# Patient Record
Sex: FEMALE | Race: WHITE | NOT HISPANIC OR LATINO | Employment: UNEMPLOYED | RURAL
[De-identification: names, ages, dates, MRNs, and addresses within clinical notes are randomized per-mention and may not be internally consistent; named-entity substitution may affect disease eponyms.]

---

## 2017-01-23 RX ORDER — FUROSEMIDE 20 MG/1
TABLET ORAL
Qty: 13 TABLET | Refills: 4 | Status: SHIPPED | OUTPATIENT
Start: 2017-01-23 | End: 2017-06-30 | Stop reason: SDUPTHER

## 2017-02-16 ENCOUNTER — OFFICE VISIT (OUTPATIENT)
Dept: FAMILY MEDICINE CLINIC | Facility: CLINIC | Age: 67
End: 2017-02-16

## 2017-02-16 VITALS
BODY MASS INDEX: 35.7 KG/M2 | WEIGHT: 194 LBS | HEIGHT: 62 IN | HEART RATE: 82 BPM | DIASTOLIC BLOOD PRESSURE: 80 MMHG | TEMPERATURE: 97.9 F | RESPIRATION RATE: 16 BRPM | SYSTOLIC BLOOD PRESSURE: 160 MMHG

## 2017-02-16 DIAGNOSIS — E78.5 HYPERLIPIDEMIA, UNSPECIFIED HYPERLIPIDEMIA TYPE: ICD-10-CM

## 2017-02-16 DIAGNOSIS — I10 ESSENTIAL HYPERTENSION: ICD-10-CM

## 2017-02-16 DIAGNOSIS — R05.9 COUGH: ICD-10-CM

## 2017-02-16 DIAGNOSIS — Z11.59 NEED FOR HEPATITIS C SCREENING TEST: ICD-10-CM

## 2017-02-16 DIAGNOSIS — E11.9 TYPE 2 DIABETES MELLITUS WITHOUT COMPLICATION, WITHOUT LONG-TERM CURRENT USE OF INSULIN (HCC): Primary | ICD-10-CM

## 2017-02-16 PROCEDURE — 99213 OFFICE O/P EST LOW 20 MIN: CPT | Performed by: NURSE PRACTITIONER

## 2017-02-16 RX ORDER — GUAIFENESIN 600 MG/1
600 TABLET, EXTENDED RELEASE ORAL 2 TIMES DAILY
Qty: 60 TABLET | Refills: 1 | Status: SHIPPED | OUTPATIENT
Start: 2017-02-16 | End: 2017-07-17

## 2017-02-16 NOTE — PROGRESS NOTES
Subjective   Afia Adams is a 66 y.o. female.     Diabetes   She presents for her follow-up diabetic visit. She has type 2 diabetes mellitus. Her disease course has been stable. There are no hypoglycemic associated symptoms. Pertinent negatives for hypoglycemia include no headaches or sweats. Pertinent negatives for diabetes include no blurred vision, no chest pain and no weight loss. There are no hypoglycemic complications. Symptoms are stable. There are no diabetic complications. Pertinent negatives for diabetic complications include no CVA, PVD or retinopathy. Her weight is stable. She is following a diabetic diet. When asked about meal planning, she reported none. She has not had a previous visit with a dietitian. She rarely participates in exercise. She monitors blood glucose at home 3-4 x per week. Her breakfast blood glucose is taken between 7-8 am. Her breakfast blood glucose range is generally 140-180 mg/dl. An ACE inhibitor/angiotensin II receptor blocker is being taken.   Hypertension   This is a chronic problem. The current episode started more than 1 year ago. The problem is controlled. Associated symptoms include peripheral edema (occ slight swelling in her feet). Pertinent negatives include no anxiety, blurred vision, chest pain, headaches, malaise/fatigue, neck pain, orthopnea, palpitations, PND, shortness of breath or sweats. There are no associated agents to hypertension. Risk factors for coronary artery disease include diabetes mellitus, post-menopausal state and sedentary lifestyle. Past treatments include angiotensin blockers, beta blockers and diuretics. The current treatment provides no improvement. There are no compliance problems.  Hypertensive end-organ damage includes heart failure. There is no history of angina, kidney disease, CAD/MI, CVA, left ventricular hypertrophy, PVD or retinopathy.   Cough   This is a new problem. The current episode started 1 to 4 weeks ago (has had  for 2 weeks). The problem has been waxing and waning. The problem occurs every few hours. The cough is non-productive. Associated symptoms include rhinorrhea. Pertinent negatives include no chest pain, chills, ear congestion, ear pain, fever, headaches, heartburn, hemoptysis, myalgias, nasal congestion, postnasal drip, rash, sore throat, shortness of breath, sweats, weight loss or wheezing. Treatments tried: loratadine. The treatment provided mild relief. There is no history of asthma, bronchiectasis, bronchitis, COPD, emphysema, environmental allergies or pneumonia.        The following portions of the patient's history were reviewed and updated as appropriate: allergies, current medications, past family history, past medical history, past social history, past surgical history and problem list.    Review of Systems   Constitutional: Negative.  Negative for chills, fever, malaise/fatigue and weight loss.   HENT: Positive for rhinorrhea. Negative for ear pain, postnasal drip and sore throat.    Eyes: Negative for blurred vision.   Respiratory: Positive for cough. Negative for hemoptysis, shortness of breath and wheezing.    Cardiovascular: Negative.  Negative for chest pain, palpitations, orthopnea and PND.   Gastrointestinal: Negative.  Negative for heartburn.   Musculoskeletal: Negative.  Negative for myalgias and neck pain.   Skin: Negative.  Negative for rash.   Allergic/Immunologic: Negative for environmental allergies.   Neurological: Negative.  Negative for headaches.   Psychiatric/Behavioral: Negative.        Objective   Physical Exam   Constitutional: She is oriented to person, place, and time. She appears well-developed and well-nourished. No distress.   HENT:   Head: Normocephalic.   Right Ear: External ear normal.   Left Ear: External ear normal.   Mouth/Throat: Oropharynx is clear and moist. No oropharyngeal exudate.   Eyes: Pupils are equal, round, and reactive to light.   Neck: Normal range of motion.  Neck supple. No thyromegaly present.   Cardiovascular: Normal rate, regular rhythm and normal heart sounds.  Exam reveals no friction rub.    No murmur heard.  No edema today in lower ext.   Pulmonary/Chest: Effort normal and breath sounds normal. No respiratory distress. She has no wheezes. She has no rales.   Abdominal: Soft. Bowel sounds are normal.   Musculoskeletal: Normal range of motion.   Neurological: She is alert and oriented to person, place, and time.   Skin: Skin is warm and dry.   Psychiatric: She has a normal mood and affect. Thought content normal.   Nursing note and vitals reviewed.      Assessment/Plan   Afia was seen today for med refill.    Diagnoses and all orders for this visit:    Type 2 diabetes mellitus without complication, without long-term current use of insulin  -     Hemoglobin A1c; Future    Essential hypertension    Hyperlipidemia, unspecified hyperlipidemia type  -     Lipid panel; Future    Need for hepatitis C screening test  -     Hepatitis C Antibody; Future    Cough  -     guaiFENesin (MUCINEX) 600 MG 12 hr tablet; Take 1 tablet by mouth 2 (Two) Times a Day.        Health maintenance is updated and she will have labs drawn next week.        Lab Results   Component Value Date    HGBA1C 6.2 (H) 08/12/2016

## 2017-02-17 PROBLEM — R05.9 COUGH: Status: ACTIVE | Noted: 2017-02-17

## 2017-02-17 PROBLEM — Z11.59 NEED FOR HEPATITIS C SCREENING TEST: Status: ACTIVE | Noted: 2017-02-17

## 2017-02-21 ENCOUNTER — LAB (OUTPATIENT)
Dept: LAB | Facility: CLINIC | Age: 67
End: 2017-02-21

## 2017-02-21 DIAGNOSIS — E11.9 TYPE 2 DIABETES MELLITUS WITHOUT COMPLICATION, WITHOUT LONG-TERM CURRENT USE OF INSULIN (HCC): ICD-10-CM

## 2017-02-21 DIAGNOSIS — E78.5 HYPERLIPIDEMIA, UNSPECIFIED HYPERLIPIDEMIA TYPE: ICD-10-CM

## 2017-02-21 DIAGNOSIS — Z11.59 NEED FOR HEPATITIS C SCREENING TEST: ICD-10-CM

## 2017-02-21 LAB
ARTICHOKE IGE QN: 88 MG/DL (ref 1–129)
CHOLEST SERPL-MCNC: 170 MG/DL (ref 0–199)
HBA1C MFR BLD: 6.16 % (ref 4–5.6)
HCV AB SER DONR QL: NEGATIVE
HDLC SERPL-MCNC: 35 MG/DL (ref 60–200)
LDLC/HDLC SERPL: 2.37 {RATIO} (ref 0–3.22)
TRIGL SERPL-MCNC: 261 MG/DL (ref 20–199)

## 2017-02-21 PROCEDURE — 83036 HEMOGLOBIN GLYCOSYLATED A1C: CPT | Performed by: NURSE PRACTITIONER

## 2017-02-21 PROCEDURE — 80061 LIPID PANEL: CPT | Performed by: NURSE PRACTITIONER

## 2017-02-21 PROCEDURE — 86803 HEPATITIS C AB TEST: CPT | Performed by: NURSE PRACTITIONER

## 2017-03-13 DIAGNOSIS — I10 ESSENTIAL HYPERTENSION: ICD-10-CM

## 2017-03-13 DIAGNOSIS — Z79.899 DRUG THERAPY: ICD-10-CM

## 2017-03-13 DIAGNOSIS — I25.10 CORONARY ARTERY DISEASE INVOLVING NATIVE CORONARY ARTERY OF NATIVE HEART WITHOUT ANGINA PECTORIS: Primary | ICD-10-CM

## 2017-03-14 ENCOUNTER — OFFICE VISIT (OUTPATIENT)
Dept: CARDIOLOGY | Facility: CLINIC | Age: 67
End: 2017-03-14

## 2017-03-14 VITALS
WEIGHT: 190.25 LBS | HEART RATE: 77 BPM | SYSTOLIC BLOOD PRESSURE: 166 MMHG | HEIGHT: 63 IN | OXYGEN SATURATION: 98 % | DIASTOLIC BLOOD PRESSURE: 82 MMHG | BODY MASS INDEX: 33.71 KG/M2

## 2017-03-14 DIAGNOSIS — R05.9 COUGH: ICD-10-CM

## 2017-03-14 DIAGNOSIS — J06.9 UPPER RESPIRATORY TRACT INFECTION, UNSPECIFIED TYPE: ICD-10-CM

## 2017-03-14 DIAGNOSIS — I10 ESSENTIAL HYPERTENSION: ICD-10-CM

## 2017-03-14 DIAGNOSIS — Z79.899 DRUG THERAPY: ICD-10-CM

## 2017-03-14 DIAGNOSIS — I25.10 CORONARY ARTERIOSCLEROSIS: ICD-10-CM

## 2017-03-14 DIAGNOSIS — E78.2 MIXED HYPERLIPIDEMIA: ICD-10-CM

## 2017-03-14 DIAGNOSIS — I50.22 CHRONIC SYSTOLIC HEART FAILURE (HCC): Primary | ICD-10-CM

## 2017-03-14 LAB
ALBUMIN SERPL-MCNC: 4.1 G/DL (ref 3.4–4.8)
ALBUMIN/GLOB SERPL: 1.2 G/DL (ref 1.1–1.8)
ALP SERPL-CCNC: 107 U/L (ref 38–126)
ALT SERPL W P-5'-P-CCNC: 23 U/L (ref 9–52)
ANION GAP SERPL CALCULATED.3IONS-SCNC: 13 MMOL/L (ref 5–15)
AST SERPL-CCNC: 20 U/L (ref 14–36)
BASOPHILS # BLD AUTO: 0.02 10*3/MM3 (ref 0–0.2)
BASOPHILS NFR BLD AUTO: 0.2 % (ref 0–2)
BILIRUB SERPL-MCNC: 0.6 MG/DL (ref 0.2–1.3)
BUN BLD-MCNC: 14 MG/DL (ref 7–21)
BUN/CREAT SERPL: 18.7 (ref 7–25)
CALCIUM SPEC-SCNC: 9 MG/DL (ref 8.4–10.2)
CHLORIDE SERPL-SCNC: 100 MMOL/L (ref 95–110)
CO2 SERPL-SCNC: 29 MMOL/L (ref 22–31)
CREAT BLD-MCNC: 0.75 MG/DL (ref 0.5–1)
DEPRECATED RDW RBC AUTO: 44.7 FL (ref 36.4–46.3)
EOSINOPHIL # BLD AUTO: 0.09 10*3/MM3 (ref 0–0.7)
EOSINOPHIL NFR BLD AUTO: 1.1 % (ref 0–7)
ERYTHROCYTE [DISTWIDTH] IN BLOOD BY AUTOMATED COUNT: 18.7 % (ref 11.5–14.5)
GFR SERPL CREATININE-BSD FRML MDRD: 78 ML/MIN/1.73 (ref 45–104)
GLOBULIN UR ELPH-MCNC: 3.4 GM/DL (ref 2.3–3.5)
GLUCOSE BLD-MCNC: 119 MG/DL (ref 60–100)
HCT VFR BLD AUTO: 31.8 % (ref 35–45)
HGB BLD-MCNC: 9.3 G/DL (ref 12–15.5)
IMM GRANULOCYTES # BLD: 0.02 10*3/MM3 (ref 0–0.02)
IMM GRANULOCYTES NFR BLD: 0.2 % (ref 0–0.5)
LYMPHOCYTES # BLD AUTO: 2.06 10*3/MM3 (ref 0.6–4.2)
LYMPHOCYTES NFR BLD AUTO: 25.5 % (ref 10–50)
MCH RBC QN AUTO: 19.5 PG (ref 26.5–34)
MCHC RBC AUTO-ENTMCNC: 29.2 G/DL (ref 31.4–36)
MCV RBC AUTO: 66.8 FL (ref 80–98)
MONOCYTES # BLD AUTO: 0.76 10*3/MM3 (ref 0–0.9)
MONOCYTES NFR BLD AUTO: 9.4 % (ref 0–12)
NEUTROPHILS # BLD AUTO: 5.13 10*3/MM3 (ref 2–8.6)
NEUTROPHILS NFR BLD AUTO: 63.6 % (ref 37–80)
PLATELET # BLD AUTO: 253 10*3/MM3 (ref 150–450)
PMV BLD AUTO: 9.6 FL (ref 8–12)
POTASSIUM BLD-SCNC: 4.7 MMOL/L (ref 3.5–5.1)
PROT SERPL-MCNC: 7.5 G/DL (ref 6.3–8.6)
RBC # BLD AUTO: 4.76 10*6/MM3 (ref 3.77–5.16)
SODIUM BLD-SCNC: 142 MMOL/L (ref 137–145)
WBC NRBC COR # BLD: 8.08 10*3/MM3 (ref 3.2–9.8)

## 2017-03-14 PROCEDURE — 85025 COMPLETE CBC W/AUTO DIFF WBC: CPT | Performed by: NURSE PRACTITIONER

## 2017-03-14 PROCEDURE — 93010 ELECTROCARDIOGRAM REPORT: CPT | Performed by: INTERNAL MEDICINE

## 2017-03-14 PROCEDURE — 93005 ELECTROCARDIOGRAM TRACING: CPT | Performed by: NURSE PRACTITIONER

## 2017-03-14 PROCEDURE — 99214 OFFICE O/P EST MOD 30 MIN: CPT | Performed by: NURSE PRACTITIONER

## 2017-03-14 PROCEDURE — 80053 COMPREHEN METABOLIC PANEL: CPT | Performed by: NURSE PRACTITIONER

## 2017-03-14 RX ORDER — CEFUROXIME AXETIL 500 MG/1
500 TABLET ORAL 2 TIMES DAILY
Qty: 14 TABLET | Refills: 0 | Status: SHIPPED | OUTPATIENT
Start: 2017-03-14 | End: 2017-07-17

## 2017-03-14 RX ORDER — BENZONATATE 100 MG/1
100 CAPSULE ORAL 3 TIMES DAILY PRN
Qty: 21 CAPSULE | Refills: 0 | Status: SHIPPED | OUTPATIENT
Start: 2017-03-14 | End: 2017-07-17

## 2017-03-14 NOTE — PROGRESS NOTES
Subjective:     Chief Complaint   Patient presents with   • Congestive Heart Failure     chief complaint      Congestive Heart Failure   Presents for follow-up visit. Associated symptoms include orthopnea. Pertinent negatives include no chest pain, edema, fatigue, palpitations, paroxysmal nocturnal dyspnea or unexpected weight change. Shortness of breath: NYHA Class II. Compliance with total regimen is 26-50%. Compliance with diet is 51-75%. Compliance with exercise is 0-25%. Compliance with medications is %.     Ms Adams is a 65 year old female who presents for her follow up HF Clinic visit.  Ms Adams was admitted to the hospital (from [facility]) on 03/08/2016 with evidence of anterior STEMI with PCI per Dr Kent. Cath was noted with evidence of multi-vessel disease. In addition, she required intubation with mechanical ventilation secondary to pulmonary edema. Muga scan demonstrated evidence of EF 35.5% on 03/15/2016. She was discharged on 03/17/2016.  Ms Adams is accompanied by her daughter-in-law, Yu Adams.  Ms Adams indicates continued stability and improvement , but is experiencing no chest pain. She is low functional capacity at this time - NYHA Class III.     PCP: BILL Rodrigues  Cardiology: Dr Kent    [facility]:  1. CAD, S/P PCI LAD - per Dr Kent - 03/08/2016 2.75mm x 12mm and a 2.5mm x 23mm Alpine stent proximal LAD  2. HFrEF, ischemic 35.5% by Muga on 03/15/2016  3. Acute pulmonary edema 03/08/2016 requiring intubation   4. RLL pneumonia 03/2016  5. Hypertension  6. Hyperlipidemia  7. DM, Type II  8.GERD  9. Colon polyp removal 03/07/2016  10. H/O hyperkalemia on AA    Yu: daughter-in-law [phone] (cell)    Review of Systems   Constitutional: Negative for appetite change, chills, fatigue, fever and unexpected weight change.   HENT: Congestion: chest for approximately 1 week.    Respiratory: Negative for  apnea and chest tightness. Cough: NP for approximately 1 week; given Mucinex. Shortness of breath: NYHA Class II.    Cardiovascular: Negative for chest pain, palpitations and leg swelling.   Gastrointestinal: Negative for abdominal distention.   Neurological: Negative for dizziness, syncope and light-headedness.   Hematological: Does not bruise/bleed easily.     Current Outpatient Prescriptions   Medication Sig Dispense Refill   • albuterol (VENTOLIN HFA) 108 (90 BASE) MCG/ACT inhaler Inhale 2 puffs 4 (four) times a day.     • aspirin 81 MG tablet Take 81 mg by mouth daily.     • carvedilol (COREG) 25 MG tablet Take 25 mg by mouth 2 (two) times a day.     • clopidogrel (PLAVIX) 75 MG tablet Take 75 mg by mouth daily.     • digoxin (LANOXIN) 125 MCG tablet Take 125 mcg by mouth every day.     • furosemide (LASIX) 20 MG tablet TAKE ONE TABLET BY MOUTH ON MONDAYS, THURSDAYS, AND SATURDAYS 13 tablet 4   • glimepiride (AMARYL) 1 MG tablet TAKE ONE-HALF TABLET BY MOUTH TWO TIMES A DAY 30 tablet 3   • guaiFENesin (MUCINEX) 600 MG 12 hr tablet Take 1 tablet by mouth 2 (Two) Times a Day. 60 tablet 1   • ipratropium-albuterol (COMBIVENT)  MCG/ACT inhaler Inhale 3 (three) times a day.     • loratadine (CLARITIN) 10 MG tablet Take 1 tablet by mouth Daily. 30 tablet 3   • losartan (COZAAR) 50 MG tablet Take 50 mg by mouth daily.     • omeprazole (PriLOSEC) 40 MG capsule Take 40 mg by mouth daily.     • pravastatin (PRAVACHOL) 20 MG tablet TAKE ONE TABLET BY MOUTH EVERY NIGHT AT BEDTIME 30 tablet 3     No current facility-administered medications for this visit.      Objective:      Physical Exam   Constitutional: She is oriented to person, place, and time. She appears well-nourished. No distress.   HENT:   Head: Normocephalic and atraumatic.   Neck: No JVD present.   Cardiovascular: Normal rate, regular rhythm and normal heart sounds.  Exam reveals no gallop.    No murmur heard.  Pulmonary/Chest: Effort normal and breath  sounds normal. No respiratory distress. She has no wheezes.   Abdominal: She exhibits no distension.   Musculoskeletal: Edema: Pedal edema BLE - reported to occur as day progresses.   Neurological: She is alert and oriented to person, place, and time.   Skin: Skin is warm and dry. She is not diaphoretic.   Psychiatric: She has a normal mood and affect. Her behavior is normal. Judgment and thought content normal.     Cardiographics  FINDINGS:  The left atrium appears to be normal in size with mild  concentric left ventricular hypertrophy with normal aortic root size.  There is evidence of posterolateral wall hypokinesis noted and septal  wall severely hypokinetic, asymmetric septal wall hypertrophy, apical  wall akinesis, severely depressed left ventricular systolic function,  with an estimated ejection fraction of about 20% to 25%.  Mitral valve appears to be mildly thickened with adequate opening.  Aortic valve appears to be thickened and calcified suggestive of aortic  sclerosis. There is concentric left ventricular hypertrophy noted with E  to A reversal suggestive of diastolic dysfunction.  Color flow Doppler revealed mild mitral and mild tricuspid  regurgitation. 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  There is no intracardiac mass or smoke ECHO contrast noted suggestive of  intracardiac thrombus. 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 Pericardium appears to be normal without any evidence  of effusion.     Cardiac Cath: 03/08/2016  CORONARIES:  Right dominant system.  1.   LEFT MAIN: Left main coronary artery toward the distal end had       evidence of luminal irregularity.  2.   LAD: Left anterior descending artery was a medium caliber vessel,       which in the proximal portion was 100% occluded after the origin of       the 1st diagonal branch. There was no evidence of any antegrade       flow down the distal left anterior descending artery. The  1st       diagonal branch in the proximal portion had up to 80% to 90%       stenosis.          The 1st septal  in the proximal portion had evidence of       luminal irregularity. There was no evidence of any antegrade flow       down the left anterior descending artery.  3.   CIRCUMFLEX: The circumflex artery was a medium caliber vessel,       which towards the ostium, had up to 50% to 60% stenosis. The distal       end of the circumflex artery gave origin to the obtuse marginal       branch #2 and #3, which had 70% to 80% stenosis.  4.   RCA: The right coronary artery was a medium caliber dominant       vessel, which in the proximal portion had a discrete 60% to 70%       stenosis. The midportion of the right coronary artery had 30% to       40% stenosis, and the distal right coronary artery bifurcated into       the posterior descending and posterolateral branch. The posterior       lateral branch at its origin had 60% to 70% stenosis. The distal       right coronary artery, before the bifurcation to the posterior       descending and posterolateral branch, had evidence of 30% to 40%       narrowing.     FINAL IMPRESSION:  1.   The right coronary artery was diffusely diseased.  2.   A 100% occluded proximal left anterior descending artery, which was       the infarct-related vessel.  3.   Ostial circumflex artery with 30% to 40% narrowing and in some       views appeared to be 60% to 70%.  4.   The 1st diagonal branch in the proximal portion with 70% to 80%       stenosis.  Following this, a 2.75 mm x 12 mm and a 2.5 mm x 23 mm Alpine stent was  then successfully deployed with reduction of stenosis from 100% to less  than 0% stenosis. Patient did have distal left anterior descending  artery, diagonal, ostial circumflex artery, and diffusely diseased right  coronary artery.     Patient will be evaluated for direct revascularization and have  discussed with Dr. Verduzco. In view of the patient's acute  myocardial  infarction with left anterior descending artery 100% occluded, patient  at the present time would not be subjected to an emergent coronary  artery bypass grafting, and patient will be stabilized.    Lab Review   Lab on 02/21/2017   Component Date Value   • Hemoglobin A1C 02/21/2017 6.16*   • Total Cholesterol 02/21/2017 170    • Triglycerides 02/21/2017 261*   • HDL Cholesterol 02/21/2017 35*   • LDL Cholesterol  02/21/2017 88    • LDL/HDL Ratio 02/21/2017 2.37    • Hepatitis C Ab 02/21/2017 Negative    Hospital Outpatient Visit on 12/20/2016   Component Date Value   • Sodium 12/20/2016 140    • Potassium 12/20/2016 5.3*   • Chloride 12/20/2016 97    • CO2 12/20/2016 28    • Anion Gap 12/20/2016 15.0    • Glucose 12/20/2016 96    • BUN 12/20/2016 15    • Creatinine 12/20/2016 0.9    • GFR MDRD Non  Jacqui* 12/20/2016 63    • GFR MDRD  12/20/2016 76    • Calcium 12/20/2016 9.5    Office Visit on 12/06/2016   Component Date Value   • Sodium 12/06/2016 139    • Potassium 12/06/2016 5.5*   • Chloride 12/06/2016 98    • CO2 12/06/2016 29    • Anion Gap 12/06/2016 12.0    • Glucose 12/06/2016 138*   • BUN 12/06/2016 13    • Creatinine 12/06/2016 0.8    • GFR MDRD Non  Jacqui* 12/06/2016 72    • GFR MDRD  12/06/2016 87    • Calcium 12/06/2016 9.5    • Digoxin 12/06/2016 <0.4*      Assessment:      Diagnosis Plan   1. Chronic systolic heart failure  Comprehensive Metabolic Panel   2. Coronary arteriosclerosis  Comprehensive Metabolic Panel    CBC & Differential    CBC Auto Differential    Scan Slide   3. Essential hypertension  Comprehensive Metabolic Panel   4. Mixed hyperlipidemia  Comprehensive Metabolic Panel   5. Cough  benzonatate (TESSALON PERLES) 100 MG capsule    CBC & Differential    CBC Auto Differential    Scan Slide   6. Upper respiratory tract infection, unspecified type  cefuroxime (CEFTIN) 500 MG tablet    CBC & Differential    CBC Auto Differential    Scan  Slide   7. Drug therapy  Comprehensive Metabolic Panel    CBC & Differential    CBC Auto Differential    Scan Slide     Ms Adams presents with history of HFrEF for which there is no clinical evidence of ADHF. Uncontrolled hypertension is noted.   Ms Adams does present with clinical evidence of URI.  Plan:   Recommended daily weight monitoring.  Recommended restricted dietary sodium intake.  Discussed patient action plan for heart failure.  Recommended avoiding NSAIDs use.  Discussed warning signs requiring additional medical attention for heart failure.  X-Large compression stockings given.  Continue current HF plan of care.    Monitor OP BP as there is question of hypertension being related to OTC medications for URI, along with disruption of sleep patterns.   Will contact re: today's lab results, along with echocardiogram results in 1 week unless otherwise indicated by today's labs.

## 2017-03-21 ENCOUNTER — DOCUMENTATION (OUTPATIENT)
Dept: CARDIOLOGY | Facility: CLINIC | Age: 67
End: 2017-03-21

## 2017-03-21 ENCOUNTER — TELEPHONE (OUTPATIENT)
Dept: CARDIOLOGY | Facility: CLINIC | Age: 67
End: 2017-03-21

## 2017-03-21 RX ORDER — HYDRALAZINE HYDROCHLORIDE 25 MG/1
25 TABLET, FILM COATED ORAL 2 TIMES DAILY
Qty: 60 TABLET | Refills: 5 | Status: SHIPPED | OUTPATIENT
Start: 2017-03-21 | End: 2017-07-12 | Stop reason: DRUGHIGH

## 2017-03-21 RX ORDER — PRAVASTATIN SODIUM 20 MG
20 TABLET ORAL NIGHTLY
Qty: 30 TABLET | Refills: 5 | Status: SHIPPED | OUTPATIENT
Start: 2017-03-21 | End: 2017-04-25 | Stop reason: SDUPTHER

## 2017-03-21 NOTE — TELEPHONE ENCOUNTER
Telephone contact re: BP which is reported to be consistent: 140-160/80.   Labs and echocardiogram results are discussed.    Initiation of Hydralazine 25 mg bid, switching ARB to noon administration.     Daughter-in-law will continue to monitor BP and contact me with any issues/concerns.

## 2017-03-22 RX ORDER — GLIMEPIRIDE 1 MG/1
TABLET ORAL
Qty: 30 TABLET | Refills: 2 | Status: SHIPPED | OUTPATIENT
Start: 2017-03-22 | End: 2017-06-20 | Stop reason: SDUPTHER

## 2017-04-25 DIAGNOSIS — E78.5 HYPERLIPIDEMIA, UNSPECIFIED HYPERLIPIDEMIA TYPE: Primary | ICD-10-CM

## 2017-04-25 RX ORDER — PRAVASTATIN SODIUM 20 MG
20 TABLET ORAL NIGHTLY
Qty: 90 TABLET | Refills: 1 | Status: SHIPPED | OUTPATIENT
Start: 2017-04-25 | End: 2017-07-17 | Stop reason: SDUPTHER

## 2017-06-20 RX ORDER — GLIMEPIRIDE 1 MG/1
TABLET ORAL
Qty: 30 TABLET | Refills: 1 | Status: SHIPPED | OUTPATIENT
Start: 2017-06-20 | End: 2017-08-17 | Stop reason: SDUPTHER

## 2017-06-30 RX ORDER — FUROSEMIDE 20 MG/1
TABLET ORAL
Qty: 13 TABLET | Refills: 0 | Status: SHIPPED | OUTPATIENT
Start: 2017-06-30 | End: 2017-07-17 | Stop reason: SDUPTHER

## 2017-07-05 ENCOUNTER — APPOINTMENT (OUTPATIENT)
Dept: LAB | Facility: HOSPITAL | Age: 67
End: 2017-07-05

## 2017-07-05 ENCOUNTER — OFFICE VISIT (OUTPATIENT)
Dept: CARDIOLOGY | Facility: CLINIC | Age: 67
End: 2017-07-05

## 2017-07-05 VITALS
HEART RATE: 82 BPM | DIASTOLIC BLOOD PRESSURE: 78 MMHG | HEIGHT: 63 IN | BODY MASS INDEX: 35.81 KG/M2 | OXYGEN SATURATION: 97 % | SYSTOLIC BLOOD PRESSURE: 180 MMHG | WEIGHT: 202.13 LBS

## 2017-07-05 DIAGNOSIS — I10 ESSENTIAL HYPERTENSION: ICD-10-CM

## 2017-07-05 DIAGNOSIS — I50.22 CHRONIC SYSTOLIC HEART FAILURE (HCC): Primary | ICD-10-CM

## 2017-07-05 DIAGNOSIS — Z79.899 DRUG THERAPY: ICD-10-CM

## 2017-07-05 DIAGNOSIS — I25.10 CORONARY ARTERIOSCLEROSIS: ICD-10-CM

## 2017-07-05 LAB
ANION GAP SERPL CALCULATED.3IONS-SCNC: 13 MMOL/L (ref 5–15)
BUN BLD-MCNC: 12 MG/DL (ref 7–21)
BUN/CREAT SERPL: 14.3 (ref 7–25)
CALCIUM SPEC-SCNC: 9.3 MG/DL (ref 8.4–10.2)
CHLORIDE SERPL-SCNC: 99 MMOL/L (ref 95–110)
CO2 SERPL-SCNC: 28 MMOL/L (ref 22–31)
CREAT BLD-MCNC: 0.84 MG/DL (ref 0.5–1)
DIGOXIN SERPL-MCNC: <0.4 NG/ML (ref 0.8–2)
GFR SERPL CREATININE-BSD FRML MDRD: 68 ML/MIN/1.73 (ref 45–104)
GLUCOSE BLD-MCNC: 120 MG/DL (ref 60–100)
POTASSIUM BLD-SCNC: 5.3 MMOL/L (ref 3.5–5.1)
SODIUM BLD-SCNC: 140 MMOL/L (ref 137–145)

## 2017-07-05 PROCEDURE — 80048 BASIC METABOLIC PNL TOTAL CA: CPT | Performed by: NURSE PRACTITIONER

## 2017-07-05 PROCEDURE — 36415 COLL VENOUS BLD VENIPUNCTURE: CPT | Performed by: NURSE PRACTITIONER

## 2017-07-05 PROCEDURE — 99214 OFFICE O/P EST MOD 30 MIN: CPT | Performed by: NURSE PRACTITIONER

## 2017-07-05 PROCEDURE — 80162 ASSAY OF DIGOXIN TOTAL: CPT | Performed by: NURSE PRACTITIONER

## 2017-07-05 RX ORDER — LOSARTAN POTASSIUM 100 MG/1
TABLET ORAL
COMMUNITY
Start: 2017-04-06 | End: 2017-07-17

## 2017-07-05 NOTE — PROGRESS NOTES
Subjective:     Congestive Heart Failure (chief complaint )    Congestive Heart Failure   Presents for follow-up visit. Associated symptoms include edema, shortness of breath (Stage C; NYHA Class III) and unexpected weight change. Pertinent negatives include no abdominal pain, chest pain, chest pressure, muscle weakness, near-syncope, orthopnea, palpitations or paroxysmal nocturnal dyspnea. Compliance with total regimen is 26-50%. Compliance problems include adherence to diet and adherence to exercise.  Compliance with diet is 26-50%. Compliance with exercise is 0-25%. Compliance with medications is 51-75%.     Mrs. Adams is a 66-year-old  female who presents to the office for scheduled 3 month office follow-up.  She is accompanied by her .   Parties report compliance with medications although they do not have their medication bottles with them today.  They report that they have been doing some shopping prior to the office visit.   They had lunch at "[employer] prior to the office visit.    Ms Adams was admitted to the hospital (from [facility]) on 03/08/2016 with evidence of anterior STEMI with PCI per Dr Kent. Cath was noted with evidence of multi-vessel disease. In addition, she required intubation with mechanical ventilation secondary to pulmonary edema. Muga scan demonstrated evidence of EF 35.5% on 03/15/2016. She was discharged on 03/17/2016.    PCP: BILL Rodrigues  Cardiology: Dr Kent    [facility]:  1. CAD, S/P PCI LAD - per Dr Kent - 03/08/2016 2.75mm x 12mm and a 2.5mm x 23mm Alpine stent proximal LAD  2. HFrEF, ischemic 35.5% by Muga on 03/15/2016  3. Acute pulmonary edema 03/08/2016 requiring intubation   4. RLL pneumonia 03/2016  5. Hypertension  6. Hyperlipidemia  7. DM, Type II  8.GERD  9. Colon polyp removal 03/07/2016  10. H/O hyperkalemia on AA     Yu: daughter-in-law [phone] (cell)    Review of Systems   Constitution: Positive for unexpected  weight change.   Cardiovascular: Positive for dyspnea on exertion and leg swelling. Negative for chest pain, irregular heartbeat, near-syncope, orthopnea, palpitations, paroxysmal nocturnal dyspnea and syncope.   Respiratory: Positive for shortness of breath (Stage C; NYHA Class III).    Musculoskeletal: Negative for muscle weakness.   Gastrointestinal: Negative for abdominal pain.   Neurological: Negative for dizziness, light-headedness and vertigo.     Past Medical History:   Diagnosis Date   • Abdominal pain 11/20/2015    unspecified   • Acute gastritis    • Acute on chronic systolic congestive heart failure 04/13/2016   • Ankle pain 04/27/2015   • Candidiasis, skin or nails 12/22/2011    controlled   • Chronic systolic congestive heart failure 08/01/2016   • Congestive heart failure 08/01/2016   • Coronary arteriosclerosis 06/21/2016    multi-vessel   • Cough 09/25/2014    proabably due to allergy, chest x ray is normal      • Edema of foot 03/24/2016   • Encounter for long-term (current) drug use     therapy   • Epigastric pain 12/03/2015   • Essential hypertension 06/21/2016   • History of echocardiogram 03/14/2016    Left atrium normal with mild CLVH wit normal aortic root size.Evidence of posterolateral wall hypokinesis. Severely depressed LV systolic function of 20-25%.Mitral valve thickened Aortic sclerosis.Diastolic dysfunction   • Hyperlipidemia 03/31/2016    unspecified   • Hypertensive disorder 03/24/2016   • Left lower quadrant pain 07/12/2013    ruling out diverticular disease      • Myocardial infarction 03/24/2016   • Nausea 11/20/2015   • Osteoarthritis of knee 06/23/2016   • Pain in limb 01/25/2011   • Pediculosis capitis 12/22/2011    controlled   • Peptic ulcer    • Polyuria 01/25/2011   • Pruritic rash 12/09/2014   • Superficial foreign body hip without major open wound, no infection 12/09/2011    ??? back   • Type 2 diabetes mellitus 06/23/2016    new onset   • Weakness 06/23/2016     Past  Surgical History:   Procedure Laterality Date   • COLONOSCOPY W/ POLYPECTOMY  03/07/2016    Transverse colon polyps,descending polyps,Sigmoid polyps, all resected and retrieved. Diverticulosis without perforation or abscess without bleeding. Erica Thomas   • HYSTERECTOMY      ovary preserv   • INJECTION OF MEDICATION  09/11/2014    Celestone (betamethasone) (2)       Social History     Social History   • Marital status:      Spouse name: N/A   • Number of children: N/A   • Years of education: N/A     Social History Main Topics   • Smoking status: Never Smoker   • Smokeless tobacco: Never Used   • Alcohol use No   • Drug use: No   • Sexual activity: Not Asked     Other Topics Concern   • None     Social History Narrative     Current Outpatient Prescriptions   Medication Sig Dispense Refill   • albuterol (VENTOLIN HFA) 108 (90 BASE) MCG/ACT inhaler Inhale 2 puffs 4 (four) times a day.     • aspirin 81 MG tablet Take 81 mg by mouth daily.     • benzonatate (TESSALON PERLES) 100 MG capsule Take 1 capsule by mouth 3 (Three) Times a Day As Needed for cough. 21 capsule 0   • carvedilol (COREG) 25 MG tablet Take 25 mg by mouth 2 (two) times a day.     • cefuroxime (CEFTIN) 500 MG tablet Take 1 tablet by mouth 2 (Two) Times a Day. 14 tablet 0   • clopidogrel (PLAVIX) 75 MG tablet Take 75 mg by mouth daily.     • digoxin (LANOXIN) 125 MCG tablet Take 125 mcg by mouth every day.     • furosemide (LASIX) 20 MG tablet TAKE ONE TABLET BY MOUTH ON MONDAYS, THURSDAYS, AND SATURDAYS 13 tablet 0   • glimepiride (AMARYL) 1 MG tablet TAKE ONE-HALF TABLET BY MOUTH TWICE A DAY 30 tablet 1   • guaiFENesin (MUCINEX) 600 MG 12 hr tablet Take 1 tablet by mouth 2 (Two) Times a Day. 60 tablet 1   • hydrALAZINE (APRESOLINE) 25 MG tablet Take 1 tablet by mouth 2 (Two) Times a Day. 60 tablet 5   • ipratropium-albuterol (COMBIVENT)  MCG/ACT inhaler Inhale 3 (three) times a day.     • loratadine (CLARITIN) 10 MG tablet Take 1 tablet  "by mouth Daily. 30 tablet 3   • losartan (COZAAR) 100 MG tablet      • omeprazole (PriLOSEC) 40 MG capsule Take 40 mg by mouth daily.     • pravastatin (PRAVACHOL) 20 MG tablet Take 1 tablet by mouth Every Night. 90 tablet 1     No current facility-administered medications for this visit.      Objective:     Vitals:    07/05/17 1316   BP: 180/78   BP Location: Left arm   Patient Position: Sitting   Cuff Size: Adult   Pulse: 82   SpO2: 97%   Weight: 202 lb 2 oz (91.7 kg)   Height: 63\" (160 cm)      Physical Exam   Constitutional: She is oriented to person, place, and time. She appears well-nourished. No distress.   HENT:   Head: Normocephalic and atraumatic.   Neck: No JVD present.   Cardiovascular: Normal rate, regular rhythm and normal heart sounds.  Exam reveals no gallop.    No murmur heard.  Pulmonary/Chest: Effort normal and breath sounds normal. No respiratory distress. She has no wheezes.   Abdominal: She exhibits no distension.   Musculoskeletal: She exhibits edema (1+ BLE without presence of compression stockings).   Neurological: She is alert and oriented to person, place, and time.   Skin: Skin is warm and dry. She is not diaphoretic.   Psychiatric: She has a normal mood and affect. Her behavior is normal. Judgment and thought content normal.     Cardiographics  Echocardiogram: 03/16/2017  · Left ventricular function is severely decreased.  · Calculated EF = 29%. Estimated EF was in agreement with the calculated EF  · Global left ventricular wall motion appears abnormal  · Left ventricular diastolic dysfunction (grade II) consistent with pseudonormalization and elevated left atrial pressures.  · Left ventricular wall thickness is consistent with mild concentric hypertrophy.  · Right Ventricle: Normal cavity size, wall thickness, systolic function and septal motion noted  · Mild tricuspid valve regurgitation is present.  · Mild mitral valve regurgitation is present  · Mild to moderate pulmonary " hypertension is present  · There is no evidence of pericardial effusion.      Cardiac Cath: 03/08/2016  CORONARIES: Right dominant system.  1. LEFT MAIN: Left main coronary artery toward the distal end had  evidence of luminal irregularity.  2. LAD: Left anterior descending artery was a medium caliber vessel,  which in the proximal portion was 100% occluded after the origin of  the 1st diagonal branch. There was no evidence of any antegrade  flow down the distal left anterior descending artery. The 1st  diagonal branch in the proximal portion had up to 80% to 90%  stenosis.      The 1st septal  in the proximal portion had evidence of  luminal irregularity. There was no evidence of any antegrade flow  down the left anterior descending artery.  3. CIRCUMFLEX: The circumflex artery was a medium caliber vessel,  which towards the ostium, had up to 50% to 60% stenosis. The distal  end of the circumflex artery gave origin to the obtuse marginal  branch #2 and #3, which had 70% to 80% stenosis.  4. RCA: The right coronary artery was a medium caliber dominant  vessel, which in the proximal portion had a discrete 60% to 70%  stenosis. The midportion of the right coronary artery had 30% to  40% stenosis, and the distal right coronary artery bifurcated into  the posterior descending and posterolateral branch. The posterior  lateral branch at its origin had 60% to 70% stenosis. The distal  right coronary artery, before the bifurcation to the posterior  descending and posterolateral branch, had evidence of 30% to 40%  narrowing.      FINAL IMPRESSION:  1. The right coronary artery was diffusely diseased.  2. A 100% occluded proximal left anterior descending artery, which was  the infarct-related vessel.  3. Ostial circumflex artery with 30% to 40% narrowing and in some  views appeared to be 60% to 70%.  4. The 1st diagonal branch in the proximal portion with 70% to 80%  stenosis.  Following this, a 2.75 mm x 12 mm and  a 2.5 mm x 23 mm Alpine stent was  then successfully deployed with reduction of stenosis from 100% to less  than 0% stenosis. Patient did have distal left anterior descending  artery, diagonal, ostial circumflex artery, and diffusely diseased right  coronary artery.      Patient will be evaluated for direct revascularization and have  discussed with Dr. Verduzco. In view of the patient's acute myocardial  infarction with left anterior descending artery 100% occluded, patient  at the present time would not be subjected to an emergent coronary  artery bypass grafting, and patient will be stabilized.     Lab Results   Component Value Date    GLUCOSE 119 (H) 03/14/2017    CALCIUM 9.0 03/14/2017     03/14/2017    K 4.7 03/14/2017    CO2 29.0 03/14/2017     03/14/2017    BUN 14 03/14/2017    CREATININE 0.75 03/14/2017    EGFRIFNONA 78 03/14/2017    BCR 18.7 03/14/2017    ANIONGAP 13.0 03/14/2017     Lab Results   Component Value Date    WBC 8.08 03/14/2017    HGB 9.3 (L) 03/14/2017    HCT 31.8 (L) 03/14/2017    MCV 66.8 (L) 03/14/2017     03/14/2017     Lab Results   Component Value Date    CHOL 170 02/21/2017     Lab Results   Component Value Date    TRIG 261 (H) 02/21/2017    TRIG 272 (H) 03/09/2016     Lab Results   Component Value Date    HDL 35 (L) 02/21/2017    HDL 28 (L) 03/09/2016     Lab Results   Component Value Date    LDLCALC 90 03/09/2016     Lab Results   Component Value Date    TSH 2.83 03/09/2016     The following portions of the patient's history were reviewed and updated as appropriate: allergies, current medications, past family history, past medical history, past social history, past surgical history and problem list.     Assessment:      Diagnosis Plan   1. Chronic systolic heart failure   Stage C; NYHA Class III Basic Metabolic Panel  BETA-BLOCKER: Carvedilol  ACE/ARB: Losartan  ENTRESTO: consider  DIURETIC: Lasix (increasing to 20 mg (2) tablets every morning for 3 days; then (1)  tablet daily until returns to office)  ALDOSTERONT ANTAGONIST: H/O hyperkalemia  IMDUR/HYDRALAZINE: Hydralazine  DIGOXIN: Present  Fluid restriction: 2000 cc daily   Sodium restriction: 2000 mg daily   6MWT: Yes  Cardiac Rehab: N/A - transportation issues  Pulmonary Rehab: N/A   ICD: N/A @ this time  CardioMEMS: could be considered  Advanced HF evaluation (Transplant/LVAD):        Digoxin Level   2. Coronary arteriosclerosis  ASA plus statin therapy   3. Essential hypertension  Increase Hydralazine 25 mg to (2) tablets twice daily   4. Drug therapy  Basic Metabolic Panel    Digoxin Level      The patient presents with history of HFrEF, ischemic for chest there is clinical evidence of mild to moderate hypervolemia.  Blood pressure is not well-controlled this time.     Telephone contact made with daughter-in-law who typically assist this individual with her medication preparation.  I have made her aware of the above medication changes and the plan for follow-up visit.

## 2017-07-12 RX ORDER — HYDRALAZINE HYDROCHLORIDE 50 MG/1
50 TABLET, FILM COATED ORAL 2 TIMES DAILY
Qty: 60 TABLET | Refills: 2 | Status: SHIPPED | OUTPATIENT
Start: 2017-07-12 | End: 2017-09-18 | Stop reason: DRUGHIGH

## 2017-07-17 ENCOUNTER — OFFICE VISIT (OUTPATIENT)
Dept: CARDIOLOGY | Facility: CLINIC | Age: 67
End: 2017-07-17

## 2017-07-17 VITALS
BODY MASS INDEX: 35.47 KG/M2 | HEIGHT: 63 IN | DIASTOLIC BLOOD PRESSURE: 74 MMHG | OXYGEN SATURATION: 95 % | WEIGHT: 200.2 LBS | SYSTOLIC BLOOD PRESSURE: 166 MMHG | HEART RATE: 75 BPM

## 2017-07-17 DIAGNOSIS — E78.5 HYPERLIPIDEMIA, UNSPECIFIED HYPERLIPIDEMIA TYPE: ICD-10-CM

## 2017-07-17 DIAGNOSIS — I25.10 CORONARY ARTERIOSCLEROSIS: ICD-10-CM

## 2017-07-17 DIAGNOSIS — I10 ESSENTIAL HYPERTENSION: ICD-10-CM

## 2017-07-17 DIAGNOSIS — Z79.899 DRUG THERAPY: ICD-10-CM

## 2017-07-17 DIAGNOSIS — I50.22 CHRONIC SYSTOLIC HEART FAILURE (HCC): Primary | ICD-10-CM

## 2017-07-17 PROCEDURE — 99214 OFFICE O/P EST MOD 30 MIN: CPT | Performed by: NURSE PRACTITIONER

## 2017-07-17 RX ORDER — MAGNESIUM OXIDE 400 MG/1
400 TABLET ORAL 2 TIMES DAILY
COMMUNITY
End: 2018-07-02 | Stop reason: SDUPTHER

## 2017-07-17 RX ORDER — FUROSEMIDE 20 MG/1
TABLET ORAL
Qty: 16 TABLET | Refills: 3 | Status: SHIPPED | OUTPATIENT
Start: 2017-07-17 | End: 2017-11-05 | Stop reason: SDUPTHER

## 2017-07-17 RX ORDER — LOSARTAN POTASSIUM 50 MG/1
50 TABLET ORAL DAILY
COMMUNITY
End: 2017-07-17 | Stop reason: DRUGHIGH

## 2017-07-17 RX ORDER — PRAVASTATIN SODIUM 20 MG
20 TABLET ORAL NIGHTLY
Qty: 30 TABLET | Refills: 5 | Status: SHIPPED | OUTPATIENT
Start: 2017-07-17 | End: 2017-08-24 | Stop reason: DRUGHIGH

## 2017-07-17 RX ORDER — LOSARTAN POTASSIUM 100 MG/1
TABLET ORAL
Qty: 30 TABLET | Refills: 5 | Status: SHIPPED | OUTPATIENT
Start: 2017-07-17 | End: 2017-12-18 | Stop reason: CLARIF

## 2017-07-17 NOTE — PROGRESS NOTES
Subjective:     Congestive Heart Failure    Congestive Heart Failure   Presents for follow-up visit. Associated symptoms include edema and shortness of breath (Stage C; NYHA Class III). Pertinent negatives include no abdominal pain, chest pain, chest pressure, muscle weakness, near-syncope, orthopnea, palpitations, paroxysmal nocturnal dyspnea or unexpected weight change. The symptoms have been improving. Compliance with total regimen is 26-50%. Compliance problems include adherence to diet and adherence to exercise.  Compliance with diet is 26-50%. Compliance with exercise is 0-25%. Compliance with medications is 51-75%.     Mrs. Adams is a 66-year-old  female who presents to the office for scheduled 3 month office follow-up.  She is accompanied by her daughter in law.   Medications bottles are reviewed today.  During the course of conversation, there are concerns that arise re: compliance with dietary Na+ and fluid restrictions.     Ms Adams was admitted to the hospital (from [facility]) on 03/08/2016 with evidence of anterior STEMI with PCI per Dr Kent. Cath was noted with evidence of multi-vessel disease. In addition, she required intubation with mechanical ventilation secondary to pulmonary edema. Muga scan demonstrated evidence of EF 35.5% on 03/15/2016. She was discharged on 03/17/2016.    PCP: BILL Rodrigues  Cardiology: Dr Kent    [facility]:  1. CAD, S/P PCI LAD - per Dr Kent - 03/08/2016 2.75mm x 12mm and a 2.5mm x 23mm Alpine stent proximal LAD  2. HFrEF, ischemic 35.5% by Muga on 03/15/2016  3. Acute pulmonary edema 03/08/2016 requiring intubation   4. RLL pneumonia 03/2016  5. Hypertension  6. Hyperlipidemia  7. DM, Type II  8.GERD  9. Colon polyp removal 03/07/2016  10. H/O hyperkalemia on AA     Yu: daughter-in-law [phone] (cell)    Review of Systems   Constitution: Negative for unexpected weight change.   Cardiovascular: Positive for dyspnea on  exertion and leg swelling. Negative for chest pain, irregular heartbeat, near-syncope, orthopnea, palpitations, paroxysmal nocturnal dyspnea and syncope.   Respiratory: Positive for shortness of breath (Stage C; NYHA Class III).    Musculoskeletal: Negative for muscle weakness.   Gastrointestinal: Negative for abdominal pain.   Neurological: Negative for dizziness, light-headedness and vertigo.     Past Medical History:   Diagnosis Date   • Abdominal pain 11/20/2015    unspecified   • Acute gastritis    • Acute on chronic systolic congestive heart failure 04/13/2016   • Ankle pain 04/27/2015   • Candidiasis, skin or nails 12/22/2011    controlled   • Chronic systolic congestive heart failure 08/01/2016   • Congestive heart failure 08/01/2016   • Coronary arteriosclerosis 06/21/2016    multi-vessel   • Cough 09/25/2014    proabably due to allergy, chest x ray is normal      • Edema of foot 03/24/2016   • Encounter for long-term (current) drug use     therapy   • Epigastric pain 12/03/2015   • Essential hypertension 06/21/2016   • History of echocardiogram 03/14/2016    Left atrium normal with mild CLVH wit normal aortic root size.Evidence of posterolateral wall hypokinesis. Severely depressed LV systolic function of 20-25%.Mitral valve thickened Aortic sclerosis.Diastolic dysfunction   • Hyperlipidemia 03/31/2016    unspecified   • Hypertensive disorder 03/24/2016   • Left lower quadrant pain 07/12/2013    ruling out diverticular disease      • Myocardial infarction 03/24/2016   • Nausea 11/20/2015   • Osteoarthritis of knee 06/23/2016   • Pain in limb 01/25/2011   • Pediculosis capitis 12/22/2011    controlled   • Peptic ulcer    • Polyuria 01/25/2011   • Pruritic rash 12/09/2014   • Superficial foreign body hip without major open wound, no infection 12/09/2011    ??? back   • Type 2 diabetes mellitus 06/23/2016    new onset   • Weakness 06/23/2016     Past Surgical History:   Procedure Laterality Date   •  "COLONOSCOPY W/ POLYPECTOMY  03/07/2016    Transverse colon polyps,descending polyps,Sigmoid polyps, all resected and retrieved. Diverticulosis without perforation or abscess without bleeding. Erica Thomas   • HYSTERECTOMY      ovary preserv   • INJECTION OF MEDICATION  09/11/2014    Celestone (betamethasone) (2)       Social History     Social History   • Marital status:      Spouse name: N/A   • Number of children: N/A   • Years of education: N/A     Social History Main Topics   • Smoking status: Never Smoker   • Smokeless tobacco: Never Used   • Alcohol use No   • Drug use: No   • Sexual activity: Defer     Other Topics Concern   • None     Social History Narrative     Current Outpatient Prescriptions   Medication Sig Dispense Refill   • aspirin 81 MG tablet Take 81 mg by mouth daily.     • carvedilol (COREG) 25 MG tablet Take 25 mg by mouth 2 (two) times a day.     • furosemide (LASIX) 20 MG tablet Take (1) tablet by mouth every Sunday, Monday, Thursday and Saturday 16 tablet 3   • glimepiride (AMARYL) 1 MG tablet TAKE ONE-HALF TABLET BY MOUTH TWICE A DAY 30 tablet 1   • hydrALAZINE (APRESOLINE) 50 MG tablet Take 1 tablet by mouth 2 (Two) Times a Day. 60 tablet 2   • loratadine (CLARITIN) 10 MG tablet Take 1 tablet by mouth Daily. 30 tablet 3   • magnesium oxide (MAG-OX) 400 MG tablet Take 400 mg by mouth 2 (Two) Times a Day.     • omeprazole (PriLOSEC) 40 MG capsule Take 40 mg by mouth daily.     • pravastatin (PRAVACHOL) 20 MG tablet Take 1 tablet by mouth Every Night. 30 tablet 5   • losartan (COZAAR) 100 MG tablet Take (1) tablet by mouth with evening meal daily 30 tablet 5     No current facility-administered medications for this visit.      Objective:     Vitals:    07/17/17 1023   BP: 166/74   BP Location: Right arm   Patient Position: Sitting   Cuff Size: Adult   Pulse: 75   SpO2: 95%   Weight: 200 lb 3.2 oz (90.8 kg)   Height: 63\" (160 cm)      Physical Exam   Constitutional: She is oriented to " person, place, and time. She appears well-nourished. No distress.   HENT:   Head: Normocephalic and atraumatic.   Neck: No JVD present.   Cardiovascular: Normal rate, regular rhythm and normal heart sounds.  Exam reveals no gallop.    No murmur heard.  Pulmonary/Chest: Effort normal and breath sounds normal. No respiratory distress. She has no wheezes.   Abdominal: She exhibits no distension.   Musculoskeletal: She exhibits edema (1+ BLE without presence of compression stockings).   Neurological: She is alert and oriented to person, place, and time.   Skin: Skin is warm and dry. She is not diaphoretic.   Psychiatric: She has a normal mood and affect. Her behavior is normal. Judgment and thought content normal.     Cardiographics  Echocardiogram: 03/16/2017  · Left ventricular function is severely decreased.  · Calculated EF = 29%. Estimated EF was in agreement with the calculated EF  · Global left ventricular wall motion appears abnormal  · Left ventricular diastolic dysfunction (grade II) consistent with pseudonormalization and elevated left atrial pressures.  · Left ventricular wall thickness is consistent with mild concentric hypertrophy.  · Right Ventricle: Normal cavity size, wall thickness, systolic function and septal motion noted  · Mild tricuspid valve regurgitation is present.  · Mild mitral valve regurgitation is present  · Mild to moderate pulmonary hypertension is present  · There is no evidence of pericardial effusion.      Cardiac Cath: 03/08/2016  CORONARIES: Right dominant system.  1. LEFT MAIN: Left main coronary artery toward the distal end had  evidence of luminal irregularity.  2. LAD: Left anterior descending artery was a medium caliber vessel,  which in the proximal portion was 100% occluded after the origin of  the 1st diagonal branch. There was no evidence of any antegrade  flow down the distal left anterior descending artery. The 1st  diagonal branch in the proximal portion had up to 80% to  90%  stenosis.      The 1st septal  in the proximal portion had evidence of  luminal irregularity. There was no evidence of any antegrade flow  down the left anterior descending artery.  3. CIRCUMFLEX: The circumflex artery was a medium caliber vessel,  which towards the ostium, had up to 50% to 60% stenosis. The distal  end of the circumflex artery gave origin to the obtuse marginal  branch #2 and #3, which had 70% to 80% stenosis.  4. RCA: The right coronary artery was a medium caliber dominant  vessel, which in the proximal portion had a discrete 60% to 70%  stenosis. The midportion of the right coronary artery had 30% to  40% stenosis, and the distal right coronary artery bifurcated into  the posterior descending and posterolateral branch. The posterior  lateral branch at its origin had 60% to 70% stenosis. The distal  right coronary artery, before the bifurcation to the posterior  descending and posterolateral branch, had evidence of 30% to 40%  narrowing.      FINAL IMPRESSION:  1. The right coronary artery was diffusely diseased.  2. A 100% occluded proximal left anterior descending artery, which was  the infarct-related vessel.  3. Ostial circumflex artery with 30% to 40% narrowing and in some  views appeared to be 60% to 70%.  4. The 1st diagonal branch in the proximal portion with 70% to 80%  stenosis.  Following this, a 2.75 mm x 12 mm and a 2.5 mm x 23 mm Alpine stent was  then successfully deployed with reduction of stenosis from 100% to less  than 0% stenosis. Patient did have distal left anterior descending  artery, diagonal, ostial circumflex artery, and diffusely diseased right  coronary artery.      Patient will be evaluated for direct revascularization and have  discussed with Dr. Verduzco. In view of the patient's acute myocardial  infarction with left anterior descending artery 100% occluded, patient  at the present time would not be subjected to an emergent coronary  artery bypass  grafting, and patient will be stabilized.     Lab Results   Component Value Date    GLUCOSE 120 (H) 07/05/2017    CALCIUM 9.3 07/05/2017     07/05/2017    K 5.3 (H) 07/05/2017    CO2 28.0 07/05/2017    CL 99 07/05/2017    BUN 12 07/05/2017    CREATININE 0.84 07/05/2017    EGFRIFNONA 68 07/05/2017    BCR 14.3 07/05/2017    ANIONGAP 13.0 07/05/2017     Lab Results   Component Value Date    WBC 8.08 03/14/2017    HGB 9.3 (L) 03/14/2017    HCT 31.8 (L) 03/14/2017    MCV 66.8 (L) 03/14/2017     03/14/2017     Lab Results   Component Value Date    CHOL 170 02/21/2017     Lab Results   Component Value Date    TRIG 261 (H) 02/21/2017    TRIG 272 (H) 03/09/2016     Lab Results   Component Value Date    HDL 35 (L) 02/21/2017    HDL 28 (L) 03/09/2016     Lab Results   Component Value Date    LDLCALC 90 03/09/2016     Lab Results   Component Value Date    TSH 2.83 03/09/2016     The following portions of the patient's history were reviewed and updated as appropriate: allergies, current medications, past family history, past medical history, past social history, past surgical history and problem list.     Assessment:      Diagnosis Plan   1. Chronic systolic heart failure   Stage C; NYHA Class III Comprehensive Metabolic Panel to be obtained in 4 weeks - along with visit to BILL Rodriuges  BETA-BLOCKER: Carvedilol  ACE/ARB: Losartan increase to 100 mg daily  ENTRESTO: consider  DIURETIC: Lasix increase to 4 days weekly  ALDOSTERONT ANTAGONIST: H/O hyperkalemia  IMDUR/HYDRALAZINE: Hydralazine  DIGOXIN: Present  Fluid restriction: 2000 cc daily - reinforced   Sodium restriction: 2000 mg daily - reinforced  6MWT: Yes  Cardiac Rehab: N/A - transportation issues  Pulmonary Rehab: N/A   ICD: N/A @ this time  CardioMEMS: could be considered  Advanced HF evaluation (Transplant/LVAD):           2. Coronary arteriosclerosis  ASA plus statin therapy   3. Essential hypertension  Changes as made above   4. Drug therapy            The patient presents with history of HFrEF, ischemic for which there is clinical evidence of mild hypervolemia.  Blood pressure is not well-controlled this time.     The patient will receive evaluation with PCP in 4 weeks and I will schedule her back in 8 weeks, however the daughter in law is strongly encouraged to contact me should she notice failure to respond with today's medication changes.

## 2017-08-17 ENCOUNTER — OFFICE VISIT (OUTPATIENT)
Dept: FAMILY MEDICINE CLINIC | Facility: CLINIC | Age: 67
End: 2017-08-17

## 2017-08-17 ENCOUNTER — LAB (OUTPATIENT)
Dept: LAB | Facility: CLINIC | Age: 67
End: 2017-08-17

## 2017-08-17 VITALS
SYSTOLIC BLOOD PRESSURE: 151 MMHG | RESPIRATION RATE: 16 BRPM | BODY MASS INDEX: 35.61 KG/M2 | DIASTOLIC BLOOD PRESSURE: 90 MMHG | HEIGHT: 63 IN | OXYGEN SATURATION: 97 % | TEMPERATURE: 97.8 F | WEIGHT: 201 LBS | HEART RATE: 80 BPM

## 2017-08-17 DIAGNOSIS — Z79.899 DRUG THERAPY: ICD-10-CM

## 2017-08-17 DIAGNOSIS — I10 ESSENTIAL HYPERTENSION: Primary | ICD-10-CM

## 2017-08-17 DIAGNOSIS — E11.9 TYPE 2 DIABETES MELLITUS WITHOUT COMPLICATION, WITHOUT LONG-TERM CURRENT USE OF INSULIN (HCC): ICD-10-CM

## 2017-08-17 DIAGNOSIS — E78.2 MIXED HYPERLIPIDEMIA: ICD-10-CM

## 2017-08-17 DIAGNOSIS — I50.22 CHRONIC SYSTOLIC HEART FAILURE (HCC): ICD-10-CM

## 2017-08-17 DIAGNOSIS — I10 ESSENTIAL HYPERTENSION: ICD-10-CM

## 2017-08-17 LAB
ALBUMIN SERPL-MCNC: 4.2 G/DL (ref 3.4–4.8)
ALBUMIN/GLOB SERPL: 1.4 G/DL (ref 1.1–1.8)
ALP SERPL-CCNC: 136 U/L (ref 38–126)
ALT SERPL W P-5'-P-CCNC: 27 U/L (ref 9–52)
ANION GAP SERPL CALCULATED.3IONS-SCNC: 13 MMOL/L (ref 5–15)
ARTICHOKE IGE QN: 96 MG/DL (ref 1–129)
AST SERPL-CCNC: 19 U/L (ref 14–36)
BILIRUB SERPL-MCNC: 0.6 MG/DL (ref 0.2–1.3)
BUN BLD-MCNC: 13 MG/DL (ref 7–21)
BUN/CREAT SERPL: 14.6 (ref 7–25)
CALCIUM SPEC-SCNC: 9.6 MG/DL (ref 8.4–10.2)
CHLORIDE SERPL-SCNC: 99 MMOL/L (ref 95–110)
CHOLEST SERPL-MCNC: 176 MG/DL (ref 0–199)
CO2 SERPL-SCNC: 26 MMOL/L (ref 22–31)
CREAT BLD-MCNC: 0.89 MG/DL (ref 0.5–1)
GFR SERPL CREATININE-BSD FRML MDRD: 64 ML/MIN/1.73 (ref 45–104)
GLOBULIN UR ELPH-MCNC: 2.9 GM/DL (ref 2.3–3.5)
GLUCOSE BLD-MCNC: 95 MG/DL (ref 60–100)
HBA1C MFR BLD: 6.4 % (ref 4–5.6)
HDLC SERPL-MCNC: 36 MG/DL (ref 60–200)
LDLC/HDLC SERPL: 2.15 {RATIO} (ref 0–3.22)
POTASSIUM BLD-SCNC: 5.3 MMOL/L (ref 3.5–5.1)
PROT SERPL-MCNC: 7.1 G/DL (ref 6.3–8.6)
SODIUM BLD-SCNC: 138 MMOL/L (ref 137–145)
TRIGL SERPL-MCNC: 313 MG/DL (ref 20–199)

## 2017-08-17 PROCEDURE — 80061 LIPID PANEL: CPT | Performed by: NURSE PRACTITIONER

## 2017-08-17 PROCEDURE — 99214 OFFICE O/P EST MOD 30 MIN: CPT | Performed by: NURSE PRACTITIONER

## 2017-08-17 PROCEDURE — 80053 COMPREHEN METABOLIC PANEL: CPT | Performed by: NURSE PRACTITIONER

## 2017-08-17 PROCEDURE — 36415 COLL VENOUS BLD VENIPUNCTURE: CPT | Performed by: NURSE PRACTITIONER

## 2017-08-17 PROCEDURE — 83036 HEMOGLOBIN GLYCOSYLATED A1C: CPT | Performed by: NURSE PRACTITIONER

## 2017-08-17 RX ORDER — GLIMEPIRIDE 1 MG/1
TABLET ORAL
Qty: 30 TABLET | Refills: 0 | Status: SHIPPED | OUTPATIENT
Start: 2017-08-17 | End: 2017-08-24 | Stop reason: SDUPTHER

## 2017-08-17 NOTE — PROGRESS NOTES
Subjective   Afia Adams is a 66 y.o. female.     HPI Comments: Here today for kuldeep.  She feels well.  Does not recall what her b/s is running.    Diabetes   She presents for her follow-up diabetic visit. She has type 2 diabetes mellitus. Her disease course has been stable. There are no hypoglycemic associated symptoms. Pertinent negatives for hypoglycemia include no headaches or sweats. There are no diabetic associated symptoms. Pertinent negatives for diabetes include no blurred vision and no chest pain. There are no hypoglycemic complications. Symptoms are stable. There are no diabetic complications. Pertinent negatives for diabetic complications include no CVA, PVD or retinopathy. Risk factors for coronary artery disease include diabetes mellitus, dyslipidemia, hypertension, obesity, sedentary lifestyle and post-menopausal. Current diabetic treatment includes oral agent (monotherapy). She is compliant with treatment all of the time. Her weight is stable. When asked about meal planning, she reported none. She has not had a previous visit with a dietitian. She rarely participates in exercise. She monitors blood glucose at home 3-4 x per week. (Doesn't recall what it is running.) An ACE inhibitor/angiotensin II receptor blocker is being taken. She does not see a podiatrist.Eye exam is current.   Hypertension   This is a chronic problem. The problem is controlled. Associated symptoms include peripheral edema. Pertinent negatives include no anxiety, blurred vision, chest pain, headaches, malaise/fatigue, neck pain, orthopnea, palpitations, PND, shortness of breath or sweats. There are no associated agents to hypertension. Risk factors for coronary artery disease include diabetes mellitus, dyslipidemia, obesity, post-menopausal state and sedentary lifestyle. Past treatments include angiotensin blockers, beta blockers, direct vasodilators and diuretics. The current treatment provides significant  improvement. There are no compliance problems.  Hypertensive end-organ damage includes CAD/MI. There is no history of angina, kidney disease, CVA, heart failure, left ventricular hypertrophy, PVD or retinopathy.   Hyperlipidemia   Pertinent negatives include no chest pain or shortness of breath.        The following portions of the patient's history were reviewed and updated as appropriate: allergies, current medications, past family history, past medical history, past social history, past surgical history and problem list.    Review of Systems   Constitutional: Negative.  Negative for malaise/fatigue.   HENT: Negative.    Eyes: Negative for blurred vision.   Respiratory: Negative.  Negative for shortness of breath.    Cardiovascular: Negative.  Negative for chest pain, palpitations, orthopnea and PND.   Musculoskeletal: Negative.  Negative for neck pain.   Skin: Negative.    Neurological: Negative.  Negative for headaches.   Psychiatric/Behavioral: Negative.        Objective   Physical Exam   Constitutional: She is oriented to person, place, and time. She appears well-developed and well-nourished. No distress.   HENT:   Head: Normocephalic.   Eyes: Pupils are equal, round, and reactive to light.   Neck: Normal range of motion. Neck supple. No thyromegaly present.   Cardiovascular: Normal rate, regular rhythm and normal heart sounds.  Exam reveals no friction rub.    No murmur heard.  1+ edema in lower ext.   Pulmonary/Chest: Effort normal and breath sounds normal. No respiratory distress. She has no wheezes. She has no rales.   Abdominal: Soft.   Musculoskeletal: Normal range of motion.       Neurological Sensory Findings -  Unaltered sharp/dull right ankle/foot discrimination and unaltered sharp/dull left ankle/foot discrimination.    Vascular Status -  Her exam exhibits right foot vasculature normal and right foot edema. Her exam exhibits left foot vasculature normal and left foot edema.   Skin Integrity  -  Her  right foot skin is intact.     Afia 's left foot skin is intact. .  Neurological: She is alert and oriented to person, place, and time.   Skin: Skin is warm and dry.   Psychiatric: She has a normal mood and affect. Thought content normal.   Nursing note and vitals reviewed.      Assessment/Plan   Afia was seen today for diabetes, hypertension, heartburn and hyperlipidemia.    Diagnoses and all orders for this visit:    Essential hypertension  -     Cancel: Comprehensive metabolic panel    Mixed hyperlipidemia  -     Lipid panel    Type 2 diabetes mellitus without complication, without long-term current use of insulin  -     Hemoglobin A1c      She does not need refills at this time.

## 2017-08-18 ENCOUNTER — TELEPHONE (OUTPATIENT)
Dept: CARDIOLOGY | Facility: CLINIC | Age: 67
End: 2017-08-18

## 2017-08-18 NOTE — TELEPHONE ENCOUNTER
Called patient's daughter in law and let her know about her lab results. Patient's daughter in law was thankful for the phone call

## 2017-08-23 ENCOUNTER — TELEPHONE (OUTPATIENT)
Dept: FAMILY MEDICINE CLINIC | Facility: CLINIC | Age: 67
End: 2017-08-23

## 2017-08-23 NOTE — TELEPHONE ENCOUNTER
----- Message from BILL Hess sent at 8/18/2017  7:35 AM CDT -----  Her pravastatin needs to go up to 40mg a day and we'll kuldeep her chol when she returns.

## 2017-08-24 DIAGNOSIS — E78.5 HYPERLIPIDEMIA, UNSPECIFIED HYPERLIPIDEMIA TYPE: ICD-10-CM

## 2017-08-24 DIAGNOSIS — E11.9 TYPE 2 DIABETES MELLITUS WITHOUT COMPLICATION, WITHOUT LONG-TERM CURRENT USE OF INSULIN (HCC): Primary | ICD-10-CM

## 2017-08-24 RX ORDER — GLIMEPIRIDE 1 MG/1
TABLET ORAL
Qty: 30 TABLET | Refills: 3 | Status: SHIPPED | OUTPATIENT
Start: 2017-08-24 | End: 2017-12-18 | Stop reason: SDUPTHER

## 2017-08-24 RX ORDER — PRAVASTATIN SODIUM 40 MG
40 TABLET ORAL DAILY
Qty: 30 TABLET | Refills: 3 | Status: SHIPPED | OUTPATIENT
Start: 2017-08-24 | End: 2017-12-19 | Stop reason: SDUPTHER

## 2017-08-25 RX ORDER — GLIMEPIRIDE 1 MG/1
TABLET ORAL
Qty: 30 TABLET | Refills: 0 | Status: SHIPPED | OUTPATIENT
Start: 2017-08-25 | End: 2017-10-15 | Stop reason: SDUPTHER

## 2017-09-08 ENCOUNTER — TELEPHONE (OUTPATIENT)
Dept: CARDIOLOGY | Facility: CLINIC | Age: 67
End: 2017-09-08

## 2017-09-08 NOTE — TELEPHONE ENCOUNTER
Yu, patient's daughter-in-law in, contacted me this morning regarding concerns of cough inquiring as to my ability descendent prescription for antibiotic.    I explained to this person that due to the presence of multiple complex medical problems, the patient needed to be seen either by primary care, urgent care or in fact offered my services to her today.    Transportation to [place] appears to be an issue for today and the daughter-in-law is going to seek assistance within their locality.

## 2017-09-18 ENCOUNTER — OFFICE VISIT (OUTPATIENT)
Dept: CARDIOLOGY | Facility: CLINIC | Age: 67
End: 2017-09-18

## 2017-09-18 ENCOUNTER — APPOINTMENT (OUTPATIENT)
Dept: LAB | Facility: HOSPITAL | Age: 67
End: 2017-09-18

## 2017-09-18 ENCOUNTER — TELEPHONE (OUTPATIENT)
Dept: CARDIOLOGY | Facility: CLINIC | Age: 67
End: 2017-09-18

## 2017-09-18 VITALS
OXYGEN SATURATION: 93 % | BODY MASS INDEX: 35.69 KG/M2 | WEIGHT: 201.44 LBS | HEIGHT: 63 IN | HEART RATE: 82 BPM | DIASTOLIC BLOOD PRESSURE: 80 MMHG | SYSTOLIC BLOOD PRESSURE: 160 MMHG

## 2017-09-18 DIAGNOSIS — E66.9 OBESITY, CLASS II, BMI 35-39.9: ICD-10-CM

## 2017-09-18 DIAGNOSIS — E11.9 TYPE 2 DIABETES MELLITUS WITHOUT COMPLICATION, WITHOUT LONG-TERM CURRENT USE OF INSULIN (HCC): ICD-10-CM

## 2017-09-18 DIAGNOSIS — I10 ESSENTIAL HYPERTENSION: ICD-10-CM

## 2017-09-18 DIAGNOSIS — E78.2 MIXED HYPERLIPIDEMIA: ICD-10-CM

## 2017-09-18 DIAGNOSIS — I25.10 CORONARY ARTERIOSCLEROSIS: ICD-10-CM

## 2017-09-18 DIAGNOSIS — I50.22 CHRONIC SYSTOLIC HEART FAILURE (HCC): Primary | ICD-10-CM

## 2017-09-18 DIAGNOSIS — Z79.899 DRUG THERAPY: ICD-10-CM

## 2017-09-18 LAB
ANION GAP SERPL CALCULATED.3IONS-SCNC: 9 MMOL/L (ref 5–15)
BUN BLD-MCNC: 15 MG/DL (ref 7–21)
BUN/CREAT SERPL: 16.5 (ref 7–25)
CALCIUM SPEC-SCNC: 9 MG/DL (ref 8.4–10.2)
CHLORIDE SERPL-SCNC: 101 MMOL/L (ref 95–110)
CO2 SERPL-SCNC: 27 MMOL/L (ref 22–31)
CREAT BLD-MCNC: 0.91 MG/DL (ref 0.5–1)
GFR SERPL CREATININE-BSD FRML MDRD: 62 ML/MIN/1.73 (ref 45–104)
GLUCOSE BLD-MCNC: 97 MG/DL (ref 60–100)
POTASSIUM BLD-SCNC: 4.2 MMOL/L (ref 3.5–5.1)
SODIUM BLD-SCNC: 137 MMOL/L (ref 137–145)

## 2017-09-18 PROCEDURE — 36415 COLL VENOUS BLD VENIPUNCTURE: CPT | Performed by: NURSE PRACTITIONER

## 2017-09-18 PROCEDURE — 80048 BASIC METABOLIC PNL TOTAL CA: CPT | Performed by: NURSE PRACTITIONER

## 2017-09-18 PROCEDURE — 99214 OFFICE O/P EST MOD 30 MIN: CPT | Performed by: NURSE PRACTITIONER

## 2017-09-18 RX ORDER — HYDRALAZINE HYDROCHLORIDE 25 MG/1
25 TABLET, FILM COATED ORAL 3 TIMES DAILY
Qty: 90 TABLET | Refills: 5 | Status: SHIPPED | OUTPATIENT
Start: 2017-09-18 | End: 2017-09-18 | Stop reason: DRUGHIGH

## 2017-09-18 RX ORDER — HYDRALAZINE HYDROCHLORIDE 50 MG/1
50 TABLET, FILM COATED ORAL 3 TIMES DAILY
Qty: 90 TABLET | Refills: 5 | Status: SHIPPED | OUTPATIENT
Start: 2017-09-18 | End: 2018-04-01 | Stop reason: SDUPTHER

## 2017-09-18 NOTE — PROGRESS NOTES
Subjective:     Congestive Heart Failure (chief complaint )    History of Present Illness  Mrs. Adams is a 66-year-old  female who presents to the office for scheduled 3 month office follow-up in the heart failure clinic.  She is accompanied by her daughter-in-law and .    Presents for follow-up visit. Associated symptoms include edema and shortness of breath (Stage C; NYHA Class III). Pertinent negatives include no abdominal pain, chest pain, chest pressure, muscle weakness, near-syncope, orthopnea, palpitations, paroxysmal nocturnal dyspnea or unexpected weight change. The symptoms have been improving. Compliance with total regimen is 26-50%. Compliance problems include adherence to diet and adherence to exercise.  Compliance with diet is 26-50%. Compliance with exercise is 0-25%. Compliance with medications is 51-75%.     Ms Adams was admitted to the hospital (from [facility]) on 03/08/2016 with evidence of anterior STEMI with PCI per Dr Kent. Cath was noted with evidence of multi-vessel disease. In addition, she required intubation with mechanical ventilation secondary to pulmonary edema. Muga scan demonstrated evidence of EF 35.5% on 03/15/2016    PCP: BILL Rodrigues  Cardiology: Dr Kent     CM:  1. CAD, S/P PCI LAD - per Dr Kent - 03/08/2016 2.75mm x 12mm and a 2.5mm x 23mm Alpine stent proximal LAD  2. HFrEF, ischemic 35.5% by Muga on 03/15/2016  3. Acute pulmonary edema 03/08/2016 requiring intubation   4. RLL pneumonia 03/2016  5. Hypertension  6. Hyperlipidemia  7. DM, Type II  8.GERD  9. Colon polyp removal 03/07/2016  10. H/O hyperkalemia on AA      Yu: daughter-in-law [phone] (cell)    Review of Systems   Constitution: Negative for chills, fever and weight gain.   Cardiovascular: Positive for dyspnea on exertion. Negative for chest pain, leg swelling (ankle swelling throughout the day), near-syncope, orthopnea, palpitations, paroxysmal  nocturnal dyspnea and syncope.   Respiratory: Positive for snoring. Cough: nonproductive cough has improved over the past 2 weeks.    Hematologic/Lymphatic: Does not bruise/bleed easily.   Gastrointestinal: Negative for bloating.     Past Medical History:   Diagnosis Date   • Abdominal pain 11/20/2015    unspecified   • Acute gastritis    • Acute on chronic systolic congestive heart failure 04/13/2016   • Ankle pain 04/27/2015   • Candidiasis, skin or nails 12/22/2011    controlled   • Chronic systolic congestive heart failure 08/01/2016   • Congestive heart failure 08/01/2016   • Coronary arteriosclerosis 06/21/2016    multi-vessel   • Cough 09/25/2014    proabably due to allergy, chest x ray is normal      • Edema of foot 03/24/2016   • Encounter for long-term (current) drug use     therapy   • Epigastric pain 12/03/2015   • Essential hypertension 06/21/2016   • History of echocardiogram 03/14/2016    Left atrium normal with mild CLVH wit normal aortic root size.Evidence of posterolateral wall hypokinesis. Severely depressed LV systolic function of 20-25%.Mitral valve thickened Aortic sclerosis.Diastolic dysfunction   • Hyperlipidemia 03/31/2016    unspecified   • Hypertensive disorder 03/24/2016   • Left lower quadrant pain 07/12/2013    ruling out diverticular disease      • Myocardial infarction 03/24/2016   • Nausea 11/20/2015   • Osteoarthritis of knee 06/23/2016   • Pain in limb 01/25/2011   • Pediculosis capitis 12/22/2011    controlled   • Peptic ulcer    • Polyuria 01/25/2011   • Pruritic rash 12/09/2014   • Superficial foreign body hip without major open wound, no infection 12/09/2011    ??? back   • Type 2 diabetes mellitus 06/23/2016    new onset   • Weakness 06/23/2016     Past Surgical History:   Procedure Laterality Date   • COLONOSCOPY W/ POLYPECTOMY  03/07/2016    Transverse colon polyps,descending polyps,Sigmoid polyps, all resected and retrieved. Diverticulosis without perforation or abscess  "without bleeding. Erica Thomas   • HYSTERECTOMY      ovary preserv   • INJECTION OF MEDICATION  09/11/2014    Celestone (betamethasone) (2)       Social History     Social History   • Marital status:      Spouse name: N/A   • Number of children: N/A   • Years of education: N/A     Social History Main Topics   • Smoking status: Never Smoker   • Smokeless tobacco: Never Used   • Alcohol use No   • Drug use: No   • Sexual activity: Defer     Other Topics Concern   • None     Social History Narrative     Current Outpatient Prescriptions   Medication Sig Dispense Refill   • aspirin 81 MG tablet Take 81 mg by mouth daily.     • carvedilol (COREG) 25 MG tablet Take 25 mg by mouth 2 (two) times a day.     • furosemide (LASIX) 20 MG tablet Take (1) tablet by mouth every Sunday, Monday, Thursday and Saturday 16 tablet 3   • glimepiride (AMARYL) 1 MG tablet 1/2 tablet by mouth 2 times a day 30 tablet 3   • glimepiride (AMARYL) 1 MG tablet TAKE 1/2 TABLET BY MOUTH TWO TIMES A DAY 30 tablet 0   • loratadine (CLARITIN) 10 MG tablet Take 1 tablet by mouth Daily. 30 tablet 3   • losartan (COZAAR) 100 MG tablet Take (1) tablet by mouth with evening meal daily 30 tablet 5   • magnesium oxide (MAG-OX) 400 MG tablet Take 400 mg by mouth 2 (Two) Times a Day.     • omeprazole (PriLOSEC) 40 MG capsule Take 40 mg by mouth daily.     • pravastatin (PRAVACHOL) 40 MG tablet Take 1 tablet by mouth Daily. 30 tablet 3   • hydrALAZINE (APRESOLINE) 25 MG tablet Take 1 tablet by mouth 3 (Three) Times a Day. 90 tablet 5     No current facility-administered medications for this visit.      Objective:     Vitals:    09/18/17 1003   BP: 160/80   BP Location: Left arm   Patient Position: Sitting   Cuff Size: Adult   Pulse: 82   SpO2: 93%   Weight: 201 lb 7 oz (91.4 kg)   Height: 63\" (160 cm)      Physical Exam   Constitutional: She is oriented to person, place, and time. She appears well-nourished. No distress.   HENT:   Head: Atraumatic. " "  Neck: JVD: 6 cm @ 45 degrees.   Cardiovascular: Normal rate and regular rhythm.    Murmur heard.  High-pitched blowing holosystolic murmur is present with a grade of 1/6  at the apex  Pulses:       Radial pulses are 2+ on the right side, and 2+ on the left side.   Pulmonary/Chest: Effort normal. No respiratory distress. She has no wheezes.   Abdominal: She exhibits no distension.   Musculoskeletal: Edema: non pitting pedal and ankle edema.   Neurological: She is alert and oriented to person, place, and time.   Skin: Skin is warm and dry.   Psychiatric: She has a normal mood and affect. Her behavior is normal. Judgment and thought content normal.   Vitals reviewed.    Flowsheet Rows         First Filed Value    Admission Height  63\" (160 cm) Documented at 09/18/2017 1003    Admission Weight  201 lb 7 oz (91.4 kg) Documented at 09/18/2017 1003        Cardiographics  Echocardiogram: 03/16/2017  · Left ventricular function is severely decreased.  · Calculated EF = 29%. Estimated EF was in agreement with the calculated EF  · Global left ventricular wall motion appears abnormal  · Left ventricular diastolic dysfunction (grade II) consistent with pseudonormalization and elevated left atrial pressures.  · Left ventricular wall thickness is consistent with mild concentric hypertrophy.  · Right Ventricle: Normal cavity size, wall thickness, systolic function and septal motion noted  · Mild tricuspid valve regurgitation is present.  · Mild mitral valve regurgitation is present  · Mild to moderate pulmonary hypertension is present  · There is no evidence of pericardial effusion.      Cardiac Cath: 03/08/2016  CORONARIES: Right dominant system.  1. LEFT MAIN: Left main coronary artery toward the distal end had  evidence of luminal irregularity.  2. LAD: Left anterior descending artery was a medium caliber vessel,  which in the proximal portion was 100% occluded after the origin of  the 1st diagonal branch. There was no " evidence of any antegrade  flow down the distal left anterior descending artery. The 1st  diagonal branch in the proximal portion had up to 80% to 90%  stenosis.      The 1st septal  in the proximal portion had evidence of  luminal irregularity. There was no evidence of any antegrade flow  down the left anterior descending artery.  3. CIRCUMFLEX: The circumflex artery was a medium caliber vessel,  which towards the ostium, had up to 50% to 60% stenosis. The distal  end of the circumflex artery gave origin to the obtuse marginal  branch #2 and #3, which had 70% to 80% stenosis.  4. RCA: The right coronary artery was a medium caliber dominant  vessel, which in the proximal portion had a discrete 60% to 70%  stenosis. The midportion of the right coronary artery had 30% to  40% stenosis, and the distal right coronary artery bifurcated into  the posterior descending and posterolateral branch. The posterior  lateral branch at its origin had 60% to 70% stenosis. The distal  right coronary artery, before the bifurcation to the posterior  descending and posterolateral branch, had evidence of 30% to 40%  narrowing.      FINAL IMPRESSION:  1. The right coronary artery was diffusely diseased.  2. A 100% occluded proximal left anterior descending artery, which was  the infarct-related vessel.  3. Ostial circumflex artery with 30% to 40% narrowing and in some  views appeared to be 60% to 70%.  4. The 1st diagonal branch in the proximal portion with 70% to 80%  stenosis.  Following this, a 2.75 mm x 12 mm and a 2.5 mm x 23 mm Alpine stent was  then successfully deployed with reduction of stenosis from 100% to less  than 0% stenosis. Patient did have distal left anterior descending  artery, diagonal, ostial circumflex artery, and diffusely diseased right  coronary artery.      Patient will be evaluated for direct revascularization and have  discussed with Dr. Verduzco. In view of the patient's acute myocardial  infarction  with left anterior descending artery 100% occluded, patient  at the present time would not be subjected to an emergent coronary  artery bypass grafting, and patient will be stabilized.      Lab Results   Component Value Date    GLUCOSE 95 08/17/2017    CALCIUM 9.6 08/17/2017     08/17/2017    K 5.3 (H) 08/17/2017    CO2 26.0 08/17/2017    CL 99 08/17/2017    BUN 13 08/17/2017    CREATININE 0.89 08/17/2017    EGFRIFNONA 64 08/17/2017    BCR 14.6 08/17/2017    ANIONGAP 13.0 08/17/2017     Lab Results   Component Value Date    WBC 8.08 03/14/2017    HGB 9.3 (L) 03/14/2017    HCT 31.8 (L) 03/14/2017    MCV 66.8 (L) 03/14/2017     03/14/2017     Lab Results   Component Value Date    CHOL 176 08/17/2017    CHOL 170 02/21/2017     Lab Results   Component Value Date    TRIG 313 (H) 08/17/2017    TRIG 261 (H) 02/21/2017    TRIG 272 (H) 03/09/2016     Lab Results   Component Value Date    HDL 36 (L) 08/17/2017    HDL 35 (L) 02/21/2017    HDL 28 (L) 03/09/2016     Lab Results   Component Value Date    LDLCALC 90 03/09/2016     Lab Results   Component Value Date    TSH 2.83 03/09/2016     The following portions of the patient's history were reviewed and updated as appropriate: allergies, current medications, past family history, past medical history, past social history, past surgical history and problem list.     Assessment:      Diagnosis Plan   1. Chronic systolic heart failure   Stage C; NYHA Class III  Basic Metabolic Panel - will contact daughter-in-law via telephone with results  BETA-BLOCKER: Carvedilol 25 mg twice daily  ACE/ARB: Losartan 100 mg daily  DIURETIC: Lasix increase to 4 days weekly  ALDOSTERONT ANTAGONIST: H/O hyperkalemia  IMDUR/HYDRALAZINE: Increase hydralazine to 25 mg 3 times a day  DIGOXIN: Not applicable  Fluid restriction: 2000 cc daily - reinforced   Sodium restriction: 2000 mg daily - reinforced  6MWT: Up-to-date  Cardiac Rehab: N/A - transportation issues  Pulmonary Rehab: N/A   ICD:  N/A @ this time  CardioMEMS: could be considered  Advanced HF evaluation (Transplant/LVAD): Not applicable      Knee-high compression stockings extra-large have been given        2. Coronary arteriosclerosis  Aspirin plus statin therapy    3. Essential hypertension  Basic Metabolic Panel    According to #1    4. Type 2 diabetes mellitus without complication, without long-term current use of insulin  Basic Metabolic Panel; as managed per PCP    5. Mixed hyperlipidemia  Statin therapy    6. Obesity, Class II, BMI 35-39.9  Referral today to Dr. Lobato daughter are, sleep medicine for October 18 ; as I believe we have made this referral in the past, I strongly encourage the patient and her family members in compliance with this visit    7. Drug therapy  Basic Metabolic Panel     The patient presents with history of HFrEF, ischemic for which there is clinical evidence of mild hypervolemia.  Blood pressure is not well-controlled this time, For which medication adjustments have been made.    Family members will monitor blood pressure at home and if continued greater than 140/90 will contact me regarding additional plan.

## 2017-09-18 NOTE — TELEPHONE ENCOUNTER
Telephone conversation at which point the daughter-in-law informs me that hydralazine bottle at home indicates 50 mg twice daily.  Yu, daughter-in-law, assures me that this is what the patient has been taking.    We will increase hydralazine to 50 mg 3 times a day with new prescription sent.

## 2017-10-09 RX ORDER — HYDRALAZINE HYDROCHLORIDE 50 MG/1
TABLET, FILM COATED ORAL
Qty: 60 TABLET | Refills: 1 | OUTPATIENT
Start: 2017-10-09

## 2017-10-16 RX ORDER — GLIMEPIRIDE 1 MG/1
TABLET ORAL
Qty: 30 TABLET | Refills: 1 | Status: SHIPPED | OUTPATIENT
Start: 2017-10-16 | End: 2017-12-13 | Stop reason: SDUPTHER

## 2017-11-06 RX ORDER — FUROSEMIDE 20 MG/1
TABLET ORAL
Qty: 16 TABLET | Refills: 2 | Status: SHIPPED | OUTPATIENT
Start: 2017-11-06 | End: 2018-01-28 | Stop reason: SDUPTHER

## 2017-11-17 ENCOUNTER — OFFICE VISIT (OUTPATIENT)
Dept: FAMILY MEDICINE CLINIC | Facility: CLINIC | Age: 67
End: 2017-11-17

## 2017-11-17 VITALS
RESPIRATION RATE: 20 BRPM | HEART RATE: 76 BPM | OXYGEN SATURATION: 98 % | BODY MASS INDEX: 35.61 KG/M2 | TEMPERATURE: 98.2 F | HEIGHT: 63 IN | SYSTOLIC BLOOD PRESSURE: 128 MMHG | DIASTOLIC BLOOD PRESSURE: 69 MMHG | WEIGHT: 201 LBS

## 2017-11-17 DIAGNOSIS — I10 ESSENTIAL HYPERTENSION: Primary | ICD-10-CM

## 2017-11-17 DIAGNOSIS — E11.9 TYPE 2 DIABETES MELLITUS WITHOUT COMPLICATION, WITHOUT LONG-TERM CURRENT USE OF INSULIN (HCC): ICD-10-CM

## 2017-11-17 PROCEDURE — 99214 OFFICE O/P EST MOD 30 MIN: CPT | Performed by: NURSE PRACTITIONER

## 2017-11-17 NOTE — PROGRESS NOTES
Subjective   Afia Adams is a 66 y.o. female.     HPI Comments: Here today for kuldeep on her dm and her htn.  She is scheduled to see cardiology next month.  She does not recall what her b/s is running.    Diabetes   She presents for her follow-up diabetic visit. She has type 2 diabetes mellitus. Her disease course has been stable. There are no hypoglycemic associated symptoms. Pertinent negatives for hypoglycemia include no headaches or sweats. There are no diabetic associated symptoms. Pertinent negatives for diabetes include no blurred vision and no chest pain. There are no hypoglycemic complications. Symptoms are stable. There are no diabetic complications. Pertinent negatives for diabetic complications include no CVA, PVD or retinopathy. Risk factors for coronary artery disease include diabetes mellitus, dyslipidemia, hypertension, obesity, sedentary lifestyle and post-menopausal. Current diabetic treatment includes oral agent (monotherapy). She is compliant with treatment all of the time. Her weight is stable. She is following a diabetic diet. Meal planning includes avoidance of concentrated sweets. She rarely participates in exercise. She monitors blood glucose at home 1-2 x per week. Blood glucose monitoring compliance is fair. An ACE inhibitor/angiotensin II receptor blocker is being taken. She does not see a podiatrist.Eye exam is current.   Hypertension   This is a chronic problem. The current episode started more than 1 year ago. The problem is unchanged. The problem is controlled. Pertinent negatives include no anxiety, blurred vision, chest pain, headaches, malaise/fatigue, neck pain, orthopnea, palpitations, peripheral edema, PND, shortness of breath or sweats. There are no associated agents to hypertension. Risk factors for coronary artery disease include diabetes mellitus, dyslipidemia, obesity, post-menopausal state and sedentary lifestyle. Past treatments include angiotensin blockers,  direct vasodilators, diuretics and beta blockers. The current treatment provides significant improvement. There are no compliance problems.  There is no history of angina, kidney disease, CAD/MI, CVA, heart failure, left ventricular hypertrophy, PVD or retinopathy.        The following portions of the patient's history were reviewed and updated as appropriate: allergies, current medications, past family history, past medical history, past social history, past surgical history and problem list.    Review of Systems   Constitutional: Negative.  Negative for malaise/fatigue.   HENT: Negative.    Eyes: Negative for blurred vision.   Respiratory: Negative.  Negative for shortness of breath.    Cardiovascular: Negative.  Negative for chest pain, palpitations, orthopnea and PND.   Musculoskeletal: Negative.  Negative for neck pain.   Skin: Negative.    Neurological: Negative.  Negative for headaches.   Psychiatric/Behavioral: Negative.        Objective   Physical Exam   Constitutional: She is oriented to person, place, and time. She appears well-developed and well-nourished. No distress.   HENT:   Head: Normocephalic.   Eyes: EOM are normal. Pupils are equal, round, and reactive to light.   Neck: Normal range of motion. Neck supple. No thyromegaly present.   Cardiovascular: Normal rate, regular rhythm and normal heart sounds.  Exam reveals no friction rub.    No murmur heard.  Pulmonary/Chest: Effort normal and breath sounds normal. No respiratory distress. She has no wheezes. She has no rales.   Abdominal: Soft. Bowel sounds are normal. She exhibits no distension.   Musculoskeletal: Normal range of motion.       Neurological Sensory Findings -  Unaltered sharp/dull right ankle/foot discrimination and unaltered sharp/dull left ankle/foot discrimination.    Vascular Status -  Her exam exhibits right foot vasculature normal. Her exam exhibits no right foot edema. Her exam exhibits left foot vasculature normal. Her exam  exhibits no left foot edema.   Skin Integrity  -  Her right foot skin is intact.     Afia 's left foot skin is intact. .  Neurological: She is alert and oriented to person, place, and time.   Skin: Skin is warm and dry.   Psychiatric: She has a normal mood and affect. Thought content normal.   Nursing note and vitals reviewed.      Assessment/Plan   Afia was seen today for diabetes and hypertension.    Diagnoses and all orders for this visit:    Essential hypertension    Type 2 diabetes mellitus without complication, without long-term current use of insulin    Lab Results   Component Value Date    HGBA1C 6.4 (H) 08/17/2017     Lab Results   Component Value Date    CHOL 176 08/17/2017    CHLPL 172 03/09/2016    TRIG 313 (H) 08/17/2017    HDL 36 (L) 08/17/2017    LDLCALC 90 03/09/2016    LDLDIRECT 96 08/17/2017     Lab Results   Component Value Date    GLUCOSE 97 09/18/2017    BUN 15 09/18/2017    CREATININE 0.91 09/18/2017    EGFRIFNONA 62 09/18/2017    BCR 16.5 09/18/2017    K 4.2 09/18/2017    CO2 27.0 09/18/2017    CALCIUM 9.0 09/18/2017    ALBUMIN 4.20 08/17/2017    LABIL2 1.4 08/17/2017    AST 19 08/17/2017    ALT 27 08/17/2017

## 2017-11-17 NOTE — PATIENT INSTRUCTIONS
Calorie Counting for Weight Loss  Calories are energy you get from the things you eat and drink. Your body uses this energy to keep you going throughout the day. The number of calories you eat affects your weight. When you eat more calories than your body needs, your body stores the extra calories as fat. When you eat fewer calories than your body needs, your body burns fat to get the energy it needs.  Calorie counting means keeping track of how many calories you eat and drink each day. If you make sure to eat fewer calories than your body needs, you should lose weight. In order for calorie counting to work, you will need to eat the number of calories that are right for you in a day to lose a healthy amount of weight per week. A healthy amount of weight to lose per week is usually 1-2 lb (0.5-0.9 kg). A dietitian can determine how many calories you need in a day and give you suggestions on how to reach your calorie goal.   WHAT IS MY MY PLAN?  My goal is to have __________ calories per day.   If I have this many calories per day, I should lose around __________ pounds per week.  WHAT DO I NEED TO KNOW ABOUT CALORIE COUNTING?  In order to meet your daily calorie goal, you will need to:  · Find out how many calories are in each food you would like to eat. Try to do this before you eat.  · Decide how much of the food you can eat.  · Write down what you ate and how many calories it had. Doing this is called keeping a food log.  WHERE DO I FIND CALORIE INFORMATION?  The number of calories in a food can be found on a Nutrition Facts label. Note that all the information on a label is based on a specific serving of the food. If a food does not have a Nutrition Facts label, try to look up the calories online or ask your dietitian for help.  HOW DO I DECIDE HOW MUCH TO EAT?  To decide how much of the food you can eat, you will need to consider both the number of calories in one serving and the size of one serving. This  information can be found on the Nutrition Facts label. If a food does not have a Nutrition Facts label, look up the information online or ask your dietitian for help.  Remember that calories are listed per serving. If you choose to have more than one serving of a food, you will have to multiply the calories per serving by the amount of servings you plan to eat. For example, the label on a package of bread might say that a serving size is 1 slice and that there are 90 calories in a serving. If you eat 1 slice, you will have eaten 90 calories. If you eat 2 slices, you will have eaten 180 calories.  HOW DO I KEEP A FOOD LOG?  After each meal, record the following information in your food log:  · What you ate.  · How much of it you ate.  · How many calories it had.  · Then, add up your calories.  Keep your food log near you, such as in a small notebook in your pocket. Another option is to use a mobile sam or website. Some programs will calculate calories for you and show you how many calories you have left each time you add an item to the log.  WHAT ARE SOME CALORIE COUNTING TIPS?  · Use your calories on foods and drinks that will fill you up and not leave you hungry. Some examples of this include foods like nuts and nut butters, vegetables, lean proteins, and high-fiber foods (more than 5 g fiber per serving).  · Eat nutritious foods and avoid empty calories. Empty calories are calories you get from foods or beverages that do not have many nutrients, such as candy and soda. It is better to have a nutritious high-calorie food (such as an avocado) than a food with few nutrients (such as a bag of chips).  · Know how many calories are in the foods you eat most often. This way, you do not have to look up how many calories they have each time you eat them.  · Look out for foods that may seem like low-calorie foods but are really high-calorie foods, such as baked goods, soda, and fat-free candy.  · Pay attention to calories  in drinks. Drinks such as sodas, specialty coffee drinks, alcohol, and juices have a lot of calories yet do not fill you up. Choose low-calorie drinks like water and diet drinks.  · Focus your calorie counting efforts on higher calorie items. Logging the calories in a garden salad that contains only vegetables is less important than calculating the calories in a milk shake.  · Find a way of tracking calories that works for you. Get creative. Most people who are successful find ways to keep track of how much they eat in a day, even if they do not count every calorie.  WHAT ARE SOME PORTION CONTROL TIPS?  · Know how many calories are in a serving. This will help you know how many servings of a certain food you can have.  · Use a measuring cup to measure serving sizes. This is helpful when you start out. With time, you will be able to estimate serving sizes for some foods.  · Take some time to put servings of different foods on your favorite plates, bowls, and cups so you know what a serving looks like.  · Try not to eat straight from a bag or box. Doing this can lead to overeating. Put the amount you would like to eat in a cup or on a plate to make sure you are eating the right portion.  · Use smaller plates, glasses, and bowls to prevent overeating. This is a quick and easy way to practice portion control. If your plate is smaller, less food can fit on it.  · Try not to multitask while eating, such as watching TV or using your computer. If it is time to eat, sit down at a table and enjoy your food. Doing this will help you to start recognizing when you are full. It will also make you more aware of what and how much you are eating.  HOW CAN I CALORIE COUNT WHEN EATING OUT?  · Ask for smaller portion sizes or child-sized portions.  · Consider sharing an entree and sides instead of getting your own entree.  · If you get your own entree, eat only half. Ask for a box at the beginning of your meal and put the rest of your  "entree in it so you are not tempted to eat it.  · Look for the calories on the menu. If calories are listed, choose the lower calorie options.  · Choose dishes that include vegetables, fruits, whole grains, low-fat dairy products, and lean protein. Focusing on smart food choices from each of the 5 food groups can help you stay on track at restaurants.  · Choose items that are boiled, broiled, grilled, or steamed.  · Choose water, milk, unsweetened iced tea, or other drinks without added sugars. If you want an alcoholic beverage, choose a lower calorie option. For example, a regular opal can have up to 700 calories and a glass of wine has around 150.  · Stay away from items that are buttered, battered, fried, or served with cream sauce. Items labeled \"crispy\" are usually fried, unless stated otherwise.  · Ask for dressings, sauces, and syrups on the side. These are usually very high in calories, so do not eat much of them.  · Watch out for salads. Many people think salads are a healthy option, but this is often not the case. Many salads come with matute, fried chicken, lots of cheese, fried chips, and dressing. All of these items have a lot of calories. If you want a salad, choose a garden salad and ask for grilled meats or steak. Ask for the dressing on the side, or ask for olive oil and vinegar or lemon to use as dressing.  · Estimate how many servings of a food you are given. For example, a serving of cooked rice is ½ cup or about the size of half a tennis ball or one cupcake wrapper. Knowing serving sizes will help you be aware of how much food you are eating at restaurants. The list below tells you how big or small some common portion sizes are based on everyday objects.    1 oz--4 stacked dice.    3 oz--1 deck of cards.    1 tsp--1 dice.    1 Tbsp--½ a Ping-Pong ball.    2 Tbsp--1 Ping-Pong ball.    ½ cup--1 tennis ball or 1 cupcake wrapper.    1 cup--1 baseball.     This information is not intended to " replace advice given to you by your health care provider. Make sure you discuss any questions you have with your health care provider.     Document Released: 12/18/2006 Document Revised: 01/08/2016 Document Reviewed: 10/23/2014  [employer] Interactive Patient Education ©2017 [employer]

## 2017-12-13 RX ORDER — GLIMEPIRIDE 1 MG/1
TABLET ORAL
Qty: 30 TABLET | Refills: 3 | Status: SHIPPED | OUTPATIENT
Start: 2017-12-13 | End: 2018-02-16 | Stop reason: SDUPTHER

## 2017-12-18 ENCOUNTER — APPOINTMENT (OUTPATIENT)
Dept: LAB | Facility: HOSPITAL | Age: 67
End: 2017-12-18

## 2017-12-18 ENCOUNTER — OFFICE VISIT (OUTPATIENT)
Dept: CARDIOLOGY | Facility: CLINIC | Age: 67
End: 2017-12-18

## 2017-12-18 VITALS
BODY MASS INDEX: 35.5 KG/M2 | SYSTOLIC BLOOD PRESSURE: 130 MMHG | WEIGHT: 200.38 LBS | HEIGHT: 63 IN | HEART RATE: 75 BPM | OXYGEN SATURATION: 98 % | DIASTOLIC BLOOD PRESSURE: 74 MMHG

## 2017-12-18 DIAGNOSIS — I50.22 CHRONIC SYSTOLIC HEART FAILURE (HCC): Primary | ICD-10-CM

## 2017-12-18 DIAGNOSIS — Z79.899 DRUG THERAPY: ICD-10-CM

## 2017-12-18 DIAGNOSIS — I10 ESSENTIAL HYPERTENSION: ICD-10-CM

## 2017-12-18 DIAGNOSIS — E66.9 OBESITY, CLASS II, BMI 35-39.9: ICD-10-CM

## 2017-12-18 DIAGNOSIS — I25.10 CORONARY ARTERIOSCLEROSIS: ICD-10-CM

## 2017-12-18 LAB
ANION GAP SERPL CALCULATED.3IONS-SCNC: 13 MMOL/L (ref 5–15)
BUN BLD-MCNC: 17 MG/DL (ref 7–21)
BUN/CREAT SERPL: 18.5 (ref 7–25)
CALCIUM SPEC-SCNC: 9.2 MG/DL (ref 8.4–10.2)
CHLORIDE SERPL-SCNC: 99 MMOL/L (ref 95–110)
CO2 SERPL-SCNC: 29 MMOL/L (ref 22–31)
CREAT BLD-MCNC: 0.92 MG/DL (ref 0.5–1)
GFR SERPL CREATININE-BSD FRML MDRD: 61 ML/MIN/1.73 (ref 60–104)
GLUCOSE BLD-MCNC: 95 MG/DL (ref 60–100)
POTASSIUM BLD-SCNC: 5.3 MMOL/L (ref 3.5–5.1)
SODIUM BLD-SCNC: 141 MMOL/L (ref 137–145)

## 2017-12-18 PROCEDURE — 80048 BASIC METABOLIC PNL TOTAL CA: CPT | Performed by: NURSE PRACTITIONER

## 2017-12-18 PROCEDURE — 36415 COLL VENOUS BLD VENIPUNCTURE: CPT | Performed by: NURSE PRACTITIONER

## 2017-12-18 PROCEDURE — 99214 OFFICE O/P EST MOD 30 MIN: CPT | Performed by: NURSE PRACTITIONER

## 2017-12-18 NOTE — PROGRESS NOTES
Subjective:     Congestive Heart Failure (chief complaint )    Congestive Heart Failure   Presents for follow-up visit. Associated symptoms include shortness of breath. Pertinent negatives include no abdominal pain, chest pain, chest pressure, edema, fatigue, near-syncope, orthopnea, palpitations, paroxysmal nocturnal dyspnea or unexpected weight change. The symptoms have been stable. Compliance with total regimen is 51-75%. Compliance problems include adherence to exercise and adherence to diet.  Compliance with diet is 51-75%. Compliance with exercise is 0-25%. Compliance with medications is %.     Mrs. Adams is a 66-year-old  female who presents to the office for scheduled 3 month office follow-up in the heart failure clinic.  She is accompanied by her daughter-in-law.    Ms Adams was admitted to the hospital (from [facility]) on 03/08/2016 with evidence of anterior STEMI with PCI per Dr Kent. Cath was noted with evidence of multi-vessel disease. In addition, she required intubation with mechanical ventilation secondary to pulmonary edema. Muga scan demonstrated evidence of EF 35.5% on 03/15/2016    PCP: BILL Rodrigues  Cardiology: Dr Kent     [facility]:  1. CAD, S/P PCI LAD - per Dr Kent - 03/08/2016 2.75mm x 12mm and a 2.5mm x 23mm Alpine stent proximal LAD  2. HFrEF, ischemic 35.5% by Muga on 03/15/2016  3. Acute pulmonary edema 03/08/2016 requiring intubation   4. RLL pneumonia 03/2016  5. Hypertension  6. Hyperlipidemia  7. DM, Type II  8.GERD  9. Colon polyp removal 03/07/2016  10. H/O hyperkalemia on AA      Yu: daughter-in-law [phone] (cell)    The patient reports that she did not follow-up with Dr. Castellanos regarding sleep medicine evaluation for BUD.    Review of Systems   Constitution: Negative for chills, fatigue, fever, unexpected weight change and weight gain.   Cardiovascular: Positive for dyspnea on exertion. Negative for chest pain, leg  swelling (ankle swelling throughout the day), near-syncope, orthopnea, palpitations, paroxysmal nocturnal dyspnea and syncope.   Respiratory: Positive for shortness of breath and snoring. Cough: nonproductive cough has improved over the past 2 weeks.    Hematologic/Lymphatic: Does not bruise/bleed easily.   Gastrointestinal: Negative for bloating and abdominal pain.     Past Medical History:   Diagnosis Date   • Abdominal pain 11/20/2015    unspecified   • Acute gastritis    • Acute on chronic systolic congestive heart failure 04/13/2016   • Ankle pain 04/27/2015   • Candidiasis, skin or nails 12/22/2011    controlled   • Chronic systolic congestive heart failure 08/01/2016   • Congestive heart failure 08/01/2016   • Coronary arteriosclerosis 06/21/2016    multi-vessel   • Cough 09/25/2014    proabably due to allergy, chest x ray is normal      • Edema of foot 03/24/2016   • Encounter for long-term (current) drug use     therapy   • Epigastric pain 12/03/2015   • Essential hypertension 06/21/2016   • History of echocardiogram 03/14/2016    Left atrium normal with mild CLVH wit normal aortic root size.Evidence of posterolateral wall hypokinesis. Severely depressed LV systolic function of 20-25%.Mitral valve thickened Aortic sclerosis.Diastolic dysfunction   • Hyperlipidemia 03/31/2016    unspecified   • Hypertensive disorder 03/24/2016   • Left lower quadrant pain 07/12/2013    ruling out diverticular disease      • Myocardial infarction 03/24/2016   • Nausea 11/20/2015   • Osteoarthritis of knee 06/23/2016   • Pain in limb 01/25/2011   • Pediculosis capitis 12/22/2011    controlled   • Peptic ulcer    • Polyuria 01/25/2011   • Pruritic rash 12/09/2014   • Superficial foreign body hip without major open wound, no infection 12/09/2011    ??? back   • Type 2 diabetes mellitus 06/23/2016    new onset   • Weakness 06/23/2016     Past Surgical History:   Procedure Laterality Date   • COLONOSCOPY W/ POLYPECTOMY  03/07/2016  "   Transverse colon polyps,descending polyps,Sigmoid polyps, all resected and retrieved. Diverticulosis without perforation or abscess without bleeding. Erica Thomas   • HYSTERECTOMY      ovary preserv   • INJECTION OF MEDICATION  09/11/2014    Celestone (betamethasone) (2)       Social History     Social History   • Marital status:      Spouse name: N/A   • Number of children: N/A   • Years of education: N/A     Social History Main Topics   • Smoking status: Never Smoker   • Smokeless tobacco: Never Used   • Alcohol use No   • Drug use: No   • Sexual activity: Defer     Other Topics Concern   • None     Social History Narrative     Current Outpatient Prescriptions   Medication Sig Dispense Refill   • aspirin 81 MG tablet Take 81 mg by mouth daily.     • carvedilol (COREG) 25 MG tablet Take 25 mg by mouth 2 (two) times a day.     • furosemide (LASIX) 20 MG tablet TAKE 1 TABLET BY MOUTH EVERY SUNDAY, MONDAY, THURSDAY, AND SATURDAY 16 tablet 2   • glimepiride (AMARYL) 1 MG tablet TAKE ONE-HALF TABLET BY MOUTH TWICE A DAY 30 tablet 3   • hydrALAZINE (APRESOLINE) 50 MG tablet Take 1 tablet by mouth 3 (Three) Times a Day. 90 tablet 5   • loratadine (CLARITIN) 10 MG tablet Take 1 tablet by mouth Daily. 30 tablet 3   • magnesium oxide (MAG-OX) 400 MG tablet Take 400 mg by mouth 2 (Two) Times a Day.     • omeprazole (PriLOSEC) 40 MG capsule Take 40 mg by mouth daily.     • pravastatin (PRAVACHOL) 40 MG tablet Take 1 tablet by mouth Daily. 30 tablet 3   • sacubitril-valsartan (ENTRESTO) 24-26 MG tablet Take 1 tablet by mouth 2 (Two) Times a Day. 60 tablet 3     No current facility-administered medications for this visit.      Objective:     Vitals:    12/18/17 0951   BP: 130/74   BP Location: Left arm   Patient Position: Sitting   Cuff Size: Adult   Pulse: 75   SpO2: 98%   Weight: 90.9 kg (200 lb 6 oz)   Height: 160 cm (63\")      Physical Exam   Constitutional: She is oriented to person, place, and time. She appears " "well-nourished. No distress.   HENT:   Head: Atraumatic.   Neck: JVD: 6 cm @ 45 degrees.   Cardiovascular: Normal rate and regular rhythm.    Murmur heard.  High-pitched blowing holosystolic murmur is present with a grade of 1/6  at the apex  Pulses:       Radial pulses are 2+ on the right side, and 2+ on the left side.   Pulmonary/Chest: Effort normal. No respiratory distress. She has no wheezes.   Abdominal: She exhibits no distension.   Musculoskeletal: Edema: non pitting pedal and ankle edema.   Neurological: She is alert and oriented to person, place, and time.   Skin: Skin is warm and dry.   Psychiatric: She has a normal mood and affect. Her behavior is normal. Judgment and thought content normal.   Vitals reviewed.    Flowsheet Rows         First Filed Value    Admission Height  63\" (160 cm) Documented at 09/18/2017 1003    Admission Weight  201 lb 7 oz (91.4 kg) Documented at 09/18/2017 1003        Cardiographics  Echocardiogram: 03/16/2017  · Left ventricular function is severely decreased.  · Calculated EF = 29%. Estimated EF was in agreement with the calculated EF  · Global left ventricular wall motion appears abnormal  · Left ventricular diastolic dysfunction (grade II) consistent with pseudonormalization and elevated left atrial pressures.  · Left ventricular wall thickness is consistent with mild concentric hypertrophy.  · Right Ventricle: Normal cavity size, wall thickness, systolic function and septal motion noted  · Mild tricuspid valve regurgitation is present.  · Mild mitral valve regurgitation is present  · Mild to moderate pulmonary hypertension is present  · There is no evidence of pericardial effusion.      Cardiac Cath: 03/08/2016  CORONARIES: Right dominant system.  1. LEFT MAIN: Left main coronary artery toward the distal end had  evidence of luminal irregularity.  2. LAD: Left anterior descending artery was a medium caliber vessel,  which in the proximal portion was 100% occluded after the " origin of  the 1st diagonal branch. There was no evidence of any antegrade  flow down the distal left anterior descending artery. The 1st  diagonal branch in the proximal portion had up to 80% to 90%  stenosis.      The 1st septal  in the proximal portion had evidence of  luminal irregularity. There was no evidence of any antegrade flow  down the left anterior descending artery.  3. CIRCUMFLEX: The circumflex artery was a medium caliber vessel,  which towards the ostium, had up to 50% to 60% stenosis. The distal  end of the circumflex artery gave origin to the obtuse marginal  branch #2 and #3, which had 70% to 80% stenosis.  4. RCA: The right coronary artery was a medium caliber dominant  vessel, which in the proximal portion had a discrete 60% to 70%  stenosis. The midportion of the right coronary artery had 30% to  40% stenosis, and the distal right coronary artery bifurcated into  the posterior descending and posterolateral branch. The posterior  lateral branch at its origin had 60% to 70% stenosis. The distal  right coronary artery, before the bifurcation to the posterior  descending and posterolateral branch, had evidence of 30% to 40%  narrowing.      FINAL IMPRESSION:  1. The right coronary artery was diffusely diseased.  2. A 100% occluded proximal left anterior descending artery, which was  the infarct-related vessel.  3. Ostial circumflex artery with 30% to 40% narrowing and in some  views appeared to be 60% to 70%.  4. The 1st diagonal branch in the proximal portion with 70% to 80%  stenosis.  Following this, a 2.75 mm x 12 mm and a 2.5 mm x 23 mm Alpine stent was  then successfully deployed with reduction of stenosis from 100% to less  than 0% stenosis. Patient did have distal left anterior descending  artery, diagonal, ostial circumflex artery, and diffusely diseased right  coronary artery.      Patient will be evaluated for direct revascularization and have  discussed with Dr. Verduzco. In  view of the patient's acute myocardial  infarction with left anterior descending artery 100% occluded, patient  at the present time would not be subjected to an emergent coronary  artery bypass grafting, and patient will be stabilized.      Lab Results   Component Value Date    GLUCOSE 97 09/18/2017    CALCIUM 9.0 09/18/2017     09/18/2017    K 4.2 09/18/2017    CO2 27.0 09/18/2017     09/18/2017    BUN 15 09/18/2017    CREATININE 0.91 09/18/2017    EGFRIFNONA 62 09/18/2017    BCR 16.5 09/18/2017    ANIONGAP 9.0 09/18/2017     Lab Results   Component Value Date    WBC 8.08 03/14/2017    HGB 9.3 (L) 03/14/2017    HCT 31.8 (L) 03/14/2017    MCV 66.8 (L) 03/14/2017     03/14/2017     Lab Results   Component Value Date    CHOL 176 08/17/2017    CHOL 170 02/21/2017     Lab Results   Component Value Date    TRIG 313 (H) 08/17/2017    TRIG 261 (H) 02/21/2017    TRIG 272 (H) 03/09/2016     Lab Results   Component Value Date    HDL 36 (L) 08/17/2017    HDL 35 (L) 02/21/2017    HDL 28 (L) 03/09/2016     Lab Results   Component Value Date    LDLCALC 90 03/09/2016     Lab Results   Component Value Date    TSH 2.83 03/09/2016     The following portions of the patient's history were reviewed and updated as appropriate: allergies, current medications, past family history, past medical history, past social history, past surgical history and problem list.     Assessment:      Diagnosis Plan   1. Chronic systolic heart failure   Stage C; NYHA Class III  Basic Metabolic Panel - will contact daughter-in-law via telephone with results  BETA-BLOCKER: Carvedilol 25 mg twice daily  ACE/ARB: We'll discontinue losartan 100 mg daily and initiate Entresto 24/26 mg twice daily beginning tonight  DIURETIC: Lasix 20 mg on Sunday Monday Thursday and Saturday  ALDOSTERONT ANTAGONIST: H/O hyperkalemia  IMDUR/HYDRALAZINE: Hydralazine to 50 mg 3 times a day  DIGOXIN: Not applicable  Fluid restriction: 2000 cc daily - reinforced    Sodium restriction: 2000 mg daily - reinforced  6MWT: Up-to-date  Cardiac Rehab: N/A - transportation issues  Pulmonary Rehab: N/A   ICD: N/A @ this time  CardioMEMS: could be considered  Advanced HF evaluation (Transplant/LVAD): Not applicable      Knee-high compression stockings recommended         2. Coronary arteriosclerosis      Aspirin plus statin therapy    3. Essential hypertension  Basic Metabolic Panel      According to #1        4. Type 2 diabetes mellitus without complication, without long-term current use of insulin  Basic Metabolic Panel; as managed per PCP          5. Mixed hyperlipidemia  Statin therapy        6. Obesity, Class II, BMI 35-39.9  I strongly encourage the patient and her family members in compliance with Sleep medicine; as referral has been made, telephone contact regarding office has been given to the daughter-in-law        7. Drug therapy  Basic Metabolic Panel         The patient presents with history of HFrEF, ischemic for which there is no clinical evidence of mild hypervolemia.  She is well perfused.    Discussion with the patient and her daughter-in-law regarding advanced directive/living will.  I have given them written information regarding [place] advanced directive.   I will address with them again upon return to office.      Dr. Kent: 01/23/2018

## 2017-12-19 DIAGNOSIS — E11.9 TYPE 2 DIABETES MELLITUS WITHOUT COMPLICATION, WITHOUT LONG-TERM CURRENT USE OF INSULIN (HCC): ICD-10-CM

## 2017-12-19 DIAGNOSIS — E78.5 HYPERLIPIDEMIA, UNSPECIFIED HYPERLIPIDEMIA TYPE: ICD-10-CM

## 2017-12-19 RX ORDER — PRAVASTATIN SODIUM 40 MG
TABLET ORAL
Qty: 30 TABLET | Refills: 2 | Status: SHIPPED | OUTPATIENT
Start: 2017-12-19 | End: 2018-03-17 | Stop reason: SDUPTHER

## 2017-12-29 ENCOUNTER — TELEPHONE (OUTPATIENT)
Dept: CARDIOLOGY | Facility: CLINIC | Age: 67
End: 2017-12-29

## 2018-01-02 ENCOUNTER — OFFICE VISIT (OUTPATIENT)
Dept: CARDIOLOGY | Facility: CLINIC | Age: 68
End: 2018-01-02

## 2018-01-02 VITALS
SYSTOLIC BLOOD PRESSURE: 130 MMHG | BODY MASS INDEX: 35.14 KG/M2 | DIASTOLIC BLOOD PRESSURE: 64 MMHG | OXYGEN SATURATION: 98 % | WEIGHT: 198.31 LBS | HEART RATE: 75 BPM | HEIGHT: 63 IN

## 2018-01-02 DIAGNOSIS — I50.22 CHRONIC SYSTOLIC HEART FAILURE (HCC): Primary | ICD-10-CM

## 2018-01-02 DIAGNOSIS — E78.2 MIXED HYPERLIPIDEMIA: ICD-10-CM

## 2018-01-02 DIAGNOSIS — I10 ESSENTIAL HYPERTENSION: ICD-10-CM

## 2018-01-02 DIAGNOSIS — I25.10 CORONARY ARTERIOSCLEROSIS: ICD-10-CM

## 2018-01-02 DIAGNOSIS — E66.9 OBESITY, CLASS II, BMI 35-39.9: ICD-10-CM

## 2018-01-02 PROCEDURE — 99214 OFFICE O/P EST MOD 30 MIN: CPT | Performed by: NURSE PRACTITIONER

## 2018-01-02 RX ORDER — LOSARTAN POTASSIUM 100 MG/1
100 TABLET ORAL EVERY EVENING
COMMUNITY
Start: 2017-12-29 | End: 2018-07-02 | Stop reason: SDUPTHER

## 2018-01-02 NOTE — PROGRESS NOTES
Subjective:     chronic systolic heart failure (chief complaint)    Congestive Heart Failure   Presents for follow-up visit. Associated symptoms include shortness of breath. Pertinent negatives include no abdominal pain, chest pain, chest pressure, edema, fatigue, near-syncope, orthopnea, palpitations, paroxysmal nocturnal dyspnea or unexpected weight change. The symptoms have been stable. Compliance with total regimen is 51-75%. Compliance problems include adherence to exercise and adherence to diet.  Compliance with diet is 51-75%. Compliance with exercise is 0-25%. Compliance with medications is %.     Mrs. Adams is a 66-year-old  female who presents for follow up re: change from Losartan to Entresto, however due to abdominal rash Entresto was discontinued and Losartan was resumed.   The patient reports resolution of the rash with above changes.     She is accompanied by her daughter-in-law.    Ms Adams was admitted to the hospital (from [facility]) on 03/08/2016 with evidence of anterior STEMI with PCI per Dr Kent. Cath was noted with evidence of multi-vessel disease. In addition, she required intubation with mechanical ventilation secondary to pulmonary edema. Muga scan demonstrated evidence of EF 35.5% on 03/15/2016    PCP: BILL Rodrigues  Cardiology: Dr Kent     [facility]:  1. CAD, S/P PCI LAD - per Dr Kent - 03/08/2016 2.75mm x 12mm and a 2.5mm x 23mm Alpine stent proximal LAD  2. HFrEF, ischemic 35.5% by Muga on 03/15/2016  3. Acute pulmonary edema 03/08/2016 requiring intubation   4. RLL pneumonia 03/2016  5. Hypertension  6. Hyperlipidemia  7. DM, Type II  8.GERD  9. Colon polyp removal 03/07/2016  10. H/O hyperkalemia on AA      Yu: daughter-in-law [phone] (cell)    The patient reports that she did not follow-up with Dr. Castellanos regarding sleep medicine evaluation for BUD.    Review of Systems   Constitution: Negative for chills, fatigue, fever,  unexpected weight change and weight gain.   Cardiovascular: Positive for dyspnea on exertion. Negative for chest pain, leg swelling (ankle swelling throughout the day), near-syncope, orthopnea, palpitations, paroxysmal nocturnal dyspnea and syncope.   Respiratory: Positive for shortness of breath and snoring. Cough: nonproductive cough has improved over the past 2 weeks.    Hematologic/Lymphatic: Does not bruise/bleed easily.   Gastrointestinal: Negative for bloating and abdominal pain.     Past Medical History:   Diagnosis Date   • Abdominal pain 11/20/2015    unspecified   • Acute gastritis    • Acute on chronic systolic congestive heart failure 04/13/2016   • Ankle pain 04/27/2015   • Candidiasis, skin or nails 12/22/2011    controlled   • Chronic systolic congestive heart failure 08/01/2016   • Congestive heart failure 08/01/2016   • Coronary arteriosclerosis 06/21/2016    multi-vessel   • Cough 09/25/2014    proabably due to allergy, chest x ray is normal      • Edema of foot 03/24/2016   • Encounter for long-term (current) drug use     therapy   • Epigastric pain 12/03/2015   • Essential hypertension 06/21/2016   • History of echocardiogram 03/14/2016    Left atrium normal with mild CLVH wit normal aortic root size.Evidence of posterolateral wall hypokinesis. Severely depressed LV systolic function of 20-25%.Mitral valve thickened Aortic sclerosis.Diastolic dysfunction   • Hyperlipidemia 03/31/2016    unspecified   • Hypertensive disorder 03/24/2016   • Left lower quadrant pain 07/12/2013    ruling out diverticular disease      • Myocardial infarction 03/24/2016   • Nausea 11/20/2015   • Osteoarthritis of knee 06/23/2016   • Pain in limb 01/25/2011   • Pediculosis capitis 12/22/2011    controlled   • Peptic ulcer    • Polyuria 01/25/2011   • Pruritic rash 12/09/2014   • Superficial foreign body hip without major open wound, no infection 12/09/2011    ??? back   • Type 2 diabetes mellitus 06/23/2016    new  "onset   • Weakness 06/23/2016     Past Surgical History:   Procedure Laterality Date   • COLONOSCOPY W/ POLYPECTOMY  03/07/2016    Transverse colon polyps,descending polyps,Sigmoid polyps, all resected and retrieved. Diverticulosis without perforation or abscess without bleeding. Erica Thomas   • HYSTERECTOMY      ovary preserv   • INJECTION OF MEDICATION  09/11/2014    Celestone (betamethasone) (2)       Social History     Social History   • Marital status:      Spouse name: N/A   • Number of children: N/A   • Years of education: N/A     Social History Main Topics   • Smoking status: Never Smoker   • Smokeless tobacco: Never Used   • Alcohol use No   • Drug use: No   • Sexual activity: Defer     Other Topics Concern   • None     Social History Narrative     Current Outpatient Prescriptions   Medication Sig Dispense Refill   • aspirin 81 MG tablet Take 81 mg by mouth daily.     • carvedilol (COREG) 25 MG tablet Take 25 mg by mouth 2 (two) times a day.     • furosemide (LASIX) 20 MG tablet TAKE 1 TABLET BY MOUTH EVERY SUNDAY, MONDAY, THURSDAY, AND SATURDAY 16 tablet 2   • glimepiride (AMARYL) 1 MG tablet TAKE ONE-HALF TABLET BY MOUTH TWICE A DAY 30 tablet 3   • hydrALAZINE (APRESOLINE) 50 MG tablet Take 1 tablet by mouth 3 (Three) Times a Day. 90 tablet 5   • loratadine (CLARITIN) 10 MG tablet Take 1 tablet by mouth Daily. 30 tablet 3   • losartan (COZAAR) 100 MG tablet Take 100 mg by mouth Every Evening.     • magnesium oxide (MAG-OX) 400 MG tablet Take 400 mg by mouth 2 (Two) Times a Day.     • omeprazole (PriLOSEC) 40 MG capsule Take 40 mg by mouth daily.     • pravastatin (PRAVACHOL) 40 MG tablet TAKE ONE TABLET BY MOUTH DAILY 30 tablet 2     No current facility-administered medications for this visit.      Objective:     Vitals:    01/02/18 0910   BP: 130/64   BP Location: Left arm   Patient Position: Sitting   Cuff Size: Adult   Pulse: 75   SpO2: 98%   Weight: 90 kg (198 lb 5 oz)   Height: 160 cm (63\") " "     Physical Exam   Constitutional: She is oriented to person, place, and time. She appears well-nourished. No distress.   HENT:   Head: Atraumatic.   Neck: JVD: 6 cm @ 45 degrees.   Cardiovascular: Normal rate and regular rhythm.    Murmur heard.  High-pitched blowing holosystolic murmur is present with a grade of 1/6  at the apex  Pulses:       Radial pulses are 2+ on the right side, and 2+ on the left side.   Pulmonary/Chest: Effort normal. No respiratory distress. She has no wheezes.   Abdominal: She exhibits no distension.   Musculoskeletal: Edema: non pitting pedal and ankle edema.   Neurological: She is alert and oriented to person, place, and time.   Skin: Skin is warm and dry.   Psychiatric: She has a normal mood and affect. Her behavior is normal. Judgment and thought content normal.   Vitals reviewed.    Flowsheet Rows         First Filed Value    Admission Height  63\" (160 cm) Documented at 09/18/2017 1003    Admission Weight  201 lb 7 oz (91.4 kg) Documented at 09/18/2017 1003        Cardiographics  Echocardiogram: 03/16/2017  · Left ventricular function is severely decreased.  · Calculated EF = 29%. Estimated EF was in agreement with the calculated EF  · Global left ventricular wall motion appears abnormal  · Left ventricular diastolic dysfunction (grade II) consistent with pseudonormalization and elevated left atrial pressures.  · Left ventricular wall thickness is consistent with mild concentric hypertrophy.  · Right Ventricle: Normal cavity size, wall thickness, systolic function and septal motion noted  · Mild tricuspid valve regurgitation is present.  · Mild mitral valve regurgitation is present  · Mild to moderate pulmonary hypertension is present  · There is no evidence of pericardial effusion.      Cardiac Cath: 03/08/2016  CORONARIES: Right dominant system.  1. LEFT MAIN: Left main coronary artery toward the distal end had  evidence of luminal irregularity.  2. LAD: Left anterior descending " artery was a medium caliber vessel,  which in the proximal portion was 100% occluded after the origin of  the 1st diagonal branch. There was no evidence of any antegrade  flow down the distal left anterior descending artery. The 1st  diagonal branch in the proximal portion had up to 80% to 90%  stenosis.      The 1st septal  in the proximal portion had evidence of  luminal irregularity. There was no evidence of any antegrade flow  down the left anterior descending artery.  3. CIRCUMFLEX: The circumflex artery was a medium caliber vessel,  which towards the ostium, had up to 50% to 60% stenosis. The distal  end of the circumflex artery gave origin to the obtuse marginal  branch #2 and #3, which had 70% to 80% stenosis.  4. RCA: The right coronary artery was a medium caliber dominant  vessel, which in the proximal portion had a discrete 60% to 70%  stenosis. The midportion of the right coronary artery had 30% to  40% stenosis, and the distal right coronary artery bifurcated into  the posterior descending and posterolateral branch. The posterior  lateral branch at its origin had 60% to 70% stenosis. The distal  right coronary artery, before the bifurcation to the posterior  descending and posterolateral branch, had evidence of 30% to 40%  narrowing.      FINAL IMPRESSION:  1. The right coronary artery was diffusely diseased.  2. A 100% occluded proximal left anterior descending artery, which was  the infarct-related vessel.  3. Ostial circumflex artery with 30% to 40% narrowing and in some  views appeared to be 60% to 70%.  4. The 1st diagonal branch in the proximal portion with 70% to 80%  stenosis.  Following this, a 2.75 mm x 12 mm and a 2.5 mm x 23 mm Alpine stent was  then successfully deployed with reduction of stenosis from 100% to less  than 0% stenosis. Patient did have distal left anterior descending  artery, diagonal, ostial circumflex artery, and diffusely diseased right  coronary  artery.      Patient will be evaluated for direct revascularization and have  discussed with Dr. Verduzco. In view of the patient's acute myocardial  infarction with left anterior descending artery 100% occluded, patient  at the present time would not be subjected to an emergent coronary  artery bypass grafting, and patient will be stabilized.      Lab Results   Component Value Date    GLUCOSE 95 12/18/2017    CALCIUM 9.2 12/18/2017     12/18/2017    K 5.3 (H) 12/18/2017    CO2 29.0 12/18/2017    CL 99 12/18/2017    BUN 17 12/18/2017    CREATININE 0.92 12/18/2017    EGFRIFNONA 61 12/18/2017    BCR 18.5 12/18/2017    ANIONGAP 13.0 12/18/2017     Lab Results   Component Value Date    WBC 8.08 03/14/2017    HGB 9.3 (L) 03/14/2017    HCT 31.8 (L) 03/14/2017    MCV 66.8 (L) 03/14/2017     03/14/2017     Lab Results   Component Value Date    CHOL 176 08/17/2017    CHOL 170 02/21/2017     Lab Results   Component Value Date    TRIG 313 (H) 08/17/2017    TRIG 261 (H) 02/21/2017    TRIG 272 (H) 03/09/2016     Lab Results   Component Value Date    HDL 36 (L) 08/17/2017    HDL 35 (L) 02/21/2017    HDL 28 (L) 03/09/2016     Lab Results   Component Value Date    LDLCALC 90 03/09/2016     Lab Results   Component Value Date    TSH 2.83 03/09/2016     The following portions of the patient's history were reviewed and updated as appropriate: allergies, current medications, past family history, past medical history, past social history, past surgical history and problem list.     Assessment:      Diagnosis Plan   1. Chronic systolic heart failure   Stage C; NYHA Class III    BETA-BLOCKER: Carvedilol 25 mg twice daily  ACE/ARB: Losartan 100 mg daily at 4 PM  DIURETIC: Lasix 20 mg on Sunday Monday Thursday and Saturday  ALDOSTERONT ANTAGONIST: H/O hyperkalemia  IMDUR/HYDRALAZINE: Hydralazine to 50 mg 3 times a day  DIGOXIN: Not applicable  Fluid restriction: 2000 cc daily - reinforced   Sodium restriction: 2000 mg daily -  reinforced  6MWT: Up-to-date  Cardiac Rehab: N/A - transportation issues  Pulmonary Rehab: N/A   ICD: N/A @ this time  CardioMEMS: could be considered  Advanced HF evaluation (Transplant/LVAD): Not applicable      Knee-high compression stockings recommended         2. Coronary arteriosclerosis      Aspirin plus statin therapy    3. Essential hypertension        According to #1        4. Type 2 diabetes mellitus without complication, without long-term current use of insulin   as managed per PCP          5. Mixed hyperlipidemia  Statin therapy        6. Obesity, Class II, BMI 35-39.9  The patient did receive evaluation re: BUD with Dr Castellanos in [place]  Sleep study set for January 8th       7. Drug therapy           The patient presents with history of HFrEF, ischemic for which there is no clinical evidence of mild hypervolemia.  She is well perfused.    Dr. Kent: 01/23/2018

## 2018-01-29 RX ORDER — FUROSEMIDE 20 MG/1
TABLET ORAL
Qty: 16 TABLET | Refills: 1 | Status: SHIPPED | OUTPATIENT
Start: 2018-01-29 | End: 2018-03-19 | Stop reason: SDUPTHER

## 2018-02-16 ENCOUNTER — OFFICE VISIT (OUTPATIENT)
Dept: FAMILY MEDICINE CLINIC | Facility: CLINIC | Age: 68
End: 2018-02-16

## 2018-02-16 VITALS
WEIGHT: 202 LBS | OXYGEN SATURATION: 95 % | HEART RATE: 83 BPM | RESPIRATION RATE: 20 BRPM | BODY MASS INDEX: 35.79 KG/M2 | HEIGHT: 63 IN | TEMPERATURE: 98.9 F | DIASTOLIC BLOOD PRESSURE: 68 MMHG | SYSTOLIC BLOOD PRESSURE: 142 MMHG

## 2018-02-16 DIAGNOSIS — E11.9 TYPE 2 DIABETES MELLITUS WITHOUT COMPLICATION, WITHOUT LONG-TERM CURRENT USE OF INSULIN (HCC): Primary | ICD-10-CM

## 2018-02-16 DIAGNOSIS — I10 ESSENTIAL HYPERTENSION: ICD-10-CM

## 2018-02-16 LAB
ALBUMIN UR-MCNC: <0.6 MG/L
HBA1C MFR BLD: 6.2 % (ref 4–5.6)

## 2018-02-16 PROCEDURE — 82043 UR ALBUMIN QUANTITATIVE: CPT | Performed by: NURSE PRACTITIONER

## 2018-02-16 PROCEDURE — 99213 OFFICE O/P EST LOW 20 MIN: CPT | Performed by: NURSE PRACTITIONER

## 2018-02-16 PROCEDURE — 36415 COLL VENOUS BLD VENIPUNCTURE: CPT | Performed by: NURSE PRACTITIONER

## 2018-02-16 PROCEDURE — 83036 HEMOGLOBIN GLYCOSYLATED A1C: CPT | Performed by: NURSE PRACTITIONER

## 2018-02-16 RX ORDER — GLIMEPIRIDE 1 MG/1
1 TABLET ORAL 2 TIMES DAILY
Qty: 60 TABLET | Refills: 3 | Status: SHIPPED | OUTPATIENT
Start: 2018-02-16 | End: 2018-05-17 | Stop reason: SDUPTHER

## 2018-02-16 NOTE — PROGRESS NOTES
Subjective   Afia Adams is a 67 y.o. female.     HPI Comments: Here today for kuldeep on her dm and her htn.  Has not been checking her b/s.  Has recently seen cardiology.      Diabetes   She presents for her follow-up diabetic visit. She has type 2 diabetes mellitus. Her disease course has been stable. There are no hypoglycemic associated symptoms. Pertinent negatives for hypoglycemia include no headaches or sweats. There are no diabetic associated symptoms. Pertinent negatives for diabetes include no blurred vision and no chest pain. There are no hypoglycemic complications. Symptoms are stable. Diabetic complications include heart disease. Pertinent negatives for diabetic complications include no CVA, PVD or retinopathy. Risk factors for coronary artery disease include diabetes mellitus, dyslipidemia, hypertension, obesity, post-menopausal and sedentary lifestyle. She is compliant with treatment most of the time. Her weight is stable. She is following a diabetic diet. Meal planning includes avoidance of concentrated sweets. She has not had a previous visit with a dietitian. She rarely participates in exercise. She monitors blood glucose at home 1-2 x per week. Blood glucose monitoring compliance is poor. (Doesn't check her b/s) An ACE inhibitor/angiotensin II receptor blocker is being taken. She does not see a podiatrist.Eye exam is not current.   Hypertension   This is a chronic problem. The current episode started more than 1 year ago. The problem is controlled. Pertinent negatives include no anxiety, blurred vision, chest pain, headaches, malaise/fatigue, neck pain, orthopnea, palpitations, peripheral edema, PND, shortness of breath or sweats. There are no associated agents to hypertension. Risk factors for coronary artery disease include diabetes mellitus, dyslipidemia, obesity, post-menopausal state and sedentary lifestyle. Past treatments include angiotensin blockers, beta blockers, direct  vasodilators and diuretics. The current treatment provides significant improvement. There are no compliance problems.  Hypertensive end-organ damage includes CAD/MI and heart failure. There is no history of angina, kidney disease, CVA, left ventricular hypertrophy, PVD or retinopathy.        The following portions of the patient's history were reviewed and updated as appropriate: allergies, current medications, past family history, past medical history, past social history, past surgical history and problem list.    Review of Systems   Constitutional: Negative.  Negative for malaise/fatigue.   HENT: Negative.    Eyes: Negative for blurred vision.   Respiratory: Negative.  Negative for shortness of breath.    Cardiovascular: Negative.  Negative for chest pain, palpitations, orthopnea and PND.   Musculoskeletal: Negative.  Negative for neck pain.   Skin: Negative.    Neurological: Negative.  Negative for headaches.   Psychiatric/Behavioral: Negative.        Objective   Physical Exam   Constitutional: She is oriented to person, place, and time. She appears well-developed and well-nourished. No distress.   HENT:   Head: Normocephalic.   Eyes: Pupils are equal, round, and reactive to light.   Neck: Normal range of motion. Neck supple. No thyromegaly present.   Cardiovascular: Normal rate, regular rhythm and normal heart sounds.  Exam reveals no friction rub.    No murmur heard.  Pulmonary/Chest: Effort normal and breath sounds normal. No respiratory distress. She has no wheezes. She has no rales.   Abdominal: Soft.   Musculoskeletal: Normal range of motion.   Neurological: She is alert and oriented to person, place, and time.   Skin: Skin is warm and dry.   Psychiatric: She has a normal mood and affect. Thought content normal.   Nursing note and vitals reviewed.      Assessment/Plan   Afia was seen today for diabetes and hypertension.    Diagnoses and all orders for this visit:    Type 2 diabetes mellitus without  complication, without long-term current use of insulin  -     Hemoglobin A1c  -     MicroAlbumin, Urine, Random - Urine, Clean Catch    Essential hypertension    Other orders  -     glimepiride (AMARYL) 1 MG tablet; Take 1 tablet by mouth 2 (Two) Times a Day.      Lab Results   Component Value Date    HGBA1C 6.4 (H) 08/17/2017

## 2018-02-16 NOTE — PATIENT INSTRUCTIONS

## 2018-03-17 DIAGNOSIS — E78.5 HYPERLIPIDEMIA, UNSPECIFIED HYPERLIPIDEMIA TYPE: ICD-10-CM

## 2018-03-17 DIAGNOSIS — E11.9 TYPE 2 DIABETES MELLITUS WITHOUT COMPLICATION, WITHOUT LONG-TERM CURRENT USE OF INSULIN (HCC): ICD-10-CM

## 2018-03-19 DIAGNOSIS — E11.9 TYPE 2 DIABETES MELLITUS WITHOUT COMPLICATION, WITHOUT LONG-TERM CURRENT USE OF INSULIN (HCC): ICD-10-CM

## 2018-03-19 DIAGNOSIS — E78.5 HYPERLIPIDEMIA, UNSPECIFIED HYPERLIPIDEMIA TYPE: ICD-10-CM

## 2018-03-19 RX ORDER — PRAVASTATIN SODIUM 40 MG
TABLET ORAL
Qty: 30 TABLET | Refills: 1 | Status: SHIPPED | OUTPATIENT
Start: 2018-03-19 | End: 2018-05-17 | Stop reason: SDUPTHER

## 2018-03-19 RX ORDER — PRAVASTATIN SODIUM 40 MG
TABLET ORAL
Qty: 30 TABLET | Refills: 1 | Status: SHIPPED | OUTPATIENT
Start: 2018-03-19 | End: 2018-07-02 | Stop reason: SDUPTHER

## 2018-03-20 RX ORDER — FUROSEMIDE 20 MG/1
TABLET ORAL
Qty: 16 TABLET | Refills: 0 | Status: SHIPPED | OUTPATIENT
Start: 2018-03-20 | End: 2018-04-16 | Stop reason: SDUPTHER

## 2018-04-02 ENCOUNTER — OFFICE VISIT (OUTPATIENT)
Dept: CARDIOLOGY | Facility: CLINIC | Age: 68
End: 2018-04-02

## 2018-04-02 ENCOUNTER — LAB (OUTPATIENT)
Dept: LAB | Facility: HOSPITAL | Age: 68
End: 2018-04-02

## 2018-04-02 VITALS
HEART RATE: 71 BPM | DIASTOLIC BLOOD PRESSURE: 64 MMHG | WEIGHT: 203.5 LBS | SYSTOLIC BLOOD PRESSURE: 136 MMHG | OXYGEN SATURATION: 98 % | HEIGHT: 63 IN | BODY MASS INDEX: 36.06 KG/M2

## 2018-04-02 DIAGNOSIS — I10 ESSENTIAL HYPERTENSION: ICD-10-CM

## 2018-04-02 DIAGNOSIS — I25.10 CORONARY ARTERIOSCLEROSIS: ICD-10-CM

## 2018-04-02 DIAGNOSIS — I50.22 CHRONIC SYSTOLIC HEART FAILURE (HCC): ICD-10-CM

## 2018-04-02 DIAGNOSIS — E11.9 TYPE 2 DIABETES MELLITUS WITHOUT COMPLICATION, WITHOUT LONG-TERM CURRENT USE OF INSULIN (HCC): ICD-10-CM

## 2018-04-02 DIAGNOSIS — E66.9 OBESITY, CLASS II, BMI 35-39.9: ICD-10-CM

## 2018-04-02 DIAGNOSIS — Z79.899 DRUG THERAPY: ICD-10-CM

## 2018-04-02 DIAGNOSIS — E78.2 MIXED HYPERLIPIDEMIA: ICD-10-CM

## 2018-04-02 DIAGNOSIS — I50.22 CHRONIC SYSTOLIC HEART FAILURE (HCC): Primary | ICD-10-CM

## 2018-04-02 LAB
ALBUMIN SERPL-MCNC: 4.1 G/DL (ref 3.4–4.8)
ALBUMIN/GLOB SERPL: 1.2 G/DL (ref 1.1–1.8)
ALP SERPL-CCNC: 119 U/L (ref 38–126)
ALT SERPL W P-5'-P-CCNC: 27 U/L (ref 9–52)
ANION GAP SERPL CALCULATED.3IONS-SCNC: 15 MMOL/L (ref 5–15)
ARTICHOKE IGE QN: 78 MG/DL (ref 1–129)
AST SERPL-CCNC: 19 U/L (ref 14–36)
BH CV ECHO MEAS - ACS: 1.7 CM
BH CV ECHO MEAS - AO ISTHMUS: 2.1 CM
BH CV ECHO MEAS - AO MAX PG (FULL): 6.6 MMHG
BH CV ECHO MEAS - AO MAX PG: 9.7 MMHG
BH CV ECHO MEAS - AO MEAN PG (FULL): 2 MMHG
BH CV ECHO MEAS - AO MEAN PG: 4 MMHG
BH CV ECHO MEAS - AO ROOT AREA (BSA CORRECTED): 1.6
BH CV ECHO MEAS - AO ROOT AREA: 8 CM^2
BH CV ECHO MEAS - AO ROOT DIAM: 3.2 CM
BH CV ECHO MEAS - AO V2 MAX: 156 CM/SEC
BH CV ECHO MEAS - AO V2 MEAN: 94.7 CM/SEC
BH CV ECHO MEAS - AO V2 VTI: 28.5 CM
BH CV ECHO MEAS - ASC AORTA: 2.7 CM
BH CV ECHO MEAS - AVA(I,A): 2.8 CM^2
BH CV ECHO MEAS - AVA(I,D): 2.8 CM^2
BH CV ECHO MEAS - AVA(V,A): 2.1 CM^2
BH CV ECHO MEAS - AVA(V,D): 2.1 CM^2
BH CV ECHO MEAS - BSA(HAYCOCK): 2.1 M^2
BH CV ECHO MEAS - BSA: 1.9 M^2
BH CV ECHO MEAS - BZI_BMI: 36 KILOGRAMS/M^2
BH CV ECHO MEAS - BZI_METRIC_HEIGHT: 160 CM
BH CV ECHO MEAS - BZI_METRIC_WEIGHT: 92.1 KG
BH CV ECHO MEAS - EDV(CUBED): 59.3 ML
BH CV ECHO MEAS - EDV(TEICH): 65.9 ML
BH CV ECHO MEAS - EF(CUBED): 56.7 %
BH CV ECHO MEAS - EF(MOD-SP4): 46 %
BH CV ECHO MEAS - EF(TEICH): 49 %
BH CV ECHO MEAS - ESV(CUBED): 25.7 ML
BH CV ECHO MEAS - ESV(TEICH): 33.6 ML
BH CV ECHO MEAS - FS: 24.4 %
BH CV ECHO MEAS - IVS/LVPW: 1.3
BH CV ECHO MEAS - IVSD: 1.6 CM
BH CV ECHO MEAS - LA DIMENSION: 3.2 CM
BH CV ECHO MEAS - LA/AO: 1
BH CV ECHO MEAS - LV MASS(C)D: 201.5 GRAMS
BH CV ECHO MEAS - LV MASS(C)DI: 103.5 GRAMS/M^2
BH CV ECHO MEAS - LV MAX PG: 3.1 MMHG
BH CV ECHO MEAS - LV MEAN PG: 2 MMHG
BH CV ECHO MEAS - LV V1 MAX: 88 CM/SEC
BH CV ECHO MEAS - LV V1 MEAN: 68.5 CM/SEC
BH CV ECHO MEAS - LV V1 VTI: 20.7 CM
BH CV ECHO MEAS - LVIDD: 3.9 CM
BH CV ECHO MEAS - LVIDS: 3 CM
BH CV ECHO MEAS - LVOT AREA (M): 3.8 CM^2
BH CV ECHO MEAS - LVOT AREA: 3.8 CM^2
BH CV ECHO MEAS - LVOT DIAM: 2.2 CM
BH CV ECHO MEAS - LVPWD: 1.2 CM
BH CV ECHO MEAS - MV A MAX VEL: 127 CM/SEC
BH CV ECHO MEAS - MV DEC SLOPE: 679 CM/SEC^2
BH CV ECHO MEAS - MV E MAX VEL: 94.3 CM/SEC
BH CV ECHO MEAS - MV E/A: 0.74
BH CV ECHO MEAS - MV MAX PG: 8.1 MMHG
BH CV ECHO MEAS - MV MEAN PG: 4.5 MMHG
BH CV ECHO MEAS - MV P1/2T MAX VEL: 112.5 CM/SEC
BH CV ECHO MEAS - MV P1/2T: 48.5 MSEC
BH CV ECHO MEAS - MV V2 MAX: 142.5 CM/SEC
BH CV ECHO MEAS - MV V2 MEAN: 101.6 CM/SEC
BH CV ECHO MEAS - MV V2 VTI: 31.5 CM
BH CV ECHO MEAS - MVA P1/2T LCG: 2 CM^2
BH CV ECHO MEAS - MVA(P1/2T): 4.5 CM^2
BH CV ECHO MEAS - MVA(VTI): 2.5 CM^2
BH CV ECHO MEAS - PA MAX PG: 3.3 MMHG
BH CV ECHO MEAS - PA V2 MAX: 91.2 CM/SEC
BH CV ECHO MEAS - RAP SYSTOLE: 5 MMHG
BH CV ECHO MEAS - RVDD: 2.3 CM
BH CV ECHO MEAS - RVSP: 39 MMHG
BH CV ECHO MEAS - SI(AO): 117.8 ML/M^2
BH CV ECHO MEAS - SI(CUBED): 17.3 ML/M^2
BH CV ECHO MEAS - SI(LVOT): 40.4 ML/M^2
BH CV ECHO MEAS - SI(TEICH): 16.6 ML/M^2
BH CV ECHO MEAS - SV(AO): 229.2 ML
BH CV ECHO MEAS - SV(CUBED): 33.6 ML
BH CV ECHO MEAS - SV(LVOT): 78.7 ML
BH CV ECHO MEAS - SV(TEICH): 32.3 ML
BH CV ECHO MEAS - TV MAX PG: 25.6 MMHG
BH CV ECHO MEAS - TV V2 MAX: 252 CM/SEC
BILIRUB SERPL-MCNC: 0.3 MG/DL (ref 0.2–1.3)
BUN BLD-MCNC: 18 MG/DL (ref 7–21)
BUN/CREAT SERPL: 21.7 (ref 7–25)
CALCIUM SPEC-SCNC: 9.3 MG/DL (ref 8.4–10.2)
CHLORIDE SERPL-SCNC: 99 MMOL/L (ref 95–110)
CHOLEST SERPL-MCNC: 159 MG/DL (ref 0–199)
CO2 SERPL-SCNC: 28 MMOL/L (ref 22–31)
CREAT BLD-MCNC: 0.83 MG/DL (ref 0.5–1)
DEPRECATED RDW RBC AUTO: 44.3 FL (ref 36.4–46.3)
ERYTHROCYTE [DISTWIDTH] IN BLOOD BY AUTOMATED COUNT: 18.3 % (ref 11.5–14.5)
GFR SERPL CREATININE-BSD FRML MDRD: 69 ML/MIN/1.73 (ref 45–104)
GLOBULIN UR ELPH-MCNC: 3.3 GM/DL (ref 2.3–3.5)
GLUCOSE BLD-MCNC: 111 MG/DL (ref 60–100)
HCT VFR BLD AUTO: 31 % (ref 35–45)
HDLC SERPL-MCNC: 31 MG/DL (ref 60–200)
HGB BLD-MCNC: 9.3 G/DL (ref 12–15.5)
LDLC/HDLC SERPL: 2.75 {RATIO} (ref 0–3.22)
MAXIMAL PREDICTED HEART RATE: 154 BPM
MCH RBC QN AUTO: 20 PG (ref 26.5–34)
MCHC RBC AUTO-ENTMCNC: 30 G/DL (ref 31.4–36)
MCV RBC AUTO: 66.8 FL (ref 80–98)
PLATELET # BLD AUTO: 279 10*3/MM3 (ref 150–450)
PMV BLD AUTO: 9.5 FL (ref 8–12)
POTASSIUM BLD-SCNC: 5 MMOL/L (ref 3.5–5.1)
PROT SERPL-MCNC: 7.4 G/DL (ref 6.3–8.6)
RBC # BLD AUTO: 4.64 10*6/MM3 (ref 3.77–5.16)
SODIUM BLD-SCNC: 142 MMOL/L (ref 137–145)
STRESS TARGET HR: 131 BPM
TRIGL SERPL-MCNC: 213 MG/DL (ref 20–199)
WBC NRBC COR # BLD: 10.35 10*3/MM3 (ref 3.2–9.8)

## 2018-04-02 PROCEDURE — 80053 COMPREHEN METABOLIC PANEL: CPT

## 2018-04-02 PROCEDURE — 85027 COMPLETE CBC AUTOMATED: CPT

## 2018-04-02 PROCEDURE — 99214 OFFICE O/P EST MOD 30 MIN: CPT | Performed by: NURSE PRACTITIONER

## 2018-04-02 PROCEDURE — 80061 LIPID PANEL: CPT

## 2018-04-02 PROCEDURE — 93010 ELECTROCARDIOGRAM REPORT: CPT | Performed by: INTERNAL MEDICINE

## 2018-04-02 PROCEDURE — 36415 COLL VENOUS BLD VENIPUNCTURE: CPT

## 2018-04-02 PROCEDURE — 93005 ELECTROCARDIOGRAM TRACING: CPT | Performed by: NURSE PRACTITIONER

## 2018-04-02 RX ORDER — HYDRALAZINE HYDROCHLORIDE 50 MG/1
TABLET, FILM COATED ORAL
Qty: 90 TABLET | Refills: 4 | Status: SHIPPED | OUTPATIENT
Start: 2018-04-02 | End: 2018-07-02 | Stop reason: SDUPTHER

## 2018-04-02 NOTE — PROGRESS NOTES
Subjective:     Follow-up (3 months)    Congestive Heart Failure   Presents for follow-up visit. Associated symptoms include shortness of breath. Pertinent negatives include no abdominal pain, chest pain, chest pressure, edema, fatigue, near-syncope, orthopnea, palpitations, paroxysmal nocturnal dyspnea or unexpected weight change. The symptoms have been stable. Compliance with total regimen is 51-75%. Compliance problems include adherence to exercise and adherence to diet.  Compliance with diet is 51-75%. Compliance with exercise is 0-25%. Compliance with medications is %.     She is accompanied by her daughter-in-law.    Ms Adams was admitted to the hospital (from [facility]) on 03/08/2016 with evidence of anterior STEMI with PCI per Dr Kent. Cath was noted with evidence of multi-vessel disease. In addition, she required intubation with mechanical ventilation secondary to pulmonary edema. Muga scan demonstrated evidence of EF 35.5% on 03/15/2016    PCP: BILL Rodrigues  Cardiology: Dr Kent     [facility]:  1. CAD, S/P PCI LAD - per Dr Kent - 03/08/2016 2.75mm x 12mm and a 2.5mm x 23mm Alpine stent proximal LAD  2. HFrEF, ischemic 35.5% by Muga on 03/15/2016  3. Acute pulmonary edema 03/08/2016 requiring intubation   4. RLL pneumonia 03/2016  5. Hypertension  6. Hyperlipidemia  7. DM, Type II  8.GERD  9. Colon polyp removal 03/07/2016  10. H/O hyperkalemia on AA      Yu: daughter-in-law [phone] (cell)    Telephone contact with Dr Kent's office re: presence of TTE for which they have none since 2016.    The patient reports negative sleep study with Dr Castellanos since last HF appointment.    Review of Systems   Constitution: Negative for chills, fatigue, fever, unexpected weight change and weight gain.   Cardiovascular: Positive for dyspnea on exertion. Negative for chest pain, leg swelling (ankle swelling throughout the day), near-syncope, orthopnea, palpitations,  paroxysmal nocturnal dyspnea and syncope.   Respiratory: Positive for shortness of breath.    Hematologic/Lymphatic: Does not bruise/bleed easily.   Gastrointestinal: Negative for bloating and abdominal pain.     Past Medical History:   Diagnosis Date   • Abdominal pain 11/20/2015    unspecified   • Acute gastritis    • Acute on chronic systolic congestive heart failure 04/13/2016   • Ankle pain 04/27/2015   • Candidiasis, skin or nails 12/22/2011    controlled   • Chronic systolic congestive heart failure 08/01/2016   • Congestive heart failure 08/01/2016   • Coronary arteriosclerosis 06/21/2016    multi-vessel   • Cough 09/25/2014    proabably due to allergy, chest x ray is normal      • Edema of foot 03/24/2016   • Encounter for long-term (current) drug use     therapy   • Epigastric pain 12/03/2015   • Essential hypertension 06/21/2016   • History of echocardiogram 03/14/2016    Left atrium normal with mild CLVH wit normal aortic root size.Evidence of posterolateral wall hypokinesis. Severely depressed LV systolic function of 20-25%.Mitral valve thickened Aortic sclerosis.Diastolic dysfunction   • Hyperlipidemia 03/31/2016    unspecified   • Hypertensive disorder 03/24/2016   • Left lower quadrant pain 07/12/2013    ruling out diverticular disease      • Myocardial infarction 03/24/2016   • Nausea 11/20/2015   • Osteoarthritis of knee 06/23/2016   • Pain in limb 01/25/2011   • Pediculosis capitis 12/22/2011    controlled   • Peptic ulcer    • Polyuria 01/25/2011   • Pruritic rash 12/09/2014   • Superficial foreign body hip without major open wound, no infection 12/09/2011    ??? back   • Type 2 diabetes mellitus 06/23/2016    new onset   • Weakness 06/23/2016     Past Surgical History:   Procedure Laterality Date   • COLONOSCOPY W/ POLYPECTOMY  03/07/2016    Transverse colon polyps,descending polyps,Sigmoid polyps, all resected and retrieved. Diverticulosis without perforation or abscess without bleeding. Erica  "William   • HYSTERECTOMY      ovary preserv   • INJECTION OF MEDICATION  09/11/2014    Celestone (betamethasone) (2)       Social History     Social History   • Marital status:      Social History Main Topics   • Smoking status: Never Smoker   • Smokeless tobacco: Never Used   • Alcohol use No   • Drug use: No   • Sexual activity: Defer     Other Topics Concern   • Not on file     Current Outpatient Prescriptions   Medication Sig Dispense Refill   • aspirin 81 MG tablet Take 81 mg by mouth daily.     • carvedilol (COREG) 25 MG tablet Take 25 mg by mouth 2 (two) times a day.     • furosemide (LASIX) 20 MG tablet TAKE ONE TABLET BY MOUTH EVERY SUNDAY, MONDAY, THURSDAY, AND SATURDAY 16 tablet 0   • glimepiride (AMARYL) 1 MG tablet Take 1 tablet by mouth 2 (Two) Times a Day. 60 tablet 3   • hydrALAZINE (APRESOLINE) 50 MG tablet TAKE ONE TABLET BY MOUTH THREE TIMES A DAY 90 tablet 4   • loratadine (CLARITIN) 10 MG tablet Take 1 tablet by mouth Daily. 30 tablet 3   • losartan (COZAAR) 100 MG tablet Take 100 mg by mouth Every Evening.     • magnesium oxide (MAG-OX) 400 MG tablet Take 400 mg by mouth 2 (Two) Times a Day.     • omeprazole (PriLOSEC) 40 MG capsule Take 40 mg by mouth daily.     • pravastatin (PRAVACHOL) 40 MG tablet TAKE ONE TABLET BY MOUTH DAILY 30 tablet 1   • pravastatin (PRAVACHOL) 40 MG tablet TAKE ONE TABLET BY MOUTH DAILY 30 tablet 1     No current facility-administered medications for this visit.      Objective:     Vitals:    04/02/18 0952   BP: 136/64   Pulse: 71   SpO2: 98%   Weight: 92.3 kg (203 lb 8 oz)   Height: 160 cm (63\")      Physical Exam   Constitutional: She is oriented to person, place, and time. She appears well-nourished. No distress.   HENT:   Head: Atraumatic.   Neck: JVD: 6 cm @ 45 degrees.   Cardiovascular: Normal rate and regular rhythm.    Murmur heard.  High-pitched blowing holosystolic murmur is present with a grade of 1/6  at the apex  Pulses:       Radial pulses are 2+ " "on the right side, and 2+ on the left side.   Pulmonary/Chest: Effort normal. No respiratory distress. She has no wheezes.   Abdominal: She exhibits no distension.   Musculoskeletal: Edema: non pitting pedal and ankle edema.   Neurological: She is alert and oriented to person, place, and time.   Skin: Skin is warm and dry.   Psychiatric: She has a normal mood and affect. Her behavior is normal. Judgment and thought content normal.   Vitals reviewed.    Flowsheet Rows         First Filed Value    Admission Height  63\" (160 cm) Documented at 09/18/2017 1003    Admission Weight  201 lb 7 oz (91.4 kg) Documented at 09/18/2017 1003        Cardiographics  Echocardiogram: 03/16/2017  · Left ventricular function is severely decreased.  · Calculated EF = 29%. Estimated EF was in agreement with the calculated EF  · Global left ventricular wall motion appears abnormal  · Left ventricular diastolic dysfunction (grade II) consistent with pseudonormalization and elevated left atrial pressures.  · Left ventricular wall thickness is consistent with mild concentric hypertrophy.  · Right Ventricle: Normal cavity size, wall thickness, systolic function and septal motion noted  · Mild tricuspid valve regurgitation is present.  · Mild mitral valve regurgitation is present  · Mild to moderate pulmonary hypertension is present  · There is no evidence of pericardial effusion.      Cardiac Cath: 03/08/2016  CORONARIES: Right dominant system.  1. LEFT MAIN: Left main coronary artery toward the distal end had  evidence of luminal irregularity.  2. LAD: Left anterior descending artery was a medium caliber vessel,  which in the proximal portion was 100% occluded after the origin of  the 1st diagonal branch. There was no evidence of any antegrade  flow down the distal left anterior descending artery. The 1st  diagonal branch in the proximal portion had up to 80% to 90%  stenosis.      The 1st septal  in the proximal portion had " evidence of  luminal irregularity. There was no evidence of any antegrade flow  down the left anterior descending artery.  3. CIRCUMFLEX: The circumflex artery was a medium caliber vessel,  which towards the ostium, had up to 50% to 60% stenosis. The distal  end of the circumflex artery gave origin to the obtuse marginal  branch #2 and #3, which had 70% to 80% stenosis.  4. RCA: The right coronary artery was a medium caliber dominant  vessel, which in the proximal portion had a discrete 60% to 70%  stenosis. The midportion of the right coronary artery had 30% to  40% stenosis, and the distal right coronary artery bifurcated into  the posterior descending and posterolateral branch. The posterior  lateral branch at its origin had 60% to 70% stenosis. The distal  right coronary artery, before the bifurcation to the posterior  descending and posterolateral branch, had evidence of 30% to 40%  narrowing.      FINAL IMPRESSION:  1. The right coronary artery was diffusely diseased.  2. A 100% occluded proximal left anterior descending artery, which was  the infarct-related vessel.  3. Ostial circumflex artery with 30% to 40% narrowing and in some  views appeared to be 60% to 70%.  4. The 1st diagonal branch in the proximal portion with 70% to 80%  stenosis.  Following this, a 2.75 mm x 12 mm and a 2.5 mm x 23 mm Alpine stent was  then successfully deployed with reduction of stenosis from 100% to less  than 0% stenosis. Patient did have distal left anterior descending  artery, diagonal, ostial circumflex artery, and diffusely diseased right  coronary artery.      Patient will be evaluated for direct revascularization and have  discussed with Dr. Verduzco. In view of the patient's acute myocardial  infarction with left anterior descending artery 100% occluded, patient  at the present time would not be subjected to an emergent coronary  artery bypass grafting, and patient will be stabilized.      Lab Results   Component Value  Date    GLUCOSE 95 12/18/2017    CALCIUM 9.2 12/18/2017     12/18/2017    K 5.3 (H) 12/18/2017    CO2 29.0 12/18/2017    CL 99 12/18/2017    BUN 17 12/18/2017    CREATININE 0.92 12/18/2017    EGFRIFNONA 61 12/18/2017    BCR 18.5 12/18/2017    ANIONGAP 13.0 12/18/2017     Lab Results   Component Value Date    WBC 10.35 (H) 04/02/2018    HGB 9.3 (L) 04/02/2018    HCT 31.0 (L) 04/02/2018    MCV 66.8 (L) 04/02/2018     04/02/2018     Lab Results   Component Value Date    CHOL 176 08/17/2017    CHOL 170 02/21/2017     Lab Results   Component Value Date    TRIG 313 (H) 08/17/2017    TRIG 261 (H) 02/21/2017    TRIG 272 (H) 03/09/2016     Lab Results   Component Value Date    HDL 36 (L) 08/17/2017    HDL 35 (L) 02/21/2017    HDL 28 (L) 03/09/2016     Lab Results   Component Value Date    TSH 2.83 03/09/2016     The following portions of the patient's history were reviewed and updated as appropriate: allergies, current medications, past family history, past medical history, past social history, past surgical history and problem list.     Assessment:      Diagnosis Plan   1. Chronic systolic heart failure   Stage C; NYHA Class III  BETA-BLOCKER: Carvedilol 25 mg twice daily  ACE/ARB: Losartan 100 mg daily at 4 PM  ENTRESTO: History of rash associated with Entresto; resolved with discontinuation  DIURETIC: Lasix 20 mg on Sunday Monday Thursday and Saturday  ALDOSTERONT ANTAGONIST: H/O hyperkalemia  IMDUR/HYDRALAZINE: Hydralazine to 50 mg 3 times a day  DIGOXIN: Not applicable  Fluid restriction: 2000 cc daily - reinforced   Sodium restriction: 2000 mg daily - reinforced  6MWT: Up-to-date  Cardiac Rehab: N/A - transportation issues  Pulmonary Rehab: N/A   ICD: N/A @ this time  CardioMEMS: could be considered  Advanced HF evaluation (Transplant/LVAD): Not applicable      Knee-high compression stockings recommended         2. Coronary arteriosclerosis without angina      Aspirin, beta blocker plus statin therapy     3. Essential hypertension, stable       According to #1        4. Type 2 diabetes mellitus without complication, without long-term current use of insulin, stable   as managed per PCP          5. Mixed hyperlipidemia, stable  Statin therapy        6. Obesity, Class II, BMI 35-39.9  The patient did receive evaluation re: BUD with Dr Castellanos in [place]    We will obtain report from Dr Castellanos's office for scanning/documentation into our EHR system.       7. Drug therapy, stable           The patient presents with history of HFrEF, ischemic for which there is no clinical evidence of mild hypervolemia.  She is well perfused.    Transthoracic echocardiogram results from today will be called to the daughter-in-law as requested per the patient.         This document has been electronically signed by BILL Kebede on April 2, 2018 1:20 PM

## 2018-04-02 NOTE — PROGRESS NOTES
Afia Adams  Procedure: 6 Minute Walk Test   4-2-2018  Indication: HFrEF    Pretest: BP:136/64               HR:71               Sa02:98               Dyspnea:0               Fatigue:5    Post Test: BP:140/70               HR:93               Sa02:96               Dyspnea:0               Fatigue:4    First 6MWT:yes    Supplemental oxygen during test:no    Stopped before 6 minutes:no    Pauses:no    Results in distance walked:168.14    Did individual experience any pain or discomfort: no pain a little fatigue    I was present for the above test and agree with the data.     NYHA Functional Class III          This document has been electronically signed by BILL Kebede on April 2, 2018 10:31 AM

## 2018-04-04 NOTE — ADDENDUM NOTE
Addended by: KEENAN LOCKE on: 4/4/2018 03:25 PM     Modules accepted: Orders     Subjective:      Dannie Thurston is a 84 y.o. male who presents with Abdominal Pain            Abdominal Pain  This is a new problem. Episode onset: 2 weeks ago. He noticed gradual onset of discomfort to RLQ and he discovered a large bulge at that time. His is currently in PT for his back and wasn't sure if this was an injury from PT. The onset quality is gradual. The problem occurs constantly. The problem has been unchanged. The pain is located in the RLQ (inguinal region). The patient is experiencing no pain. Quality: describes as discomfort but not painful. The abdominal pain does not radiate. Pertinent negatives include no constipation, diarrhea, fever, nausea or vomiting. Associated symptoms comments: He regularly deals with constipation issues, this is not new for him. Denies any new bowel problems. Denies any redness, tenderness or new changes to his RLQ. Nothing aggravates the pain. There is no history of abdominal surgery or irritable bowel syndrome.       Review of Systems   Constitutional: Negative for fever.   Gastrointestinal: Positive for abdominal pain. Negative for nausea, vomiting, diarrhea and constipation.   All other systems reviewed and are negative.    Past Medical History   Diagnosis Date   • Arthritis    • Hypertension    • CATARACT    • Hyperlipidemia    • BPH (benign prostatic hyperplasia)       Past Surgical History   Procedure Laterality Date   • Tonsillectomy       CHILD   • Cataract phaco with iol  2008     BILATERAL   • Trigger finger release  10/8/2010     Performed by VIV COHEN at SURGERY SAME DAY [facility]      Social History     Social History   • Marital Status:      Spouse Name: N/A   • Number of Children: N/A   • Years of Education: N/A     Occupational History   • Not on file.     Social History Main Topics   • Smoking status: Former Smoker -- 3 years     Types: Pipe, Cigars     Quit date: 05/20/1972   • Smokeless tobacco: Never Used   • Alcohol Use: 1.2 oz/week  "    1 Shots of liquor, 1 Cans of beer per week   • Drug Use: No   • Sexual Activity: No     Other Topics Concern   • Not on file     Social History Narrative          Objective:     /68 mmHg  Pulse 88  Temp(Src) 36.5 °C (97.7 °F)  Resp 16  Ht 1.816 m (5' 11.5\")  Wt 83.915 kg (185 lb)  BMI 25.45 kg/m2  SpO2 94%     Physical Exam   Constitutional: He is oriented to person, place, and time. Vital signs are normal. He appears well-developed and well-nourished.   HENT:   Head: Normocephalic and atraumatic.   Eyes: EOM are normal. Pupils are equal, round, and reactive to light.   Neck: Normal range of motion.   Cardiovascular: Normal rate and regular rhythm.    Pulmonary/Chest: Effort normal.   Abdominal: Soft. Normal appearance. There is no tenderness. There is no rigidity and no guarding. A hernia is present. Hernia confirmed positive in the right inguinal area.       Musculoskeletal: Normal range of motion.   Neurological: He is alert and oriented to person, place, and time.   Skin: Skin is warm and dry.   Psychiatric: He has a normal mood and affect. His speech is normal and behavior is normal. Thought content normal.   Vitals reviewed.              Assessment/Plan:     1. Unilateral inguinal hernia without obstruction or gangrene, recurrence not specified  - REFERRAL TO GENERAL SURGERY    Strict ER precautions discussed for pain to the area, change in bowel movements, redness or new concerning symptoms  May continue PT, advised him to let pain be his guide with activity  Supportive care, differential diagnoses, and indications for immediate follow-up discussed with patient.    Pathogenesis of diagnosis discussed including typical length and natural progression.      Instructed to return to  or nearest emergency department if symptoms fail to improve, for any change in condition, further concerns, or new concerning symptoms.  Patient states understanding of the plan of care and discharge " instructions.

## 2018-04-10 DIAGNOSIS — I50.22 CHRONIC SYSTOLIC HEART FAILURE (HCC): Primary | ICD-10-CM

## 2018-04-10 RX ORDER — CARVEDILOL 25 MG/1
25 TABLET ORAL 2 TIMES DAILY WITH MEALS
Qty: 60 TABLET | Refills: 5 | Status: SHIPPED | OUTPATIENT
Start: 2018-04-10 | End: 2018-07-02 | Stop reason: SDUPTHER

## 2018-04-17 RX ORDER — FUROSEMIDE 20 MG/1
TABLET ORAL
Qty: 16 TABLET | Refills: 0 | Status: SHIPPED | OUTPATIENT
Start: 2018-04-17 | End: 2018-05-12 | Stop reason: SDUPTHER

## 2018-05-15 RX ORDER — FUROSEMIDE 20 MG/1
TABLET ORAL
Qty: 16 TABLET | Refills: 0 | Status: SHIPPED | OUTPATIENT
Start: 2018-05-15 | End: 2018-06-09 | Stop reason: SDUPTHER

## 2018-05-17 ENCOUNTER — OFFICE VISIT (OUTPATIENT)
Dept: FAMILY MEDICINE CLINIC | Facility: CLINIC | Age: 68
End: 2018-05-17

## 2018-05-17 ENCOUNTER — TELEPHONE (OUTPATIENT)
Dept: FAMILY MEDICINE CLINIC | Facility: CLINIC | Age: 68
End: 2018-05-17

## 2018-05-17 ENCOUNTER — TELEPHONE (OUTPATIENT)
Dept: CARDIOLOGY | Facility: CLINIC | Age: 68
End: 2018-05-17

## 2018-05-17 VITALS
BODY MASS INDEX: 36.5 KG/M2 | TEMPERATURE: 98.5 F | HEART RATE: 77 BPM | HEIGHT: 63 IN | OXYGEN SATURATION: 98 % | SYSTOLIC BLOOD PRESSURE: 149 MMHG | RESPIRATION RATE: 20 BRPM | WEIGHT: 206 LBS | DIASTOLIC BLOOD PRESSURE: 65 MMHG

## 2018-05-17 DIAGNOSIS — I10 ESSENTIAL HYPERTENSION: ICD-10-CM

## 2018-05-17 DIAGNOSIS — E11.9 TYPE 2 DIABETES MELLITUS WITHOUT COMPLICATION, WITHOUT LONG-TERM CURRENT USE OF INSULIN (HCC): Primary | ICD-10-CM

## 2018-05-17 PROCEDURE — 99214 OFFICE O/P EST MOD 30 MIN: CPT | Performed by: NURSE PRACTITIONER

## 2018-05-17 RX ORDER — GLIMEPIRIDE 1 MG/1
1 TABLET ORAL 2 TIMES DAILY
Qty: 60 TABLET | Refills: 5 | Status: SHIPPED | OUTPATIENT
Start: 2018-05-17 | End: 2018-11-10 | Stop reason: SDUPTHER

## 2018-05-17 NOTE — TELEPHONE ENCOUNTER
Returned Yu's phone call had a question about patients blood sugar testing equipment informed Yu that she would need to contact the provider that follows patients blood sugar. Yu was understanding and I provided her with a phone number for Nancy Chou who she said follows it

## 2018-05-18 RX ORDER — FLASH GLUCOSE SENSOR
1 KIT MISCELLANEOUS ONCE
Qty: 1 EACH | Refills: 0 | Status: SHIPPED | OUTPATIENT
Start: 2018-05-18 | End: 2018-05-18

## 2018-05-21 RX ORDER — FLASH GLUCOSE SENSOR
1 KIT MISCELLANEOUS
Qty: 4 EACH | Refills: 11 | Status: SHIPPED | OUTPATIENT
Start: 2018-05-21 | End: 2020-03-19 | Stop reason: SDUPTHER

## 2018-06-04 RX ORDER — BENZONATATE 200 MG/1
200 CAPSULE ORAL 3 TIMES DAILY PRN
Qty: 21 CAPSULE | Refills: 0 | Status: SHIPPED | OUTPATIENT
Start: 2018-06-04 | End: 2018-11-05 | Stop reason: SDUPTHER

## 2018-06-06 ENCOUNTER — TELEPHONE (OUTPATIENT)
Dept: CARDIOLOGY | Facility: CLINIC | Age: 68
End: 2018-06-06

## 2018-06-06 NOTE — TELEPHONE ENCOUNTER
Received telephone contact from daughter-in-law (Yu) regarding the patient (Afia) experiencing diarrhea today.  Instructions given for bland diet with adequate hydration today and if continuation or worsening of her symptoms, the patient needs to present to an urgent care center for evaluation, or her primary care provider.          This document has been electronically signed by BILL Kebede on June 6, 2018 11:48 AM

## 2018-06-11 RX ORDER — FUROSEMIDE 20 MG/1
TABLET ORAL
Qty: 16 TABLET | Refills: 0 | Status: SHIPPED | OUTPATIENT
Start: 2018-06-11 | End: 2018-07-02 | Stop reason: SDUPTHER

## 2018-07-02 ENCOUNTER — OFFICE VISIT (OUTPATIENT)
Dept: CARDIOLOGY | Facility: CLINIC | Age: 68
End: 2018-07-02

## 2018-07-02 ENCOUNTER — LAB (OUTPATIENT)
Dept: LAB | Facility: HOSPITAL | Age: 68
End: 2018-07-02

## 2018-07-02 VITALS
DIASTOLIC BLOOD PRESSURE: 74 MMHG | SYSTOLIC BLOOD PRESSURE: 132 MMHG | WEIGHT: 197.6 LBS | HEIGHT: 63 IN | BODY MASS INDEX: 35.01 KG/M2 | HEART RATE: 74 BPM | OXYGEN SATURATION: 95 %

## 2018-07-02 DIAGNOSIS — E11.9 TYPE 2 DIABETES MELLITUS WITHOUT COMPLICATION, WITHOUT LONG-TERM CURRENT USE OF INSULIN (HCC): ICD-10-CM

## 2018-07-02 DIAGNOSIS — I50.22 CHRONIC SYSTOLIC HEART FAILURE (HCC): Primary | ICD-10-CM

## 2018-07-02 DIAGNOSIS — I25.10 CORONARY ARTERIOSCLEROSIS: ICD-10-CM

## 2018-07-02 DIAGNOSIS — I10 ESSENTIAL HYPERTENSION: ICD-10-CM

## 2018-07-02 DIAGNOSIS — E78.2 MIXED HYPERLIPIDEMIA: ICD-10-CM

## 2018-07-02 DIAGNOSIS — Z79.899 DRUG THERAPY: ICD-10-CM

## 2018-07-02 DIAGNOSIS — E78.5 HYPERLIPIDEMIA, UNSPECIFIED HYPERLIPIDEMIA TYPE: ICD-10-CM

## 2018-07-02 DIAGNOSIS — I50.22 CHRONIC SYSTOLIC HEART FAILURE (HCC): ICD-10-CM

## 2018-07-02 DIAGNOSIS — E66.9 OBESITY, CLASS II, BMI 35-39.9: ICD-10-CM

## 2018-07-02 LAB
ANION GAP SERPL CALCULATED.3IONS-SCNC: 11 MMOL/L (ref 5–15)
BUN BLD-MCNC: 16 MG/DL (ref 7–21)
BUN/CREAT SERPL: 20 (ref 7–25)
CALCIUM SPEC-SCNC: 8.9 MG/DL (ref 8.4–10.2)
CHLORIDE SERPL-SCNC: 99 MMOL/L (ref 95–110)
CO2 SERPL-SCNC: 28 MMOL/L (ref 22–31)
CREAT BLD-MCNC: 0.8 MG/DL (ref 0.5–1)
GFR SERPL CREATININE-BSD FRML MDRD: 72 ML/MIN/1.73 (ref 45–104)
GLUCOSE BLD-MCNC: 88 MG/DL (ref 60–100)
MAGNESIUM SERPL-MCNC: 2.1 MG/DL (ref 1.6–2.3)
POTASSIUM BLD-SCNC: 4 MMOL/L (ref 3.5–5.1)
SODIUM BLD-SCNC: 138 MMOL/L (ref 137–145)

## 2018-07-02 PROCEDURE — 99215 OFFICE O/P EST HI 40 MIN: CPT | Performed by: NURSE PRACTITIONER

## 2018-07-02 PROCEDURE — 80048 BASIC METABOLIC PNL TOTAL CA: CPT

## 2018-07-02 PROCEDURE — 83735 ASSAY OF MAGNESIUM: CPT

## 2018-07-02 PROCEDURE — 36415 COLL VENOUS BLD VENIPUNCTURE: CPT

## 2018-07-02 RX ORDER — FUROSEMIDE 20 MG/1
TABLET ORAL
Qty: 16 TABLET | Refills: 5 | Status: SHIPPED | OUTPATIENT
Start: 2018-07-02 | End: 2019-01-31 | Stop reason: SDUPTHER

## 2018-07-02 RX ORDER — LOSARTAN POTASSIUM 100 MG/1
100 TABLET ORAL EVERY EVENING
Qty: 30 TABLET | Refills: 5 | Status: SHIPPED | OUTPATIENT
Start: 2018-07-02 | End: 2018-10-04 | Stop reason: SDUPTHER

## 2018-07-02 RX ORDER — CARVEDILOL 25 MG/1
25 TABLET ORAL 2 TIMES DAILY WITH MEALS
Qty: 60 TABLET | Refills: 5 | Status: SHIPPED | OUTPATIENT
Start: 2018-07-02 | End: 2018-10-04 | Stop reason: SDUPTHER

## 2018-07-02 RX ORDER — MAGNESIUM OXIDE 400 MG/1
400 TABLET ORAL DAILY
Qty: 30 TABLET | Refills: 5 | Status: SHIPPED | OUTPATIENT
Start: 2018-07-02 | End: 2018-10-04 | Stop reason: SDUPTHER

## 2018-07-02 RX ORDER — HYDRALAZINE HYDROCHLORIDE 50 MG/1
50 TABLET, FILM COATED ORAL 3 TIMES DAILY
Qty: 90 TABLET | Refills: 5 | Status: SHIPPED | OUTPATIENT
Start: 2018-07-02 | End: 2018-09-13 | Stop reason: SDUPTHER

## 2018-07-02 RX ORDER — PRAVASTATIN SODIUM 40 MG
40 TABLET ORAL NIGHTLY
Qty: 30 TABLET | Refills: 5 | Status: SHIPPED | OUTPATIENT
Start: 2018-07-02 | End: 2018-12-18 | Stop reason: SDUPTHER

## 2018-07-02 NOTE — PROGRESS NOTES
Subjective:     Congestive Heart Failure (chief complaint)    Congestive Heart Failure   Presents for follow-up visit. Pertinent negatives include no abdominal pain, chest pain, chest pressure, edema, fatigue, near-syncope, orthopnea, palpitations, paroxysmal nocturnal dyspnea, shortness of breath or unexpected weight change. The symptoms have been stable. Compliance with total regimen is 51-75%. Compliance problems include adherence to exercise and adherence to diet.  Compliance with diet is 51-75%. Compliance with exercise is 0-25%. Compliance with medications is %.     She is accompanied by her daughter-in-law.    Ms Adams was admitted to the hospital (from [facility]) on 03/08/2016 with evidence of anterior STEMI with PCI per Dr Kent. Cath was noted with evidence of multi-vessel disease. In addition, she required intubation with mechanical ventilation secondary to pulmonary edema. Muga scan demonstrated evidence of EF 35.5% on 03/15/2016    PCP: BILL Rodrigues  Cardiology: Dr Kent     [facility] [address] [facility]:  1. CAD, S/P PCI LAD - per Dr Kent - 03/08/2016 2.75mm x 12mm and a 2.5mm x 23mm Alpine stent proximal LAD  2. HFrEF, ischemic 35.5% by Muga on 03/15/2016  3. Acute pulmonary edema 03/08/2016 requiring intubation   4. RLL pneumonia 03/2016  5. Hypertension  6. Hyperlipidemia  7. DM, Type II  8.GERD  9. Colon polyp removal 03/07/2016  10. H/O hyperkalemia on AA      Yu: daughter-in-law [phone] (cell)    The patient reports negative sleep study with Dr Castellanos since last HF appointment.    Review of Systems   Constitution: Negative for chills, fatigue, fever, unexpected weight change and weight gain.   HENT: Negative.    Eyes: Negative.    Cardiovascular: Positive for dyspnea on exertion. Negative for chest pain, leg swelling (ankle swelling throughout the day), near-syncope, orthopnea, palpitations, paroxysmal nocturnal dyspnea and syncope.   Respiratory: Negative for shortness  of breath, sleep disturbances due to breathing, snoring and wheezing.    Endocrine: Negative for polydipsia, polyphagia and polyuria.   Hematologic/Lymphatic: Negative for bleeding problem. Does not bruise/bleed easily.   Skin: Negative for itching and rash.   Musculoskeletal: Positive for arthritis. Negative for falls.   Gastrointestinal: Negative for bloating and abdominal pain.   Genitourinary: Negative.    Neurological: Negative for dizziness, light-headedness and vertigo.   Psychiatric/Behavioral: Negative for altered mental status. The patient is not nervous/anxious.      Past Medical History:   Diagnosis Date   • Abdominal pain 11/20/2015    unspecified   • Acute gastritis    • Acute on chronic systolic congestive heart failure 04/13/2016   • Ankle pain 04/27/2015   • Candidiasis, skin or nails 12/22/2011    controlled   • Chronic systolic congestive heart failure 08/01/2016   • Congestive heart failure 08/01/2016   • Coronary arteriosclerosis 06/21/2016    multi-vessel   • Cough 09/25/2014    proabably due to allergy, chest x ray is normal      • Edema of foot 03/24/2016   • Encounter for long-term (current) drug use     therapy   • Epigastric pain 12/03/2015   • Essential hypertension 06/21/2016   • History of echocardiogram 03/14/2016    Left atrium normal with mild CLVH wit normal aortic root size.Evidence of posterolateral wall hypokinesis. Severely depressed LV systolic function of 20-25%.Mitral valve thickened Aortic sclerosis.Diastolic dysfunction   • Hyperlipidemia 03/31/2016    unspecified   • Hypertensive disorder 03/24/2016   • Left lower quadrant pain 07/12/2013    ruling out diverticular disease      • Myocardial infarction 03/24/2016   • Nausea 11/20/2015   • Osteoarthritis of knee 06/23/2016   • Pain in limb 01/25/2011   • Pediculosis capitis 12/22/2011    controlled   • Peptic ulcer    • Polyuria 01/25/2011   • Pruritic rash 12/09/2014   • Superficial foreign body hip without major open  wound, no infection 12/09/2011    ??? back   • Type 2 diabetes mellitus 06/23/2016    new onset   • Weakness 06/23/2016     Past Surgical History:   Procedure Laterality Date   • COLONOSCOPY W/ POLYPECTOMY  03/07/2016    Transverse colon polyps,descending polyps,Sigmoid polyps, all resected and retrieved. Diverticulosis without perforation or abscess without bleeding. Erica Thomas   • HYSTERECTOMY      ovary preserv   • INJECTION OF MEDICATION  09/11/2014    Celestone (betamethasone) (2)       Social History     Social History   • Marital status:      Social History Main Topics   • Smoking status: Never Smoker   • Smokeless tobacco: Never Used   • Alcohol use No   • Drug use: No   • Sexual activity: Defer     Other Topics Concern   • Not on file     Current Outpatient Prescriptions   Medication Sig Dispense Refill   • aspirin 81 MG tablet Take 81 mg by mouth daily.     • benzonatate (TESSALON) 200 MG capsule Take 1 capsule by mouth 3 (Three) Times a Day As Needed for Cough. 21 capsule 0   • carvedilol (COREG) 25 MG tablet Take 1 tablet by mouth 2 (Two) Times a Day With Meals. 60 tablet 5   • Continuous Blood Gluc Sensor ([employer] BENNETT SENSOR SYSTEM) misc 1 each Every 7 (Seven) Days. 4 each 11   • furosemide (LASIX) 20 MG tablet TAKE ONE TABLET BY MOUTH DAILY EVERY SUNDAY, MONDAY, THURSDAY AND SATURDAY 16 tablet 0   • glimepiride (AMARYL) 1 MG tablet Take 1 tablet by mouth 2 (Two) Times a Day. 60 tablet 5   • hydrALAZINE (APRESOLINE) 50 MG tablet TAKE ONE TABLET BY MOUTH THREE TIMES A DAY 90 tablet 4   • loratadine (CLARITIN) 10 MG tablet Take 1 tablet by mouth Daily. 30 tablet 3   • losartan (COZAAR) 100 MG tablet Take 100 mg by mouth Every Evening.     • magnesium oxide (MAG-OX) 400 MG tablet Take 400 mg by mouth 2 (Two) Times a Day.     • omeprazole (PriLOSEC) 40 MG capsule Take 40 mg by mouth daily.     • pravastatin (PRAVACHOL) 40 MG tablet TAKE ONE TABLET BY MOUTH DAILY 30 tablet 1     No current  "facility-administered medications for this visit.      Objective:     Vitals:    07/02/18 1010   BP: 132/74   BP Location: Left arm   Patient Position: Sitting   Cuff Size: Adult   Pulse: 74   SpO2: 95%   Weight: 89.6 kg (197 lb 9.6 oz)   Height: 160 cm (63\")      Physical Exam   Constitutional: She is oriented to person, place, and time. She appears well-nourished. No distress.   HENT:   Head: Normocephalic and atraumatic.   Eyes: Right eye exhibits no discharge. Left eye exhibits no discharge.   Neck: JVD: 6 cm @ 45 degrees.   Cardiovascular: Normal rate and regular rhythm.    Murmur heard.  High-pitched blowing holosystolic murmur is present with a grade of 1/6  at the apex  Pulses:       Radial pulses are 2+ on the right side, and 2+ on the left side.   Pulmonary/Chest: Effort normal. No stridor. No respiratory distress. She has no wheezes. She has no rales.   Abdominal: She exhibits no distension. There is no tenderness.   Musculoskeletal: She exhibits no edema or tenderness.   Neurological: She is alert and oriented to person, place, and time.   Skin: Skin is warm and dry.   Psychiatric: She has a normal mood and affect. Her behavior is normal. Judgment and thought content normal.   Vitals reviewed.    Flowsheet Rows         First Filed Value    Admission Height  63\" (160 cm) Documented at 09/18/2017 1003    Admission Weight  201 lb 7 oz (91.4 kg) Documented at 09/18/2017 1003        Cardiographics  Transthoracic echocardiogram: 4 02/02/2018  · Left Ventricle: Left ventricular systolic function is mildly decreased. Calculated EF = 46%.  · Left ventricular wall thickness is consistent with mild-to-moderate concentric hypertrophy. Diastolic function is normal.  · Right Ventricle: Normal right ventricular cavity size, wall thickness, systolic function and septal motion noted  · Mild mitral valve regurgitation is present.  · Mild tricuspid valve regurgitation is present. Estimated right ventricular systolic " pressure from tricuspid regurgitation is mildly elevated (35-45 mmHg).  · No evidence of pulmonary hypertension is present.  · There is no evidence of pericardial effusion.    Echocardiogram: 03/16/2017  · Left ventricular function is severely decreased.  · Calculated EF = 29%. Estimated EF was in agreement with the calculated EF  · Global left ventricular wall motion appears abnormal  · Left ventricular diastolic dysfunction (grade II) consistent with pseudonormalization and elevated left atrial pressures.  · Left ventricular wall thickness is consistent with mild concentric hypertrophy.  · Right Ventricle: Normal cavity size, wall thickness, systolic function and septal motion noted  · Mild tricuspid valve regurgitation is present.  · Mild mitral valve regurgitation is present  · Mild to moderate pulmonary hypertension is present  · There is no evidence of pericardial effusion.      Cardiac Cath: 03/08/2016  CORONARIES: Right dominant system.  1. LEFT MAIN: Left main coronary artery toward the distal end had  evidence of luminal irregularity.  2. LAD: Left anterior descending artery was a medium caliber vessel,  which in the proximal portion was 100% occluded after the origin of  the 1st diagonal branch. There was no evidence of any antegrade  flow down the distal left anterior descending artery. The 1st  diagonal branch in the proximal portion had up to 80% to 90%  stenosis.      The 1st septal  in the proximal portion had evidence of  luminal irregularity. There was no evidence of any antegrade flow  down the left anterior descending artery.  3. CIRCUMFLEX: The circumflex artery was a medium caliber vessel,  which towards the ostium, had up to 50% to 60% stenosis. The distal  end of the circumflex artery gave origin to the obtuse marginal  branch #2 and #3, which had 70% to 80% stenosis.  4. RCA: The right coronary artery was a medium caliber dominant  vessel, which in the proximal portion had a  discrete 60% to 70%  stenosis. The midportion of the right coronary artery had 30% to  40% stenosis, and the distal right coronary artery bifurcated into  the posterior descending and posterolateral branch. The posterior  lateral branch at its origin had 60% to 70% stenosis. The distal  right coronary artery, before the bifurcation to the posterior  descending and posterolateral branch, had evidence of 30% to 40%  narrowing.      FINAL IMPRESSION:  1. The right coronary artery was diffusely diseased.  2. A 100% occluded proximal left anterior descending artery, which was  the infarct-related vessel.  3. Ostial circumflex artery with 30% to 40% narrowing and in some  views appeared to be 60% to 70%.  4. The 1st diagonal branch in the proximal portion with 70% to 80%  stenosis.  Following this, a 2.75 mm x 12 mm and a 2.5 mm x 23 mm Alpine stent was  then successfully deployed with reduction of stenosis from 100% to less  than 0% stenosis. Patient did have distal left anterior descending  artery, diagonal, ostial circumflex artery, and diffusely diseased right  coronary artery.      Patient will be evaluated for direct revascularization and have  discussed with Dr. Verduzco. In view of the patient's acute myocardial  infarction with left anterior descending artery 100% occluded, patient  at the present time would not be subjected to an emergent coronary  artery bypass grafting, and patient will be stabilized.      Lab Results   Component Value Date    GLUCOSE 111 (H) 04/02/2018    CALCIUM 9.3 04/02/2018     04/02/2018    K 5.0 04/02/2018    CO2 28.0 04/02/2018    CL 99 04/02/2018    BUN 18 04/02/2018    CREATININE 0.83 04/02/2018    EGFRIFNONA 69 04/02/2018    BCR 21.7 04/02/2018    ANIONGAP 15.0 04/02/2018     Lab Results   Component Value Date    WBC 10.35 (H) 04/02/2018    HGB 9.3 (L) 04/02/2018    HCT 31.0 (L) 04/02/2018    MCV 66.8 (L) 04/02/2018     04/02/2018     Lab Results   Component Value Date     CHOL 159 04/02/2018    CHOL 176 08/17/2017    CHOL 170 02/21/2017     Lab Results   Component Value Date    TRIG 213 (H) 04/02/2018    TRIG 313 (H) 08/17/2017    TRIG 261 (H) 02/21/2017     Lab Results   Component Value Date    HDL 31 (L) 04/02/2018    HDL 36 (L) 08/17/2017    HDL 35 (L) 02/21/2017     Lab Results   Component Value Date    TSH 2.83 03/09/2016     The following portions of the patient's history were reviewed and updated as appropriate: allergies, current medications, past family history, past medical history, past social history, past surgical history and problem list.     Assessment:      Diagnosis Plan   1. Chronic systolic heart failure   Stage C; NYHA Class III  BETA-BLOCKER: Carvedilol 25 mg twice daily  ACE/ARB: Losartan 100 mg daily at 4 PM  ENTRESTO: History of rash associated with Entresto; resolved with discontinuation  DIURETIC: Lasix 20 mg on Sunday Monday Thursday and Saturday  ALDOSTERONT ANTAGONIST: H/O hyperkalemia  IMDUR/HYDRALAZINE: Hydralazine to 50 mg 3 times a day  DIGOXIN: Not applicable  Fluid restriction: 2000 cc daily - reinforced   Sodium restriction: 2000 mg daily - reinforced  6MWT: Up-to-date  Cardiac Rehab: N/A - transportation issues  Pulmonary Rehab: N/A   ICD: N/A @ this time  CardioMEMS: could be considered  Advanced HF evaluation (Transplant/LVAD): Not applicable      Knee-high compression stockings recommended         2. Coronary arteriosclerosis without angina      Aspirin, beta blocker plus statin therapy    3. Essential hypertension, stable       According to #1        4. Type 2 diabetes mellitus without complication, without long-term current use of insulin, stable   as managed per PCP          5. Mixed hyperlipidemia, stable  Statin therapy        6. Obesity, Class II, BMI 35-39.9  The patient did receive evaluation re: BUD with Dr Castellanos in [place]       7. Drug therapy, stable           The patient presents with history of HFrEF, ischemic for which  there is no clinical evidence of mild hypervolemia.  She is well perfused.    Recommended daily weight monitoring.  Discussed patient action plan for heart failure.  Recommended avoiding NSAIDs use.  Discussed warning signs requiring additional medical attention for heart failure.    I will contact the daughter-in-law regarding results of today's basic metabolic profile, as approved by the patient.        This document has been electronically signed by BILL Kebede on July 2, 2018 11:00 AM

## 2018-08-17 ENCOUNTER — OFFICE VISIT (OUTPATIENT)
Dept: FAMILY MEDICINE CLINIC | Facility: CLINIC | Age: 68
End: 2018-08-17

## 2018-08-17 VITALS
OXYGEN SATURATION: 97 % | BODY MASS INDEX: 35.44 KG/M2 | HEART RATE: 79 BPM | DIASTOLIC BLOOD PRESSURE: 64 MMHG | RESPIRATION RATE: 18 BRPM | SYSTOLIC BLOOD PRESSURE: 138 MMHG | WEIGHT: 200 LBS | HEIGHT: 63 IN | TEMPERATURE: 98.8 F

## 2018-08-17 DIAGNOSIS — I10 ESSENTIAL HYPERTENSION: ICD-10-CM

## 2018-08-17 DIAGNOSIS — E11.9 TYPE 2 DIABETES MELLITUS WITHOUT COMPLICATION, WITHOUT LONG-TERM CURRENT USE OF INSULIN (HCC): Primary | ICD-10-CM

## 2018-08-17 LAB — HBA1C MFR BLD: 6.3 % (ref 4–5.6)

## 2018-08-17 PROCEDURE — 36415 COLL VENOUS BLD VENIPUNCTURE: CPT | Performed by: NURSE PRACTITIONER

## 2018-08-17 PROCEDURE — 99214 OFFICE O/P EST MOD 30 MIN: CPT | Performed by: NURSE PRACTITIONER

## 2018-08-17 PROCEDURE — 83036 HEMOGLOBIN GLYCOSYLATED A1C: CPT | Performed by: NURSE PRACTITIONER

## 2018-08-17 NOTE — PROGRESS NOTES
Subjective   Afia Adams is a 67 y.o. female.     Here today for kuldeep.  Has seen cardiology recently and most meds refilled.  Does not recall what her b/s is running.  Last a1c was just over 6.      Diabetes   She presents for her follow-up diabetic visit. She has type 2 diabetes mellitus. Her disease course has been stable. There are no hypoglycemic associated symptoms. Pertinent negatives for hypoglycemia include no headaches or sweats. There are no diabetic associated symptoms. Pertinent negatives for diabetes include no blurred vision and no chest pain. There are no hypoglycemic complications. Symptoms are stable. Diabetic complications include heart disease. Pertinent negatives for diabetic complications include no CVA, PVD or retinopathy. Risk factors for coronary artery disease include diabetes mellitus, dyslipidemia, hypertension, obesity, post-menopausal and sedentary lifestyle. Current diabetic treatment includes oral agent (monotherapy). She is compliant with treatment all of the time. Her weight is stable. She is following a diabetic diet. Meal planning includes avoidance of concentrated sweets. She has not had a previous visit with a dietitian. She rarely participates in exercise. She monitors blood glucose at home 3-4 x per week. (Patient does not remember) An ACE inhibitor/angiotensin II receptor blocker is being taken. She does not see a podiatrist.Eye exam is current.   Hypertension   This is a chronic problem. The current episode started more than 1 year ago. The problem is controlled. Associated symptoms include peripheral edema. Pertinent negatives include no anxiety, blurred vision, chest pain, headaches, malaise/fatigue, neck pain, orthopnea, palpitations, PND, shortness of breath or sweats. There are no associated agents to hypertension. Risk factors for coronary artery disease include diabetes mellitus, dyslipidemia, obesity, post-menopausal state and sedentary lifestyle. Past  treatments include beta blockers, diuretics and direct vasodilators. Current antihypertension treatment includes beta blockers, direct vasodilators and diuretics. The current treatment provides significant improvement. There are no compliance problems.  Hypertensive end-organ damage includes CAD/MI. There is no history of angina, kidney disease, CVA, heart failure, left ventricular hypertrophy, PVD or retinopathy.        The following portions of the patient's history were reviewed and updated as appropriate: allergies, current medications, past family history, past medical history, past social history, past surgical history and problem list.    Review of Systems   Constitutional: Negative.  Negative for malaise/fatigue.   HENT: Negative.    Eyes: Negative for blurred vision.   Respiratory: Negative.  Negative for shortness of breath.    Cardiovascular: Negative.  Negative for chest pain, palpitations, orthopnea and PND.   Musculoskeletal: Negative.  Negative for neck pain.   Skin: Negative.    Neurological: Negative.    Psychiatric/Behavioral: Negative.        Objective   Physical Exam   Constitutional: She is oriented to person, place, and time. She appears well-developed and well-nourished. No distress.   HENT:   Head: Normocephalic.   Eyes: Pupils are equal, round, and reactive to light.   Neck: Normal range of motion. Neck supple.   Cardiovascular: Normal rate, regular rhythm and normal heart sounds.  Exam reveals no friction rub.    No murmur heard.  Pulmonary/Chest: Effort normal and breath sounds normal. No respiratory distress. She has no wheezes. She has no rales.   Abdominal: Soft.   Musculoskeletal: Normal range of motion.       Neurological Sensory Findings -  Unaltered sharp/dull right ankle/foot discrimination and unaltered sharp/dull left ankle/foot discrimination.  Vascular Status -  Her right foot exhibits normal foot vasculature  and no edema. Her left foot exhibits normal foot vasculature  and no  edema.  Skin Integrity  -  Her right foot skin is intact.Her left foot skin is intact..  Neurological: She is alert and oriented to person, place, and time.   Skin: Skin is warm and dry.   Psychiatric: She has a normal mood and affect. Thought content normal.   Nursing note and vitals reviewed.        Assessment/Plan   Afia was seen today for diabetes and hypertension.    Diagnoses and all orders for this visit:    Type 2 diabetes mellitus without complication, without long-term current use of insulin (CMS/[facility])  -     Hemoglobin A1c    Essential hypertension      Lab Results   Component Value Date    HGBA1C 6.2 (H) 02/16/2018

## 2018-09-13 DIAGNOSIS — I10 ESSENTIAL HYPERTENSION: ICD-10-CM

## 2018-09-13 RX ORDER — HYDRALAZINE HYDROCHLORIDE 50 MG/1
50 TABLET, FILM COATED ORAL 3 TIMES DAILY
Qty: 90 TABLET | Refills: 5 | Status: SHIPPED | OUTPATIENT
Start: 2018-09-13 | End: 2018-10-04 | Stop reason: SDUPTHER

## 2018-10-04 ENCOUNTER — OFFICE VISIT (OUTPATIENT)
Dept: CARDIOLOGY | Facility: CLINIC | Age: 68
End: 2018-10-04

## 2018-10-04 ENCOUNTER — LAB (OUTPATIENT)
Dept: LAB | Facility: HOSPITAL | Age: 68
End: 2018-10-04

## 2018-10-04 VITALS
HEART RATE: 82 BPM | HEIGHT: 63 IN | SYSTOLIC BLOOD PRESSURE: 142 MMHG | DIASTOLIC BLOOD PRESSURE: 70 MMHG | BODY MASS INDEX: 35.12 KG/M2 | WEIGHT: 198.2 LBS | OXYGEN SATURATION: 95 %

## 2018-10-04 DIAGNOSIS — I10 ESSENTIAL HYPERTENSION: ICD-10-CM

## 2018-10-04 DIAGNOSIS — E66.9 OBESITY, CLASS II, BMI 35-39.9: Primary | ICD-10-CM

## 2018-10-04 DIAGNOSIS — I25.10 CORONARY ARTERIOSCLEROSIS: ICD-10-CM

## 2018-10-04 DIAGNOSIS — I50.22 CHRONIC SYSTOLIC HEART FAILURE (HCC): ICD-10-CM

## 2018-10-04 DIAGNOSIS — E78.2 MIXED HYPERLIPIDEMIA: ICD-10-CM

## 2018-10-04 LAB
ALBUMIN SERPL-MCNC: 3.9 G/DL (ref 3.4–4.8)
ALBUMIN/GLOB SERPL: 1.2 G/DL (ref 1.1–1.8)
ALP SERPL-CCNC: 135 U/L (ref 38–126)
ALT SERPL W P-5'-P-CCNC: 20 U/L (ref 9–52)
ANION GAP SERPL CALCULATED.3IONS-SCNC: 12 MMOL/L (ref 5–15)
AST SERPL-CCNC: 18 U/L (ref 14–36)
BILIRUB SERPL-MCNC: 0.5 MG/DL (ref 0.2–1.3)
BUN BLD-MCNC: 14 MG/DL (ref 7–21)
BUN/CREAT SERPL: 16.7 (ref 7–25)
CALCIUM SPEC-SCNC: 9.7 MG/DL (ref 8.4–10.2)
CHLORIDE SERPL-SCNC: 99 MMOL/L (ref 95–110)
CO2 SERPL-SCNC: 27 MMOL/L (ref 22–31)
CREAT BLD-MCNC: 0.84 MG/DL (ref 0.5–1)
GFR SERPL CREATININE-BSD FRML MDRD: 68 ML/MIN/1.73 (ref 45–104)
GLOBULIN UR ELPH-MCNC: 3.2 GM/DL (ref 2.3–3.5)
GLUCOSE BLD-MCNC: 85 MG/DL (ref 60–100)
POTASSIUM BLD-SCNC: 5.2 MMOL/L (ref 3.5–5.1)
PROT SERPL-MCNC: 7.1 G/DL (ref 6.3–8.6)
SODIUM BLD-SCNC: 138 MMOL/L (ref 137–145)

## 2018-10-04 PROCEDURE — 99214 OFFICE O/P EST MOD 30 MIN: CPT | Performed by: NURSE PRACTITIONER

## 2018-10-04 PROCEDURE — 80053 COMPREHEN METABOLIC PANEL: CPT

## 2018-10-04 PROCEDURE — 36415 COLL VENOUS BLD VENIPUNCTURE: CPT

## 2018-10-04 RX ORDER — CARVEDILOL 25 MG/1
25 TABLET ORAL 2 TIMES DAILY WITH MEALS
Qty: 60 TABLET | Refills: 5 | Status: SHIPPED | OUTPATIENT
Start: 2018-10-04 | End: 2018-12-17 | Stop reason: SDUPTHER

## 2018-10-04 RX ORDER — MAGNESIUM OXIDE 400 MG/1
400 TABLET ORAL DAILY
Qty: 30 TABLET | Refills: 5 | Status: SHIPPED | OUTPATIENT
Start: 2018-10-04 | End: 2019-07-02 | Stop reason: SDUPTHER

## 2018-10-04 RX ORDER — LOSARTAN POTASSIUM 100 MG/1
100 TABLET ORAL EVERY EVENING
Qty: 30 TABLET | Refills: 5 | Status: SHIPPED | OUTPATIENT
Start: 2018-10-04 | End: 2019-01-04 | Stop reason: ALTCHOICE

## 2018-10-04 RX ORDER — HYDRALAZINE HYDROCHLORIDE 50 MG/1
50 TABLET, FILM COATED ORAL 3 TIMES DAILY
Qty: 90 TABLET | Refills: 5 | Status: SHIPPED | OUTPATIENT
Start: 2018-10-04 | End: 2019-04-03 | Stop reason: SDUPTHER

## 2018-10-04 NOTE — PROGRESS NOTES
Subjective:     Congestive Heart Failure (chief complaint)    Congestive Heart Failure   Presents for follow-up visit. Pertinent negatives include no abdominal pain, chest pain, chest pressure, edema, fatigue, near-syncope, orthopnea, palpitations, paroxysmal nocturnal dyspnea, shortness of breath or unexpected weight change. The symptoms have been stable. Compliance with total regimen is 51-75%. Compliance problems include adherence to exercise and adherence to diet.  Compliance with diet is 51-75%. Compliance with exercise is 0-25%. Compliance with medications is %.     She is accompanied by her  and daughter-in-law.    Ms Adams was admitted to the hospital (from [facility]) on 03/08/2016 with evidence of anterior STEMI with PCI per Dr Kent. Cath was noted with evidence of multi-vessel disease. In addition, she required intubation with mechanical ventilation secondary to pulmonary edema. Muga scan demonstrated evidence of EF 35.5% on 03/15/2016    PCP: BILL Rodrigues  Cardiology: Dr Kent     [facility]:  1. CAD, S/P PCI LAD - per Dr Kent - 03/08/2016 2.75mm x 12mm and a 2.5mm x 23mm Alpine stent proximal LAD  2. HFrEF, ischemic 35.5% by Muga on 03/15/2016  3. Acute pulmonary edema 03/08/2016 requiring intubation   4. RLL pneumonia 03/2016  5. Hypertension  6. Hyperlipidemia  7. DM, Type II  8.GERD  9. Colon polyp removal 03/07/2016  10. H/O hyperkalemia on AA      Yu: daughter-in-law [phone] (cell)    The patient reports negative sleep study with Dr Castellanos since last HF appointment.    Review of Systems   Constitution: Negative for chills, fatigue, fever, unexpected weight change and weight gain.   HENT: Negative.    Eyes: Negative.    Cardiovascular: Positive for dyspnea on exertion. Negative for chest pain, leg swelling (ankle swelling throughout the day), near-syncope, orthopnea, palpitations, paroxysmal nocturnal dyspnea and syncope.   Respiratory: Negative  for shortness of breath, sleep disturbances due to breathing, snoring and wheezing.    Endocrine: Negative for polydipsia, polyphagia and polyuria.   Hematologic/Lymphatic: Negative for bleeding problem. Does not bruise/bleed easily.   Skin: Negative for itching and rash.   Musculoskeletal: Positive for arthritis. Negative for falls.   Gastrointestinal: Negative for bloating and abdominal pain.   Genitourinary: Negative.    Neurological: Negative for dizziness, light-headedness and vertigo.   Psychiatric/Behavioral: Negative for altered mental status. The patient is not nervous/anxious.      Past Medical History:   Diagnosis Date   • Abdominal pain 11/20/2015    unspecified   • Acute gastritis    • Acute on chronic systolic congestive heart failure (CMS/[facility]) 04/13/2016   • Ankle pain 04/27/2015   • Candidiasis, skin or nails 12/22/2011    controlled   • Chronic systolic congestive heart failure (CMS/[facility]) 08/01/2016   • Congestive heart failure (CMS/[facility]) 08/01/2016   • Coronary arteriosclerosis 06/21/2016    multi-vessel   • Cough 09/25/2014    proabably due to allergy, chest x ray is normal      • Edema of foot 03/24/2016   • Encounter for long-term (current) drug use     therapy   • Epigastric pain 12/03/2015   • Essential hypertension 06/21/2016   • History of echocardiogram 03/14/2016    Left atrium normal with mild CLVH wit normal aortic root size.Evidence of posterolateral wall hypokinesis. Severely depressed LV systolic function of 20-25%.Mitral valve thickened Aortic sclerosis.Diastolic dysfunction   • Hyperlipidemia 03/31/2016    unspecified   • Hypertensive disorder 03/24/2016   • Left lower quadrant pain 07/12/2013    ruling out diverticular disease      • Myocardial infarction (CMS/[facility]) 03/24/2016   • Nausea 11/20/2015   • Osteoarthritis of knee 06/23/2016   • Pain in limb 01/25/2011   • Pediculosis capitis 12/22/2011    controlled   • Peptic ulcer    • Polyuria 01/25/2011   • Pruritic rash 12/09/2014   •  Superficial foreign body hip without major open wound, no infection 12/09/2011    ??? back   • Type 2 diabetes mellitus (CMS/HCC) 06/23/2016    new onset   • Weakness 06/23/2016     Past Surgical History:   Procedure Laterality Date   • COLONOSCOPY W/ POLYPECTOMY  03/07/2016    Transverse colon polyps,descending polyps,Sigmoid polyps, all resected and retrieved. Diverticulosis without perforation or abscess without bleeding. Erica William   • HYSTERECTOMY      ovary preserv   • INJECTION OF MEDICATION  09/11/2014    Celestone (betamethasone) (2)       Social History     Social History   • Marital status:      Social History Main Topics   • Smoking status: Never Smoker   • Smokeless tobacco: Never Used   • Alcohol use No   • Drug use: No   • Sexual activity: Defer     Other Topics Concern   • Not on file     Current Outpatient Prescriptions   Medication Sig Dispense Refill   • aspirin 81 MG tablet Take 81 mg by mouth daily.     • benzonatate (TESSALON) 200 MG capsule Take 1 capsule by mouth 3 (Three) Times a Day As Needed for Cough. 21 capsule 0   • carvedilol (COREG) 25 MG tablet Take 1 tablet by mouth 2 (Two) Times a Day With Meals. 60 tablet 5   • Continuous Blood Gluc Sensor (Nevolution BENNETT SENSOR SYSTEM) misc 1 each Every 7 (Seven) Days. 4 each 11   • furosemide (LASIX) 20 MG tablet Take 1 tablet by mouth every Sunday, Monday, Thursday and Saturday 16 tablet 5   • glimepiride (AMARYL) 1 MG tablet Take 1 tablet by mouth 2 (Two) Times a Day. 60 tablet 5   • hydrALAZINE (APRESOLINE) 50 MG tablet Take 1 tablet by mouth 3 (Three) Times a Day. 90 tablet 5   • loratadine (CLARITIN) 10 MG tablet Take 1 tablet by mouth Daily. 30 tablet 3   • losartan (COZAAR) 100 MG tablet Take 1 tablet by mouth Every Evening. 30 tablet 5   • magnesium oxide (MAG-OX) 400 MG tablet Take 1 tablet by mouth Daily. 30 tablet 5   • omeprazole (PriLOSEC) 40 MG capsule Take 40 mg by mouth daily.     • pravastatin (PRAVACHOL) 40 MG tablet  "Take 1 tablet by mouth Every Night. 30 tablet 5     No current facility-administered medications for this visit.      Objective:     Vitals:    10/04/18 1417   BP: 142/70   BP Location: Left arm   Patient Position: Sitting   Cuff Size: Adult   Pulse: 82   SpO2: 95%   Weight: 89.9 kg (198 lb 3.2 oz)   Height: 160 cm (63\")      Physical Exam   Constitutional: She is oriented to person, place, and time. She appears well-nourished. No distress.   HENT:   Head: Normocephalic and atraumatic.   Eyes: Right eye exhibits no discharge. Left eye exhibits no discharge.   Neck: JVD: 6 cm @ 45 degrees.   Cardiovascular: Normal rate and regular rhythm.    Murmur heard.  High-pitched blowing holosystolic murmur is present with a grade of 1/6  at the apex  Pulses:       Radial pulses are 2+ on the right side, and 2+ on the left side.   Pulmonary/Chest: Effort normal. No stridor. No respiratory distress. She has no wheezes. She has no rales.   Abdominal: She exhibits no distension. There is no tenderness.   Musculoskeletal: She exhibits no edema or tenderness.   Neurological: She is alert and oriented to person, place, and time.   Skin: Skin is warm and dry.   Psychiatric: She has a normal mood and affect. Her behavior is normal. Judgment and thought content normal.   Vitals reviewed.    Flowsheet Rows         First Filed Value    Admission Height  63\" (160 cm) Documented at 09/18/2017 1003    Admission Weight  201 lb 7 oz (91.4 kg) Documented at 09/18/2017 1003        Cardiographics  Transthoracic echocardiogram: 4 02/02/2018  · Left Ventricle: Left ventricular systolic function is mildly decreased. Calculated EF = 46%.  · Left ventricular wall thickness is consistent with mild-to-moderate concentric hypertrophy. Diastolic function is normal.  · Right Ventricle: Normal right ventricular cavity size, wall thickness, systolic function and septal motion noted  · Mild mitral valve regurgitation is present.  · Mild tricuspid valve " regurgitation is present. Estimated right ventricular systolic pressure from tricuspid regurgitation is mildly elevated (35-45 mmHg).  · No evidence of pulmonary hypertension is present.  · There is no evidence of pericardial effusion.    Echocardiogram: 03/16/2017  · Left ventricular function is severely decreased.  · Calculated EF = 29%. Estimated EF was in agreement with the calculated EF  · Global left ventricular wall motion appears abnormal  · Left ventricular diastolic dysfunction (grade II) consistent with pseudonormalization and elevated left atrial pressures.  · Left ventricular wall thickness is consistent with mild concentric hypertrophy.  · Right Ventricle: Normal cavity size, wall thickness, systolic function and septal motion noted  · Mild tricuspid valve regurgitation is present.  · Mild mitral valve regurgitation is present  · Mild to moderate pulmonary hypertension is present  · There is no evidence of pericardial effusion.      Cardiac Cath: 03/08/2016  CORONARIES: Right dominant system.  1. LEFT MAIN: Left main coronary artery toward the distal end had  evidence of luminal irregularity.  2. LAD: Left anterior descending artery was a medium caliber vessel,  which in the proximal portion was 100% occluded after the origin of  the 1st diagonal branch. There was no evidence of any antegrade  flow down the distal left anterior descending artery. The 1st  diagonal branch in the proximal portion had up to 80% to 90%  stenosis.      The 1st septal  in the proximal portion had evidence of  luminal irregularity. There was no evidence of any antegrade flow  down the left anterior descending artery.  3. CIRCUMFLEX: The circumflex artery was a medium caliber vessel,  which towards the ostium, had up to 50% to 60% stenosis. The distal  end of the circumflex artery gave origin to the obtuse marginal  branch #2 and #3, which had 70% to 80% stenosis.  4. RCA: The right coronary artery was a medium  caliber dominant  vessel, which in the proximal portion had a discrete 60% to 70%  stenosis. The midportion of the right coronary artery had 30% to  40% stenosis, and the distal right coronary artery bifurcated into  the posterior descending and posterolateral branch. The posterior  lateral branch at its origin had 60% to 70% stenosis. The distal  right coronary artery, before the bifurcation to the posterior  descending and posterolateral branch, had evidence of 30% to 40%  narrowing.      FINAL IMPRESSION:  1. The right coronary artery was diffusely diseased.  2. A 100% occluded proximal left anterior descending artery, which was  the infarct-related vessel.  3. Ostial circumflex artery with 30% to 40% narrowing and in some  views appeared to be 60% to 70%.  4. The 1st diagonal branch in the proximal portion with 70% to 80%  stenosis.  Following this, a 2.75 mm x 12 mm and a 2.5 mm x 23 mm Alpine stent was  then successfully deployed with reduction of stenosis from 100% to less  than 0% stenosis. Patient did have distal left anterior descending  artery, diagonal, ostial circumflex artery, and diffusely diseased right  coronary artery.      Patient will be evaluated for direct revascularization and have  discussed with Dr. Verduzco. In view of the patient's acute myocardial  infarction with left anterior descending artery 100% occluded, patient  at the present time would not be subjected to an emergent coronary  artery bypass grafting, and patient will be stabilized.      Lab Results   Component Value Date    GLUCOSE 88 07/02/2018    CALCIUM 8.9 07/02/2018     07/02/2018    K 4.0 07/02/2018    CO2 28.0 07/02/2018    CL 99 07/02/2018    BUN 16 07/02/2018    CREATININE 0.80 07/02/2018    EGFRIFNONA 72 07/02/2018    BCR 20.0 07/02/2018    ANIONGAP 11.0 07/02/2018     Lab Results   Component Value Date    WBC 10.35 (H) 04/02/2018    HGB 9.3 (L) 04/02/2018    HCT 31.0 (L) 04/02/2018    MCV 66.8 (L) 04/02/2018    PLT  279 04/02/2018     Lab Results   Component Value Date    CHOL 159 04/02/2018    CHOL 176 08/17/2017    CHOL 170 02/21/2017     Lab Results   Component Value Date    TRIG 213 (H) 04/02/2018    TRIG 313 (H) 08/17/2017    TRIG 261 (H) 02/21/2017     Lab Results   Component Value Date    HDL 31 (L) 04/02/2018    HDL 36 (L) 08/17/2017    HDL 35 (L) 02/21/2017     Lab Results   Component Value Date    TSH 2.83 03/09/2016     The following portions of the patient's history were reviewed and updated as appropriate: allergies, current medications, past family history, past medical history, past social history, past surgical history and problem list.     Assessment:      Diagnosis Plan   1. Chronic systolic heart failure   Stage C; NYHA Class III  BETA-BLOCKER: Carvedilol 25 mg twice daily  ACE/ARB: Losartan 100 mg daily at 4 PM  ENTRESTO: History of rash associated with Entresto; resolved with discontinuation  DIURETIC: Lasix 20 mg on Sunday Monday Thursday and Saturday  ALDOSTERONT ANTAGONIST: H/O hyperkalemia  IMDUR/HYDRALAZINE: Hydralazine to 50 mg 3 times a day  DIGOXIN: Not applicable  Fluid restriction: 2000 cc daily - reinforced   Sodium restriction: 2000 mg daily - reinforced  6MWT: Up-to-date  Cardiac Rehab: N/A - transportation issues  Pulmonary Rehab: N/A   ICD: N/A @ this time  CardioMEMS: could be considered  Advanced HF evaluation (Transplant/LVAD): Not applicable      Knee-high compression stockings recommended         2. Coronary arteriosclerosis without angina      Aspirin, beta blocker plus statin therapy    3. Essential hypertension, stable According to #1        5. Mixed hyperlipidemia, stable  Statin therapy        6. Obesity, Class II, BMI 35-39.9  The patient did receive evaluation re: BUD with Dr Castellanos in [place]         The patient presents with history of HFrEF, ischemic for which there is no clinical evidence of mild hypervolemia.  She is well perfused.    Recommended daily weight  monitoring.  Discussed patient action plan for heart failure.  Recommended avoiding NSAIDs use.  Discussed warning signs requiring additional medical attention for heart failure.    I will contact the daughter-in-law regarding results of today's basic metabolic profile, as approved by the patient.    The patient is scheduled to receive evaluation with Dr. Kent in November 2018, as well as her primary care provider.        This document has been electronically signed by BILL Kebede on October 4, 2018 2:26 PM

## 2018-11-05 ENCOUNTER — TELEPHONE (OUTPATIENT)
Dept: CARDIOLOGY | Facility: CLINIC | Age: 68
End: 2018-11-05

## 2018-11-05 DIAGNOSIS — R05.9 COUGH: Primary | ICD-10-CM

## 2018-11-05 RX ORDER — AZITHROMYCIN 250 MG/1
TABLET, FILM COATED ORAL
Qty: 6 TABLET | Refills: 0 | Status: SHIPPED | OUTPATIENT
Start: 2018-11-05 | End: 2018-11-19

## 2018-11-05 RX ORDER — BENZONATATE 200 MG/1
200 CAPSULE ORAL 3 TIMES DAILY PRN
Qty: 21 CAPSULE | Refills: 0 | Status: SHIPPED | OUTPATIENT
Start: 2018-11-05 | End: 2019-10-23

## 2018-11-05 NOTE — TELEPHONE ENCOUNTER
The patient is reported to have 2 days of non-productive cough without fever or chills, loss of appetite or change in activity tolerance.  The patient's PCP is out of the office and is requesting medication.  I have instructed that I will supply an antibiotic (Z-pack) and Tessalon Pearles for cough as needed.   If lack of improvement within 48 hours, or worsening of symptoms, the patient is to present to an urgent or acute care facility for further assessment/evaluation.           This document has been electronically signed by BILL Kebede on November 5, 2018 9:07 AM

## 2018-11-10 DIAGNOSIS — E11.9 TYPE 2 DIABETES MELLITUS WITHOUT COMPLICATION, WITHOUT LONG-TERM CURRENT USE OF INSULIN (HCC): ICD-10-CM

## 2018-11-12 RX ORDER — GLIMEPIRIDE 1 MG/1
TABLET ORAL
Qty: 180 TABLET | Refills: 4 | Status: SHIPPED | OUTPATIENT
Start: 2018-11-12 | End: 2019-05-20 | Stop reason: SDUPTHER

## 2018-11-19 ENCOUNTER — OFFICE VISIT (OUTPATIENT)
Dept: FAMILY MEDICINE CLINIC | Facility: CLINIC | Age: 68
End: 2018-11-19

## 2018-11-19 ENCOUNTER — APPOINTMENT (OUTPATIENT)
Dept: LAB | Facility: HOSPITAL | Age: 68
End: 2018-11-19

## 2018-11-19 VITALS
DIASTOLIC BLOOD PRESSURE: 74 MMHG | SYSTOLIC BLOOD PRESSURE: 132 MMHG | HEIGHT: 63 IN | WEIGHT: 197 LBS | RESPIRATION RATE: 20 BRPM | HEART RATE: 68 BPM | BODY MASS INDEX: 34.91 KG/M2 | TEMPERATURE: 98.7 F | OXYGEN SATURATION: 98 %

## 2018-11-19 DIAGNOSIS — K13.0 ANGULAR CHEILITIS: ICD-10-CM

## 2018-11-19 DIAGNOSIS — E11.9 TYPE 2 DIABETES MELLITUS WITHOUT COMPLICATION, WITHOUT LONG-TERM CURRENT USE OF INSULIN (HCC): Primary | ICD-10-CM

## 2018-11-19 DIAGNOSIS — I10 ESSENTIAL HYPERTENSION: ICD-10-CM

## 2018-11-19 PROCEDURE — 99214 OFFICE O/P EST MOD 30 MIN: CPT | Performed by: NURSE PRACTITIONER

## 2018-11-19 PROCEDURE — 82607 VITAMIN B-12: CPT | Performed by: NURSE PRACTITIONER

## 2018-11-19 NOTE — PROGRESS NOTES
Subjective   Afia Adams is a 67 y.o. female.     Here today for kuldeep on her b/p and her dm.  She is not checking her b/s.  She does admit to feeling tired.  She does have red cracked area at the corners of her mouth.      Hypertension   This is a chronic problem. The current episode started more than 1 year ago. The problem is controlled. Associated symptoms include malaise/fatigue. Pertinent negatives include no anxiety, blurred vision, chest pain, headaches, neck pain, orthopnea, palpitations, peripheral edema, PND, shortness of breath or sweats. There are no associated agents to hypertension. Risk factors for coronary artery disease include dyslipidemia, diabetes mellitus, obesity, post-menopausal state and sedentary lifestyle. Past treatments include beta blockers, angiotensin blockers and diuretics. Current antihypertension treatment includes angiotensin blockers, beta blockers and diuretics. The current treatment provides significant improvement. There are no compliance problems.  There is no history of angina, kidney disease, CAD/MI, CVA, heart failure, left ventricular hypertrophy, PVD or retinopathy.   Diabetes   She presents for her follow-up diabetic visit. She has type 2 diabetes mellitus. Her disease course has been stable. There are no hypoglycemic associated symptoms. Pertinent negatives for hypoglycemia include no headaches or sweats. Associated symptoms include fatigue. Pertinent negatives for diabetes include no blurred vision, no chest pain, no foot ulcerations, no polydipsia, no polyphagia, no polyuria, no visual change, no weakness and no weight loss. There are no hypoglycemic complications. Symptoms are stable. Pertinent negatives for diabetic complications include no CVA, PVD or retinopathy. Risk factors for coronary artery disease include diabetes mellitus, dyslipidemia, hypertension, obesity, post-menopausal and sedentary lifestyle. Current diabetic treatment includes oral  agent (monotherapy). She is compliant with treatment all of the time. Her weight is stable. She is following a diabetic diet. Meal planning includes avoidance of concentrated sweets. She has not had a previous visit with a dietitian. She rarely participates in exercise. There is no compliance with monitoring of blood glucose. An ACE inhibitor/angiotensin II receptor blocker is being taken. She does not see a podiatrist.Eye exam is current.        The following portions of the patient's history were reviewed and updated as appropriate: allergies, current medications, past family history, past medical history, past social history, past surgical history and problem list.    Review of Systems   Constitutional: Positive for fatigue and malaise/fatigue. Negative for unexpected weight loss.   HENT: Negative.    Eyes: Negative for blurred vision.   Respiratory: Negative.  Negative for shortness of breath.    Cardiovascular: Negative.  Negative for chest pain, palpitations, orthopnea and PND.   Endocrine: Negative for polydipsia, polyphagia and polyuria.   Musculoskeletal: Negative.  Negative for neck pain.   Skin: Negative.    Neurological: Negative.  Negative for weakness.   Psychiatric/Behavioral: Negative.        Objective   Physical Exam   Constitutional: She is oriented to person, place, and time. She appears well-developed and well-nourished. No distress.   HENT:   Head: Normocephalic.   Red cracked areas noted at each corner of her mouth.  Tongue appears glossy.   Eyes: Pupils are equal, round, and reactive to light.   Neck: Normal range of motion. Neck supple. No thyromegaly present.   Cardiovascular: Normal rate and regular rhythm. Exam reveals no friction rub.   No murmur heard.  Pulmonary/Chest: Effort normal and breath sounds normal. No stridor. No respiratory distress. She has no wheezes. She has no rales.   Abdominal: Soft.   Musculoskeletal: Normal range of motion.       Neurological Sensory Findings -   Unaltered sharp/dull right ankle/foot discrimination and unaltered sharp/dull left ankle/foot discrimination.  Vascular Status -  Her right foot exhibits normal foot vasculature  and no edema. Her left foot exhibits normal foot vasculature  and no edema.  Skin Integrity  - Her left foot skin is intact..  Neurological: She is alert and oriented to person, place, and time.   Skin: Skin is warm and dry.   Psychiatric: She has a normal mood and affect. Thought content normal.   Vitals reviewed.        Assessment/Plan   Afia was seen today for hypertension, heartburn and diabetes.    Diagnoses and all orders for this visit:    Type 2 diabetes mellitus without complication, without long-term current use of insulin (CMS/[facility])    Angular cheilitis  -     Vitamin B12    Essential hypertension

## 2018-11-20 LAB — VIT B12 BLD-MCNC: 315 PG/ML (ref 239–931)

## 2018-11-30 NOTE — TELEPHONE ENCOUNTER
The patient developed a rash following the administration Entresto...  Will stop the Entresto and restart Losartan  
(0) indicator not present

## 2018-12-17 DIAGNOSIS — I50.22 CHRONIC SYSTOLIC HEART FAILURE (HCC): ICD-10-CM

## 2018-12-17 RX ORDER — CARVEDILOL 25 MG/1
25 TABLET ORAL 2 TIMES DAILY WITH MEALS
Qty: 60 TABLET | Refills: 5 | Status: SHIPPED | OUTPATIENT
Start: 2018-12-17 | End: 2019-07-02 | Stop reason: SDUPTHER

## 2018-12-18 DIAGNOSIS — E78.2 MIXED HYPERLIPIDEMIA: ICD-10-CM

## 2018-12-18 DIAGNOSIS — E78.5 HYPERLIPIDEMIA, UNSPECIFIED HYPERLIPIDEMIA TYPE: ICD-10-CM

## 2018-12-18 DIAGNOSIS — E11.9 TYPE 2 DIABETES MELLITUS WITHOUT COMPLICATION, WITHOUT LONG-TERM CURRENT USE OF INSULIN (HCC): ICD-10-CM

## 2018-12-18 RX ORDER — PRAVASTATIN SODIUM 40 MG
40 TABLET ORAL NIGHTLY
Qty: 90 TABLET | Refills: 1 | Status: SHIPPED | OUTPATIENT
Start: 2018-12-18 | End: 2019-02-18 | Stop reason: SDUPTHER

## 2018-12-24 ENCOUNTER — TELEPHONE (OUTPATIENT)
Dept: CARDIOLOGY | Facility: CLINIC | Age: 68
End: 2018-12-24

## 2018-12-24 NOTE — TELEPHONE ENCOUNTER
Contacted patient's daughter in law, Yu, because she had left a voicemail and we thought it was regarding the patient but it was about the patient's son, but they had got him in to see his family doctor. Her son is not a patient of ours and we have never seen him in the office here.

## 2019-01-04 ENCOUNTER — OFFICE VISIT (OUTPATIENT)
Dept: CARDIOLOGY | Facility: CLINIC | Age: 69
End: 2019-01-04

## 2019-01-04 VITALS
DIASTOLIC BLOOD PRESSURE: 68 MMHG | WEIGHT: 196.8 LBS | OXYGEN SATURATION: 95 % | SYSTOLIC BLOOD PRESSURE: 122 MMHG | BODY MASS INDEX: 34.87 KG/M2 | HEIGHT: 63 IN | HEART RATE: 72 BPM

## 2019-01-04 DIAGNOSIS — E78.2 MIXED HYPERLIPIDEMIA: ICD-10-CM

## 2019-01-04 DIAGNOSIS — I25.10 CORONARY ARTERIOSCLEROSIS: ICD-10-CM

## 2019-01-04 DIAGNOSIS — I50.22 CHRONIC SYSTOLIC HEART FAILURE (HCC): Primary | ICD-10-CM

## 2019-01-04 DIAGNOSIS — I10 ESSENTIAL HYPERTENSION: ICD-10-CM

## 2019-01-04 PROCEDURE — 99215 OFFICE O/P EST HI 40 MIN: CPT | Performed by: NURSE PRACTITIONER

## 2019-01-04 RX ORDER — ALBUTEROL SULFATE 90 UG/1
2 AEROSOL, METERED RESPIRATORY (INHALATION) EVERY 4 HOURS PRN
Qty: 1 INHALER | Refills: 0 | Status: SHIPPED | OUTPATIENT
Start: 2019-01-04 | End: 2021-12-08

## 2019-01-04 NOTE — PROGRESS NOTES
Subjective:     Congestive Heart Failure (chief complaint)    Congestive Heart Failure   Presents for follow-up visit. Pertinent negatives include no abdominal pain, chest pain, chest pressure, edema, fatigue, near-syncope, orthopnea, palpitations, paroxysmal nocturnal dyspnea, shortness of breath or unexpected weight change. The symptoms have been stable. Compliance with total regimen is 51-75%. Compliance problems include adherence to exercise and adherence to diet.  Compliance with diet is 51-75%. Compliance with exercise is 0-25%. Compliance with medications is %.     She is accompanied by her  and daughter-in-law.    Ms Adams was admitted to the hospital (from [facility]) on 03/08/2016 with evidence of anterior STEMI with PCI per Dr Kent. Cath was noted with evidence of multi-vessel disease. In addition, she required intubation with mechanical ventilation secondary to pulmonary edema. Muga scan demonstrated evidence of EF 35.5% on 03/15/2016    PCP: BILL Rodrigues  Cardiology: Dr Kent     [facility]:  1. CAD, S/P PCI LAD - per Dr Kent - 03/08/2016 2.75mm x 12mm and a 2.5mm x 23mm Alpine stent proximal LAD  2. HFrEF, ischemic 35.5% by Muga on 03/15/2016  3. Acute pulmonary edema 03/08/2016 requiring intubation   4. RLL pneumonia 03/2016  5. Hypertension  6. Hyperlipidemia  7. DM, Type II  8.GERD  9. Colon polyp removal 03/07/2016  10. H/O hyperkalemia on AA      Yu: daughter-in-law [phone] (cell)    The patient reports negative sleep study with Dr Castellanos since last HF appointment.    Today, the patient presents and indicates that secondary to recall of her losartan 100 mg, Dr. Kent has forwarded new prescription which she will  later today.  The patient is unaware of a substitute that Dr. Kent is proceeding forward.  The daughter-in-law indicates an upcoming appointment with BILL Rodrigues within the next 2-3 weeks at which time laboratory  diagnostics are anticipated.    Review of Systems   Constitution: Negative for chills, decreased appetite, fatigue, fever, unexpected weight change and weight gain.   HENT: Negative.    Eyes: Negative.    Cardiovascular: Positive for dyspnea on exertion. Negative for chest pain, leg swelling (ankle swelling throughout the day), near-syncope, orthopnea, palpitations, paroxysmal nocturnal dyspnea and syncope.   Respiratory: Positive for cough (NP for the past 2-3 days). Negative for hemoptysis, shortness of breath, sleep disturbances due to breathing, snoring, sputum production and wheezing.    Endocrine: Negative for polydipsia, polyphagia and polyuria.   Hematologic/Lymphatic: Negative for bleeding problem. Does not bruise/bleed easily.   Skin: Negative for itching and rash.   Musculoskeletal: Positive for arthritis. Negative for falls.   Gastrointestinal: Negative for bloating and abdominal pain.   Genitourinary: Negative.    Neurological: Negative for dizziness, light-headedness and vertigo.   Psychiatric/Behavioral: Negative for altered mental status. The patient is not nervous/anxious.      Past Medical History:   Diagnosis Date   • Abdominal pain 11/20/2015    unspecified   • Acute gastritis    • Acute on chronic systolic congestive heart failure (CMS/HCC) 04/13/2016   • Ankle pain 04/27/2015   • Candidiasis, skin or nails 12/22/2011    controlled   • Chronic systolic congestive heart failure (CMS/HCC) 08/01/2016   • Congestive heart failure (CMS/HCC) 08/01/2016   • Coronary arteriosclerosis 06/21/2016    multi-vessel   • Cough 09/25/2014    proabably due to allergy, chest x ray is normal      • Edema of foot 03/24/2016   • Encounter for long-term (current) drug use     therapy   • Epigastric pain 12/03/2015   • Essential hypertension 06/21/2016   • History of echocardiogram 03/14/2016    Left atrium normal with mild CLVH wit normal aortic root size.Evidence of posterolateral wall hypokinesis. Severely  depressed LV systolic function of 20-25%.Mitral valve thickened Aortic sclerosis.Diastolic dysfunction   • Hyperlipidemia 03/31/2016    unspecified   • Hypertensive disorder 03/24/2016   • Left lower quadrant pain 07/12/2013    ruling out diverticular disease      • Myocardial infarction (CMS/HCC) 03/24/2016   • Nausea 11/20/2015   • Osteoarthritis of knee 06/23/2016   • Pain in limb 01/25/2011   • Pediculosis capitis 12/22/2011    controlled   • Peptic ulcer    • Polyuria 01/25/2011   • Pruritic rash 12/09/2014   • Superficial foreign body hip without major open wound, no infection 12/09/2011    ??? back   • Type 2 diabetes mellitus (CMS/HCC) 06/23/2016    new onset   • Weakness 06/23/2016     Past Surgical History:   Procedure Laterality Date   • COLONOSCOPY W/ POLYPECTOMY  03/07/2016    Transverse colon polyps,descending polyps,Sigmoid polyps, all resected and retrieved. Diverticulosis without perforation or abscess without bleeding. Erica Thomas   • HYSTERECTOMY      ovary preserv   • INJECTION OF MEDICATION  09/11/2014    Celestone (betamethasone) (2)       Social History     Socioeconomic History   • Marital status:      Spouse name: Not on file   • Number of children: Not on file   • Years of education: Not on file   • Highest education level: Not on file   Tobacco Use   • Smoking status: Never Smoker   • Smokeless tobacco: Never Used   Substance and Sexual Activity   • Alcohol use: No   • Drug use: No   • Sexual activity: Defer     Family History   Problem Relation Age of Onset   • Alcohol abuse Other    • Asthma Other    • Lung cancer Other    • Heart attack Father    • Lung cancer Father    • Hypertension Father    • Heart disease Father    • Heart attack Brother    • Hypertension Brother    • Heart disease Brother      Current Outpatient Medications   Medication Sig Dispense Refill   • aspirin 81 MG tablet Take 81 mg by mouth daily.     • benzonatate (TESSALON) 200 MG capsule Take 1 capsule by  "mouth 3 (Three) Times a Day As Needed for Cough. 21 capsule 0   • carvedilol (COREG) 25 MG tablet Take 1 tablet by mouth 2 (Two) Times a Day With Meals. 60 tablet 5   • Continuous Blood Gluc Sensor (FREESTYLE BENNETT SENSOR SYSTEM) misc 1 each Every 7 (Seven) Days. 4 each 11   • furosemide (LASIX) 20 MG tablet Take 1 tablet by mouth every Sunday, Monday, Thursday and Saturday 16 tablet 5   • glimepiride (AMARYL) 1 MG tablet TAKE ONE TABLET BY MOUTH TWICE A  tablet 4   • hydrALAZINE (APRESOLINE) 50 MG tablet Take 1 tablet by mouth 3 (Three) Times a Day. 90 tablet 5   • loratadine (CLARITIN) 10 MG tablet Take 1 tablet by mouth Daily. 30 tablet 3   • magnesium oxide (MAG-OX) 400 MG tablet Take 1 tablet by mouth Daily. 30 tablet 5   • omeprazole (PriLOSEC) 40 MG capsule Take 40 mg by mouth daily.     • pravastatin (PRAVACHOL) 40 MG tablet Take 1 tablet by mouth Every Night. 90 tablet 1   • albuterol sulfate  (90 Base) MCG/ACT inhaler Inhale 2 puffs Every 4 (Four) Hours As Needed for Wheezing. 1 inhaler 0     No current facility-administered medications for this visit.      Objective:     Vitals:    01/04/19 0945   BP: 122/68   BP Location: Left arm   Patient Position: Sitting   Cuff Size: Adult   Pulse: 72   SpO2: 95%   Weight: 89.3 kg (196 lb 12.8 oz)   Height: 160 cm (63\")      Physical Exam   Constitutional: She is oriented to person, place, and time. She appears well-nourished. No distress.   HENT:   Head: Normocephalic and atraumatic.   Eyes: Right eye exhibits no discharge. Left eye exhibits no discharge.   Neck: JVD: 6 cm @ 45 degrees.   Cardiovascular: Normal rate and regular rhythm.   Murmur heard.  High-pitched blowing holosystolic murmur is present with a grade of 1/6 at the apex.  Pulses:       Radial pulses are 2+ on the right side, and 2+ on the left side.   Pulmonary/Chest: Effort normal. No stridor. No respiratory distress (few expirtory). She has wheezes. She has no rales.   Abdominal: She " "exhibits no distension. There is no tenderness.   Musculoskeletal: She exhibits no edema or tenderness.   Neurological: She is alert and oriented to person, place, and time.   Skin: Skin is warm and dry.   Psychiatric: She has a normal mood and affect. Her behavior is normal. Judgment and thought content normal.   Vitals reviewed.    Flowsheet Rows         First Filed Value    Admission Height  63\" (160 cm) Documented at 09/18/2017 1003    Admission Weight  201 lb 7 oz (91.4 kg) Documented at 09/18/2017 1003        Cardiographics  Transthoracic echocardiogram: 4 02/02/2018  · Left Ventricle: Left ventricular systolic function is mildly decreased. Calculated EF = 46%.  · Left ventricular wall thickness is consistent with mild-to-moderate concentric hypertrophy. Diastolic function is normal.  · Right Ventricle: Normal right ventricular cavity size, wall thickness, systolic function and septal motion noted  · Mild mitral valve regurgitation is present.  · Mild tricuspid valve regurgitation is present. Estimated right ventricular systolic pressure from tricuspid regurgitation is mildly elevated (35-45 mmHg).  · No evidence of pulmonary hypertension is present.  · There is no evidence of pericardial effusion.    Echocardiogram: 03/16/2017  · Left ventricular function is severely decreased.  · Calculated EF = 29%. Estimated EF was in agreement with the calculated EF  · Global left ventricular wall motion appears abnormal  · Left ventricular diastolic dysfunction (grade II) consistent with pseudonormalization and elevated left atrial pressures.  · Left ventricular wall thickness is consistent with mild concentric hypertrophy.  · Right Ventricle: Normal cavity size, wall thickness, systolic function and septal motion noted  · Mild tricuspid valve regurgitation is present.  · Mild mitral valve regurgitation is present  · Mild to moderate pulmonary hypertension is present  · There is no evidence of pericardial " effusion.      Cardiac Cath: 03/08/2016  CORONARIES: Right dominant system.  1. LEFT MAIN: Left main coronary artery toward the distal end had  evidence of luminal irregularity.  2. LAD: Left anterior descending artery was a medium caliber vessel,  which in the proximal portion was 100% occluded after the origin of  the 1st diagonal branch. There was no evidence of any antegrade  flow down the distal left anterior descending artery. The 1st  diagonal branch in the proximal portion had up to 80% to 90%  stenosis.      The 1st septal  in the proximal portion had evidence of  luminal irregularity. There was no evidence of any antegrade flow  down the left anterior descending artery.  3. CIRCUMFLEX: The circumflex artery was a medium caliber vessel,  which towards the ostium, had up to 50% to 60% stenosis. The distal  end of the circumflex artery gave origin to the obtuse marginal  branch #2 and #3, which had 70% to 80% stenosis.  4. RCA: The right coronary artery was a medium caliber dominant  vessel, which in the proximal portion had a discrete 60% to 70%  stenosis. The midportion of the right coronary artery had 30% to  40% stenosis, and the distal right coronary artery bifurcated into  the posterior descending and posterolateral branch. The posterior  lateral branch at its origin had 60% to 70% stenosis. The distal  right coronary artery, before the bifurcation to the posterior  descending and posterolateral branch, had evidence of 30% to 40%  narrowing.      FINAL IMPRESSION:  1. The right coronary artery was diffusely diseased.  2. A 100% occluded proximal left anterior descending artery, which was  the infarct-related vessel.  3. Ostial circumflex artery with 30% to 40% narrowing and in some  views appeared to be 60% to 70%.  4. The 1st diagonal branch in the proximal portion with 70% to 80%  stenosis.  Following this, a 2.75 mm x 12 mm and a 2.5 mm x 23 mm Alpine stent was  then successfully deployed  with reduction of stenosis from 100% to less  than 0% stenosis. Patient did have distal left anterior descending  artery, diagonal, ostial circumflex artery, and diffusely diseased right  coronary artery.      Patient will be evaluated for direct revascularization and have  discussed with Dr. Verduzco. In view of the patient's acute myocardial  infarction with left anterior descending artery 100% occluded, patient  at the present time would not be subjected to an emergent coronary  artery bypass grafting, and patient will be stabilized.      Lab Results   Component Value Date    GLUCOSE 85 10/04/2018    CALCIUM 9.7 10/04/2018     10/04/2018    K 5.2 (H) 10/04/2018    CO2 27.0 10/04/2018    CL 99 10/04/2018    BUN 14 10/04/2018    CREATININE 0.84 10/04/2018    EGFRIFNONA 68 10/04/2018    BCR 16.7 10/04/2018    ANIONGAP 12.0 10/04/2018     Lab Results   Component Value Date    WBC 10.35 (H) 04/02/2018    HGB 9.3 (L) 04/02/2018    HCT 31.0 (L) 04/02/2018    MCV 66.8 (L) 04/02/2018     04/02/2018     Lab Results   Component Value Date    CHOL 159 04/02/2018    CHOL 176 08/17/2017    CHOL 170 02/21/2017     Lab Results   Component Value Date    TRIG 213 (H) 04/02/2018    TRIG 313 (H) 08/17/2017    TRIG 261 (H) 02/21/2017     Lab Results   Component Value Date    HDL 31 (L) 04/02/2018    HDL 36 (L) 08/17/2017    HDL 35 (L) 02/21/2017     Lab Results   Component Value Date    TSH 2.83 03/09/2016     The following portions of the patient's history were reviewed and updated as appropriate: allergies, current medications, past family history, past medical history, past social history, past surgical history and problem list.     Assessment:      Diagnosis Plan   1. Chronic systolic heart failure   Stage C; NYHA Class III  BETA-BLOCKER: Carvedilol 25 mg twice daily  ACE/ARB: Losartan has been recalled @ 100 mg dosage for which the patient is to  alternate by Dr Yoli BETANCOURT: History of rash associated  with Entresto; resolved with discontinuation  DIURETIC: Lasix 20 mg on Sunday Monday Thursday and Saturday  ALDOSTERONT ANTAGONIST: H/O hyperkalemia  IMDUR/HYDRALAZINE: Hydralazine 50 mg 3 times a day  DIGOXIN: Not applicable  Fluid restriction: 2000 cc daily - reinforced   Sodium restriction: 2000 mg daily - reinforced    Knee-high compression stockings recommended    Recommended daily weight monitoring.  Discussed patient action plan for heart failure.  Recommended avoiding NSAIDs use.  Discussed warning signs requiring additional medical attention for heart failure.           2. Coronary arteriosclerosis without angina      Aspirin, beta blocker plus statin therapy    3. Essential hypertension, stable According to #1        5. Mixed hyperlipidemia, stable  Statin therapy        6. BMI 34.0 - 34.9  The patient did receive evaluation re: BUD with Dr Castellanos in [place] which is reported to be negative.    The patient is strongly encouraged in lifestyle modifications:  Activity: Approximately 150 minutes of moderate activity (such as walking program)  per week (20-30 minutes most days of the week)  Diet: Heart healthy foods - more fresh fruits and vegetables and whole grains; less red meat; more fish and poultry that is baked or grilled - not fried; less salt not to exceed 2000 mg daily - less processed food; No trans or saturated fat  Non Smoker  Diabetes management  Blood pressure management  Weight management: Patient's Body mass index is 34.86 kg/m². BMI is above normal parameters. Recommendations include: exercise counseling and nutrition counseling.  Stress management         The patient presents with history of HFrEF, ischemic for which there is no clinical evidence of hypervolemia.  She is well perfused.    Secondary to the presence of wheezes which is most likely related to her asthma/allergy, albuterol inhaler has been prescribed with instructions to use twice daily for the next 2-3 days as well as  every 4 hours when necessary.    If lack of improvement over the course of the next 2-3 days, strongly encouraged and follow-up with primary care provider or urgent care setting.        This document has been electronically signed by BILL Kebede on January 4, 2019 10:37 AM

## 2019-01-31 ENCOUNTER — TELEPHONE (OUTPATIENT)
Dept: CARDIOLOGY | Facility: CLINIC | Age: 69
End: 2019-01-31

## 2019-01-31 DIAGNOSIS — I50.22 CHRONIC SYSTOLIC HEART FAILURE (HCC): ICD-10-CM

## 2019-01-31 RX ORDER — FUROSEMIDE 20 MG/1
TABLET ORAL
Qty: 16 TABLET | Refills: 5 | Status: SHIPPED | OUTPATIENT
Start: 2019-01-31 | End: 2019-07-02 | Stop reason: SDUPTHER

## 2019-02-01 RX ORDER — FUROSEMIDE 20 MG/1
TABLET ORAL
Qty: 16 TABLET | Refills: 0 | OUTPATIENT
Start: 2019-02-01

## 2019-02-18 ENCOUNTER — OFFICE VISIT (OUTPATIENT)
Dept: FAMILY MEDICINE CLINIC | Facility: CLINIC | Age: 69
End: 2019-02-18

## 2019-02-18 ENCOUNTER — APPOINTMENT (OUTPATIENT)
Dept: LAB | Facility: HOSPITAL | Age: 69
End: 2019-02-18

## 2019-02-18 VITALS
HEART RATE: 73 BPM | BODY MASS INDEX: 36.14 KG/M2 | OXYGEN SATURATION: 97 % | RESPIRATION RATE: 18 BRPM | SYSTOLIC BLOOD PRESSURE: 133 MMHG | HEIGHT: 63 IN | DIASTOLIC BLOOD PRESSURE: 47 MMHG | WEIGHT: 204 LBS | TEMPERATURE: 98.1 F

## 2019-02-18 DIAGNOSIS — E78.2 MIXED HYPERLIPIDEMIA: ICD-10-CM

## 2019-02-18 DIAGNOSIS — Z12.39 SCREENING FOR BREAST CANCER: ICD-10-CM

## 2019-02-18 DIAGNOSIS — E11.9 TYPE 2 DIABETES MELLITUS WITHOUT COMPLICATION, WITHOUT LONG-TERM CURRENT USE OF INSULIN (HCC): Primary | ICD-10-CM

## 2019-02-18 DIAGNOSIS — E78.5 HYPERLIPIDEMIA, UNSPECIFIED HYPERLIPIDEMIA TYPE: ICD-10-CM

## 2019-02-18 LAB
ALBUMIN UR-MCNC: <0.6 MG/L
HBA1C MFR BLD: 6.3 % (ref 4–5.6)

## 2019-02-18 PROCEDURE — 83036 HEMOGLOBIN GLYCOSYLATED A1C: CPT | Performed by: NURSE PRACTITIONER

## 2019-02-18 PROCEDURE — 99214 OFFICE O/P EST MOD 30 MIN: CPT | Performed by: NURSE PRACTITIONER

## 2019-02-18 PROCEDURE — 82043 UR ALBUMIN QUANTITATIVE: CPT | Performed by: NURSE PRACTITIONER

## 2019-02-18 RX ORDER — PRAVASTATIN SODIUM 40 MG
40 TABLET ORAL NIGHTLY
Qty: 90 TABLET | Refills: 1 | Status: SHIPPED | OUTPATIENT
Start: 2019-02-18 | End: 2019-07-02 | Stop reason: SDUPTHER

## 2019-02-18 NOTE — PROGRESS NOTES
Subjective   Afia Adams is a 68 y.o. female.     Here today for kuldeep on her dm and her htn.  She cont to see her cardiologist.  She reports checking her b/s but does not recall what it is running.      Diabetes   She presents for her follow-up diabetic visit. She has type 2 diabetes mellitus. Her disease course has been stable. Pertinent negatives for hypoglycemia include no headaches or sweats. There are no diabetic associated symptoms. Pertinent negatives for diabetes include no blurred vision and no chest pain. There are no hypoglycemic complications. Symptoms are stable. Diabetic complications include heart disease. Pertinent negatives for diabetic complications include no autonomic neuropathy, CVA, impotence, nephropathy, peripheral neuropathy, PVD or retinopathy. Risk factors for coronary artery disease include diabetes mellitus, dyslipidemia, hypertension, obesity, sedentary lifestyle and post-menopausal. Current diabetic treatment includes oral agent (monotherapy). She is compliant with treatment all of the time. Her weight is stable. She is following a diabetic diet. Meal planning includes avoidance of concentrated sweets. She has had a previous visit with a dietitian. She rarely participates in exercise. She monitors blood glucose at home 3-4 x per week. An ACE inhibitor/angiotensin II receptor blocker is not being taken. She does not see a podiatrist.Eye exam is not current (is making own appointment).   Hypertension   This is a chronic problem. The current episode started more than 1 year ago. The problem has been waxing and waning since onset. The problem is controlled. Pertinent negatives include no anxiety, blurred vision, chest pain, headaches, malaise/fatigue, neck pain, orthopnea, palpitations, peripheral edema, PND, shortness of breath or sweats. There are no associated agents to hypertension. Risk factors for coronary artery disease include diabetes mellitus, dyslipidemia, obesity,  post-menopausal state and sedentary lifestyle. Past treatments include beta blockers, diuretics and direct vasodilators. Current antihypertension treatment includes beta blockers, diuretics and direct vasodilators. The current treatment provides significant improvement. There are no compliance problems.  Hypertensive end-organ damage includes CAD/MI. There is no history of angina, kidney disease, CVA, heart failure, left ventricular hypertrophy, PVD or retinopathy.        The following portions of the patient's history were reviewed and updated as appropriate: allergies, current medications, past family history, past medical history, past social history, past surgical history and problem list.    Review of Systems   Constitutional: Negative.  Negative for malaise/fatigue.   HENT: Negative.    Eyes: Negative for blurred vision.   Respiratory: Negative.  Negative for shortness of breath.    Cardiovascular: Negative.  Negative for chest pain, palpitations, orthopnea and PND.   Genitourinary: Negative for impotence.   Musculoskeletal: Negative.  Negative for neck pain.   Skin: Negative.    Neurological: Negative.    Psychiatric/Behavioral: Negative.        Objective   Physical Exam   Constitutional: She is oriented to person, place, and time. She appears well-developed and well-nourished. No distress.   HENT:   Head: Normocephalic.   Eyes: Pupils are equal, round, and reactive to light.   Neck: Normal range of motion. Neck supple. No thyromegaly present.   Cardiovascular: Normal rate, regular rhythm and normal heart sounds. Exam reveals no friction rub.   No murmur heard.  Pulmonary/Chest: Effort normal and breath sounds normal. No stridor. No respiratory distress. She has no wheezes. She has no rales.   Abdominal: Soft.   Musculoskeletal: Normal range of motion.       Neurological Sensory Findings -  Unaltered sharp/dull right ankle/foot discrimination and unaltered sharp/dull left ankle/foot discrimination.  Vascular  Status -  Her right foot exhibits normal foot vasculature  and no edema. Her left foot exhibits normal foot vasculature  and no edema.  Skin Integrity  -  Her right foot skin is intact.Her left foot skin is intact..  Neurological: She is alert and oriented to person, place, and time.   Skin: Skin is warm and dry.   Psychiatric: She has a normal mood and affect.   Nursing note and vitals reviewed.        Assessment/Plan   Afia was seen today for diabetes, hypertension, heartburn and hyperlipidemia.    Diagnoses and all orders for this visit:    Type 2 diabetes mellitus without complication, without long-term current use of insulin (CMS/formerly [facility])  -     pravastatin (PRAVACHOL) 40 MG tablet; Take 1 tablet by mouth Every Night.  -     Hemoglobin A1c  -     MicroAlbumin, Urine, Random - Urine, Clean Catch    Hyperlipidemia, unspecified hyperlipidemia type  -     pravastatin (PRAVACHOL) 40 MG tablet; Take 1 tablet by mouth Every Night.    Mixed hyperlipidemia  -     pravastatin (PRAVACHOL) 40 MG tablet; Take 1 tablet by mouth Every Night.    Screening for breast cancer  -     Mammo Screening Bilateral With CAD; Future

## 2019-02-20 ENCOUNTER — TELEPHONE (OUTPATIENT)
Dept: FAMILY MEDICINE CLINIC | Facility: CLINIC | Age: 69
End: 2019-02-20

## 2019-04-03 ENCOUNTER — OFFICE VISIT (OUTPATIENT)
Dept: CARDIOLOGY | Facility: CLINIC | Age: 69
End: 2019-04-03

## 2019-04-03 VITALS
HEIGHT: 63 IN | OXYGEN SATURATION: 98 % | HEART RATE: 77 BPM | BODY MASS INDEX: 35.75 KG/M2 | DIASTOLIC BLOOD PRESSURE: 64 MMHG | WEIGHT: 201.8 LBS | SYSTOLIC BLOOD PRESSURE: 124 MMHG

## 2019-04-03 DIAGNOSIS — I10 ESSENTIAL HYPERTENSION: ICD-10-CM

## 2019-04-03 DIAGNOSIS — I25.10 CORONARY ARTERIOSCLEROSIS: ICD-10-CM

## 2019-04-03 DIAGNOSIS — E66.01 MORBIDLY OBESE (HCC): ICD-10-CM

## 2019-04-03 DIAGNOSIS — E78.2 MIXED HYPERLIPIDEMIA: ICD-10-CM

## 2019-04-03 DIAGNOSIS — I50.22 CHRONIC SYSTOLIC HEART FAILURE (HCC): Primary | ICD-10-CM

## 2019-04-03 PROCEDURE — 93000 ELECTROCARDIOGRAM COMPLETE: CPT | Performed by: INTERNAL MEDICINE

## 2019-04-03 PROCEDURE — 99214 OFFICE O/P EST MOD 30 MIN: CPT | Performed by: NURSE PRACTITIONER

## 2019-04-03 RX ORDER — OLMESARTAN MEDOXOMIL 20 MG/1
40 TABLET ORAL
COMMUNITY
End: 2019-07-02 | Stop reason: SDUPTHER

## 2019-04-03 RX ORDER — HYDRALAZINE HYDROCHLORIDE 50 MG/1
50 TABLET, FILM COATED ORAL 3 TIMES DAILY
Qty: 90 TABLET | Refills: 5 | Status: SHIPPED | OUTPATIENT
Start: 2019-04-03 | End: 2019-07-02 | Stop reason: SDUPTHER

## 2019-04-03 NOTE — PROGRESS NOTES
Subjective:     Congestive Heart Failure (chief complaint)    Patient Care Team:  Nancy Chou APRN as PCP - General    Congestive Heart Failure   Presents for follow-up visit. Pertinent negatives include no abdominal pain, chest pain, chest pressure, edema, fatigue, near-syncope, orthopnea, palpitations, paroxysmal nocturnal dyspnea, shortness of breath or unexpected weight change. The symptoms have been stable. Compliance with total regimen is 51-75%. Compliance problems include adherence to exercise and adherence to diet.  Compliance with diet is 51-75%. Compliance with exercise is 0-25%. Compliance with medications is %.     She is accompanied by her  and daughter-in-law.    Ms Adams was admitted to the hospital (from [facility]) on 03/08/2016 with evidence of anterior STEMI with PCI per Dr Kent. Cath was noted with evidence of multi-vessel disease. In addition, she required intubation with mechanical ventilation secondary to pulmonary edema. Muga scan demonstrated evidence of EF 35.5% on 03/15/2016    CMH:  1. CAD, S/P PCI LAD - per Dr Kent - 03/08/2016 2.75mm x 12mm and a 2.5mm x 23mm Alpine stent proximal LAD  2. HFrEF, ischemic 35.5% by Muga on 03/15/2016  3. Acute pulmonary edema 03/08/2016 requiring intubation   4. RLL pneumonia 03/2016  5. Hypertension  6. Hyperlipidemia  7. DM, Type II  8.GERD  9. Colon polyp removal 03/07/2016  10. H/O hyperkalemia on AA      Yu: daughter-in-law [phone] (cell)    The patient reports negative sleep study with Dr Castellanos since last HF appointment.    The daughter-in-law indicates an upcoming appointment with BILL Rodrigues within the next 2-3 weeks at which time laboratory diagnostics are anticipated.    Review of Systems   Constitution: Negative for chills, decreased appetite, fatigue, fever, unexpected weight change and weight gain.   HENT: Negative.    Eyes: Negative.    Cardiovascular: Positive for dyspnea  on exertion. Negative for chest pain, leg swelling (ankle swelling throughout the day), near-syncope, orthopnea, palpitations, paroxysmal nocturnal dyspnea and syncope.   Respiratory: Negative for cough (NP for the past 2-3 days), hemoptysis, shortness of breath, sleep disturbances due to breathing, snoring, sputum production and wheezing.    Endocrine: Negative for polydipsia, polyphagia and polyuria.   Hematologic/Lymphatic: Negative for bleeding problem. Does not bruise/bleed easily.   Skin: Negative for itching and rash.   Musculoskeletal: Positive for arthritis. Negative for falls.   Gastrointestinal: Negative for bloating and abdominal pain.   Genitourinary: Negative.    Neurological: Negative for dizziness, light-headedness and vertigo.   Psychiatric/Behavioral: Negative for altered mental status. The patient is not nervous/anxious.      Past Medical History:   Diagnosis Date   • Abdominal pain 11/20/2015    unspecified   • Acute gastritis    • Acute on chronic systolic congestive heart failure (CMS/[facility]) 04/13/2016   • Ankle pain 04/27/2015   • Candidiasis, skin or nails 12/22/2011    controlled   • Chronic systolic congestive heart failure (CMS/[facility]) 08/01/2016   • Congestive heart failure (CMS/[facility]) 08/01/2016   • Coronary arteriosclerosis 06/21/2016    multi-vessel   • Cough 09/25/2014    proabably due to allergy, chest x ray is normal      • Edema of foot 03/24/2016   • Encounter for long-term (current) drug use     therapy   • Epigastric pain 12/03/2015   • Essential hypertension 06/21/2016   • History of echocardiogram 03/14/2016    Left atrium normal with mild CLVH wit normal aortic root size.Evidence of posterolateral wall hypokinesis. Severely depressed LV systolic function of 20-25%.Mitral valve thickened Aortic sclerosis.Diastolic dysfunction   • Hyperlipidemia 03/31/2016    unspecified   • Hypertensive disorder 03/24/2016   • Left lower quadrant pain 07/12/2013    ruling out diverticular disease       • Myocardial infarction (CMS/[facility]) 03/24/2016   • Nausea 11/20/2015   • Osteoarthritis of knee 06/23/2016   • Pain in limb 01/25/2011   • Pediculosis capitis 12/22/2011    controlled   • Peptic ulcer    • Polyuria 01/25/2011   • Pruritic rash 12/09/2014   • Superficial foreign body hip without major open wound, no infection 12/09/2011    ??? back   • Type 2 diabetes mellitus (CMS/[facility]) 06/23/2016    new onset   • Weakness 06/23/2016   ,  Past Surgical History:   Procedure Laterality Date   • COLONOSCOPY W/ POLYPECTOMY  03/07/2016    Transverse colon polyps,descending polyps,Sigmoid polyps, all resected and retrieved. Diverticulosis without perforation or abscess without bleeding. Erica Thomas   • HYSTERECTOMY      ovary preserv   • INJECTION OF MEDICATION  09/11/2014    Celestone (betamethasone) (2)     ,  Social History     Socioeconomic History   • Marital status:      Spouse name: Not on file   • Number of children: Not on file   • Years of education: Not on file   • Highest education level: Not on file   Tobacco Use   • Smoking status: Never Smoker   • Smokeless tobacco: Never Used   Substance and Sexual Activity   • Alcohol use: No   • Drug use: No   • Sexual activity: Defer   ,  Family History   Problem Relation Age of Onset   • Alcohol abuse Other    • Asthma Other    • Lung cancer Other    • Heart attack Father    • Lung cancer Father    • Hypertension Father    • Heart disease Father    • Heart attack Brother    • Hypertension Brother    • Heart disease Brother      Current Outpatient Medications   Medication Sig Dispense Refill   • albuterol sulfate  (90 Base) MCG/ACT inhaler Inhale 2 puffs Every 4 (Four) Hours As Needed for Wheezing. 1 inhaler 0   • aspirin 81 MG tablet Take 81 mg by mouth daily.     • benzonatate (TESSALON) 200 MG capsule Take 1 capsule by mouth 3 (Three) Times a Day As Needed for Cough. 21 capsule 0   • carvedilol (COREG) 25 MG tablet Take 1 tablet by mouth 2 (Two) Times  "a Day With Meals. 60 tablet 5   • Continuous Blood Gluc Sensor (FREESTYLE BENNETT SENSOR SYSTEM) misc 1 each Every 7 (Seven) Days. 4 each 11   • furosemide (LASIX) 20 MG tablet Take 1 tablet by mouth every Sunday, Monday, Thursday and Saturday 16 tablet 5   • glimepiride (AMARYL) 1 MG tablet TAKE ONE TABLET BY MOUTH TWICE A  tablet 4   • hydrALAZINE (APRESOLINE) 50 MG tablet Take 1 tablet by mouth 3 (Three) Times a Day. 90 tablet 5   • loratadine (CLARITIN) 10 MG tablet Take 1 tablet by mouth Daily. 30 tablet 3   • magnesium oxide (MAG-OX) 400 MG tablet Take 1 tablet by mouth Daily. 30 tablet 5   • olmesartan (BENICAR) 20 MG tablet Take 40 mg by mouth Daily With Dinner.     • omeprazole (PriLOSEC) 40 MG capsule Take 40 mg by mouth daily.     • pravastatin (PRAVACHOL) 40 MG tablet Take 1 tablet by mouth Every Night. 90 tablet 1     No current facility-administered medications for this visit.      Objective:     Vitals:    04/03/19 0926   BP: 124/64   BP Location: Left arm   Patient Position: Sitting   Cuff Size: Adult   Pulse: 77   SpO2: 98%   Weight: 91.5 kg (201 lb 12.8 oz)   Height: 160 cm (63\")      Physical Exam   Constitutional: She is oriented to person, place, and time. She appears well-nourished. No distress.   HENT:   Head: Normocephalic and atraumatic.   Eyes: Right eye exhibits no discharge. Left eye exhibits no discharge.   Neck: JVD: 6 cm @ 45 degrees.   Cardiovascular: Normal rate and regular rhythm.   Murmur heard.  High-pitched blowing holosystolic murmur is present with a grade of 1/6 at the apex.  Pulses:       Radial pulses are 2+ on the right side, and 2+ on the left side.   Pulmonary/Chest: Effort normal. No stridor. No respiratory distress. She has no wheezes. She has no rales.   Abdominal: She exhibits no distension. There is no tenderness.   Musculoskeletal: She exhibits no edema or tenderness.   Neurological: She is alert and oriented to person, place, and time.   Skin: Skin is warm and " "dry.   Psychiatric: She has a normal mood and affect. Her behavior is normal. Judgment and thought content normal.   Vitals reviewed.    Flowsheet Rows         First Filed Value    Admission Height  63\" (160 cm) Documented at 09/18/2017 1003    Admission Weight  201 lb 7 oz (91.4 kg) Documented at 09/18/2017 1003        Data Reviewed:  Results for orders placed in visit on 04/02/18   Adult Transthoracic Echo Complete W/ Cont if Necessary Per Protocol    Narrative · Left Ventricle: Left ventricular systolic function is mildly decreased.   Calculated EF = 46%.  · Left ventricular wall thickness is consistent with mild-to-moderate   concentric hypertrophy. Diastolic function is normal.  · Right Ventricle: Normal right ventricular cavity size, wall thickness,   systolic function and septal motion noted  · Mild mitral valve regurgitation is present.  · Mild tricuspid valve regurgitation is present. Estimated right   ventricular systolic pressure from tricuspid regurgitation is mildly   elevated (35-45 mmHg).  · No evidence of pulmonary hypertension is present.  · There is no evidence of pericardial effusion.        Echocardiogram: 03/16/2017  · Left ventricular function is severely decreased.  · Calculated EF = 29%. Estimated EF was in agreement with the calculated EF  · Global left ventricular wall motion appears abnormal  · Left ventricular diastolic dysfunction (grade II) consistent with pseudonormalization and elevated left atrial pressures.  · Left ventricular wall thickness is consistent with mild concentric hypertrophy.  · Right Ventricle: Normal cavity size, wall thickness, systolic function and septal motion noted  · Mild tricuspid valve regurgitation is present.  · Mild mitral valve regurgitation is present  · Mild to moderate pulmonary hypertension is present  · There is no evidence of pericardial effusion.      Cardiac Cath: 03/08/2016  CORONARIES: Right dominant system.  1. LEFT MAIN: Left main coronary " artery toward the distal end had  evidence of luminal irregularity.  2. LAD: Left anterior descending artery was a medium caliber vessel,  which in the proximal portion was 100% occluded after the origin of  the 1st diagonal branch. There was no evidence of any antegrade  flow down the distal left anterior descending artery. The 1st  diagonal branch in the proximal portion had up to 80% to 90%  stenosis.  The 1st septal  in the proximal portion had evidence of  luminal irregularity. There was no evidence of any antegrade flow  down the left anterior descending artery.  3. CIRCUMFLEX: The circumflex artery was a medium caliber vessel,  which towards the ostium, had up to 50% to 60% stenosis. The distal  end of the circumflex artery gave origin to the obtuse marginal  branch #2 and #3, which had 70% to 80% stenosis.  4. RCA: The right coronary artery was a medium caliber dominant  vessel, which in the proximal portion had a discrete 60% to 70%  stenosis. The midportion of the right coronary artery had 30% to  40% stenosis, and the distal right coronary artery bifurcated into  the posterior descending and posterolateral branch. The posterior  lateral branch at its origin had 60% to 70% stenosis. The distal  right coronary artery, before the bifurcation to the posterior  descending and posterolateral branch, had evidence of 30% to 40%  narrowing.      FINAL IMPRESSION:  1. The right coronary artery was diffusely diseased.  2. A 100% occluded proximal left anterior descending artery, which was  the infarct-related vessel.  3. Ostial circumflex artery with 30% to 40% narrowing and in some  views appeared to be 60% to 70%.  4. The 1st diagonal branch in the proximal portion with 70% to 80%  stenosis.  Following this, a 2.75 mm x 12 mm and a 2.5 mm x 23 mm Alpine stent was  then successfully deployed with reduction of stenosis from 100% to less  than 0% stenosis. Patient did have distal left anterior  descending  artery, diagonal, ostial circumflex artery, and diffusely diseased right  coronary artery.      Patient will be evaluated for direct revascularization and have  discussed with Dr. Verduzco. In view of the patient's acute myocardial  infarction with left anterior descending artery 100% occluded, patient  at the present time would not be subjected to an emergent coronary  artery bypass grafting, and patient will be stabilized.      Lab Results   Component Value Date    GLUCOSE 85 10/04/2018    CALCIUM 9.7 10/04/2018     10/04/2018    K 5.2 (H) 10/04/2018    CO2 27.0 10/04/2018    CL 99 10/04/2018    BUN 14 10/04/2018    CREATININE 0.84 10/04/2018    EGFRIFNONA 68 10/04/2018    BCR 16.7 10/04/2018    ANIONGAP 12.0 10/04/2018     Lab Results   Component Value Date    WBC 10.35 (H) 04/02/2018    HGB 9.3 (L) 04/02/2018    HCT 31.0 (L) 04/02/2018    MCV 66.8 (L) 04/02/2018     04/02/2018     Lab Results   Component Value Date    CHOL 159 04/02/2018    CHOL 176 08/17/2017    CHOL 170 02/21/2017     Lab Results   Component Value Date    TRIG 213 (H) 04/02/2018    TRIG 313 (H) 08/17/2017    TRIG 261 (H) 02/21/2017     Lab Results   Component Value Date    HDL 31 (L) 04/02/2018    HDL 36 (L) 08/17/2017    HDL 35 (L) 02/21/2017     Lab Results   Component Value Date    TSH 2.83 03/09/2016     The following portions of the patient's history were reviewed and updated as appropriate: allergies, current medications, past family history, past medical history, past social history, past surgical history and problem list.     Assessment:      Diagnosis Plan   1. Chronic systolic heart failure (CMS/[facility])  NYHA Stage C; Class II-III BETA-BLOCKER: Carvedilol 25 mg twice daily  ACE/ARB: Benicar 40 mg daily @ 4 PM  ENTRESTO: History of rash associated with Entresto; resolved with discontinuation  DIURETIC: Lasix 20 mg on Sunday Monday Thursday and Saturday  ALDOSTERONT ANTAGONIST: H/O hyperkalemia  IMDUR/HYDRALAZINE:  Hydralazine 50 mg 3 times a day  DIGOXIN: Not applicable  Fluid restriction: 2000 cc daily - reinforced   Sodium restriction: 2000 mg daily - reinforced    Knee-high compression stockings recommended    Recommended daily weight monitoring.  Discussed patient action plan for heart failure.  Recommended avoiding NSAIDs use.  Discussed warning signs requiring additional medical attention for heart failure.           2. Coronary arteriosclerosis without angina      Aspirin, beta blocker plus statin therapy    3. Essential hypertension, stable According to #1        5. Mixed hyperlipidemia, stable  Statin therapy        6. Morbidly Obese (CMS/HCC) The patient did receive evaluation re: BUD with Dr Castellanos in [place] which is reported to be negative.    The patient is strongly encouraged in lifestyle modifications:  Activity: Approximately 150 minutes of moderate activity (such as walking program)  per week (20-30 minutes most days of the week)  Diet: Heart healthy foods - more fresh fruits and vegetables and whole grains; less red meat; more fish and poultry that is baked or grilled - not fried; less salt not to exceed 2000 mg daily - less processed food; No trans or saturated fat  Non Smoker  Diabetes management  Blood pressure management  Weight management: Patient's Body mass index is 35.75 kg/m². BMI is above normal parameters. Recommendations include: exercise counseling and nutrition counseling.  Stress management         The patient presents with history of HFrEF, ischemic for which there is no clinical evidence of hypervolemia.  She is well perfused.    The patient has an appointment with her primary care provider within the next 3-4 weeks and family members are reporting plans for laboratory diagnostics at that time.        This document has been electronically signed by BILL Kebede on April 3, 2019 10:36 AM

## 2019-04-18 DIAGNOSIS — Z12.39 SCREENING FOR BREAST CANCER: ICD-10-CM

## 2019-05-17 ENCOUNTER — APPOINTMENT (OUTPATIENT)
Dept: LAB | Facility: HOSPITAL | Age: 69
End: 2019-05-17

## 2019-05-17 ENCOUNTER — OFFICE VISIT (OUTPATIENT)
Dept: FAMILY MEDICINE CLINIC | Facility: CLINIC | Age: 69
End: 2019-05-17

## 2019-05-17 VITALS
WEIGHT: 208 LBS | HEART RATE: 80 BPM | DIASTOLIC BLOOD PRESSURE: 81 MMHG | OXYGEN SATURATION: 95 % | TEMPERATURE: 98.7 F | RESPIRATION RATE: 20 BRPM | SYSTOLIC BLOOD PRESSURE: 129 MMHG | BODY MASS INDEX: 36.86 KG/M2 | HEIGHT: 63 IN

## 2019-05-17 DIAGNOSIS — I25.10 CORONARY ARTERIOSCLEROSIS: ICD-10-CM

## 2019-05-17 DIAGNOSIS — I10 ESSENTIAL HYPERTENSION: ICD-10-CM

## 2019-05-17 DIAGNOSIS — E78.2 MIXED HYPERLIPIDEMIA: ICD-10-CM

## 2019-05-17 DIAGNOSIS — E11.9 TYPE 2 DIABETES MELLITUS WITHOUT COMPLICATION, WITHOUT LONG-TERM CURRENT USE OF INSULIN (HCC): Primary | ICD-10-CM

## 2019-05-17 DIAGNOSIS — D50.9 IRON DEFICIENCY ANEMIA, UNSPECIFIED IRON DEFICIENCY ANEMIA TYPE: ICD-10-CM

## 2019-05-17 LAB
ALBUMIN SERPL-MCNC: 4.2 G/DL (ref 3.5–5.2)
ALBUMIN/GLOB SERPL: 1.4 G/DL
ALP SERPL-CCNC: 114 U/L (ref 39–117)
ALT SERPL W P-5'-P-CCNC: 12 U/L (ref 1–33)
ANION GAP SERPL CALCULATED.3IONS-SCNC: 10.5 MMOL/L
AST SERPL-CCNC: 15 U/L (ref 1–32)
BASOPHILS # BLD AUTO: 0.05 10*3/MM3 (ref 0–0.2)
BASOPHILS NFR BLD AUTO: 0.6 % (ref 0–1.5)
BILIRUB SERPL-MCNC: 0.2 MG/DL (ref 0.2–1.2)
BUN BLD-MCNC: 22 MG/DL (ref 8–23)
BUN/CREAT SERPL: 21 (ref 7–25)
CALCIUM SPEC-SCNC: 9.6 MG/DL (ref 8.6–10.5)
CHLORIDE SERPL-SCNC: 102 MMOL/L (ref 98–107)
CHOLEST SERPL-MCNC: 143 MG/DL (ref 0–200)
CO2 SERPL-SCNC: 26.5 MMOL/L (ref 22–29)
CREAT BLD-MCNC: 1.05 MG/DL (ref 0.57–1)
DEPRECATED RDW RBC AUTO: 46.7 FL (ref 37–54)
EOSINOPHIL # BLD AUTO: 0.35 10*3/MM3 (ref 0–0.4)
EOSINOPHIL NFR BLD AUTO: 4.3 % (ref 0.3–6.2)
ERYTHROCYTE [DISTWIDTH] IN BLOOD BY AUTOMATED COUNT: 18.9 % (ref 12.3–15.4)
GFR SERPL CREATININE-BSD FRML MDRD: 52 ML/MIN/1.73
GLOBULIN UR ELPH-MCNC: 3.1 GM/DL
GLUCOSE BLD-MCNC: 89 MG/DL (ref 65–99)
HBA1C MFR BLD: 5.9 % (ref 4.8–5.6)
HCT VFR BLD AUTO: 30.6 % (ref 34–46.6)
HDLC SERPL-MCNC: 34 MG/DL (ref 40–60)
HGB BLD-MCNC: 8.6 G/DL (ref 12–15.9)
LDLC SERPL CALC-MCNC: 62 MG/DL (ref 0–100)
LDLC/HDLC SERPL: 1.84 {RATIO}
LYMPHOCYTES # BLD AUTO: 2.09 10*3/MM3 (ref 0.7–3.1)
LYMPHOCYTES NFR BLD AUTO: 25.7 % (ref 19.6–45.3)
MAGNESIUM SERPL-MCNC: 2.3 MG/DL (ref 1.6–2.4)
MCH RBC QN AUTO: 19.8 PG (ref 26.6–33)
MCHC RBC AUTO-ENTMCNC: 28.1 G/DL (ref 31.5–35.7)
MCV RBC AUTO: 70.5 FL (ref 79–97)
MONOCYTES # BLD AUTO: 0.68 10*3/MM3 (ref 0.1–0.9)
MONOCYTES NFR BLD AUTO: 8.4 % (ref 5–12)
NEUTROPHILS # BLD AUTO: 4.93 10*3/MM3 (ref 1.7–7)
NEUTROPHILS NFR BLD AUTO: 60.6 % (ref 42.7–76)
PLATELET # BLD AUTO: 272 10*3/MM3 (ref 140–450)
PMV BLD AUTO: 11.8 FL (ref 6–12)
POTASSIUM BLD-SCNC: 5.7 MMOL/L (ref 3.5–5.2)
PROT SERPL-MCNC: 7.3 G/DL (ref 6–8.5)
RBC # BLD AUTO: 4.34 10*6/MM3 (ref 3.77–5.28)
SODIUM BLD-SCNC: 139 MMOL/L (ref 136–145)
TRIGL SERPL-MCNC: 233 MG/DL (ref 0–150)
VLDLC SERPL-MCNC: 46.6 MG/DL (ref 5–40)
WBC NRBC COR # BLD: 8.13 10*3/MM3 (ref 3.4–10.8)

## 2019-05-17 PROCEDURE — 83735 ASSAY OF MAGNESIUM: CPT | Performed by: NURSE PRACTITIONER

## 2019-05-17 PROCEDURE — 85025 COMPLETE CBC W/AUTO DIFF WBC: CPT | Performed by: NURSE PRACTITIONER

## 2019-05-17 PROCEDURE — 80053 COMPREHEN METABOLIC PANEL: CPT | Performed by: NURSE PRACTITIONER

## 2019-05-17 PROCEDURE — 99214 OFFICE O/P EST MOD 30 MIN: CPT | Performed by: NURSE PRACTITIONER

## 2019-05-17 PROCEDURE — 83036 HEMOGLOBIN GLYCOSYLATED A1C: CPT | Performed by: NURSE PRACTITIONER

## 2019-05-17 PROCEDURE — 80061 LIPID PANEL: CPT | Performed by: NURSE PRACTITIONER

## 2019-05-17 RX ORDER — GLIMEPIRIDE 1 MG/1
1 TABLET ORAL 2 TIMES DAILY
Qty: 180 TABLET | Refills: 4 | Status: SHIPPED | OUTPATIENT
Start: 2019-05-17 | End: 2019-10-23 | Stop reason: SDUPTHER

## 2019-05-20 NOTE — PROGRESS NOTES
Subjective   Afia Adams is a 68 y.o. female.     Here today for kuldeep.  She is accompanied by her daughter who assists her.  She reports b/s of 120.  She cont to see cardiology and has recently seen Naida Talley.  She feels well and has no complaints today.      Diabetes   She presents for her follow-up diabetic visit. She has type 2 diabetes mellitus. Her disease course has been stable. There are no hypoglycemic associated symptoms. Pertinent negatives for hypoglycemia include no headaches or sweats. There are no diabetic associated symptoms. Pertinent negatives for diabetes include no blurred vision. There are no hypoglycemic complications. Symptoms are stable. Diabetic complications include heart disease. Pertinent negatives for diabetic complications include no autonomic neuropathy, CVA, impotence, nephropathy, peripheral neuropathy, PVD or retinopathy. Risk factors for coronary artery disease include diabetes mellitus, dyslipidemia, hypertension, obesity, post-menopausal and sedentary lifestyle. Current diabetic treatment includes oral agent (monotherapy). She is compliant with treatment all of the time. Her weight is stable. She is following a diabetic diet. Meal planning includes avoidance of concentrated sweets. She has not had a previous visit with a dietitian. She rarely participates in exercise. She monitors blood glucose at home 1-2 x per day. Her breakfast blood glucose is taken between 6-7 am. Her breakfast blood glucose range is generally 110-130 mg/dl. Her overall blood glucose range is 130-140 mg/dl. An ACE inhibitor/angiotensin II receptor blocker is not being taken. She does not see a podiatrist.Eye exam is current.   Hypertension   This is a chronic problem. The current episode started more than 1 year ago. The problem is controlled. Associated symptoms include peripheral edema. Pertinent negatives include no anxiety, blurred vision, headaches, malaise/fatigue, neck pain,  orthopnea, palpitations, PND or sweats. There are no associated agents to hypertension. Risk factors for coronary artery disease include diabetes mellitus, dyslipidemia, obesity, post-menopausal state and sedentary lifestyle. Past treatments include beta blockers, direct vasodilators and diuretics. Current antihypertension treatment includes beta blockers, direct vasodilators and diuretics. Hypertensive end-organ damage includes CAD/MI. There is no history of angina, kidney disease, CVA, heart failure, left ventricular hypertrophy, PVD or retinopathy.        The following portions of the patient's history were reviewed and updated as appropriate: allergies, current medications, past family history, past medical history, past social history, past surgical history and problem list.    Review of Systems   Constitutional: Negative.  Negative for malaise/fatigue.   HENT: Negative.    Eyes: Negative.  Negative for blurred vision.   Respiratory: Negative.    Cardiovascular: Negative.  Negative for palpitations, orthopnea and PND.   Gastrointestinal: Negative.    Endocrine: Negative.    Genitourinary: Negative.  Negative for impotence.   Musculoskeletal: Negative.  Negative for neck pain.   Skin: Negative.    Allergic/Immunologic: Negative.    Hematological: Negative.        Objective   Physical Exam   Constitutional: She is oriented to person, place, and time. She appears well-developed and well-nourished. No distress.   HENT:   Head: Normocephalic and atraumatic.   Right Ear: External ear normal.   Left Ear: External ear normal.   Nose: Nose normal.   Mouth/Throat: Oropharynx is clear and moist. No oropharyngeal exudate.   Eyes: Pupils are equal, round, and reactive to light.   Neck: Normal range of motion. Neck supple. No thyromegaly present.   Cardiovascular: Normal rate, regular rhythm and normal heart sounds. Exam reveals no friction rub.   No murmur heard.  Pulmonary/Chest: Effort normal and breath sounds normal. No  respiratory distress. She has no wheezes. She has no rales.   Abdominal: Soft.   Musculoskeletal: Normal range of motion.    Afia had a diabetic foot exam performed today.    Neurological Sensory Findings -  Unaltered sharp/dull right ankle/foot discrimination and unaltered sharp/dull left ankle/foot discrimination.  Vascular Status -  Her right foot exhibits normal foot vasculature  and no edema. Her left foot exhibits normal foot vasculature  and no edema.  Skin Integrity  -  Her right foot skin is intact.Her left foot skin is intact..  Neurological: She is alert and oriented to person, place, and time.   Skin: Skin is warm and dry.   Psychiatric: She has a normal mood and affect. Thought content normal.   Nursing note and vitals reviewed.        Assessment/Plan   Afia was seen today for diabetes, hypertension and heartburn.    Diagnoses and all orders for this visit:    Type 2 diabetes mellitus without complication, without long-term current use of insulin (CMS/[facility])  -     Hemoglobin A1c  -     glimepiride (AMARYL) 1 MG tablet; Take 1 tablet by mouth 2 (Two) Times a Day.    Coronary arteriosclerosis  -     Magnesium    Mixed hyperlipidemia  -     Lipid Panel    Essential hypertension  -     Comprehensive metabolic panel    Iron deficiency anemia, unspecified iron deficiency anemia type  -     CBC & Differential  -     CBC Auto Differential  -     Manual Differential; Future  -     Cancel: Manual Differential      Lab Results   Component Value Date    HGBA1C 5.90 (H) 05/17/2019

## 2019-06-03 ENCOUNTER — TELEPHONE (OUTPATIENT)
Dept: FAMILY MEDICINE CLINIC | Facility: CLINIC | Age: 69
End: 2019-06-03

## 2019-06-30 DIAGNOSIS — E78.5 HYPERLIPIDEMIA, UNSPECIFIED HYPERLIPIDEMIA TYPE: ICD-10-CM

## 2019-06-30 DIAGNOSIS — E11.9 TYPE 2 DIABETES MELLITUS WITHOUT COMPLICATION, WITHOUT LONG-TERM CURRENT USE OF INSULIN (HCC): ICD-10-CM

## 2019-06-30 DIAGNOSIS — E78.2 MIXED HYPERLIPIDEMIA: ICD-10-CM

## 2019-07-01 RX ORDER — PRAVASTATIN SODIUM 40 MG
TABLET ORAL
Qty: 90 TABLET | Refills: 3 | Status: SHIPPED | OUTPATIENT
Start: 2019-07-01 | End: 2019-07-02 | Stop reason: SDUPTHER

## 2019-07-01 NOTE — PROGRESS NOTES
Subjective:     Congestive Heart Failure (3 month follow up)    Patient Care Team:  Nancy Chou APRN as PCP - General    Congestive Heart Failure   Presents for follow-up visit. Pertinent negatives include no abdominal pain, chest pain, chest pressure, edema, fatigue, near-syncope, orthopnea, palpitations, paroxysmal nocturnal dyspnea, shortness of breath or unexpected weight change. The symptoms have been stable. Compliance with total regimen is 51-75%. Compliance problems include adherence to exercise and adherence to diet.  Compliance with diet is 51-75%. Compliance with exercise is 0-25%. Compliance with medications is %.     She is accompanied by her  and daughter-in-law.    Ms Adams was admitted to the hospital (from [facility]) on 03/08/2016 with evidence of anterior STEMI with PCI per Dr Kent. Cath was noted with evidence of multi-vessel disease. In addition, she required intubation with mechanical ventilation secondary to pulmonary edema. Muga scan demonstrated evidence of EF 35.5% on 03/15/2016    CMH:  1. CAD, S/P PCI LAD - per Dr Kent - 03/08/2016 2.75mm x 12mm and a 2.5mm x 23mm Alpine stent proximal LAD  2. HFrEF, ischemic 35.5% by Muga on 03/15/2016  3. Acute pulmonary edema 03/08/2016 requiring intubation   4. RLL pneumonia 03/2016  5. Hypertension  6. Hyperlipidemia  7. DM, Type II  8.GERD  9. Colon polyp removal 03/07/2016  10. H/O hyperkalemia on AA      Yu: daughter-in-law [phone] (cell)    Review of Systems   Constitution: Negative for chills, decreased appetite, fatigue, fever, unexpected weight change and weight gain.   HENT: Negative.    Eyes: Negative.    Cardiovascular: Positive for dyspnea on exertion. Negative for chest pain, leg swelling (ankle swelling throughout the day), near-syncope, orthopnea, palpitations, paroxysmal nocturnal dyspnea and syncope.   Respiratory: Negative for cough, hemoptysis, shortness of breath, sleep  disturbances due to breathing, snoring, sputum production and wheezing.    Endocrine: Negative for polydipsia, polyphagia and polyuria.   Hematologic/Lymphatic: Negative for bleeding problem. Does not bruise/bleed easily.   Skin: Negative for itching and rash.   Musculoskeletal: Positive for arthritis. Negative for falls.   Gastrointestinal: Negative for bloating and abdominal pain.   Genitourinary: Negative.    Neurological: Negative for dizziness, light-headedness and vertigo.   Psychiatric/Behavioral: Negative for altered mental status. The patient is not nervous/anxious.      Past Medical History:   Diagnosis Date   • Abdominal pain 11/20/2015    unspecified   • Acute gastritis    • Acute on chronic systolic congestive heart failure (CMS/[facility]) 04/13/2016   • Ankle pain 04/27/2015   • Candidiasis, skin or nails 12/22/2011    controlled   • Chronic systolic congestive heart failure (CMS/[facility]) 08/01/2016   • Congestive heart failure (CMS/[facility]) 08/01/2016   • Coronary arteriosclerosis 06/21/2016    multi-vessel   • Cough 09/25/2014    proabably due to allergy, chest x ray is normal      • Edema of foot 03/24/2016   • Encounter for long-term (current) drug use     therapy   • Epigastric pain 12/03/2015   • Essential hypertension 06/21/2016   • History of echocardiogram 03/14/2016    Left atrium normal with mild CLVH wit normal aortic root size.Evidence of posterolateral wall hypokinesis. Severely depressed LV systolic function of 20-25%.Mitral valve thickened Aortic sclerosis.Diastolic dysfunction   • Hyperlipidemia 03/31/2016    unspecified   • Hypertensive disorder 03/24/2016   • Left lower quadrant pain 07/12/2013    ruling out diverticular disease      • Myocardial infarction (CMS/[facility]) 03/24/2016   • Nausea 11/20/2015   • Osteoarthritis of knee 06/23/2016   • Pain in limb 01/25/2011   • Pediculosis capitis 12/22/2011    controlled   • Peptic ulcer    • Polyuria 01/25/2011   • Pruritic rash 12/09/2014   • Superficial  foreign body hip without major open wound, no infection 12/09/2011    ??? back   • Type 2 diabetes mellitus (CMS/[facility]) 06/23/2016    new onset   • Weakness 06/23/2016   ,  Past Surgical History:   Procedure Laterality Date   • COLONOSCOPY W/ POLYPECTOMY  03/07/2016    Transverse colon polyps,descending polyps,Sigmoid polyps, all resected and retrieved. Diverticulosis without perforation or abscess without bleeding. Erica Thomas   • HYSTERECTOMY      ovary preserv   • INJECTION OF MEDICATION  09/11/2014    Celestone (betamethasone) (2)     ,  Social History     Socioeconomic History   • Marital status:      Spouse name: Not on file   • Number of children: Not on file   • Years of education: Not on file   • Highest education level: Not on file   Tobacco Use   • Smoking status: Never Smoker   • Smokeless tobacco: Never Used   Substance and Sexual Activity   • Alcohol use: No   • Drug use: No   • Sexual activity: Defer   ,  Family History   Problem Relation Age of Onset   • Alcohol abuse Other    • Asthma Other    • Lung cancer Other    • Heart attack Father    • Lung cancer Father    • Hypertension Father    • Heart disease Father    • Heart attack Brother    • Hypertension Brother    • Heart disease Brother      Current Outpatient Medications   Medication Sig Dispense Refill   • albuterol sulfate  (90 Base) MCG/ACT inhaler Inhale 2 puffs Every 4 (Four) Hours As Needed for Wheezing. 1 inhaler 0   • aspirin 81 MG tablet Take 81 mg by mouth daily.     • benzonatate (TESSALON) 200 MG capsule Take 1 capsule by mouth 3 (Three) Times a Day As Needed for Cough. 21 capsule 0   • carvedilol (COREG) 25 MG tablet Take 1 tablet by mouth 2 (Two) Times a Day With Meals. 60 tablet 5   • Continuous Blood Gluc Sensor (FREESTYLE BENNETT SENSOR SYSTEM) misc 1 each Every 7 (Seven) Days. 4 each 11   • furosemide (LASIX) 20 MG tablet Take 1 tablet by mouth every Sunday, Monday, Thursday and Saturday 16 tablet 5   • glimepiride  "(AMARYL) 1 MG tablet Take 1 tablet by mouth 2 (Two) Times a Day. 180 tablet 4   • hydrALAZINE (APRESOLINE) 50 MG tablet Take 1 tablet by mouth 3 (Three) Times a Day. 90 tablet 5   • loratadine (CLARITIN) 10 MG tablet Take 1 tablet by mouth Daily. 30 tablet 3   • magnesium oxide (MAG-OX) 400 MG tablet Take 1 tablet by mouth Daily. 30 tablet 5   • olmesartan (BENICAR) 20 MG tablet Take 40 mg by mouth Daily With Dinner.     • omeprazole (PriLOSEC) 40 MG capsule Take 40 mg by mouth daily.     • pravastatin (PRAVACHOL) 40 MG tablet Take 1 tablet by mouth Every Night. 90 tablet 1   • pravastatin (PRAVACHOL) 40 MG tablet TAKE ONE TABLET BY MOUTH ONCE NIGHTLY 90 tablet 3     No current facility-administered medications for this visit.      Objective:     Vitals:    07/02/19 0928   BP: 129/70   Pulse: 69   SpO2: 99%   Weight: 91.4 kg (201 lb 9.6 oz)   Height: 160 cm (62.99\")      Physical Exam   Constitutional: She is oriented to person, place, and time. She appears well-nourished. No distress.   HENT:   Head: Normocephalic and atraumatic.   Eyes: Right eye exhibits no discharge. Left eye exhibits no discharge.   Neck: JVD: 6 cm @ 45 degrees.   Cardiovascular: Normal rate and regular rhythm.   Murmur heard.  High-pitched blowing holosystolic murmur is present with a grade of 1/6 at the apex.  Pulses:       Radial pulses are 2+ on the right side, and 2+ on the left side.   Pulmonary/Chest: Effort normal. No stridor. No respiratory distress. She has no wheezes. She has no rales.   Abdominal: She exhibits no distension. There is no tenderness.   Musculoskeletal: She exhibits no edema or tenderness.   Neurological: She is alert and oriented to person, place, and time.   Skin: Skin is warm and dry.   Psychiatric: She has a normal mood and affect. Her behavior is normal. Judgment and thought content normal.   Vitals reviewed.    Flowsheet Rows         First Filed Value    Admission Height  63\" (160 cm) Documented at 09/18/2017 " 1003    Admission Weight  201 lb 7 oz (91.4 kg) Documented at 09/18/2017 1003        Data Reviewed:  Results for orders placed in visit on 04/02/18   Adult Transthoracic Echo Complete W/ Cont if Necessary Per Protocol    Narrative · Left Ventricle: Left ventricular systolic function is mildly decreased.   Calculated EF = 46%.  · Left ventricular wall thickness is consistent with mild-to-moderate   concentric hypertrophy. Diastolic function is normal.  · Right Ventricle: Normal right ventricular cavity size, wall thickness,   systolic function and septal motion noted  · Mild mitral valve regurgitation is present.  · Mild tricuspid valve regurgitation is present. Estimated right   ventricular systolic pressure from tricuspid regurgitation is mildly   elevated (35-45 mmHg).  · No evidence of pulmonary hypertension is present.  · There is no evidence of pericardial effusion.        Echocardiogram: 03/16/2017  · Left ventricular function is severely decreased.  · Calculated EF = 29%. Estimated EF was in agreement with the calculated EF  · Global left ventricular wall motion appears abnormal  · Left ventricular diastolic dysfunction (grade II) consistent with pseudonormalization and elevated left atrial pressures.  · Left ventricular wall thickness is consistent with mild concentric hypertrophy.  · Right Ventricle: Normal cavity size, wall thickness, systolic function and septal motion noted  · Mild tricuspid valve regurgitation is present.  · Mild mitral valve regurgitation is present  · Mild to moderate pulmonary hypertension is present  · There is no evidence of pericardial effusion.      Cardiac Cath: 03/08/2016  CORONARIES: Right dominant system.  1. LEFT MAIN: Left main coronary artery toward the distal end had  evidence of luminal irregularity.  2. LAD: Left anterior descending artery was a medium caliber vessel,  which in the proximal portion was 100% occluded after the origin of  the 1st diagonal branch. There  was no evidence of any antegrade  flow down the distal left anterior descending artery. The 1st  diagonal branch in the proximal portion had up to 80% to 90%  stenosis.  The 1st septal  in the proximal portion had evidence of  luminal irregularity. There was no evidence of any antegrade flow  down the left anterior descending artery.  3. CIRCUMFLEX: The circumflex artery was a medium caliber vessel,  which towards the ostium, had up to 50% to 60% stenosis. The distal  end of the circumflex artery gave origin to the obtuse marginal  branch #2 and #3, which had 70% to 80% stenosis.  4. RCA: The right coronary artery was a medium caliber dominant  vessel, which in the proximal portion had a discrete 60% to 70%  stenosis. The midportion of the right coronary artery had 30% to  40% stenosis, and the distal right coronary artery bifurcated into  the posterior descending and posterolateral branch. The posterior  lateral branch at its origin had 60% to 70% stenosis. The distal  right coronary artery, before the bifurcation to the posterior  descending and posterolateral branch, had evidence of 30% to 40%  narrowing.      FINAL IMPRESSION:  1. The right coronary artery was diffusely diseased.  2. A 100% occluded proximal left anterior descending artery, which was  the infarct-related vessel.  3. Ostial circumflex artery with 30% to 40% narrowing and in some  views appeared to be 60% to 70%.  4. The 1st diagonal branch in the proximal portion with 70% to 80%  stenosis.  Following this, a 2.75 mm x 12 mm and a 2.5 mm x 23 mm Alpine stent was  then successfully deployed with reduction of stenosis from 100% to less  than 0% stenosis. Patient did have distal left anterior descending  artery, diagonal, ostial circumflex artery, and diffusely diseased right  coronary artery.      Patient will be evaluated for direct revascularization and have  discussed with Dr. Verduzco. In view of the patient's acute  myocardial  infarction with left anterior descending artery 100% occluded, patient  at the present time would not be subjected to an emergent coronary  artery bypass grafting, and patient will be stabilized.      Lab Results   Component Value Date    GLUCOSE 89 05/17/2019    CALCIUM 9.6 05/17/2019     05/17/2019    K 5.7 (H) 05/17/2019    CO2 26.5 05/17/2019     05/17/2019    BUN 22 05/17/2019    CREATININE 1.05 (H) 05/17/2019    EGFRIFNONA 52 (L) 05/17/2019    BCR 21.0 05/17/2019    ANIONGAP 10.5 05/17/2019     Lab Results   Component Value Date    WBC 8.13 05/17/2019    HGB 8.6 (L) 05/17/2019    HCT 30.6 (L) 05/17/2019    MCV 70.5 (L) 05/17/2019     05/17/2019     Lab Results   Component Value Date    CHOL 143 05/17/2019    CHOL 159 04/02/2018    CHOL 176 08/17/2017     Lab Results   Component Value Date    TRIG 233 (H) 05/17/2019    TRIG 213 (H) 04/02/2018    TRIG 313 (H) 08/17/2017     Lab Results   Component Value Date    HDL 34 (L) 05/17/2019    HDL 31 (L) 04/02/2018    HDL 36 (L) 08/17/2017     Lab Results   Component Value Date    TSH 2.83 03/09/2016     The following portions of the patient's history were reviewed and updated as appropriate: allergies, current medications, past family history, past medical history, past social history, past surgical history and problem list.     Assessment:      Diagnosis Plan   1. Chronic systolic heart failure (CMS/[facility])  NYHA Stage C; Class II-III BETA-BLOCKER: Carvedilol 25 mg twice daily  ACE/ARB: Benicar 40 mg daily @ 4 PM  ENTRESTO: History of rash associated with Entresto; resolved with discontinuation  DIURETIC: Lasix 20 mg on Sunday Monday Thursday and Saturday  ALDOSTERONT ANTAGONIST: H/O hyperkalemia  IMDUR/HYDRALAZINE: Hydralazine 50 mg 3 times a day  DIGOXIN: Not applicable  Fluid restriction: 2000 cc daily - reinforced   Sodium restriction: 2000 mg daily - reinforced    Knee-high compression stockings recommended    Recommended daily weight  monitoring.  Discussed patient action plan for heart failure.  Recommended avoiding NSAIDs use.  Discussed warning signs requiring additional medical attention for heart failure.           2. Coronary arteriosclerosis without angina      Aspirin, beta blocker plus statin therapy    3. Essential hypertension, stable According to #1        5. Mixed hyperlipidemia, stable  Statin therapy        6. Obesity Class II, BMI 35-39.9 The patient did receive evaluation re: BUD with Dr Castellanos in [place] which is reported to be negative.    The patient is strongly encouraged in lifestyle modifications:  Activity: Approximately 150 minutes of moderate activity (such as walking program)  per week (20-30 minutes most days of the week)  Diet: Heart healthy foods - more fresh fruits and vegetables and whole grains; less red meat; more fish and poultry that is baked or grilled - not fried; less salt not to exceed 2000 mg daily - less processed food; No trans or saturated fat  Non Smoker  Diabetes management  Blood pressure management  Weight management: Patient's Body mass index is 35.72 kg/m². BMI is above normal parameters. Recommendations include: exercise counseling and nutrition counseling.  Stress management         The patient presents with history of HFrEF, ischemic for which there is no clinical evidence of hypervolemia.  She is well perfused.        This document has been electronically signed by BILL Kebede on July 2, 2019 9:39 AM

## 2019-07-02 ENCOUNTER — OFFICE VISIT (OUTPATIENT)
Dept: CARDIOLOGY | Facility: CLINIC | Age: 69
End: 2019-07-02

## 2019-07-02 VITALS
HEIGHT: 63 IN | WEIGHT: 201.6 LBS | OXYGEN SATURATION: 99 % | HEART RATE: 69 BPM | SYSTOLIC BLOOD PRESSURE: 129 MMHG | DIASTOLIC BLOOD PRESSURE: 70 MMHG | BODY MASS INDEX: 35.72 KG/M2

## 2019-07-02 DIAGNOSIS — I10 ESSENTIAL HYPERTENSION: ICD-10-CM

## 2019-07-02 DIAGNOSIS — E11.9 TYPE 2 DIABETES MELLITUS WITHOUT COMPLICATION, WITHOUT LONG-TERM CURRENT USE OF INSULIN (HCC): ICD-10-CM

## 2019-07-02 DIAGNOSIS — I25.10 CORONARY ARTERIOSCLEROSIS: ICD-10-CM

## 2019-07-02 DIAGNOSIS — E78.2 MIXED HYPERLIPIDEMIA: ICD-10-CM

## 2019-07-02 DIAGNOSIS — I50.22 CHRONIC SYSTOLIC HEART FAILURE (HCC): Primary | ICD-10-CM

## 2019-07-02 DIAGNOSIS — E66.9 OBESITY, CLASS II, BMI 35-39.9: ICD-10-CM

## 2019-07-02 DIAGNOSIS — E78.5 HYPERLIPIDEMIA, UNSPECIFIED HYPERLIPIDEMIA TYPE: ICD-10-CM

## 2019-07-02 PROCEDURE — 99214 OFFICE O/P EST MOD 30 MIN: CPT | Performed by: NURSE PRACTITIONER

## 2019-07-02 RX ORDER — MAGNESIUM OXIDE 400 MG/1
400 TABLET ORAL DAILY
Qty: 30 TABLET | Refills: 5 | Status: SHIPPED | OUTPATIENT
Start: 2019-07-02 | End: 2019-11-20 | Stop reason: SDUPTHER

## 2019-07-02 RX ORDER — OLMESARTAN MEDOXOMIL 40 MG/1
40 TABLET ORAL
Qty: 30 TABLET | Refills: 5 | Status: SHIPPED | OUTPATIENT
Start: 2019-07-02 | End: 2019-07-02 | Stop reason: SDUPTHER

## 2019-07-02 RX ORDER — HYDRALAZINE HYDROCHLORIDE 50 MG/1
50 TABLET, FILM COATED ORAL 3 TIMES DAILY
Qty: 90 TABLET | Refills: 5 | Status: SHIPPED | OUTPATIENT
Start: 2019-07-02 | End: 2019-12-23

## 2019-07-02 RX ORDER — CARVEDILOL 25 MG/1
25 TABLET ORAL 2 TIMES DAILY WITH MEALS
Qty: 60 TABLET | Refills: 5 | Status: SHIPPED | OUTPATIENT
Start: 2019-07-02 | End: 2020-03-19 | Stop reason: SDUPTHER

## 2019-07-02 RX ORDER — FUROSEMIDE 20 MG/1
TABLET ORAL
Qty: 16 TABLET | Refills: 5 | Status: SHIPPED | OUTPATIENT
Start: 2019-07-02 | End: 2019-08-08 | Stop reason: SDUPTHER

## 2019-07-02 RX ORDER — PRAVASTATIN SODIUM 40 MG
40 TABLET ORAL NIGHTLY
Qty: 30 TABLET | Refills: 5 | Status: SHIPPED | OUTPATIENT
Start: 2019-07-02 | End: 2020-03-19 | Stop reason: SDUPTHER

## 2019-07-02 RX ORDER — OLMESARTAN MEDOXOMIL 40 MG/1
40 TABLET ORAL
Qty: 90 TABLET | Refills: 3 | Status: SHIPPED | OUTPATIENT
Start: 2019-07-02 | End: 2020-03-19 | Stop reason: SDUPTHER

## 2019-08-08 DIAGNOSIS — I50.22 CHRONIC SYSTOLIC HEART FAILURE (HCC): ICD-10-CM

## 2019-08-08 RX ORDER — FUROSEMIDE 20 MG/1
TABLET ORAL
Qty: 16 TABLET | Refills: 0 | Status: SHIPPED | OUTPATIENT
Start: 2019-08-08 | End: 2020-10-15

## 2019-08-29 ENCOUNTER — OFFICE VISIT (OUTPATIENT)
Dept: FAMILY MEDICINE CLINIC | Facility: CLINIC | Age: 69
End: 2019-08-29

## 2019-08-29 VITALS
OXYGEN SATURATION: 95 % | HEIGHT: 63 IN | HEART RATE: 59 BPM | WEIGHT: 206 LBS | SYSTOLIC BLOOD PRESSURE: 132 MMHG | BODY MASS INDEX: 36.5 KG/M2 | TEMPERATURE: 98.2 F | DIASTOLIC BLOOD PRESSURE: 82 MMHG

## 2019-08-29 DIAGNOSIS — I10 ESSENTIAL HYPERTENSION: ICD-10-CM

## 2019-08-29 DIAGNOSIS — E66.9 OBESITY, CLASS II, BMI 35-39.9: ICD-10-CM

## 2019-08-29 DIAGNOSIS — E11.8 TYPE 2 DIABETES MELLITUS WITH COMPLICATION, WITHOUT LONG-TERM CURRENT USE OF INSULIN (HCC): Primary | ICD-10-CM

## 2019-08-29 PROCEDURE — 99214 OFFICE O/P EST MOD 30 MIN: CPT | Performed by: NURSE PRACTITIONER

## 2019-08-29 NOTE — PATIENT INSTRUCTIONS
Calorie Counting for Weight Loss  Calories are units of energy. Your body needs a certain amount of calories from food to keep you going throughout the day. When you eat more calories than your body needs, your body stores the extra calories as fat. When you eat fewer calories than your body needs, your body burns fat to get the energy it needs.  Calorie counting means keeping track of how many calories you eat and drink each day. Calorie counting can be helpful if you need to lose weight. If you make sure to eat fewer calories than your body needs, you should lose weight. Ask your health care provider what a healthy weight is for you.  For calorie counting to work, you will need to eat the right number of calories in a day in order to lose a healthy amount of weight per week. A dietitian can help you determine how many calories you need in a day and will give you suggestions on how to reach your calorie goal.  · A healthy amount of weight to lose per week is usually 1-2 lb (0.5-0.9 kg). This usually means that your daily calorie intake should be reduced by 500-750 calories.  · Eating 1,200 - 1,500 calories per day can help most women lose weight.  · Eating 1,500 - 1,800 calories per day can help most men lose weight.  What is my plan?  My goal is to have __________ calories per day.  If I have this many calories per day, I should lose around __________ pounds per week.  What do I need to know about calorie counting?  In order to meet your daily calorie goal, you will need to:  · Find out how many calories are in each food you would like to eat. Try to do this before you eat.  · Decide how much of the food you plan to eat.  · Write down what you ate and how many calories it had. Doing this is called keeping a food log.  To successfully lose weight, it is important to balance calorie counting with a healthy lifestyle that includes regular activity. Aim for 150 minutes of moderate exercise (such as walking) or 75  minutes of vigorous exercise (such as running) each week.  Where do I find calorie information?    The number of calories in a food can be found on a Nutrition Facts label. If a food does not have a Nutrition Facts label, try to look up the calories online or ask your dietitian for help.  Remember that calories are listed per serving. If you choose to have more than one serving of a food, you will have to multiply the calories per serving by the amount of servings you plan to eat. For example, the label on a package of bread might say that a serving size is 1 slice and that there are 90 calories in a serving. If you eat 1 slice, you will have eaten 90 calories. If you eat 2 slices, you will have eaten 180 calories.  How do I keep a food log?  Immediately after each meal, record the following information in your food log:  · What you ate. Don't forget to include toppings, sauces, and other extras on the food.  · How much you ate. This can be measured in cups, ounces, or number of items.  · How many calories each food and drink had.  · The total number of calories in the meal.  Keep your food log near you, such as in a small notebook in your pocket, or use a mobile sam or website. Some programs will calculate calories for you and show you how many calories you have left for the day to meet your goal.  What are some calorie counting tips?    · Use your calories on foods and drinks that will fill you up and not leave you hungry:  ? Some examples of foods that fill you up are nuts and nut butters, vegetables, lean proteins, and high-fiber foods like whole grains. High-fiber foods are foods with more than 5 g fiber per serving.  ? Drinks such as sodas, specialty coffee drinks, alcohol, and juices have a lot of calories, yet do not fill you up.  · Eat nutritious foods and avoid empty calories. Empty calories are calories you get from foods or beverages that do not have many vitamins or protein, such as candy, sweets, and  "soda. It is better to have a nutritious high-calorie food (such as an avocado) than a food with few nutrients (such as a bag of chips).  · Know how many calories are in the foods you eat most often. This will help you calculate calorie counts faster.  · Pay attention to calories in drinks. Low-calorie drinks include water and unsweetened drinks.  · Pay attention to nutrition labels for \"low fat\" or \"fat free\" foods. These foods sometimes have the same amount of calories or more calories than the full fat versions. They also often have added sugar, starch, or salt, to make up for flavor that was removed with the fat.  · Find a way of tracking calories that works for you. Get creative. Try different apps or programs if writing down calories does not work for you.  What are some portion control tips?  · Know how many calories are in a serving. This will help you know how many servings of a certain food you can have.  · Use a measuring cup to measure serving sizes. You could also try weighing out portions on a kitchen scale. With time, you will be able to estimate serving sizes for some foods.  · Take some time to put servings of different foods on your favorite plates, bowls, and cups so you know what a serving looks like.  · Try not to eat straight from a bag or box. Doing this can lead to overeating. Put the amount you would like to eat in a cup or on a plate to make sure you are eating the right portion.  · Use smaller plates, glasses, and bowls to prevent overeating.  · Try not to multitask (for example, watch TV or use your computer) while eating. If it is time to eat, sit down at a table and enjoy your food. This will help you to know when you are full. It will also help you to be aware of what you are eating and how much you are eating.  What are tips for following this plan?  Reading food labels  · Check the calorie count compared to the serving size. The serving size may be smaller than what you are used to " eating.  · Check the source of the calories. Make sure the food you are eating is high in vitamins and protein and low in saturated and trans fats.  Shopping  · Read nutrition labels while you shop. This will help you make healthy decisions before you decide to purchase your food.  · Make a grocery list and stick to it.  Cooking  · Try to cook your favorite foods in a healthier way. For example, try baking instead of frying.  · Use low-fat dairy products.  Meal planning  · Use more fruits and vegetables. Half of your plate should be fruits and vegetables.  · Include lean proteins like poultry and fish.  How do I count calories when eating out?  · Ask for smaller portion sizes.  · Consider sharing an entree and sides instead of getting your own entree.  · If you get your own entree, eat only half. Ask for a box at the beginning of your meal and put the rest of your entree in it so you are not tempted to eat it.  · If calories are listed on the menu, choose the lower calorie options.  · Choose dishes that include vegetables, fruits, whole grains, low-fat dairy products, and lean protein.  · Choose items that are boiled, broiled, grilled, or steamed. Stay away from items that are buttered, battered, fried, or served with cream sauce. Items labeled “crispy” are usually fried, unless stated otherwise.  · Choose water, low-fat milk, unsweetened iced tea, or other drinks without added sugar. If you want an alcoholic beverage, choose a lower calorie option such as a glass of wine or light beer.  · Ask for dressings, sauces, and syrups on the side. These are usually high in calories, so you should limit the amount you eat.  · If you want a salad, choose a garden salad and ask for grilled meats. Avoid extra toppings like matute, cheese, or fried items. Ask for the dressing on the side, or ask for olive oil and vinegar or lemon to use as dressing.  · Estimate how many servings of a food you are given. For example, a serving of  cooked rice is ½ cup or about the size of half a baseball. Knowing serving sizes will help you be aware of how much food you are eating at restaurants. The list below tells you how big or small some common portion sizes are based on everyday objects:  ? 1 oz--4 stacked dice.  ? 3 oz--1 deck of cards.  ? 1 tsp--1 die.  ? 1 Tbsp--½ a ping-pong ball.  ? 2 Tbsp--1 ping-pong ball.  ? ½ cup--½ baseball.  ? 1 cup--1 baseball.  Summary  · Calorie counting means keeping track of how many calories you eat and drink each day. If you eat fewer calories than your body needs, you should lose weight.  · A healthy amount of weight to lose per week is usually 1-2 lb (0.5-0.9 kg). This usually means reducing your daily calorie intake by 500-750 calories.  · The number of calories in a food can be found on a Nutrition Facts label. If a food does not have a Nutrition Facts label, try to look up the calories online or ask your dietitian for help.  · Use your calories on foods and drinks that will fill you up, and not on foods and drinks that will leave you hungry.  · Use smaller plates, glasses, and bowls to prevent overeating.  This information is not intended to replace advice given to you by your health care provider. Make sure you discuss any questions you have with your health care provider.  Document Released: 12/18/2006 Document Revised: 11/17/2017 Document Reviewed: 11/17/2017  [employer] Patient Education © 2019 [employer]      Exercising to Lose Weight  Exercise is structured, repetitive physical activity to improve fitness and health. Getting regular exercise is important for everyone. It is especially important if you are overweight. Being overweight increases your risk of heart disease, stroke, diabetes, high blood pressure, and several types of cancer. Reducing your calorie intake and exercising can help you lose weight.  Exercise is usually categorized as moderate or vigorous intensity. To lose weight, most  people need to do a certain amount of moderate-intensity or vigorous-intensity exercise each week.  Moderate-intensity exercise    Moderate-intensity exercise is any activity that gets you moving enough to burn at least three times more energy (calories) than if you were sitting.  Examples of moderate exercise include:  · Walking a mile in 15 minutes.  · Doing light yard work.  · Biking at an easy pace.  Most people should get at least 150 minutes (2 hours and 30 minutes) a week of moderate-intensity exercise to maintain their body weight.  Vigorous-intensity exercise  Vigorous-intensity exercise is any activity that gets you moving enough to burn at least six times more calories than if you were sitting. When you exercise at this intensity, you should be working hard enough that you are not able to carry on a conversation.  Examples of vigorous exercise include:  · Running.  · Playing a team sport, such as football, basketball, and soccer.  · Jumping rope.  Most people should get at least 75 minutes (1 hour and 15 minutes) a week of vigorous-intensity exercise to maintain their body weight.  How can exercise affect me?  When you exercise enough to burn more calories than you eat, you lose weight. Exercise also reduces body fat and builds muscle. The more muscle you have, the more calories you burn. Exercise also:  · Improves mood.  · Reduces stress and tension.  · Improves your overall fitness, flexibility, and endurance.  · Increases bone strength.  The amount of exercise you need to lose weight depends on:  · Your age.  · The type of exercise.  · Any health conditions you have.  · Your overall physical ability.  Talk to your health care provider about how much exercise you need and what types of activities are safe for you.  What actions can I take to lose weight?  Nutrition    · Make changes to your diet as told by your health care provider or diet and nutrition specialist (dietitian). This may  include:  ? Eating fewer calories.  ? Eating more protein.  ? Eating less unhealthy fats.  ? Eating a diet that includes fresh fruits and vegetables, whole grains, low-fat dairy products, and lean protein.  ? Avoiding foods with added fat, salt, and sugar.  · Drink plenty of water while you exercise to prevent dehydration or heat stroke.  Activity  · Choose an activity that you enjoy and set realistic goals. Your health care provider can help you make an exercise plan that works for you.  · Exercise at a moderate or vigorous intensity most days of the week.  ? The intensity of exercise may vary from person to person. You can tell how intense a workout is for you by paying attention to your breathing and heartbeat. Most people will notice their breathing and heartbeat get faster with more intense exercise.  · Do resistance training twice each week, such as:  ? Push-ups.  ? Sit-ups.  ? Lifting weights.  ? Using resistance bands.  · Getting short amounts of exercise can be just as helpful as long structured periods of exercise. If you have trouble finding time to exercise, try to include exercise in your daily routine.  ? Get up, stretch, and walk around every 30 minutes throughout the day.  ? Go for a walk during your lunch break.  ? Park your car farther away from your destination.  ? If you take public transportation, get off one stop early and walk the rest of the way.  ? Make phone calls while standing up and walking around.  ? Take the stairs instead of elevators or escalators.  · Wear comfortable clothes and shoes with good support.  · Do not exercise so much that you hurt yourself, feel dizzy, or get very short of breath.  Where to find more information  · [place] Department of Health and Human Services: www.hhs.gov  · Centers for Disease Control and Prevention (CDC): www.cdc.gov  Contact a health care provider:  · Before starting a new exercise program.  · If you have questions or concerns about your  weight.  · If you have a medical problem that keeps you from exercising.  Get help right away if you have any of the following while exercising:  · Injury.  · Dizziness.  · Difficulty breathing or shortness of breath that does not go away when you stop exercising.  · Chest pain.  · Rapid heartbeat.  Summary  · Being overweight increases your risk of heart disease, stroke, diabetes, high blood pressure, and several types of cancer.  · Losing weight happens when you burn more calories than you eat.  · Reducing the amount of calories you eat in addition to getting regular moderate or vigorous exercise each week helps you lose weight.  This information is not intended to replace advice given to you by your health care provider. Make sure you discuss any questions you have with your health care provider.  Document Released: 01/20/2012 Document Revised: 12/31/2018 Document Reviewed: 12/31/2018  [employer] Patient Education © 2019 [employer]

## 2019-08-29 NOTE — PROGRESS NOTES
Subjective   Afia Adams is a 68 y.o. female.     Here today for kuldeep on her dm and her htn.  She reports feeling well.  She does not recall what her b/s is running.  She has recently seen cardiology.      Diabetes   She presents for her follow-up diabetic visit. She has type 2 diabetes mellitus. Her disease course has been stable. There are no hypoglycemic associated symptoms. Pertinent negatives for hypoglycemia include no headaches or sweats. There are no diabetic associated symptoms. Pertinent negatives for diabetes include no blurred vision and no chest pain. There are no hypoglycemic complications. Symptoms are stable. Diabetic complications include heart disease. Pertinent negatives for diabetic complications include no autonomic neuropathy, CVA, impotence, nephropathy, peripheral neuropathy, PVD or retinopathy. Risk factors for coronary artery disease include diabetes mellitus, dyslipidemia, obesity, hypertension, post-menopausal and sedentary lifestyle. Current diabetic treatment includes oral agent (monotherapy). She is compliant with treatment all of the time. Her weight is stable. She is following a diabetic diet. Meal planning includes avoidance of concentrated sweets. She has not had a previous visit with a dietitian. She rarely participates in exercise. She monitors blood glucose at home 1-2 x per day. (Does not recall) An ACE inhibitor/angiotensin II receptor blocker is being taken. She does not see a podiatrist.Eye exam is current.   Hypertension   This is a chronic problem. The current episode started today. The problem is unchanged. The problem is controlled. Pertinent negatives include no anxiety, blurred vision, chest pain, headaches, malaise/fatigue, neck pain, orthopnea, palpitations, peripheral edema, PND, shortness of breath or sweats. There are no associated agents to hypertension. Risk factors for coronary artery disease include diabetes mellitus, dyslipidemia, obesity,  post-menopausal state and sedentary lifestyle. Past treatments include beta blockers, diuretics and angiotensin blockers. Current antihypertension treatment includes angiotensin blockers, calcium channel blockers and diuretics. The current treatment provides significant improvement. There are no compliance problems.  Hypertensive end-organ damage includes CAD/MI and heart failure. There is no history of angina, kidney disease, CVA, left ventricular hypertrophy, PVD or retinopathy.        The following portions of the patient's history were reviewed and updated as appropriate: allergies, current medications, past family history, past medical history, past social history, past surgical history and problem list.    Review of Systems   Constitutional: Negative.  Negative for malaise/fatigue.   HENT: Negative.    Eyes: Negative.  Negative for blurred vision.   Respiratory: Negative.  Negative for shortness of breath.    Cardiovascular: Negative.  Negative for chest pain, palpitations, orthopnea and PND.   Gastrointestinal: Negative.    Endocrine: Negative.    Genitourinary: Negative.  Negative for impotence.   Musculoskeletal: Negative.  Negative for neck pain.   Skin: Negative.    Allergic/Immunologic: Negative.    Hematological: Negative.        Objective   Physical Exam   Constitutional: She is oriented to person, place, and time. She appears well-developed and well-nourished. No distress.   HENT:   Head: Normocephalic and atraumatic.   Right Ear: External ear normal.   Left Ear: External ear normal.   Nose: Nose normal.   Mouth/Throat: Oropharynx is clear and moist. No oropharyngeal exudate.   Eyes: Pupils are equal, round, and reactive to light.   Neck: Normal range of motion. Neck supple. No thyromegaly present.   Cardiovascular: Normal rate, regular rhythm and normal heart sounds. Exam reveals no friction rub.   No murmur heard.  Pulmonary/Chest: Effort normal and breath sounds normal. No respiratory distress. She  has no wheezes. She has no rales.   Abdominal: Soft.   Musculoskeletal: Normal range of motion.    Afia had a diabetic foot exam performed today.    Neurological Sensory Findings -  Unaltered sharp/dull right ankle/foot discrimination and unaltered sharp/dull left ankle/foot discrimination.  Vascular Status -  Her right foot exhibits normal foot vasculature  and no edema. Her left foot exhibits normal foot vasculature  and no edema.  Skin Integrity  -  Her right foot skin is intact.Her left foot skin is intact..  Neurological: She is alert and oriented to person, place, and time.   Skin: Skin is warm and dry.   Psychiatric: She has a normal mood and affect. Thought content normal.   Nursing note and vitals reviewed.        Assessment/Plan   Afia was seen today for diabetes and hypertension.    Diagnoses and all orders for this visit:    Type 2 diabetes mellitus with complication, without long-term current use of insulin (CMS/[facility])    Obesity, Class II, BMI 35-39.9  Comments:  diet and exercise info given    Essential hypertension      Lab Results   Component Value Date    HGBA1C 5.90 (H) 05/17/2019     No changes in meds

## 2019-09-05 DIAGNOSIS — I50.22 CHRONIC SYSTOLIC HEART FAILURE (HCC): ICD-10-CM

## 2019-09-05 RX ORDER — CARVEDILOL 25 MG/1
TABLET ORAL
Qty: 180 TABLET | Refills: 4 | OUTPATIENT
Start: 2019-09-05

## 2019-10-03 DIAGNOSIS — E11.9 TYPE 2 DIABETES MELLITUS WITHOUT COMPLICATION, WITHOUT LONG-TERM CURRENT USE OF INSULIN (HCC): ICD-10-CM

## 2019-10-04 RX ORDER — GLIMEPIRIDE 1 MG/1
TABLET ORAL
Qty: 180 TABLET | Refills: 4 | Status: SHIPPED | OUTPATIENT
Start: 2019-10-04 | End: 2020-03-19 | Stop reason: SDUPTHER

## 2019-10-15 NOTE — PROGRESS NOTES
Subjective:     Congestive Heart Failure    Patient Care Team:  Nancy Chou APRN as PCP - General    History of Present Illness  Ms Adams was admitted to the hospital (from [facility]) on 03/08/2016 with evidence of anterior STEMI with PCI per Dr Kent. Cath was noted with evidence of multi-vessel disease. In addition, she required intubation with mechanical ventilation secondary to pulmonary edema. Muga scan demonstrated evidence of EF 35.5% on 03/15/2016     CM[facility]:  1. CAD, S/P PCI LAD - per Dr Kent - 03/08/2016 2.75mm x 12mm and a 2.5mm x 23mm Alpine stent proximal LAD  2. HFrEF, ischemic 35.5% by Muga on 03/15/2016  3. Acute pulmonary edema 03/08/2016 requiring intubation   4. RLL pneumonia 03/2016  5. Hypertension  6. Hyperlipidemia  7. DM, Type II  8.GERD  9. Colon polyp removal 03/07/2016  10. H/O hyperkalemia on AA      Yu: daughter-in-law [phone] (cell)    10/21/2019  The patient presents accompanied by her . They report an appointment with Dr Kent within the past three months at which time they report an echocardiogram was performed. They have not heard back re: results.   The patient indicates compliance with her medications, compliance with dietary Na+ restrictions. She is of low functional capacity and does not weigh on a daily basis.   She has an appointment with PCP in November and reports plans for lab work.     Review of Systems   Cardiovascular: Negative for chest pain, claudication, cyanosis, dyspnea on exertion, irregular heartbeat, leg swelling, near-syncope, orthopnea, palpitations and paroxysmal nocturnal dyspnea.   Neurological: Negative for dizziness, light-headedness and vertigo.     Past Medical History:   Diagnosis Date   • Pain in limb 01/25/2011   • Polyuria 01/25/2011   • Superficial foreign body hip without major open wound, no infection 12/09/2011    ??? back   • Candidiasis, skin or nails 12/22/2011    controlled   • Pediculosis  capitis 12/22/2011    controlled   • Left lower quadrant pain 07/12/2013    ruling out diverticular disease      • Cough 09/25/2014    proabably due to allergy, chest x ray is normal      • Pruritic rash 12/09/2014   • Ankle pain 04/27/2015   • Nausea 11/20/2015   • Abdominal pain 11/20/2015    unspecified   • Epigastric pain 12/03/2015   • History of echocardiogram 03/14/2016    Left atrium normal with mild CLVH wit normal aortic root size.Evidence of posterolateral wall hypokinesis. Severely depressed LV systolic function of 20-25%.Mitral valve thickened Aortic sclerosis.Diastolic dysfunction   • Edema of foot 03/24/2016   • Myocardial infarction (CMS/HCC) 03/24/2016   • Hypertensive disorder 03/24/2016   • Hyperlipidemia 03/31/2016    unspecified   • Acute on chronic systolic congestive heart failure (CMS/Formerly [facility]) 04/13/2016   • Coronary arteriosclerosis 06/21/2016    multi-vessel   • Essential hypertension 06/21/2016   • Osteoarthritis of knee 06/23/2016   • Type 2 diabetes mellitus (CMS/Former[facility]) 06/23/2016    new onset   • Weakness 06/23/2016   • Chronic systolic congestive heart failure (CMS/Former[facility]) 08/01/2016   • Congestive heart failure (CMS/Formerly [facility]) 08/01/2016   • Acute gastritis    • Encounter for long-term (current) drug use     therapy   • Peptic ulcer    ,   Past Surgical History:   Procedure Laterality Date   • INJECTION OF MEDICATION  09/11/2014    Celestone (betamethasone) (2)     • COLONOSCOPY W/ POLYPECTOMY  03/07/2016    Transverse colon polyps,descending polyps,Sigmoid polyps, all resected and retrieved. Diverticulosis without perforation or abscess without bleeding. Erica Thomas   • HYSTERECTOMY      ovary preserv   ,   Family History   Problem Relation Age of Onset   • Alcohol abuse Other    • Asthma Other    • Lung cancer Other    • Heart attack Father    • Lung cancer Father    • Hypertension Father    • Heart disease Father    • Heart attack Brother    • Hypertension Brother    • Heart disease Brother     ,   Social History     Socioeconomic History   • Marital status:      Spouse name: Not on file   • Number of children: Not on file   • Years of education: Not on file   • Highest education level: Not on file   Tobacco Use   • Smoking status: Never Smoker   • Smokeless tobacco: Never Used   Substance and Sexual Activity   • Alcohol use: No   • Drug use: No   • Sexual activity: Defer     Patient has no known allergies.    Current Outpatient Medications   Medication Sig Dispense Refill   • albuterol sulfate  (90 Base) MCG/ACT inhaler Inhale 2 puffs Every 4 (Four) Hours As Needed for Wheezing. 1 inhaler 0   • aspirin 81 MG tablet Take 81 mg by mouth daily.     • carvedilol (COREG) 25 MG tablet Take 1 tablet by mouth 2 (Two) Times a Day With Meals. 60 tablet 5   • Continuous Blood Gluc Sensor ([employer] SENSOR SYSTEM) misc 1 each Every 7 (Seven) Days. 4 each 11   • furosemide (LASIX) 20 MG tablet TAKE 1 TABLET BY MOUTH EVERY SUNDAY, MONDAY, AND THURSDAY MORNINGS (Patient taking differently: 20 mg. TAKE 1 TABLET BY MOUTH on Saturday,sunday, Monday, and Thursday morning) 16 tablet 0   • glimepiride (AMARYL) 1 MG tablet TAKE ONE TABLET BY MOUTH TWICE A  tablet 4   • hydrALAZINE (APRESOLINE) 50 MG tablet Take 1 tablet by mouth 3 (Three) Times a Day. 90 tablet 5   • loratadine (CLARITIN) 10 MG tablet Take 1 tablet by mouth Daily. 30 tablet 3   • magnesium oxide (MAG-OX) 400 MG tablet Take 1 tablet by mouth Daily. (Patient taking differently: Take 400 mg by mouth 2 (Two) Times a Day.) 30 tablet 5   • olmesartan (BENICAR) 40 MG tablet Take 1 tablet by mouth Daily With Dinner. 90 tablet 3   • omeprazole (PriLOSEC) 40 MG capsule Take 40 mg by mouth daily.     • pravastatin (PRAVACHOL) 40 MG tablet Take 1 tablet by mouth Every Night. 30 tablet 5     No current facility-administered medications for this visit.      Objective:     Vitals:    10/21/19 0900   BP: 130/70   BP Location: Right arm   Patient  "Position: Sitting   Cuff Size: Adult   Pulse: 67   SpO2: 98%   Weight: 91.5 kg (201 lb 12.8 oz)   Height: 160 cm (63\")       Wt Readings from Last 3 Encounters:   10/21/19 91.5 kg (201 lb 12.8 oz)   08/29/19 93.4 kg (206 lb)   07/02/19 91.4 kg (201 lb 9.6 oz)       Physical Exam   Constitutional: She is oriented to person, place, and time. She appears well-nourished. No distress.   HENT:   Head: Normocephalic and atraumatic.   Eyes: Right eye exhibits no discharge. Left eye exhibits no discharge.   Neck: JVD: 6 cm @ 45 degrees.   Cardiovascular: Normal rate and regular rhythm.   Murmur heard.  High-pitched blowing holosystolic murmur is present with a grade of 1/6 at the apex.  Pulses:       Radial pulses are 2+ on the right side, and 2+ on the left side.   Pulmonary/Chest: Effort normal. No stridor. No respiratory distress. She has no wheezes. She has no rales.   Abdominal: She exhibits no distension. There is no tenderness.   Musculoskeletal: She exhibits no edema or tenderness.   Neurological: She is alert and oriented to person, place, and time.   Skin: Skin is warm and dry.   Psychiatric: She has a normal mood and affect. Her behavior is normal. Judgment and thought content normal.   Vitals reviewed.    Data Reviewed:  Results for orders placed in visit on 04/02/18   Adult Transthoracic Echo Complete W/ Cont if Necessary Per Protocol    Narrative · Left Ventricle: Left ventricular systolic function is mildly decreased.   Calculated EF = 46%.  · Left ventricular wall thickness is consistent with mild-to-moderate   concentric hypertrophy. Diastolic function is normal.  · Right Ventricle: Normal right ventricular cavity size, wall thickness,   systolic function and septal motion noted  · Mild mitral valve regurgitation is present.  · Mild tricuspid valve regurgitation is present. Estimated right   ventricular systolic pressure from tricuspid regurgitation is mildly   elevated (35-45 mmHg).  · No evidence of " pulmonary hypertension is present.  · There is no evidence of pericardial effusion.        Echocardiogram: 03/16/2017  · Left ventricular function is severely decreased.  · Calculated EF = 29%. Estimated EF was in agreement with the calculated EF  · Global left ventricular wall motion appears abnormal  · Left ventricular diastolic dysfunction (grade II) consistent with pseudonormalization and elevated left atrial pressures.  · Left ventricular wall thickness is consistent with mild concentric hypertrophy.  · Right Ventricle: Normal cavity size, wall thickness, systolic function and septal motion noted  · Mild tricuspid valve regurgitation is present.  · Mild mitral valve regurgitation is present  · Mild to moderate pulmonary hypertension is present  · There is no evidence of pericardial effusion.      Cardiac Cath: 03/08/2016  CORONARIES: Right dominant system.  1. LEFT MAIN: Left main coronary artery toward the distal end had  evidence of luminal irregularity.  2. LAD: Left anterior descending artery was a medium caliber vessel,  which in the proximal portion was 100% occluded after the origin of  the 1st diagonal branch. There was no evidence of any antegrade  flow down the distal left anterior descending artery. The 1st  diagonal branch in the proximal portion had up to 80% to 90%  stenosis.  The 1st septal  in the proximal portion had evidence of  luminal irregularity. There was no evidence of any antegrade flow  down the left anterior descending artery.  3. CIRCUMFLEX: The circumflex artery was a medium caliber vessel,  which towards the ostium, had up to 50% to 60% stenosis. The distal  end of the circumflex artery gave origin to the obtuse marginal  branch #2 and #3, which had 70% to 80% stenosis.  4. RCA: The right coronary artery was a medium caliber dominant  vessel, which in the proximal portion had a discrete 60% to 70%  stenosis. The midportion of the right coronary artery had 30% to  40%  stenosis, and the distal right coronary artery bifurcated into  the posterior descending and posterolateral branch. The posterior  lateral branch at its origin had 60% to 70% stenosis. The distal  right coronary artery, before the bifurcation to the posterior  descending and posterolateral branch, had evidence of 30% to 40%  narrowing.      FINAL IMPRESSION:  1. The right coronary artery was diffusely diseased.  2. A 100% occluded proximal left anterior descending artery, which was  the infarct-related vessel.  3. Ostial circumflex artery with 30% to 40% narrowing and in some  views appeared to be 60% to 70%.  4. The 1st diagonal branch in the proximal portion with 70% to 80%  stenosis.  Following this, a 2.75 mm x 12 mm and a 2.5 mm x 23 mm Alpine stent was  then successfully deployed with reduction of stenosis from 100% to less  than 0% stenosis. Patient did have distal left anterior descending  artery, diagonal, ostial circumflex artery, and diffusely diseased right  coronary artery.      Patient will be evaluated for direct revascularization and have  discussed with Dr. Verduzco. In view of the patient's acute myocardial  infarction with left anterior descending artery 100% occluded, patient  at the present time would not be subjected to an emergent coronary  artery bypass grafting, and patient will be stabilized.    Labs:  Lab Results   Component Value Date    GLUCOSE 89 05/17/2019    CALCIUM 9.6 05/17/2019     05/17/2019    K 5.7 (H) 05/17/2019    CO2 26.5 05/17/2019     05/17/2019    BUN 22 05/17/2019    CREATININE 1.05 (H) 05/17/2019    EGFRIFNONA 52 (L) 05/17/2019    BCR 21.0 05/17/2019    ANIONGAP 10.5 05/17/2019     Lab Results   Component Value Date    ALT 12 05/17/2019     CrCl cannot be calculated (Patient's most recent lab result is older than the maximum 30 days allowed.).    Lab Results   Component Value Date    CHOL 143 05/17/2019    CHLPL 172 03/09/2016    TRIG 233 (H) 05/17/2019    HDL  34 (L) 05/17/2019    LDL 62 05/17/2019     Lab Results   Component Value Date    TSH 2.83 03/09/2016    E3TDIZH 6.0 03/09/2016     The following portions of the patient's history were reviewed and updated as appropriate: allergies, current medications, problem list,  past medical history, surgical history, family history and social history.    Recent images independently reviewed.    Available laboratory values reviewed.    Assessment:      Diagnosis Plan   1. Chronic systolic heart failure (CMS/HCC)     2. Coronary arteriosclerosis     3. Essential hypertension       #1, #3  AHA Stage C; NYHA Class III  BETA-BLOCKER: Carvedilol 25 mg twice daily  ACE/ARB: Benicar 40 mg daily at 4 PM  ENTRESTO: History of associated rash  DIURETIC: Lasix 20 mg on Sunday, Monday, Thursday and Saturday  ALDOSTERONE ANTAGONIST: History of hyperkalemia  IMDUR/HYDRALAZINE: Hydralazine 50 mg 3 times daily  DIGOXIN: Not applicable  Fluid restriction: 2000 cc daily  Sodium restriction: 2000 mg daily     Recommended daily weight monitoring.  Discussed patient action plan for heart failure.  Recommended avoiding NSAIDs use.  Discussed warning signs requiring additional medical attention for heart failure.    #2  ASA, statin plus beta blocker therapy  Follow with Dr Kent    The patient will continue to follow with Dr Kent and PCP, BILL Dobbs.    Future Appointments       Provider Department Center    11/25/2019 9:45 AM Nancy Chou APRN [facility]               This document has been electronically signed by BILL Kebede on October 23, 2019 7:49 AM

## 2019-10-21 ENCOUNTER — OFFICE VISIT (OUTPATIENT)
Dept: CARDIOLOGY | Facility: CLINIC | Age: 69
End: 2019-10-21

## 2019-10-21 VITALS
WEIGHT: 201.8 LBS | HEART RATE: 67 BPM | HEIGHT: 63 IN | DIASTOLIC BLOOD PRESSURE: 70 MMHG | SYSTOLIC BLOOD PRESSURE: 130 MMHG | OXYGEN SATURATION: 98 % | BODY MASS INDEX: 35.75 KG/M2

## 2019-10-21 DIAGNOSIS — I25.10 CORONARY ARTERIOSCLEROSIS: ICD-10-CM

## 2019-10-21 DIAGNOSIS — I10 ESSENTIAL HYPERTENSION: ICD-10-CM

## 2019-10-21 DIAGNOSIS — I50.22 CHRONIC SYSTOLIC HEART FAILURE (HCC): Primary | ICD-10-CM

## 2019-10-21 PROCEDURE — 99214 OFFICE O/P EST MOD 30 MIN: CPT | Performed by: NURSE PRACTITIONER

## 2019-11-20 DIAGNOSIS — I50.22 CHRONIC SYSTOLIC HEART FAILURE (HCC): ICD-10-CM

## 2019-11-20 DIAGNOSIS — I25.10 CORONARY ARTERIOSCLEROSIS: ICD-10-CM

## 2019-11-25 ENCOUNTER — OFFICE VISIT (OUTPATIENT)
Dept: FAMILY MEDICINE CLINIC | Facility: CLINIC | Age: 69
End: 2019-11-25

## 2019-11-25 ENCOUNTER — APPOINTMENT (OUTPATIENT)
Dept: LAB | Facility: HOSPITAL | Age: 69
End: 2019-11-25

## 2019-11-25 VITALS
HEART RATE: 69 BPM | BODY MASS INDEX: 35.97 KG/M2 | DIASTOLIC BLOOD PRESSURE: 52 MMHG | RESPIRATION RATE: 20 BRPM | SYSTOLIC BLOOD PRESSURE: 115 MMHG | WEIGHT: 203 LBS | TEMPERATURE: 97.7 F | HEIGHT: 63 IN | OXYGEN SATURATION: 97 %

## 2019-11-25 DIAGNOSIS — E66.01 MORBIDLY OBESE (HCC): ICD-10-CM

## 2019-11-25 DIAGNOSIS — I10 ESSENTIAL HYPERTENSION: Primary | ICD-10-CM

## 2019-11-25 DIAGNOSIS — E11.9 TYPE 2 DIABETES MELLITUS WITHOUT COMPLICATION, WITHOUT LONG-TERM CURRENT USE OF INSULIN (HCC): ICD-10-CM

## 2019-11-25 DIAGNOSIS — E78.2 MIXED HYPERLIPIDEMIA: ICD-10-CM

## 2019-11-25 LAB
ALBUMIN SERPL-MCNC: 4.3 G/DL (ref 3.5–5.2)
ALBUMIN/GLOB SERPL: 1.3 G/DL
ALP SERPL-CCNC: 122 U/L (ref 39–117)
ALT SERPL W P-5'-P-CCNC: 13 U/L (ref 1–33)
ANION GAP SERPL CALCULATED.3IONS-SCNC: 10.8 MMOL/L (ref 5–15)
AST SERPL-CCNC: 16 U/L (ref 1–32)
BILIRUB SERPL-MCNC: 0.4 MG/DL (ref 0.2–1.2)
BUN BLD-MCNC: 16 MG/DL (ref 8–23)
BUN/CREAT SERPL: 12.8 (ref 7–25)
CALCIUM SPEC-SCNC: 9.8 MG/DL (ref 8.6–10.5)
CHLORIDE SERPL-SCNC: 95 MMOL/L (ref 98–107)
CO2 SERPL-SCNC: 29.2 MMOL/L (ref 22–29)
CREAT BLD-MCNC: 1.25 MG/DL (ref 0.57–1)
GFR SERPL CREATININE-BSD FRML MDRD: 43 ML/MIN/1.73
GLOBULIN UR ELPH-MCNC: 3.4 GM/DL
GLUCOSE BLD-MCNC: 97 MG/DL (ref 65–99)
HBA1C MFR BLD: 6.34 % (ref 4.8–5.6)
POTASSIUM BLD-SCNC: 5 MMOL/L (ref 3.5–5.2)
PROT SERPL-MCNC: 7.7 G/DL (ref 6–8.5)
SODIUM BLD-SCNC: 135 MMOL/L (ref 136–145)

## 2019-11-25 PROCEDURE — 80053 COMPREHEN METABOLIC PANEL: CPT | Performed by: NURSE PRACTITIONER

## 2019-11-25 PROCEDURE — 99214 OFFICE O/P EST MOD 30 MIN: CPT | Performed by: NURSE PRACTITIONER

## 2019-11-25 PROCEDURE — 83036 HEMOGLOBIN GLYCOSYLATED A1C: CPT | Performed by: NURSE PRACTITIONER

## 2019-12-01 NOTE — PATIENT INSTRUCTIONS
Calorie Counting for Weight Loss  Calories are units of energy. Your body needs a certain amount of calories from food to keep you going throughout the day. When you eat more calories than your body needs, your body stores the extra calories as fat. When you eat fewer calories than your body needs, your body burns fat to get the energy it needs.  Calorie counting means keeping track of how many calories you eat and drink each day. Calorie counting can be helpful if you need to lose weight. If you make sure to eat fewer calories than your body needs, you should lose weight. Ask your health care provider what a healthy weight is for you.  For calorie counting to work, you will need to eat the right number of calories in a day in order to lose a healthy amount of weight per week. A dietitian can help you determine how many calories you need in a day and will give you suggestions on how to reach your calorie goal.  · A healthy amount of weight to lose per week is usually 1-2 lb (0.5-0.9 kg). This usually means that your daily calorie intake should be reduced by 500-750 calories.  · Eating 1,200 - 1,500 calories per day can help most women lose weight.  · Eating 1,500 - 1,800 calories per day can help most men lose weight.  What is my plan?  My goal is to have __________ calories per day.  If I have this many calories per day, I should lose around __________ pounds per week.  What do I need to know about calorie counting?  In order to meet your daily calorie goal, you will need to:  · Find out how many calories are in each food you would like to eat. Try to do this before you eat.  · Decide how much of the food you plan to eat.  · Write down what you ate and how many calories it had. Doing this is called keeping a food log.  To successfully lose weight, it is important to balance calorie counting with a healthy lifestyle that includes regular activity. Aim for 150 minutes of moderate exercise (such as walking) or 75  minutes of vigorous exercise (such as running) each week.  Where do I find calorie information?    The number of calories in a food can be found on a Nutrition Facts label. If a food does not have a Nutrition Facts label, try to look up the calories online or ask your dietitian for help.  Remember that calories are listed per serving. If you choose to have more than one serving of a food, you will have to multiply the calories per serving by the amount of servings you plan to eat. For example, the label on a package of bread might say that a serving size is 1 slice and that there are 90 calories in a serving. If you eat 1 slice, you will have eaten 90 calories. If you eat 2 slices, you will have eaten 180 calories.  How do I keep a food log?  Immediately after each meal, record the following information in your food log:  · What you ate. Don't forget to include toppings, sauces, and other extras on the food.  · How much you ate. This can be measured in cups, ounces, or number of items.  · How many calories each food and drink had.  · The total number of calories in the meal.  Keep your food log near you, such as in a small notebook in your pocket, or use a mobile sam or website. Some programs will calculate calories for you and show you how many calories you have left for the day to meet your goal.  What are some calorie counting tips?    · Use your calories on foods and drinks that will fill you up and not leave you hungry:  ? Some examples of foods that fill you up are nuts and nut butters, vegetables, lean proteins, and high-fiber foods like whole grains. High-fiber foods are foods with more than 5 g fiber per serving.  ? Drinks such as sodas, specialty coffee drinks, alcohol, and juices have a lot of calories, yet do not fill you up.  · Eat nutritious foods and avoid empty calories. Empty calories are calories you get from foods or beverages that do not have many vitamins or protein, such as candy, sweets, and  "soda. It is better to have a nutritious high-calorie food (such as an avocado) than a food with few nutrients (such as a bag of chips).  · Know how many calories are in the foods you eat most often. This will help you calculate calorie counts faster.  · Pay attention to calories in drinks. Low-calorie drinks include water and unsweetened drinks.  · Pay attention to nutrition labels for \"low fat\" or \"fat free\" foods. These foods sometimes have the same amount of calories or more calories than the full fat versions. They also often have added sugar, starch, or salt, to make up for flavor that was removed with the fat.  · Find a way of tracking calories that works for you. Get creative. Try different apps or programs if writing down calories does not work for you.  What are some portion control tips?  · Know how many calories are in a serving. This will help you know how many servings of a certain food you can have.  · Use a measuring cup to measure serving sizes. You could also try weighing out portions on a kitchen scale. With time, you will be able to estimate serving sizes for some foods.  · Take some time to put servings of different foods on your favorite plates, bowls, and cups so you know what a serving looks like.  · Try not to eat straight from a bag or box. Doing this can lead to overeating. Put the amount you would like to eat in a cup or on a plate to make sure you are eating the right portion.  · Use smaller plates, glasses, and bowls to prevent overeating.  · Try not to multitask (for example, watch TV or use your computer) while eating. If it is time to eat, sit down at a table and enjoy your food. This will help you to know when you are full. It will also help you to be aware of what you are eating and how much you are eating.  What are tips for following this plan?  Reading food labels  · Check the calorie count compared to the serving size. The serving size may be smaller than what you are used to " "eating.  · Check the source of the calories. Make sure the food you are eating is high in vitamins and protein and low in saturated and trans fats.  Shopping  · Read nutrition labels while you shop. This will help you make healthy decisions before you decide to purchase your food.  · Make a grocery list and stick to it.  Cooking  · Try to cook your favorite foods in a healthier way. For example, try baking instead of frying.  · Use low-fat dairy products.  Meal planning  · Use more fruits and vegetables. Half of your plate should be fruits and vegetables.  · Include lean proteins like poultry and fish.  How do I count calories when eating out?  · Ask for smaller portion sizes.  · Consider sharing an entree and sides instead of getting your own entree.  · If you get your own entree, eat only half. Ask for a box at the beginning of your meal and put the rest of your entree in it so you are not tempted to eat it.  · If calories are listed on the menu, choose the lower calorie options.  · Choose dishes that include vegetables, fruits, whole grains, low-fat dairy products, and lean protein.  · Choose items that are boiled, broiled, grilled, or steamed. Stay away from items that are buttered, battered, fried, or served with cream sauce. Items labeled \"crispy\" are usually fried, unless stated otherwise.  · Choose water, low-fat milk, unsweetened iced tea, or other drinks without added sugar. If you want an alcoholic beverage, choose a lower calorie option such as a glass of wine or light beer.  · Ask for dressings, sauces, and syrups on the side. These are usually high in calories, so you should limit the amount you eat.  · If you want a salad, choose a garden salad and ask for grilled meats. Avoid extra toppings like matute, cheese, or fried items. Ask for the dressing on the side, or ask for olive oil and vinegar or lemon to use as dressing.  · Estimate how many servings of a food you are given. For example, a serving of " cooked rice is ½ cup or about the size of half a baseball. Knowing serving sizes will help you be aware of how much food you are eating at restaurants. The list below tells you how big or small some common portion sizes are based on everyday objects:  ? 1 oz--4 stacked dice.  ? 3 oz--1 deck of cards.  ? 1 tsp--1 die.  ? 1 Tbsp--½ a ping-pong ball.  ? 2 Tbsp--1 ping-pong ball.  ? ½ cup--½ baseball.  ? 1 cup--1 baseball.  Summary  · Calorie counting means keeping track of how many calories you eat and drink each day. If you eat fewer calories than your body needs, you should lose weight.  · A healthy amount of weight to lose per week is usually 1-2 lb (0.5-0.9 kg). This usually means reducing your daily calorie intake by 500-750 calories.  · The number of calories in a food can be found on a Nutrition Facts label. If a food does not have a Nutrition Facts label, try to look up the calories online or ask your dietitian for help.  · Use your calories on foods and drinks that will fill you up, and not on foods and drinks that will leave you hungry.  · Use smaller plates, glasses, and bowls to prevent overeating.  This information is not intended to replace advice given to you by your health care provider. Make sure you discuss any questions you have with your health care provider.  Document Released: 12/18/2006 Document Revised: 09/06/2019 Document Reviewed: 11/17/2017  [employer] Interactive Patient Education © 2019 [employer]      Exercising to Lose Weight  Exercise is structured, repetitive physical activity to improve fitness and health. Getting regular exercise is important for everyone. It is especially important if you are overweight. Being overweight increases your risk of heart disease, stroke, diabetes, high blood pressure, and several types of cancer. Reducing your calorie intake and exercising can help you lose weight.  Exercise is usually categorized as moderate or vigorous intensity. To lose weight, most  people need to do a certain amount of moderate-intensity or vigorous-intensity exercise each week.  Moderate-intensity exercise    Moderate-intensity exercise is any activity that gets you moving enough to burn at least three times more energy (calories) than if you were sitting.  Examples of moderate exercise include:  · Walking a mile in 15 minutes.  · Doing light yard work.  · Biking at an easy pace.  Most people should get at least 150 minutes (2 hours and 30 minutes) a week of moderate-intensity exercise to maintain their body weight.  Vigorous-intensity exercise  Vigorous-intensity exercise is any activity that gets you moving enough to burn at least six times more calories than if you were sitting. When you exercise at this intensity, you should be working hard enough that you are not able to carry on a conversation.  Examples of vigorous exercise include:  · Running.  · Playing a team sport, such as football, basketball, and soccer.  · Jumping rope.  Most people should get at least 75 minutes (1 hour and 15 minutes) a week of vigorous-intensity exercise to maintain their body weight.  How can exercise affect me?  When you exercise enough to burn more calories than you eat, you lose weight. Exercise also reduces body fat and builds muscle. The more muscle you have, the more calories you burn. Exercise also:  · Improves mood.  · Reduces stress and tension.  · Improves your overall fitness, flexibility, and endurance.  · Increases bone strength.  The amount of exercise you need to lose weight depends on:  · Your age.  · The type of exercise.  · Any health conditions you have.  · Your overall physical ability.  Talk to your health care provider about how much exercise you need and what types of activities are safe for you.  What actions can I take to lose weight?  Nutrition    · Make changes to your diet as told by your health care provider or diet and nutrition specialist (dietitian). This may  include:  ? Eating fewer calories.  ? Eating more protein.  ? Eating less unhealthy fats.  ? Eating a diet that includes fresh fruits and vegetables, whole grains, low-fat dairy products, and lean protein.  ? Avoiding foods with added fat, salt, and sugar.  · Drink plenty of water while you exercise to prevent dehydration or heat stroke.  Activity  · Choose an activity that you enjoy and set realistic goals. Your health care provider can help you make an exercise plan that works for you.  · Exercise at a moderate or vigorous intensity most days of the week.  ? The intensity of exercise may vary from person to person. You can tell how intense a workout is for you by paying attention to your breathing and heartbeat. Most people will notice their breathing and heartbeat get faster with more intense exercise.  · Do resistance training twice each week, such as:  ? Push-ups.  ? Sit-ups.  ? Lifting weights.  ? Using resistance bands.  · Getting short amounts of exercise can be just as helpful as long structured periods of exercise. If you have trouble finding time to exercise, try to include exercise in your daily routine.  ? Get up, stretch, and walk around every 30 minutes throughout the day.  ? Go for a walk during your lunch break.  ? Park your car farther away from your destination.  ? If you take public transportation, get off one stop early and walk the rest of the way.  ? Make phone calls while standing up and walking around.  ? Take the stairs instead of elevators or escalators.  · Wear comfortable clothes and shoes with good support.  · Do not exercise so much that you hurt yourself, feel dizzy, or get very short of breath.  Where to find more information  · [place] Department of Health and Human Services: www.hhs.gov  · Centers for Disease Control and Prevention (CDC): www.cdc.gov  Contact a health care provider:  · Before starting a new exercise program.  · If you have questions or concerns about your  weight.  · If you have a medical problem that keeps you from exercising.  Get help right away if you have any of the following while exercising:  · Injury.  · Dizziness.  · Difficulty breathing or shortness of breath that does not go away when you stop exercising.  · Chest pain.  · Rapid heartbeat.  Summary  · Being overweight increases your risk of heart disease, stroke, diabetes, high blood pressure, and several types of cancer.  · Losing weight happens when you burn more calories than you eat.  · Reducing the amount of calories you eat in addition to getting regular moderate or vigorous exercise each week helps you lose weight.  This information is not intended to replace advice given to you by your health care provider. Make sure you discuss any questions you have with your health care provider.  Document Released: 01/20/2012 Document Revised: 12/31/2018 Document Reviewed: 12/31/2018  [employer] Interactive Patient Education © 2019 [employer]      Exercising to Lose Weight  Exercise is structured, repetitive physical activity to improve fitness and health. Getting regular exercise is important for everyone. It is especially important if you are overweight. Being overweight increases your risk of heart disease, stroke, diabetes, high blood pressure, and several types of cancer. Reducing your calorie intake and exercising can help you lose weight.  Exercise is usually categorized as moderate or vigorous intensity. To lose weight, most people need to do a certain amount of moderate-intensity or vigorous-intensity exercise each week.  Moderate-intensity exercise    Moderate-intensity exercise is any activity that gets you moving enough to burn at least three times more energy (calories) than if you were sitting.  Examples of moderate exercise include:  · Walking a mile in 15 minutes.  · Doing light yard work.  · Biking at an easy pace.  Most people should get at least 150 minutes (2 hours and 30 minutes) a week of  moderate-intensity exercise to maintain their body weight.  Vigorous-intensity exercise  Vigorous-intensity exercise is any activity that gets you moving enough to burn at least six times more calories than if you were sitting. When you exercise at this intensity, you should be working hard enough that you are not able to carry on a conversation.  Examples of vigorous exercise include:  · Running.  · Playing a team sport, such as football, basketball, and soccer.  · Jumping rope.  Most people should get at least 75 minutes (1 hour and 15 minutes) a week of vigorous-intensity exercise to maintain their body weight.  How can exercise affect me?  When you exercise enough to burn more calories than you eat, you lose weight. Exercise also reduces body fat and builds muscle. The more muscle you have, the more calories you burn. Exercise also:  · Improves mood.  · Reduces stress and tension.  · Improves your overall fitness, flexibility, and endurance.  · Increases bone strength.  The amount of exercise you need to lose weight depends on:  · Your age.  · The type of exercise.  · Any health conditions you have.  · Your overall physical ability.  Talk to your health care provider about how much exercise you need and what types of activities are safe for you.  What actions can I take to lose weight?  Nutrition    · Make changes to your diet as told by your health care provider or diet and nutrition specialist (dietitian). This may include:  ? Eating fewer calories.  ? Eating more protein.  ? Eating less unhealthy fats.  ? Eating a diet that includes fresh fruits and vegetables, whole grains, low-fat dairy products, and lean protein.  ? Avoiding foods with added fat, salt, and sugar.  · Drink plenty of water while you exercise to prevent dehydration or heat stroke.  Activity  · Choose an activity that you enjoy and set realistic goals. Your health care provider can help you make an exercise plan that works for you.  · Exercise  at a moderate or vigorous intensity most days of the week.  ? The intensity of exercise may vary from person to person. You can tell how intense a workout is for you by paying attention to your breathing and heartbeat. Most people will notice their breathing and heartbeat get faster with more intense exercise.  · Do resistance training twice each week, such as:  ? Push-ups.  ? Sit-ups.  ? Lifting weights.  ? Using resistance bands.  · Getting short amounts of exercise can be just as helpful as long structured periods of exercise. If you have trouble finding time to exercise, try to include exercise in your daily routine.  ? Get up, stretch, and walk around every 30 minutes throughout the day.  ? Go for a walk during your lunch break.  ? Park your car farther away from your destination.  ? If you take public transportation, get off one stop early and walk the rest of the way.  ? Make phone calls while standing up and walking around.  ? Take the stairs instead of elevators or escalators.  · Wear comfortable clothes and shoes with good support.  · Do not exercise so much that you hurt yourself, feel dizzy, or get very short of breath.  Where to find more information  · [place] Department of Health and Human Services: www.hhs.gov  · Centers for Disease Control and Prevention (CDC): www.cdc.gov  Contact a health care provider:  · Before starting a new exercise program.  · If you have questions or concerns about your weight.  · If you have a medical problem that keeps you from exercising.  Get help right away if you have any of the following while exercising:  · Injury.  · Dizziness.  · Difficulty breathing or shortness of breath that does not go away when you stop exercising.  · Chest pain.  · Rapid heartbeat.  Summary  · Being overweight increases your risk of heart disease, stroke, diabetes, high blood pressure, and several types of cancer.  · Losing weight happens when you burn more calories than you eat.  · Reducing the  amount of calories you eat in addition to getting regular moderate or vigorous exercise each week helps you lose weight.  This information is not intended to replace advice given to you by your health care provider. Make sure you discuss any questions you have with your health care provider.  Document Released: 01/20/2012 Document Revised: 12/31/2018 Document Reviewed: 12/31/2018  [employer] Interactive Patient Education © 2019 [employer]

## 2019-12-22 DIAGNOSIS — I10 ESSENTIAL HYPERTENSION: ICD-10-CM

## 2019-12-23 RX ORDER — HYDRALAZINE HYDROCHLORIDE 50 MG/1
TABLET, FILM COATED ORAL
Qty: 90 TABLET | Refills: 5 | Status: SHIPPED | OUTPATIENT
Start: 2019-12-23 | End: 2020-03-19 | Stop reason: SDUPTHER

## 2020-02-14 DIAGNOSIS — E11.9 TYPE 2 DIABETES MELLITUS WITHOUT COMPLICATION, WITHOUT LONG-TERM CURRENT USE OF INSULIN (HCC): ICD-10-CM

## 2020-02-14 DIAGNOSIS — E78.5 HYPERLIPIDEMIA, UNSPECIFIED HYPERLIPIDEMIA TYPE: ICD-10-CM

## 2020-02-14 DIAGNOSIS — E78.2 MIXED HYPERLIPIDEMIA: ICD-10-CM

## 2020-02-14 RX ORDER — PRAVASTATIN SODIUM 40 MG
TABLET ORAL
Qty: 30 TABLET | Refills: 5 | OUTPATIENT
Start: 2020-02-14

## 2020-03-19 DIAGNOSIS — I50.22 CHRONIC SYSTOLIC HEART FAILURE (HCC): ICD-10-CM

## 2020-03-19 DIAGNOSIS — E78.5 HYPERLIPIDEMIA, UNSPECIFIED HYPERLIPIDEMIA TYPE: ICD-10-CM

## 2020-03-19 DIAGNOSIS — E78.2 MIXED HYPERLIPIDEMIA: ICD-10-CM

## 2020-03-19 DIAGNOSIS — J06.9 UPPER RESPIRATORY TRACT INFECTION, UNSPECIFIED TYPE: ICD-10-CM

## 2020-03-19 DIAGNOSIS — I10 ESSENTIAL HYPERTENSION: ICD-10-CM

## 2020-03-19 DIAGNOSIS — E11.9 TYPE 2 DIABETES MELLITUS WITHOUT COMPLICATION, WITHOUT LONG-TERM CURRENT USE OF INSULIN (HCC): ICD-10-CM

## 2020-03-19 RX ORDER — OLMESARTAN MEDOXOMIL 40 MG/1
40 TABLET ORAL
Qty: 90 TABLET | Refills: 3 | Status: SHIPPED | OUTPATIENT
Start: 2020-03-19 | End: 2021-04-26

## 2020-03-19 RX ORDER — PRAVASTATIN SODIUM 40 MG
40 TABLET ORAL NIGHTLY
Qty: 30 TABLET | Refills: 5 | Status: SHIPPED | OUTPATIENT
Start: 2020-03-19

## 2020-03-19 RX ORDER — CARVEDILOL 25 MG/1
25 TABLET ORAL 2 TIMES DAILY WITH MEALS
Qty: 60 TABLET | Refills: 5 | Status: SHIPPED | OUTPATIENT
Start: 2020-03-19 | End: 2020-10-09 | Stop reason: SDUPTHER

## 2020-03-19 RX ORDER — HYDRALAZINE HYDROCHLORIDE 50 MG/1
50 TABLET, FILM COATED ORAL 3 TIMES DAILY
Qty: 90 TABLET | Refills: 5 | Status: SHIPPED | OUTPATIENT
Start: 2020-03-19 | End: 2021-09-27 | Stop reason: SDUPTHER

## 2020-03-19 RX ORDER — ONDANSETRON 4 MG/1
4 TABLET, FILM COATED ORAL EVERY 8 HOURS PRN
Qty: 30 TABLET | Refills: 2 | Status: SHIPPED | OUTPATIENT
Start: 2020-03-19 | End: 2021-12-08

## 2020-03-19 RX ORDER — FLASH GLUCOSE SENSOR
1 KIT MISCELLANEOUS
Qty: 4 EACH | Refills: 11 | Status: SHIPPED | OUTPATIENT
Start: 2020-03-19 | End: 2021-12-08

## 2020-03-19 RX ORDER — GLIMEPIRIDE 1 MG/1
1 TABLET ORAL 2 TIMES DAILY
Qty: 180 TABLET | Refills: 4 | Status: ON HOLD | OUTPATIENT
Start: 2020-03-19 | End: 2020-10-02 | Stop reason: SDUPTHER

## 2020-03-19 RX ORDER — LORATADINE 10 MG/1
10 TABLET ORAL DAILY
Qty: 30 TABLET | Refills: 3 | Status: SHIPPED | OUTPATIENT
Start: 2020-03-19 | End: 2020-10-09 | Stop reason: SDUPTHER

## 2020-03-19 RX ORDER — OMEPRAZOLE 40 MG/1
40 CAPSULE, DELAYED RELEASE ORAL DAILY
Qty: 90 CAPSULE | Refills: 1 | Status: SHIPPED | OUTPATIENT
Start: 2020-03-19 | End: 2021-01-18

## 2020-08-05 NOTE — TELEPHONE ENCOUNTER
Anesthesia Evaluation     Patient summary reviewed   no history of anesthetic complications:  NPO Solid Status: > 8 hours             Airway   Mallampati: II  TM distance: >3 FB  Neck ROM: full  Dental      Pulmonary    (-) COPD, asthma, sleep apnea, not a smoker  Cardiovascular   Exercise tolerance: excellent (>7 METS)    (+) hyperlipidemia,   (-) pacemaker, past MI, angina, cardiac stents      Neuro/Psych  (-) seizures, TIA, CVA  GI/Hepatic/Renal/Endo    (+) obesity,     (-) GERD, liver disease, no renal disease, diabetes    Musculoskeletal     Abdominal    Substance History      OB/GYN          Other                        Anesthesia Plan    ASA 2     general     intravenous induction     Anesthetic plan, all risks, benefits, and alternatives have been provided, discussed and informed consent has been obtained with: patient.       Telephone contact regarding today's results of basic metabolic profile.    All questions have been answered

## 2020-09-08 ENCOUNTER — LAB (OUTPATIENT)
Dept: LAB | Facility: HOSPITAL | Age: 70
End: 2020-09-08

## 2020-09-08 ENCOUNTER — OFFICE VISIT (OUTPATIENT)
Dept: FAMILY MEDICINE CLINIC | Facility: CLINIC | Age: 70
End: 2020-09-08

## 2020-09-08 VITALS
OXYGEN SATURATION: 96 % | SYSTOLIC BLOOD PRESSURE: 132 MMHG | HEIGHT: 63 IN | HEART RATE: 91 BPM | TEMPERATURE: 98.1 F | RESPIRATION RATE: 20 BRPM | DIASTOLIC BLOOD PRESSURE: 88 MMHG | BODY MASS INDEX: 35.26 KG/M2 | WEIGHT: 199 LBS

## 2020-09-08 DIAGNOSIS — E78.2 MIXED HYPERLIPIDEMIA: ICD-10-CM

## 2020-09-08 DIAGNOSIS — E11.9 TYPE 2 DIABETES MELLITUS WITHOUT COMPLICATION, WITHOUT LONG-TERM CURRENT USE OF INSULIN (HCC): Primary | ICD-10-CM

## 2020-09-08 DIAGNOSIS — I10 ESSENTIAL HYPERTENSION: ICD-10-CM

## 2020-09-08 LAB
ALBUMIN SERPL-MCNC: 4 G/DL (ref 3.5–5.2)
ALBUMIN UR-MCNC: 13.7 MG/DL
ALBUMIN/GLOB SERPL: 1.1 G/DL
ALP SERPL-CCNC: 122 U/L (ref 39–117)
ALT SERPL W P-5'-P-CCNC: 12 U/L (ref 1–33)
ANION GAP SERPL CALCULATED.3IONS-SCNC: 9.6 MMOL/L (ref 5–15)
AST SERPL-CCNC: 10 U/L (ref 1–32)
BILIRUB SERPL-MCNC: 0.3 MG/DL (ref 0–1.2)
BUN SERPL-MCNC: 19 MG/DL (ref 8–23)
BUN/CREAT SERPL: 14.6 (ref 7–25)
CALCIUM SPEC-SCNC: 9.9 MG/DL (ref 8.6–10.5)
CHLORIDE SERPL-SCNC: 99 MMOL/L (ref 98–107)
CHOLEST SERPL-MCNC: 167 MG/DL (ref 0–200)
CO2 SERPL-SCNC: 27.4 MMOL/L (ref 22–29)
CREAT SERPL-MCNC: 1.3 MG/DL (ref 0.57–1)
GFR SERPL CREATININE-BSD FRML MDRD: 41 ML/MIN/1.73
GLOBULIN UR ELPH-MCNC: 3.5 GM/DL
GLUCOSE SERPL-MCNC: 80 MG/DL (ref 65–99)
HBA1C MFR BLD: 6.16 % (ref 4.8–5.6)
HDLC SERPL-MCNC: 31 MG/DL (ref 40–60)
LDLC SERPL CALC-MCNC: 75 MG/DL (ref 0–100)
LDLC/HDLC SERPL: 2.41 {RATIO}
POTASSIUM SERPL-SCNC: 5.4 MMOL/L (ref 3.5–5.2)
PROT SERPL-MCNC: 7.5 G/DL (ref 6–8.5)
SODIUM SERPL-SCNC: 136 MMOL/L (ref 136–145)
TRIGL SERPL-MCNC: 306 MG/DL (ref 0–150)
VLDLC SERPL-MCNC: 61.2 MG/DL (ref 5–40)

## 2020-09-08 PROCEDURE — 99213 OFFICE O/P EST LOW 20 MIN: CPT | Performed by: NURSE PRACTITIONER

## 2020-09-08 PROCEDURE — 83036 HEMOGLOBIN GLYCOSYLATED A1C: CPT | Performed by: NURSE PRACTITIONER

## 2020-09-08 PROCEDURE — 80053 COMPREHEN METABOLIC PANEL: CPT | Performed by: NURSE PRACTITIONER

## 2020-09-08 PROCEDURE — 82043 UR ALBUMIN QUANTITATIVE: CPT | Performed by: NURSE PRACTITIONER

## 2020-09-08 PROCEDURE — 80061 LIPID PANEL: CPT | Performed by: NURSE PRACTITIONER

## 2020-09-08 NOTE — PATIENT INSTRUCTIONS
Calorie Counting for Weight Loss  Calories are units of energy. Your body needs a certain amount of calories from food to keep you going throughout the day. When you eat more calories than your body needs, your body stores the extra calories as fat. When you eat fewer calories than your body needs, your body burns fat to get the energy it needs.  Calorie counting means keeping track of how many calories you eat and drink each day. Calorie counting can be helpful if you need to lose weight. If you make sure to eat fewer calories than your body needs, you should lose weight. Ask your health care provider what a healthy weight is for you.  For calorie counting to work, you will need to eat the right number of calories in a day in order to lose a healthy amount of weight per week. A dietitian can help you determine how many calories you need in a day and will give you suggestions on how to reach your calorie goal.  · A healthy amount of weight to lose per week is usually 1-2 lb (0.5-0.9 kg). This usually means that your daily calorie intake should be reduced by 500-750 calories.  · Eating 1,200 - 1,500 calories per day can help most women lose weight.  · Eating 1,500 - 1,800 calories per day can help most men lose weight.  What is my plan?  My goal is to have __________ calories per day.  If I have this many calories per day, I should lose around __________ pounds per week.  What do I need to know about calorie counting?  In order to meet your daily calorie goal, you will need to:  · Find out how many calories are in each food you would like to eat. Try to do this before you eat.  · Decide how much of the food you plan to eat.  · Write down what you ate and how many calories it had. Doing this is called keeping a food log.  To successfully lose weight, it is important to balance calorie counting with a healthy lifestyle that includes regular activity. Aim for 150 minutes of moderate exercise (such as walking) or 75  minutes of vigorous exercise (such as running) each week.  Where do I find calorie information?    The number of calories in a food can be found on a Nutrition Facts label. If a food does not have a Nutrition Facts label, try to look up the calories online or ask your dietitian for help.  Remember that calories are listed per serving. If you choose to have more than one serving of a food, you will have to multiply the calories per serving by the amount of servings you plan to eat. For example, the label on a package of bread might say that a serving size is 1 slice and that there are 90 calories in a serving. If you eat 1 slice, you will have eaten 90 calories. If you eat 2 slices, you will have eaten 180 calories.  How do I keep a food log?  Immediately after each meal, record the following information in your food log:  · What you ate. Don't forget to include toppings, sauces, and other extras on the food.  · How much you ate. This can be measured in cups, ounces, or number of items.  · How many calories each food and drink had.  · The total number of calories in the meal.  Keep your food log near you, such as in a small notebook in your pocket, or use a mobile sam or website. Some programs will calculate calories for you and show you how many calories you have left for the day to meet your goal.  What are some calorie counting tips?    · Use your calories on foods and drinks that will fill you up and not leave you hungry:  ? Some examples of foods that fill you up are nuts and nut butters, vegetables, lean proteins, and high-fiber foods like whole grains. High-fiber foods are foods with more than 5 g fiber per serving.  ? Drinks such as sodas, specialty coffee drinks, alcohol, and juices have a lot of calories, yet do not fill you up.  · Eat nutritious foods and avoid empty calories. Empty calories are calories you get from foods or beverages that do not have many vitamins or protein, such as candy, sweets, and  "soda. It is better to have a nutritious high-calorie food (such as an avocado) than a food with few nutrients (such as a bag of chips).  · Know how many calories are in the foods you eat most often. This will help you calculate calorie counts faster.  · Pay attention to calories in drinks. Low-calorie drinks include water and unsweetened drinks.  · Pay attention to nutrition labels for \"low fat\" or \"fat free\" foods. These foods sometimes have the same amount of calories or more calories than the full fat versions. They also often have added sugar, starch, or salt, to make up for flavor that was removed with the fat.  · Find a way of tracking calories that works for you. Get creative. Try different apps or programs if writing down calories does not work for you.  What are some portion control tips?  · Know how many calories are in a serving. This will help you know how many servings of a certain food you can have.  · Use a measuring cup to measure serving sizes. You could also try weighing out portions on a kitchen scale. With time, you will be able to estimate serving sizes for some foods.  · Take some time to put servings of different foods on your favorite plates, bowls, and cups so you know what a serving looks like.  · Try not to eat straight from a bag or box. Doing this can lead to overeating. Put the amount you would like to eat in a cup or on a plate to make sure you are eating the right portion.  · Use smaller plates, glasses, and bowls to prevent overeating.  · Try not to multitask (for example, watch TV or use your computer) while eating. If it is time to eat, sit down at a table and enjoy your food. This will help you to know when you are full. It will also help you to be aware of what you are eating and how much you are eating.  What are tips for following this plan?  Reading food labels  · Check the calorie count compared to the serving size. The serving size may be smaller than what you are used to " "eating.  · Check the source of the calories. Make sure the food you are eating is high in vitamins and protein and low in saturated and trans fats.  Shopping  · Read nutrition labels while you shop. This will help you make healthy decisions before you decide to purchase your food.  · Make a grocery list and stick to it.  Cooking  · Try to cook your favorite foods in a healthier way. For example, try baking instead of frying.  · Use low-fat dairy products.  Meal planning  · Use more fruits and vegetables. Half of your plate should be fruits and vegetables.  · Include lean proteins like poultry and fish.  How do I count calories when eating out?  · Ask for smaller portion sizes.  · Consider sharing an entree and sides instead of getting your own entree.  · If you get your own entree, eat only half. Ask for a box at the beginning of your meal and put the rest of your entree in it so you are not tempted to eat it.  · If calories are listed on the menu, choose the lower calorie options.  · Choose dishes that include vegetables, fruits, whole grains, low-fat dairy products, and lean protein.  · Choose items that are boiled, broiled, grilled, or steamed. Stay away from items that are buttered, battered, fried, or served with cream sauce. Items labeled \"crispy\" are usually fried, unless stated otherwise.  · Choose water, low-fat milk, unsweetened iced tea, or other drinks without added sugar. If you want an alcoholic beverage, choose a lower calorie option such as a glass of wine or light beer.  · Ask for dressings, sauces, and syrups on the side. These are usually high in calories, so you should limit the amount you eat.  · If you want a salad, choose a garden salad and ask for grilled meats. Avoid extra toppings like matute, cheese, or fried items. Ask for the dressing on the side, or ask for olive oil and vinegar or lemon to use as dressing.  · Estimate how many servings of a food you are given. For example, a serving of " cooked rice is ½ cup or about the size of half a baseball. Knowing serving sizes will help you be aware of how much food you are eating at restaurants. The list below tells you how big or small some common portion sizes are based on everyday objects:  ? 1 oz--4 stacked dice.  ? 3 oz--1 deck of cards.  ? 1 tsp--1 die.  ? 1 Tbsp--½ a ping-pong ball.  ? 2 Tbsp--1 ping-pong ball.  ? ½ cup--½ baseball.  ? 1 cup--1 baseball.  Summary  · Calorie counting means keeping track of how many calories you eat and drink each day. If you eat fewer calories than your body needs, you should lose weight.  · A healthy amount of weight to lose per week is usually 1-2 lb (0.5-0.9 kg). This usually means reducing your daily calorie intake by 500-750 calories.  · The number of calories in a food can be found on a Nutrition Facts label. If a food does not have a Nutrition Facts label, try to look up the calories online or ask your dietitian for help.  · Use your calories on foods and drinks that will fill you up, and not on foods and drinks that will leave you hungry.  · Use smaller plates, glasses, and bowls to prevent overeating.  This information is not intended to replace advice given to you by your health care provider. Make sure you discuss any questions you have with your health care provider.  Document Released: 12/18/2006 Document Revised: 09/06/2019 Document Reviewed: 11/17/2017  [employer] Patient Education © 2020 [employer]      Exercising to Lose Weight  Exercise is structured, repetitive physical activity to improve fitness and health. Getting regular exercise is important for everyone. It is especially important if you are overweight. Being overweight increases your risk of heart disease, stroke, diabetes, high blood pressure, and several types of cancer. Reducing your calorie intake and exercising can help you lose weight.  Exercise is usually categorized as moderate or vigorous intensity. To lose weight, most people need  to do a certain amount of moderate-intensity or vigorous-intensity exercise each week.  Moderate-intensity exercise    Moderate-intensity exercise is any activity that gets you moving enough to burn at least three times more energy (calories) than if you were sitting.  Examples of moderate exercise include:  · Walking a mile in 15 minutes.  · Doing light yard work.  · Biking at an easy pace.  Most people should get at least 150 minutes (2 hours and 30 minutes) a week of moderate-intensity exercise to maintain their body weight.  Vigorous-intensity exercise  Vigorous-intensity exercise is any activity that gets you moving enough to burn at least six times more calories than if you were sitting. When you exercise at this intensity, you should be working hard enough that you are not able to carry on a conversation.  Examples of vigorous exercise include:  · Running.  · Playing a team sport, such as football, basketball, and soccer.  · Jumping rope.  Most people should get at least 75 minutes (1 hour and 15 minutes) a week of vigorous-intensity exercise to maintain their body weight.  How can exercise affect me?  When you exercise enough to burn more calories than you eat, you lose weight. Exercise also reduces body fat and builds muscle. The more muscle you have, the more calories you burn. Exercise also:  · Improves mood.  · Reduces stress and tension.  · Improves your overall fitness, flexibility, and endurance.  · Increases bone strength.  The amount of exercise you need to lose weight depends on:  · Your age.  · The type of exercise.  · Any health conditions you have.  · Your overall physical ability.  Talk to your health care provider about how much exercise you need and what types of activities are safe for you.  What actions can I take to lose weight?  Nutrition    · Make changes to your diet as told by your health care provider or diet and nutrition specialist (dietitian). This may include:  ? Eating fewer  calories.  ? Eating more protein.  ? Eating less unhealthy fats.  ? Eating a diet that includes fresh fruits and vegetables, whole grains, low-fat dairy products, and lean protein.  ? Avoiding foods with added fat, salt, and sugar.  · Drink plenty of water while you exercise to prevent dehydration or heat stroke.  Activity  · Choose an activity that you enjoy and set realistic goals. Your health care provider can help you make an exercise plan that works for you.  · Exercise at a moderate or vigorous intensity most days of the week.  ? The intensity of exercise may vary from person to person. You can tell how intense a workout is for you by paying attention to your breathing and heartbeat. Most people will notice their breathing and heartbeat get faster with more intense exercise.  · Do resistance training twice each week, such as:  ? Push-ups.  ? Sit-ups.  ? Lifting weights.  ? Using resistance bands.  · Getting short amounts of exercise can be just as helpful as long structured periods of exercise. If you have trouble finding time to exercise, try to include exercise in your daily routine.  ? Get up, stretch, and walk around every 30 minutes throughout the day.  ? Go for a walk during your lunch break.  ? Park your car farther away from your destination.  ? If you take public transportation, get off one stop early and walk the rest of the way.  ? Make phone calls while standing up and walking around.  ? Take the stairs instead of elevators or escalators.  · Wear comfortable clothes and shoes with good support.  · Do not exercise so much that you hurt yourself, feel dizzy, or get very short of breath.  Where to find more information  · [place] Department of Health and Human Services: www.hhs.gov  · Centers for Disease Control and Prevention (CDC): www.cdc.gov  Contact a health care provider:  · Before starting a new exercise program.  · If you have questions or concerns about your weight.  · If you have a medical  problem that keeps you from exercising.  Get help right away if you have any of the following while exercising:  · Injury.  · Dizziness.  · Difficulty breathing or shortness of breath that does not go away when you stop exercising.  · Chest pain.  · Rapid heartbeat.  Summary  · Being overweight increases your risk of heart disease, stroke, diabetes, high blood pressure, and several types of cancer.  · Losing weight happens when you burn more calories than you eat.  · Reducing the amount of calories you eat in addition to getting regular moderate or vigorous exercise each week helps you lose weight.  This information is not intended to replace advice given to you by your health care provider. Make sure you discuss any questions you have with your health care provider.  Document Released: 01/20/2012 Document Revised: 12/31/2018 Document Reviewed: 12/31/2018  [employer] Patient Education © 2020 [employer]

## 2020-09-08 NOTE — PROGRESS NOTES
Subjective   Afia Adams is a 69 y.o. female.     Afia Adams age 69 comes in today for recheck on her diabetes and her hypertension.  She is recently seen Dr. Kent and he told her that her ejection fraction has improved.  She does not know her blood sugar runs.  Family member apparently checks it for her and make sure that she takes the correct medication.  She has history of coronary artery disease.    Hypertension   This is a chronic problem. The current episode started more than 1 year ago. The problem is unchanged. The problem is controlled. Pertinent negatives include no anxiety, blurred vision, headaches, malaise/fatigue, neck pain, orthopnea, palpitations, peripheral edema, PND, shortness of breath or sweats. There are no associated agents to hypertension. Risk factors for coronary artery disease include diabetes mellitus, dyslipidemia, obesity, post-menopausal state and sedentary lifestyle. Past treatments include beta blockers, direct vasodilators, angiotensin blockers and diuretics. Current antihypertension treatment includes beta blockers, direct vasodilators, angiotensin blockers and diuretics. The current treatment provides significant improvement. There are no compliance problems.  Hypertensive end-organ damage includes CAD/MI. There is no history of angina, kidney disease, CVA, heart failure, left ventricular hypertrophy, PVD or retinopathy.   Diabetes   She presents for her follow-up diabetic visit. She has type 2 diabetes mellitus. Her disease course has been stable. There are no hypoglycemic associated symptoms. Pertinent negatives for hypoglycemia include no headaches or sweats. There are no diabetic associated symptoms. Pertinent negatives for diabetes include no blurred vision. There are no hypoglycemic complications. Symptoms are stable. Diabetic complications include heart disease. Pertinent negatives for diabetic complications include no autonomic neuropathy, CVA,  impotence, nephropathy, peripheral neuropathy, PVD or retinopathy. Risk factors for coronary artery disease include post-menopausal, sedentary lifestyle, obesity, hypertension, dyslipidemia and diabetes mellitus. Current diabetic treatment includes oral agent (monotherapy). She is compliant with treatment all of the time. Her weight is stable. She is following a diabetic diet. Meal planning includes avoidance of concentrated sweets. She has had a previous visit with a dietitian. She rarely participates in exercise. Frequency home blood tests: There is checked by family member who does not accompany her today.  Not sure how many times a week that she does in fact check it. An ACE inhibitor/angiotensin II receptor blocker is being taken. She does not see a podiatrist.Eye exam is not current.        The following portions of the patient's history were reviewed and updated as appropriate: allergies, current medications, past family history, past medical history, past social history, past surgical history and problem list.    Review of Systems   Constitutional: Negative.  Negative for malaise/fatigue.   HENT: Negative.    Eyes: Negative.  Negative for blurred vision.   Respiratory: Negative.  Negative for shortness of breath.    Cardiovascular: Negative.  Negative for palpitations, orthopnea and PND.   Gastrointestinal: Negative.    Endocrine: Negative.    Genitourinary: Negative.  Negative for impotence.   Musculoskeletal: Negative.  Negative for neck pain.   Skin: Negative.    Allergic/Immunologic: Negative.    Hematological: Negative.        Objective   Physical Exam   Constitutional: She is oriented to person, place, and time. She appears well-developed and well-nourished. No distress.   HENT:   Head: Normocephalic and atraumatic.   Mouth/Throat: No oropharyngeal exudate.   Eyes: Pupils are equal, round, and reactive to light.   Neck: Normal range of motion. Neck supple. No thyromegaly present.   Cardiovascular:  Normal rate, regular rhythm and normal heart sounds. Exam reveals no friction rub.   No murmur heard.  Pulmonary/Chest: Effort normal and breath sounds normal. No respiratory distress. She has no wheezes. She has no rales.   Abdominal: Soft.   Musculoskeletal: Normal range of motion.    Afia had a diabetic foot exam performed today.    Neurological Sensory Findings -  Unaltered sharp/dull right ankle/foot discrimination and unaltered sharp/dull left ankle/foot discrimination.  Vascular Status -  Her right foot exhibits normal foot vasculature  and no edema. Her left foot exhibits normal foot vasculature  and no edema.  Skin Integrity  -  Her right foot skin is intact.Her left foot skin is intact..  Neurological: She is alert and oriented to person, place, and time.   Skin: Skin is warm and dry.   Psychiatric: She has a normal mood and affect. Thought content normal.   Nursing note and vitals reviewed.        Assessment/Plan   Afia was seen today for hypertension, diabetes and heartburn.    Diagnoses and all orders for this visit:    Type 2 diabetes mellitus without complication, without long-term current use of insulin (CMS/[facility])  -     Hemoglobin A1c  -     MicroAlbumin, Urine, Random - Urine, Clean Catch    Mixed hyperlipidemia  -     Lipid Panel    Essential hypertension  -     Comprehensive metabolic panel      Lab Results   Component Value Date    HGBA1C 6.34 (H) 11/25/2019       Is noted to be 35.3 which is considered obese.  Diet and exercise information will be provided to this patient.

## 2020-09-25 ENCOUNTER — OFFICE VISIT (OUTPATIENT)
Dept: FAMILY MEDICINE CLINIC | Facility: CLINIC | Age: 70
End: 2020-09-25

## 2020-09-25 VITALS
HEART RATE: 90 BPM | BODY MASS INDEX: 36.62 KG/M2 | DIASTOLIC BLOOD PRESSURE: 84 MMHG | OXYGEN SATURATION: 98 % | RESPIRATION RATE: 20 BRPM | SYSTOLIC BLOOD PRESSURE: 142 MMHG | WEIGHT: 199 LBS | TEMPERATURE: 98.6 F | HEIGHT: 62 IN

## 2020-09-25 DIAGNOSIS — R10.84 GENERALIZED ABDOMINAL PAIN: ICD-10-CM

## 2020-09-25 DIAGNOSIS — N39.0 ACUTE UTI: Primary | ICD-10-CM

## 2020-09-25 DIAGNOSIS — K59.00 CONSTIPATION, UNSPECIFIED CONSTIPATION TYPE: Primary | ICD-10-CM

## 2020-09-25 DIAGNOSIS — R11.0 NAUSEA: ICD-10-CM

## 2020-09-25 DIAGNOSIS — R30.0 DYSURIA: ICD-10-CM

## 2020-09-25 LAB
BILIRUB BLD-MCNC: ABNORMAL MG/DL
CLARITY, POC: ABNORMAL
COLOR UR: YELLOW
GLUCOSE UR STRIP-MCNC: NEGATIVE MG/DL
KETONES UR QL: ABNORMAL
LEUKOCYTE EST, POC: ABNORMAL
NITRITE UR-MCNC: POSITIVE MG/ML
PH UR: 6 [PH] (ref 5–8)
PROT UR STRIP-MCNC: ABNORMAL MG/DL
RBC # UR STRIP: ABNORMAL /UL
SP GR UR: 1.03 (ref 1–1.03)
UROBILINOGEN UR QL: NORMAL

## 2020-09-25 PROCEDURE — 87086 URINE CULTURE/COLONY COUNT: CPT | Performed by: NURSE PRACTITIONER

## 2020-09-25 PROCEDURE — 99214 OFFICE O/P EST MOD 30 MIN: CPT | Performed by: NURSE PRACTITIONER

## 2020-09-25 PROCEDURE — 81003 URINALYSIS AUTO W/O SCOPE: CPT | Performed by: NURSE PRACTITIONER

## 2020-09-25 PROCEDURE — 87186 SC STD MICRODIL/AGAR DIL: CPT | Performed by: NURSE PRACTITIONER

## 2020-09-25 PROCEDURE — 96372 THER/PROPH/DIAG INJ SC/IM: CPT | Performed by: NURSE PRACTITIONER

## 2020-09-25 PROCEDURE — 87088 URINE BACTERIA CULTURE: CPT | Performed by: NURSE PRACTITIONER

## 2020-09-25 RX ORDER — ONDANSETRON 8 MG/1
8 TABLET, ORALLY DISINTEGRATING ORAL EVERY 8 HOURS PRN
Qty: 20 TABLET | Refills: 0 | Status: SHIPPED | OUTPATIENT
Start: 2020-09-25 | End: 2020-10-02 | Stop reason: HOSPADM

## 2020-09-25 RX ORDER — POLYETHYLENE GLYCOL 3350 17 G/17G
17 POWDER, FOR SOLUTION ORAL DAILY PRN
Qty: 100 EACH | Refills: 1 | Status: SHIPPED | OUTPATIENT
Start: 2020-09-25

## 2020-09-25 RX ORDER — ONDANSETRON 8 MG/1
8 TABLET, ORALLY DISINTEGRATING ORAL ONCE
Status: COMPLETED | OUTPATIENT
Start: 2020-09-25 | End: 2020-09-25

## 2020-09-25 RX ORDER — SULFAMETHOXAZOLE AND TRIMETHOPRIM 800; 160 MG/1; MG/1
1 TABLET ORAL 2 TIMES DAILY
Qty: 14 TABLET | Refills: 0 | Status: SHIPPED | OUTPATIENT
Start: 2020-09-25 | End: 2020-09-28 | Stop reason: SINTOL

## 2020-09-25 RX ORDER — CEFTRIAXONE 1 G/1
1 INJECTION, POWDER, FOR SOLUTION INTRAMUSCULAR; INTRAVENOUS ONCE
Status: COMPLETED | OUTPATIENT
Start: 2020-09-25 | End: 2020-09-25

## 2020-09-25 RX ADMIN — CEFTRIAXONE 1 G: 1 INJECTION, POWDER, FOR SOLUTION INTRAMUSCULAR; INTRAVENOUS at 11:00

## 2020-09-25 RX ADMIN — ONDANSETRON 8 MG: 8 TABLET, ORALLY DISINTEGRATING ORAL at 10:26

## 2020-09-25 NOTE — PROGRESS NOTES
"Subjective   Afia Adams is a 69 y.o. female.     FP Same Day Appointment    PCP: BILL Russo    CC: \"nausea, abdominal pain\"    Denies hx of abdominal surgeries or obstructions.     Nausea  This is a new problem. The current episode started today. The problem occurs constantly. The problem has been unchanged. Associated symptoms include abdominal pain (generalized), a change in bowel habit (has only had BM a couple of times in the last few weeks, but hast been soft), nausea and urinary symptoms (decreased urine, dysuria). Pertinent negatives include no anorexia, arthralgias, chest pain, chills, congestion, coughing, diaphoresis, fatigue, fever, headaches, joint swelling, myalgias, neck pain, numbness, rash, sore throat, swollen glands, vertigo, visual change, vomiting or weakness. Nothing aggravates the symptoms. She has tried nothing for the symptoms.        The following portions of the patient's history were reviewed and updated as appropriate: allergies, current medications, past medical history, past social history, past surgical history and problem list.    Review of Systems   Constitutional: Positive for appetite change ( today). Negative for chills, diaphoresis, fatigue and fever.   HENT: Negative.  Negative for congestion, sore throat and swollen glands.    Respiratory: Negative.  Negative for cough.    Cardiovascular: Negative.  Negative for chest pain.   Gastrointestinal: Positive for abdominal pain (generalized), change in bowel habit (has only had BM a couple of times in the last few weeks, but hast been soft), constipation (only had a couple of BM's in the last few weeks, but have been soft) and nausea. Negative for anorexia, blood in stool, vomiting, GERD and indigestion.   Genitourinary: Negative for decreased libido and difficulty urinating.   Musculoskeletal: Negative for arthralgias, joint swelling, myalgias and neck pain.   Skin: Negative for rash.   Neurological: Negative for " "dizziness, vertigo, weakness, numbness and headache.     /84 (BP Location: Right arm, Patient Position: Sitting, Cuff Size: Large Adult)   Pulse 90   Temp 98.6 °F (37 °C) (Temporal)   Resp 20   Ht 157.5 cm (62\")   Wt 90.3 kg (199 lb)   LMP  (LMP Unknown)   SpO2 98%   BMI 36.40 kg/m²     Objective   Physical Exam  Vitals signs and nursing note reviewed.   Constitutional:       General: She is not in acute distress.     Appearance: Normal appearance. She is ill-appearing ( moaning ).   HENT:      Head: Normocephalic and atraumatic.      Mouth/Throat:      Mouth: Mucous membranes are moist.      Pharynx: Oropharynx is clear. No oropharyngeal exudate or posterior oropharyngeal erythema.   Neck:      Musculoskeletal: Normal range of motion and neck supple.   Cardiovascular:      Rate and Rhythm: Normal rate and regular rhythm.   Pulmonary:      Effort: Pulmonary effort is normal. No respiratory distress.      Breath sounds: Normal breath sounds. No wheezing, rhonchi or rales.   Abdominal:      General: Bowel sounds are normal.      Palpations: Abdomen is soft.      Tenderness: There is abdominal tenderness (generalized). There is no guarding or rebound.   Lymphadenopathy:      Cervical: No cervical adenopathy.   Skin:     General: Skin is warm and dry.      Coloration: Skin is pale.   Neurological:      General: No focal deficit present.      Mental Status: She is alert and oriented to person, place, and time.       Recent Results (from the past 24 hour(s))   POCT urinalysis dipstick, automated    Collection Time: 09/25/20 11:02 AM    Specimen: Urine   Result Value Ref Range    Color Yellow Yellow, Straw, Dark Yellow, Annia    Clarity, UA Cloudy (A) Clear    Specific Gravity  1.030 1.005 - 1.030    pH, Urine 6.0 5.0 - 8.0    Leukocytes Large (3+) (A) Negative    Nitrite, UA Positive (A) Negative    Protein, POC 2+ (A) Negative mg/dL    Glucose, UA Negative Negative, 1000 mg/dL (3+) mg/dL    Ketones, UA 1+ " (A) Negative    Urobilinogen, UA Normal Normal    Bilirubin 1 mg/dL (A) Negative    Blood, UA 1+ (A) Negative     No Images in the past 120 days found..      Assessment/Plan   Afia was seen today for nausea.    Diagnoses and all orders for this visit:    Acute UTI  -     Urine Culture - Urine, Urine, Clean Catch  -     cefTRIAXone (ROCEPHIN) injection 1 g  -     sulfamethoxazole-trimethoprim (Bactrim DS) 800-160 MG per tablet; Take 1 tablet by mouth 2 (Two) Times a Day.    Nausea  -     ondansetron ODT (ZOFRAN-ODT) disintegrating tablet 8 mg  -     POCT urinalysis dipstick, automated  -     XR Abdomen Flat & Upright (In Office)  -     ondansetron ODT (Zofran ODT) 8 MG disintegrating tablet; Place 1 tablet on the tongue Every 8 (Eight) Hours As Needed for Nausea or Vomiting.    Generalized abdominal pain  -     POCT urinalysis dipstick, automated  -     XR Abdomen Flat & Upright (In Office)    Dysuria  -     POCT urinalysis dipstick, automated      Zofran ODT given in office with some relief of nausea.    Rx for Zofran ODT provided    Rocephin 1 gm IM x 1 in office  Rx for Bactrim DS to begin tonight  Urine culture pending--will call with results when available  Increase water. Empty bladder frequently.    Abdominal xrays pending--will call with results when available    Patient's Body mass index is 36.4 kg/m². BMI is above normal parameters. Recommendations include: referral to primary care.    See PCP or RTC if symptoms persist/worsen  See PCP for routine f/u visit and management of chronic medical conditions      This document has been electronically signed by BILL Crawford on September 25, 2020 11:08 CDT,.

## 2020-09-27 LAB — BACTERIA SPEC AEROBE CULT: ABNORMAL

## 2020-09-28 ENCOUNTER — TELEPHONE (OUTPATIENT)
Dept: FAMILY MEDICINE CLINIC | Facility: CLINIC | Age: 70
End: 2020-09-28

## 2020-09-28 DIAGNOSIS — N39.0 E-COLI UTI: Primary | ICD-10-CM

## 2020-09-28 DIAGNOSIS — B96.20 E-COLI UTI: Primary | ICD-10-CM

## 2020-09-28 RX ORDER — CEFUROXIME AXETIL 500 MG/1
500 TABLET ORAL 2 TIMES DAILY
Qty: 20 TABLET | Refills: 0 | Status: SHIPPED | OUTPATIENT
Start: 2020-09-28 | End: 2020-10-02 | Stop reason: HOSPADM

## 2020-09-28 NOTE — TELEPHONE ENCOUNTER
I talked to pt daughter, new rx requested for abx to be sent to [facility]. Patient did not tolerate bactrim well. Please advise.     Daughter and law requested Bactrim (profuse sweating) be added to her allergies.

## 2020-09-28 NOTE — TELEPHONE ENCOUNTER
Patient's EC called requesting to speak with clinical staff. Provided no additional information as to reason for callback.     VALENTINA ROMERO (Child) [phone]

## 2020-09-28 NOTE — PROGRESS NOTES
I talked to pt daughter in law, Patient did not tolerate bactrim well- requested for different abx to be sent to [facility]. . Please advise.

## 2020-09-29 ENCOUNTER — HOSPITAL ENCOUNTER (INPATIENT)
Facility: HOSPITAL | Age: 70
LOS: 3 days | Discharge: HOME | End: 2020-10-02
Attending: EMERGENCY MEDICINE | Admitting: HOSPITALIST

## 2020-09-29 ENCOUNTER — APPOINTMENT (OUTPATIENT)
Dept: CT IMAGING | Facility: HOSPITAL | Age: 70
End: 2020-09-29

## 2020-09-29 ENCOUNTER — APPOINTMENT (OUTPATIENT)
Dept: GENERAL RADIOLOGY | Facility: HOSPITAL | Age: 70
End: 2020-09-29

## 2020-09-29 DIAGNOSIS — N17.9 ACUTE RENAL FAILURE, UNSPECIFIED ACUTE RENAL FAILURE TYPE (HCC): ICD-10-CM

## 2020-09-29 DIAGNOSIS — Z74.09 IMPAIRED MOBILITY AND ACTIVITIES OF DAILY LIVING: ICD-10-CM

## 2020-09-29 DIAGNOSIS — E11.9 TYPE 2 DIABETES MELLITUS WITHOUT COMPLICATION, WITHOUT LONG-TERM CURRENT USE OF INSULIN (HCC): ICD-10-CM

## 2020-09-29 DIAGNOSIS — N20.0 RENAL STONE: ICD-10-CM

## 2020-09-29 DIAGNOSIS — N13.9 OBSTRUCTIVE UROPATHY: Primary | ICD-10-CM

## 2020-09-29 DIAGNOSIS — Z78.9 IMPAIRED MOBILITY AND ACTIVITIES OF DAILY LIVING: ICD-10-CM

## 2020-09-29 DIAGNOSIS — E16.2 HYPOGLYCEMIA: ICD-10-CM

## 2020-09-29 DIAGNOSIS — Z74.09 IMPAIRED PHYSICAL MOBILITY: ICD-10-CM

## 2020-09-29 DIAGNOSIS — N12 PYELONEPHRITIS: ICD-10-CM

## 2020-09-29 LAB
ALBUMIN SERPL-MCNC: 3.6 G/DL (ref 3.5–5.2)
ALBUMIN/GLOB SERPL: 1 G/DL
ALP SERPL-CCNC: 119 U/L (ref 39–117)
ALT SERPL W P-5'-P-CCNC: 11 U/L (ref 1–33)
ANION GAP SERPL CALCULATED.3IONS-SCNC: 10 MMOL/L (ref 5–15)
AST SERPL-CCNC: 14 U/L (ref 1–32)
BACTERIA UR QL AUTO: ABNORMAL /HPF
BASOPHILS # BLD AUTO: 0.04 10*3/MM3 (ref 0–0.2)
BASOPHILS NFR BLD AUTO: 0.3 % (ref 0–1.5)
BILIRUB SERPL-MCNC: 0.2 MG/DL (ref 0–1.2)
BILIRUB UR QL STRIP: NEGATIVE
BUN SERPL-MCNC: 39 MG/DL (ref 8–23)
BUN/CREAT SERPL: 11.9 (ref 7–25)
CALCIUM SPEC-SCNC: 9.1 MG/DL (ref 8.6–10.5)
CHLORIDE SERPL-SCNC: 97 MMOL/L (ref 98–107)
CLARITY UR: ABNORMAL
CO2 SERPL-SCNC: 27 MMOL/L (ref 22–29)
COLOR UR: YELLOW
CREAT SERPL-MCNC: 3.29 MG/DL (ref 0.57–1)
DEPRECATED RDW RBC AUTO: 46.7 FL (ref 37–54)
EOSINOPHIL # BLD AUTO: 0.4 10*3/MM3 (ref 0–0.4)
EOSINOPHIL NFR BLD AUTO: 3.4 % (ref 0.3–6.2)
ERYTHROCYTE [DISTWIDTH] IN BLOOD BY AUTOMATED COUNT: 16.8 % (ref 12.3–15.4)
GFR SERPL CREATININE-BSD FRML MDRD: 14 ML/MIN/1.73
GFR SERPL CREATININE-BSD FRML MDRD: ABNORMAL ML/MIN/{1.73_M2}
GLOBULIN UR ELPH-MCNC: 3.5 GM/DL
GLUCOSE BLDC GLUCOMTR-MCNC: 101 MG/DL (ref 70–130)
GLUCOSE BLDC GLUCOMTR-MCNC: 167 MG/DL (ref 70–130)
GLUCOSE BLDC GLUCOMTR-MCNC: 189 MG/DL (ref 70–130)
GLUCOSE BLDC GLUCOMTR-MCNC: 56 MG/DL (ref 70–130)
GLUCOSE SERPL-MCNC: 34 MG/DL (ref 65–99)
GLUCOSE UR STRIP-MCNC: NEGATIVE MG/DL
HCT VFR BLD AUTO: 33.2 % (ref 34–46.6)
HGB BLD-MCNC: 10.5 G/DL (ref 12–15.9)
HGB UR QL STRIP.AUTO: NEGATIVE
HOLD SPECIMEN: NORMAL
HOLD SPECIMEN: NORMAL
HYALINE CASTS UR QL AUTO: ABNORMAL /LPF
IMM GRANULOCYTES # BLD AUTO: 0.06 10*3/MM3 (ref 0–0.05)
IMM GRANULOCYTES NFR BLD AUTO: 0.5 % (ref 0–0.5)
KETONES UR QL STRIP: NEGATIVE
LEUKOCYTE ESTERASE UR QL STRIP.AUTO: ABNORMAL
LIPASE SERPL-CCNC: 55 U/L (ref 13–60)
LYMPHOCYTES # BLD AUTO: 1.13 10*3/MM3 (ref 0.7–3.1)
LYMPHOCYTES NFR BLD AUTO: 9.5 % (ref 19.6–45.3)
MAGNESIUM SERPL-MCNC: 2.6 MG/DL (ref 1.6–2.4)
MCH RBC QN AUTO: 24.2 PG (ref 26.6–33)
MCHC RBC AUTO-ENTMCNC: 31.6 G/DL (ref 31.5–35.7)
MCV RBC AUTO: 76.5 FL (ref 79–97)
MONOCYTES # BLD AUTO: 0.69 10*3/MM3 (ref 0.1–0.9)
MONOCYTES NFR BLD AUTO: 5.8 % (ref 5–12)
NEUTROPHILS NFR BLD AUTO: 80.5 % (ref 42.7–76)
NEUTROPHILS NFR BLD AUTO: 9.54 10*3/MM3 (ref 1.7–7)
NITRITE UR QL STRIP: NEGATIVE
NRBC BLD AUTO-RTO: 0 /100 WBC (ref 0–0.2)
NT-PROBNP SERPL-MCNC: 1021 PG/ML (ref 0–900)
PH UR STRIP.AUTO: 6 [PH] (ref 5–9)
PLATELET # BLD AUTO: 424 10*3/MM3 (ref 140–450)
PMV BLD AUTO: 9.5 FL (ref 6–12)
POTASSIUM SERPL-SCNC: 4.5 MMOL/L (ref 3.5–5.2)
PROT SERPL-MCNC: 7.1 G/DL (ref 6–8.5)
PROT UR QL STRIP: NEGATIVE
RBC # BLD AUTO: 4.34 10*6/MM3 (ref 3.77–5.28)
RBC # UR: ABNORMAL /HPF
REF LAB TEST METHOD: ABNORMAL
SODIUM SERPL-SCNC: 134 MMOL/L (ref 136–145)
SP GR UR STRIP: 1.01 (ref 1–1.03)
SQUAMOUS #/AREA URNS HPF: ABNORMAL /HPF
TROPONIN T SERPL-MCNC: <0.01 NG/ML (ref 0–0.03)
UROBILINOGEN UR QL STRIP: ABNORMAL
WBC # BLD AUTO: 11.86 10*3/MM3 (ref 3.4–10.8)
WBC UR QL AUTO: ABNORMAL /HPF
WHOLE BLOOD HOLD SPECIMEN: NORMAL
WHOLE BLOOD HOLD SPECIMEN: NORMAL

## 2020-09-29 PROCEDURE — 85025 COMPLETE CBC W/AUTO DIFF WBC: CPT | Performed by: EMERGENCY MEDICINE

## 2020-09-29 PROCEDURE — 93005 ELECTROCARDIOGRAM TRACING: CPT | Performed by: EMERGENCY MEDICINE

## 2020-09-29 PROCEDURE — 83880 ASSAY OF NATRIURETIC PEPTIDE: CPT | Performed by: EMERGENCY MEDICINE

## 2020-09-29 PROCEDURE — 82962 GLUCOSE BLOOD TEST: CPT

## 2020-09-29 PROCEDURE — 25010000002 CEFTRIAXONE PER 250 MG: Performed by: EMERGENCY MEDICINE

## 2020-09-29 PROCEDURE — 81001 URINALYSIS AUTO W/SCOPE: CPT | Performed by: EMERGENCY MEDICINE

## 2020-09-29 PROCEDURE — 83735 ASSAY OF MAGNESIUM: CPT | Performed by: EMERGENCY MEDICINE

## 2020-09-29 PROCEDURE — 74176 CT ABD & PELVIS W/O CONTRAST: CPT

## 2020-09-29 PROCEDURE — 83690 ASSAY OF LIPASE: CPT | Performed by: EMERGENCY MEDICINE

## 2020-09-29 PROCEDURE — 80053 COMPREHEN METABOLIC PANEL: CPT | Performed by: EMERGENCY MEDICINE

## 2020-09-29 PROCEDURE — 93010 ELECTROCARDIOGRAM REPORT: CPT | Performed by: INTERNAL MEDICINE

## 2020-09-29 PROCEDURE — 99284 EMERGENCY DEPT VISIT MOD MDM: CPT

## 2020-09-29 PROCEDURE — 87086 URINE CULTURE/COLONY COUNT: CPT | Performed by: EMERGENCY MEDICINE

## 2020-09-29 PROCEDURE — 71045 X-RAY EXAM CHEST 1 VIEW: CPT

## 2020-09-29 PROCEDURE — 84484 ASSAY OF TROPONIN QUANT: CPT | Performed by: EMERGENCY MEDICINE

## 2020-09-29 RX ORDER — POLYETHYLENE GLYCOL 3350 17 G/17G
17 POWDER, FOR SOLUTION ORAL DAILY PRN
Status: DISCONTINUED | OUTPATIENT
Start: 2020-09-29 | End: 2020-10-02 | Stop reason: HOSPADM

## 2020-09-29 RX ORDER — PRAVASTATIN SODIUM 40 MG
40 TABLET ORAL NIGHTLY
Status: DISCONTINUED | OUTPATIENT
Start: 2020-09-29 | End: 2020-10-02 | Stop reason: HOSPADM

## 2020-09-29 RX ORDER — DEXTROSE MONOHYDRATE 25 G/50ML
INJECTION, SOLUTION INTRAVENOUS
Status: COMPLETED
Start: 2020-09-29 | End: 2020-09-29

## 2020-09-29 RX ORDER — SODIUM CHLORIDE 9 MG/ML
125 INJECTION, SOLUTION INTRAVENOUS CONTINUOUS
Status: DISCONTINUED | OUTPATIENT
Start: 2020-09-29 | End: 2020-09-29

## 2020-09-29 RX ORDER — PANTOPRAZOLE SODIUM 40 MG/1
40 TABLET, DELAYED RELEASE ORAL EVERY MORNING
Status: DISCONTINUED | OUTPATIENT
Start: 2020-09-30 | End: 2020-10-02 | Stop reason: HOSPADM

## 2020-09-29 RX ORDER — SODIUM CHLORIDE 0.9 % (FLUSH) 0.9 %
10 SYRINGE (ML) INJECTION AS NEEDED
Status: DISCONTINUED | OUTPATIENT
Start: 2020-09-29 | End: 2020-10-02 | Stop reason: HOSPADM

## 2020-09-29 RX ORDER — DEXTROSE MONOHYDRATE 25 G/50ML
50 INJECTION, SOLUTION INTRAVENOUS ONCE
Status: COMPLETED | OUTPATIENT
Start: 2020-09-29 | End: 2020-09-29

## 2020-09-29 RX ORDER — LANOLIN ALCOHOL/MO/W.PET/CERES
400 CREAM (GRAM) TOPICAL
Status: DISCONTINUED | OUTPATIENT
Start: 2020-09-30 | End: 2020-10-02 | Stop reason: HOSPADM

## 2020-09-29 RX ORDER — DEXTROSE MONOHYDRATE 100 MG/ML
100 INJECTION, SOLUTION INTRAVENOUS CONTINUOUS
Status: DISCONTINUED | OUTPATIENT
Start: 2020-09-29 | End: 2020-09-30

## 2020-09-29 RX ORDER — LOSARTAN POTASSIUM 50 MG/1
100 TABLET ORAL
Status: DISCONTINUED | OUTPATIENT
Start: 2020-09-29 | End: 2020-09-30

## 2020-09-29 RX ORDER — DEXTROSE MONOHYDRATE 25 G/50ML
INJECTION, SOLUTION INTRAVENOUS
Status: DISPENSED
Start: 2020-09-29 | End: 2020-09-30

## 2020-09-29 RX ORDER — NICOTINE POLACRILEX 4 MG
15 LOZENGE BUCCAL
Status: DISCONTINUED | OUTPATIENT
Start: 2020-09-29 | End: 2020-10-02 | Stop reason: HOSPADM

## 2020-09-29 RX ORDER — CARVEDILOL 25 MG/1
25 TABLET ORAL 2 TIMES DAILY WITH MEALS
Status: DISCONTINUED | OUTPATIENT
Start: 2020-09-29 | End: 2020-10-02 | Stop reason: HOSPADM

## 2020-09-29 RX ORDER — ONDANSETRON 4 MG/1
8 TABLET, ORALLY DISINTEGRATING ORAL EVERY 8 HOURS PRN
Status: DISCONTINUED | OUTPATIENT
Start: 2020-09-29 | End: 2020-10-02 | Stop reason: HOSPADM

## 2020-09-29 RX ORDER — ALBUTEROL SULFATE 2.5 MG/3ML
2.5 SOLUTION RESPIRATORY (INHALATION) EVERY 6 HOURS PRN
Status: DISCONTINUED | OUTPATIENT
Start: 2020-09-29 | End: 2020-10-02 | Stop reason: HOSPADM

## 2020-09-29 RX ORDER — SODIUM CHLORIDE 0.9 % (FLUSH) 0.9 %
10 SYRINGE (ML) INJECTION EVERY 12 HOURS SCHEDULED
Status: DISCONTINUED | OUTPATIENT
Start: 2020-09-29 | End: 2020-10-02 | Stop reason: HOSPADM

## 2020-09-29 RX ORDER — NYSTATIN 100000 [USP'U]/G
POWDER TOPICAL EVERY 12 HOURS SCHEDULED
Status: DISCONTINUED | OUTPATIENT
Start: 2020-09-29 | End: 2020-10-02 | Stop reason: HOSPADM

## 2020-09-29 RX ORDER — DEXTROSE MONOHYDRATE 25 G/50ML
50 INJECTION, SOLUTION INTRAVENOUS
Status: DISCONTINUED | OUTPATIENT
Start: 2020-09-29 | End: 2020-09-29

## 2020-09-29 RX ORDER — HYDRALAZINE HYDROCHLORIDE 50 MG/1
50 TABLET, FILM COATED ORAL 3 TIMES DAILY
Status: DISCONTINUED | OUTPATIENT
Start: 2020-09-29 | End: 2020-10-02 | Stop reason: HOSPADM

## 2020-09-29 RX ORDER — DEXTROSE MONOHYDRATE 25 G/50ML
25 INJECTION, SOLUTION INTRAVENOUS
Status: DISCONTINUED | OUTPATIENT
Start: 2020-09-29 | End: 2020-10-02 | Stop reason: HOSPADM

## 2020-09-29 RX ADMIN — CARVEDILOL 25 MG: 25 TABLET, FILM COATED ORAL at 20:50

## 2020-09-29 RX ADMIN — PRAVASTATIN SODIUM 40 MG: 40 TABLET ORAL at 20:50

## 2020-09-29 RX ADMIN — SODIUM CHLORIDE 125 ML/HR: 9 INJECTION, SOLUTION INTRAVENOUS at 12:33

## 2020-09-29 RX ADMIN — CEFTRIAXONE SODIUM 1 G: 1 INJECTION, POWDER, FOR SOLUTION INTRAMUSCULAR; INTRAVENOUS at 12:33

## 2020-09-29 RX ADMIN — DEXTROSE MONOHYDRATE 75 ML/HR: 100 INJECTION, SOLUTION INTRAVENOUS at 14:35

## 2020-09-29 RX ADMIN — DEXTROSE MONOHYDRATE 50 ML: 25 INJECTION, SOLUTION INTRAVENOUS at 11:56

## 2020-09-29 RX ADMIN — DEXTROSE MONOHYDRATE 50 ML: 25 INJECTION, SOLUTION INTRAVENOUS at 17:05

## 2020-09-29 RX ADMIN — LOSARTAN POTASSIUM 100 MG: 50 TABLET, FILM COATED ORAL at 20:50

## 2020-09-29 RX ADMIN — NYSTATIN: 100000 POWDER TOPICAL at 20:50

## 2020-09-29 RX ADMIN — SODIUM CHLORIDE 500 ML: 9 INJECTION, SOLUTION INTRAVENOUS at 11:07

## 2020-09-30 ENCOUNTER — APPOINTMENT (OUTPATIENT)
Dept: GENERAL RADIOLOGY | Facility: HOSPITAL | Age: 70
End: 2020-09-30

## 2020-09-30 ENCOUNTER — ANESTHESIA (OUTPATIENT)
Dept: PERIOP | Facility: HOSPITAL | Age: 70
End: 2020-09-30

## 2020-09-30 ENCOUNTER — ANESTHESIA EVENT (OUTPATIENT)
Dept: PERIOP | Facility: HOSPITAL | Age: 70
End: 2020-09-30

## 2020-09-30 PROBLEM — N20.0 RENAL STONE: Status: ACTIVE | Noted: 2020-09-29

## 2020-09-30 LAB
ANION GAP SERPL CALCULATED.3IONS-SCNC: 9 MMOL/L (ref 5–15)
BACTERIA SPEC AEROBE CULT: ABNORMAL
BASOPHILS # BLD AUTO: 0.06 10*3/MM3 (ref 0–0.2)
BASOPHILS NFR BLD AUTO: 0.5 % (ref 0–1.5)
BUN SERPL-MCNC: 21 MG/DL (ref 8–23)
BUN/CREAT SERPL: 11.4 (ref 7–25)
CALCIUM SPEC-SCNC: 9.1 MG/DL (ref 8.6–10.5)
CHLORIDE SERPL-SCNC: 100 MMOL/L (ref 98–107)
CO2 SERPL-SCNC: 26 MMOL/L (ref 22–29)
CREAT SERPL-MCNC: 1.84 MG/DL (ref 0.57–1)
DEPRECATED RDW RBC AUTO: 46.6 FL (ref 37–54)
EOSINOPHIL # BLD AUTO: 0.57 10*3/MM3 (ref 0–0.4)
EOSINOPHIL NFR BLD AUTO: 4.7 % (ref 0.3–6.2)
ERYTHROCYTE [DISTWIDTH] IN BLOOD BY AUTOMATED COUNT: 16.5 % (ref 12.3–15.4)
GFR SERPL CREATININE-BSD FRML MDRD: 27 ML/MIN/1.73
GLUCOSE BLDC GLUCOMTR-MCNC: 109 MG/DL (ref 70–130)
GLUCOSE BLDC GLUCOMTR-MCNC: 130 MG/DL (ref 70–130)
GLUCOSE BLDC GLUCOMTR-MCNC: 181 MG/DL (ref 70–130)
GLUCOSE BLDC GLUCOMTR-MCNC: 246 MG/DL (ref 70–130)
GLUCOSE SERPL-MCNC: 256 MG/DL (ref 65–99)
HCT VFR BLD AUTO: 31.8 % (ref 34–46.6)
HGB BLD-MCNC: 9.8 G/DL (ref 12–15.9)
IMM GRANULOCYTES # BLD AUTO: 0.08 10*3/MM3 (ref 0–0.05)
IMM GRANULOCYTES NFR BLD AUTO: 0.7 % (ref 0–0.5)
LYMPHOCYTES # BLD AUTO: 1.54 10*3/MM3 (ref 0.7–3.1)
LYMPHOCYTES NFR BLD AUTO: 12.6 % (ref 19.6–45.3)
MCH RBC QN AUTO: 23.8 PG (ref 26.6–33)
MCHC RBC AUTO-ENTMCNC: 30.8 G/DL (ref 31.5–35.7)
MCV RBC AUTO: 77.2 FL (ref 79–97)
MONOCYTES # BLD AUTO: 1.12 10*3/MM3 (ref 0.1–0.9)
MONOCYTES NFR BLD AUTO: 9.2 % (ref 5–12)
NEUTROPHILS NFR BLD AUTO: 72.3 % (ref 42.7–76)
NEUTROPHILS NFR BLD AUTO: 8.83 10*3/MM3 (ref 1.7–7)
NRBC BLD AUTO-RTO: 0 /100 WBC (ref 0–0.2)
PLATELET # BLD AUTO: 427 10*3/MM3 (ref 140–450)
PMV BLD AUTO: 9.6 FL (ref 6–12)
POTASSIUM SERPL-SCNC: 4.5 MMOL/L (ref 3.5–5.2)
RBC # BLD AUTO: 4.12 10*6/MM3 (ref 3.77–5.28)
SARS-COV-2 N GENE RESP QL NAA+PROBE: NOT DETECTED
SODIUM SERPL-SCNC: 135 MMOL/L (ref 136–145)
WBC # BLD AUTO: 12.2 10*3/MM3 (ref 3.4–10.8)

## 2020-09-30 PROCEDURE — 0T778DZ DILATION OF LEFT URETER WITH INTRALUMINAL DEVICE, VIA NATURAL OR ARTIFICIAL OPENING ENDOSCOPIC: ICD-10-PCS | Performed by: UROLOGY

## 2020-09-30 PROCEDURE — C1758 CATHETER, URETERAL: HCPCS | Performed by: UROLOGY

## 2020-09-30 PROCEDURE — 25010000002 SUCCINYLCHOLINE PER 20 MG: Performed by: NURSE ANESTHETIST, CERTIFIED REGISTERED

## 2020-09-30 PROCEDURE — 80048 BASIC METABOLIC PNL TOTAL CA: CPT | Performed by: HOSPITALIST

## 2020-09-30 PROCEDURE — 25010000002 MIDAZOLAM PER 1 MG: Performed by: NURSE ANESTHETIST, CERTIFIED REGISTERED

## 2020-09-30 PROCEDURE — 82962 GLUCOSE BLOOD TEST: CPT

## 2020-09-30 PROCEDURE — 74420 UROGRAPHY RTRGR +-KUB: CPT

## 2020-09-30 PROCEDURE — 97162 PT EVAL MOD COMPLEX 30 MIN: CPT

## 2020-09-30 PROCEDURE — 25010000002 CEFTRIAXONE PER 250 MG: Performed by: HOSPITALIST

## 2020-09-30 PROCEDURE — 85025 COMPLETE CBC W/AUTO DIFF WBC: CPT | Performed by: HOSPITALIST

## 2020-09-30 PROCEDURE — BT1F1ZZ FLUOROSCOPY OF LEFT KIDNEY, URETER AND BLADDER USING LOW OSMOLAR CONTRAST: ICD-10-PCS | Performed by: UROLOGY

## 2020-09-30 PROCEDURE — C2617 STENT, NON-COR, TEM W/O DEL: HCPCS | Performed by: UROLOGY

## 2020-09-30 PROCEDURE — 25010000002 PROPOFOL 10 MG/ML EMULSION: Performed by: NURSE ANESTHETIST, CERTIFIED REGISTERED

## 2020-09-30 PROCEDURE — 87635 SARS-COV-2 COVID-19 AMP PRB: CPT | Performed by: HOSPITALIST

## 2020-09-30 PROCEDURE — 63710000001 INSULIN ASPART PER 5 UNITS: Performed by: HOSPITALIST

## 2020-09-30 PROCEDURE — 25010000002 FENTANYL CITRATE (PF) 100 MCG/2ML SOLUTION: Performed by: NURSE ANESTHETIST, CERTIFIED REGISTERED

## 2020-09-30 PROCEDURE — 25010000002 PHENYLEPHRINE PER 1 ML: Performed by: NURSE ANESTHETIST, CERTIFIED REGISTERED

## 2020-09-30 PROCEDURE — 25010000002 IOPAMIDOL 61 % SOLUTION: Performed by: UROLOGY

## 2020-09-30 PROCEDURE — 25010000003 CEFAZOLIN PER 500 MG: Performed by: NURSE ANESTHETIST, CERTIFIED REGISTERED

## 2020-09-30 PROCEDURE — C1769 GUIDE WIRE: HCPCS | Performed by: UROLOGY

## 2020-09-30 DEVICE — URETERAL STENT
Type: IMPLANTABLE DEVICE | Site: URETER | Status: FUNCTIONAL
Brand: PERCUFLEX™ PLUS

## 2020-09-30 RX ORDER — SODIUM CHLORIDE 9 MG/ML
75 INJECTION, SOLUTION INTRAVENOUS CONTINUOUS
Status: DISCONTINUED | OUTPATIENT
Start: 2020-09-30 | End: 2020-10-02 | Stop reason: HOSPADM

## 2020-09-30 RX ORDER — EPHEDRINE SULFATE 50 MG/ML
5 INJECTION, SOLUTION INTRAVENOUS ONCE AS NEEDED
Status: DISCONTINUED | OUTPATIENT
Start: 2020-09-30 | End: 2020-09-30 | Stop reason: HOSPADM

## 2020-09-30 RX ORDER — DEXTROSE AND SODIUM CHLORIDE 5; .45 G/100ML; G/100ML
100 INJECTION, SOLUTION INTRAVENOUS CONTINUOUS
Status: DISCONTINUED | OUTPATIENT
Start: 2020-09-30 | End: 2020-10-01

## 2020-09-30 RX ORDER — EPHEDRINE SULFATE 50 MG/ML
INJECTION, SOLUTION INTRAVENOUS AS NEEDED
Status: DISCONTINUED | OUTPATIENT
Start: 2020-09-30 | End: 2020-09-30 | Stop reason: SURG

## 2020-09-30 RX ORDER — ONDANSETRON 4 MG/1
4 TABLET, FILM COATED ORAL EVERY 6 HOURS PRN
Status: DISCONTINUED | OUTPATIENT
Start: 2020-09-30 | End: 2020-10-02 | Stop reason: HOSPADM

## 2020-09-30 RX ORDER — NALOXONE HCL 0.4 MG/ML
0.4 VIAL (ML) INJECTION
Status: DISCONTINUED | OUTPATIENT
Start: 2020-09-30 | End: 2020-10-02 | Stop reason: HOSPADM

## 2020-09-30 RX ORDER — NALOXONE HCL 0.4 MG/ML
0.4 VIAL (ML) INJECTION AS NEEDED
Status: DISCONTINUED | OUTPATIENT
Start: 2020-09-30 | End: 2020-09-30 | Stop reason: HOSPADM

## 2020-09-30 RX ORDER — FLUMAZENIL 0.1 MG/ML
0.2 INJECTION INTRAVENOUS AS NEEDED
Status: DISCONTINUED | OUTPATIENT
Start: 2020-09-30 | End: 2020-09-30 | Stop reason: HOSPADM

## 2020-09-30 RX ORDER — ACETAMINOPHEN 650 MG/1
650 SUPPOSITORY RECTAL ONCE AS NEEDED
Status: DISCONTINUED | OUTPATIENT
Start: 2020-09-30 | End: 2020-09-30 | Stop reason: HOSPADM

## 2020-09-30 RX ORDER — PROPOFOL 10 MG/ML
VIAL (ML) INTRAVENOUS AS NEEDED
Status: DISCONTINUED | OUTPATIENT
Start: 2020-09-30 | End: 2020-09-30 | Stop reason: SURG

## 2020-09-30 RX ORDER — FENTANYL CITRATE 50 UG/ML
INJECTION, SOLUTION INTRAMUSCULAR; INTRAVENOUS AS NEEDED
Status: DISCONTINUED | OUTPATIENT
Start: 2020-09-30 | End: 2020-09-30 | Stop reason: SURG

## 2020-09-30 RX ORDER — ACETAMINOPHEN 325 MG/1
650 TABLET ORAL ONCE AS NEEDED
Status: DISCONTINUED | OUTPATIENT
Start: 2020-09-30 | End: 2020-09-30 | Stop reason: HOSPADM

## 2020-09-30 RX ORDER — SODIUM CHLORIDE, SODIUM GLUCONATE, SODIUM ACETATE, POTASSIUM CHLORIDE, AND MAGNESIUM CHLORIDE 526; 502; 368; 37; 30 MG/100ML; MG/100ML; MG/100ML; MG/100ML; MG/100ML
INJECTION, SOLUTION INTRAVENOUS CONTINUOUS PRN
Status: DISCONTINUED | OUTPATIENT
Start: 2020-09-30 | End: 2020-09-30 | Stop reason: SURG

## 2020-09-30 RX ORDER — MIDAZOLAM HYDROCHLORIDE 1 MG/ML
INJECTION INTRAMUSCULAR; INTRAVENOUS AS NEEDED
Status: DISCONTINUED | OUTPATIENT
Start: 2020-09-30 | End: 2020-09-30 | Stop reason: SURG

## 2020-09-30 RX ORDER — ONDANSETRON 2 MG/ML
4 INJECTION INTRAMUSCULAR; INTRAVENOUS EVERY 6 HOURS PRN
Status: DISCONTINUED | OUTPATIENT
Start: 2020-09-30 | End: 2020-10-02 | Stop reason: HOSPADM

## 2020-09-30 RX ORDER — LABETALOL HYDROCHLORIDE 5 MG/ML
5 INJECTION, SOLUTION INTRAVENOUS
Status: DISCONTINUED | OUTPATIENT
Start: 2020-09-30 | End: 2020-09-30 | Stop reason: HOSPADM

## 2020-09-30 RX ORDER — PROMETHAZINE HYDROCHLORIDE 25 MG/1
25 SUPPOSITORY RECTAL ONCE AS NEEDED
Status: DISCONTINUED | OUTPATIENT
Start: 2020-09-30 | End: 2020-09-30 | Stop reason: HOSPADM

## 2020-09-30 RX ORDER — CEFAZOLIN SODIUM 1 G/3ML
INJECTION, POWDER, FOR SOLUTION INTRAMUSCULAR; INTRAVENOUS AS NEEDED
Status: DISCONTINUED | OUTPATIENT
Start: 2020-09-30 | End: 2020-09-30 | Stop reason: SURG

## 2020-09-30 RX ORDER — DIPHENHYDRAMINE HYDROCHLORIDE 50 MG/ML
12.5 INJECTION INTRAMUSCULAR; INTRAVENOUS
Status: DISCONTINUED | OUTPATIENT
Start: 2020-09-30 | End: 2020-09-30 | Stop reason: HOSPADM

## 2020-09-30 RX ORDER — PROMETHAZINE HYDROCHLORIDE 25 MG/1
25 TABLET ORAL ONCE AS NEEDED
Status: DISCONTINUED | OUTPATIENT
Start: 2020-09-30 | End: 2020-09-30 | Stop reason: HOSPADM

## 2020-09-30 RX ORDER — SUCCINYLCHOLINE CHLORIDE 20 MG/ML
INJECTION INTRAMUSCULAR; INTRAVENOUS AS NEEDED
Status: DISCONTINUED | OUTPATIENT
Start: 2020-09-30 | End: 2020-09-30 | Stop reason: SURG

## 2020-09-30 RX ORDER — ONDANSETRON 2 MG/ML
4 INJECTION INTRAMUSCULAR; INTRAVENOUS ONCE AS NEEDED
Status: DISCONTINUED | OUTPATIENT
Start: 2020-09-30 | End: 2020-09-30 | Stop reason: HOSPADM

## 2020-09-30 RX ORDER — MEPERIDINE HYDROCHLORIDE 25 MG/ML
12.5 INJECTION INTRAMUSCULAR; INTRAVENOUS; SUBCUTANEOUS
Status: DISCONTINUED | OUTPATIENT
Start: 2020-09-30 | End: 2020-09-30 | Stop reason: HOSPADM

## 2020-09-30 RX ADMIN — SUCCINYLCHOLINE CHLORIDE 140 MG: 20 INJECTION, SOLUTION INTRAMUSCULAR; INTRAVENOUS at 18:17

## 2020-09-30 RX ADMIN — EPHEDRINE SULFATE 5 MG: 50 INJECTION INTRAVENOUS at 18:30

## 2020-09-30 RX ADMIN — PRAVASTATIN SODIUM 40 MG: 40 TABLET ORAL at 20:31

## 2020-09-30 RX ADMIN — EPHEDRINE SULFATE 10 MG: 50 INJECTION INTRAVENOUS at 18:31

## 2020-09-30 RX ADMIN — PROPOFOL 90 MG: 10 INJECTION, EMULSION INTRAVENOUS at 18:17

## 2020-09-30 RX ADMIN — INSULIN ASPART 6 UNITS: 100 INJECTION, SOLUTION INTRAVENOUS; SUBCUTANEOUS at 08:28

## 2020-09-30 RX ADMIN — DEXTROSE AND SODIUM CHLORIDE 100 ML/HR: 5; 450 INJECTION, SOLUTION INTRAVENOUS at 20:31

## 2020-09-30 RX ADMIN — PHENYLEPHRINE HYDROCHLORIDE 0.5 MCG/KG/MIN: 10 INJECTION INTRAVENOUS at 18:43

## 2020-09-30 RX ADMIN — CARVEDILOL 25 MG: 25 TABLET, FILM COATED ORAL at 08:27

## 2020-09-30 RX ADMIN — PHENYLEPHRINE HYDROCHLORIDE 100 MCG: 10 INJECTION INTRAVENOUS at 18:37

## 2020-09-30 RX ADMIN — CEFTRIAXONE SODIUM 1 G: 1 INJECTION, POWDER, FOR SOLUTION INTRAMUSCULAR; INTRAVENOUS at 11:53

## 2020-09-30 RX ADMIN — CARVEDILOL 25 MG: 25 TABLET, FILM COATED ORAL at 20:31

## 2020-09-30 RX ADMIN — SODIUM CHLORIDE, PRESERVATIVE FREE 10 ML: 5 INJECTION INTRAVENOUS at 08:28

## 2020-09-30 RX ADMIN — PANTOPRAZOLE SODIUM 40 MG: 40 TABLET, DELAYED RELEASE ORAL at 06:19

## 2020-09-30 RX ADMIN — MIDAZOLAM HYDROCHLORIDE 1 MG: 2 INJECTION, SOLUTION INTRAMUSCULAR; INTRAVENOUS at 18:08

## 2020-09-30 RX ADMIN — SODIUM CHLORIDE, PRESERVATIVE FREE 10 ML: 5 INJECTION INTRAVENOUS at 20:32

## 2020-09-30 RX ADMIN — PHENYLEPHRINE HYDROCHLORIDE 100 MCG: 10 INJECTION INTRAVENOUS at 18:36

## 2020-09-30 RX ADMIN — HYDRALAZINE HYDROCHLORIDE 50 MG: 50 TABLET, FILM COATED ORAL at 20:31

## 2020-09-30 RX ADMIN — SODIUM CHLORIDE, SODIUM GLUCONATE, SODIUM ACETATE, POTASSIUM CHLORIDE, AND MAGNESIUM CHLORIDE: 526; 502; 368; 37; 30 INJECTION, SOLUTION INTRAVENOUS at 18:55

## 2020-09-30 RX ADMIN — NYSTATIN: 100000 POWDER TOPICAL at 21:00

## 2020-09-30 RX ADMIN — CEFAZOLIN SODIUM 1 G: 1 INJECTION, POWDER, FOR SOLUTION INTRAMUSCULAR; INTRAVENOUS at 18:23

## 2020-09-30 RX ADMIN — FENTANYL CITRATE 50 MCG: 50 INJECTION, SOLUTION INTRAMUSCULAR; INTRAVENOUS at 18:17

## 2020-09-30 RX ADMIN — HYDRALAZINE HYDROCHLORIDE 50 MG: 50 TABLET, FILM COATED ORAL at 08:27

## 2020-09-30 RX ADMIN — Medication 400 MG: at 08:27

## 2020-09-30 RX ADMIN — SODIUM CHLORIDE 100 ML/HR: 900 INJECTION, SOLUTION INTRAVENOUS at 08:27

## 2020-09-30 RX ADMIN — CEFTRIAXONE SODIUM 1 G: 1 INJECTION, POWDER, FOR SOLUTION INTRAMUSCULAR; INTRAVENOUS at 19:15

## 2020-09-30 RX ADMIN — LOSARTAN POTASSIUM 100 MG: 50 TABLET, FILM COATED ORAL at 08:27

## 2020-09-30 RX ADMIN — NYSTATIN: 100000 POWDER TOPICAL at 08:30

## 2020-09-30 RX ADMIN — DEXTROSE MONOHYDRATE 100 ML/HR: 100 INJECTION, SOLUTION INTRAVENOUS at 00:05

## 2020-09-30 RX ADMIN — EPHEDRINE SULFATE 5 MG: 50 INJECTION INTRAVENOUS at 18:29

## 2020-09-30 NOTE — ANESTHESIA PREPROCEDURE EVALUATION
Anesthesia Evaluation     Patient summary reviewed and Nursing notes reviewed   no history of anesthetic complications:  NPO Solid Status: > 8 hours  NPO Liquid Status: > 8 hours           Airway   Mallampati: II  TM distance: >3 FB  Neck ROM: full  possible difficult intubation and Small opening  Dental    (+) upper dentures and lower dentures    Pulmonary    (+) asthma,decreased breath sounds,   (-) COPD, sleep apnea, wheezes, not a smoker, no home oxygen    ROS comment: FINDINGS:  Cardiac silhouette is normal in size. Thoracic aorta contains  atherosclerotic calcification. Mild pulmonary vascular  congestion.  No pleural effusion or pneumothorax.     IMPRESSION:  CONCLUSION:  Pulmonary vascular congestion.     Electronically signed by:  Salas Palomo MD  9/29/2020 12:27 PM  Cardiovascular   Exercise tolerance: poor (<4 METS)    ECG reviewed  Beta blocker given within 24 hours of surgery  Rhythm: regular  Rate: normal    (+) hypertension well controlled 2 medications or greater, valvular problems/murmurs murmur, MR and TI, past MI  >12 months, CAD, cardiac stents (2 stents 2015.) more than 12 months ago CHF Systolic <55%, murmur (Grade II/VI systolic), hyperlipidemia,   (-) pacemaker, dysrhythmias, carotid bruits, DVT    ROS comment: Date/Time: 9/29/2020 1:40 PM   Performed by: Robert Taylor MD   Authorized by: Robert Taylor MD   Interpreted by physician   Rhythm comments: Accelerated junctional   Rate: normal   BPM: 70   QRS axis: left   Clinical impression: abnormal ECG   Comments: Inferoseptal Q waves.   Interpreted by Robert Taylor MD on 9/29/2020  2:05 PM    · Left Ventricle: Left ventricular systolic function is mildly decreased. Calculated EF = 46%.  · Left ventricular wall thickness is consistent with mild-to-moderate concentric hypertrophy. Diastolic function is normal.  · Right Ventricle: Normal right ventricular cavity size, wall thickness, systolic function and septal motion noted  · Mild mitral valve  regurgitation is present.  · Mild tricuspid valve regurgitation is present. Estimated right ventricular systolic pressure from tricuspid regurgitation is mildly elevated (35-45 mmHg).  · No evidence of pulmonary hypertension is present.  · There is no evidence of pericardial effusion.       Neuro/Psych- negative ROS  (-) seizures, TIA, CVA  GI/Hepatic/Renal/Endo    (+) obesity,  GERD well controlled, PUD,  liver disease history of elevated LFT, renal disease (Creatinine 1.84) stones, diabetes mellitus type 2,     ROS Comment: HGB 9.8 HCT 31.8    Musculoskeletal     Abdominal   (+) obese,    Substance History - negative use     OB/GYN negative ob/gyn ROS         Other   arthritis,      ROS/Med Hx Other: Follows with Dr. Kent- last seen 3 weeks ago     Albuterol use for asthma- once a month    TTE 4/2/2018:  · Left Ventricle: Left ventricular systolic function is mildly decreased. Calculated EF = 46%.  · Left ventricular wall thickness is consistent with mild-to-moderate concentric hypertrophy. Diastolic function is normal.  · Right Ventricle: Normal right ventricular cavity size, wall thickness, systolic function and septal motion noted  · Mild mitral valve regurgitation is present.  · Mild tricuspid valve regurgitation is present. Estimated right ventricular systolic pressure from tricuspid regurgitation is mildly elevated (35-45 mmHg).  · No evidence of pulmonary hypertension is present.  · There is no evidence of pericardial effusion.    2016 had 2 stents placed                Anesthesia Plan    ASA 4 - emergent     general   (Pt was seen by Dr. Kent 3 weeks ago. Follows closely with Dr. Kent every 6 months. Discussed general anesthesia with pt and spouse and they agree to risks and possible complications. )  intravenous induction     Anesthetic plan, all risks, benefits, and alternatives have been provided, discussed and informed consent has been obtained with: patient.

## 2020-09-30 NOTE — ANESTHESIA PROCEDURE NOTES
Airway  Urgency: elective    Date/Time: 9/30/2020 6:19 PM  End Time:9/30/2020 6:39 PM  Airway not difficult    General Information and Staff    Patient location during procedure: OR  CRNA: Joanne Seaman CRNA    Indications and Patient Condition  Indications for airway management: airway protection    Preoxygenated: yes  Mask difficulty assessment: 0 - not attempted    Final Airway Details  Final airway type: endotracheal airway      Successful airway: ETT  Cuffed: yes   Successful intubation technique: video laryngoscopy  Facilitating devices/methods: intubating stylet  Endotracheal tube insertion site: oral  Blade: Van  Blade size: 3  ETT size (mm): 7.0  Cormack-Lehane Classification: grade I - full view of glottis  Placement verified by: chest auscultation and capnometry   Cuff volume (mL): 8  Measured from: lips  ETT/EBT  to lips (cm): 21  Number of attempts at approach: 1  Assessment: lips, teeth, and gum same as pre-op and atraumatic intubation

## 2020-10-01 LAB
ANION GAP SERPL CALCULATED.3IONS-SCNC: 8 MMOL/L (ref 5–15)
BASOPHILS # BLD AUTO: 0.06 10*3/MM3 (ref 0–0.2)
BASOPHILS NFR BLD AUTO: 0.6 % (ref 0–1.5)
BUN SERPL-MCNC: 12 MG/DL (ref 8–23)
BUN/CREAT SERPL: 8.8 (ref 7–25)
CALCIUM SPEC-SCNC: 8.8 MG/DL (ref 8.6–10.5)
CHLORIDE SERPL-SCNC: 101 MMOL/L (ref 98–107)
CO2 SERPL-SCNC: 26 MMOL/L (ref 22–29)
CREAT SERPL-MCNC: 1.36 MG/DL (ref 0.57–1)
DEPRECATED RDW RBC AUTO: 47.3 FL (ref 37–54)
EOSINOPHIL # BLD AUTO: 0.4 10*3/MM3 (ref 0–0.4)
EOSINOPHIL NFR BLD AUTO: 3.8 % (ref 0.3–6.2)
ERYTHROCYTE [DISTWIDTH] IN BLOOD BY AUTOMATED COUNT: 16.7 % (ref 12.3–15.4)
GFR SERPL CREATININE-BSD FRML MDRD: 39 ML/MIN/1.73
GLUCOSE BLDC GLUCOMTR-MCNC: 106 MG/DL (ref 70–130)
GLUCOSE BLDC GLUCOMTR-MCNC: 164 MG/DL (ref 70–130)
GLUCOSE BLDC GLUCOMTR-MCNC: 179 MG/DL (ref 70–130)
GLUCOSE BLDC GLUCOMTR-MCNC: 201 MG/DL (ref 70–130)
GLUCOSE BLDC GLUCOMTR-MCNC: 210 MG/DL (ref 70–130)
GLUCOSE BLDC GLUCOMTR-MCNC: 85 MG/DL (ref 70–130)
GLUCOSE SERPL-MCNC: 178 MG/DL (ref 65–99)
HCT VFR BLD AUTO: 31.8 % (ref 34–46.6)
HGB BLD-MCNC: 9.9 G/DL (ref 12–15.9)
IMM GRANULOCYTES # BLD AUTO: 0.07 10*3/MM3 (ref 0–0.05)
IMM GRANULOCYTES NFR BLD AUTO: 0.7 % (ref 0–0.5)
LYMPHOCYTES # BLD AUTO: 1.74 10*3/MM3 (ref 0.7–3.1)
LYMPHOCYTES NFR BLD AUTO: 16.5 % (ref 19.6–45.3)
MCH RBC QN AUTO: 24.2 PG (ref 26.6–33)
MCHC RBC AUTO-ENTMCNC: 31.1 G/DL (ref 31.5–35.7)
MCV RBC AUTO: 77.8 FL (ref 79–97)
MONOCYTES # BLD AUTO: 0.81 10*3/MM3 (ref 0.1–0.9)
MONOCYTES NFR BLD AUTO: 7.7 % (ref 5–12)
NEUTROPHILS NFR BLD AUTO: 7.47 10*3/MM3 (ref 1.7–7)
NEUTROPHILS NFR BLD AUTO: 70.7 % (ref 42.7–76)
NRBC BLD AUTO-RTO: 0 /100 WBC (ref 0–0.2)
PLATELET # BLD AUTO: 364 10*3/MM3 (ref 140–450)
PMV BLD AUTO: 8.9 FL (ref 6–12)
POTASSIUM SERPL-SCNC: 4.4 MMOL/L (ref 3.5–5.2)
RBC # BLD AUTO: 4.09 10*6/MM3 (ref 3.77–5.28)
SODIUM SERPL-SCNC: 135 MMOL/L (ref 136–145)
WBC # BLD AUTO: 10.55 10*3/MM3 (ref 3.4–10.8)

## 2020-10-01 PROCEDURE — 85025 COMPLETE CBC W/AUTO DIFF WBC: CPT | Performed by: UROLOGY

## 2020-10-01 PROCEDURE — 25010000002 CEFTRIAXONE PER 250 MG: Performed by: UROLOGY

## 2020-10-01 PROCEDURE — 82962 GLUCOSE BLOOD TEST: CPT

## 2020-10-01 PROCEDURE — 80048 BASIC METABOLIC PNL TOTAL CA: CPT | Performed by: UROLOGY

## 2020-10-01 PROCEDURE — 63710000001 INSULIN ASPART PER 5 UNITS: Performed by: UROLOGY

## 2020-10-01 PROCEDURE — 97530 THERAPEUTIC ACTIVITIES: CPT

## 2020-10-01 PROCEDURE — 97110 THERAPEUTIC EXERCISES: CPT

## 2020-10-01 PROCEDURE — 97116 GAIT TRAINING THERAPY: CPT

## 2020-10-01 RX ORDER — OXYBUTYNIN CHLORIDE 5 MG/1
2.5 TABLET ORAL 2 TIMES DAILY PRN
Status: DISCONTINUED | OUTPATIENT
Start: 2020-10-01 | End: 2020-10-02 | Stop reason: HOSPADM

## 2020-10-01 RX ADMIN — Medication 400 MG: at 08:20

## 2020-10-01 RX ADMIN — SODIUM CHLORIDE, PRESERVATIVE FREE 10 ML: 5 INJECTION INTRAVENOUS at 21:16

## 2020-10-01 RX ADMIN — HYDRALAZINE HYDROCHLORIDE 50 MG: 50 TABLET, FILM COATED ORAL at 21:16

## 2020-10-01 RX ADMIN — INSULIN ASPART 2 UNITS: 100 INJECTION, SOLUTION INTRAVENOUS; SUBCUTANEOUS at 08:20

## 2020-10-01 RX ADMIN — CARVEDILOL 25 MG: 25 TABLET, FILM COATED ORAL at 08:20

## 2020-10-01 RX ADMIN — DEXTROSE AND SODIUM CHLORIDE 100 ML/HR: 5; 450 INJECTION, SOLUTION INTRAVENOUS at 08:20

## 2020-10-01 RX ADMIN — SODIUM CHLORIDE 75 ML/HR: 900 INJECTION, SOLUTION INTRAVENOUS at 09:53

## 2020-10-01 RX ADMIN — CEFTRIAXONE SODIUM 1 G: 1 INJECTION, POWDER, FOR SOLUTION INTRAMUSCULAR; INTRAVENOUS at 18:27

## 2020-10-01 RX ADMIN — POLYETHYLENE GLYCOL 3350 17 G: 17 POWDER, FOR SOLUTION ORAL at 11:25

## 2020-10-01 RX ADMIN — HYDRALAZINE HYDROCHLORIDE 50 MG: 50 TABLET, FILM COATED ORAL at 08:20

## 2020-10-01 RX ADMIN — NYSTATIN: 100000 POWDER TOPICAL at 08:20

## 2020-10-01 RX ADMIN — PRAVASTATIN SODIUM 40 MG: 40 TABLET ORAL at 21:16

## 2020-10-01 RX ADMIN — HYDRALAZINE HYDROCHLORIDE 50 MG: 50 TABLET, FILM COATED ORAL at 17:15

## 2020-10-01 RX ADMIN — INSULIN ASPART 4 UNITS: 100 INJECTION, SOLUTION INTRAVENOUS; SUBCUTANEOUS at 11:19

## 2020-10-01 RX ADMIN — NYSTATIN: 100000 POWDER TOPICAL at 21:15

## 2020-10-01 RX ADMIN — PANTOPRAZOLE SODIUM 40 MG: 40 TABLET, DELAYED RELEASE ORAL at 06:17

## 2020-10-01 RX ADMIN — CARVEDILOL 25 MG: 25 TABLET, FILM COATED ORAL at 17:15

## 2020-10-01 NOTE — PLAN OF CARE
Problem: Adult Inpatient Plan of Care  Goal: Plan of Care Review  Outcome: Ongoing, Progressing  Flowsheets (Taken 10/1/2020 1325)  Plan of Care Reviewed With: patient  Outcome Summary: pt t/madhav sup to sit w/ CGA, sit<>stand w/ Min x 1, t/madhav bed to bathroom w/ RW w/ CGA/Min x 1, pt sat back down on bed prior to taking first steps stating she thought she was moving her feet, ambulated ~6', 20' w/ RW w/ CGA/Min x 1, pt did c/o being dizzy w/ ambulation, performed seated B LE ther ex, cont w/ gt and strengthening

## 2020-10-01 NOTE — THERAPY TREATMENT NOTE
Acute Care - Physical Therapy Treatment Note  [facility]     Patient Name: Afia Adams  : 1950  MRN: [id number]  Today's Date: 10/1/2020           PT Assessment (last 12 hours)      PT Evaluation and Treatment     Row Name 10/01/20 1325          Physical Therapy Time and Intention    Subjective Information  no complaints  -     Document Type  therapy note (daily note)  -     Mode of Treatment  individual therapy;physical therapy  -     Comment   present, pt needing to go to the bathroom and wanting to get OOB  -     Row Name 10/01/20 1325          General Information    Patient Profile Reviewed  yes  -     Existing Precautions/Restrictions  fall  -     Row Name 10/01/20 1325          Cognition    Orientation Status (Cognition)  oriented x 3  -     Row Name 10/01/20 1325          Pain Scale: Numbers Pre/Post-Treatment    Pretreatment Pain Rating  0/10 - no pain  -     Posttreatment Pain Rating  0/10 - no pain  -     Row Name 10/01/20 1325          Range of Motion Comprehensive    General Range of Motion  bilateral lower extremity ROM WNL  -     Row Name 10/01/20 1325          Bed Mobility    Bed Mobility  --  -     Scooting/Bridging [place] (Bed Mobility)  --  -     Supine-Sit [place] (Bed Mobility)  contact guard  -     Sit-Supine [place] (Bed Mobility)  --  -     Assistive Device (Bed Mobility)  bed rails;head of bed elevated  -     Row Name 10/01/20 1325          Transfers    Transfers  sit-stand transfer;stand-sit transfer;toilet transfer  -     Sit-Stand [place] (Transfers)  minimum assist (75% patient effort);1 person assist  -     Stand-Sit [place] (Transfers)  contact guard;minimum assist (75% patient effort);1 person assist  -     [place] Level (Toilet Transfer)  contact guard  -     Assistive Device (Toilet Transfer)  commode;grab bars/safety frame;walker, front-wheeled  -     Row Name 10/01/20 1320           Sit-Stand Transfer    Assistive Device (Sit-Stand Transfers)  walker, front-wheeled  -     Row Name 10/01/20 1325          Stand-Sit Transfer    Assistive Device (Stand-Sit Transfers)  walker, front-wheeled  -     Row Name 10/01/20 1325          Toilet Transfer    Type (Toilet Transfer)  sit-stand;stand-sit  -[facility] Name 10/01/20 1325          Gait/Stairs (Locomotion)    [place] Level (Gait)  minimum assist (75% patient effort);contact guard  -     Assistive Device (Gait)  walker, front-wheeled  -     Distance in Feet (Gait)  6', 20'  -     Deviations/Abnormal Patterns (Gait)  base of support, narrow;artemio decreased  -     Comment (Gait/Stairs)  pt c/o's dizziness w/ ambulation, pt initially stood and began to take a step before sitting back down on the bed, pt states she thought she was taking a step, doesn't know what happened  -[facility] Name 10/01/20 1325          Safety Issues, Functional Mobility    Impairments Affecting Function (Mobility)  endurance/activity tolerance;strength;cognition  -[facility] Name 10/01/20 1325          Motor Skills    Therapeutic Exercise  hip;knee;ankle  -[facility] Name 10/01/20 1325          Hip (Therapeutic Exercise)    Hip (Therapeutic Exercise)  AROM (active range of motion);isometric exercises  -     Hip AROM (Therapeutic Exercise)  bilateral;flexion;extension  -     Hip Isometrics (Therapeutic Exercise)  bilateral;aDduction  -[facility] Name 10/01/20 1325          Knee (Therapeutic Exercise)    Knee (Therapeutic Exercise)  AROM (active range of motion)  -     Knee AROM (Therapeutic Exercise)  bilateral;LAQ (long arc quad)  -[facility] Name 10/01/20 1325          Ankle (Therapeutic Exercise)    Ankle (Therapeutic Exercise)  AROM (active range of motion)  -     Ankle AROM (Therapeutic Exercise)  bilateral;dorsiflexion;plantarflexion  -[facility] Name 10/01/20 1325          Vital Signs    Pre Systolic BP Rehab  165  -     Pre Treatment  Diastolic BP  79  -     Post Systolic BP Rehab  176  -     Post Treatment Diastolic BP  81  -JW     Pretreatment Heart Rate (beats/min)  85  -     Posttreatment Heart Rate (beats/min)  84  -JW     Pre Patient Position  Supine  -JW     Intra Patient Position  Standing  -JW     Post Patient Position  Sitting  -     Row Name 10/01/20 1325          Bed Mobility Goal 1 (PT)    Activity/Assistive Device (Bed Mobility Goal 1, PT)  sit to supine/supine to sit  -JW     [place] Level/Cues Needed (Bed Mobility Goal 1, PT)  modified independence  -JW     Time Frame (Bed Mobility Goal 1, PT)  by discharge;long term goal (LTG)  -     Strategies/Barriers (Bed Mobility Goal 1, PT)  Fatigues very easily; long toenail on R Great Toe.  -     Progress/Outcomes (Bed Mobility Goal 1, PT)  goal not met  -     Row Name 10/01/20 1325          Transfer Goal 1 (PT)    Activity/Assistive Device (Transfer Goal 1, PT)  sit-to-stand/stand-to-sit;bed-to-chair/chair-to-bed;walker, rolling  -JW     [place] Level/Cues Needed (Transfer Goal 1, PT)  supervision required  -     Time Frame (Transfer Goal 1, PT)  long term goal (LTG)  -JW     Strategies/Barriers (Transfers Goal 1, PT)  Fatigues very easily; long toenail on R Great Toe.  -     Progress/Outcome (Transfer Goal 1, PT)  goal not met  -     Row Name 10/01/20 1325          Gait Training Goal 1 (PT)    [place] Level (Gait Training Goal 1, PT)  supervision required  -     Distance (Gait Training Goal 1, PT)  50'x2.  -     Time Frame (Gait Training Goal 1, PT)  long term goal (LTG);by discharge  -     Strategies/Barriers (Gait Training Goal 1, PT)  Fatigues very easily; long toenail on R Great Toe.  -     Row Name 10/01/20 1325          Stairs Goal 1 (PT)    Activity/Assistive Device (Stairs Goal 1, PT)  using handrail, right;using handrail, left  -     [place] Level/Cues Needed (Stairs Goal 1, PT)  supervision required  -     Number of Stairs  (Stairs Goal 1, PT)  2 steps, B rails.  -     Time Frame (Stairs Goal 1, PT)  by discharge;long term goal (LTG)  -     Strategies/Barriers (Stairs Goal 1, PT)  Fatigues very easily; long toenail on R Great Toe.  -     Progress/Outcome (Stairs Goal 1, PT)  goal not met  -     Row Name 10/01/20 1325          Positioning and Restraints    Pre-Treatment Position  in bed  -     Post Treatment Position  chair  -JW     In Chair  reclined;call light within reach;encouraged to call for assist;with family/caregiver  -     Row Name 10/01/20 1325          Therapy Assessment/Plan (PT)    Criteria for Skilled Interventions Met (PT)  yes;skilled treatment is necessary  -       User Key  (r) = Recorded By, (t) = Taken By, (c) = Cosigned By    Initials Name Provider Type     Kathy Jo, PTA Physical Therapy Assistant        Physical Therapy Education                 Title: PT OT SLP Therapies (In Progress)     Topic: Physical Therapy (In Progress)     Point: Mobility training (Done)     Learning Progress Summary           Patient Acceptance, E, VU,NR by  at 9/30/2020 1200    Comment: Educated on proper use of walker, proper gait mechanics, safe hand placement with transfers, proper technique with bed mobility.                   Point: Home exercise program (Not Started)     Learner Progress:  Not documented in this visit.          Point: Body mechanics (Not Started)     Learner Progress:  Not documented in this visit.          Point: Precautions (Not Started)     Learner Progress:  Not documented in this visit.                      User Key     Initials Effective Dates Name Provider Type Discipline     04/03/18 -  Thomas Manning, PT Physical Therapist PT              PT Recommendation and Plan  Anticipated Discharge Disposition (PT): home with assist  Therapy Frequency (PT): daily  Plan of Care Reviewed With: patient  Outcome Summary: pt t/madhav sup to sit w/ CGA, sit<>stand w/ Min x 1, t/madhav bed to  bathroom w/ RW w/ CGA/Min x 1, pt sat back down on bed prior to taking first steps stating she thought she was moving her feet, ambulated ~6', 20' w/ RW w/ CGA/Min x 1, pt did c/o being dizzy w/ ambulation, performed seated B LE ther ex, cont w/ gt and strengthening       Time Calculation:   PT Charges     Row Name 10/01/20 1416             Time Calculation    Start Time  1325  -      Stop Time  1410  -      Time Calculation (min)  45 min  -      PT Received On  10/01/20  -         Time Calculation- PT    Total Timed Code Minutes- PT  45 minute(s)  -        User Key  (r) = Recorded By, (t) = Taken By, (c) = Cosigned By    Initials Name Provider Type    Kathy Anguiano PTA Physical Therapy Assistant        Therapy Charges for Today     Code Description Service Date Service Provider Modifiers Qty    18107868584 HC GAIT TRAINING EA 15 MIN 10/1/2020 Kathy Jo, PTA GP 1    14574156084 HC PT THERAPEUTIC ACT EA 15 MIN 10/1/2020 Kathy Jo, GOLDEN GP 1    73809177331 HC PT THER PROC EA 15 MIN 10/1/2020 Kathy Jo, PTA GP 1          PT G-Codes  Outcome Measure Options: AM-PAC 6 Clicks Basic Mobility (PT)  AM-PAC 6 Clicks Score (PT): 17    Kathy Jo PTA  10/1/2020

## 2020-10-01 NOTE — PROGRESS NOTES
"   LOS: 2 days   Patient Care Team:  Nancy Chou APRN as PCP - General    Subjective     Subjective:  Symptoms:  Stable.  She reports diarrhea.  (Reports increased frequency, diarrhea.).    Diet:  No nausea or vomiting.    Pain:  She reports no pain.        History taken from: patient chart    Objective     Vital Signs  Temp:  [96.8 °F (36 °C)-98.2 °F (36.8 °C)] 96.8 °F (36 °C)  Heart Rate:  [73-91] 75  Resp:  [18-20] 18  BP: (126-180)/(61-85) 147/73    Objective:  General Appearance:  In no acute distress.    Vital signs: (most recent): Blood pressure 147/73, pulse 75, temperature 96.8 °F (36 °C), temperature source Oral, resp. rate 18, height 160 cm (63\"), weight 92.4 kg (203 lb 9.6 oz), SpO2 96 %, not currently breastfeeding.    Output: Producing urine and producing stool.    Lungs:  Normal effort.  She is not in respiratory distress.    Abdomen: Abdomen is soft.  (+ CVA tender LEFT).     Neurological: Patient is alert.    Pupils:  Pupils are equal, round, and reactive to light.    Skin:  Warm.              Results Review:    Lab Results (last 24 hours)     Procedure Component Value Units Date/Time    POC Glucose Once [[id number]]  (Abnormal) Collected: 10/01/20 1112    Specimen: Blood Updated: 10/01/20 1229     Glucose 210 mg/dL     POC Glucose Once [[id number]]  (Abnormal) Collected: 10/01/20 0636    Specimen: Blood Updated: 10/01/20 1048     Glucose 179 mg/dL     Basic Metabolic Panel [[id number]]  (Abnormal) Collected: 10/01/20 0703    Specimen: Blood Updated: 10/01/20 0800     Glucose 178 mg/dL      BUN 12 mg/dL      Creatinine 1.36 mg/dL      Sodium 135 mmol/L      Potassium 4.4 mmol/L      Chloride 101 mmol/L      CO2 26.0 mmol/L      Calcium 8.8 mg/dL      eGFR Non African Amer 39 mL/min/1.73      BUN/Creatinine Ratio 8.8     Anion Gap 8.0 mmol/L     Narrative:      GFR Normal >60  Chronic Kidney Disease <60  Kidney Failure <15      POC Glucose Once [[id number]]  (Normal) Collected: 09/30/20 1953    " Specimen: Blood Updated: 10/01/20 0735     Glucose 106 mg/dL     CBC & Differential [[id number]]  (Abnormal) Collected: 10/01/20 0703    Specimen: Blood Updated: 10/01/20 0723    Narrative:      The following orders were created for panel order CBC & Differential.  Procedure                               Abnormality         Status                     ---------                               -----------         ------                     CBC Auto Differential[[id number]]        Abnormal            Final result                 Please view results for these tests on the individual orders.    CBC Auto Differential [[id number]]  (Abnormal) Collected: 10/01/20 0703    Specimen: Blood Updated: 10/01/20 0723     WBC 10.55 10*3/mm3      RBC 4.09 10*6/mm3      Hemoglobin 9.9 g/dL      Hematocrit 31.8 %      MCV 77.8 fL      MCH 24.2 pg      MCHC 31.1 g/dL      RDW 16.7 %      RDW-SD 47.3 fl      MPV 8.9 fL      Platelets 364 10*3/mm3      Neutrophil % 70.7 %      Lymphocyte % 16.5 %      Monocyte % 7.7 %      Eosinophil % 3.8 %      Basophil % 0.6 %      Immature Grans % 0.7 %      Neutrophils, Absolute 7.47 10*3/mm3      Lymphocytes, Absolute 1.74 10*3/mm3      Monocytes, Absolute 0.81 10*3/mm3      Eosinophils, Absolute 0.40 10*3/mm3      Basophils, Absolute 0.06 10*3/mm3      Immature Grans, Absolute 0.07 10*3/mm3      nRBC 0.0 /100 WBC     POC Glucose Once [[id number]]  (Abnormal) Collected: 10/01/20 0009    Specimen: Blood Updated: 10/01/20 0036     Glucose 164 mg/dL     POC Glucose Once [[id number]]  (Normal) Collected: 09/30/20 1921    Specimen: Blood Updated: 09/30/20 1933     Glucose 109 mg/dL          Imaging Results (Last 24 Hours)     Procedure Component Value Units Date/Time    FL Retrograde Pyelogram In OR [[id number]] Resulted: 10/01/20 0838     Updated: 10/01/20 0838           I reviewed the patient's new clinical results.  I reviewed the patient's new imaging results and agree with the interpretation.  I reviewed  the patient's other test results and agree with the interpretation      Assessment/Plan       Type 2 diabetes mellitus without complication (CMS/[facility])    Essential hypertension    HFrEF (heart failure with reduced ejection fraction) (CMS/[facility])    Obesity, Class II, BMI 35-39.9    Obstructive uropathy    Renal stone      Assessment & Plan    POD #1 LEFT J-stent placement  Consulted to Urology for 17 x 12 mm left renal stone causing mild left hydronephrosis, some perinephric fat stranding. Associated CVA tenderness left side that is mild-->resolved. She has a leukocytosis-->resolved. No fever. She has acute kidney injury-->improving. She has confirmed E.Coli UTI from 9/25/20-->antibiotics ongoing.   -WBC 11.86-->12.20-->10.55  -Estimated Creatinine Clearance: 42.8 mL/min (A) (by C-G formula based on SCr of 1.36 mg/dL (H)).   -Cr 3.29-->1.84-->1.36, baseline Cr closer to 1.2.   -10/1/20: complains of increased frequency     Plan:  Will add oxybutynin for increased frequency possibly related to J-stent    BILL Lee  10/01/20  14:19 CDT

## 2020-10-01 NOTE — PLAN OF CARE
Goal Outcome Evaluation:  Plan of Care Reviewed With: patient  Progress: improving  Outcome Summary: VSS, frequently voiding, no new changes, will cont to monitor

## 2020-10-01 NOTE — PROGRESS NOTES
[facility]  INPATIENT PROGRESS NOTE    Length of Stay: 2  Date of Admission: 9/29/2020  Primary Care Physician: Nancy Chou APRN    Subjective   Chief Complaint: No new complaints.    HPI: Patient is seen for follow-up.  She is 69-year-old female with history of diabetes mellitus, hypertension, heart failure with reduced ejection fraction, GERD who was admitted for obstructive uropathy with acute kidney injury, hypoglycemia and acute cystitis/pyelonephritis.    She is doing better, tolerating prescribed diet and remains deconditioned.  She voices no new complaints.      Review of Systems   Constitutional: Positive for activity change and fatigue. Negative for appetite change, chills, diaphoresis and fever.   HENT: Negative for trouble swallowing and voice change.    Eyes: Negative for photophobia and visual disturbance.   Respiratory: Negative for cough, choking, chest tightness, shortness of breath, wheezing and stridor.    Cardiovascular: Negative for chest pain, palpitations and leg swelling.   Gastrointestinal: Negative for abdominal distention, abdominal pain, blood in stool, constipation, diarrhea, nausea and vomiting.   Endocrine: Negative for cold intolerance, heat intolerance, polydipsia, polyphagia and polyuria.   Genitourinary: Negative for decreased urine volume, difficulty urinating, dysuria, enuresis, flank pain, frequency, hematuria and urgency.   Musculoskeletal: Negative for arthralgias, gait problem, myalgias, neck pain and neck stiffness.   Skin: Negative for pallor, rash and wound.   Neurological: Negative for dizziness, tremors, seizures, syncope, facial asymmetry, speech difficulty, weakness, light-headedness, numbness and headaches.   Hematological: Does not bruise/bleed easily.   Psychiatric/Behavioral: Negative for agitation, behavioral problems and confusion.       Objective    Temp:  [97 °F (36.1 °C)-98.2 °F (36.8 °C)] 97.6  °F (36.4 °C)  Heart Rate:  [73-91] 91  Resp:  [18-20] 18  BP: (126-180)/(61-85) 156/69    Physical Exam  Vitals signs and nursing note reviewed.   Constitutional:       General: She is not in acute distress.     Appearance: She is well-developed. She is obese. She is not diaphoretic.   HENT:      Head: Normocephalic and atraumatic.      Right Ear: External ear normal.      Left Ear: External ear normal.      Nose: Nose normal.   Eyes:      Conjunctiva/sclera: Conjunctivae normal.      Pupils: Pupils are equal, round, and reactive to light.   Neck:      Musculoskeletal: Normal range of motion and neck supple.      Thyroid: No thyromegaly.      Vascular: No JVD.   Cardiovascular:      Rate and Rhythm: Normal rate and regular rhythm.      Heart sounds: Normal heart sounds. No murmur. No friction rub. No gallop.    Pulmonary:      Effort: Pulmonary effort is normal. No respiratory distress.      Breath sounds: Normal breath sounds. No stridor. No wheezing or rales.   Chest:      Chest wall: No tenderness.   Abdominal:      General: Bowel sounds are normal. There is no distension.      Palpations: Abdomen is soft. There is no mass.      Tenderness: There is no abdominal tenderness. There is no guarding or rebound.      Hernia: No hernia is present.   Musculoskeletal: Normal range of motion.         General: No tenderness or deformity.   Skin:     General: Skin is warm and dry.      Coloration: Skin is not pale.      Findings: No erythema or rash.   Neurological:      General: No focal deficit present.      Mental Status: She is alert and oriented to person, place, and time. Mental status is at baseline.      Cranial Nerves: No cranial nerve deficit.      Sensory: No sensory deficit.      Motor: No weakness.      Coordination: Coordination normal.      Deep Tendon Reflexes: Reflexes are normal and symmetric. Reflexes normal.   Psychiatric:         Behavior: Behavior normal. Behavior is cooperative.         Thought  Content: Thought content normal.         Judgment: Judgment normal.           Medication Review:    Current Facility-Administered Medications:   •  albuterol (PROVENTIL) nebulizer solution 0.083% 2.5 mg/3mL, 2.5 mg, Nebulization, Q6H PRN, Richar Rojas MD  •  carvedilol (COREG) tablet 25 mg, 25 mg, Oral, BID With Meals, Richar Rojas MD, 25 mg at 10/01/20 0820  •  cefTRIAXone (ROCEPHIN) 1 g/100 mL 0.9% NS (MBP), 1 g, Intravenous, Q24H, Richar Rojas MD, 1 g at 09/30/20 1915  •  dextrose (D50W) 25 g/ 50mL Intravenous Solution 25 g, 25 g, Intravenous, Q15 Min PRN, Richar Rojas MD  •  dextrose (GLUTOSE) oral gel 15 g, 15 g, Oral, Q15 Min PRN, Richar Rojas MD  •  glucagon (human recombinant) (GLUCAGEN DIAGNOSTIC) injection 1 mg, 1 mg, Subcutaneous, Q15 Min PRN, Richar Rojas MD  •  hydrALAZINE (APRESOLINE) tablet 50 mg, 50 mg, Oral, TID, Richar Rojas MD, 50 mg at 10/01/20 0820  •  insulin aspart (novoLOG) injection 0-9 Units, 0-9 Units, Subcutaneous, TID AC, Richar Rojas MD, 2 Units at 10/01/20 0820  •  magic butt ointment, , Topical, PRN, Richar Rojas MD  •  Magnesium Oxide tablet 400 mg, 400 mg, Oral, Daily With Breakfast, Richar Rojas MD, 400 mg at 10/01/20 0820  •  morphine injection 4 mg, 4 mg, Intravenous, Q2H PRN **AND** naloxone (NARCAN) injection 0.4 mg, 0.4 mg, Intravenous, Q5 Min PRN, Richar Rojas MD  •  nystatin (MYCOSTATIN) powder, , Topical, Q12H, Richar Rojas MD  •  ondansetron (ZOFRAN) tablet 4 mg, 4 mg, Oral, Q6H PRN **OR** ondansetron (ZOFRAN) injection 4 mg, 4 mg, Intravenous, Q6H PRN, Richar Rojas MD  •  ondansetron ODT (ZOFRAN-ODT) disintegrating tablet 8 mg, 8 mg, Oral, Q8H PRN, Richar Rojas MD  •  pantoprazole (PROTONIX) EC tablet 40 mg, 40 mg, Oral, QAM, Richar Rojas MD, 40 mg at 10/01/20 0617  •  polyethylene glycol (MIRALAX) packet 17 g, 17 g, Oral, Daily PRN, Richar Rojas MD  •  pravastatin (PRAVACHOL) tablet 40 mg, 40  mg, Oral, Nightly, ElliottRichar MD, 40 mg at 09/30/20 2031  •  sodium chloride 0.9 % flush 10 mL, 10 mL, Intravenous, Q12H, Richar Rojas MD, 10 mL at 09/30/20 2032  •  sodium chloride 0.9 % flush 10 mL, 10 mL, Intravenous, PRN, Richar Rojas MD  •  sodium chloride 0.9 % infusion, 75 mL/hr, Intravenous, Continuous, Maria [facility] HealthEdmond MD, Last Rate: 75 mL/hr at 10/01/20 0953, 75 mL/hr at 10/01/20 0953    Results Review:  I have reviewed the labs, radiology results, and diagnostic studies.    Laboratory Data:   Results from last 7 days   Lab Units 10/01/20  0703 09/30/20 0657 09/29/20  1035   SODIUM mmol/L 135* 135* 134*   POTASSIUM mmol/L 4.4 4.5 4.5   CHLORIDE mmol/L 101 100 97*   CO2 mmol/L 26.0 26.0 27.0   BUN mg/dL 12 21 39*   CREATININE mg/dL 1.36* 1.84* 3.29*   GLUCOSE mg/dL 178* 256* 34*   CALCIUM mg/dL 8.8 9.1 9.1   BILIRUBIN mg/dL  --   --  0.2   ALK PHOS U/L  --   --  119*   ALT (SGPT) U/L  --   --  11   AST (SGOT) U/L  --   --  14   ANION GAP mmol/L 8.0 9.0 10.0     Estimated Creatinine Clearance: 42.8 mL/min (A) (by C-G formula based on SCr of 1.36 mg/dL (H)).  Results from last 7 days   Lab Units 09/29/20  1035   MAGNESIUM mg/dL 2.6*         Results from last 7 days   Lab Units 10/01/20  0703 09/30/20  0657 09/29/20  1035   WBC 10*3/mm3 10.55 12.20* 11.86*   HEMOGLOBIN g/dL 9.9* 9.8* 10.5*   HEMATOCRIT % 31.8* 31.8* 33.2*   PLATELETS 10*3/mm3 364 427 424           Culture Data:   No results found for: BLOODCX  Urine Culture   Date Value Ref Range Status   09/29/2020 <10,000 CFU/mL Gram Negative Bacilli (A)  Final     Comment:     Call if further workup needed.       No results found for: RESPCX  No results found for: WOUNDCX  No results found for: STOOLCX  No components found for: BODYFLD    Radiology Data:   Imaging Results (Last 24 Hours)     Procedure Component Value Units Date/Time    FL Retrograde Pyelogram In OR [[id number]] Resulted: 10/01/20 0838     Updated: 10/01/20 0838           I have reviewed the patient's current medications.     Assessment/Plan     Hospital Problem List:  Active Problems:    Type 2 diabetes mellitus without complication (CMS/[facility])    Essential hypertension    HFrEF (heart failure with reduced ejection fraction) (CMS/[facility])    Obesity, Class II, BMI 35-39.9    Obstructive uropathy    Renal stone    Obstructive uropathy secondary to nephrolithiasis: Patient is postoperative day #1 status post cystoscopy and stent placement.  Continue IV hydration and pain control.  Input by urologist is appreciated.     Pyelonephritis/acute cystitis: Urine culture done 9/25/2020 is positive for E. coli and repeat urine culture showed gram-negative bacilli.  Reactive leukocytosis has resolved.    Hypertension: Stable.  Continue hydralazine and Coreg.  Continue to hold Cozaar secondary to acute renal failure.    History of diabetes mellitus: Stable.  Continue Accu-Cheks and sliding scale insulin.    Hyponatremia (likely hypovolemic): Improving.  Continue isotonic saline, restrict free water and monitor BMP.      Acute on chronic kidney disease stage III (likely prerenal / obstructive uropathy): Patient's baseline creatinine is between 1.2-1.3.  Improving as creatinine is down to 1.36 from a high of 3.29.  Continue to monitor renal function, avoid nephrotoxins and consult nephrologist if the need arises.    Heart failure with reduced ejection fraction: Patient is clinically not in heart failure and hypovolemic.  Diuretic therapy is on hold secondary to hypovolemia and acute kidney injury.    History of GERD: Continue PPI.    Continue DVT prophylaxis.    Deconditioning: Consult PT and OT.    Discharge Planning: In progress.    Edmond Jett MD   10/01/20   11:05 CDT

## 2020-10-01 NOTE — PLAN OF CARE
Problem: Adult Inpatient Plan of Care  Goal: Plan of Care Review  Outcome: Ongoing, Progressing  Flowsheets (Taken 10/1/2020 1613)  Progress: improving  Plan of Care Reviewed With: patient  Outcome Summary: vss. worked with therapy. no complaints of pain or discomfort. resting between care. will continue to monitor.   Goal Outcome Evaluation:  Plan of Care Reviewed With: patient  Progress: improving  Outcome Summary: vss. worked with therapy. no complaints of pain or discomfort. resting between care. will continue to monitor.

## 2020-10-02 ENCOUNTER — READMISSION MANAGEMENT (OUTPATIENT)
Dept: CALL CENTER | Facility: HOSPITAL | Age: 70
End: 2020-10-02

## 2020-10-02 VITALS
SYSTOLIC BLOOD PRESSURE: 155 MMHG | DIASTOLIC BLOOD PRESSURE: 81 MMHG | HEART RATE: 89 BPM | WEIGHT: 202.6 LBS | RESPIRATION RATE: 18 BRPM | BODY MASS INDEX: 35.9 KG/M2 | OXYGEN SATURATION: 95 % | HEIGHT: 63 IN | TEMPERATURE: 97.7 F

## 2020-10-02 LAB
ANION GAP SERPL CALCULATED.3IONS-SCNC: 8 MMOL/L (ref 5–15)
BASOPHILS # BLD AUTO: 0.05 10*3/MM3 (ref 0–0.2)
BASOPHILS NFR BLD AUTO: 0.4 % (ref 0–1.5)
BUN SERPL-MCNC: 8 MG/DL (ref 8–23)
BUN/CREAT SERPL: 7 (ref 7–25)
CALCIUM SPEC-SCNC: 9 MG/DL (ref 8.6–10.5)
CHLORIDE SERPL-SCNC: 104 MMOL/L (ref 98–107)
CO2 SERPL-SCNC: 27 MMOL/L (ref 22–29)
CREAT SERPL-MCNC: 1.15 MG/DL (ref 0.57–1)
DEPRECATED RDW RBC AUTO: 47 FL (ref 37–54)
EOSINOPHIL # BLD AUTO: 0.4 10*3/MM3 (ref 0–0.4)
EOSINOPHIL NFR BLD AUTO: 3.5 % (ref 0.3–6.2)
ERYTHROCYTE [DISTWIDTH] IN BLOOD BY AUTOMATED COUNT: 16.5 % (ref 12.3–15.4)
GFR SERPL CREATININE-BSD FRML MDRD: 47 ML/MIN/1.73
GLUCOSE BLDC GLUCOMTR-MCNC: 121 MG/DL (ref 70–130)
GLUCOSE BLDC GLUCOMTR-MCNC: 164 MG/DL (ref 70–130)
GLUCOSE SERPL-MCNC: 123 MG/DL (ref 65–99)
HCT VFR BLD AUTO: 32.7 % (ref 34–46.6)
HGB BLD-MCNC: 9.8 G/DL (ref 12–15.9)
IMM GRANULOCYTES # BLD AUTO: 0.12 10*3/MM3 (ref 0–0.05)
IMM GRANULOCYTES NFR BLD AUTO: 1 % (ref 0–0.5)
LYMPHOCYTES # BLD AUTO: 1.76 10*3/MM3 (ref 0.7–3.1)
LYMPHOCYTES NFR BLD AUTO: 15.4 % (ref 19.6–45.3)
MCH RBC QN AUTO: 23.7 PG (ref 26.6–33)
MCHC RBC AUTO-ENTMCNC: 30 G/DL (ref 31.5–35.7)
MCV RBC AUTO: 79 FL (ref 79–97)
MONOCYTES # BLD AUTO: 0.93 10*3/MM3 (ref 0.1–0.9)
MONOCYTES NFR BLD AUTO: 8.1 % (ref 5–12)
NEUTROPHILS NFR BLD AUTO: 71.6 % (ref 42.7–76)
NEUTROPHILS NFR BLD AUTO: 8.19 10*3/MM3 (ref 1.7–7)
NRBC BLD AUTO-RTO: 0 /100 WBC (ref 0–0.2)
PLATELET # BLD AUTO: 395 10*3/MM3 (ref 140–450)
PMV BLD AUTO: 9.6 FL (ref 6–12)
POTASSIUM SERPL-SCNC: 4.5 MMOL/L (ref 3.5–5.2)
RBC # BLD AUTO: 4.14 10*6/MM3 (ref 3.77–5.28)
SODIUM SERPL-SCNC: 139 MMOL/L (ref 136–145)
WBC # BLD AUTO: 11.45 10*3/MM3 (ref 3.4–10.8)

## 2020-10-02 PROCEDURE — 85025 COMPLETE CBC W/AUTO DIFF WBC: CPT | Performed by: UROLOGY

## 2020-10-02 PROCEDURE — 80048 BASIC METABOLIC PNL TOTAL CA: CPT | Performed by: UROLOGY

## 2020-10-02 PROCEDURE — 97166 OT EVAL MOD COMPLEX 45 MIN: CPT

## 2020-10-02 PROCEDURE — 82962 GLUCOSE BLOOD TEST: CPT

## 2020-10-02 RX ORDER — GLIMEPIRIDE 1 MG/1
1 TABLET ORAL
Qty: 180 TABLET | Refills: 4 | Status: SHIPPED | OUTPATIENT
Start: 2020-10-02 | End: 2020-10-26 | Stop reason: HOSPADM

## 2020-10-02 RX ORDER — BLOOD-GLUCOSE METER
1 KIT MISCELLANEOUS AS NEEDED
Qty: 1 EACH | Refills: 0 | Status: SHIPPED | OUTPATIENT
Start: 2020-10-02 | End: 2021-12-08

## 2020-10-02 RX ORDER — OXYBUTYNIN CHLORIDE 5 MG/1
2.5 TABLET ORAL 2 TIMES DAILY PRN
Qty: 30 TABLET | Refills: 0 | Status: SHIPPED | OUTPATIENT
Start: 2020-10-02 | End: 2021-12-08

## 2020-10-02 RX ORDER — CEFDINIR 300 MG/1
300 CAPSULE ORAL 2 TIMES DAILY
Qty: 10 CAPSULE | Refills: 0 | Status: SHIPPED | OUTPATIENT
Start: 2020-10-02 | End: 2020-10-07

## 2020-10-02 RX ADMIN — CARVEDILOL 25 MG: 25 TABLET, FILM COATED ORAL at 10:19

## 2020-10-02 RX ADMIN — PANTOPRAZOLE SODIUM 40 MG: 40 TABLET, DELAYED RELEASE ORAL at 05:50

## 2020-10-02 RX ADMIN — Medication 400 MG: at 10:19

## 2020-10-02 RX ADMIN — HYDRALAZINE HYDROCHLORIDE 50 MG: 50 TABLET, FILM COATED ORAL at 10:19

## 2020-10-02 RX ADMIN — NYSTATIN: 100000 POWDER TOPICAL at 10:19

## 2020-10-02 NOTE — DISCHARGE SUMMARY
[facility] Medicine Services  DISCHARGE SUMMARY       Date of Admission: 9/29/2020  Date of Discharge:  10/2/2020  Primary Care Physician: Nancy Chou APRN    Presenting Problem/History of Present Illness:  Hypoglycemia [E16.2]  Pyelonephritis [N12]  Obstructive uropathy [N13.9]  Acute renal failure, unspecified acute renal failure type (CMS/Formerly [facility]) [N17.9]   Patient is 69-year-old female with history of diabetes mellitus, hypertension, heart failure with reduced ejection fraction, GERD who was admitted for obstructive uropathy with acute kidney injury, hypoglycemia and acute cystitis/pyelonephritis.    Final Discharge Diagnoses:  Active Hospital Problems    Diagnosis   • Obstructive uropathy   • Renal stone     Added automatically from request for surgery [id number]     • Obesity, Class II, BMI 35-39.9   • HFrEF (heart failure with reduced ejection fraction) (CMS/Formerly [facility])   • Type 2 diabetes mellitus without complication (CMS/Formerly [facility])   • Essential hypertension       Consults:   Consults     Date and Time Order Name Status Description    9/29/2020 1728 Inpatient Urology Consult Completed           Procedures Performed: Procedure(s):  CYSTOSCOPY LEFT URETEROSCOPY RETROGRADE PYELOGRAM STENT INSERTION                Pertinent Test Results:   Lab Results (last 7 days)     Procedure Component Value Units Date/Time    POC Glucose Once [[id number]]  (Normal) Collected: 10/02/20 0754    Specimen: Blood Updated: 10/02/20 0820     Glucose 121 mg/dL     Basic Metabolic Panel [[id number]]  (Abnormal) Collected: 10/02/20 0526    Specimen: Blood Updated: 10/02/20 0640     Glucose 123 mg/dL      BUN 8 mg/dL      Creatinine 1.15 mg/dL      Sodium 139 mmol/L      Potassium 4.5 mmol/L      Chloride 104 mmol/L      CO2 27.0 mmol/L      Calcium 9.0 mg/dL      eGFR Non African Amer 47 mL/min/1.73      BUN/Creatinine Ratio 7.0     Anion Gap 8.0 mmol/L     Narrative:      GFR Normal >60  Chronic Kidney  Disease <60  Kidney Failure <15      CBC & Differential [[id number]]  (Abnormal) Collected: 10/02/20 0526    Specimen: Blood Updated: 10/02/20 0624    Narrative:      The following orders were created for panel order CBC & Differential.  Procedure                               Abnormality         Status                     ---------                               -----------         ------                     CBC Auto Differential[[id number]]        Abnormal            Final result                 Please view results for these tests on the individual orders.    CBC Auto Differential [[id number]]  (Abnormal) Collected: 10/02/20 0526    Specimen: Blood Updated: 10/02/20 0624     WBC 11.45 10*3/mm3      RBC 4.14 10*6/mm3      Hemoglobin 9.8 g/dL      Hematocrit 32.7 %      MCV 79.0 fL      MCH 23.7 pg      MCHC 30.0 g/dL      RDW 16.5 %      RDW-SD 47.0 fl      MPV 9.6 fL      Platelets 395 10*3/mm3      Neutrophil % 71.6 %      Lymphocyte % 15.4 %      Monocyte % 8.1 %      Eosinophil % 3.5 %      Basophil % 0.4 %      Immature Grans % 1.0 %      Neutrophils, Absolute 8.19 10*3/mm3      Lymphocytes, Absolute 1.76 10*3/mm3      Monocytes, Absolute 0.93 10*3/mm3      Eosinophils, Absolute 0.40 10*3/mm3      Basophils, Absolute 0.05 10*3/mm3      Immature Grans, Absolute 0.12 10*3/mm3      nRBC 0.0 /100 WBC     POC Glucose Once [[id number]]  (Abnormal) Collected: 10/01/20 1930    Specimen: Blood Updated: 10/01/20 1954     Glucose 201 mg/dL     POC Glucose Once [[id number]]  (Normal) Collected: 10/01/20 1628    Specimen: Blood Updated: 10/01/20 1734     Glucose 85 mg/dL     POC Glucose Once [[id number]]  (Abnormal) Collected: 10/01/20 1112    Specimen: Blood Updated: 10/01/20 1229     Glucose 210 mg/dL     POC Glucose Once [[id number]]  (Abnormal) Collected: 10/01/20 0636    Specimen: Blood Updated: 10/01/20 1048     Glucose 179 mg/dL     Basic Metabolic Panel [[id number]]  (Abnormal) Collected: 10/01/20 0703    Specimen: Blood  Updated: 10/01/20 0800     Glucose 178 mg/dL      BUN 12 mg/dL      Creatinine 1.36 mg/dL      Sodium 135 mmol/L      Potassium 4.4 mmol/L      Chloride 101 mmol/L      CO2 26.0 mmol/L      Calcium 8.8 mg/dL      eGFR Non African Amer 39 mL/min/1.73      BUN/Creatinine Ratio 8.8     Anion Gap 8.0 mmol/L     Narrative:      GFR Normal >60  Chronic Kidney Disease <60  Kidney Failure <15      POC Glucose Once [[id number]]  (Normal) Collected: 09/30/20 1953    Specimen: Blood Updated: 10/01/20 0735     Glucose 106 mg/dL     CBC & Differential [[id number]]  (Abnormal) Collected: 10/01/20 0703    Specimen: Blood Updated: 10/01/20 0723    Narrative:      The following orders were created for panel order CBC & Differential.  Procedure                               Abnormality         Status                     ---------                               -----------         ------                     CBC Auto Differential[[id number]]        Abnormal            Final result                 Please view results for these tests on the individual orders.    CBC Auto Differential [[id number]]  (Abnormal) Collected: 10/01/20 0703    Specimen: Blood Updated: 10/01/20 0723     WBC 10.55 10*3/mm3      RBC 4.09 10*6/mm3      Hemoglobin 9.9 g/dL      Hematocrit 31.8 %      MCV 77.8 fL      MCH 24.2 pg      MCHC 31.1 g/dL      RDW 16.7 %      RDW-SD 47.3 fl      MPV 8.9 fL      Platelets 364 10*3/mm3      Neutrophil % 70.7 %      Lymphocyte % 16.5 %      Monocyte % 7.7 %      Eosinophil % 3.8 %      Basophil % 0.6 %      Immature Grans % 0.7 %      Neutrophils, Absolute 7.47 10*3/mm3      Lymphocytes, Absolute 1.74 10*3/mm3      Monocytes, Absolute 0.81 10*3/mm3      Eosinophils, Absolute 0.40 10*3/mm3      Basophils, Absolute 0.06 10*3/mm3      Immature Grans, Absolute 0.07 10*3/mm3      nRBC 0.0 /100 WBC     POC Glucose Once [[id number]]  (Abnormal) Collected: 10/01/20 0009    Specimen: Blood Updated: 10/01/20 0036     Glucose 164 mg/dL     POC  Glucose Once [[id number]]  (Normal) Collected: 09/30/20 1921    Specimen: Blood Updated: 09/30/20 1933     Glucose 109 mg/dL     POC Glucose Once [[id number]]  (Normal) Collected: 09/30/20 1034    Specimen: Blood Updated: 09/30/20 1218     Glucose 130 mg/dL     Urine Culture - Urine, Urine, Clean Catch [[id number]]  (Abnormal) Collected: 09/29/20 1104    Specimen: Urine, Clean Catch Updated: 09/30/20 1157     Urine Culture <10,000 CFU/mL Gram Negative Bacilli     Comment: Call if further workup needed.         Basic Metabolic Panel [[id number]]  (Abnormal) Collected: 09/30/20 0657    Specimen: Blood Updated: 09/30/20 0814     Glucose 256 mg/dL      BUN 21 mg/dL      Creatinine 1.84 mg/dL      Sodium 135 mmol/L      Potassium 4.5 mmol/L      Chloride 100 mmol/L      CO2 26.0 mmol/L      Calcium 9.1 mg/dL      eGFR Non African Amer 27 mL/min/1.73      BUN/Creatinine Ratio 11.4     Anion Gap 9.0 mmol/L     Narrative:      GFR Normal >60  Chronic Kidney Disease <60  Kidney Failure <15      CBC & Differential [[id number]]  (Abnormal) Collected: 09/30/20 0657    Specimen: Blood Updated: 09/30/20 0747    Narrative:      The following orders were created for panel order CBC & Differential.  Procedure                               Abnormality         Status                     ---------                               -----------         ------                     CBC Auto Differential[[id number]]        Abnormal            Final result                 Please view results for these tests on the individual orders.    CBC Auto Differential [[id number]]  (Abnormal) Collected: 09/30/20 0657    Specimen: Blood Updated: 09/30/20 0747     WBC 12.20 10*3/mm3      RBC 4.12 10*6/mm3      Hemoglobin 9.8 g/dL      Hematocrit 31.8 %      MCV 77.2 fL      MCH 23.8 pg      MCHC 30.8 g/dL      RDW 16.5 %      RDW-SD 46.6 fl      MPV 9.6 fL      Platelets 427 10*3/mm3      Neutrophil % 72.3 %      Lymphocyte % 12.6 %      Monocyte % 9.2 %       Eosinophil % 4.7 %      Basophil % 0.5 %      Immature Grans % 0.7 %      Neutrophils, Absolute 8.83 10*3/mm3      Lymphocytes, Absolute 1.54 10*3/mm3      Monocytes, Absolute 1.12 10*3/mm3      Eosinophils, Absolute 0.57 10*3/mm3      Basophils, Absolute 0.06 10*3/mm3      Immature Grans, Absolute 0.08 10*3/mm3      nRBC 0.0 /100 WBC     POC Glucose Once [[id number]]  (Abnormal) Collected: 09/30/20 0612    Specimen: Blood Updated: 09/30/20 0742     Glucose 246 mg/dL     COVID-19, [facility] IN-HOUSE, NP SWAB IN TRANSPORT MEDIA 8-10 HR TAT - Swab, Nasopharynx [[id number]]  (Normal) Collected: 09/30/20 0021    Specimen: Swab from Nasopharynx Updated: 09/30/20 0512     COVID19 Not Detected    Narrative:      Testing performed by Real Time RT-PCR  This test has not been approved by the [facility] but is authorized under the Emergency Use Act (EUA)    Fact sheet for providers: https://www.fda.gov/media/231214/download    Fact sheet for patients: https://www.fda.gov/media/562061/download        POC Glucose Once [[id number]]  (Abnormal) Collected: 09/30/20 0010    Specimen: Blood Updated: 09/30/20 0453     Glucose 181 mg/dL     POC Glucose Once [[id number]]  (Abnormal) Collected: 09/29/20 1948    Specimen: Blood Updated: 09/29/20 2019     Glucose 189 mg/dL     POC Glucose Once [[id number]]  (Abnormal) Collected: 09/29/20 1806    Specimen: Blood Updated: 09/29/20 1819     Glucose 167 mg/dL     POC Glucose Once [[id number]]  (Abnormal) Collected: 09/29/20 1556    Specimen: Blood Updated: 09/29/20 1734     Glucose 56 mg/dL     POC Glucose Once [[id number]]  (Normal) Collected: 09/29/20 1258    Specimen: Blood Updated: 09/29/20 1346     Glucose 101 mg/dL     Comprehensive Metabolic Panel [[id number]]  (Abnormal) Collected: 09/29/20 1035    Specimen: Blood Updated: 09/29/20 1151     Glucose 34 mg/dL      BUN 39 mg/dL      Creatinine 3.29 mg/dL      Sodium 134 mmol/L      Potassium 4.5 mmol/L      Chloride 97 mmol/L      CO2 27.0 mmol/L       Calcium 9.1 mg/dL      Total Protein 7.1 g/dL      Albumin 3.60 g/dL      ALT (SGPT) 11 U/L      AST (SGOT) 14 U/L      Alkaline Phosphatase 119 U/L      Total Bilirubin 0.2 mg/dL      eGFR Non African Amer 14 mL/min/1.73      Comment: <15 Indicative of kidney failure.        eGFR   Amer --     Comment: <15 Indicative of kidney failure.        Globulin 3.5 gm/dL      A/G Ratio 1.0 g/dL      BUN/Creatinine Ratio 11.9     Anion Gap 10.0 mmol/L     Narrative:      GFR Normal >60  Chronic Kidney Disease <60  Kidney Failure <15      [place] Draw [[id number]] Collected: 09/29/20 1035    Specimen: Blood Updated: 09/29/20 1146    Narrative:      The following orders were created for panel order [place] Draw.  Procedure                               Abnormality         Status                     ---------                               -----------         ------                     Light Blue Top[[id number]]                                   Final result               Green Top (Gel)[[id number]]                                  Final result               Lavender Top[[id number]]                                     Final result               Gold Top - SST[[id number]]                                   Final result                 Please view results for these tests on the individual orders.    Light Blue Top [[id number]] Collected: 09/29/20 1035    Specimen: Blood Updated: 09/29/20 1146     Extra Tube hold for add-on     Comment: Auto resulted       Green Top (Gel) [[id number]] Collected: 09/29/20 1035    Specimen: Blood Updated: 09/29/20 1146     Extra Tube Hold for add-ons.     Comment: Auto resulted.       Lavender Top [[id number]] Collected: 09/29/20 1035    Specimen: Blood Updated: 09/29/20 1146     Extra Tube hold for add-on     Comment: Auto resulted       Gold Top - SST [[id number]] Collected: 09/29/20 1035    Specimen: Blood Updated: 09/29/20 1146     Extra Tube Hold for add-ons.     Comment: Auto resulted.       Lipase  [[id number]]  (Normal) Collected: 09/29/20 1035    Specimen: Blood Updated: 09/29/20 1124     Lipase 55 U/L     Magnesium [[id number]]  (Abnormal) Collected: 09/29/20 1035    Specimen: Blood Updated: 09/29/20 1124     Magnesium 2.6 mg/dL     Troponin [[id number]]  (Normal) Collected: 09/29/20 1035    Specimen: Blood Updated: 09/29/20 1123     Troponin T <0.010 ng/mL     Narrative:      Troponin T Reference Range:  <= 0.03 ng/mL-   Negative for AMI  >0.03 ng/mL-     Abnormal for myocardial necrosis.  Clinicians would have to utilize clinical acumen, EKG, Troponin and serial changes to determine if it is an Acute Myocardial Infarction or myocardial injury due to an underlying chronic condition.       Results may be falsely decreased if patient taking Biotin.      BNP [[id number]]  (Abnormal) Collected: 09/29/20 1035    Specimen: Blood Updated: 09/29/20 1122     proBNP 1,021.0 pg/mL     Narrative:      Among patients with dyspnea, NT-proBNP is highly sensitive for the detection of acute congestive heart failure. In addition NT-proBNP of <300 pg/ml effectively rules out acute congestive heart failure with 99% negative predictive value.    Results may be falsely decreased if patient taking Biotin.      Urinalysis, Microscopic Only - Urine, Clean Catch [[id number]]  (Abnormal) Collected: 09/29/20 1104    Specimen: Urine, Clean Catch Updated: 09/29/20 1113     RBC, UA 3-5 /HPF      WBC, UA 21-30 /HPF      Bacteria, UA None Seen /HPF      Squamous Epithelial Cells, UA 6-12 /HPF      Hyaline Casts, UA 3-6 /LPF      Methodology Automated Microscopy    Urinalysis With Culture If Indicated - Urine, Clean Catch [[id number]]  (Abnormal) Collected: 09/29/20 1104    Specimen: Urine, Clean Catch Updated: 09/29/20 1112     Color, UA Yellow     Appearance, UA Cloudy     pH, UA 6.0     Specific Gravity, UA 1.009     Glucose, UA Negative     Ketones, UA Negative     Bilirubin, UA Negative     Blood, UA Negative     Protein, UA Negative      Leuk Esterase, UA Moderate (2+)     Nitrite, UA Negative     Urobilinogen, UA 0.2 E.U./dL    CBC & Differential [[id number]]  (Abnormal) Collected: 09/29/20 1035    Specimen: Blood Updated: 09/29/20 1103    Narrative:      The following orders were created for panel order CBC & Differential.  Procedure                               Abnormality         Status                     ---------                               -----------         ------                     CBC Auto Differential[[id number]]        Abnormal            Final result                 Please view results for these tests on the individual orders.    CBC Auto Differential [[id number]]  (Abnormal) Collected: 09/29/20 1035    Specimen: Blood Updated: 09/29/20 1103     WBC 11.86 10*3/mm3      RBC 4.34 10*6/mm3      Hemoglobin 10.5 g/dL      Hematocrit 33.2 %      MCV 76.5 fL      MCH 24.2 pg      MCHC 31.6 g/dL      RDW 16.8 %      RDW-SD 46.7 fl      MPV 9.5 fL      Platelets 424 10*3/mm3      Neutrophil % 80.5 %      Lymphocyte % 9.5 %      Monocyte % 5.8 %      Eosinophil % 3.4 %      Basophil % 0.3 %      Immature Grans % 0.5 %      Neutrophils, Absolute 9.54 10*3/mm3      Lymphocytes, Absolute 1.13 10*3/mm3      Monocytes, Absolute 0.69 10*3/mm3      Eosinophils, Absolute 0.40 10*3/mm3      Basophils, Absolute 0.04 10*3/mm3      Immature Grans, Absolute 0.06 10*3/mm3      nRBC 0.0 /100 WBC         Imaging Results (Last 7 Days)     Procedure Component Value Units Date/Time    FL Retrograde Pyelogram In OR [[id number]] Resulted: 10/01/20 0838     Updated: 10/01/20 0838    CT Abdomen Pelvis Without Contrast [[id number]] Collected: 09/29/20 1208     Updated: 09/29/20 1243    Narrative:      EXAM: CT ABDOMEN PELVIS WITHOUT IV CONTRAST    COMPARISONS: None    INDICATION: right side pain    TECHNIQUE: Noncontrast CT images were obtained through the  abdomen and pelvis.  Coronal and sagittal reformats were  provided.      FINDINGS:  Note, the lack of intravenous  contrast limits the evaluation of  the vasculature and viscera in the abdomen and pelvis.    Liver: Liver is normal in size and contour. No intrahepatic  ductal dilatation.  Spleen: Within normal limits.  Pancreas: Within normal limits.  Gallbladder: Decompressed    Adrenals: Within normal limits.  Kidneys: There is a 17 x 12 mm left nephrolith. There is trace  left perinephric fat stranding and minimal left hydronephrosis.  Ureters: Visualized portions are within normal limits.   Bladder: Partially distended without focal abnormality.   : Hysterectomy. No adnexal masses.    Stomach: Small hiatal hernia.  Bowel: Small bowel is unremarkable. There are distal and sigmoid  colonic diverticula without diverticulitis.  Appendix: Within normal limits.    Vasculature: Major abdominal vasculature is normal in course and  caliber. There is aortoiliac atherosclerosis.    Miscellaneous: No significant free fluid, focal fluid collection,  pneumoperitoneum, or bulky lymphadenopathy.    Musculoskeletal: No acute fracture or suspicious osseous lesion.  There is vertebral body hemangioma noted within T12. Mild  spondylosis and degenerative disc disease of the thoracic and  lumbosacral spine.    Lungs: Lung bases are unremarkable.      Impression:      Obstructing 17 x 12 mm left nephrolith with mild left  hydronephrosis and left perinephric fat stranding.    Distal and diverticulosis without diverticulitis.    Small hiatal hernia.    Electronically signed by:  Rodrigo Sterling MD  9/29/2020  12:42 PM CDT Workstation: [id number]    XR Chest 1 View [[id number]] Collected: 09/29/20 1123     Updated: 09/29/20 1228    Narrative:      PROCEDURE: Portable chest x-ray    TECHNIQUE: Single frontal view of the chest    COMPARISON: 3/17/2016    HISTORY: dizziness    FINDINGS:  Cardiac silhouette is normal in size. Thoracic aorta contains  atherosclerotic calcification. Mild pulmonary vascular  congestion.  No pleural effusion or  pneumothorax.      Impression:      CONCLUSION:  Pulmonary vascular congestion.    Electronically signed by:  Salas Palomo MD  9/29/2020 12:27 PM  CDT Workstation: [id number]            Chief Complaint on Day of Discharge: None.    Hospital Course:  The patient was admitted and managed as follows:    Obstructive uropathy secondary to nephrolithiasis:  She was started on IV hydration and pain control.  She was seen by the urologist and had cystoscopy and stent placement.  She was recommended oxybutynin as needed, cleared for discharge by the urologist and will be seen for follow-up in 1 week.       Pyelonephritis/acute cystitis: Patient was started on IV antibiotics.  Urine culture done 9/25/2020 was positive for E. coli and repeat urine culture showed gram-negative bacilli.  Antibiotic was de-escalated to Omnicef on discharge. Reactive leukocytosis has resolved.     History of diabetes mellitus: Patient was hypoglycemic on admission requiring dextrose infusion.  Blood sugar did improve and she was started on Accu-Cheks and sliding scale insulin.  Dose of glimepiride was changed to once daily on discharge.  She was also prescribed glucometer on discharge.      Hyponatremia (likely hypovolemic):  Resolved with isotonic saline.         Acute on chronic kidney disease stage III (likely prerenal / obstructive uropathy): Patient's baseline creatinine is between 1.2-1.3.  Creatinine was down to 1.15 on discharge following IV hydration.     Heart failure with reduced ejection fraction: Patient patient was hypovolemic on admission requiring IV resuscitation.  She was euvolemic prior to discharge and her home medications including diuretic and losartan were resumed.     She also benefited from PT and OT secondary to deconditioning and will be set up for transitional visit with home health.    Condition on Discharge: Stable and improved.    Physical Exam on Discharge:  /81 (BP Location: Right arm, Patient Position:  "Lying)   Pulse 89   Temp 97.7 °F (36.5 °C) (Oral)   Resp 18   Ht 160 cm (63\")   Wt 91.9 kg (202 lb 9.6 oz)   LMP  (LMP Unknown)   SpO2 95%   BMI 35.89 kg/m²   Physical Exam  Vitals signs and nursing note reviewed.   Constitutional:       General: She is not in acute distress.     Appearance: She is well-developed. She is obese. She is not diaphoretic.   HENT:      Head: Normocephalic and atraumatic.      Right Ear: External ear normal.      Left Ear: External ear normal.      Nose: Nose normal.   Eyes:      Conjunctiva/sclera: Conjunctivae normal.      Pupils: Pupils are equal, round, and reactive to light.   Neck:      Musculoskeletal: Normal range of motion and neck supple.      Thyroid: No thyromegaly.      Vascular: No JVD.   Cardiovascular:      Rate and Rhythm: Normal rate and regular rhythm.      Heart sounds: Normal heart sounds. No murmur. No friction rub. No gallop.    Pulmonary:      Effort: Pulmonary effort is normal. No respiratory distress.      Breath sounds: Normal breath sounds. No stridor. No wheezing or rales.   Chest:      Chest wall: No tenderness.   Abdominal:      General: Bowel sounds are normal. There is no distension.      Palpations: Abdomen is soft. There is no mass.      Tenderness: There is no abdominal tenderness. There is no guarding or rebound.      Hernia: No hernia is present.   Musculoskeletal: Normal range of motion.         General: No tenderness or deformity.   Skin:     General: Skin is warm and dry.      Coloration: Skin is not pale.      Findings: No erythema or rash.   Neurological:      General: No focal deficit present.      Mental Status: She is alert and oriented to person, place, and time. Mental status is at baseline.      Cranial Nerves: No cranial nerve deficit.      Sensory: No sensory deficit.      Coordination: Coordination normal.      Deep Tendon Reflexes: Reflexes are normal and symmetric.   Psychiatric:         Mood and Affect: Mood normal.         " Behavior: Behavior normal. Behavior is cooperative.         Thought Content: Thought content normal.         Judgment: Judgment normal.           Discharge Disposition:  Home or Self Care    Discharge Medications:     Discharge Medications      New Medications      Instructions Start Date   cefdinir 300 MG capsule  Commonly known as: OMNICEF   300 mg, Oral, 2 Times Daily      glucose monitor monitoring kit   1 each, Does not apply, As Needed      oxybutynin 5 MG tablet  Commonly known as: DITROPAN   2.5 mg, Oral, 2 Times Daily PRN         Changes to Medications      Instructions Start Date   furosemide 20 MG tablet  Commonly known as: LASIX  What changed:   · how much to take  · additional instructions   TAKE 1 TABLET BY MOUTH EVERY SUNDAY, MONDAY, AND THURSDAY MORNINGS      glimepiride 1 MG tablet  Commonly known as: AMARYL  What changed: when to take this   1 mg, Oral, Every Morning Before Breakfast      MAGnesium-Oxide 400 (241.3 Mg) MG tablet tablet  Generic drug: magnesium oxide  What changed:   · how much to take  · how to take this  · when to take this  · additional instructions   TAKE 1 TABLET BY MOUTH EVERY DAY WITH FOOD         Continue These Medications      Instructions Start Date   albuterol sulfate  (90 Base) MCG/ACT inhaler  Commonly known as: PROVENTIL HFA;VENTOLIN HFA;PROAIR HFA   2 puffs, Inhalation, Every 4 Hours PRN      aspirin 81 MG tablet   81 mg, Oral, Daily      carvedilol 25 MG tablet  Commonly known as: COREG   25 mg, Oral, 2 Times Daily With Meals      FreeStyle Bennett Sensor System   1 each, Does not apply, Every 7 Days      hydrALAZINE 50 MG tablet  Commonly known as: APRESOLINE   50 mg, Oral, 3 Times Daily      loratadine 10 MG tablet  Commonly known as: CLARITIN   10 mg, Oral, Daily      olmesartan 40 MG tablet  Commonly known as: BENICAR   40 mg, Oral, Daily With Dinner      omeprazole 40 MG capsule  Commonly known as: priLOSEC   40 mg, Oral, Daily      ondansetron 4 MG  tablet  Commonly known as: Zofran   4 mg, Oral, Every 8 Hours PRN      polyethylene glycol 17 g packet  Commonly known as: MIRALAX   17 g, Oral, Daily PRN      pravastatin 40 MG tablet  Commonly known as: PRAVACHOL   40 mg, Oral, Nightly         Stop These Medications    cefuroxime 500 MG tablet  Commonly known as: Ceftin     ondansetron ODT 8 MG disintegrating tablet  Commonly known as: Zofran ODT            Discharge Diet: Cardiac and diabetic.    Activity at Discharge: As tolerated.    Discharge Care Plan/Instructions: Patient has been advised to take her medications as prescribed and to return to the emergency room in the event of any worsening symptoms.    Follow-up Appointments:   Future Appointments   Date Time Provider Department Center   3/8/2021  8:45 AM Nancy Chou APRN [facility]  [facility] None   Follow-up with Dr. Rojas in 1 week.    Test Results Pending at Discharge:     Edmond Jett MD  10/02/20  10:40 CDT    Time: 35 minutes.

## 2020-10-02 NOTE — PAYOR COMM NOTE
"  Yi Bah, GISELE [facility]  [phone]     Phone  [phone]       Fax  Cont stay review    Afia Adams (69 y.o. 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 Marital Status          Other        Admission Date Admission Type Admitting Provider Attending Provider Department, Room/Bed    9/29/20 Emergency Xu Blanchard MD Williams, Kevin L, MD [address], 416/2    Discharge Date Discharge Disposition Discharge Destination                       Attending Provider: Xu Blanchard MD    Allergies: Bactrim [Sulfamethoxazole-trimethoprim]    Isolation: None   Infection: None   Code Status: CPR    Ht: 160 cm (63\")   Wt: 91.9 kg (202 lb 9.6 oz)    Admission Cmt: None   Principal Problem: None                Active Insurance as of 9/29/2020     Primary Coverage     Payor Plan Insurance Group Employer/Plan Group    ANTHEM MEDICARE REPLACEMENT ANTHEM MEDICARE ADVANTAGE [id number]     Payor Plan Address Payor Plan Phone Number Payor Plan Fax Number Effective Dates    [address] [phone]  8/1/2017 - None Entered    [facility] [id number]       Subscriber Name Subscriber Birth Date Member ID       AFIA ADAMS 1950 [id number]                 Emergency Contacts      (Rel.) Home Phone Work Phone Mobile Phone    Adams,Robert (Spouse) [phone]    RASHIVALENTINA (Relative) [phone] -- [phone]            Vital Signs (last day)     Date/Time   Temp   Temp src   Pulse   Resp   BP   Patient Position   SpO2    10/02/20 0840   --   --   93   --   --   --   --    10/02/20 0340   97.3 (36.3)   Oral   83   18   158/82   Lying   --    10/02/20 0154   --   --   76   --   --   --   --    10/02/20 0103   98.3 (36.8)   Oral   --   --   --   --   --    " 10/01/20 2115   --   --   84   18   144/86   Lying   94    10/01/20 1614   97.7 (36.5)   Oral   84   18   148/80   Lying   96    10/01/20 1540   --   --   80   --   --   --   --    10/01/20 1108   96.8 (36)   Oral   75   18   147/73   Lying   96    10/01/20 0819   97.6 (36.4)   Oral   91   18   156/69   Lying   95    10/01/20 0736   --   --   85   --   --   --   --    10/01/20 0342   98 (36.7)   Oral   73   18   144/61   Lying   95    10/01/20 0144   --   --   77   --   --   --   --              Oxygen Therapy (last day)     Date/Time   SpO2   Device (Oxygen Therapy)   Flow (L/min)   Oxygen Concentration (%)   ETCO2 (mmHg)    10/01/20 2115   94   room air   --   --   --    10/01/20 2041   --   room air   --   --   --    10/01/20 1614   96   room air   --   --   --    10/01/20 1108   96   room air   --   --   --    10/01/20 0819   95   room air   --   --   --    10/01/20 0342   95   room air   --   --   --              Current Facility-Administered Medications   Medication Dose Route Frequency Provider Last Rate Last Dose   • albuterol (PROVENTIL) nebulizer solution 0.083% 2.5 mg/3mL  2.5 mg Nebulization Q6H PRN Richar Rojas MD       • carvedilol (COREG) tablet 25 mg  25 mg Oral BID With Meals Richar Rojas MD   25 mg at 10/01/20 1715   • cefTRIAXone (ROCEPHIN) 1 g/100 mL 0.9% NS (MBP)  1 g Intravenous Q24H Richar Rojas MD   1 g at 10/01/20 1827   • dextrose (D50W) 25 g/ 50mL Intravenous Solution 25 g  25 g Intravenous Q15 Min PRN Richar Rojas MD       • dextrose (GLUTOSE) oral gel 15 g  15 g Oral Q15 Min PRN Richar Rojas MD       • glucagon (human recombinant) (GLUCAGEN DIAGNOSTIC) injection 1 mg  1 mg Subcutaneous Q15 Min PRN Richar Rojas MD       • hydrALAZINE (APRESOLINE) tablet 50 mg  50 mg Oral TID Richar Rojas MD   50 mg at 10/01/20 2116   • insulin aspart (novoLOG) injection 0-9 Units  0-9 Units Subcutaneous TID AC Richar Rojas MD   4 Units at 10/01/20 1119   • magic  butt ointment   Topical PRN Richar Rojas MD       • Magnesium Oxide tablet 400 mg  400 mg Oral Daily With Breakfast Richar Rojas MD   400 mg at 10/01/20 0820   • morphine injection 4 mg  4 mg Intravenous Q2H PRN Richar Rojas MD        And   • naloxone (NARCAN) injection 0.4 mg  0.4 mg Intravenous Q5 Min PRN Richar Rojas MD       • nystatin (MYCOSTATIN) powder   Topical Q12H Richar Rojas MD       • ondansetron (ZOFRAN) tablet 4 mg  4 mg Oral Q6H PRN Richar Rojas MD        Or   • ondansetron (ZOFRAN) injection 4 mg  4 mg Intravenous Q6H PRN Richar Rojas MD       • ondansetron ODT (ZOFRAN-ODT) disintegrating tablet 8 mg  8 mg Oral Q8H PRN Richar Rojas MD       • oxybutynin (DITROPAN) half tablet 2.5 mg  2.5 mg Oral BID PRN Anne Evans, BILL       • pantoprazole (PROTONIX) EC tablet 40 mg  40 mg Oral QAM Richar Rojas MD   40 mg at 10/02/20 0550   • polyethylene glycol (MIRALAX) packet 17 g  17 g Oral Daily PRN Richar Rojas MD   17 g at 10/01/20 1125   • pravastatin (PRAVACHOL) tablet 40 mg  40 mg Oral Nightly Richar Rojas MD   40 mg at 10/01/20 2116   • sodium chloride 0.9 % flush 10 mL  10 mL Intravenous Q12H Richar Rojas MD   10 mL at 10/01/20 2116   • sodium chloride 0.9 % flush 10 mL  10 mL Intravenous PRN Richar Rojas MD       • sodium chloride 0.9 % infusion  75 mL/hr Intravenous Continuous EchEdmond chinchilla MD 75 mL/hr at 10/02/20 0604 75 mL/hr at 10/02/20 0604     Lab Results (last 24 hours)     Procedure Component Value Units Date/Time    POC Glucose Once [[id number]]  (Normal) Collected: 10/02/20 0754    Specimen: Blood Updated: 10/02/20 0820     Glucose 121 mg/dL     Basic Metabolic Panel [[id number]]  (Abnormal) Collected: 10/02/20 0526    Specimen: Blood Updated: 10/02/20 0640     Glucose 123 mg/dL      BUN 8 mg/dL      Creatinine 1.15 mg/dL      Sodium 139 mmol/L      Potassium 4.5 mmol/L      Chloride 104 mmol/L      CO2 27.0 mmol/L       Calcium 9.0 mg/dL      eGFR Non African Amer 47 mL/min/1.73      BUN/Creatinine Ratio 7.0     Anion Gap 8.0 mmol/L     Narrative:      GFR Normal >60  Chronic Kidney Disease <60  Kidney Failure <15      CBC & Differential [[id number]]  (Abnormal) Collected: 10/02/20 0526    Specimen: Blood Updated: 10/02/20 0624    Narrative:      The following orders were created for panel order CBC & Differential.  Procedure                               Abnormality         Status                     ---------                               -----------         ------                     CBC Auto Differential[[id number]]        Abnormal            Final result                 Please view results for these tests on the individual orders.    CBC Auto Differential [[id number]]  (Abnormal) Collected: 10/02/20 0526    Specimen: Blood Updated: 10/02/20 0624     WBC 11.45 10*3/mm3      RBC 4.14 10*6/mm3      Hemoglobin 9.8 g/dL      Hematocrit 32.7 %      MCV 79.0 fL      MCH 23.7 pg      MCHC 30.0 g/dL      RDW 16.5 %      RDW-SD 47.0 fl      MPV 9.6 fL      Platelets 395 10*3/mm3      Neutrophil % 71.6 %      Lymphocyte % 15.4 %      Monocyte % 8.1 %      Eosinophil % 3.5 %      Basophil % 0.4 %      Immature Grans % 1.0 %      Neutrophils, Absolute 8.19 10*3/mm3      Lymphocytes, Absolute 1.76 10*3/mm3      Monocytes, Absolute 0.93 10*3/mm3      Eosinophils, Absolute 0.40 10*3/mm3      Basophils, Absolute 0.05 10*3/mm3      Immature Grans, Absolute 0.12 10*3/mm3      nRBC 0.0 /100 WBC     POC Glucose Once [[id number]]  (Abnormal) Collected: 10/01/20 1930    Specimen: Blood Updated: 10/01/20 1954     Glucose 201 mg/dL     POC Glucose Once [[id number]]  (Normal) Collected: 10/01/20 1628    Specimen: Blood Updated: 10/01/20 1734     Glucose 85 mg/dL     POC Glucose Once [[id number]]  (Abnormal) Collected: 10/01/20 1112    Specimen: Blood Updated: 10/01/20 1229     Glucose 210 mg/dL     POC Glucose Once [[id number]]  (Abnormal) Collected: 10/01/20  "0636    Specimen: Blood Updated: 10/01/20 1048     Glucose 179 mg/dL         Imaging Results (Last 24 Hours)     ** No results found for the last 24 hours. **           Physician Progress Notes (last 24 hours) (Notes from 10/01/20 0938 through 10/02/20 0938)      Anne Evans APRN at 10/01/20 1400     Attestation signed by [place], Richar BANGURA MD at 10/01/20 1814    I have reviewed this documentation and agree.                       LOS: 2 days   Patient Care Team:  Nancy Chou APRN as PCP - General    Subjective     Subjective:  Symptoms:  Stable.  She reports diarrhea.  (Reports increased frequency, diarrhea.).    Diet:  No nausea or vomiting.    Pain:  She reports no pain.        History taken from: patient chart    Objective     Vital Signs  Temp:  [96.8 °F (36 °C)-98.2 °F (36.8 °C)] 96.8 °F (36 °C)  Heart Rate:  [73-91] 75  Resp:  [18-20] 18  BP: (126-180)/(61-85) 147/73    Objective:  General Appearance:  In no acute distress.    Vital signs: (most recent): Blood pressure 147/73, pulse 75, temperature 96.8 °F (36 °C), temperature source Oral, resp. rate 18, height 160 cm (63\"), weight 92.4 kg (203 lb 9.6 oz), SpO2 96 %, not currently breastfeeding.    Output: Producing urine and producing stool.    Lungs:  Normal effort.  She is not in respiratory distress.    Abdomen: Abdomen is soft.  (+ CVA tender LEFT).     Neurological: Patient is alert.    Pupils:  Pupils are equal, round, and reactive to light.    Skin:  Warm.              Results Review:    Lab Results (last 24 hours)     Procedure Component Value Units Date/Time    POC Glucose Once [[id number]]  (Abnormal) Collected: 10/01/20 1112    Specimen: Blood Updated: 10/01/20 1229     Glucose 210 mg/dL     POC Glucose Once [[id number]]  (Abnormal) Collected: 10/01/20 0636    Specimen: Blood Updated: 10/01/20 1048     Glucose 179 mg/dL     Basic Metabolic Panel [[id number]]  (Abnormal) Collected: 10/01/20 0703    Specimen: Blood Updated: 10/01/20 0800     " Glucose 178 mg/dL      BUN 12 mg/dL      Creatinine 1.36 mg/dL      Sodium 135 mmol/L      Potassium 4.4 mmol/L      Chloride 101 mmol/L      CO2 26.0 mmol/L      Calcium 8.8 mg/dL      eGFR Non African Amer 39 mL/min/1.73      BUN/Creatinine Ratio 8.8     Anion Gap 8.0 mmol/L     Narrative:      GFR Normal >60  Chronic Kidney Disease <60  Kidney Failure <15      POC Glucose Once [[id number]]  (Normal) Collected: 09/30/20 1953    Specimen: Blood Updated: 10/01/20 0735     Glucose 106 mg/dL     CBC & Differential [[id number]]  (Abnormal) Collected: 10/01/20 0703    Specimen: Blood Updated: 10/01/20 0723    Narrative:      The following orders were created for panel order CBC & Differential.  Procedure                               Abnormality         Status                     ---------                               -----------         ------                     CBC Auto Differential[[id number]]        Abnormal            Final result                 Please view results for these tests on the individual orders.    CBC Auto Differential [[id number]]  (Abnormal) Collected: 10/01/20 0703    Specimen: Blood Updated: 10/01/20 0723     WBC 10.55 10*3/mm3      RBC 4.09 10*6/mm3      Hemoglobin 9.9 g/dL      Hematocrit 31.8 %      MCV 77.8 fL      MCH 24.2 pg      MCHC 31.1 g/dL      RDW 16.7 %      RDW-SD 47.3 fl      MPV 8.9 fL      Platelets 364 10*3/mm3      Neutrophil % 70.7 %      Lymphocyte % 16.5 %      Monocyte % 7.7 %      Eosinophil % 3.8 %      Basophil % 0.6 %      Immature Grans % 0.7 %      Neutrophils, Absolute 7.47 10*3/mm3      Lymphocytes, Absolute 1.74 10*3/mm3      Monocytes, Absolute 0.81 10*3/mm3      Eosinophils, Absolute 0.40 10*3/mm3      Basophils, Absolute 0.06 10*3/mm3      Immature Grans, Absolute 0.07 10*3/mm3      nRBC 0.0 /100 WBC     POC Glucose Once [[id number]]  (Abnormal) Collected: 10/01/20 0009    Specimen: Blood Updated: 10/01/20 0036     Glucose 164 mg/dL     POC Glucose Once [[id number]]   (Normal) Collected: 09/30/20 1921    Specimen: Blood Updated: 09/30/20 1933     Glucose 109 mg/dL          Imaging Results (Last 24 Hours)     Procedure Component Value Units Date/Time    FL Retrograde Pyelogram In OR [[id number]] Resulted: 10/01/20 0838     Updated: 10/01/20 0838           I reviewed the patient's new clinical results.  I reviewed the patient's new imaging results and agree with the interpretation.  I reviewed the patient's other test results and agree with the interpretation      Assessment/Plan       Type 2 diabetes mellitus without complication (CMS/[facility])    Essential hypertension    HFrEF (heart failure with reduced ejection fraction) (CMS/[facility])    Obesity, Class II, BMI 35-39.9    Obstructive uropathy    Renal stone      Assessment & Plan    POD #1 LEFT J-stent placement  Consulted to Urology for 17 x 12 mm left renal stone causing mild left hydronephrosis, some perinephric fat stranding. Associated CVA tenderness left side that is mild-->resolved. She has a leukocytosis-->resolved. No fever. She has acute kidney injury-->improving. She has confirmed E.Coli UTI from 9/25/20-->antibiotics ongoing.   -WBC 11.86-->12.20-->10.55  -Estimated Creatinine Clearance: 42.8 mL/min (A) (by C-G formula based on SCr of 1.36 mg/dL (H)).   -Cr 3.29-->1.84-->1.36, baseline Cr closer to 1.2.   -10/1/20: complains of increased frequency     Plan:  Will add oxybutynin for increased frequency possibly related to J-stent    BILL Lee  10/01/20  14:19 CDT        Electronically signed by [place], Richar BANGURA MD at 10/01/20 1814     Edmond Jett MD at 10/01/20 1105              [facility] Medicine Services  INPATIENT PROGRESS NOTE    Length of Stay: 2  Date of Admission: 9/29/2020  Primary Care Physician: Nancy Chou APRN    Subjective   Chief Complaint: No new complaints.    HPI: Patient is seen for follow-up.  She is 69-year-old female with history of diabetes  mellitus, hypertension, heart failure with reduced ejection fraction, GERD who was admitted for obstructive uropathy with acute kidney injury, hypoglycemia and acute cystitis/pyelonephritis.    She is doing better, tolerating prescribed diet and remains deconditioned.  She voices no new complaints.      Review of Systems   Constitutional: Positive for activity change and fatigue. Negative for appetite change, chills, diaphoresis and fever.   HENT: Negative for trouble swallowing and voice change.    Eyes: Negative for photophobia and visual disturbance.   Respiratory: Negative for cough, choking, chest tightness, shortness of breath, wheezing and stridor.    Cardiovascular: Negative for chest pain, palpitations and leg swelling.   Gastrointestinal: Negative for abdominal distention, abdominal pain, blood in stool, constipation, diarrhea, nausea and vomiting.   Endocrine: Negative for cold intolerance, heat intolerance, polydipsia, polyphagia and polyuria.   Genitourinary: Negative for decreased urine volume, difficulty urinating, dysuria, enuresis, flank pain, frequency, hematuria and urgency.   Musculoskeletal: Negative for arthralgias, gait problem, myalgias, neck pain and neck stiffness.   Skin: Negative for pallor, rash and wound.   Neurological: Negative for dizziness, tremors, seizures, syncope, facial asymmetry, speech difficulty, weakness, light-headedness, numbness and headaches.   Hematological: Does not bruise/bleed easily.   Psychiatric/Behavioral: Negative for agitation, behavioral problems and confusion.       Objective    Temp:  [97 °F (36.1 °C)-98.2 °F (36.8 °C)] 97.6 °F (36.4 °C)  Heart Rate:  [73-91] 91  Resp:  [18-20] 18  BP: (126-180)/(61-85) 156/69    Physical Exam  Vitals signs and nursing note reviewed.   Constitutional:       General: She is not in acute distress.     Appearance: She is well-developed. She is obese. She is not diaphoretic.   HENT:      Head: Normocephalic and atraumatic.       Right Ear: External ear normal.      Left Ear: External ear normal.      Nose: Nose normal.   Eyes:      Conjunctiva/sclera: Conjunctivae normal.      Pupils: Pupils are equal, round, and reactive to light.   Neck:      Musculoskeletal: Normal range of motion and neck supple.      Thyroid: No thyromegaly.      Vascular: No JVD.   Cardiovascular:      Rate and Rhythm: Normal rate and regular rhythm.      Heart sounds: Normal heart sounds. No murmur. No friction rub. No gallop.    Pulmonary:      Effort: Pulmonary effort is normal. No respiratory distress.      Breath sounds: Normal breath sounds. No stridor. No wheezing or rales.   Chest:      Chest wall: No tenderness.   Abdominal:      General: Bowel sounds are normal. There is no distension.      Palpations: Abdomen is soft. There is no mass.      Tenderness: There is no abdominal tenderness. There is no guarding or rebound.      Hernia: No hernia is present.   Musculoskeletal: Normal range of motion.         General: No tenderness or deformity.   Skin:     General: Skin is warm and dry.      Coloration: Skin is not pale.      Findings: No erythema or rash.   Neurological:      General: No focal deficit present.      Mental Status: She is alert and oriented to person, place, and time. Mental status is at baseline.      Cranial Nerves: No cranial nerve deficit.      Sensory: No sensory deficit.      Motor: No weakness.      Coordination: Coordination normal.      Deep Tendon Reflexes: Reflexes are normal and symmetric. Reflexes normal.   Psychiatric:         Behavior: Behavior normal. Behavior is cooperative.         Thought Content: Thought content normal.         Judgment: Judgment normal.           Medication Review:    Current Facility-Administered Medications:   •  albuterol (PROVENTIL) nebulizer solution 0.083% 2.5 mg/3mL, 2.5 mg, Nebulization, Q6H PRN, Richar Rojas MD  •  carvedilol (COREG) tablet 25 mg, 25 mg, Oral, BID With Meals, Richar Rojas,  MD, 25 mg at 10/01/20 0820  •  cefTRIAXone (ROCEPHIN) 1 g/100 mL 0.9% NS (MBP), 1 g, Intravenous, Q24H, Richar Rojas MD, 1 g at 09/30/20 1915  •  dextrose (D50W) 25 g/ 50mL Intravenous Solution 25 g, 25 g, Intravenous, Q15 Min PRN, Richar Rojas MD  •  dextrose (GLUTOSE) oral gel 15 g, 15 g, Oral, Q15 Min PRN, Richar Rojas MD  •  glucagon (human recombinant) (GLUCAGEN DIAGNOSTIC) injection 1 mg, 1 mg, Subcutaneous, Q15 Min PRN, Richar Rojas MD  •  hydrALAZINE (APRESOLINE) tablet 50 mg, 50 mg, Oral, TID, Rihcar Rojas MD, 50 mg at 10/01/20 0820  •  insulin aspart (novoLOG) injection 0-9 Units, 0-9 Units, Subcutaneous, TID AC, Richar Rojas MD, 2 Units at 10/01/20 0820  •  magic butt ointment, , Topical, PRN, Richar Rojas MD  •  Magnesium Oxide tablet 400 mg, 400 mg, Oral, Daily With Breakfast, Richar Rojas MD, 400 mg at 10/01/20 0820  •  morphine injection 4 mg, 4 mg, Intravenous, Q2H PRN **AND** naloxone (NARCAN) injection 0.4 mg, 0.4 mg, Intravenous, Q5 Min PRN, Richar Rojas MD  •  nystatin (MYCOSTATIN) powder, , Topical, Q12H, Richar Rojas MD  •  ondansetron (ZOFRAN) tablet 4 mg, 4 mg, Oral, Q6H PRN **OR** ondansetron (ZOFRAN) injection 4 mg, 4 mg, Intravenous, Q6H PRN, Richar Rojas MD  •  ondansetron ODT (ZOFRAN-ODT) disintegrating tablet 8 mg, 8 mg, Oral, Q8H PRN, Richar Rojas MD  •  pantoprazole (PROTONIX) EC tablet 40 mg, 40 mg, Oral, QAM, Richar Rojas MD, 40 mg at 10/01/20 0617  •  polyethylene glycol (MIRALAX) packet 17 g, 17 g, Oral, Daily PRN, Richar Rojas MD  •  pravastatin (PRAVACHOL) tablet 40 mg, 40 mg, Oral, Nightly, Richar Rojas MD, 40 mg at 09/30/20 2031  •  sodium chloride 0.9 % flush 10 mL, 10 mL, Intravenous, Q12H, Richar Rojas MD, 10 mL at 09/30/20 2032  •  sodium chloride 0.9 % flush 10 mL, 10 mL, Intravenous, PRN, Rcihar Rojas MD  •  sodium chloride 0.9 % infusion, 75 mL/hr, Intravenous, Continuous, [place],  Edmond HILL MD, Last Rate: 75 mL/hr at 10/01/20 0953, 75 mL/hr at 10/01/20 0953    Results Review:  I have reviewed the labs, radiology results, and diagnostic studies.    Laboratory Data:   Results from last 7 days   Lab Units 10/01/20  0703 09/30/20  0657 09/29/20  1035   SODIUM mmol/L 135* 135* 134*   POTASSIUM mmol/L 4.4 4.5 4.5   CHLORIDE mmol/L 101 100 97*   CO2 mmol/L 26.0 26.0 27.0   BUN mg/dL 12 21 39*   CREATININE mg/dL 1.36* 1.84* 3.29*   GLUCOSE mg/dL 178* 256* 34*   CALCIUM mg/dL 8.8 9.1 9.1   BILIRUBIN mg/dL  --   --  0.2   ALK PHOS U/L  --   --  119*   ALT (SGPT) U/L  --   --  11   AST (SGOT) U/L  --   --  14   ANION GAP mmol/L 8.0 9.0 10.0     Estimated Creatinine Clearance: 42.8 mL/min (A) (by C-G formula based on SCr of 1.36 mg/dL (H)).  Results from last 7 days   Lab Units 09/29/20  1035   MAGNESIUM mg/dL 2.6*         Results from last 7 days   Lab Units 10/01/20  0703 09/30/20  0657 09/29/20  1035   WBC 10*3/mm3 10.55 12.20* 11.86*   HEMOGLOBIN g/dL 9.9* 9.8* 10.5*   HEMATOCRIT % 31.8* 31.8* 33.2*   PLATELETS 10*3/mm3 364 427 424           Culture Data:   No results found for: BLOODCX  Urine Culture   Date Value Ref Range Status   09/29/2020 <10,000 CFU/mL Gram Negative Bacilli (A)  Final     Comment:     Call if further workup needed.       No results found for: RESPCX  No results found for: WOUNDCX  No results found for: STOOLCX  No components found for: BODYFLD    Radiology Data:   Imaging Results (Last 24 Hours)     Procedure Component Value Units Date/Time    FL Retrograde Pyelogram In OR [[id number]] Resulted: 10/01/20 0838     Updated: 10/01/20 0838          I have reviewed the patient's current medications.     Assessment/Plan     Hospital Problem List:  Active Problems:    Type 2 diabetes mellitus without complication (CMS/[facility])    Essential hypertension    HFrEF (heart failure with reduced ejection fraction) (CMS/[facility])    Obesity, Class II, BMI 35-39.9    Obstructive uropathy    Renal  stone    Obstructive uropathy secondary to nephrolithiasis: Patient is postoperative day #1 status post cystoscopy and stent placement.  Continue IV hydration and pain control.  Input by urologist is appreciated.     Pyelonephritis/acute cystitis: Urine culture done 9/25/2020 is positive for E. coli and repeat urine culture showed gram-negative bacilli.  Reactive leukocytosis has resolved.    Hypertension: Stable.  Continue hydralazine and Coreg.  Continue to hold Cozaar secondary to acute renal failure.    History of diabetes mellitus: Stable.  Continue Accu-Cheks and sliding scale insulin.    Hyponatremia (likely hypovolemic): Improving.  Continue isotonic saline, restrict free water and monitor BMP.      Acute on chronic kidney disease stage III (likely prerenal / obstructive uropathy): Patient's baseline creatinine is between 1.2-1.3.  Improving as creatinine is down to 1.36 from a high of 3.29.  Continue to monitor renal function, avoid nephrotoxins and consult nephrologist if the need arises.    Heart failure with reduced ejection fraction: Patient is clinically not in heart failure and hypovolemic.  Diuretic therapy is on hold secondary to hypovolemia and acute kidney injury.    History of GERD: Continue PPI.    Continue DVT prophylaxis.    Deconditioning: Consult PT and OT.    Discharge Planning: In progress.    Edmond Jett MD   10/01/20   11:05 CDT          Electronically signed by Edmond Jett MD at 10/01/20 1102       Medical Student Notes (last 24 hours) (Notes from 10/01/20 0938 through 10/02/20 0938)    No notes of this type exist for this encounter.         Consult Notes (last 24 hours) (Notes from 10/01/20 0938 through 10/02/20 0938)    No notes of this type exist for this encounter.

## 2020-10-02 NOTE — PLAN OF CARE
Goal Outcome Evaluation:  Plan of Care Reviewed With: patient  Progress: improving  Outcome Summary: ARI, she has been sleeping well, no complaint at this time, will cont to monitor

## 2020-10-02 NOTE — SIGNIFICANT NOTE
10/02/20 1112   OTHER   Discipline physical therapy assistant   Rehab Time/Intention   Session Not Performed patient/family declined treatment

## 2020-10-02 NOTE — THERAPY EVALUATION
Acute Care - Occupational Therapy Initial Evaluation  [facility]     Patient Name: Afia Adams  : 1950  MRN: [id number]  Today's Date: 10/2/2020  Onset of Illness/Injury or Date of Surgery: 20  Date of Referral to OT: 10/01/20  Referring Physician: BENIGNO White MD    Admit Date: 2020       ICD-10-CM ICD-9-CM   1. Obstructive uropathy  N13.9 599.60   2. Acute renal failure, unspecified acute renal failure type (CMS/[facility])  N17.9 584.9   3. Pyelonephritis  N12 590.80   4. Hypoglycemia  E16.2 251.2   5. Renal stone  N20.0 592.0   6. Impaired physical mobility  Z74.09 781.99   7. Type 2 diabetes mellitus without complication, without long-term current use of insulin (CMS/[facility])  E11.9 250.00   8. Impaired mobility and activities of daily living  Z74.09 V49.89    Z78.9      Patient Active Problem List   Diagnosis   • Type 2 diabetes mellitus without complication (CMS/[facility])   • Essential hypertension   • Hyperlipemia   • HFrEF (heart failure with reduced ejection fraction) (CMS/[facility])   • Coronary arteriosclerosis   • Cough   • Need for hepatitis C screening test   • Obesity, Class II, BMI 35-39.9   • Drug therapy   • Morbidly obese (CMS/[facility])   • Acute UTI   • Obstructive uropathy   • Renal stone     Past Medical History:   Diagnosis Date   • Abdominal pain 2015    unspecified   • Acute gastritis    • Acute on chronic systolic congestive heart failure (CMS/[facility]) 2016   • Ankle pain 2015   • Candidiasis, skin or nails 2011    controlled   • Chronic systolic congestive heart failure (CMS/HCC) 2016   • Congestive heart failure (CMS/[facility]) 2016   • Coronary arteriosclerosis 2016    multi-vessel   • Cough 2014    proabably due to allergy, chest x ray is normal      • Edema of foot 2016   • Encounter for long-term (current) drug use     therapy   • Epigastric pain 2015   • Essential hypertension 2016   • History of echocardiogram  03/14/2016    Left atrium normal with mild CLVH wit normal aortic root size.Evidence of posterolateral wall hypokinesis. Severely depressed LV systolic function of 20-25%.Mitral valve thickened Aortic sclerosis.Diastolic dysfunction   • Hyperlipidemia 03/31/2016    unspecified   • Hypertensive disorder 03/24/2016   • Left lower quadrant pain 07/12/2013    ruling out diverticular disease      • Myocardial infarction (CMS/HCC) 03/24/2016   • Nausea 11/20/2015   • Osteoarthritis of knee 06/23/2016   • Pain in limb 01/25/2011   • Pediculosis capitis 12/22/2011    controlled   • Peptic ulcer    • Polyuria 01/25/2011   • Pruritic rash 12/09/2014   • Superficial foreign body hip without major open wound, no infection 12/09/2011    ??? back   • Type 2 diabetes mellitus (CMS/HCC) 06/23/2016    new onset   • Weakness 06/23/2016     Past Surgical History:   Procedure Laterality Date   • COLONOSCOPY W/ POLYPECTOMY  03/07/2016    Transverse colon polyps,descending polyps,Sigmoid polyps, all resected and retrieved. Diverticulosis without perforation or abscess without bleeding. Erica Thomas   • HYSTERECTOMY      ovary preserv   • INJECTION OF MEDICATION  09/11/2014    Celestone (betamethasone) (2)            OT ASSESSMENT FLOWSHEET (last 12 hours)      OT Evaluation and Treatment     Row Name 10/02/20 0912                   OT Time and Intention    Subjective Information  no complaints  -RB        Document Type  evaluation  -RB        Mode of Treatment  individual therapy;occupational therapy  -RB        Patient Effort  good  -RB           General Information    Patient Profile Reviewed  yes  -RB        Onset of Illness/Injury or Date of Surgery  09/29/20  -RB        Referring Physician  BENIGNO White MD  -RB        Prior Level of Function  independent:;gait;transfer;ADL's;min assist:;home management;dependent:;driving  -RB        Existing Precautions/Restrictions  fall  -RB        Risks Reviewed  patient:;LOB  -RB         Benefits Reviewed  patient:;improve function;increase independence;increase strength;increase balance  -RB           Previous Level of Function/Home Environm    BADLs, Premorbid Functional Level  independent  -RB        IADLs, Premorbid Functional Level  needs assistive device for safe performance;needs assistance for safe performance  -RB        Household Ambulation, Premorbid Functional Level  needs adaptive equipment for safe performance  -RB        Community Ambulation, Premorbid Functional Level  needs adaptive equipment for safe performance  -RB           Living Environment    Current Living Arrangements  correctional facility  -RB        Lives With  spouse;child(ashlee), adult  -RB           Home Main Entrance    Number of Stairs, Main Entrance  two  -RB        Stair Railings, Main Entrance  railings on both sides of stairs  -RB           Cognition    Orientation Status (Cognition)  oriented x 4  -RB           Pain Scale: Numbers Pre/Post-Treatment    Pretreatment Pain Rating  0/10 - no pain  -RB        Posttreatment Pain Rating  0/10 - no pain  -RB           Range of Motion Comprehensive    General Range of Motion  bilateral upper extremity ROM WNL  -RB           Strength Comprehensive (MMT)    Comment, General Manual Muscle Testing (MMT) Assessment  B UE str was 4+/5 grossly  -RB           Bed Mobility    Bed Mobility  --  -RB        Scooting/Bridging [place] (Bed Mobility)  --  -RB        Supine-Sit [place] (Bed Mobility)  --  -RB        Sit-Supine [place] (Bed Mobility)  --  -RB        Assistive Device (Bed Mobility)  --  -RB           Functional Mobility    Functional Mobility- Ind. Level  minimum assist (75% patient effort)  -RB        Functional Mobility- Device  rolling walker  -RB        Functional Mobility-Distance (Feet)  30  -RB        Functional Mobility- Safety Issues  balance decreased during turns;sequencing ability decreased;step length decreased  -RB           Transfer  Assessment/Treatment    Transfers  sit-stand transfer;stand-sit transfer;toilet transfer  -RB           Transfers    Sit-Stand [place] (Transfers)  minimum assist (75% patient effort)  -RB        Stand-Sit [place] (Transfers)  contact guard;minimum assist (75% patient effort)  -RB        [place] Level (Toilet Transfer)  minimum assist (75% patient effort)  -RB        Assistive Device (Toilet Transfer)  raised toilet seat;grab bars/safety frame;walker, front-wheeled  -RB           Sit-Stand Transfer    Assistive Device (Sit-Stand Transfers)  walker, front-wheeled  -RB           Stand-Sit Transfer    Assistive Device (Stand-Sit Transfers)  walker, front-wheeled  -RB           Toilet Transfer    Type (Toilet Transfer)  sit-stand;stand-sit  -RB           Safety Issues, Functional Mobility    Impairments Affecting Function (Mobility)  endurance/activity tolerance;strength;cognition  -RB           Activities of Daily Living    BADL Assessment/Intervention  lower body dressing;toileting  -RB           Lower Body Dressing Assessment/Training    [place] Level (Lower Body Dressing)  lower body dressing skills;doff;don;socks;set up;supervision  -RB        Position (Lower Body Dressing)  sitting up in bed  -RB           Toileting Assessment/Training    [place] Level (Toileting)  toileting skills;supervision  -RB        Assistive Devices (Toileting)  raised toilet seat;grab bar/safety frame  -RB        Position (Toileting)  supported standing;unsupported sitting  -RB           Plan of Care Review    Plan of Care Reviewed With  patient  -RB           Vital Signs    Pre Systolic BP Rehab  181  -RB        Pre Treatment Diastolic BP  100  -RB        Post Systolic BP Rehab  154  -RB        Post Treatment Diastolic BP  90  -RB        Pretreatment Heart Rate (beats/min)  96  -RB        Posttreatment Heart Rate (beats/min)  102  -RB        Pre SpO2 (%)  96  -RB        O2 Delivery Pre Treatment  room air  -RB         Post SpO2 (%)  97  -RB        O2 Delivery Post Treatment  room air  -RB        Pre Patient Position  Sitting  -RB        Intra Patient Position  Standing  -RB        Post Patient Position  Sitting  -RB           OT Goals    Bed Mobility Goal Selection (OT)  --  -RB        Transfer Goal Selection (OT)  transfer, OT goal 1  -RB        Bathing Goal Selection (OT)  bathing, OT goal 1  -RB        Dressing Goal Selection (OT)  dressing, OT goal 1  -RB        Toileting Goal Selection (OT)  toileting, OT goal 1  -RB        Activity Tolerance Goal Selection (OT)  activity tolerance, OT goal 1  -RB           Transfer Goal 1 (OT)    Activity/Assistive Device (Transfer Goal 1, OT)  transfers, all  -RB        [place] Level/Cues Needed (Transfer Goal 1, OT)  standby assist  -RB        Time Frame (Transfer Goal 1, OT)  long term goal (LTG)  -RB        Progress/Outcome (Transfer Goal 1, OT)  goal not met  -RB           Bathing Goal 1 (OT)    Activity/Device (Bathing Goal 1, OT)  bathing skills, all  -RB        [place] Level/Cues Needed (Bathing Goal 1, OT)  contact guard assist  -RB        Time Frame (Bathing Goal 1, OT)  long term goal (LTG)  -RB        Progress/Outcomes (Bathing Goal 1, OT)  goal not met  -RB           Dressing Goal 1 (OT)    Activity/Device (Dressing Goal 1, OT)  dressing skills, all  -RB        [place]/Cues Needed (Dressing Goal 1, OT)  supervision required  -RB        Time Frame (Dressing Goal 1, OT)  long term goal (LTG)  -RB        Progress/Outcome (Dressing Goal 1, OT)  goal not met  -RB           Toileting Goal 1 (OT)    Activity/Device (Toileting Goal 1, OT)  toileting skills, all  -RB        [place] Level/Cues Needed (Toileting Goal 1, OT)  modified independence  -RB        Time Frame (Toileting Goal 1, OT)  long term goal (LTG)  -RB        Progress/Outcome (Toileting Goal 1, OT)  goal not met  -RB            Activity Tolerance Goal 1 (OT)    Activity Tolerance Goal 1 (OT)   Therapeutic/functional activity with 1-2 rest breaks.  -RB        Activity Level (Endurance Goal 1, OT)  15 min activity  -RB        Time Frame (Activity Tolerance Goal 1, OT)  long term goal (LTG)  -RB        Progress/Outcome (Activity Tolerance Goal 1, OT)  goal not met  -RB           Patient Education Goal (OT)    Activity (Patient Education Goal, OT)  Home safety/fall prev and EC/WS.  -RB        Green/Cues/Accuracy (Memory Goal 2, OT)  demonstrates adequately;verbalizes understanding  -RB        Time Frame (Patient Education Goal, OT)  long term goal (LTG)  -RB        Progress/Outcome (Patient Education Goal, OT)  goal not met  -RB           Positioning and Restraints    Pre-Treatment Position  sitting in chair/recliner  -RB        Post Treatment Position  chair  -RB        In Chair  encouraged to call for assist;call light within reach;sitting  -RB           Therapy Assessment/Plan (OT)    Date of Referral to OT  10/01/20  -RB        Functional Level at Time of Evaluation (OT)  Impaired mob and ADLs..  -RB        OT Diagnosis  Imapaired mob and ADLs.  -RB        Rehab Potential (OT)  good, to achieve stated therapy goals  -RB        Criteria for Skilled Therapeutic Interventions Met (OT)  yes;meets criteria  -RB        Therapy Frequency (OT)  other (see comments) 5-7 days/wk.  -RB        Predicted Duration of Therapy Intervention (OT)  Until D/C or goals met.  -RB        Problem List (OT)  problems related to;balance;coordination;mobility;strength;inability to direct their own care  -RB        Activity Limitations Related to Problem List (OT)  unable to ambulate safely;unable to transfer safely;BADLs not performed adequately or safely;home management activity not performed adequately or safely;community activities not performed adequately or safely  -RB        Planned Therapy Interventions (OT)  activity tolerance training;adaptive equipment training;BADL retraining;functional balance  retraining;occupation/activity based interventions;patient/caregiver education/training;strengthening exercise;transfer/mobility retraining;ROM/therapeutic exercise  -RB           Evaluation Complexity (OT)    Review Occupational Profile/Medical/Therapy History Complexity  moderate complexity  -RB        Assessment, Occupational Performance/Identification of Deficit Complexity  moderate complexity  -RB        Clinical Decision Making Complexity (OT)  moderate complexity  -RB        Overall Complexity of Evaluation (OT)  moderate complexity  -RB           Therapy Plan Review/Discharge Plan (OT)    Therapy Plan Review (OT)  evaluation/treatment results reviewed;care plan/treatment goals reviewed;current/potential barriers reviewed;participants voiced agreement with care plan;patient  -RB        Anticipated Discharge Disposition (OT)  home with assist;home with 24/7 care;other (see comments);home with home health H/H therapy.  -RB          User Key  (r) = Recorded By, (t) = Taken By, (c) = Cosigned By    Initials Name Effective Dates    RB Keo Dominguez, OT 07/24/19 -          Occupational Therapy Education                 Title: PT OT SLP Therapies (In Progress)     Topic: Occupational Therapy (In Progress)     Point: ADL training (Not Started)     Description:   Instruct learner(s) on proper safety adaptation and remediation techniques during self care or transfers.   Instruct in proper use of assistive devices.              Learner Progress:  Not documented in this visit.          Point: Home exercise program (Not Started)     Description:   Instruct learner(s) on appropriate technique for monitoring, assisting and/or progressing therapeutic exercises/activities.              Learner Progress:  Not documented in this visit.          Point: Precautions (Done)     Description:   Instruct learner(s) on prescribed precautions during self-care and functional transfers.              Learning Progress Summary            Patient Acceptance, E, VU,NR by RB at 10/2/2020 2040    Comment: Edu pt on use of gait belt and non skid socks when OOB and no OOB without assist.                   Point: Body mechanics (Not Started)     Description:   Instruct learner(s) on proper positioning and spine alignment during self-care, functional mobility activities and/or exercises.              Learner Progress:  Not documented in this visit.                      User Key     Initials Effective Dates Name Provider Type Discipline     07/24/19 -  Keo Dominguez OT Occupational Therapist OT                  OT Recommendation and Plan  Planned Therapy Interventions (OT): activity tolerance training, adaptive equipment training, BADL retraining, functional balance retraining, occupation/activity based interventions, patient/caregiver education/training, strengthening exercise, transfer/mobility retraining, ROM/therapeutic exercise  Therapy Frequency (OT): other (see comments)(5-7 days/wk.)  Plan of Care Review  Plan of Care Reviewed With: patient  Plan of Care Reviewed With: patient    Outcome Measures     Row Name 10/02/20 0912             How much help from another is currently needed...    Putting on and taking off regular lower body clothing?  3  -RB      Bathing (including washing, rinsing, and drying)  2  -RB      Toileting (which includes using toilet bed pan or urinal)  3  -RB      Putting on and taking off regular upper body clothing  4  -RB      Taking care of personal grooming (such as brushing teeth)  4  -RB      Eating meals  4  -RB      AM-PAC 6 Clicks Score (OT)  20  -RB         Functional Assessment    Outcome Measure Options  AM-PAC 6 Clicks Daily Activity (OT)  -RB        User Key  (r) = Recorded By, (t) = Taken By, (c) = Cosigned By    Initials Name Provider Type    RB Keo Dominguez OT Occupational Therapist          Time Calculation:   Time Calculation- OT     Row Name 10/02/20 3743             Time Calculation- OT    OT Start  Time  0912  -      OT Stop Time  1001  -      OT Time Calculation (min)  49 min  -      OT Received On  10/02/20  -      OT Goal Re-Cert Due Date  10/15/20  -        User Key  (r) = Recorded By, (t) = Taken By, (c) = Cosigned By    Initials Name Provider Type    RB Keo Dominguez OT Occupational Therapist        Therapy Charges for Today     Code Description Service Date Service Provider Modifiers Qty    [id number] HC OT EVAL MOD COMPLEXITY 3 10/2/2020 Keo Dominguez OT GO 1               Keo Dominguez OT  10/2/2020

## 2020-10-02 NOTE — PLAN OF CARE
OT patt on this date.  Pt required min A for sit to stand, ambulation 30' to/from bathroom and toilet transfer.  Tosha care with supervision.  She was set up supervision to don/doff sock.  She could benefit from OT services to increase safety and independence with ADLs and functional mobility.  Rec home health thereapy when D/C from hospital.

## 2020-10-03 NOTE — OUTREACH NOTE
Prep Survey      Responses   [employer] facility patient discharged from?  [facility]   Is LACE score < 7 ?  No   Eligibility  [facility]   Date of Admission  09/29/20   Date of Discharge  10/02/20   Discharge diagnosis  Pyelonephritis   Does the patient have one of the following disease processes/diagnoses(primary or secondary)?  Other   Does the patient have Home health ordered?  No   Is there a DME ordered?  No   Prep survey completed?  Yes          Cuca Archer RN

## 2020-10-05 ENCOUNTER — TRANSITIONAL CARE MANAGEMENT TELEPHONE ENCOUNTER (OUTPATIENT)
Dept: CALL CENTER | Facility: HOSPITAL | Age: 70
End: 2020-10-05

## 2020-10-05 NOTE — PAYOR COMM NOTE
"Elle Metz  [facility]  P :[phone]  F: [phone]    auth#[id number]    Afia Adams (69 y.o. Female)     Date of Birth Social Security Number Address Home Phone MRN    1950  [id number] [place] [address] [phone] [id number]    Worship Marital Status          Other        Admission Date Admission Type Admitting Provider Attending Provider Department, Room/Bed    9/29/20 Emergency Xu Blanchard MD  [address], 416/2    Discharge Date Discharge Disposition Discharge Destination        10/2/2020 Home or Self Care              Attending Provider: (none)   Allergies: Bactrim [Sulfamethoxazole-trimethoprim]    Isolation: None   Infection: None   Code Status: Prior    Ht: 160 cm (63\")   Wt: 91.9 kg (202 lb 9.6 oz)    Admission Cmt: None   Principal Problem: None                Active Insurance as of 9/29/2020     Primary Coverage     Payor Plan Insurance Group Employer/Plan Group    ANTH MEDICARE REPLACEMENT ANTH MEDICARE ADVANTAGE [id number]     Payor Plan Address Payor Plan Phone Number Payor Plan Fax Number Effective Dates    [address] [phone]  8/1/2017 - None Entered    [place] [address]       Subscriber Name Subscriber Birth Date Member ID       AFIA ADAMS 1950 [id number]                 Emergency Contacts      (Rel.) Home Phone Work Phone Mobile Phone    BryanNahun (Spouse) [phone]    ADAMS, BETH (Relative) [phone] -- [phone]               Discharge Summary      Edmond Jett MD at 10/02/20 1040              [facility] Medicine Services  DISCHARGE SUMMARY       Date of Admission: 9/29/2020  Date of Discharge:  10/2/2020  Primary Care Physician: Nancy Chou APRN    Presenting Problem/History of Present Illness:  Hypoglycemia [E16.2]  Pyelonephritis " [N12]  Obstructive uropathy [N13.9]  Acute renal failure, unspecified acute renal failure type (CMS/[facility]) [N17.9]   Patient is 69-year-old female with history of diabetes mellitus, hypertension, heart failure with reduced ejection fraction, GERD who was admitted for obstructive uropathy with acute kidney injury, hypoglycemia and acute cystitis/pyelonephritis.    Final Discharge Diagnoses:  Active Hospital Problems    Diagnosis   • Obstructive uropathy   • Renal stone     Added automatically from request for surgery [id number]     • Obesity, Class II, BMI 35-39.9   • HFrEF (heart failure with reduced ejection fraction) (CMS/[facility])   • Type 2 diabetes mellitus without complication (CMS/[facility])   • Essential hypertension       Consults:   Consults     Date and Time Order Name Status Description    9/29/2020 1728 Inpatient Urology Consult Completed           Procedures Performed: Procedure(s):  CYSTOSCOPY LEFT URETEROSCOPY RETROGRADE PYELOGRAM STENT INSERTION                Pertinent Test Results:   Lab Results (last 7 days)     Procedure Component Value Units Date/Time    POC Glucose Once [[id number]]  (Normal) Collected: 10/02/20 0754    Specimen: Blood Updated: 10/02/20 0820     Glucose 121 mg/dL     Basic Metabolic Panel [[id number]]  (Abnormal) Collected: 10/02/20 0526    Specimen: Blood Updated: 10/02/20 0640     Glucose 123 mg/dL      BUN 8 mg/dL      Creatinine 1.15 mg/dL      Sodium 139 mmol/L      Potassium 4.5 mmol/L      Chloride 104 mmol/L      CO2 27.0 mmol/L      Calcium 9.0 mg/dL      eGFR Non African Amer 47 mL/min/1.73      BUN/Creatinine Ratio 7.0     Anion Gap 8.0 mmol/L     Narrative:      GFR Normal >60  Chronic Kidney Disease <60  Kidney Failure <15      CBC & Differential [[id number]]  (Abnormal) Collected: 10/02/20 0526    Specimen: Blood Updated: 10/02/20 0624    Narrative:      The following orders were created for panel order CBC & Differential.  Procedure                               Abnormality          Status                     ---------                               -----------         ------                     CBC Auto Differential[[id number]]        Abnormal            Final result                 Please view results for these tests on the individual orders.    CBC Auto Differential [[id number]]  (Abnormal) Collected: 10/02/20 0526    Specimen: Blood Updated: 10/02/20 0624     WBC 11.45 10*3/mm3      RBC 4.14 10*6/mm3      Hemoglobin 9.8 g/dL      Hematocrit 32.7 %      MCV 79.0 fL      MCH 23.7 pg      MCHC 30.0 g/dL      RDW 16.5 %      RDW-SD 47.0 fl      MPV 9.6 fL      Platelets 395 10*3/mm3      Neutrophil % 71.6 %      Lymphocyte % 15.4 %      Monocyte % 8.1 %      Eosinophil % 3.5 %      Basophil % 0.4 %      Immature Grans % 1.0 %      Neutrophils, Absolute 8.19 10*3/mm3      Lymphocytes, Absolute 1.76 10*3/mm3      Monocytes, Absolute 0.93 10*3/mm3      Eosinophils, Absolute 0.40 10*3/mm3      Basophils, Absolute 0.05 10*3/mm3      Immature Grans, Absolute 0.12 10*3/mm3      nRBC 0.0 /100 WBC     POC Glucose Once [[id number]]  (Abnormal) Collected: 10/01/20 1930    Specimen: Blood Updated: 10/01/20 1954     Glucose 201 mg/dL     POC Glucose Once [[id number]]  (Normal) Collected: 10/01/20 1628    Specimen: Blood Updated: 10/01/20 1734     Glucose 85 mg/dL     POC Glucose Once [[id number]]  (Abnormal) Collected: 10/01/20 1112    Specimen: Blood Updated: 10/01/20 1229     Glucose 210 mg/dL     POC Glucose Once [[id number]]  (Abnormal) Collected: 10/01/20 0636    Specimen: Blood Updated: 10/01/20 1048     Glucose 179 mg/dL     Basic Metabolic Panel [[id number]]  (Abnormal) Collected: 10/01/20 0703    Specimen: Blood Updated: 10/01/20 0800     Glucose 178 mg/dL      BUN 12 mg/dL      Creatinine 1.36 mg/dL      Sodium 135 mmol/L      Potassium 4.4 mmol/L      Chloride 101 mmol/L      CO2 26.0 mmol/L      Calcium 8.8 mg/dL      eGFR Non African Amer 39 mL/min/1.73      BUN/Creatinine Ratio 8.8     Anion Gap 8.0  mmol/L     Narrative:      GFR Normal >60  Chronic Kidney Disease <60  Kidney Failure <15      POC Glucose Once [[id number]]  (Normal) Collected: 09/30/20 1953    Specimen: Blood Updated: 10/01/20 0735     Glucose 106 mg/dL     CBC & Differential [[id number]]  (Abnormal) Collected: 10/01/20 0703    Specimen: Blood Updated: 10/01/20 0723    Narrative:      The following orders were created for panel order CBC & Differential.  Procedure                               Abnormality         Status                     ---------                               -----------         ------                     CBC Auto Differential[[id number]]        Abnormal            Final result                 Please view results for these tests on the individual orders.    CBC Auto Differential [[id number]]  (Abnormal) Collected: 10/01/20 0703    Specimen: Blood Updated: 10/01/20 0723     WBC 10.55 10*3/mm3      RBC 4.09 10*6/mm3      Hemoglobin 9.9 g/dL      Hematocrit 31.8 %      MCV 77.8 fL      MCH 24.2 pg      MCHC 31.1 g/dL      RDW 16.7 %      RDW-SD 47.3 fl      MPV 8.9 fL      Platelets 364 10*3/mm3      Neutrophil % 70.7 %      Lymphocyte % 16.5 %      Monocyte % 7.7 %      Eosinophil % 3.8 %      Basophil % 0.6 %      Immature Grans % 0.7 %      Neutrophils, Absolute 7.47 10*3/mm3      Lymphocytes, Absolute 1.74 10*3/mm3      Monocytes, Absolute 0.81 10*3/mm3      Eosinophils, Absolute 0.40 10*3/mm3      Basophils, Absolute 0.06 10*3/mm3      Immature Grans, Absolute 0.07 10*3/mm3      nRBC 0.0 /100 WBC     POC Glucose Once [[id number]]  (Abnormal) Collected: 10/01/20 0009    Specimen: Blood Updated: 10/01/20 0036     Glucose 164 mg/dL     POC Glucose Once [[id number]]  (Normal) Collected: 09/30/20 1921    Specimen: Blood Updated: 09/30/20 1933     Glucose 109 mg/dL     POC Glucose Once [[id number]]  (Normal) Collected: 09/30/20 1034    Specimen: Blood Updated: 09/30/20 1218     Glucose 130 mg/dL     Urine Culture - Urine, Urine, Clean  Catch [[id number]]  (Abnormal) Collected: 09/29/20 1104    Specimen: Urine, Clean Catch Updated: 09/30/20 1157     Urine Culture <10,000 CFU/mL Gram Negative Bacilli     Comment: Call if further workup needed.         Basic Metabolic Panel [[id number]]  (Abnormal) Collected: 09/30/20 0657    Specimen: Blood Updated: 09/30/20 0814     Glucose 256 mg/dL      BUN 21 mg/dL      Creatinine 1.84 mg/dL      Sodium 135 mmol/L      Potassium 4.5 mmol/L      Chloride 100 mmol/L      CO2 26.0 mmol/L      Calcium 9.1 mg/dL      eGFR Non African Amer 27 mL/min/1.73      BUN/Creatinine Ratio 11.4     Anion Gap 9.0 mmol/L     Narrative:      GFR Normal >60  Chronic Kidney Disease <60  Kidney Failure <15      CBC & Differential [[id number]]  (Abnormal) Collected: 09/30/20 0657    Specimen: Blood Updated: 09/30/20 0747    Narrative:      The following orders were created for panel order CBC & Differential.  Procedure                               Abnormality         Status                     ---------                               -----------         ------                     CBC Auto Differential[[id number]]        Abnormal            Final result                 Please view results for these tests on the individual orders.    CBC Auto Differential [[id number]]  (Abnormal) Collected: 09/30/20 0657    Specimen: Blood Updated: 09/30/20 0747     WBC 12.20 10*3/mm3      RBC 4.12 10*6/mm3      Hemoglobin 9.8 g/dL      Hematocrit 31.8 %      MCV 77.2 fL      MCH 23.8 pg      MCHC 30.8 g/dL      RDW 16.5 %      RDW-SD 46.6 fl      MPV 9.6 fL      Platelets 427 10*3/mm3      Neutrophil % 72.3 %      Lymphocyte % 12.6 %      Monocyte % 9.2 %      Eosinophil % 4.7 %      Basophil % 0.5 %      Immature Grans % 0.7 %      Neutrophils, Absolute 8.83 10*3/mm3      Lymphocytes, Absolute 1.54 10*3/mm3      Monocytes, Absolute 1.12 10*3/mm3      Eosinophils, Absolute 0.57 10*3/mm3      Basophils, Absolute 0.06 10*3/mm3      Immature Grans, Absolute  0.08 10*3/mm3      nRBC 0.0 /100 WBC     POC Glucose Once [[id number]]  (Abnormal) Collected: 09/30/20 0612    Specimen: Blood Updated: 09/30/20 0742     Glucose 246 mg/dL     COVID-19, [facility] IN-HOUSE, NP SWAB IN TRANSPORT MEDIA 8-10 HR TAT - Swab, Nasopharynx [[id number]]  (Normal) Collected: 09/30/20 0021    Specimen: Swab from Nasopharynx Updated: 09/30/20 0512     COVID19 Not Detected    Narrative:      Testing performed by Real Time RT-PCR  This test has not been approved by the [employer] but is authorized under the Emergency Use Act (EUA)    Fact sheet for providers: https://www.fda.gov/media/106854/download    Fact sheet for patients: https://www.fda.gov/media/245974/download        POC Glucose Once [[id number]]  (Abnormal) Collected: 09/30/20 0010    Specimen: Blood Updated: 09/30/20 0453     Glucose 181 mg/dL     POC Glucose Once [[id number]]  (Abnormal) Collected: 09/29/20 1948    Specimen: Blood Updated: 09/29/20 2019     Glucose 189 mg/dL     POC Glucose Once [[id number]]  (Abnormal) Collected: 09/29/20 1806    Specimen: Blood Updated: 09/29/20 1819     Glucose 167 mg/dL     POC Glucose Once [[id number]]  (Abnormal) Collected: 09/29/20 1556    Specimen: Blood Updated: 09/29/20 1734     Glucose 56 mg/dL     POC Glucose Once [[id number]]  (Normal) Collected: 09/29/20 1258    Specimen: Blood Updated: 09/29/20 1346     Glucose 101 mg/dL     Comprehensive Metabolic Panel [[id number]]  (Abnormal) Collected: 09/29/20 1035    Specimen: Blood Updated: 09/29/20 1151     Glucose 34 mg/dL      BUN 39 mg/dL      Creatinine 3.29 mg/dL      Sodium 134 mmol/L      Potassium 4.5 mmol/L      Chloride 97 mmol/L      CO2 27.0 mmol/L      Calcium 9.1 mg/dL      Total Protein 7.1 g/dL      Albumin 3.60 g/dL      ALT (SGPT) 11 U/L      AST (SGOT) 14 U/L      Alkaline Phosphatase 119 U/L      Total Bilirubin 0.2 mg/dL      eGFR Non African Amer 14 mL/min/1.73      Comment: <15 Indicative of kidney failure.        eGFR   Amer --      Comment: <15 Indicative of kidney failure.        Globulin 3.5 gm/dL      A/G Ratio 1.0 g/dL      BUN/Creatinine Ratio 11.9     Anion Gap 10.0 mmol/L     Narrative:      GFR Normal >60  Chronic Kidney Disease <60  Kidney Failure <15      [place] Draw [[id number]] Collected: 09/29/20 1035    Specimen: Blood Updated: 09/29/20 1146    Narrative:      The following orders were created for panel order [place] Draw.  Procedure                               Abnormality         Status                     ---------                               -----------         ------                     Light Blue Top[[id number]]                                   Final result               Green Top (Gel)[[id number]]                                  Final result               Lavender Top[[id number]]                                     Final result               Gold Top - SST[[id number]]                                   Final result                 Please view results for these tests on the individual orders.    Light Blue Top [[id number]] Collected: 09/29/20 1035    Specimen: Blood Updated: 09/29/20 1146     Extra Tube hold for add-on     Comment: Auto resulted       Green Top (Gel) [[id number]] Collected: 09/29/20 1035    Specimen: Blood Updated: 09/29/20 1146     Extra Tube Hold for add-ons.     Comment: Auto resulted.       Lavender Top [[id number]] Collected: 09/29/20 1035    Specimen: Blood Updated: 09/29/20 1146     Extra Tube hold for add-on     Comment: Auto resulted       Gold Top - SST [[id number]] Collected: 09/29/20 1035    Specimen: Blood Updated: 09/29/20 1146     Extra Tube Hold for add-ons.     Comment: Auto resulted.       Lipase [[id number]]  (Normal) Collected: 09/29/20 1035    Specimen: Blood Updated: 09/29/20 1124     Lipase 55 U/L     Magnesium [[id number]]  (Abnormal) Collected: 09/29/20 1035    Specimen: Blood Updated: 09/29/20 1124     Magnesium 2.6 mg/dL     Troponin [[id number]]  (Normal) Collected: 09/29/20 1035     Specimen: Blood Updated: 09/29/20 1123     Troponin T <0.010 ng/mL     Narrative:      Troponin T Reference Range:  <= 0.03 ng/mL-   Negative for AMI  >0.03 ng/mL-     Abnormal for myocardial necrosis.  Clinicians would have to utilize clinical acumen, EKG, Troponin and serial changes to determine if it is an Acute Myocardial Infarction or myocardial injury due to an underlying chronic condition.       Results may be falsely decreased if patient taking Biotin.      BNP [[id number]]  (Abnormal) Collected: 09/29/20 1035    Specimen: Blood Updated: 09/29/20 1122     proBNP 1,021.0 pg/mL     Narrative:      Among patients with dyspnea, NT-proBNP is highly sensitive for the detection of acute congestive heart failure. In addition NT-proBNP of <300 pg/ml effectively rules out acute congestive heart failure with 99% negative predictive value.    Results may be falsely decreased if patient taking Biotin.      Urinalysis, Microscopic Only - Urine, Clean Catch [[id number]]  (Abnormal) Collected: 09/29/20 1104    Specimen: Urine, Clean Catch Updated: 09/29/20 1113     RBC, UA 3-5 /HPF      WBC, UA 21-30 /HPF      Bacteria, UA None Seen /HPF      Squamous Epithelial Cells, UA 6-12 /HPF      Hyaline Casts, UA 3-6 /LPF      Methodology Automated Microscopy    Urinalysis With Culture If Indicated - Urine, Clean Catch [[id number]]  (Abnormal) Collected: 09/29/20 1104    Specimen: Urine, Clean Catch Updated: 09/29/20 1112     Color, UA Yellow     Appearance, UA Cloudy     pH, UA 6.0     Specific Gravity, UA 1.009     Glucose, UA Negative     Ketones, UA Negative     Bilirubin, UA Negative     Blood, UA Negative     Protein, UA Negative     Leuk Esterase, UA Moderate (2+)     Nitrite, UA Negative     Urobilinogen, UA 0.2 E.U./dL    CBC & Differential [[id number]]  (Abnormal) Collected: 09/29/20 1035    Specimen: Blood Updated: 09/29/20 1103    Narrative:      The following orders were created for panel order CBC &  Differential.  Procedure                               Abnormality         Status                     ---------                               -----------         ------                     CBC Auto Differential[[id number]]        Abnormal            Final result                 Please view results for these tests on the individual orders.    CBC Auto Differential [[id number]]  (Abnormal) Collected: 09/29/20 1035    Specimen: Blood Updated: 09/29/20 1103     WBC 11.86 10*3/mm3      RBC 4.34 10*6/mm3      Hemoglobin 10.5 g/dL      Hematocrit 33.2 %      MCV 76.5 fL      MCH 24.2 pg      MCHC 31.6 g/dL      RDW 16.8 %      RDW-SD 46.7 fl      MPV 9.5 fL      Platelets 424 10*3/mm3      Neutrophil % 80.5 %      Lymphocyte % 9.5 %      Monocyte % 5.8 %      Eosinophil % 3.4 %      Basophil % 0.3 %      Immature Grans % 0.5 %      Neutrophils, Absolute 9.54 10*3/mm3      Lymphocytes, Absolute 1.13 10*3/mm3      Monocytes, Absolute 0.69 10*3/mm3      Eosinophils, Absolute 0.40 10*3/mm3      Basophils, Absolute 0.04 10*3/mm3      Immature Grans, Absolute 0.06 10*3/mm3      nRBC 0.0 /100 WBC         Imaging Results (Last 7 Days)     Procedure Component Value Units Date/Time    FL Retrograde Pyelogram In OR [[id number]] Resulted: 10/01/20 0838     Updated: 10/01/20 0838    CT Abdomen Pelvis Without Contrast [[id number]] Collected: 09/29/20 1208     Updated: 09/29/20 1243    Narrative:      EXAM: CT ABDOMEN PELVIS WITHOUT IV CONTRAST    COMPARISONS: None    INDICATION: right side pain    TECHNIQUE: Noncontrast CT images were obtained through the  abdomen and pelvis.  Coronal and sagittal reformats were  provided.      FINDINGS:  Note, the lack of intravenous contrast limits the evaluation of  the vasculature and viscera in the abdomen and pelvis.    Liver: Liver is normal in size and contour. No intrahepatic  ductal dilatation.  Spleen: Within normal limits.  Pancreas: Within normal limits.  Gallbladder: Decompressed    Adrenals:  Within normal limits.  Kidneys: There is a 17 x 12 mm left nephrolith. There is trace  left perinephric fat stranding and minimal left hydronephrosis.  Ureters: Visualized portions are within normal limits.   Bladder: Partially distended without focal abnormality.   : Hysterectomy. No adnexal masses.    Stomach: Small hiatal hernia.  Bowel: Small bowel is unremarkable. There are distal and sigmoid  colonic diverticula without diverticulitis.  Appendix: Within normal limits.    Vasculature: Major abdominal vasculature is normal in course and  caliber. There is aortoiliac atherosclerosis.    Miscellaneous: No significant free fluid, focal fluid collection,  pneumoperitoneum, or bulky lymphadenopathy.    Musculoskeletal: No acute fracture or suspicious osseous lesion.  There is vertebral body hemangioma noted within T12. Mild  spondylosis and degenerative disc disease of the thoracic and  lumbosacral spine.    Lungs: Lung bases are unremarkable.      Impression:      Obstructing 17 x 12 mm left nephrolith with mild left  hydronephrosis and left perinephric fat stranding.    Distal and diverticulosis without diverticulitis.    Small hiatal hernia.    Electronically signed by:  Rodrigo Sterling MD  9/29/2020  12:42 PM CDT Workstation: [id number]    XR Chest 1 View [[id number]] Collected: 09/29/20 1123     Updated: 09/29/20 1228    Narrative:      PROCEDURE: Portable chest x-ray    TECHNIQUE: Single frontal view of the chest    COMPARISON: 3/17/2016    HISTORY: dizziness    FINDINGS:  Cardiac silhouette is normal in size. Thoracic aorta contains  atherosclerotic calcification. Mild pulmonary vascular  congestion.  No pleural effusion or pneumothorax.      Impression:      CONCLUSION:  Pulmonary vascular congestion.    Electronically signed by:  Salas Palomo MD  9/29/2020 12:27 PM  CDT Workstation: [id number]            Chief Complaint on Day of Discharge: None.    Hospital Course:  The patient was admitted and  "managed as follows:    Obstructive uropathy secondary to nephrolithiasis:  She was started on IV hydration and pain control.  She was seen by the urologist and had cystoscopy and stent placement.  She was recommended oxybutynin as needed, cleared for discharge by the urologist and will be seen for follow-up in 1 week.       Pyelonephritis/acute cystitis: Patient was started on IV antibiotics.  Urine culture done 9/25/2020 was positive for E. coli and repeat urine culture showed gram-negative bacilli.  Antibiotic was de-escalated to Omnicef on discharge. Reactive leukocytosis has resolved.     History of diabetes mellitus: Patient was hypoglycemic on admission requiring dextrose infusion.  Blood sugar did improve and she was started on Accu-Cheks and sliding scale insulin.  Dose of glimepiride was changed to once daily on discharge.  She was also prescribed glucometer on discharge.      Hyponatremia (likely hypovolemic):  Resolved with isotonic saline.         Acute on chronic kidney disease stage III (likely prerenal / obstructive uropathy): Patient's baseline creatinine is between 1.2-1.3.  Creatinine was down to 1.15 on discharge following IV hydration.     Heart failure with reduced ejection fraction: Patient patient was hypovolemic on admission requiring IV resuscitation.  She was euvolemic prior to discharge and her home medications including diuretic and losartan were resumed.     She also benefited from PT and OT secondary to deconditioning and will be set up for transitional visit with home health.    Condition on Discharge: Stable and improved.    Physical Exam on Discharge:  /81 (BP Location: Right arm, Patient Position: Lying)   Pulse 89   Temp 97.7 °F (36.5 °C) (Oral)   Resp 18   Ht 160 cm (63\")   Wt 91.9 kg (202 lb 9.6 oz)   LMP  (LMP Unknown)   SpO2 95%   BMI 35.89 kg/m²   Physical Exam  Vitals signs and nursing note reviewed.   Constitutional:       General: She is not in acute " distress.     Appearance: She is well-developed. She is obese. She is not diaphoretic.   HENT:      Head: Normocephalic and atraumatic.      Right Ear: External ear normal.      Left Ear: External ear normal.      Nose: Nose normal.   Eyes:      Conjunctiva/sclera: Conjunctivae normal.      Pupils: Pupils are equal, round, and reactive to light.   Neck:      Musculoskeletal: Normal range of motion and neck supple.      Thyroid: No thyromegaly.      Vascular: No JVD.   Cardiovascular:      Rate and Rhythm: Normal rate and regular rhythm.      Heart sounds: Normal heart sounds. No murmur. No friction rub. No gallop.    Pulmonary:      Effort: Pulmonary effort is normal. No respiratory distress.      Breath sounds: Normal breath sounds. No stridor. No wheezing or rales.   Chest:      Chest wall: No tenderness.   Abdominal:      General: Bowel sounds are normal. There is no distension.      Palpations: Abdomen is soft. There is no mass.      Tenderness: There is no abdominal tenderness. There is no guarding or rebound.      Hernia: No hernia is present.   Musculoskeletal: Normal range of motion.         General: No tenderness or deformity.   Skin:     General: Skin is warm and dry.      Coloration: Skin is not pale.      Findings: No erythema or rash.   Neurological:      General: No focal deficit present.      Mental Status: She is alert and oriented to person, place, and time. Mental status is at baseline.      Cranial Nerves: No cranial nerve deficit.      Sensory: No sensory deficit.      Coordination: Coordination normal.      Deep Tendon Reflexes: Reflexes are normal and symmetric.   Psychiatric:         Mood and Affect: Mood normal.         Behavior: Behavior normal. Behavior is cooperative.         Thought Content: Thought content normal.         Judgment: Judgment normal.           Discharge Disposition:  Home or Self Care    Discharge Medications:     Discharge Medications      New Medications       Instructions Start Date   cefdinir 300 MG capsule  Commonly known as: OMNICEF   300 mg, Oral, 2 Times Daily      glucose monitor monitoring kit   1 each, Does not apply, As Needed      oxybutynin 5 MG tablet  Commonly known as: DITROPAN   2.5 mg, Oral, 2 Times Daily PRN         Changes to Medications      Instructions Start Date   furosemide 20 MG tablet  Commonly known as: LASIX  What changed:   · how much to take  · additional instructions   TAKE 1 TABLET BY MOUTH EVERY SUNDAY, MONDAY, AND THURSDAY MORNINGS      glimepiride 1 MG tablet  Commonly known as: AMARYL  What changed: when to take this   1 mg, Oral, Every Morning Before Breakfast      MAGnesium-Oxide 400 (241.3 Mg) MG tablet tablet  Generic drug: magnesium oxide  What changed:   · how much to take  · how to take this  · when to take this  · additional instructions   TAKE 1 TABLET BY MOUTH EVERY DAY WITH FOOD         Continue These Medications      Instructions Start Date   albuterol sulfate  (90 Base) MCG/ACT inhaler  Commonly known as: PROVENTIL HFA;VENTOLIN HFA;PROAIR HFA   2 puffs, Inhalation, Every 4 Hours PRN      aspirin 81 MG tablet   81 mg, Oral, Daily      carvedilol 25 MG tablet  Commonly known as: COREG   25 mg, Oral, 2 Times Daily With Meals      FreeStyle Bennett Sensor System   1 each, Does not apply, Every 7 Days      hydrALAZINE 50 MG tablet  Commonly known as: APRESOLINE   50 mg, Oral, 3 Times Daily      loratadine 10 MG tablet  Commonly known as: CLARITIN   10 mg, Oral, Daily      olmesartan 40 MG tablet  Commonly known as: BENICAR   40 mg, Oral, Daily With Dinner      omeprazole 40 MG capsule  Commonly known as: priLOSEC   40 mg, Oral, Daily      ondansetron 4 MG tablet  Commonly known as: Zofran   4 mg, Oral, Every 8 Hours PRN      polyethylene glycol 17 g packet  Commonly known as: MIRALAX   17 g, Oral, Daily PRN      pravastatin 40 MG tablet  Commonly known as: PRAVACHOL   40 mg, Oral, Nightly         Stop These Medications     cefuroxime 500 MG tablet  Commonly known as: Ceftin     ondansetron ODT 8 MG disintegrating tablet  Commonly known as: Zofran ODT            Discharge Diet: Cardiac and diabetic.    Activity at Discharge: As tolerated.    Discharge Care Plan/Instructions: Patient has been advised to take her medications as prescribed and to return to the emergency room in the event of any worsening symptoms.    Follow-up Appointments:   Future Appointments   Date Time Provider Department Center   3/8/2021  8:45 AM Nancy Chou APRN [facility] None   Follow-up with Dr. Rojas in 1 week.    Test Results Pending at Discharge:     Edmond Jett MD  10/02/20  10:40 CDT    Time: 35 minutes.                Electronically signed by Edmond Jett MD at 10/02/20 1050       Discharge Order (From admission, onward)     Start     Ordered    10/02/20 1035  Discharge patient  Once     Expected Discharge Date: 10/02/20    Discharge Disposition: Home or Self Care    Physician of Record for Attribution - Please select from Treatment Team: EDMOND JETT [[id number]]    Review needed by CMO to determine Physician of Record: No       Question Answer Comment   Physician of Record for Attribution - Please select from Treatment Team EDMOND JETT    Review needed by CMO to determine Physician of Record No        10/02/20 4058

## 2020-10-05 NOTE — OUTREACH NOTE
Call Center TCM Note      Responses   [facility]   Does the patient have one of the following disease processes/diagnoses(primary or secondary)?  Other   TCM attempt successful?  Yes   Call start time  1512   Call end time  1514   Discharge diagnosis  Pyelonephritis   Meds reviewed with patient/caregiver?  Yes   Is the patient having any side effects they believe may be caused by any medication additions or changes?  No   Does the patient have all medications ordered at discharge?  Yes   Is the patient taking all medications as directed (includes completed medication regime)?  Yes   Does the patient have a primary care provider?   Yes   Does the patient have an appointment with their PCP within 7 days of discharge?  Yes   Comments regarding PCP  Hospital d/c f/u appt is on 10/9/20 at 11:00 am    Has the patient kept scheduled appointments due by today?  N/A   What is the Home health agency?   [facility]   Has home health visited the patient within 72 hours of discharge?  Yes   Psychosocial issues?  No   Did the patient receive a copy of their discharge instructions?  Yes   Nursing interventions  Reviewed instructions with patient   What is the patient's perception of their health status since discharge?  Improving   Is the patient/caregiver able to teach back signs and symptoms related to disease process for when to call PCP?  Yes   Is the patient/caregiver able to teach back signs and symptoms related to disease process for when to call 911?  Yes   Is the patient/caregiver able to teach back the hierarchy of who to call/visit for symptoms/problems? PCP, Specialist, Home health nurse, Urgent Care, ED, 911  Yes   If the patient is a current smoker, are they able to teach back resources for cessation?  Not a smoker   TCM call completed?  Yes          Silvia Cummins RN    10/5/2020, 15:14 EDT

## 2020-10-08 ENCOUNTER — PREP FOR SURGERY (OUTPATIENT)
Dept: OTHER | Facility: HOSPITAL | Age: 70
End: 2020-10-08

## 2020-10-08 DIAGNOSIS — N20.0 CALCULUS OF KIDNEY: Primary | ICD-10-CM

## 2020-10-09 ENCOUNTER — OFFICE VISIT (OUTPATIENT)
Dept: FAMILY MEDICINE CLINIC | Facility: CLINIC | Age: 70
End: 2020-10-09

## 2020-10-09 VITALS
WEIGHT: 193.4 LBS | TEMPERATURE: 98.2 F | DIASTOLIC BLOOD PRESSURE: 79 MMHG | OXYGEN SATURATION: 95 % | HEART RATE: 76 BPM | SYSTOLIC BLOOD PRESSURE: 151 MMHG | BODY MASS INDEX: 34.26 KG/M2

## 2020-10-09 DIAGNOSIS — I50.22 CHRONIC SYSTOLIC HEART FAILURE (HCC): ICD-10-CM

## 2020-10-09 DIAGNOSIS — N20.0 RENAL STONE: Primary | ICD-10-CM

## 2020-10-09 DIAGNOSIS — N39.0 ACUTE UTI: ICD-10-CM

## 2020-10-09 DIAGNOSIS — J06.9 UPPER RESPIRATORY TRACT INFECTION, UNSPECIFIED TYPE: ICD-10-CM

## 2020-10-09 PROCEDURE — 99214 OFFICE O/P EST MOD 30 MIN: CPT | Performed by: NURSE PRACTITIONER

## 2020-10-09 RX ORDER — CARVEDILOL 25 MG/1
25 TABLET ORAL 2 TIMES DAILY WITH MEALS
Qty: 60 TABLET | Refills: 5 | Status: SHIPPED | OUTPATIENT
Start: 2020-10-09 | End: 2022-05-17 | Stop reason: HOSPADM

## 2020-10-09 RX ORDER — LORATADINE 10 MG/1
10 TABLET ORAL DAILY
Qty: 30 TABLET | Refills: 3 | Status: SHIPPED | OUTPATIENT
Start: 2020-10-09

## 2020-10-09 NOTE — PATIENT INSTRUCTIONS
Calorie Counting for Weight Loss  Calories are units of energy. Your body needs a certain amount of calories from food to keep you going throughout the day. When you eat more calories than your body needs, your body stores the extra calories as fat. When you eat fewer calories than your body needs, your body burns fat to get the energy it needs.  Calorie counting means keeping track of how many calories you eat and drink each day. Calorie counting can be helpful if you need to lose weight. If you make sure to eat fewer calories than your body needs, you should lose weight. Ask your health care provider what a healthy weight is for you.  For calorie counting to work, you will need to eat the right number of calories in a day in order to lose a healthy amount of weight per week. A dietitian can help you determine how many calories you need in a day and will give you suggestions on how to reach your calorie goal.  · A healthy amount of weight to lose per week is usually 1-2 lb (0.5-0.9 kg). This usually means that your daily calorie intake should be reduced by 500-750 calories.  · Eating 1,200 - 1,500 calories per day can help most women lose weight.  · Eating 1,500 - 1,800 calories per day can help most men lose weight.  What is my plan?  My goal is to have __________ calories per day.  If I have this many calories per day, I should lose around __________ pounds per week.  What do I need to know about calorie counting?  In order to meet your daily calorie goal, you will need to:  · Find out how many calories are in each food you would like to eat. Try to do this before you eat.  · Decide how much of the food you plan to eat.  · Write down what you ate and how many calories it had. Doing this is called keeping a food log.  To successfully lose weight, it is important to balance calorie counting with a healthy lifestyle that includes regular activity. Aim for 150 minutes of moderate exercise (such as walking) or 75  minutes of vigorous exercise (such as running) each week.  Where do I find calorie information?    The number of calories in a food can be found on a Nutrition Facts label. If a food does not have a Nutrition Facts label, try to look up the calories online or ask your dietitian for help.  Remember that calories are listed per serving. If you choose to have more than one serving of a food, you will have to multiply the calories per serving by the amount of servings you plan to eat. For example, the label on a package of bread might say that a serving size is 1 slice and that there are 90 calories in a serving. If you eat 1 slice, you will have eaten 90 calories. If you eat 2 slices, you will have eaten 180 calories.  How do I keep a food log?  Immediately after each meal, record the following information in your food log:  · What you ate. Don't forget to include toppings, sauces, and other extras on the food.  · How much you ate. This can be measured in cups, ounces, or number of items.  · How many calories each food and drink had.  · The total number of calories in the meal.  Keep your food log near you, such as in a small notebook in your pocket, or use a mobile sam or website. Some programs will calculate calories for you and show you how many calories you have left for the day to meet your goal.  What are some calorie counting tips?    · Use your calories on foods and drinks that will fill you up and not leave you hungry:  ? Some examples of foods that fill you up are nuts and nut butters, vegetables, lean proteins, and high-fiber foods like whole grains. High-fiber foods are foods with more than 5 g fiber per serving.  ? Drinks such as sodas, specialty coffee drinks, alcohol, and juices have a lot of calories, yet do not fill you up.  · Eat nutritious foods and avoid empty calories. Empty calories are calories you get from foods or beverages that do not have many vitamins or protein, such as candy, sweets, and  "soda. It is better to have a nutritious high-calorie food (such as an avocado) than a food with few nutrients (such as a bag of chips).  · Know how many calories are in the foods you eat most often. This will help you calculate calorie counts faster.  · Pay attention to calories in drinks. Low-calorie drinks include water and unsweetened drinks.  · Pay attention to nutrition labels for \"low fat\" or \"fat free\" foods. These foods sometimes have the same amount of calories or more calories than the full fat versions. They also often have added sugar, starch, or salt, to make up for flavor that was removed with the fat.  · Find a way of tracking calories that works for you. Get creative. Try different apps or programs if writing down calories does not work for you.  What are some portion control tips?  · Know how many calories are in a serving. This will help you know how many servings of a certain food you can have.  · Use a measuring cup to measure serving sizes. You could also try weighing out portions on a kitchen scale. With time, you will be able to estimate serving sizes for some foods.  · Take some time to put servings of different foods on your favorite plates, bowls, and cups so you know what a serving looks like.  · Try not to eat straight from a bag or box. Doing this can lead to overeating. Put the amount you would like to eat in a cup or on a plate to make sure you are eating the right portion.  · Use smaller plates, glasses, and bowls to prevent overeating.  · Try not to multitask (for example, watch TV or use your computer) while eating. If it is time to eat, sit down at a table and enjoy your food. This will help you to know when you are full. It will also help you to be aware of what you are eating and how much you are eating.  What are tips for following this plan?  Reading food labels  · Check the calorie count compared to the serving size. The serving size may be smaller than what you are used to " "eating.  · Check the source of the calories. Make sure the food you are eating is high in vitamins and protein and low in saturated and trans fats.  Shopping  · Read nutrition labels while you shop. This will help you make healthy decisions before you decide to purchase your food.  · Make a grocery list and stick to it.  Cooking  · Try to cook your favorite foods in a healthier way. For example, try baking instead of frying.  · Use low-fat dairy products.  Meal planning  · Use more fruits and vegetables. Half of your plate should be fruits and vegetables.  · Include lean proteins like poultry and fish.  How do I count calories when eating out?  · Ask for smaller portion sizes.  · Consider sharing an entree and sides instead of getting your own entree.  · If you get your own entree, eat only half. Ask for a box at the beginning of your meal and put the rest of your entree in it so you are not tempted to eat it.  · If calories are listed on the menu, choose the lower calorie options.  · Choose dishes that include vegetables, fruits, whole grains, low-fat dairy products, and lean protein.  · Choose items that are boiled, broiled, grilled, or steamed. Stay away from items that are buttered, battered, fried, or served with cream sauce. Items labeled \"crispy\" are usually fried, unless stated otherwise.  · Choose water, low-fat milk, unsweetened iced tea, or other drinks without added sugar. If you want an alcoholic beverage, choose a lower calorie option such as a glass of wine or light beer.  · Ask for dressings, sauces, and syrups on the side. These are usually high in calories, so you should limit the amount you eat.  · If you want a salad, choose a garden salad and ask for grilled meats. Avoid extra toppings like matute, cheese, or fried items. Ask for the dressing on the side, or ask for olive oil and vinegar or lemon to use as dressing.  · Estimate how many servings of a food you are given. For example, a serving of " cooked rice is ½ cup or about the size of half a baseball. Knowing serving sizes will help you be aware of how much food you are eating at restaurants. The list below tells you how big or small some common portion sizes are based on everyday objects:  ? 1 oz--4 stacked dice.  ? 3 oz--1 deck of cards.  ? 1 tsp--1 die.  ? 1 Tbsp--½ a ping-pong ball.  ? 2 Tbsp--1 ping-pong ball.  ? ½ cup--½ baseball.  ? 1 cup--1 baseball.  Summary  · Calorie counting means keeping track of how many calories you eat and drink each day. If you eat fewer calories than your body needs, you should lose weight.  · A healthy amount of weight to lose per week is usually 1-2 lb (0.5-0.9 kg). This usually means reducing your daily calorie intake by 500-750 calories.  · The number of calories in a food can be found on a Nutrition Facts label. If a food does not have a Nutrition Facts label, try to look up the calories online or ask your dietitian for help.  · Use your calories on foods and drinks that will fill you up, and not on foods and drinks that will leave you hungry.  · Use smaller plates, glasses, and bowls to prevent overeating.  This information is not intended to replace advice given to you by your health care provider. Make sure you discuss any questions you have with your health care provider.  Document Released: 12/18/2006 Document Revised: 09/06/2019 Document Reviewed: 11/17/2017  [employer] Patient Education © 2020 [employer]      Exercising to Lose Weight  Exercise is structured, repetitive physical activity to improve fitness and health. Getting regular exercise is important for everyone. It is especially important if you are overweight. Being overweight increases your risk of heart disease, stroke, diabetes, high blood pressure, and several types of cancer. Reducing your calorie intake and exercising can help you lose weight.  Exercise is usually categorized as moderate or vigorous intensity. To lose weight, most people need  to do a certain amount of moderate-intensity or vigorous-intensity exercise each week.  Moderate-intensity exercise    Moderate-intensity exercise is any activity that gets you moving enough to burn at least three times more energy (calories) than if you were sitting.  Examples of moderate exercise include:  · Walking a mile in 15 minutes.  · Doing light yard work.  · Biking at an easy pace.  Most people should get at least 150 minutes (2 hours and 30 minutes) a week of moderate-intensity exercise to maintain their body weight.  Vigorous-intensity exercise  Vigorous-intensity exercise is any activity that gets you moving enough to burn at least six times more calories than if you were sitting. When you exercise at this intensity, you should be working hard enough that you are not able to carry on a conversation.  Examples of vigorous exercise include:  · Running.  · Playing a team sport, such as football, basketball, and soccer.  · Jumping rope.  Most people should get at least 75 minutes (1 hour and 15 minutes) a week of vigorous-intensity exercise to maintain their body weight.  How can exercise affect me?  When you exercise enough to burn more calories than you eat, you lose weight. Exercise also reduces body fat and builds muscle. The more muscle you have, the more calories you burn. Exercise also:  · Improves mood.  · Reduces stress and tension.  · Improves your overall fitness, flexibility, and endurance.  · Increases bone strength.  The amount of exercise you need to lose weight depends on:  · Your age.  · The type of exercise.  · Any health conditions you have.  · Your overall physical ability.  Talk to your health care provider about how much exercise you need and what types of activities are safe for you.  What actions can I take to lose weight?  Nutrition    · Make changes to your diet as told by your health care provider or diet and nutrition specialist (dietitian). This may include:  ? Eating fewer  calories.  ? Eating more protein.  ? Eating less unhealthy fats.  ? Eating a diet that includes fresh fruits and vegetables, whole grains, low-fat dairy products, and lean protein.  ? Avoiding foods with added fat, salt, and sugar.  · Drink plenty of water while you exercise to prevent dehydration or heat stroke.  Activity  · Choose an activity that you enjoy and set realistic goals. Your health care provider can help you make an exercise plan that works for you.  · Exercise at a moderate or vigorous intensity most days of the week.  ? The intensity of exercise may vary from person to person. You can tell how intense a workout is for you by paying attention to your breathing and heartbeat. Most people will notice their breathing and heartbeat get faster with more intense exercise.  · Do resistance training twice each week, such as:  ? Push-ups.  ? Sit-ups.  ? Lifting weights.  ? Using resistance bands.  · Getting short amounts of exercise can be just as helpful as long structured periods of exercise. If you have trouble finding time to exercise, try to include exercise in your daily routine.  ? Get up, stretch, and walk around every 30 minutes throughout the day.  ? Go for a walk during your lunch break.  ? Park your car farther away from your destination.  ? If you take public transportation, get off one stop early and walk the rest of the way.  ? Make phone calls while standing up and walking around.  ? Take the stairs instead of elevators or escalators.  · Wear comfortable clothes and shoes with good support.  · Do not exercise so much that you hurt yourself, feel dizzy, or get very short of breath.  Where to find more information  · [place] Department of Health and Human Services: www.hhs.gov  · Centers for Disease Control and Prevention (CDC): www.cdc.gov  Contact a health care provider:  · Before starting a new exercise program.  · If you have questions or concerns about your weight.  · If you have a medical  problem that keeps you from exercising.  Get help right away if you have any of the following while exercising:  · Injury.  · Dizziness.  · Difficulty breathing or shortness of breath that does not go away when you stop exercising.  · Chest pain.  · Rapid heartbeat.  Summary  · Being overweight increases your risk of heart disease, stroke, diabetes, high blood pressure, and several types of cancer.  · Losing weight happens when you burn more calories than you eat.  · Reducing the amount of calories you eat in addition to getting regular moderate or vigorous exercise each week helps you lose weight.  This information is not intended to replace advice given to you by your health care provider. Make sure you discuss any questions you have with your health care provider.  Document Released: 01/20/2012 Document Revised: 12/31/2018 Document Reviewed: 12/31/2018  [employer] Patient Education © 2020 [employer]

## 2020-10-09 NOTE — PROGRESS NOTES
Subjective   Afia Adams is a 69 y.o. female.     Afia Adams age 69 comes in today for recheck.  She is recently been in the hospital with a UTI and a kidney stone on the left.  She states she is feeling much better.  She is following up with her urologist soon.  She states her blood sugar did go up with this incident but she does not recall how high her blood sugars running daughter accompanies her and does not recall either.    Flank Pain  This is a new problem. The current episode started 1 to 4 weeks ago. The problem occurs constantly. The problem has been gradually improving since onset. The pain radiates to the left thigh. The pain is at a severity of 10/10. The pain is severe. Stiffness is present all day and at night. Associated symptoms include abdominal pain and pelvic pain. Pertinent negatives include no bladder incontinence, bowel incontinence, chest pain, dysuria, fever, headaches, leg pain, numbness, paresis, paresthesias, perianal numbness, tingling, weakness or weight loss. Risk factors include sedentary lifestyle and menopause. Treatments tried: Stone was treated along with a UTI at the hospital. The treatment provided significant relief.   Urinary Tract Infection   This is a new problem. The current episode started 1 to 4 weeks ago. The problem occurs every urination. The problem has been resolved (Says that the UTI has resolved.). The pain is at a severity of 0/10. The patient is experiencing no pain. There has been no fever. Associated symptoms include flank pain, frequency and urgency. Pertinent negatives include no chills, discharge, hematuria, hesitancy, nausea, possible pregnancy, sweats or vomiting. The treatment provided significant relief. Her past medical history is significant for kidney stones and recurrent UTIs.        The following portions of the patient's history were reviewed and updated as appropriate: allergies, current medications, past family history, past  medical history, past social history, past surgical history and problem list.    Review of Systems   Constitutional: Negative.  Negative for chills, fever and unexpected weight loss.   HENT: Negative.    Eyes: Negative.    Respiratory: Negative.    Cardiovascular: Negative.  Negative for chest pain.   Gastrointestinal: Positive for abdominal pain. Negative for bowel incontinence, nausea and vomiting.   Endocrine: Negative.    Genitourinary: Positive for flank pain, frequency, pelvic pain and urgency. Negative for dysuria, hematuria, hesitancy and urinary incontinence.   Skin: Negative.    Allergic/Immunologic: Negative.    Neurological: Negative.  Negative for tingling, weakness, numbness and paresthesias.   Hematological: Negative.    Psychiatric/Behavioral: Negative.        Objective   Physical Exam  Vitals signs and nursing note reviewed.   Constitutional:       General: She is not in acute distress.     Appearance: She is well-developed.   HENT:      Head: Normocephalic and atraumatic.      Right Ear: External ear normal.      Left Ear: External ear normal.      Nose: Nose normal.      Mouth/Throat:      Pharynx: No oropharyngeal exudate.   Eyes:      Pupils: Pupils are equal, round, and reactive to light.   Neck:      Musculoskeletal: Normal range of motion and neck supple.      Thyroid: No thyromegaly.   Cardiovascular:      Rate and Rhythm: Normal rate and regular rhythm.      Heart sounds: Normal heart sounds. No murmur. No friction rub.   Pulmonary:      Effort: Pulmonary effort is normal. No respiratory distress.      Breath sounds: Normal breath sounds. No wheezing or rales.   Abdominal:      Palpations: Abdomen is soft.   Musculoskeletal: Normal range of motion.   Skin:     General: Skin is warm and dry.   Neurological:      Mental Status: She is alert and oriented to person, place, and time.   Psychiatric:         Thought Content: Thought content normal.           Assessment/Plan   Afia was seen today  for hospital follow up visit and flank pain.    Diagnoses and all orders for this visit:    Renal stone    Chronic systolic heart failure (CMS/HCC)  -     carvedilol (COREG) 25 MG tablet; Take 1 tablet by mouth 2 (Two) Times a Day With Meals.    Upper respiratory tract infection, unspecified type  -     loratadine (CLARITIN) 10 MG tablet; Take 1 tablet by mouth Daily.    Acute UTI        My is noted to be 35.4 which is considered obese.  Diet and exercise information will be provided this patient.

## 2020-10-14 ENCOUNTER — READMISSION MANAGEMENT (OUTPATIENT)
Dept: CALL CENTER | Facility: HOSPITAL | Age: 70
End: 2020-10-14

## 2020-10-14 NOTE — OUTREACH NOTE
Medical Week 2 Survey      Responses   [facility] patient discharged from?  [place]   Does the patient have one of the following disease processes/diagnoses(primary or secondary)?  Other   Week 2 attempt successful?  Yes   Call start time  1641   Discharge diagnosis  Pyelonephritis   Call end time  1642   Meds reviewed with patient/caregiver?  Yes   Is the patient taking all medications as directed (includes completed medication regime)?  Yes   Comments regarding PCP  Hospital d/c f/u appt is on 10/9/20 at 11:00 am    Has the patient kept scheduled appointments due by today?  Yes   What is the patient's perception of their health status since discharge?  Improving   Week 2 Call Completed?  Yes   Revoked  No further contact(revokes)-requires comment   Graduated/Revoked comments  Pt reports she's doing well and no further calls are needed.           Silvia Cummins RN

## 2020-10-15 RX ORDER — FUROSEMIDE 20 MG/1
20 TABLET ORAL
COMMUNITY
End: 2020-10-26 | Stop reason: HOSPADM

## 2020-10-16 ENCOUNTER — TELEPHONE (OUTPATIENT)
Dept: FAMILY MEDICINE CLINIC | Facility: CLINIC | Age: 70
End: 2020-10-16

## 2020-10-16 NOTE — TELEPHONE ENCOUNTER
[facility] called wanting to know if you received a  Fax from them on this patient. She would like you to give them a call back the phone number is [phone] case ID is [id number]

## 2020-10-18 ENCOUNTER — LAB (OUTPATIENT)
Dept: LAB | Facility: HOSPITAL | Age: 70
End: 2020-10-18

## 2020-10-18 DIAGNOSIS — Z01.818 PREOP TESTING: Primary | ICD-10-CM

## 2020-10-18 LAB — SARS-COV-2 N GENE RESP QL NAA+PROBE: NOT DETECTED

## 2020-10-18 PROCEDURE — 87635 SARS-COV-2 COVID-19 AMP PRB: CPT

## 2020-10-18 PROCEDURE — C9803 HOPD COVID-19 SPEC COLLECT: HCPCS

## 2020-10-19 ENCOUNTER — TELEPHONE (OUTPATIENT)
Dept: FAMILY MEDICINE CLINIC | Facility: CLINIC | Age: 70
End: 2020-10-19

## 2020-10-19 DIAGNOSIS — E11.9 TYPE 2 DIABETES MELLITUS WITHOUT COMPLICATION, WITHOUT LONG-TERM CURRENT USE OF INSULIN (HCC): Primary | ICD-10-CM

## 2020-10-19 NOTE — TELEPHONE ENCOUNTER
Ms. Adams is needing a referral to see Dr. Dozier. She is needing her toe nails clipped they are really long Urgent

## 2020-10-21 ENCOUNTER — APPOINTMENT (OUTPATIENT)
Dept: GENERAL RADIOLOGY | Facility: HOSPITAL | Age: 70
End: 2020-10-21

## 2020-10-21 ENCOUNTER — ANESTHESIA (OUTPATIENT)
Dept: PERIOP | Facility: HOSPITAL | Age: 70
End: 2020-10-21

## 2020-10-21 ENCOUNTER — HOSPITAL ENCOUNTER (OUTPATIENT)
Facility: HOSPITAL | Age: 70
Setting detail: HOSPITAL OUTPATIENT SURGERY
Discharge: HOME | End: 2020-10-21
Attending: UROLOGY | Admitting: UROLOGY

## 2020-10-21 ENCOUNTER — ANESTHESIA EVENT (OUTPATIENT)
Dept: PERIOP | Facility: HOSPITAL | Age: 70
End: 2020-10-21

## 2020-10-21 VITALS
SYSTOLIC BLOOD PRESSURE: 150 MMHG | OXYGEN SATURATION: 98 % | RESPIRATION RATE: 20 BRPM | BODY MASS INDEX: 33.52 KG/M2 | HEIGHT: 63 IN | WEIGHT: 189.15 LBS | DIASTOLIC BLOOD PRESSURE: 76 MMHG | TEMPERATURE: 96.8 F | HEART RATE: 80 BPM

## 2020-10-21 DIAGNOSIS — N20.0 CALCULUS OF KIDNEY: ICD-10-CM

## 2020-10-21 LAB
BILIRUB UR QL STRIP: NEGATIVE
CLARITY UR: ABNORMAL
COLOR UR: YELLOW
GLUCOSE BLDC GLUCOMTR-MCNC: 106 MG/DL (ref 70–130)
GLUCOSE BLDC GLUCOMTR-MCNC: 88 MG/DL (ref 70–130)
GLUCOSE UR STRIP-MCNC: NEGATIVE MG/DL
HGB UR QL STRIP.AUTO: NEGATIVE
KETONES UR QL STRIP: NEGATIVE
LEUKOCYTE ESTERASE UR QL STRIP.AUTO: ABNORMAL
NITRITE UR QL STRIP: POSITIVE
PH UR STRIP.AUTO: 7 [PH] (ref 5–9)
PROT UR QL STRIP: ABNORMAL
SP GR UR STRIP: 1.02 (ref 1–1.03)
UROBILINOGEN UR QL STRIP: ABNORMAL

## 2020-10-21 PROCEDURE — C1758 CATHETER, URETERAL: HCPCS | Performed by: UROLOGY

## 2020-10-21 PROCEDURE — 88300 SURGICAL PATH GROSS: CPT

## 2020-10-21 PROCEDURE — 25010000002 ONDANSETRON PER 1 MG: Performed by: NURSE ANESTHETIST, CERTIFIED REGISTERED

## 2020-10-21 PROCEDURE — 25010000002 FENTANYL CITRATE (PF) 100 MCG/2ML SOLUTION: Performed by: NURSE ANESTHETIST, CERTIFIED REGISTERED

## 2020-10-21 PROCEDURE — 25010000002 PHENYLEPHRINE PER 1 ML: Performed by: NURSE ANESTHETIST, CERTIFIED REGISTERED

## 2020-10-21 PROCEDURE — 81003 URINALYSIS AUTO W/O SCOPE: CPT | Performed by: UROLOGY

## 2020-10-21 PROCEDURE — 82962 GLUCOSE BLOOD TEST: CPT

## 2020-10-21 PROCEDURE — C2617 STENT, NON-COR, TEM W/O DEL: HCPCS | Performed by: UROLOGY

## 2020-10-21 PROCEDURE — 25010000002 PROPOFOL 10 MG/ML EMULSION: Performed by: NURSE ANESTHETIST, CERTIFIED REGISTERED

## 2020-10-21 PROCEDURE — 25010000002 MIDAZOLAM PER 1 MG: Performed by: NURSE ANESTHETIST, CERTIFIED REGISTERED

## 2020-10-21 PROCEDURE — 25010000002 IOPAMIDOL 61 % SOLUTION: Performed by: UROLOGY

## 2020-10-21 PROCEDURE — 25010000002 CEFTRIAXONE: Performed by: UROLOGY

## 2020-10-21 PROCEDURE — 74420 UROGRAPHY RTRGR +-KUB: CPT

## 2020-10-21 PROCEDURE — C1769 GUIDE WIRE: HCPCS | Performed by: UROLOGY

## 2020-10-21 DEVICE — URETERAL STENT
Type: IMPLANTABLE DEVICE | Status: FUNCTIONAL
Brand: PERCUFLEX™ PLUS

## 2020-10-21 RX ORDER — SODIUM CHLORIDE 9 MG/ML
1000 INJECTION, SOLUTION INTRAVENOUS CONTINUOUS
Status: DISCONTINUED | OUTPATIENT
Start: 2020-10-21 | End: 2020-10-21 | Stop reason: HOSPADM

## 2020-10-21 RX ORDER — LABETALOL HYDROCHLORIDE 5 MG/ML
5 INJECTION, SOLUTION INTRAVENOUS
Status: CANCELLED | OUTPATIENT
Start: 2020-10-21

## 2020-10-21 RX ORDER — EPHEDRINE SULFATE 50 MG/ML
INJECTION, SOLUTION INTRAVENOUS AS NEEDED
Status: DISCONTINUED | OUTPATIENT
Start: 2020-10-21 | End: 2020-10-21 | Stop reason: SURG

## 2020-10-21 RX ORDER — PROPOFOL 10 MG/ML
VIAL (ML) INTRAVENOUS AS NEEDED
Status: DISCONTINUED | OUTPATIENT
Start: 2020-10-21 | End: 2020-10-21 | Stop reason: SURG

## 2020-10-21 RX ORDER — ONDANSETRON 2 MG/ML
INJECTION INTRAMUSCULAR; INTRAVENOUS AS NEEDED
Status: DISCONTINUED | OUTPATIENT
Start: 2020-10-21 | End: 2020-10-21 | Stop reason: SURG

## 2020-10-21 RX ORDER — PROMETHAZINE HYDROCHLORIDE 25 MG/1
25 TABLET ORAL ONCE AS NEEDED
Status: CANCELLED | OUTPATIENT
Start: 2020-10-21

## 2020-10-21 RX ORDER — MIDAZOLAM HYDROCHLORIDE 1 MG/ML
INJECTION INTRAMUSCULAR; INTRAVENOUS AS NEEDED
Status: DISCONTINUED | OUTPATIENT
Start: 2020-10-21 | End: 2020-10-21 | Stop reason: SURG

## 2020-10-21 RX ORDER — ACETAMINOPHEN 325 MG/1
650 TABLET ORAL ONCE AS NEEDED
Status: CANCELLED | OUTPATIENT
Start: 2020-10-21

## 2020-10-21 RX ORDER — FENTANYL CITRATE 50 UG/ML
INJECTION, SOLUTION INTRAMUSCULAR; INTRAVENOUS AS NEEDED
Status: DISCONTINUED | OUTPATIENT
Start: 2020-10-21 | End: 2020-10-21 | Stop reason: SURG

## 2020-10-21 RX ORDER — OXYCODONE AND ACETAMINOPHEN 7.5; 325 MG/1; MG/1
1-2 TABLET ORAL EVERY 4 HOURS PRN
Qty: 10 TABLET | Refills: 0 | Status: ON HOLD | OUTPATIENT
Start: 2020-10-21 | End: 2020-11-04

## 2020-10-21 RX ORDER — ACETAMINOPHEN 650 MG/1
650 SUPPOSITORY RECTAL ONCE AS NEEDED
Status: CANCELLED | OUTPATIENT
Start: 2020-10-21

## 2020-10-21 RX ORDER — NALOXONE HCL 0.4 MG/ML
0.4 VIAL (ML) INJECTION AS NEEDED
Status: CANCELLED | OUTPATIENT
Start: 2020-10-21

## 2020-10-21 RX ORDER — PROMETHAZINE HYDROCHLORIDE 25 MG/1
25 SUPPOSITORY RECTAL ONCE AS NEEDED
Status: CANCELLED | OUTPATIENT
Start: 2020-10-21

## 2020-10-21 RX ORDER — DIPHENHYDRAMINE HYDROCHLORIDE 50 MG/ML
12.5 INJECTION INTRAMUSCULAR; INTRAVENOUS
Status: CANCELLED | OUTPATIENT
Start: 2020-10-21

## 2020-10-21 RX ORDER — MEPERIDINE HYDROCHLORIDE 25 MG/ML
12.5 INJECTION INTRAMUSCULAR; INTRAVENOUS; SUBCUTANEOUS
Status: CANCELLED | OUTPATIENT
Start: 2020-10-21 | End: 2020-10-22

## 2020-10-21 RX ORDER — FLUMAZENIL 0.1 MG/ML
0.2 INJECTION INTRAVENOUS AS NEEDED
Status: CANCELLED | OUTPATIENT
Start: 2020-10-21

## 2020-10-21 RX ORDER — LIDOCAINE HYDROCHLORIDE 20 MG/ML
INJECTION, SOLUTION INFILTRATION; PERINEURAL AS NEEDED
Status: DISCONTINUED | OUTPATIENT
Start: 2020-10-21 | End: 2020-10-21 | Stop reason: SURG

## 2020-10-21 RX ORDER — ONDANSETRON 2 MG/ML
4 INJECTION INTRAMUSCULAR; INTRAVENOUS ONCE AS NEEDED
Status: CANCELLED | OUTPATIENT
Start: 2020-10-21

## 2020-10-21 RX ORDER — LEVOFLOXACIN 250 MG/1
250 TABLET ORAL DAILY
Qty: 7 TABLET | Refills: 0 | Status: SHIPPED | OUTPATIENT
Start: 2020-10-21 | End: 2020-10-28

## 2020-10-21 RX ORDER — EPHEDRINE SULFATE 50 MG/ML
5 INJECTION, SOLUTION INTRAVENOUS ONCE AS NEEDED
Status: CANCELLED | OUTPATIENT
Start: 2020-10-21

## 2020-10-21 RX ADMIN — FENTANYL CITRATE 25 MCG: 50 INJECTION, SOLUTION INTRAMUSCULAR; INTRAVENOUS at 16:22

## 2020-10-21 RX ADMIN — EPHEDRINE SULFATE 10 MG: 50 INJECTION INTRAVENOUS at 16:20

## 2020-10-21 RX ADMIN — PROPOFOL 80 MG: 10 INJECTION, EMULSION INTRAVENOUS at 15:45

## 2020-10-21 RX ADMIN — ONDANSETRON 4 MG: 2 INJECTION INTRAMUSCULAR; INTRAVENOUS at 16:42

## 2020-10-21 RX ADMIN — LIDOCAINE HYDROCHLORIDE 100 MG: 20 INJECTION, SOLUTION INFILTRATION; PERINEURAL at 15:45

## 2020-10-21 RX ADMIN — EPHEDRINE SULFATE 10 MG: 50 INJECTION INTRAVENOUS at 15:55

## 2020-10-21 RX ADMIN — EPHEDRINE SULFATE 10 MG: 50 INJECTION INTRAVENOUS at 16:37

## 2020-10-21 RX ADMIN — SODIUM CHLORIDE 1000 ML: 9 INJECTION, SOLUTION INTRAVENOUS at 13:34

## 2020-10-21 RX ADMIN — FENTANYL CITRATE 50 MCG: 50 INJECTION, SOLUTION INTRAMUSCULAR; INTRAVENOUS at 15:45

## 2020-10-21 RX ADMIN — CEFTRIAXONE 1 G: 1 INJECTION, POWDER, FOR SOLUTION INTRAMUSCULAR; INTRAVENOUS at 15:53

## 2020-10-21 RX ADMIN — MIDAZOLAM HYDROCHLORIDE 1 MG: 2 INJECTION, SOLUTION INTRAMUSCULAR; INTRAVENOUS at 15:37

## 2020-10-21 RX ADMIN — EPHEDRINE SULFATE 10 MG: 50 INJECTION INTRAVENOUS at 15:59

## 2020-10-21 RX ADMIN — EPHEDRINE SULFATE 10 MG: 50 INJECTION INTRAVENOUS at 16:09

## 2020-10-21 RX ADMIN — PHENYLEPHRINE HYDROCHLORIDE 100 MCG: 10 INJECTION INTRAVENOUS at 16:33

## 2020-10-21 RX ADMIN — FENTANYL CITRATE 25 MCG: 50 INJECTION, SOLUTION INTRAMUSCULAR; INTRAVENOUS at 16:04

## 2020-10-21 NOTE — OP NOTE
CYSTOSCOPY URETEROSCOPY RETROGRADE PYELOGRAM HOLMIUM LASER STENT INSERTION  Procedure Note    Afia Adams  10/21/2020    Pre-op Diagnosis:   Calculus of kidney [N20.0]    Post-op Diagnosis:     Post-Op Diagnosis Codes:     * Calculus of kidney [N20.0]    Procedure(s):  CYSTOSCOPY, LEFT RETROGRADE, URETEROSCOPY, LASER LITHOTRIPSY, STENT PLACEMENT    Surgeon(s):  Richar Rojas MD    Anesthesia: Choice    Staff:   Circulator: Radha Pierce RN  Scrub Person: Rajan Granado  Assistant: Amelia Miranda CSA    Assistant: Amelia iMranda CSA was responsible for performing the following activities: Irrigation and their skilled assistance was necessary for the success of this case.     Estimated Blood Loss: minimal    Specimens:                ID Type Source Tests Collected by Time   A : Old Stent Tissue Kidney, Left TISSUE PATHOLOGY EXAM [place], Richar BANGURA MD 10/21/2020 1638         Drains:   [REMOVED] External Urinary Catheter (Removed)   Site Assessment Clean;Skin intact 10/02/20 1024   Application/Removal external catheter changed 10/01/20 2041   Collection Container Wall suction 10/02/20 1024   Output (mL) 200 mL 09/30/20 0400       Findings: 1 cm left renal pelvis stone    Complications: None    Indications: Same    Description of Procedure: Patient came to surgery placed in dorsolithotomy position.  Sterile prep and drape genitalia routine fashion passed on 20 cystoscope and bladder left ureter was catheterized 6 cc of contrast placed up the ureter I put an 038 guidewire in the left renal pelvis I then dilated with the navigator system and then put the lithoview ureteroscope over the top guidewire in the right renal pelvis.  I used a 365 µm fiber Brice laser with the fragmentation and also dust mode of the laser and fragmented the stone and small pieces flush out to debris.  Then I remove the lithoview ureteroscope over the top guidewire and then over the top of the guidewire  through-the-scope I placed a 8 x 26 double-J stent.  I then drained the bladder remove the scope and fluoroscopy showed J stent was in good position.  Patient was then taken recovery  Richar Rojas MD     Date: 10/21/2020  Time: 16:57 CDT

## 2020-10-21 NOTE — ANESTHESIA PROCEDURE NOTES
Airway  Urgency: elective    Date/Time: 10/21/2020 3:49 PM  Airway not difficult    General Information and Staff    Patient location during procedure: OR  CRNA: Kayla Prakash CRNA    Indications and Patient Condition  Indications for airway management: airway protection    Preoxygenated: yes  Mask difficulty assessment: 0 - not attempted    Final Airway Details  Final airway type: supraglottic airway      Successful airway: I-gel  Size 4    Number of attempts at approach: 1  Assessment: lips, teeth, and gum same as pre-op

## 2020-10-21 NOTE — ANESTHESIA PREPROCEDURE EVALUATION
Anesthesia Evaluation     Patient summary reviewed and Nursing notes reviewed   no history of anesthetic complications:  NPO Solid Status: > 8 hours  NPO Liquid Status: > 8 hours           Airway   Mallampati: II  TM distance: >3 FB  Neck ROM: full  possible difficult intubation, Small opening and Large neck circumference  Comment: General anesthesia 9/30/20. MAC #3, ETT 7.0, Grade 1  Dental    (+) upper dentures and lower dentures    Pulmonary    (+) asthma,decreased breath sounds,   (-) COPD, sleep apnea, wheezes, not a smoker, no home oxygen    ROS comment: FINDINGS:  Cardiac silhouette is normal in size. Thoracic aorta contains  atherosclerotic calcification. Mild pulmonary vascular  congestion.  No pleural effusion or pneumothorax.     IMPRESSION:  CONCLUSION:  Pulmonary vascular congestion.     Electronically signed by:  Salas Palomo MD  9/29/2020 12:27 PM  Cardiovascular   Exercise tolerance: poor (<4 METS)    ECG reviewed  Beta blocker given within 24 hours of surgery  Rhythm: regular  Rate: normal    (+) hypertension well controlled 2 medications or greater, valvular problems/murmurs murmur, MR and TI, past MI  >12 months, CAD, cardiac stents (2 stents 2015.) more than 12 months ago CHF Systolic <55%, murmur (Grade II/VI systolic), hyperlipidemia,   (-) pacemaker, dysrhythmias, carotid bruits, DVT    ROS comment: Date/Time: 9/29/2020 1:40 PM   Performed by: Robert Taylor MD   Authorized by: Robert Taylor MD   Interpreted by physician   Rhythm comments: Accelerated junctional   Rate: normal   BPM: 70   QRS axis: left   Clinical impression: abnormal ECG   Comments: Inferoseptal Q waves.   Interpreted by Robert Taylor MD on 9/29/2020  2:05 PM    · Left Ventricle: Left ventricular systolic function is mildly decreased. Calculated EF = 46%.  · Left ventricular wall thickness is consistent with mild-to-moderate concentric hypertrophy. Diastolic function is normal.  · Right Ventricle: Normal right ventricular  cavity size, wall thickness, systolic function and septal motion noted  · Mild mitral valve regurgitation is present.  · Mild tricuspid valve regurgitation is present. Estimated right ventricular systolic pressure from tricuspid regurgitation is mildly elevated (35-45 mmHg).  · No evidence of pulmonary hypertension is present.  · There is no evidence of pericardial effusion.       Neuro/Psych- negative ROS  (-) seizures, TIA, CVA  GI/Hepatic/Renal/Endo    (+) obesity,  GERD well controlled, PUD,  liver disease history of elevated LFT, renal disease (Creatinine 1.15) stones, diabetes mellitus type 2,     ROS Comment: HGB 9.8 HCT 31.8    Musculoskeletal     Abdominal   (+) obese,    Substance History - negative use     OB/GYN negative ob/gyn ROS         Other   arthritis,      ROS/Med Hx Other: Follows with Dr. Kent- last seen 3 weeks ago     Albuterol use for asthma- once a month    TTE 4/2/2018:  · Left Ventricle: Left ventricular systolic function is mildly decreased. Calculated EF = 46%.  · Left ventricular wall thickness is consistent with mild-to-moderate concentric hypertrophy. Diastolic function is normal.  · Right Ventricle: Normal right ventricular cavity size, wall thickness, systolic function and septal motion noted  · Mild mitral valve regurgitation is present.  · Mild tricuspid valve regurgitation is present. Estimated right ventricular systolic pressure from tricuspid regurgitation is mildly elevated (35-45 mmHg).  · No evidence of pulmonary hypertension is present.  · There is no evidence of pericardial effusion.    2016 had 2 stents placed                    Anesthesia Plan    ASA 4     general   ( Follows closely with Dr. Kent every 6 months. Discussed general anesthesia with pt and spouse and they agree to risks and possible complications. )  intravenous induction     Anesthetic plan, all risks, benefits, and alternatives have been provided, discussed and informed consent has been obtained  with: patient and spouse/significant other.

## 2020-10-21 NOTE — ANESTHESIA POSTPROCEDURE EVALUATION
Patient: Afia Adams    Procedure Summary     Date: 10/21/20 Room / Location: [facility] OR 02 / [facility] OR    Anesthesia Start: 1539 Anesthesia Stop: 1655    Procedure: CYSTOSCOPY, LEFT RETROGRADE, URETEROSCOPY, LASER LITHOTRIPSY, STENT PLACEMENT (Left ) Diagnosis:       Calculus of kidney      (Calculus of kidney [N20.0])    Surgeon: Richar Rojas MD Provider: Steve Magdaleno MD    Anesthesia Type: general ASA Status: 4          Anesthesia Type: general    Vitals  No vitals data found for the desired time range.          Post Anesthesia Care and Evaluation    Patient location during evaluation: PACU  Patient participation: complete - patient participated  Level of consciousness: awake and alert  Pain score: 2  Airway patency: patent  Anesthetic complications: No anesthetic complications  PONV Status: none  Cardiovascular status: acceptable  Respiratory status: acceptable  Hydration status: acceptable

## 2020-10-22 ENCOUNTER — TELEPHONE (OUTPATIENT)
Dept: FAMILY MEDICINE CLINIC | Facility: CLINIC | Age: 70
End: 2020-10-22

## 2020-10-22 NOTE — TELEPHONE ENCOUNTER
PATIENTS DAUGHTER CALLED STATING THAT SHE HAS BEEN WAITING ON A CALL BACK TO HEAR ABOUT A REFERRAL SHE ASKED FOR ON 10/19/2020. HER MOTHER HAS A LONG NAIL THAT NEEDS TO BE CUT BY A PODIATRIST.    PLEASE CALL BACK AND ADVISE:  [phone]

## 2020-10-23 PROCEDURE — 99284 EMERGENCY DEPT VISIT MOD MDM: CPT

## 2020-10-23 PROCEDURE — 96374 THER/PROPH/DIAG INJ IV PUSH: CPT

## 2020-10-24 ENCOUNTER — APPOINTMENT (OUTPATIENT)
Dept: CT IMAGING | Facility: HOSPITAL | Age: 70
End: 2020-10-24

## 2020-10-24 ENCOUNTER — HOSPITAL ENCOUNTER (OUTPATIENT)
Facility: HOSPITAL | Age: 70
Setting detail: OBSERVATION
Discharge: HOME | End: 2020-10-26
Attending: EMERGENCY MEDICINE | Admitting: FAMILY MEDICINE

## 2020-10-24 ENCOUNTER — APPOINTMENT (OUTPATIENT)
Dept: CARDIOLOGY | Facility: HOSPITAL | Age: 70
End: 2020-10-24

## 2020-10-24 DIAGNOSIS — E16.2 HYPOGLYCEMIA: Primary | ICD-10-CM

## 2020-10-24 DIAGNOSIS — N28.9 ACUTE RENAL INSUFFICIENCY: ICD-10-CM

## 2020-10-24 DIAGNOSIS — R10.84 GENERALIZED ABDOMINAL PAIN: ICD-10-CM

## 2020-10-24 PROBLEM — N18.31 STAGE 3A CHRONIC KIDNEY DISEASE (HCC): Status: ACTIVE | Noted: 2020-10-24

## 2020-10-24 PROBLEM — E11.621 DIABETIC ULCER OF TOE OF RIGHT FOOT ASSOCIATED WITH TYPE 2 DIABETES MELLITUS (HCC): Status: ACTIVE | Noted: 2020-10-24

## 2020-10-24 PROBLEM — I25.10 CAD (CORONARY ARTERY DISEASE): Status: ACTIVE | Noted: 2020-10-24

## 2020-10-24 PROBLEM — N17.9 AKI (ACUTE KIDNEY INJURY) (HCC): Status: ACTIVE | Noted: 2020-10-24

## 2020-10-24 PROBLEM — L97.519 DIABETIC ULCER OF TOE OF RIGHT FOOT ASSOCIATED WITH TYPE 2 DIABETES MELLITUS (HCC): Status: ACTIVE | Noted: 2020-10-24

## 2020-10-24 PROBLEM — N20.0 NEPHROLITHIASIS: Status: ACTIVE | Noted: 2020-10-24

## 2020-10-24 LAB
ALBUMIN SERPL-MCNC: 3.3 G/DL (ref 3.5–5.2)
ALBUMIN SERPL-MCNC: 3.6 G/DL (ref 3.5–5.2)
ALBUMIN/GLOB SERPL: 1 G/DL
ALBUMIN/GLOB SERPL: 1.1 G/DL
ALP SERPL-CCNC: 101 U/L (ref 39–117)
ALP SERPL-CCNC: 107 U/L (ref 39–117)
ALT SERPL W P-5'-P-CCNC: 7 U/L (ref 1–33)
ALT SERPL W P-5'-P-CCNC: 9 U/L (ref 1–33)
ANION GAP SERPL CALCULATED.3IONS-SCNC: 11 MMOL/L (ref 5–15)
ANION GAP SERPL CALCULATED.3IONS-SCNC: 9 MMOL/L (ref 5–15)
AST SERPL-CCNC: 13 U/L (ref 1–32)
AST SERPL-CCNC: 17 U/L (ref 1–32)
BACTERIA UR QL AUTO: ABNORMAL /HPF
BACTERIA UR QL AUTO: ABNORMAL /HPF
BASOPHILS # BLD AUTO: 0.04 10*3/MM3 (ref 0–0.2)
BASOPHILS # BLD AUTO: 0.04 10*3/MM3 (ref 0–0.2)
BASOPHILS NFR BLD AUTO: 0.4 % (ref 0–1.5)
BASOPHILS NFR BLD AUTO: 0.5 % (ref 0–1.5)
BH CV ECHO MEAS - ACS: 1.8 CM
BH CV ECHO MEAS - AO MAX PG (FULL): 3.4 MMHG
BH CV ECHO MEAS - AO MAX PG: 7.7 MMHG
BH CV ECHO MEAS - AO MEAN PG (FULL): 2 MMHG
BH CV ECHO MEAS - AO MEAN PG: 4 MMHG
BH CV ECHO MEAS - AO ROOT AREA (BSA CORRECTED): 1.7
BH CV ECHO MEAS - AO ROOT AREA: 8.6 CM^2
BH CV ECHO MEAS - AO ROOT DIAM: 3.3 CM
BH CV ECHO MEAS - AO V2 MAX: 139 CM/SEC
BH CV ECHO MEAS - AO V2 MEAN: 93.5 CM/SEC
BH CV ECHO MEAS - AO V2 VTI: 31.1 CM
BH CV ECHO MEAS - ASC AORTA: 3.5 CM
BH CV ECHO MEAS - AVA(I,A): 3.1 CM^2
BH CV ECHO MEAS - AVA(I,D): 3.1 CM^2
BH CV ECHO MEAS - AVA(V,A): 2.8 CM^2
BH CV ECHO MEAS - AVA(V,D): 2.8 CM^2
BH CV ECHO MEAS - BSA(HAYCOCK): 2 M^2
BH CV ECHO MEAS - BSA: 1.9 M^2
BH CV ECHO MEAS - BZI_BMI: 33.8 KILOGRAMS/M^2
BH CV ECHO MEAS - BZI_METRIC_HEIGHT: 160 CM
BH CV ECHO MEAS - BZI_METRIC_WEIGHT: 86.6 KG
BH CV ECHO MEAS - EDV(CUBED): 154.9 ML
BH CV ECHO MEAS - EDV(MOD-SP2): 74.1 ML
BH CV ECHO MEAS - EDV(MOD-SP4): 60.5 ML
BH CV ECHO MEAS - EDV(TEICH): 139.5 ML
BH CV ECHO MEAS - EF(CUBED): 54.5 %
BH CV ECHO MEAS - EF(MOD-SP2): 49.8 %
BH CV ECHO MEAS - EF(MOD-SP4): 15.7 %
BH CV ECHO MEAS - EF(TEICH): 45.9 %
BH CV ECHO MEAS - ESV(CUBED): 70.4 ML
BH CV ECHO MEAS - ESV(MOD-SP2): 37.2 ML
BH CV ECHO MEAS - ESV(MOD-SP4): 51 ML
BH CV ECHO MEAS - ESV(TEICH): 75.5 ML
BH CV ECHO MEAS - FS: 23.1 %
BH CV ECHO MEAS - IVS/LVPW: 0.81
BH CV ECHO MEAS - IVSD: 0.84 CM
BH CV ECHO MEAS - LA DIMENSION: 2.8 CM
BH CV ECHO MEAS - LA/AO: 0.85
BH CV ECHO MEAS - LV DIASTOLIC VOL/BSA (35-75): 31.9 ML/M^2
BH CV ECHO MEAS - LV MASS(C)D: 189.1 GRAMS
BH CV ECHO MEAS - LV MASS(C)DI: 99.7 GRAMS/M^2
BH CV ECHO MEAS - LV MAX PG: 4.3 MMHG
BH CV ECHO MEAS - LV MEAN PG: 2 MMHG
BH CV ECHO MEAS - LV SYSTOLIC VOL/BSA (12-30): 26.9 ML/M^2
BH CV ECHO MEAS - LV V1 MAX: 104 CM/SEC
BH CV ECHO MEAS - LV V1 MEAN: 64.3 CM/SEC
BH CV ECHO MEAS - LV V1 VTI: 25.3 CM
BH CV ECHO MEAS - LVIDD: 5.4 CM
BH CV ECHO MEAS - LVIDS: 4.1 CM
BH CV ECHO MEAS - LVLD AP2: 7 CM
BH CV ECHO MEAS - LVLD AP4: 8.2 CM
BH CV ECHO MEAS - LVLS AP2: 6.4 CM
BH CV ECHO MEAS - LVLS AP4: 6.9 CM
BH CV ECHO MEAS - LVOT AREA (M): 3.8 CM^2
BH CV ECHO MEAS - LVOT AREA: 3.8 CM^2
BH CV ECHO MEAS - LVOT DIAM: 2.2 CM
BH CV ECHO MEAS - LVPWD: 1 CM
BH CV ECHO MEAS - MR MAX PG: 110.7 MMHG
BH CV ECHO MEAS - MR MAX VEL: 526 CM/SEC
BH CV ECHO MEAS - MV A MAX VEL: 129 CM/SEC
BH CV ECHO MEAS - MV DEC SLOPE: 893 CM/SEC^2
BH CV ECHO MEAS - MV E MAX VEL: 117 CM/SEC
BH CV ECHO MEAS - MV E/A: 0.91
BH CV ECHO MEAS - MV P1/2T MAX VEL: 145 CM/SEC
BH CV ECHO MEAS - MV P1/2T: 47.6 MSEC
BH CV ECHO MEAS - MVA P1/2T LCG: 1.5 CM^2
BH CV ECHO MEAS - MVA(P1/2T): 4.6 CM^2
BH CV ECHO MEAS - PA MAX PG (FULL): 0.08 MMHG
BH CV ECHO MEAS - PA MAX PG: 3.3 MMHG
BH CV ECHO MEAS - PA V2 MAX: 90.5 CM/SEC
BH CV ECHO MEAS - RAP SYSTOLE: 10 MMHG
BH CV ECHO MEAS - RV MAX PG: 3.2 MMHG
BH CV ECHO MEAS - RV MEAN PG: 2 MMHG
BH CV ECHO MEAS - RV V1 MAX: 89.4 CM/SEC
BH CV ECHO MEAS - RV V1 MEAN: 58.6 CM/SEC
BH CV ECHO MEAS - RV V1 VTI: 16.8 CM
BH CV ECHO MEAS - RVDD: 2.4 CM
BH CV ECHO MEAS - RVSP: 26.5 MMHG
BH CV ECHO MEAS - SI(AO): 140.3 ML/M^2
BH CV ECHO MEAS - SI(CUBED): 44.5 ML/M^2
BH CV ECHO MEAS - SI(LVOT): 50.7 ML/M^2
BH CV ECHO MEAS - SI(MOD-SP2): 19.5 ML/M^2
BH CV ECHO MEAS - SI(MOD-SP4): 5 ML/M^2
BH CV ECHO MEAS - SI(TEICH): 33.7 ML/M^2
BH CV ECHO MEAS - SV(AO): 266 ML
BH CV ECHO MEAS - SV(CUBED): 84.4 ML
BH CV ECHO MEAS - SV(LVOT): 96.2 ML
BH CV ECHO MEAS - SV(MOD-SP2): 36.9 ML
BH CV ECHO MEAS - SV(MOD-SP4): 9.5 ML
BH CV ECHO MEAS - SV(TEICH): 64 ML
BH CV ECHO MEAS - TR MAX VEL: 203 CM/SEC
BILIRUB SERPL-MCNC: 0.2 MG/DL (ref 0–1.2)
BILIRUB SERPL-MCNC: 0.3 MG/DL (ref 0–1.2)
BILIRUB UR QL STRIP: NEGATIVE
BILIRUB UR QL STRIP: NEGATIVE
BUN SERPL-MCNC: 31 MG/DL (ref 8–23)
BUN SERPL-MCNC: 34 MG/DL (ref 8–23)
BUN/CREAT SERPL: 16.5 (ref 7–25)
BUN/CREAT SERPL: 17 (ref 7–25)
CALCIUM SPEC-SCNC: 9 MG/DL (ref 8.6–10.5)
CALCIUM SPEC-SCNC: 9.1 MG/DL (ref 8.6–10.5)
CHLORIDE SERPL-SCNC: 96 MMOL/L (ref 98–107)
CHLORIDE SERPL-SCNC: 99 MMOL/L (ref 98–107)
CLARITY UR: ABNORMAL
CLARITY UR: ABNORMAL
CO2 SERPL-SCNC: 25 MMOL/L (ref 22–29)
CO2 SERPL-SCNC: 27 MMOL/L (ref 22–29)
COLOR UR: YELLOW
COLOR UR: YELLOW
CREAT SERPL-MCNC: 1.82 MG/DL (ref 0.57–1)
CREAT SERPL-MCNC: 2.06 MG/DL (ref 0.57–1)
D-LACTATE SERPL-SCNC: 0.8 MMOL/L (ref 0.5–2)
DEPRECATED RDW RBC AUTO: 43.3 FL (ref 37–54)
DEPRECATED RDW RBC AUTO: 46.4 FL (ref 37–54)
EOSINOPHIL # BLD AUTO: 0.25 10*3/MM3 (ref 0–0.4)
EOSINOPHIL # BLD AUTO: 0.34 10*3/MM3 (ref 0–0.4)
EOSINOPHIL NFR BLD AUTO: 2.9 % (ref 0.3–6.2)
EOSINOPHIL NFR BLD AUTO: 3.1 % (ref 0.3–6.2)
ERYTHROCYTE [DISTWIDTH] IN BLOOD BY AUTOMATED COUNT: 15.9 % (ref 12.3–15.4)
ERYTHROCYTE [DISTWIDTH] IN BLOOD BY AUTOMATED COUNT: 16.2 % (ref 12.3–15.4)
GFR SERPL CREATININE-BSD FRML MDRD: 24 ML/MIN/1.73
GFR SERPL CREATININE-BSD FRML MDRD: 28 ML/MIN/1.73
GLOBULIN UR ELPH-MCNC: 3.3 GM/DL
GLOBULIN UR ELPH-MCNC: 3.4 GM/DL
GLUCOSE BLDC GLUCOMTR-MCNC: 100 MG/DL (ref 70–130)
GLUCOSE BLDC GLUCOMTR-MCNC: 105 MG/DL (ref 70–130)
GLUCOSE BLDC GLUCOMTR-MCNC: 138 MG/DL (ref 70–130)
GLUCOSE BLDC GLUCOMTR-MCNC: 185 MG/DL (ref 70–130)
GLUCOSE BLDC GLUCOMTR-MCNC: 186 MG/DL (ref 70–130)
GLUCOSE BLDC GLUCOMTR-MCNC: 200 MG/DL (ref 70–130)
GLUCOSE BLDC GLUCOMTR-MCNC: 50 MG/DL (ref 70–130)
GLUCOSE SERPL-MCNC: 148 MG/DL (ref 65–99)
GLUCOSE SERPL-MCNC: 46 MG/DL (ref 65–99)
GLUCOSE UR STRIP-MCNC: NEGATIVE MG/DL
GLUCOSE UR STRIP-MCNC: NEGATIVE MG/DL
HBA1C MFR BLD: 6.1 % (ref 4.8–5.6)
HCT VFR BLD AUTO: 29.8 % (ref 34–46.6)
HCT VFR BLD AUTO: 30.9 % (ref 34–46.6)
HGB BLD-MCNC: 9.3 G/DL (ref 12–15.9)
HGB BLD-MCNC: 9.5 G/DL (ref 12–15.9)
HGB UR QL STRIP.AUTO: ABNORMAL
HGB UR QL STRIP.AUTO: ABNORMAL
HOLD SPECIMEN: NORMAL
HOLD SPECIMEN: NORMAL
HYALINE CASTS UR QL AUTO: ABNORMAL /LPF
HYALINE CASTS UR QL AUTO: ABNORMAL /LPF
IMM GRANULOCYTES # BLD AUTO: 0.07 10*3/MM3 (ref 0–0.05)
IMM GRANULOCYTES # BLD AUTO: 0.1 10*3/MM3 (ref 0–0.05)
IMM GRANULOCYTES NFR BLD AUTO: 0.8 % (ref 0–0.5)
IMM GRANULOCYTES NFR BLD AUTO: 0.9 % (ref 0–0.5)
KETONES UR QL STRIP: NEGATIVE
KETONES UR QL STRIP: NEGATIVE
LEUKOCYTE ESTERASE UR QL STRIP.AUTO: ABNORMAL
LEUKOCYTE ESTERASE UR QL STRIP.AUTO: ABNORMAL
LV EF 2D ECHO EST: 43 %
LYMPHOCYTES # BLD AUTO: 1.53 10*3/MM3 (ref 0.7–3.1)
LYMPHOCYTES # BLD AUTO: 1.71 10*3/MM3 (ref 0.7–3.1)
LYMPHOCYTES NFR BLD AUTO: 14.1 % (ref 19.6–45.3)
LYMPHOCYTES NFR BLD AUTO: 19.7 % (ref 19.6–45.3)
MAXIMAL PREDICTED HEART RATE: 152 BPM
MCH RBC QN AUTO: 23.8 PG (ref 26.6–33)
MCH RBC QN AUTO: 24 PG (ref 26.6–33)
MCHC RBC AUTO-ENTMCNC: 30.7 G/DL (ref 31.5–35.7)
MCHC RBC AUTO-ENTMCNC: 31.2 G/DL (ref 31.5–35.7)
MCV RBC AUTO: 76.2 FL (ref 79–97)
MCV RBC AUTO: 78 FL (ref 79–97)
MONOCYTES # BLD AUTO: 0.82 10*3/MM3 (ref 0.1–0.9)
MONOCYTES # BLD AUTO: 0.91 10*3/MM3 (ref 0.1–0.9)
MONOCYTES NFR BLD AUTO: 8.4 % (ref 5–12)
MONOCYTES NFR BLD AUTO: 9.5 % (ref 5–12)
NEUTROPHILS NFR BLD AUTO: 5.77 10*3/MM3 (ref 1.7–7)
NEUTROPHILS NFR BLD AUTO: 66.6 % (ref 42.7–76)
NEUTROPHILS NFR BLD AUTO: 7.95 10*3/MM3 (ref 1.7–7)
NEUTROPHILS NFR BLD AUTO: 73.1 % (ref 42.7–76)
NITRITE UR QL STRIP: NEGATIVE
NITRITE UR QL STRIP: NEGATIVE
NRBC BLD AUTO-RTO: 0 /100 WBC (ref 0–0.2)
NRBC BLD AUTO-RTO: 0.2 /100 WBC (ref 0–0.2)
PH UR STRIP.AUTO: 6 [PH] (ref 5–9)
PH UR STRIP.AUTO: 6 [PH] (ref 5–9)
PLATELET # BLD AUTO: 274 10*3/MM3 (ref 140–450)
PLATELET # BLD AUTO: 283 10*3/MM3 (ref 140–450)
PMV BLD AUTO: 10.4 FL (ref 6–12)
PMV BLD AUTO: 9.7 FL (ref 6–12)
POTASSIUM SERPL-SCNC: 3.8 MMOL/L (ref 3.5–5.2)
POTASSIUM SERPL-SCNC: 4.2 MMOL/L (ref 3.5–5.2)
PROT SERPL-MCNC: 6.6 G/DL (ref 6–8.5)
PROT SERPL-MCNC: 7 G/DL (ref 6–8.5)
PROT UR QL STRIP: ABNORMAL
PROT UR QL STRIP: ABNORMAL
RBC # BLD AUTO: 3.91 10*6/MM3 (ref 3.77–5.28)
RBC # BLD AUTO: 3.96 10*6/MM3 (ref 3.77–5.28)
RBC # UR: ABNORMAL /HPF
RBC # UR: ABNORMAL /HPF
REF LAB TEST METHOD: ABNORMAL
REF LAB TEST METHOD: ABNORMAL
SODIUM SERPL-SCNC: 133 MMOL/L (ref 136–145)
SODIUM SERPL-SCNC: 134 MMOL/L (ref 136–145)
SP GR UR STRIP: 1.01 (ref 1–1.03)
SP GR UR STRIP: 1.01 (ref 1–1.03)
SQUAMOUS #/AREA URNS HPF: ABNORMAL /HPF
SQUAMOUS #/AREA URNS HPF: ABNORMAL /HPF
STRESS TARGET HR: 129 BPM
UROBILINOGEN UR QL STRIP: ABNORMAL
UROBILINOGEN UR QL STRIP: ABNORMAL
WBC # BLD AUTO: 10.87 10*3/MM3 (ref 3.4–10.8)
WBC # BLD AUTO: 8.66 10*3/MM3 (ref 3.4–10.8)
WBC UR QL AUTO: ABNORMAL /HPF
WBC UR QL AUTO: ABNORMAL /HPF
WHOLE BLOOD HOLD SPECIMEN: NORMAL
WHOLE BLOOD HOLD SPECIMEN: NORMAL
YEAST URNS QL MICRO: ABNORMAL /HPF

## 2020-10-24 PROCEDURE — 87086 URINE CULTURE/COLONY COUNT: CPT | Performed by: STUDENT IN AN ORGANIZED HEALTH CARE EDUCATION/TRAINING PROGRAM

## 2020-10-24 PROCEDURE — 81001 URINALYSIS AUTO W/SCOPE: CPT | Performed by: STUDENT IN AN ORGANIZED HEALTH CARE EDUCATION/TRAINING PROGRAM

## 2020-10-24 PROCEDURE — 93306 TTE W/DOPPLER COMPLETE: CPT | Performed by: INTERNAL MEDICINE

## 2020-10-24 PROCEDURE — 80053 COMPREHEN METABOLIC PANEL: CPT | Performed by: STUDENT IN AN ORGANIZED HEALTH CARE EDUCATION/TRAINING PROGRAM

## 2020-10-24 PROCEDURE — G0378 HOSPITAL OBSERVATION PER HR: HCPCS

## 2020-10-24 PROCEDURE — 96376 TX/PRO/DX INJ SAME DRUG ADON: CPT

## 2020-10-24 PROCEDURE — 93306 TTE W/DOPPLER COMPLETE: CPT

## 2020-10-24 PROCEDURE — 85025 COMPLETE CBC W/AUTO DIFF WBC: CPT | Performed by: STUDENT IN AN ORGANIZED HEALTH CARE EDUCATION/TRAINING PROGRAM

## 2020-10-24 PROCEDURE — 82962 GLUCOSE BLOOD TEST: CPT

## 2020-10-24 PROCEDURE — 96361 HYDRATE IV INFUSION ADD-ON: CPT

## 2020-10-24 PROCEDURE — 83036 HEMOGLOBIN GLYCOSYLATED A1C: CPT | Performed by: STUDENT IN AN ORGANIZED HEALTH CARE EDUCATION/TRAINING PROGRAM

## 2020-10-24 PROCEDURE — 25010000002 PERFLUTREN (DEFINITY) 8.476 MG IN SODIUM CHLORIDE 0.9 % 10 ML INJECTION: Performed by: STUDENT IN AN ORGANIZED HEALTH CARE EDUCATION/TRAINING PROGRAM

## 2020-10-24 PROCEDURE — 99220 PR INITIAL OBSERVATION CARE/DAY 70 MINUTES: CPT | Performed by: STUDENT IN AN ORGANIZED HEALTH CARE EDUCATION/TRAINING PROGRAM

## 2020-10-24 PROCEDURE — 87040 BLOOD CULTURE FOR BACTERIA: CPT | Performed by: STUDENT IN AN ORGANIZED HEALTH CARE EDUCATION/TRAINING PROGRAM

## 2020-10-24 PROCEDURE — 83605 ASSAY OF LACTIC ACID: CPT | Performed by: STUDENT IN AN ORGANIZED HEALTH CARE EDUCATION/TRAINING PROGRAM

## 2020-10-24 PROCEDURE — 74176 CT ABD & PELVIS W/O CONTRAST: CPT

## 2020-10-24 PROCEDURE — 96374 THER/PROPH/DIAG INJ IV PUSH: CPT

## 2020-10-24 RX ORDER — CETIRIZINE HYDROCHLORIDE 10 MG/1
10 TABLET ORAL DAILY
Status: DISCONTINUED | OUTPATIENT
Start: 2020-10-24 | End: 2020-10-26 | Stop reason: HOSPADM

## 2020-10-24 RX ORDER — SODIUM CHLORIDE 0.9 % (FLUSH) 0.9 %
10 SYRINGE (ML) INJECTION EVERY 12 HOURS SCHEDULED
Status: DISCONTINUED | OUTPATIENT
Start: 2020-10-24 | End: 2020-10-26 | Stop reason: HOSPADM

## 2020-10-24 RX ORDER — NICOTINE POLACRILEX 4 MG
15 LOZENGE BUCCAL
Status: DISCONTINUED | OUTPATIENT
Start: 2020-10-24 | End: 2020-10-26 | Stop reason: HOSPADM

## 2020-10-24 RX ORDER — LEVOFLOXACIN 250 MG/1
250 TABLET ORAL DAILY
Status: DISCONTINUED | OUTPATIENT
Start: 2020-10-24 | End: 2020-10-26 | Stop reason: HOSPADM

## 2020-10-24 RX ORDER — ALBUTEROL SULFATE 2.5 MG/3ML
2.5 SOLUTION RESPIRATORY (INHALATION) EVERY 6 HOURS PRN
Status: DISCONTINUED | OUTPATIENT
Start: 2020-10-24 | End: 2020-10-26 | Stop reason: HOSPADM

## 2020-10-24 RX ORDER — ACETAMINOPHEN 325 MG/1
650 TABLET ORAL EVERY 4 HOURS PRN
Status: DISCONTINUED | OUTPATIENT
Start: 2020-10-24 | End: 2020-10-26 | Stop reason: HOSPADM

## 2020-10-24 RX ORDER — LOSARTAN POTASSIUM 25 MG/1
25 TABLET ORAL
Status: DISCONTINUED | OUTPATIENT
Start: 2020-10-24 | End: 2020-10-26 | Stop reason: HOSPADM

## 2020-10-24 RX ORDER — OXYBUTYNIN CHLORIDE 5 MG/1
2.5 TABLET ORAL 2 TIMES DAILY PRN
Status: DISCONTINUED | OUTPATIENT
Start: 2020-10-24 | End: 2020-10-26 | Stop reason: HOSPADM

## 2020-10-24 RX ORDER — FUROSEMIDE 20 MG/1
20 TABLET ORAL
Status: DISCONTINUED | OUTPATIENT
Start: 2020-10-24 | End: 2020-10-24

## 2020-10-24 RX ORDER — PRAVASTATIN SODIUM 40 MG
40 TABLET ORAL NIGHTLY
Status: DISCONTINUED | OUTPATIENT
Start: 2020-10-24 | End: 2020-10-26 | Stop reason: HOSPADM

## 2020-10-24 RX ORDER — POLYETHYLENE GLYCOL 3350 17 G/17G
17 POWDER, FOR SOLUTION ORAL DAILY PRN
Status: DISCONTINUED | OUTPATIENT
Start: 2020-10-24 | End: 2020-10-26 | Stop reason: HOSPADM

## 2020-10-24 RX ORDER — SODIUM CHLORIDE 0.9 % (FLUSH) 0.9 %
10 SYRINGE (ML) INJECTION AS NEEDED
Status: DISCONTINUED | OUTPATIENT
Start: 2020-10-24 | End: 2020-10-26 | Stop reason: HOSPADM

## 2020-10-24 RX ORDER — SODIUM CHLORIDE 9 MG/ML
75 INJECTION, SOLUTION INTRAVENOUS CONTINUOUS
Status: DISCONTINUED | OUTPATIENT
Start: 2020-10-24 | End: 2020-10-26 | Stop reason: HOSPADM

## 2020-10-24 RX ORDER — PANTOPRAZOLE SODIUM 40 MG/1
40 TABLET, DELAYED RELEASE ORAL EVERY MORNING
Status: DISCONTINUED | OUTPATIENT
Start: 2020-10-24 | End: 2020-10-26 | Stop reason: HOSPADM

## 2020-10-24 RX ORDER — ONDANSETRON 4 MG/1
4 TABLET, FILM COATED ORAL EVERY 6 HOURS PRN
Status: DISCONTINUED | OUTPATIENT
Start: 2020-10-24 | End: 2020-10-26 | Stop reason: HOSPADM

## 2020-10-24 RX ORDER — DOCUSATE SODIUM 100 MG/1
100 CAPSULE, LIQUID FILLED ORAL 2 TIMES DAILY PRN
Status: DISCONTINUED | OUTPATIENT
Start: 2020-10-24 | End: 2020-10-26 | Stop reason: HOSPADM

## 2020-10-24 RX ORDER — LANOLIN ALCOHOL/MO/W.PET/CERES
400 CREAM (GRAM) TOPICAL 2 TIMES DAILY
Status: DISCONTINUED | OUTPATIENT
Start: 2020-10-24 | End: 2020-10-26 | Stop reason: HOSPADM

## 2020-10-24 RX ORDER — DEXTROSE MONOHYDRATE 25 G/50ML
25 INJECTION, SOLUTION INTRAVENOUS
Status: DISCONTINUED | OUTPATIENT
Start: 2020-10-24 | End: 2020-10-26 | Stop reason: HOSPADM

## 2020-10-24 RX ORDER — OXYCODONE AND ACETAMINOPHEN 7.5; 325 MG/1; MG/1
1 TABLET ORAL EVERY 4 HOURS PRN
Status: DISCONTINUED | OUTPATIENT
Start: 2020-10-24 | End: 2020-10-26 | Stop reason: HOSPADM

## 2020-10-24 RX ORDER — DEXTROSE MONOHYDRATE 25 G/50ML
50 INJECTION, SOLUTION INTRAVENOUS
Status: DISCONTINUED | OUTPATIENT
Start: 2020-10-24 | End: 2020-10-26 | Stop reason: HOSPADM

## 2020-10-24 RX ORDER — CARVEDILOL 25 MG/1
25 TABLET ORAL 2 TIMES DAILY WITH MEALS
Status: DISCONTINUED | OUTPATIENT
Start: 2020-10-24 | End: 2020-10-26 | Stop reason: HOSPADM

## 2020-10-24 RX ORDER — HYDRALAZINE HYDROCHLORIDE 50 MG/1
50 TABLET, FILM COATED ORAL 3 TIMES DAILY
Status: DISCONTINUED | OUTPATIENT
Start: 2020-10-24 | End: 2020-10-26 | Stop reason: HOSPADM

## 2020-10-24 RX ADMIN — PANTOPRAZOLE SODIUM 40 MG: 40 TABLET, DELAYED RELEASE ORAL at 06:49

## 2020-10-24 RX ADMIN — SODIUM CHLORIDE 75 ML/HR: 9 INJECTION, SOLUTION INTRAVENOUS at 16:49

## 2020-10-24 RX ADMIN — LEVOFLOXACIN 250 MG: 250 TABLET, FILM COATED ORAL at 08:35

## 2020-10-24 RX ADMIN — CARVEDILOL 25 MG: 25 TABLET, FILM COATED ORAL at 17:38

## 2020-10-24 RX ADMIN — CETIRIZINE HYDROCHLORIDE 10 MG: 10 TABLET, FILM COATED ORAL at 08:32

## 2020-10-24 RX ADMIN — Medication 400 MG: at 08:31

## 2020-10-24 RX ADMIN — PERFLUTREN 1.5 ML: 6.52 INJECTION, SUSPENSION INTRAVENOUS at 15:15

## 2020-10-24 RX ADMIN — HYDRALAZINE HYDROCHLORIDE 50 MG: 50 TABLET, FILM COATED ORAL at 20:58

## 2020-10-24 RX ADMIN — HYDRALAZINE HYDROCHLORIDE 50 MG: 50 TABLET, FILM COATED ORAL at 16:49

## 2020-10-24 RX ADMIN — HYDRALAZINE HYDROCHLORIDE 50 MG: 50 TABLET, FILM COATED ORAL at 08:32

## 2020-10-24 RX ADMIN — DEXTROSE MONOHYDRATE 25 G: 500 INJECTION PARENTERAL at 16:38

## 2020-10-24 RX ADMIN — DEXTROSE MONOHYDRATE 50 ML: 500 INJECTION PARENTERAL at 01:03

## 2020-10-24 RX ADMIN — CARVEDILOL 25 MG: 25 TABLET, FILM COATED ORAL at 08:31

## 2020-10-24 RX ADMIN — Medication 400 MG: at 20:58

## 2020-10-24 RX ADMIN — SODIUM CHLORIDE, PRESERVATIVE FREE 10 ML: 5 INJECTION INTRAVENOUS at 20:59

## 2020-10-24 RX ADMIN — LOSARTAN POTASSIUM 25 MG: 25 TABLET, FILM COATED ORAL at 08:32

## 2020-10-24 RX ADMIN — PRAVASTATIN SODIUM 40 MG: 40 TABLET ORAL at 20:58

## 2020-10-24 RX ADMIN — SODIUM CHLORIDE, PRESERVATIVE FREE 10 ML: 5 INJECTION INTRAVENOUS at 08:32

## 2020-10-24 NOTE — ED NOTES
Pt has finished peanut butter and orange juice. Pt is up to restroom had a good bowel movement. Pt back in bed amp of d50 given. [place] given. Pt states her stomach is feeling a little better .     Gladis Irwin, RN  10/24/20 0102

## 2020-10-24 NOTE — PLAN OF CARE
Goal Outcome Evaluation:  Plan of Care Reviewed With: patient  Progress: no change  Outcome Summary: Vitals stable.  Pt hypoglycemic 1x on this shift.  Glucose at 50.  1 amp of D50 given and blood sugar up to 200.  Will continue to monitor this pt.

## 2020-10-24 NOTE — ED PROVIDER NOTES
Subjective   Afia Adams 69 y.o. past medical history type 2 diabetes, coronary artery disease, heart failure with reduced ejection fraction, seen and evaluated for low blood sugar at home.   gave her orange juice and initial reading of in the 40s and afterwards which was in the 40s and he called for presentation to emergency department.  He reports that she was dizzy, sweaty.  They deny loss of consciousness, numbness, tingling.  Patient does report she recently had surgery to get her stones out.  She denies fevers or chills and reports continued on antibiotics.          Review of Systems   Constitutional: Positive for chills and diaphoresis. Negative for activity change, appetite change and fever.   HENT: Negative for congestion, rhinorrhea, sinus pressure, sinus pain and sore throat.    Eyes: Negative for visual disturbance.   Respiratory: Negative for apnea, cough, choking, chest tightness, shortness of breath and wheezing.    Cardiovascular: Negative for chest pain, palpitations and leg swelling.   Gastrointestinal: Positive for abdominal pain. Negative for abdominal distention, blood in stool, constipation, diarrhea, nausea and vomiting.   Genitourinary: Negative for difficulty urinating, dysuria, frequency, hematuria and urgency.   Musculoskeletal: Negative for arthralgias, back pain, joint swelling, myalgias and neck pain.   Skin: Negative for color change, pallor, rash and wound.   Neurological: Positive for dizziness. Negative for weakness, numbness and headaches.   Psychiatric/Behavioral: Negative for agitation and behavioral problems.       Past Medical History:   Diagnosis Date   • Abdominal pain 11/20/2015    unspecified   • Acute gastritis    • Acute on chronic systolic congestive heart failure (CMS/[facility]) 04/13/2016   • Ankle pain 04/27/2015   • Candidiasis, skin or nails 12/22/2011    controlled   • Chronic systolic congestive heart failure (CMS/[facility]) 08/01/2016   • Congestive heart  failure (CMS/[facility]) 08/01/2016   • Coronary arteriosclerosis 06/21/2016    multi-vessel, 2 stents, followed by Dr. Kent   • Cough 09/25/2014    proabably due to allergy, chest x ray is normal      • Edema of foot 03/24/2016   • Encounter for long-term (current) drug use     therapy   • Epigastric pain 12/03/2015   • Essential hypertension 06/21/2016   • History of echocardiogram 03/14/2016    Left atrium normal with mild CLVH wit normal aortic root size.Evidence of posterolateral wall hypokinesis. Severely depressed LV systolic function of 20-25%.Mitral valve thickened Aortic sclerosis.Diastolic dysfunction   • Hyperlipidemia 03/31/2016    unspecified   • Hypertensive disorder 03/24/2016   • Left lower quadrant pain 07/12/2013    ruling out diverticular disease      • Myocardial infarction (CMS/[facility]) 03/24/2016   • Nausea 11/20/2015   • Osteoarthritis of knee 06/23/2016   • Pain in limb 01/25/2011   • Pediculosis capitis 12/22/2011    controlled   • Peptic ulcer    • Polyuria 01/25/2011   • Pruritic rash 12/09/2014   • Superficial foreign body hip without major open wound, no infection 12/09/2011    ??? back   • Type 2 diabetes mellitus (CMS/[facility]) 06/23/2016    new onset   • Weakness 06/23/2016       Allergies   Allergen Reactions   • Bactrim [Sulfamethoxazole-Trimethoprim] Other (See Comments)     Profuse sweating       Past Surgical History:   Procedure Laterality Date   • COLONOSCOPY W/ POLYPECTOMY  03/07/2016    Transverse colon polyps,descending polyps,Sigmoid polyps, all resected and retrieved. Diverticulosis without perforation or abscess without bleeding. Erica Thomas   • CYSTOSCOPY, URETEROSCOPY, RETROGRADE PYELOGRAM, STENT INSERTION Left 9/30/2020    Procedure: CYSTOSCOPY LEFT URETEROSCOPY RETROGRADE PYELOGRAM STENT INSERTION;  Surgeon: [place], Richar BANGURA MD;  Location: [facility];  Service: Urology;  Laterality: Left;   • HYSTERECTOMY      ovary preserv   • INJECTION OF MEDICATION  09/11/2014    Celestone  (betamethasone) (2)         Family History   Problem Relation Age of Onset   • Alcohol abuse Other    • Asthma Other    • Lung cancer Other    • Heart attack Father    • Lung cancer Father    • Hypertension Father    • Heart disease Father    • Heart attack Brother    • Hypertension Brother    • Heart disease Brother        Social History     Socioeconomic History   • Marital status:      Spouse name: Not on file   • Number of children: Not on file   • Years of education: Not on file   • Highest education level: Not on file   Tobacco Use   • Smoking status: Never Smoker   • Smokeless tobacco: Never Used   Substance and Sexual Activity   • Alcohol use: No   • Drug use: No   • Sexual activity: Defer           Objective   Physical Exam  Constitutional:       General: She is not in acute distress.     Appearance: She is well-developed. She is not diaphoretic.   HENT:      Head: Normocephalic and atraumatic.      Right Ear: External ear normal.      Left Ear: External ear normal.      Nose: Nose normal.   Eyes:      General: No scleral icterus.        Right eye: No discharge.         Left eye: No discharge.      Conjunctiva/sclera: Conjunctivae normal.      Pupils: Pupils are equal, round, and reactive to light.   Neck:      Musculoskeletal: Normal range of motion and neck supple.      Thyroid: No thyromegaly.   Cardiovascular:      Rate and Rhythm: Normal rate and regular rhythm.      Heart sounds: Normal heart sounds. No murmur. No friction rub. No gallop.    Pulmonary:      Effort: Pulmonary effort is normal. No respiratory distress.      Breath sounds: Normal breath sounds. No wheezing or rales.   Chest:      Chest wall: No tenderness.   Abdominal:      General: Bowel sounds are normal. There is no distension.      Palpations: Abdomen is soft. There is no mass.      Tenderness: There is abdominal tenderness (Nonfocal without rebound). There is no guarding.   Musculoskeletal: Normal range of motion.          General: No tenderness or deformity.   Lymphadenopathy:      Cervical: No cervical adenopathy.   Skin:     General: Skin is warm and dry.      Capillary Refill: Capillary refill takes 2 to 3 seconds.      Findings: Rash (Underneath the base of first enlarged toenail where the toenail meets skin.) present.   Neurological:      Mental Status: She is alert and oriented to person, place, and time.      Cranial Nerves: No cranial nerve deficit.   Psychiatric:         Behavior: Behavior normal.         Thought Content: Thought content normal.         Judgment: Judgment normal.         Procedures           ED Course  ED Course as of Oct 24 0440   Sat Oct 24, 2020   0125 Patient given amp of D50.  We are going to recheck her POC glucose at this time.    [WO]   0126 There remains concern for diabetic foot injury, underneath her 5 to 6 cm toenail.    [WO]   0232 POC Glucose STAT [WO]   0309 Repeat POC glucose of 180s.    [WO]   0324 Patient with acute renal sufficiency with creatinine of 2.06, no baseline for comparison.  GFR is 24.  We will start patient on normal saline 125 mils per hour.    [WO]   0334 Dr. Rossi accepts the patient for admission at 0335 hrs. on 10-.    [WO]      ED Course User Index  [WO] Salas Hernandez III, MD           Lab Results (all)     Procedure Component Value Units Date/Time    POC Glucose Once [[id number]]  (Abnormal) Collected: 10/24/20 0308    Specimen: Blood Updated: 10/24/20 0320     Glucose 185 mg/dL      Comment: RN NotifiedOperator: [id number] [facility] ID: [id number]       Urinalysis, Microscopic Only - Urine, Clean Catch [[id number]]  (Abnormal) Collected: 10/24/20 0116    Specimen: Urine, Clean Catch Updated: 10/24/20 0219     RBC, UA None Seen /HPF      WBC, UA 21-30 /HPF      Bacteria, UA 2+ /HPF      Squamous Epithelial Cells, UA 31-50 /HPF      Hyaline Casts, UA None Seen /LPF      Methodology Manual Light Microscopy    Urine Culture - Urine, Urine, Clean Catch  [[id number]] Collected: 10/24/20 0116    Specimen: Urine, Clean Catch Updated: 10/24/20 0219    Urinalysis With Culture If Indicated - Urine, Clean Catch [[id number]]  (Abnormal) Collected: 10/24/20 0116    Specimen: Urine, Clean Catch Updated: 10/24/20 0155     Color, UA Yellow     Appearance, UA Cloudy     pH, UA 6.0     Specific Gravity, UA 1.006     Glucose, UA Negative     Ketones, UA Negative     Bilirubin, UA Negative     Blood, UA Small (1+)     Protein, UA 30 mg/dL (1+)     Leuk Esterase, UA Large (3+)     Nitrite, UA Negative     Urobilinogen, UA 0.2 E.U./dL    [place] Draw [[id number]] Collected: 10/24/20 0034    Specimen: Blood Updated: 10/24/20 0145    Narrative:      The following orders were created for panel order [place] Draw.  Procedure                               Abnormality         Status                     ---------                               -----------         ------                     Light Blue Top[[id number]]                                   Final result               Green Top (Gel)[[id number]]                                  Final result               Lavender Top[[id number]]                                     Final result               Gold Top - SST[[id number]]                                   Final result                 Please view results for these tests on the individual orders.    Light Blue Top [[id number]] Collected: 10/24/20 0034    Specimen: Blood Updated: 10/24/20 0145     Extra Tube hold for add-on     Comment: Auto resulted       Green Top (Gel) [[id number]] Collected: 10/24/20 0034    Specimen: Blood Updated: 10/24/20 0145     Extra Tube Hold for add-ons.     Comment: Auto resulted.       Lavender Top [[id number]] Collected: 10/24/20 0034    Specimen: Blood Updated: 10/24/20 0145     Extra Tube hold for add-on     Comment: Auto resulted       Gold Top - SST [[id number]] Collected: 10/24/20 0034    Specimen: Blood Updated: 10/24/20 0145     Extra Tube Hold for add-ons.      Comment: Auto resulted.       POC Glucose Once [[id number]]  (Abnormal) Collected: 10/24/20 0127    Specimen: Blood Updated: 10/24/20 0140     Glucose 186 mg/dL      Comment: : [id number] KELSY Mehta ID: [id number]       Comprehensive Metabolic Panel [[id number]]  (Abnormal) Collected: 10/24/20 0034    Specimen: Blood Updated: 10/24/20 0139     Glucose 46 mg/dL      BUN 34 mg/dL      Creatinine 2.06 mg/dL      Sodium 134 mmol/L      Potassium 3.8 mmol/L      Chloride 96 mmol/L      CO2 27.0 mmol/L      Calcium 9.1 mg/dL      Total Protein 7.0 g/dL      Albumin 3.60 g/dL      ALT (SGPT) 9 U/L      AST (SGOT) 17 U/L      Alkaline Phosphatase 107 U/L      Total Bilirubin 0.3 mg/dL      eGFR Non African Amer 24 mL/min/1.73      Globulin 3.4 gm/dL      A/G Ratio 1.1 g/dL      BUN/Creatinine Ratio 16.5     Anion Gap 11.0 mmol/L     Narrative:      GFR Normal >60  Chronic Kidney Disease <60  Kidney Failure <15      CBC & Differential [[id number]]  (Abnormal) Collected: 10/24/20 0034    Specimen: Blood Updated: 10/24/20 0123    Narrative:      The following orders were created for panel order CBC & Differential.  Procedure                               Abnormality         Status                     ---------                               -----------         ------                     CBC Auto Differential[[id number]]        Abnormal            Final result                 Please view results for these tests on the individual orders.    CBC Auto Differential [[id number]]  (Abnormal) Collected: 10/24/20 0034    Specimen: Blood Updated: 10/24/20 0123     WBC 10.87 10*3/mm3      RBC 3.96 10*6/mm3      Hemoglobin 9.5 g/dL      Hematocrit 30.9 %      MCV 78.0 fL      MCH 24.0 pg      MCHC 30.7 g/dL      RDW 16.2 %      RDW-SD 46.4 fl      MPV 10.4 fL      Platelets 283 10*3/mm3      Neutrophil % 73.1 %      Lymphocyte % 14.1 %      Monocyte % 8.4 %      Eosinophil % 3.1 %      Basophil % 0.4 %      Immature Grans %  0.9 %      Neutrophils, Absolute 7.95 10*3/mm3      Lymphocytes, Absolute 1.53 10*3/mm3      Monocytes, Absolute 0.91 10*3/mm3      Eosinophils, Absolute 0.34 10*3/mm3      Basophils, Absolute 0.04 10*3/mm3      Immature Grans, Absolute 0.10 10*3/mm3      nRBC 0.0 /100 WBC     Lactic Acid, Plasma [[id number]]  (Normal) Collected: 10/24/20 0041    Specimen: Blood Updated: 10/24/20 0103     Lactate 0.8 mmol/L     Blood Culture - Blood, Arm, Left [[id number]] Collected: 10/24/20 0040    Specimen: Blood from Arm, Left Updated: 10/24/20 0046        Imaging Results (Last 72 Hours)     Procedure Component Value Units Date/Time    CT Abdomen Pelvis Without Contrast [[id number]] Collected: 10/24/20 0209     Updated: 10/24/20 0242    Narrative:      EXAM:    CT Abdomen and Pelvis Without Intravenous Contrast    CLINICAL HISTORY:    The patient is 69 years old and is Female; s/p renal stone with  abdominal pain    TECHNIQUE:    Axial computed tomography images of the abdomen and pelvis  without intravenous contrast.  Sagittal and coronal reformatted  images were created and reviewed.  This CT exam was performed  using one or more of the following dose reduction techniques:   automated exposure control, adjustment of the mA and/or kV  according to patient size, and/or use of iterative reconstruction  technique.    COMPARISON:    CT of the abdomen and pelvis September 29, 2020    FINDINGS:    LUNG BASES:  Calcified granuloma within the right lower lobe is  present.  Interval development of atelectasis within the left  lower lobe is noted.    PLEURAL SPACE:  Trace left pleural effusion is present.    HEART:  Trace pericardial effusion is present.     ABDOMEN:    LIVER:  Homogeneous without focal mass.    GALLBLADDER AND BILE DUCTS:  The gallbladder is not well  distended. Punctate calcified gallstone is present.    PANCREAS:  Unremarkable.  No ductal dilation.    SPLEEN:  Unremarkable.    ADRENALS:  Unremarkable.  No mass.     KIDNEYS AND URETERS:  A left double-J ureteral stent is in  place. There is minimal improvement in the mild left  hydronephrosis. Previously demonstrated calculus at the level of  the left UPJ is no longer present. However, innumerable left  intrarenal calcifications are present.  Mild left perinephric  stranding is present. The right kidney is normal.    STOMACH AND BOWEL:  The stomach is moderately distended with  food contents. A small duodenal diverticulum is noted without  surrounding inflammation. The small bowel is decompressed.  Minimal stool is present throughout colon. There is no mucosal  thickening or evidence of bowel obstruction. Scattered colonic  diverticula are noted without surrounding inflammation.     PELVIS:    APPENDIX:  The appendix is normal in caliber without  surrounding inflammation.    BLADDER:  The bladder is nearly empty.  No stones.    REPRODUCTIVE:  The patient is status post hysterectomy.     ABDOMEN and PELVIS:    INTRAPERITONEAL SPACE:  Unremarkable.  No free air.  No  significant fluid collection.    BONES/JOINTS:  No acute fracture.    SOFT TISSUES:  The soft tissues are normal.    VASCULATURE:  Extensive atherosclerosis of the vasculature is  noted.  No abdominal aortic aneurysm.    LYMPH NODES:  Unremarkable. No enlarged lymph nodes.      Impression:      1.  Left double-J ureteral stent with multiple left intrarenal  calcifications likely from lithotripsy of the previously  demonstrated large left UVJ calculus which is no longer seen.  2.  Colonic diverticulosis    Electronically signed by:  Florina Jimenes MD  10/24/2020 2:41 AM  CDT Workstation: [id number]        CT abdomen pelvis without any acute injury just double-J stent.  Patient with ELMA with last creatinine 1.5 now over 2.  Patient also with hypoglycemia that responded to an amp of D50, however only at 180 mg/dL.  Patient would likely sulfonylurea related hyperglycemia refractory to p.o. intake and significant elevation  with 1 ampoule of D50.  We deferred D5 continuous infusion at this time and admitted patient to family medicine with recommendation for close glucose monitoring.                                MDM  Number of Diagnoses or Management Options  Acute renal insufficiency: new and requires workup  Generalized abdominal pain: new and requires workup  Hypoglycemia: new and requires workup     Amount and/or Complexity of Data Reviewed  Clinical lab tests: reviewed and ordered  Tests in the radiology section of CPT®: reviewed and ordered  Tests in the medicine section of CPT®: ordered and reviewed  Decide to obtain previous medical records or to obtain history from someone other than the patient: yes    Risk of Complications, Morbidity, and/or Mortality  Presenting problems: high  Diagnostic procedures: high  Management options: high    Patient Progress  Patient progress: stable      Final diagnoses:   Hypoglycemia   Generalized abdominal pain   Acute renal insufficiency             This document has been electronically signed by Salas Hernandez III, MD on October 24, 2020 04:40 CDT      Dragon disclaimer: Parts of this note were transcribed using dragon dictation software.       Salas Hernandez III, MD  Resident  10/24/20 0442

## 2020-10-24 NOTE — H&P
HISTORY AND PHYSICAL  NAME: Afia Adams  : 1950  MRN: [id number]    DATE OF ADMISSION:  10/24/2020     DATE & TIME SEEN: 10/24/20 at 4:43am    PCP: Nancy Chou APRN    CODE STATUS: Full code    CHIEF COMPLAINT:  Hypoglycemia    HPI:  Afia Adams is a 69 y.o. female with a CMH of cad, chf, ckd, htn, hld  who presents complaining of an episode of hypoglycemia. Patient states she felt dizzy, sweating, nauseous. She states that she took her amaryl in the morning at noon. Patient states her symptoms started before midnight and she rushed to the ED via her daughter in law. Patient denies chest pain at that time. She states she simply started sweating and got real cold. She states this has happened in the past a long time ago. She use to be on amaryl twice a day but had an episode of hypoglycemia during her last hospital admission and was switched to one a day. Patient has not been on any other hypoglycemics. Patient has no urinary symptoms.     CONCURRENT MEDICAL HISTORY:  Past Medical History:   Diagnosis Date   • Abdominal pain 2015    unspecified   • Acute gastritis    • Acute on chronic systolic congestive heart failure (CMS/HCC) 2016   • Ankle pain 2015   • Candidiasis, skin or nails 2011    controlled   • Chronic systolic congestive heart failure (CMS/HCC) 2016   • Congestive heart failure (CMS/[facility]) 2016   • Coronary arteriosclerosis 2016    multi-vessel, 2 stents, followed by Dr. Kent   • Cough 2014    proabably due to allergy, chest x ray is normal      • Edema of foot 2016   • Encounter for long-term (current) drug use     therapy   • Epigastric pain 2015   • Essential hypertension 2016   • History of echocardiogram 2016    Left atrium normal with mild CLVH wit normal aortic root size.Evidence of posterolateral wall hypokinesis. Severely depressed LV systolic function of 20-25%.Mitral valve  thickened Aortic sclerosis.Diastolic dysfunction   • Hyperlipidemia 03/31/2016    unspecified   • Hypertensive disorder 03/24/2016   • Left lower quadrant pain 07/12/2013    ruling out diverticular disease      • Myocardial infarction (CMS/HCC) 03/24/2016   • Nausea 11/20/2015   • Osteoarthritis of knee 06/23/2016   • Pain in limb 01/25/2011   • Pediculosis capitis 12/22/2011    controlled   • Peptic ulcer    • Polyuria 01/25/2011   • Pruritic rash 12/09/2014   • Superficial foreign body hip without major open wound, no infection 12/09/2011    ??? back   • Type 2 diabetes mellitus (CMS/HCC) 06/23/2016    new onset   • Weakness 06/23/2016       PAST SURGICAL HISTORY:  Past Surgical History:   Procedure Laterality Date   • COLONOSCOPY W/ POLYPECTOMY  03/07/2016    Transverse colon polyps,descending polyps,Sigmoid polyps, all resected and retrieved. Diverticulosis without perforation or abscess without bleeding. Erica Thomas   • CYSTOSCOPY, URETEROSCOPY, RETROGRADE PYELOGRAM, STENT INSERTION Left 9/30/2020    Procedure: CYSTOSCOPY LEFT URETEROSCOPY RETROGRADE PYELOGRAM STENT INSERTION;  Surgeon: Bedrock, Richar BANGURA MD;  Location: [facility];  Service: Urology;  Laterality: Left;   • HYSTERECTOMY      ovary preserv   • INJECTION OF MEDICATION  09/11/2014    Celestone (betamethasone) (2)         FAMILY HISTORY:  Family History   Problem Relation Age of Onset   • Alcohol abuse Other    • Asthma Other    • Lung cancer Other    • Heart attack Father    • Lung cancer Father    • Hypertension Father    • Heart disease Father    • Heart attack Brother    • Hypertension Brother    • Heart disease Brother         SOCIAL HISTORY:  Social History     Socioeconomic History   • Marital status:      Spouse name: Not on file   • Number of children: Not on file   • Years of education: Not on file   • Highest education level: Not on file   Tobacco Use   • Smoking status: Never Smoker   • Smokeless tobacco: Never Used   Substance  and Sexual Activity   • Alcohol use: No   • Drug use: No   • Sexual activity: Defer       HOME MEDICATIONS:  Prior to Admission medications    Medication Sig Start Date End Date Taking? Authorizing Provider   aspirin 81 MG tablet Take 81 mg by mouth daily.   Yes Rangel Cassidy MD   albuterol sulfate  (90 Base) MCG/ACT inhaler Inhale 2 puffs Every 4 (Four) Hours As Needed for Wheezing. 1/4/19   Naida Talley APRN   carvedilol (COREG) 25 MG tablet Take 1 tablet by mouth 2 (Two) Times a Day With Meals. 10/9/20   Nancy Chou APRN   Continuous Blood Gluc Sensor ([employer] BENNETT SENSOR SYSTEM) 1 each Every 7 (Seven) Days. 3/19/20   Nancy Chou APRN   furosemide (LASIX) 20 MG tablet Take 20 mg by mouth 4 (Four) Times a Week. Saturday, Sunday, Monday, and Thursday    ProviderRangel MD   glimepiride (AMARYL) 1 MG tablet Take 1 tablet by mouth Every Morning Before Breakfast. 10/2/20   Edmond Jett MD   glucose monitor monitoring kit 1 each As Needed (Follow Accu-Cheks 3 times daily and at at bedtime.). 10/2/20   Edmond Jett MD   hydrALAZINE (APRESOLINE) 50 MG tablet Take 1 tablet by mouth 3 (Three) Times a Day. 3/19/20   Nancy Chou APRN   levoFLOXacin (Levaquin) 250 MG tablet Take 1 tablet by mouth Daily for 7 days. 10/21/20 10/28/20  PrestonRichar MD   loratadine (CLARITIN) 10 MG tablet Take 1 tablet by mouth Daily. 10/9/20   Nancy Chou APRN   magnesium oxide (MAGOX) 400 (241.3 Mg) MG tablet tablet Take 400 mg by mouth 2 (Two) Times a Day.    Rangel Cassidy MD   olmesartan (BENICAR) 40 MG tablet Take 1 tablet by mouth Daily With Dinner. 3/19/20   Nancy Chou APRN   omeprazole (priLOSEC) 40 MG capsule Take 1 capsule by mouth Daily. 3/19/20   Nancy Chou APRN   ondansetron (Zofran) 4 MG tablet Take 1 tablet by mouth Every 8 (Eight) Hours As Needed for Nausea or Vomiting. 3/19/20   Nancy Chou, BILL   oxybutynin (DITROPAN)  5 MG tablet Take 0.5 tablets by mouth 2 (Two) Times a Day As Needed (increased frequency or bladder spasms). 10/2/20   Edmond Jett MD   oxyCODONE-acetaminophen (PERCOCET) 7.5-325 MG per tablet Take 1-2 tablets by mouth Every 4 (Four) Hours As Needed (Pain). 10/21/20   Richar Rojas MD   polyethylene glycol (MIRALAX) 17 g packet Take 17 g by mouth Daily As Needed (constipation). 9/25/20   Mookie Aggarwal APRN   pravastatin (PRAVACHOL) 40 MG tablet Take 1 tablet by mouth Every Night. 3/19/20   Nancy Chou APRN       ALLERGIES:  Bactrim [sulfamethoxazole-trimethoprim]    REVIEW OF SYSTEMS  Review of Systems   Constitutional: Negative for activity change, appetite change, chills, fatigue and fever.   HENT: Negative for drooling, ear discharge, ear pain, facial swelling, hearing loss, mouth sores, rhinorrhea and sinus pain.    Eyes: Negative for pain, redness and itching.   Respiratory: Negative for cough, choking, chest tightness, shortness of breath and stridor.    Cardiovascular: Negative for chest pain, palpitations and leg swelling.   Gastrointestinal: Negative for abdominal distention, abdominal pain, anal bleeding, blood in stool, constipation, diarrhea and nausea.   Endocrine: Negative for heat intolerance, polydipsia and polyphagia.   Genitourinary: Negative for dysuria, flank pain, frequency and genital sores.   Musculoskeletal: Negative for back pain, gait problem, joint swelling and myalgias.   Skin: Negative for pallor and rash.   Neurological: Negative for seizures, facial asymmetry, speech difficulty, light-headedness, numbness and headaches.   Hematological: Negative for adenopathy. Does not bruise/bleed easily.   Psychiatric/Behavioral: Negative for confusion, decreased concentration, dysphoric mood and hallucinations. The patient is not nervous/anxious and is not hyperactive.        PHYSICAL EXAM:  Temp:  [96.1 °F (35.6 °C)-97.6 °F (36.4 °C)] 96.1 °F (35.6 °C)  Heart Rate:  [63-76]  72  Resp:  [18] 18  BP: (121-137)/(63-74) 137/63  Body mass index is 33.87 kg/m².  Physical Exam  Constitutional:       Appearance: She is well-developed.   HENT:      Head: Normocephalic and atraumatic.      Right Ear: External ear normal.      Left Ear: External ear normal.      Nose: Nose normal.   Eyes:      Conjunctiva/sclera: Conjunctivae normal.      Pupils: Pupils are equal, round, and reactive to light.   Neck:      Musculoskeletal: Normal range of motion and neck supple.   Cardiovascular:      Rate and Rhythm: Normal rate and regular rhythm.      Heart sounds: Normal heart sounds.   Pulmonary:      Effort: Pulmonary effort is normal.      Breath sounds: Normal breath sounds.   Abdominal:      General: Bowel sounds are normal.      Palpations: Abdomen is soft.   Musculoskeletal: Normal range of motion.      Comments: BL LE edema more pronounced in the ankle region     Skin:     General: Skin is warm and dry.      Capillary Refill: Capillary refill takes less than 2 seconds.      Comments: Multiple bug excoriations in bilateral le  Large right big toe nail with tenderness      Neurological:      Mental Status: She is alert and oriented to person, place, and time.   Psychiatric:         Behavior: Behavior normal.         Thought Content: Thought content normal.         Judgment: Judgment normal.         DIAGNOSTIC DATA:   Lab Results (last 24 hours)     Procedure Component Value Units Date/Time    POC Glucose Once [[id number]]  (Abnormal) Collected: 10/24/20 0308    Specimen: Blood Updated: 10/24/20 0320     Glucose 185 mg/dL      Comment: RN NotifiedOperator: [id number] SANTI [facility] ID: [id number]       Urinalysis, Microscopic Only - Urine, Clean Catch [[id number]]  (Abnormal) Collected: 10/24/20 0116    Specimen: Urine, Clean Catch Updated: 10/24/20 0219     RBC, UA None Seen /HPF      WBC, UA 21-30 /HPF      Bacteria, UA 2+ /HPF      Squamous Epithelial Cells, UA 31-50 /HPF      Hyaline Casts, UA  None Seen /LPF      Methodology Manual Light Microscopy    Urine Culture - Urine, Urine, Clean Catch [[id number]] Collected: 10/24/20 0116    Specimen: Urine, Clean Catch Updated: 10/24/20 0219    Urinalysis With Culture If Indicated - Urine, Clean Catch [[id number]]  (Abnormal) Collected: 10/24/20 0116    Specimen: Urine, Clean Catch Updated: 10/24/20 0155     Color, UA Yellow     Appearance, UA Cloudy     pH, UA 6.0     Specific Gravity, UA 1.006     Glucose, UA Negative     Ketones, UA Negative     Bilirubin, UA Negative     Blood, UA Small (1+)     Protein, UA 30 mg/dL (1+)     Leuk Esterase, UA Large (3+)     Nitrite, UA Negative     Urobilinogen, UA 0.2 E.U./dL    [facility] Draw [[id number]] Collected: 10/24/20 0034    Specimen: Blood Updated: 10/24/20 0145    Narrative:      The following orders were created for panel order [facility] Draw.  Procedure                               Abnormality         Status                     ---------                               -----------         ------                     Light Blue Top[[id number]]                                   Final result               Green Top (Gel)[[id number]]                                  Final result               Lavender Top[[id number]]                                     Final result               Gold Top - SST[[id number]]                                   Final result                 Please view results for these tests on the individual orders.    Light Blue Top [[id number]] Collected: 10/24/20 0034    Specimen: Blood Updated: 10/24/20 0145     Extra Tube hold for add-on     Comment: Auto resulted       Green Top (Gel) [[id number]] Collected: 10/24/20 0034    Specimen: Blood Updated: 10/24/20 0145     Extra Tube Hold for add-ons.     Comment: Auto resulted.       Lavender Top [[id number]] Collected: 10/24/20 0034    Specimen: Blood Updated: 10/24/20 0145     Extra Tube hold for add-on     Comment: Auto resulted       Gold Top - SST [[id number]]  Collected: 10/24/20 0034    Specimen: Blood Updated: 10/24/20 0145     Extra Tube Hold for add-ons.     Comment: Auto resulted.       POC Glucose Once [[id number]]  (Abnormal) Collected: 10/24/20 0127    Specimen: Blood Updated: 10/24/20 0140     Glucose 186 mg/dL      Comment: : [id number] KELSY Mehta ID: [id number]       Comprehensive Metabolic Panel [[id number]]  (Abnormal) Collected: 10/24/20 0034    Specimen: Blood Updated: 10/24/20 0139     Glucose 46 mg/dL      BUN 34 mg/dL      Creatinine 2.06 mg/dL      Sodium 134 mmol/L      Potassium 3.8 mmol/L      Chloride 96 mmol/L      CO2 27.0 mmol/L      Calcium 9.1 mg/dL      Total Protein 7.0 g/dL      Albumin 3.60 g/dL      ALT (SGPT) 9 U/L      AST (SGOT) 17 U/L      Alkaline Phosphatase 107 U/L      Total Bilirubin 0.3 mg/dL      eGFR Non African Amer 24 mL/min/1.73      Globulin 3.4 gm/dL      A/G Ratio 1.1 g/dL      BUN/Creatinine Ratio 16.5     Anion Gap 11.0 mmol/L     Narrative:      GFR Normal >60  Chronic Kidney Disease <60  Kidney Failure <15      CBC & Differential [[id number]]  (Abnormal) Collected: 10/24/20 0034    Specimen: Blood Updated: 10/24/20 0123    Narrative:      The following orders were created for panel order CBC & Differential.  Procedure                               Abnormality         Status                     ---------                               -----------         ------                     CBC Auto Differential[[id number]]        Abnormal            Final result                 Please view results for these tests on the individual orders.    CBC Auto Differential [[id number]]  (Abnormal) Collected: 10/24/20 0034    Specimen: Blood Updated: 10/24/20 0123     WBC 10.87 10*3/mm3      RBC 3.96 10*6/mm3      Hemoglobin 9.5 g/dL      Hematocrit 30.9 %      MCV 78.0 fL      MCH 24.0 pg      MCHC 30.7 g/dL      RDW 16.2 %      RDW-SD 46.4 fl      MPV 10.4 fL      Platelets 283 10*3/mm3      Neutrophil % 73.1 %       Lymphocyte % 14.1 %      Monocyte % 8.4 %      Eosinophil % 3.1 %      Basophil % 0.4 %      Immature Grans % 0.9 %      Neutrophils, Absolute 7.95 10*3/mm3      Lymphocytes, Absolute 1.53 10*3/mm3      Monocytes, Absolute 0.91 10*3/mm3      Eosinophils, Absolute 0.34 10*3/mm3      Basophils, Absolute 0.04 10*3/mm3      Immature Grans, Absolute 0.10 10*3/mm3      nRBC 0.0 /100 WBC     Lactic Acid, Plasma [[id number]]  (Normal) Collected: 10/24/20 0041    Specimen: Blood Updated: 10/24/20 0103     Lactate 0.8 mmol/L     Blood Culture - Blood, Arm, Left [[id number]] Collected: 10/24/20 0040    Specimen: Blood from Arm, Left Updated: 10/24/20 0046           Imaging Results (Last 24 Hours)     Procedure Component Value Units Date/Time    CT Abdomen Pelvis Without Contrast [[id number]] Collected: 10/24/20 0209     Updated: 10/24/20 0242    Narrative:      EXAM:    CT Abdomen and Pelvis Without Intravenous Contrast    CLINICAL HISTORY:    The patient is 69 years old and is Female; s/p renal stone with  abdominal pain    TECHNIQUE:    Axial computed tomography images of the abdomen and pelvis  without intravenous contrast.  Sagittal and coronal reformatted  images were created and reviewed.  This CT exam was performed  using one or more of the following dose reduction techniques:   automated exposure control, adjustment of the mA and/or kV  according to patient size, and/or use of iterative reconstruction  technique.    COMPARISON:    CT of the abdomen and pelvis September 29, 2020    FINDINGS:    LUNG BASES:  Calcified granuloma within the right lower lobe is  present.  Interval development of atelectasis within the left  lower lobe is noted.    PLEURAL SPACE:  Trace left pleural effusion is present.    HEART:  Trace pericardial effusion is present.     ABDOMEN:    LIVER:  Homogeneous without focal mass.    GALLBLADDER AND BILE DUCTS:  The gallbladder is not well  distended. Punctate calcified gallstone is present.     PANCREAS:  Unremarkable.  No ductal dilation.    SPLEEN:  Unremarkable.    ADRENALS:  Unremarkable.  No mass.    KIDNEYS AND URETERS:  A left double-J ureteral stent is in  place. There is minimal improvement in the mild left  hydronephrosis. Previously demonstrated calculus at the level of  the left UPJ is no longer present. However, innumerable left  intrarenal calcifications are present.  Mild left perinephric  stranding is present. The right kidney is normal.    STOMACH AND BOWEL:  The stomach is moderately distended with  food contents. A small duodenal diverticulum is noted without  surrounding inflammation. The small bowel is decompressed.  Minimal stool is present throughout colon. There is no mucosal  thickening or evidence of bowel obstruction. Scattered colonic  diverticula are noted without surrounding inflammation.     PELVIS:    APPENDIX:  The appendix is normal in caliber without  surrounding inflammation.    BLADDER:  The bladder is nearly empty.  No stones.    REPRODUCTIVE:  The patient is status post hysterectomy.     ABDOMEN and PELVIS:    INTRAPERITONEAL SPACE:  Unremarkable.  No free air.  No  significant fluid collection.    BONES/JOINTS:  No acute fracture.    SOFT TISSUES:  The soft tissues are normal.    VASCULATURE:  Extensive atherosclerosis of the vasculature is  noted.  No abdominal aortic aneurysm.    LYMPH NODES:  Unremarkable. No enlarged lymph nodes.      Impression:      1.  Left double-J ureteral stent with multiple left intrarenal  calcifications likely from lithotripsy of the previously  demonstrated large left UVJ calculus which is no longer seen.  2.  Colonic diverticulosis    Electronically signed by:  Florina Jimenes MD  10/24/2020 2:41 AM  CDT Workstation: [id number]            I reviewed the patient's new clinical results.    ASSESSMENT AND PLAN: This is a 69 y.o. female with:    Active Hospital Problems    Diagnosis POA   • **Hypoglycemia [E16.2] Yes     Patient had a glucose  of 46, was given an amp of d50 and went up to 185  Patient will be taken off the glipizide   will start patient on different hypoglycemic before discharge  hba1c pening  ssi       • CAD (coronary artery disease) [I25.10] Yes     Hx of 2 stents placed by Dr. Kent in 2016   Continue with statin, bb  Will hold asa as patient recently had lithotripsy and has not been told to resume asa yet     • ELMA (acute kidney injury) (CMS/[facility]) [N17.9] Yes     Creatinine of 2.06  Baseline at 1.3  Will start on ivf at 75 cc/hr  Will hold lasix       • Nephrolithiasis s/p lithotripsy [N20.0] Yes     Patient had a Cystoscopy, left retrograde, ureteroscopy, laser lithotripsy, stent placement done with Dr. Rojas on 10/21/20  Ct abdomen today shows resolution of the kidney stone   Continue with levaquin       • Stage 3a chronic kidney disease [N18.31] Yes     Baseline around 1.2-1.3  Creatinine on admission 2.06  Will provide iv hydration       • Diabetic ulcer of toe of right foot associated with type 2 diabetes mellitus (CMS/[facility]) [E11.621, L97.519] Yes     Will consult podiatry       • Obesity, Class II, BMI 35-39.9 [E66.9] Yes     Body mass index is 33.87 kg/m².  Nutrition consult       • HFrEF (heart failure with reduced ejection fraction) (CMS/[facility]) [I50.20] Yes     Continue with acei, bb, statin   Echo done in 2018 with Calculated EF = 46%.  Will repeat echo  Daily standing weights  Patient currently has a elma so will not restrict water at this time   Sodium restricted diet  Will hold lasix while we are correcting patients elma      • Essential hypertension [I10] Yes     Continue with coreg, benicar, hydralazine, and lasix     • Hyperlipemia [E78.5] Yes     Continue with statin      • Type 2 diabetes mellitus without complication (CMS/[facility]) [E11.9] Yes     Will discontinue patients amaryl  Will start on different medication before discharge  Currently on SSI           DVT prophylaxis: NICHOLASs/TEDgodwin Adams and JOHN  have discussed pain goals for this hospitalization after reviewing her current clinical condition, medical history and prior pain experiences.  The goal is to keep the pain level at a minimum.  To help achieve this, I plan to add pain medication as needed.    JOSEF # [id number], reviewed and consistent with patient reported medications.    Expected Length of Stay: Where: home and When:  1-2days    I discussed the patient's findings and my recommendations with patient.     Magda Peña MD is the attending on record at time of admission, She is aware of the patient's status and agrees with the above history and physical.         Mayra Rossi M.D. PGY3  [facility]  [address] [place] [address]  Office: [phone]    This document has been electronically signed by Mayra Rossi MD on October 24, 2020 04:57 CDT

## 2020-10-24 NOTE — ED NOTES
POC Glucose 186 MD Stover and Mary made aware.      Gladis Irwin, RN  10/24/20 0138     [id number] Detailed

## 2020-10-24 NOTE — PROGRESS NOTES
FAMILY MEDICINE RESIDENCY  TRANSFER OF CARE/ACCEPTANCE NOTE    PATIENT NAME: Afia Adams  : 1950  MRN: [id number]     Active Hospital Problems    Diagnosis  POA   • **Hypoglycemia [E16.2]  Yes   • CAD (coronary artery disease) [I25.10]  Yes   • ELMA (acute kidney injury) (CMS/[facility]) [N17.9]  Yes   • Nephrolithiasis s/p lithotripsy [N20.0]  Yes   • Stage 3a chronic kidney disease [N18.31]  Yes   • Diabetic ulcer of toe of right foot associated with type 2 diabetes mellitus (CMS/[facility]) [E11.621, L97.519]  Yes   • Obesity, Class II, BMI 35-39.9 [E66.9]  Yes   • HFrEF (heart failure with reduced ejection fraction) (CMS/[facility]) [I50.20]  Yes   • Essential hypertension [I10]  Yes   • Hyperlipemia [E78.5]  Yes   • Type 2 diabetes mellitus without complication (CMS/[facility]) [E11.9]  Yes      Resolved Hospital Problems   No resolved problems to display.       Patient seen and examined by me on 10/24/20 at 6:51 AM.  Interim History:    Patient is a 69-year-old female was admitted to our service for hypoglycemia, ELMA, and diabetic foot ulcer.      Patient has a current medical history of coronary artery disease status post 2 stent placement in 2016, HFrEF with EF 46% per echo in 2018, hypertension, hyperlipidemia, type 2 diabetes mellitus, chronic kidney disease, and recent cystoscopy with lithotripsy and stent placement on 10/21/2020 for which she is currently on Levaquin.     Currently on sliding scale insulin.  Patient will need a different hypoglycemic prior to discharge.  Holding aspirin due to recent lithotripsy and given that patient has not been told to resume aspirin yet.  Receiving IV fluids at 75 cc/h while we hold patient's Lasix that way her creatinine improved to her baseline.  Continue Levaquin for recent cystoscopy with lithotripsy and stent placement.  We will possibly consult podiatry today after we have examined patient with our attending.  Will obtain a new echo and monitor daily weights for  patient.  Currently on SCDs/teds for DVT prophylaxis.      I have noted the following changes since admission: Patient weighs 191 pounds and 3.2 ounces today.  Creatinine has improved to 1.8.  Patient has a hemoglobin A1c of 6.1.  Given that patient had a contaminated UA, we will follow a urinalysis with culture.  Sliding scale insulin was held this morning given low glucose level of 100 and 6 AM.  Patient reports right toe pain with light touch.    I have reviewed the H&P, diagnostic data and plan of care for Afia Adams, case discussed with the admitting provider and/or FM service team.  I will be taking over care of this patient during the current hospitalization.      Edel Santiago MD  10/24/20  06:51 CDT              This document has been electronically signed by Edel Santiago MD on October 24, 2020 08:11 CDT

## 2020-10-24 NOTE — ED TRIAGE NOTES
Pt was placed on glimepride by her pcp and now her blood glucose started dropping. iveth thought her sugar was dropping so he gave her orange juice and apples.     Pt states no sob, cp, n/v/dirrhea. Pt glucose here was 42. Pt given orange juice and peanut butter.

## 2020-10-25 LAB
ALBUMIN SERPL-MCNC: 3 G/DL (ref 3.5–5.2)
ALBUMIN/GLOB SERPL: 1.1 G/DL
ALP SERPL-CCNC: 92 U/L (ref 39–117)
ALT SERPL W P-5'-P-CCNC: 6 U/L (ref 1–33)
ANION GAP SERPL CALCULATED.3IONS-SCNC: 7 MMOL/L (ref 5–15)
AST SERPL-CCNC: 11 U/L (ref 1–32)
BACTERIA SPEC AEROBE CULT: ABNORMAL
BACTERIA SPEC AEROBE CULT: ABNORMAL
BASOPHILS # BLD AUTO: 0.03 10*3/MM3 (ref 0–0.2)
BASOPHILS NFR BLD AUTO: 0.3 % (ref 0–1.5)
BILIRUB SERPL-MCNC: 0.2 MG/DL (ref 0–1.2)
BUN SERPL-MCNC: 19 MG/DL (ref 8–23)
BUN/CREAT SERPL: 15.2 (ref 7–25)
CALCIUM SPEC-SCNC: 8.6 MG/DL (ref 8.6–10.5)
CHLORIDE SERPL-SCNC: 105 MMOL/L (ref 98–107)
CO2 SERPL-SCNC: 26 MMOL/L (ref 22–29)
CREAT SERPL-MCNC: 1.25 MG/DL (ref 0.57–1)
DEPRECATED RDW RBC AUTO: 44.7 FL (ref 37–54)
EOSINOPHIL # BLD AUTO: 0.34 10*3/MM3 (ref 0–0.4)
EOSINOPHIL NFR BLD AUTO: 3.8 % (ref 0.3–6.2)
ERYTHROCYTE [DISTWIDTH] IN BLOOD BY AUTOMATED COUNT: 15.9 % (ref 12.3–15.4)
GFR SERPL CREATININE-BSD FRML MDRD: 43 ML/MIN/1.73
GLOBULIN UR ELPH-MCNC: 2.7 GM/DL
GLUCOSE BLDC GLUCOMTR-MCNC: 115 MG/DL (ref 70–130)
GLUCOSE BLDC GLUCOMTR-MCNC: 158 MG/DL (ref 70–130)
GLUCOSE BLDC GLUCOMTR-MCNC: 189 MG/DL (ref 70–130)
GLUCOSE BLDC GLUCOMTR-MCNC: 193 MG/DL (ref 70–130)
GLUCOSE SERPL-MCNC: 104 MG/DL (ref 65–99)
HCT VFR BLD AUTO: 27.5 % (ref 34–46.6)
HGB BLD-MCNC: 8.7 G/DL (ref 12–15.9)
IMM GRANULOCYTES # BLD AUTO: 0.1 10*3/MM3 (ref 0–0.05)
IMM GRANULOCYTES NFR BLD AUTO: 1.1 % (ref 0–0.5)
IRON 24H UR-MRATE: 21 MCG/DL (ref 37–145)
IRON SATN MFR SERPL: 7 % (ref 20–50)
LYMPHOCYTES # BLD AUTO: 1.73 10*3/MM3 (ref 0.7–3.1)
LYMPHOCYTES NFR BLD AUTO: 19.1 % (ref 19.6–45.3)
MCH RBC QN AUTO: 24.2 PG (ref 26.6–33)
MCHC RBC AUTO-ENTMCNC: 31.6 G/DL (ref 31.5–35.7)
MCV RBC AUTO: 76.4 FL (ref 79–97)
MONOCYTES # BLD AUTO: 0.62 10*3/MM3 (ref 0.1–0.9)
MONOCYTES NFR BLD AUTO: 6.9 % (ref 5–12)
NEUTROPHILS NFR BLD AUTO: 6.23 10*3/MM3 (ref 1.7–7)
NEUTROPHILS NFR BLD AUTO: 68.8 % (ref 42.7–76)
NRBC BLD AUTO-RTO: 0 /100 WBC (ref 0–0.2)
PLATELET # BLD AUTO: 260 10*3/MM3 (ref 140–450)
PMV BLD AUTO: 9.9 FL (ref 6–12)
POTASSIUM SERPL-SCNC: 4 MMOL/L (ref 3.5–5.2)
PROT SERPL-MCNC: 5.7 G/DL (ref 6–8.5)
RBC # BLD AUTO: 3.6 10*6/MM3 (ref 3.77–5.28)
SODIUM SERPL-SCNC: 138 MMOL/L (ref 136–145)
TIBC SERPL-MCNC: 291 MCG/DL (ref 298–536)
TRANSFERRIN SERPL-MCNC: 195 MG/DL (ref 200–360)
WBC # BLD AUTO: 9.05 10*3/MM3 (ref 3.4–10.8)

## 2020-10-25 PROCEDURE — G0378 HOSPITAL OBSERVATION PER HR: HCPCS

## 2020-10-25 PROCEDURE — 99225 PR SBSQ OBSERVATION CARE/DAY 25 MINUTES: CPT | Performed by: STUDENT IN AN ORGANIZED HEALTH CARE EDUCATION/TRAINING PROGRAM

## 2020-10-25 PROCEDURE — 83540 ASSAY OF IRON: CPT | Performed by: FAMILY MEDICINE

## 2020-10-25 PROCEDURE — 96361 HYDRATE IV INFUSION ADD-ON: CPT

## 2020-10-25 PROCEDURE — 80053 COMPREHEN METABOLIC PANEL: CPT | Performed by: STUDENT IN AN ORGANIZED HEALTH CARE EDUCATION/TRAINING PROGRAM

## 2020-10-25 PROCEDURE — 84466 ASSAY OF TRANSFERRIN: CPT | Performed by: FAMILY MEDICINE

## 2020-10-25 PROCEDURE — 85025 COMPLETE CBC W/AUTO DIFF WBC: CPT | Performed by: STUDENT IN AN ORGANIZED HEALTH CARE EDUCATION/TRAINING PROGRAM

## 2020-10-25 PROCEDURE — 82962 GLUCOSE BLOOD TEST: CPT

## 2020-10-25 RX ADMIN — SODIUM CHLORIDE 75 ML/HR: 9 INJECTION, SOLUTION INTRAVENOUS at 20:31

## 2020-10-25 RX ADMIN — Medication 400 MG: at 08:04

## 2020-10-25 RX ADMIN — SODIUM CHLORIDE 75 ML/HR: 9 INJECTION, SOLUTION INTRAVENOUS at 06:00

## 2020-10-25 RX ADMIN — HYDRALAZINE HYDROCHLORIDE 50 MG: 50 TABLET, FILM COATED ORAL at 17:00

## 2020-10-25 RX ADMIN — Medication 400 MG: at 20:32

## 2020-10-25 RX ADMIN — HYDRALAZINE HYDROCHLORIDE 50 MG: 50 TABLET, FILM COATED ORAL at 08:04

## 2020-10-25 RX ADMIN — LEVOFLOXACIN 250 MG: 250 TABLET, FILM COATED ORAL at 08:04

## 2020-10-25 RX ADMIN — PANTOPRAZOLE SODIUM 40 MG: 40 TABLET, DELAYED RELEASE ORAL at 08:37

## 2020-10-25 RX ADMIN — PRAVASTATIN SODIUM 40 MG: 40 TABLET ORAL at 20:32

## 2020-10-25 RX ADMIN — OXYCODONE HYDROCHLORIDE AND ACETAMINOPHEN 1 TABLET: 7.5; 325 TABLET ORAL at 02:19

## 2020-10-25 RX ADMIN — CARVEDILOL 25 MG: 25 TABLET, FILM COATED ORAL at 17:00

## 2020-10-25 RX ADMIN — LOSARTAN POTASSIUM 25 MG: 25 TABLET, FILM COATED ORAL at 08:04

## 2020-10-25 RX ADMIN — SODIUM CHLORIDE, PRESERVATIVE FREE 10 ML: 5 INJECTION INTRAVENOUS at 08:05

## 2020-10-25 RX ADMIN — SODIUM CHLORIDE, PRESERVATIVE FREE 10 ML: 5 INJECTION INTRAVENOUS at 20:32

## 2020-10-25 RX ADMIN — OXYCODONE HYDROCHLORIDE AND ACETAMINOPHEN 1 TABLET: 7.5; 325 TABLET ORAL at 08:04

## 2020-10-25 RX ADMIN — CETIRIZINE HYDROCHLORIDE 10 MG: 10 TABLET, FILM COATED ORAL at 08:04

## 2020-10-25 RX ADMIN — CARVEDILOL 25 MG: 25 TABLET, FILM COATED ORAL at 08:04

## 2020-10-25 RX ADMIN — OXYCODONE HYDROCHLORIDE AND ACETAMINOPHEN 1 TABLET: 7.5; 325 TABLET ORAL at 20:31

## 2020-10-25 RX ADMIN — HYDRALAZINE HYDROCHLORIDE 50 MG: 50 TABLET, FILM COATED ORAL at 20:32

## 2020-10-25 NOTE — PLAN OF CARE
Goal Outcome Evaluation:  Plan of Care Reviewed With: patient  Progress: no change  Outcome Summary: Vitals stable.  Pt recieved PRN pain medication on this shift; medication was effective and pt not complaining of any pain at this time.  Will continue to monitor.

## 2020-10-25 NOTE — PROGRESS NOTES
FAMILY MEDICINE DAILY PROGRESS NOTE  NAME: Afia Adams  : 1950  MRN: [id number]     LOS: 0 days     PROVIDER OF SERVICE: Edel Santiago MD    Chief Complaint: ELMA (acute kidney injury) (CMS/[facility])    Subjective:     Interval History:  History taken from: patient  Patient is a 69-year-old female was admitted to our service for hypoglycemia, ELMA, and diabetic foot ulcer.       Patient has a current medical history of coronary artery disease status post 2 stent placement in 2016, HFrEF with EF 43%, hypertension, hyperlipidemia, type 2 diabetes mellitus, chronic kidney disease, and recent cystoscopy with lithotripsy and stent placement on 10/21/2020 for which she is currently on Levaquin.      Oral hypoglycemics and sliding scale insulin has been discontinued given hemoglobin A1c level of 6.1.Holding aspirin due to recent lithotripsy and given that patient has not been told to resume aspirin yet.  Receiving IV fluids at 75 cc/h while we hold patient's Lasix that way her creatinine improved to her baseline.  Continue Levaquin for recent cystoscopy with lithotripsy and stent placement.      Patient did have an elevated blood pressure of 170/71 this morning.  Her creatinine did improve from 1.82 yesterday to 1.25 this morning.  Had a slight drop in hemoglobin from 9.3 yesterday to 8.7.  Patient did have a hypoglycemic episode with glucose as low as 50 after which 1 amp of D50 was given and subsequently blood glucose went up to 200.     Patient currently is 192 pounds and 9.6 ounces standing scale.        Review of Systems:   Review of Systems  Constitutional: Negative for activity change, appetite change, chills, fatigue and fever.   HENT: Negative for drooling, ear discharge, ear pain, facial swelling, hearing loss, mouth sores, rhinorrhea and sinus pain.    Eyes: Negative for pain, redness and itching.   Respiratory: Negative for cough, choking, chest tightness, shortness of breath and stridor.     Cardiovascular: Negative for chest pain, palpitations and leg swelling.   Gastrointestinal: Negative for abdominal distention, abdominal pain, anal bleeding, blood in stool, constipation, diarrhea and nausea.   Endocrine: Negative for heat intolerance, polydipsia and polyphagia.   Genitourinary: Negative for dysuria, flank pain, frequency and genital sores.   Musculoskeletal: Negative for back pain, gait problem, joint swelling and myalgias.   Skin: Negative for pallor and rash.   Neurological: Negative for seizures, facial asymmetry, speech difficulty, light-headedness, numbness and headaches.   Hematological: Negative for adenopathy. Does not bruise/bleed easily.   Psychiatric/Behavioral: Negative for confusion, decreased concentration, dysphoric mood and hallucinations. The patient is not nervous/anxious and is not hyperactive.   Objective:     Vital Signs  Temp:  [96.8 °F (36 °C)-98.2 °F (36.8 °C)] 96.8 °F (36 °C)  Heart Rate:  [67-77] 68  Resp:  [18] 18  BP: (103-170)/(53-71) 138/65    Physical Exam  Physical Exam  Constitutional:       Appearance: She is well-developed.   HENT:      Head: Normocephalic and atraumatic.      Nose: Nose normal.   Eyes:      Conjunctiva/sclera: Conjunctivae normal.      Pupils: Pupils are equal, round, and reactive to light.   Neck:      Musculoskeletal: Normal range of motion and neck supple.   Cardiovascular:      Rate and Rhythm: Normal rate and regular rhythm.      Pulses: Normal pulses.      Heart sounds: Normal heart sounds.   Pulmonary:      Effort: Pulmonary effort is normal. No respiratory distress.      Breath sounds: Normal breath sounds.   Abdominal:      General: Abdomen is flat. Bowel sounds are normal. There is no distension.      Palpations: Abdomen is soft.   Musculoskeletal: Normal range of motion.      Comments: Trace lower extremity edema   Skin:     General: Skin is warm and dry.      Comments:  right great toe with nail thickened & overgrown   Neurological:       General: No focal deficit present.      Mental Status: She is alert and oriented to person, place, and time. Mental status is at baseline.      Cranial Nerves: No cranial nerve deficit.   Psychiatric:         Mood and Affect: Mood normal.         Behavior: Behavior normal.               Medication Review    Current Facility-Administered Medications:   •  acetaminophen (TYLENOL) tablet 650 mg, 650 mg, Oral, Q4H PRN, Mayra Rossi MD  •  albuterol (PROVENTIL) nebulizer solution 0.083% 2.5 mg/3mL, 2.5 mg, Nebulization, Q6H PRN, Mayra Rossi MD  •  carvedilol (COREG) tablet 25 mg, 25 mg, Oral, BID With Meals, Mayra Rossi MD, 25 mg at 10/25/20 0804  •  cetirizine (zyrTEC) tablet 10 mg, 10 mg, Oral, Daily, Mayra Rossi MD, 10 mg at 10/25/20 0804  •  dextrose (D50W) 25 g/ 50mL Intravenous Solution 25 g, 25 g, Intravenous, Q15 Min PRN, Mayra Rossi MD, 25 g at 10/24/20 1638  •  dextrose (D50W) 25 g/ 50mL Intravenous Solution 50 mL, 50 mL, Intravenous, Q1H PRN, Mayra Rossi MD, 50 mL at 10/24/20 0103  •  dextrose (GLUTOSE) oral gel 15 g, 15 g, Oral, Q15 Min PRN, Mayra Rossi MD  •  docusate sodium (COLACE) capsule 100 mg, 100 mg, Oral, BID PRN, Mayra Rossi MD  •  glucagon (human recombinant) (GLUCAGEN DIAGNOSTIC) injection 1 mg, 1 mg, Subcutaneous, Q15 Min PRN, Mayra Rossi MD  •  hydrALAZINE (APRESOLINE) tablet 50 mg, 50 mg, Oral, TID, Mayra Rossi MD, 50 mg at 10/25/20 0804  •  levoFLOXacin (LEVAQUIN) tablet 250 mg, 250 mg, Oral, Daily, Mayra Rossi MD, 250 mg at 10/25/20 0804  •  losartan (COZAAR) tablet 25 mg, 25 mg, Oral, Q24H, Mayra Rossi MD, 25 mg at 10/25/20 0804  •  Magnesium Oxide tablet 400 mg, 400 mg, Oral, BID, Mayra Rossi MD, 400 mg at 10/25/20 0804  •  ondansetron (ZOFRAN) tablet 4 mg, 4 mg, Oral, Q6H PRN, Mayra Rossi MD  •  oxybutynin (DITROPAN) half tablet 2.5 mg, 2.5 mg, Oral, BID PRN, Mayra Rossi MD  •  oxyCODONE-acetaminophen (PERCOCET) 7.5-325 MG per  tablet 1 tablet, 1 tablet, Oral, Q4H PRN, Mayra Rossi MD, 1 tablet at 10/25/20 0804  •  pantoprazole (PROTONIX) EC tablet 40 mg, 40 mg, Oral, QAM, Mayra Rossi MD, 40 mg at 10/25/20 0837  •  polyethylene glycol (MIRALAX) packet 17 g, 17 g, Oral, Daily PRN, Mayra Rossi MD  •  pravastatin (PRAVACHOL) tablet 40 mg, 40 mg, Oral, Nightly, Mayra Rossi MD, 40 mg at 10/24/20 2058  •  sodium chloride 0.9 % flush 10 mL, 10 mL, Intravenous, PRN, Mayra Rossi MD  •  sodium chloride 0.9 % flush 10 mL, 10 mL, Intravenous, Q12H, Mayra Rossi MD, 10 mL at 10/25/20 0805  •  sodium chloride 0.9 % flush 10 mL, 10 mL, Intravenous, PRN, Mayra Rossi MD  •  sodium chloride 0.9 % infusion, 75 mL/hr, Intravenous, Continuous, Mayra Rossi MD, Last Rate: 75 mL/hr at 10/25/20 0934, 75 mL/hr at 10/25/20 0934     Diagnostic Data    Lab Results (last 24 hours)     Procedure Component Value Units Date/Time    Urine Culture - Urine, Urine, Clean Catch [[id number]]  (Abnormal) Collected: 10/24/20 0116    Specimen: Urine, Clean Catch Updated: 10/25/20 1046     Urine Culture Yeast isolated    Narrative:      No further workup    Urine Culture - Urine, Urine, Clean Catch [[id number]]  (Abnormal) Collected: 10/24/20 0552    Specimen: Urine, Clean Catch Updated: 10/25/20 1046     Urine Culture Yeast isolated    Narrative:      No further workup    POC Glucose Once [[id number]]  (Abnormal) Collected: 10/25/20 1017    Specimen: Blood Updated: 10/25/20 1037     Glucose 193 mg/dL      Comment: RN NotifiedOperator: [id number] BAYRON NOAHMeter ID: [id number]       Iron Profile [[id number]]  (Abnormal) Collected: 10/25/20 0622    Specimen: Blood Updated: 10/25/20 0917     Iron 21 mcg/dL      Iron Saturation 7 %      Transferrin 195 mg/dL      TIBC 291 mcg/dL     Comprehensive Metabolic Panel [[id number]]  (Abnormal) Collected: 10/25/20 0622    Specimen: Blood Updated: 10/25/20 0700     Glucose 104 mg/dL      BUN 19 mg/dL      Creatinine  1.25 mg/dL      Sodium 138 mmol/L      Potassium 4.0 mmol/L      Chloride 105 mmol/L      CO2 26.0 mmol/L      Calcium 8.6 mg/dL      Total Protein 5.7 g/dL      Albumin 3.00 g/dL      ALT (SGPT) 6 U/L      AST (SGOT) 11 U/L      Alkaline Phosphatase 92 U/L      Total Bilirubin 0.2 mg/dL      eGFR Non African Amer 43 mL/min/1.73      Globulin 2.7 gm/dL      A/G Ratio 1.1 g/dL      BUN/Creatinine Ratio 15.2     Anion Gap 7.0 mmol/L     Narrative:      GFR Normal >60  Chronic Kidney Disease <60  Kidney Failure <15      CBC & Differential [[id number]]  (Abnormal) Collected: 10/25/20 0622    Specimen: Blood Updated: 10/25/20 0651    Narrative:      The following orders were created for panel order CBC & Differential.  Procedure                               Abnormality         Status                     ---------                               -----------         ------                     CBC Auto Differential[[id number]]        Abnormal            Final result                 Please view results for these tests on the individual orders.    CBC Auto Differential [[id number]]  (Abnormal) Collected: 10/25/20 0622    Specimen: Blood Updated: 10/25/20 0651     WBC 9.05 10*3/mm3      RBC 3.60 10*6/mm3      Hemoglobin 8.7 g/dL      Hematocrit 27.5 %      MCV 76.4 fL      MCH 24.2 pg      MCHC 31.6 g/dL      RDW 15.9 %      RDW-SD 44.7 fl      MPV 9.9 fL      Platelets 260 10*3/mm3      Neutrophil % 68.8 %      Lymphocyte % 19.1 %      Monocyte % 6.9 %      Eosinophil % 3.8 %      Basophil % 0.3 %      Immature Grans % 1.1 %      Neutrophils, Absolute 6.23 10*3/mm3      Lymphocytes, Absolute 1.73 10*3/mm3      Monocytes, Absolute 0.62 10*3/mm3      Eosinophils, Absolute 0.34 10*3/mm3      Basophils, Absolute 0.03 10*3/mm3      Immature Grans, Absolute 0.10 10*3/mm3      nRBC 0.0 /100 WBC     POC Glucose Once [[id number]]  (Normal) Collected: 10/25/20 0552    Specimen: Blood Updated: 10/25/20 0647     Glucose 115 mg/dL       Comment: RN NotifiedOperator: [id number] DANIEL ELRITAMeter ID: [id number]       Blood Culture - Blood, Hand, Right [[id number]] Collected: 10/24/20 0458    Specimen: Blood from Hand, Right Updated: 10/25/20 0500     Blood Culture No growth at 24 hours    Blood Culture - Blood, Arm, Left [[id number]] Collected: 10/24/20 0040    Specimen: Blood from Arm, Left Updated: 10/25/20 0100     Blood Culture No growth at 24 hours    POC Glucose Once [[id number]]  (Abnormal) Collected: 10/24/20 1957    Specimen: Blood Updated: 10/24/20 2022     Glucose 138 mg/dL      Comment: RN NotifiedOperator: [id number] CARLO REBECCAMeter ID: [id number]       POC Glucose Once [[id number]]  (Abnormal) Collected: 10/24/20 1648    Specimen: Blood Updated: 10/24/20 1708     Glucose 200 mg/dL      Comment: RN NotifiedOperator: [id number] KRISTOPHER LINDSEYMeter ID: [id number]       POC Glucose Once [[id number]]  (Abnormal) Collected: 10/24/20 1635    Specimen: Blood Updated: 10/24/20 1650     Glucose 50 mg/dL      Comment: RN NotifiedOperator: [id number] ERICA SANDRAMeter ID: [id number]              Imaging Results (Last 24 Hours)     ** No results found for the last 24 hours. **          I reviewed the patient's new clinical results.    Assessment/Plan:     Active Hospital Problems    Diagnosis   • **ELMA (acute kidney injury) (CMS/[facility])     Results from last 7 days   Lab Units 10/25/20  0622 10/24/20  0458 10/24/20  0034   CREATININE mg/dL 1.25* 1.82* 2.06*     Baseline at 1.3  Ivf at 75 cc/hr  Will hold lasix       • Hypoglycemia     Results from last 7 days   Lab Units 10/25/20  0622 10/24/20  0458 10/24/20  0034   GLUCOSE mg/dL 104* 148* 46*     -Hemoglobin A1c of 6.1.  Patient has appropriate hemoglobin A1c for her age  -Oral hypoglycemics and sliding scale insulin has been discontinued  -Hypoglycemia episode likely due to side effect of last use oral hypoglycemic.  Blood glucose improved after D50.  We will continue to monitor.         •  CAD (coronary artery disease)     Hx of 2 stents placed by Dr. Kent in 2016   Continue with statin, bb  Will hold asa as patient recently had lithotripsy and has not been told to resume asa yet     • Nephrolithiasis s/p lithotripsy     Patient had a Cystoscopy, left retrograde, ureteroscopy, laser lithotripsy, stent placement done with Dr. Rojas on 10/21/20  Ct abdomen today shows resolution of the kidney stone   Continue with levaquin       • Stage 3a chronic kidney disease     Results from last 7 days   Lab Units 10/25/20  0622 10/24/20  0458 10/24/20  0034   CREATININE mg/dL 1.25* 1.82* 2.06*     Baseline around 1.2-1.3  Creatinine on admission 2.06  Will provide iv hydration       • Diabetic ulcer of toe of right foot associated with type 2 diabetes mellitus (CMS/[facility])     Will consult podiatry       • Obesity, Class II, BMI 35-39.9     Body mass index is 33.87 kg/m².  Nutrition consult       • HFrEF (heart failure with reduced ejection fraction) (CMS/[facility])     Continue with acei, bb, statin   Echo done in 2018 with Calculated EF = 46%.  Will repeat echo  Daily standing weights  Patient currently has a sandra so will not restrict water at this time   Sodium restricted diet  Will hold lasix while we are correcting patients sandra      • Essential hypertension     Continue with coreg, benicar, hydralazine, and lasix     • Hyperlipemia     Continue with statin      • Type 2 diabetes mellitus without complication (CMS/[facility])     Results from last 7 days   Lab Units 10/25/20  0622 10/24/20  0458 10/24/20  0034   GLUCOSE mg/dL 104* 148* 46*     -Hemoglobin A1c of 6.1.  Patient has appropriate hemoglobin A1c for her age  -Oral hypoglycemics and sliding scale insulin has been discontinued             DVT prophylaxis: SCDs  Code Status and Medical Interventions:   Ordered at: 10/24/20 0428     Level Of Support Discussed With:    Patient     Code Status:    CPR     Medical Interventions (Level of Support Prior to Arrest):     Full       Plan for disposition:Home in 1 day      Time: 15 min               This document has been electronically signed by Edel Santiago MD on October 25, 2020 11:56 CDT

## 2020-10-25 NOTE — PROGRESS NOTES
Discharge Planning Assessment  [facility]     Patient Name: Afia Adams  MRN: [id number]  Today's Date: 10/25/2020    Admit Date: 10/24/2020    Discharge Needs Assessment     Row Name 10/25/20 0830       Living Environment    Lives With  spouse;child(ashlee), adult    Name(s) of Who Lives With Patient  Nahun Adams, Xu Adams    Current Living Arrangements  home/apartment/condo    Primary Care Provided by  spouse/significant other    Provides Primary Care For  no one, unable/limited ability to care for self    Family Caregiver if Needed  none    Quality of Family Relationships  supportive    Able to Return to Prior Arrangements  yes       Resource/Environmental Concerns    Resource/Environmental Concerns  none    Transportation Concerns  car, none       Transition Planning    Patient/Family Anticipates Transition to  home with family    Patient/Family Anticipated Services at Transition  home health care    Transportation Anticipated  family or friend will provide       Discharge Needs Assessment    Current Outpatient/Agency/Support Group  homecare agency    Equipment Currently Used at Home  walker, rolling    Concerns to be Addressed  denies needs/concerns at this time    Anticipated Changes Related to Illness  none    Equipment Needed After Discharge  glucometer    Discharge Facility/Level of Care Needs  home with home health    Current Discharge Risk  chronically ill        Discharge Plan     Row Name 10/25/20 0857       Plan    Plan Comments  CM notified Dr Rivera that pt has been taking glimepiride bid and not monitorin blood glucose as she doesnt have glucometer. pt  needs order for glucometer, strips, and lancets at d/c. per MD will order....Sulma Michaud RN    Row Name 10/25/20 0864       Plan    Plan Comments  CM spoke with Yu Adams via phone and per Yu pt used to have a Bennett for monitoring blood glucose, stated pt didnt like it but she doesnt like FSBS either. states  "she doesnt know where the Bennett is at, states pt doesnt have glucometer for measuring blood sugar. CM advised that CM will request an order for glucometer and pt/daughter-n-law can f/u with PCP regarding obtianing new Bennett blood glucose monitoring.per pt all meds have been affordable. note left for MD requesting order for glucometer and supplies....Thomas lange RN    Row Name 10/25/20 0837       Plan    Plan  home with home health services    Provided Post Acute Provider List?  N/A    N/A Provider List Comment  pt stated prefers [facility], recently seen by [facility]    Patient/Family in Agreement with Plan  yes    Plan Comments  Lace/consult completed- verified demographics/preferred pharmacy. has rx coverage. per pt her spouse assist with adls as needed. states she has rolling walker but doesn't use all the time. pt also states \" I used to have a thing that my daughter-n-law put on my arm to monitor my sugar but I haven't used it in a long time\". denies having glucometer for accuchecks. pt gave permission for CM to speak with Yu Adams- daughter-n-law regarding glucose monitoring at home and meds. denies any d/c needs. per pt HH came to see her a few times believes it was [facility] and would like HH at d/c with [facility]....Thomas Lange RN    Row Name 10/25/20 0817       Plan    Plan Comments  continued observation- Creatinine 1.25 today, NS@75cc/hr. urine cx pending- po levaquin...Thomas Lange RN        Continued Care and Services - Admitted Since 10/24/2020    Coordination has not been started for this encounter.     Selected Continued Care - Prior Encounters Includes selections from prior encounters from 7/26/2020 to 10/25/2020    Discharged on 10/2/2020 Admission date: 9/29/2020 - Discharge disposition: Home or Self Care    Home Medical Care     Service Provider Selected Services Address Phone Fax    [facility] [address] [facility] [address] [phone]             "          Demographic Summary     Row Name 10/25/20 0828       General Information    Admission Type  observation    Arrived From  home    Required Notices Provided  Observation Status Notice    Referral Source  high risk screening    Reason for Consult  discharge planning    Preferred Language  English     Used During This Interaction  no       Contact Information    Permission Granted to Share Info With          Functional Status     Row Name 10/25/20 0829       Functional Status, IADL    Medications  assistive person    Meal Preparation  assistive person    Housekeeping  assistive person    Laundry  assistive person    Shopping  assistive person    IADL Comments  states her spouse assist with adls as needed       Mental Status Summary    Recent Changes in Mental Status/Cognitive Functioning  no changes        Psychosocial    No documentation.       Abuse/Neglect    No documentation.       Legal    No documentation.       Substance Abuse    No documentation.       Patient Forms    No documentation.           Sulma Michaud RN

## 2020-10-26 ENCOUNTER — READMISSION MANAGEMENT (OUTPATIENT)
Dept: CALL CENTER | Facility: HOSPITAL | Age: 70
End: 2020-10-26

## 2020-10-26 VITALS
DIASTOLIC BLOOD PRESSURE: 69 MMHG | WEIGHT: 194.8 LBS | OXYGEN SATURATION: 96 % | BODY MASS INDEX: 34.52 KG/M2 | HEIGHT: 63 IN | TEMPERATURE: 98 F | RESPIRATION RATE: 18 BRPM | SYSTOLIC BLOOD PRESSURE: 145 MMHG | HEART RATE: 76 BPM

## 2020-10-26 DIAGNOSIS — E11.9 TYPE 2 DIABETES MELLITUS WITHOUT COMPLICATION, WITHOUT LONG-TERM CURRENT USE OF INSULIN (HCC): ICD-10-CM

## 2020-10-26 PROBLEM — E16.2 HYPOGLYCEMIA: Status: RESOLVED | Noted: 2020-10-24 | Resolved: 2020-10-26

## 2020-10-26 PROBLEM — N20.0 NEPHROLITHIASIS: Status: RESOLVED | Noted: 2020-10-24 | Resolved: 2020-10-26

## 2020-10-26 PROBLEM — N17.9 AKI (ACUTE KIDNEY INJURY) (HCC): Status: RESOLVED | Noted: 2020-10-24 | Resolved: 2020-10-26

## 2020-10-26 LAB
ALBUMIN SERPL-MCNC: 3.3 G/DL (ref 3.5–5.2)
ALBUMIN/GLOB SERPL: 1 G/DL
ALP SERPL-CCNC: 107 U/L (ref 39–117)
ALT SERPL W P-5'-P-CCNC: 6 U/L (ref 1–33)
ANION GAP SERPL CALCULATED.3IONS-SCNC: 10 MMOL/L (ref 5–15)
AST SERPL-CCNC: 10 U/L (ref 1–32)
BASOPHILS # BLD AUTO: 0.06 10*3/MM3 (ref 0–0.2)
BASOPHILS NFR BLD AUTO: 0.6 % (ref 0–1.5)
BILIRUB SERPL-MCNC: 0.2 MG/DL (ref 0–1.2)
BUN SERPL-MCNC: 13 MG/DL (ref 8–23)
BUN/CREAT SERPL: 10.7 (ref 7–25)
CALCIUM SPEC-SCNC: 9 MG/DL (ref 8.6–10.5)
CHLORIDE SERPL-SCNC: 103 MMOL/L (ref 98–107)
CO2 SERPL-SCNC: 23 MMOL/L (ref 22–29)
CREAT SERPL-MCNC: 1.22 MG/DL (ref 0.57–1)
DEPRECATED RDW RBC AUTO: 43.8 FL (ref 37–54)
EOSINOPHIL # BLD AUTO: 0.39 10*3/MM3 (ref 0–0.4)
EOSINOPHIL NFR BLD AUTO: 3.6 % (ref 0.3–6.2)
ERYTHROCYTE [DISTWIDTH] IN BLOOD BY AUTOMATED COUNT: 15.9 % (ref 12.3–15.4)
GFR SERPL CREATININE-BSD FRML MDRD: 44 ML/MIN/1.73
GLOBULIN UR ELPH-MCNC: 3.2 GM/DL
GLUCOSE BLDC GLUCOMTR-MCNC: 115 MG/DL (ref 70–130)
GLUCOSE BLDC GLUCOMTR-MCNC: 208 MG/DL (ref 70–130)
GLUCOSE BLDC GLUCOMTR-MCNC: 42 MG/DL (ref 70–130)
GLUCOSE SERPL-MCNC: 111 MG/DL (ref 65–99)
HCT VFR BLD AUTO: 29.6 % (ref 34–46.6)
HGB BLD-MCNC: 9.3 G/DL (ref 12–15.9)
IMM GRANULOCYTES # BLD AUTO: 0.16 10*3/MM3 (ref 0–0.05)
IMM GRANULOCYTES NFR BLD AUTO: 1.5 % (ref 0–0.5)
LAB AP CASE REPORT: NORMAL
LYMPHOCYTES # BLD AUTO: 2.42 10*3/MM3 (ref 0.7–3.1)
LYMPHOCYTES NFR BLD AUTO: 22.5 % (ref 19.6–45.3)
MCH RBC QN AUTO: 23.9 PG (ref 26.6–33)
MCHC RBC AUTO-ENTMCNC: 31.4 G/DL (ref 31.5–35.7)
MCV RBC AUTO: 76.1 FL (ref 79–97)
MONOCYTES # BLD AUTO: 0.74 10*3/MM3 (ref 0.1–0.9)
MONOCYTES NFR BLD AUTO: 6.9 % (ref 5–12)
NEUTROPHILS NFR BLD AUTO: 6.99 10*3/MM3 (ref 1.7–7)
NEUTROPHILS NFR BLD AUTO: 64.9 % (ref 42.7–76)
NRBC BLD AUTO-RTO: 0 /100 WBC (ref 0–0.2)
PATH REPORT.FINAL DX SPEC: NORMAL
PLATELET # BLD AUTO: 290 10*3/MM3 (ref 140–450)
PMV BLD AUTO: 9.4 FL (ref 6–12)
POTASSIUM SERPL-SCNC: 4.3 MMOL/L (ref 3.5–5.2)
PROT SERPL-MCNC: 6.5 G/DL (ref 6–8.5)
RBC # BLD AUTO: 3.89 10*6/MM3 (ref 3.77–5.28)
SODIUM SERPL-SCNC: 136 MMOL/L (ref 136–145)
WBC # BLD AUTO: 10.76 10*3/MM3 (ref 3.4–10.8)

## 2020-10-26 PROCEDURE — 96361 HYDRATE IV INFUSION ADD-ON: CPT

## 2020-10-26 PROCEDURE — 82962 GLUCOSE BLOOD TEST: CPT

## 2020-10-26 PROCEDURE — 85025 COMPLETE CBC W/AUTO DIFF WBC: CPT | Performed by: STUDENT IN AN ORGANIZED HEALTH CARE EDUCATION/TRAINING PROGRAM

## 2020-10-26 PROCEDURE — 99217 PR OBSERVATION CARE DISCHARGE MANAGEMENT: CPT | Performed by: STUDENT IN AN ORGANIZED HEALTH CARE EDUCATION/TRAINING PROGRAM

## 2020-10-26 PROCEDURE — G0378 HOSPITAL OBSERVATION PER HR: HCPCS

## 2020-10-26 PROCEDURE — 80053 COMPREHEN METABOLIC PANEL: CPT | Performed by: STUDENT IN AN ORGANIZED HEALTH CARE EDUCATION/TRAINING PROGRAM

## 2020-10-26 RX ORDER — FERROUS SULFATE TAB EC 324 MG (65 MG FE EQUIVALENT) 324 (65 FE) MG
324 TABLET DELAYED RESPONSE ORAL
Qty: 1 TABLET | Refills: 0 | Status: SHIPPED | OUTPATIENT
Start: 2020-10-27 | End: 2020-10-28

## 2020-10-26 RX ORDER — FERROUS SULFATE TAB EC 324 MG (65 MG FE EQUIVALENT) 324 (65 FE) MG
324 TABLET DELAYED RESPONSE ORAL
Status: DISCONTINUED | OUTPATIENT
Start: 2020-10-26 | End: 2020-10-26 | Stop reason: HOSPADM

## 2020-10-26 RX ADMIN — Medication 400 MG: at 09:34

## 2020-10-26 RX ADMIN — CARVEDILOL 25 MG: 25 TABLET, FILM COATED ORAL at 09:34

## 2020-10-26 RX ADMIN — HYDRALAZINE HYDROCHLORIDE 50 MG: 50 TABLET, FILM COATED ORAL at 09:34

## 2020-10-26 RX ADMIN — LEVOFLOXACIN 250 MG: 250 TABLET, FILM COATED ORAL at 09:34

## 2020-10-26 RX ADMIN — SODIUM CHLORIDE, PRESERVATIVE FREE 10 ML: 5 INJECTION INTRAVENOUS at 09:35

## 2020-10-26 RX ADMIN — CETIRIZINE HYDROCHLORIDE 10 MG: 10 TABLET, FILM COATED ORAL at 09:34

## 2020-10-26 RX ADMIN — FERROUS SULFATE TAB EC 324 MG (65 MG FE EQUIVALENT) 324 MG: 324 (65 FE) TABLET DELAYED RESPONSE at 09:36

## 2020-10-26 RX ADMIN — LOSARTAN POTASSIUM 25 MG: 25 TABLET, FILM COATED ORAL at 09:34

## 2020-10-26 NOTE — CONSULTS
Adult Nutrition  Assessment    Patient Name:  Afia Adams  YOB: 1950  MRN: [id number]  Admit Date:  10/24/2020    Assessment Date:  10/26/2020    Comments:  Pt with dx Heart Failure.  Pt declined Low Sodium Diet ed but accepted diet copy.  Pt discharged.    Reason for Assessment     Row Name 10/26/20 1519          Reason for Assessment    Reason For Assessment  per organizational policy Low Sodium Diet ed     Diagnosis  cardiac disease dx Heart Failure     Identified At Risk by Screening Criteria  need for education                             Electronically signed by:  Pamela Velasco RD  10/26/20 15:20 CDT

## 2020-10-26 NOTE — DISCHARGE SUMMARY
DISCHARGE SUMMARY    PATIENT NAME: Afia Adams       PHYSICIAN: Edel Santiago MD  : 1950  MRN: [id number]    ADMITTED: 10/24/2020     DISCHARGED: 10/26/20    ADMISSION DIAGNOSES:  Active Hospital Problems    Diagnosis  POA   • CAD (coronary artery disease) [I25.10]  Yes   • Stage 3a chronic kidney disease [N18.31]  Yes   • Diabetic ulcer of toe of right foot associated with type 2 diabetes mellitus (CMS/HCC) [E11.621, L97.519]  Yes   • Obesity, Class II, BMI 35-39.9 [E66.9]  Yes   • HFrEF (heart failure with reduced ejection fraction) (CMS/HCC) [I50.20]  Yes   • Essential hypertension [I10]  Yes   • Hyperlipemia [E78.5]  Yes      Resolved Hospital Problems    Diagnosis Date Resolved POA   • **ELMA (acute kidney injury) (CMS/HCC) [N17.9] 10/26/2020 Yes     Priority: High   • Hypoglycemia [E16.2] 10/26/2020 Yes   • Nephrolithiasis s/p lithotripsy [N20.0] 10/26/2020 Yes   • Type 2 diabetes mellitus without complication (CMS/HCC) [E11.9] 10/26/2020 Yes     DISCHARGE DIAGNOSES:   Active Hospital Problems    Diagnosis  POA   • CAD (coronary artery disease) [I25.10]  Yes   • Stage 3a chronic kidney disease [N18.31]  Yes   • Diabetic ulcer of toe of right foot associated with type 2 diabetes mellitus (CMS/HCC) [E11.621, L97.519]  Yes   • Obesity, Class II, BMI 35-39.9 [E66.9]  Yes   • HFrEF (heart failure with reduced ejection fraction) (CMS/HCC) [I50.20]  Yes   • Essential hypertension [I10]  Yes   • Hyperlipemia [E78.5]  Yes      Resolved Hospital Problems    Diagnosis Date Resolved POA   • **ELMA (acute kidney injury) (CMS/HCC) [N17.9] 10/26/2020 Yes     Priority: High   • Hypoglycemia [E16.2] 10/26/2020 Yes   • Nephrolithiasis s/p lithotripsy [N20.0] 10/26/2020 Yes   • Type 2 diabetes mellitus without complication (CMS/HCC) [E11.9] 10/26/2020 Yes       SERVICE: Family [facility]  Attending: Magda Peña MD  Resident: Edel Santiago MD    CONSULTS:   Consult Orders (all)  (From admission, onward)     Start     Ordered    10/25/20 0025  Inpatient Case Management  Consult  Once     Provider:  (Not yet assigned)    10/25/20 0024                PROCEDURES:   None during present admission    HISTORY OF PRESENT ILLNESS:   Afia Adams is a 69 y.o. female with a CMH of cad, chf, ckd, htn, hld  who presents complaining of an episode of hypoglycemia. Patient states she felt dizzy, sweating, nauseous. She states that she took her amaryl in the morning at noon. Patient states her symptoms started before midnight and she rushed to the ED via her daughter in law. Patient denies chest pain at that time. She states she simply started sweating and got real cold. She states this has happened in the past a long time ago. She use to be on amaryl twice a day but had an episode of hypoglycemia during her last hospital admission and was switched to one a day. Patient has not been on any other hypoglycemics. Patient has no urinary symptoms.     HPI written by  on 10/24/20    DIAGNOSTIC DATA:   Lab Results (last 24 hours)     Procedure Component Value Units Date/Time    POC Glucose Once [[id number]]  (Normal) Collected: 10/26/20 0617    Specimen: Blood Updated: 10/26/20 0635     Glucose 115 mg/dL      Comment: RN NotifiedOperator: [id number] SAMIA Rebolledo ID: [id number]       Comprehensive Metabolic Panel [[id number]]  (Abnormal) Collected: 10/26/20 0600    Specimen: Blood Updated: 10/26/20 0630     Glucose 111 mg/dL      BUN 13 mg/dL      Creatinine 1.22 mg/dL      Sodium 136 mmol/L      Potassium 4.3 mmol/L      Chloride 103 mmol/L      CO2 23.0 mmol/L      Calcium 9.0 mg/dL      Total Protein 6.5 g/dL      Albumin 3.30 g/dL      ALT (SGPT) 6 U/L      AST (SGOT) 10 U/L      Alkaline Phosphatase 107 U/L      Total Bilirubin 0.2 mg/dL      eGFR Non African Amer 44 mL/min/1.73      Globulin 3.2 gm/dL      A/G Ratio 1.0 g/dL      BUN/Creatinine Ratio 10.7     Anion Gap  10.0 mmol/L     Narrative:      GFR Normal >60  Chronic Kidney Disease <60  Kidney Failure <15      CBC & Differential [[id number]]  (Abnormal) Collected: 10/26/20 0600    Specimen: Blood Updated: 10/26/20 0613    Narrative:      The following orders were created for panel order CBC & Differential.  Procedure                               Abnormality         Status                     ---------                               -----------         ------                     CBC Auto Differential[[id number]]        Abnormal            Final result                 Please view results for these tests on the individual orders.    CBC Auto Differential [[id number]]  (Abnormal) Collected: 10/26/20 0600    Specimen: Blood Updated: 10/26/20 0613     WBC 10.76 10*3/mm3      RBC 3.89 10*6/mm3      Hemoglobin 9.3 g/dL      Hematocrit 29.6 %      MCV 76.1 fL      MCH 23.9 pg      MCHC 31.4 g/dL      RDW 15.9 %      RDW-SD 43.8 fl      MPV 9.4 fL      Platelets 290 10*3/mm3      Neutrophil % 64.9 %      Lymphocyte % 22.5 %      Monocyte % 6.9 %      Eosinophil % 3.6 %      Basophil % 0.6 %      Immature Grans % 1.5 %      Neutrophils, Absolute 6.99 10*3/mm3      Lymphocytes, Absolute 2.42 10*3/mm3      Monocytes, Absolute 0.74 10*3/mm3      Eosinophils, Absolute 0.39 10*3/mm3      Basophils, Absolute 0.06 10*3/mm3      Immature Grans, Absolute 0.16 10*3/mm3      nRBC 0.0 /100 WBC     Blood Culture - Blood, Hand, Right [[id number]] Collected: 10/24/20 0458    Specimen: Blood from Hand, Right Updated: 10/26/20 0500     Blood Culture No growth at 2 days    Blood Culture - Blood, Arm, Left [[id number]] Collected: 10/24/20 0040    Specimen: Blood from Arm, Left Updated: 10/26/20 0100     Blood Culture No growth at 2 days    POC Glucose Once [[id number]]  (Abnormal) Collected: 10/25/20 2014    Specimen: Blood Updated: 10/25/20 2041     Glucose 189 mg/dL      Comment: RN NotifiedOperator: [id number] SAMIA Rebolledo ID: [id number]       POC  Glucose Once [[id number]]  (Abnormal) Collected: 10/25/20 1609    Specimen: Blood Updated: 10/25/20 1624     Glucose 158 mg/dL      Comment: RN NotifiedOperator: [id number] ERICA Lui ID: [id number]           Imaging Results (Last 24 Hours)     ** No results found for the last 24 hours. **           HOSPITAL COURSE:  For patient's hypoglycemia we held her home oral hypoglycemic and sliding scale insulin.  Hemoglobin A1c during admission was 6.1 indicating that patient is not diabetic and does not need to be on oral hypoglycemics. Aspirin was held during hospitalization due to recent cystoscopy, lithotripsy and stent placement on 10/21/2020; Patient completed antibiotic course from recent urologic procedure.  ELMA was managed with IV fluids while all nephrotoxic agents were held.  Creatinine improved to patient's baseline level.  Unfortunately during hospitalization, patient had two contaminated urinalysis results and urine culture yielded yeast infection however patient remained asymptomatic.  I will have patient follow-up with PCP that way PCP can repeat BMP to monitor creatinine level, repeat UA with urine culture to see if there truly a yeast infection.  We will also start patient on ferrous sulfate for her iron deficiency anemia.  We will continue all home medications except for the oral hypoglycemics given that patient is not diabetic.  We will also temporarily hold patient's Lasix at discharge for 4 days.  PCP can determine whether or not to restart the Lasix or not.  We advised patient to hold the diuretic unless she became symptomatic or started swelling up.  Patient understood this.  We will also refer patient to podiatry to have her right great toe evaluated.    DISCHARGE CONDITION:   Stable    DISPOSITION:  Home or Self Care    DISCHARGE MEDICATIONS     Discharge Medications      New Medications      Instructions Start Date   ferrous sulfate 324 (65 Fe) MG tablet delayed-release EC tablet   324  mg, Oral, Daily With Breakfast   Start Date: October 27, 2020        Continue These Medications      Instructions Start Date   albuterol sulfate  (90 Base) MCG/ACT inhaler  Commonly known as: PROVENTIL HFA;VENTOLIN HFA;PROAIR HFA   2 puffs, Inhalation, Every 4 Hours PRN      aspirin 81 MG tablet   81 mg, Oral, Daily      carvedilol 25 MG tablet  Commonly known as: COREG   25 mg, Oral, 2 Times Daily With Meals      FreeStyle Bennett Sensor System   1 each, Does not apply, Every 7 Days      glucose monitor monitoring kit   1 each, Does not apply, As Needed      hydrALAZINE 50 MG tablet  Commonly known as: APRESOLINE   50 mg, Oral, 3 Times Daily      levoFLOXacin 250 MG tablet  Commonly known as: Levaquin   250 mg, Oral, Daily      loratadine 10 MG tablet  Commonly known as: CLARITIN   10 mg, Oral, Daily      magnesium oxide 400 (241.3 Mg) MG tablet tablet  Commonly known as: MAGOX   400 mg, Oral, 2 Times Daily      olmesartan 40 MG tablet  Commonly known as: BENICAR   40 mg, Oral, Daily With Dinner      omeprazole 40 MG capsule  Commonly known as: priLOSEC   40 mg, Oral, Daily      ondansetron 4 MG tablet  Commonly known as: Zofran   4 mg, Oral, Every 8 Hours PRN      oxybutynin 5 MG tablet  Commonly known as: DITROPAN   2.5 mg, Oral, 2 Times Daily PRN      oxyCODONE-acetaminophen 7.5-325 MG per tablet  Commonly known as: PERCOCET   1-2 tablets, Oral, Every 4 Hours PRN      polyethylene glycol 17 g packet  Commonly known as: MIRALAX   17 g, Oral, Daily PRN      pravastatin 40 MG tablet  Commonly known as: PRAVACHOL   40 mg, Oral, Nightly         Stop These Medications    furosemide 20 MG tablet  Commonly known as: LASIX     glimepiride 1 MG tablet  Commonly known as: AMARYL            INSTRUCTIONS:  Activity:   Activity Instructions     Activity as Tolerated          Diet:   Diet Instructions     Diet: Renal      Discharge Diet: Renal          FOLLOW UP:   Additional Instructions for the Follow-ups that You Need  to Schedule     Discharge Follow-up with PCP   As directed       Currently Documented PCP:    Nancy Chou APRN    PCP Phone Number:    [phone]     Follow Up Details: Follow with PCP in 1 day to recheck creatinine and repeat urinalysis and urine culture. Will hold lasix at discharge for 4 days however patient instructed to use the diuretic should she become symptomatic. PCP to decide whether to resume lasix or not         Discharge Follow-up with Specialty: Follow up with Podiatry to have right great toe and nail evaluated; 1 Day   As directed      Specialty: Follow up with Podiatry to have right great toe and nail evaluated    Follow Up: 1 Day           Follow-up Information     Nancy Chou APRN .    Specialty: Family Medicine  Why: Follow with PCP in 1 day to recheck creatinine and repeat urinalysis and urine culture. Will hold lasix at discharge for 4 days however patient instructed to use the diuretic should she become symptomatic. PCP to decide whether to resume lasix or not  Contact information:  [facility] [address]  [facility] [address]  [phone]                   PENDING TEST RESULTS AT DISCHARGE  Pending Labs     Order Current Status    Blood Culture - Blood, Arm, Left Preliminary result    Blood Culture - Blood, Hand, Right Preliminary result          Time: >30 minutes was spent in discharge planning, medication reconciliation and coordination of care for this patient.    Magda Peña MD is the attending at time of discharge, She is aware of the patient's status and agrees with the above discharge summary.              This document has been electronically signed by Edel Santiago MD on October 26, 2020 11:33 CDT

## 2020-10-26 NOTE — PLAN OF CARE
Problem: Adult Inpatient Plan of Care  Goal: Absence of Hospital-Acquired Illness or Injury  Intervention: Identify and Manage Fall Risk  Recent Flowsheet Documentation  Taken 10/26/2020 0600 by Marina José RN  Safety Promotion/Fall Prevention:   safety round/check completed   assistive device/personal items within reach   clutter free environment maintained  Taken 10/26/2020 0000 by Marina José RN  Safety Promotion/Fall Prevention:   safety round/check completed   nonskid shoes/slippers when out of bed   fall prevention program maintained   assistive device/personal items within reach   clutter free environment maintained   activity supervised  Taken 10/25/2020 2200 by Marina José, RN  Safety Promotion/Fall Prevention: safety round/check completed  Taken 10/25/2020 2100 by Marina José RN  Safety Promotion/Fall Prevention:   safety round/check completed   assistive device/personal items within reach   clutter free environment maintained  Taken 10/25/2020 2034 by Marina José RN  Safety Promotion/Fall Prevention:   safety round/check completed   nonskid shoes/slippers when out of bed   fall prevention program maintained   assistive device/personal items within reach   clutter free environment maintained  Taken 10/25/2020 1905 by Marina José RN  Safety Promotion/Fall Prevention:   safety round/check completed   nonskid shoes/slippers when out of bed   fall prevention program maintained   clutter free environment maintained   assistive device/personal items within reach   activity supervised   Goal Outcome Evaluation:  Plan of Care Reviewed With: patient  Progress: improving  Outcome Summary: pt rested well during the night vital signs remained stable. no s/s of distress.

## 2020-10-26 NOTE — TELEPHONE ENCOUNTER
Left message referral has been sent that they would be getting a call from podiatry in [place]

## 2020-10-26 NOTE — MEDICAL STUDENT
FAMILY MEDICINE DAILY PROGRESS NOTE    NAME: Afia Adams  : 1950  MRN: [id number]      LOS: 0 days     PROVIDER OF SERVICE: Kenny Dominguez, Medical Student    Chief Complaint: ELMA (acute kidney injury) (CMS/[facility])    Subjective:     Interval History:  History taken from: patient RN   Ms. Adams is a 69 y.o. female followed for ELMA with pertinent PMH of HFrEF (EF = 43%), HTN, hyperlipidemia, T2DM, CKD, and recent cystoscopy and lithotripsy with stent placement on 10/21/2020.     Patient states that she feels well with no new events overnight. VSS. She says she feels well enough to go home. We are currently holding aspirin due to the recent procedure and holding lasix due to ELMA. Patients Cr is trending down at 1.22 today. Oral hypoglycemics and sliding scale insulin has been discontinued given hemoglobin A1c of 6.1. Glucose has remained stable overnight.    Blood pressure elevated at 158/70 - down from 170/71 yesterday. Hemoglobin back up to 9.4 from 8.7 yesterday. Current weight is 194 lb 12.2 oz. (weight on admission 193 lb).     Review of Systems:   Review of Systems   Constitutional: Negative for activity change, appetite change, chills, diaphoresis, fatigue and fever.   HENT: Negative for congestion, rhinorrhea, sinus pressure, sinus pain and sneezing.    Respiratory: Negative for apnea, chest tightness, shortness of breath, wheezing and stridor.    Cardiovascular: Positive for leg swelling. Negative for chest pain and palpitations.   Gastrointestinal: Negative for abdominal distention, abdominal pain, anal bleeding, blood in stool, constipation, diarrhea and nausea.   Genitourinary: Negative for difficulty urinating, dysuria, enuresis, flank pain, frequency, vaginal discharge and vaginal pain.   Musculoskeletal: Negative for arthralgias, back pain, gait problem and joint swelling.   Skin: Negative for color change, pallor and rash.   Neurological: Negative for dizziness,  light-headedness and headaches.   Psychiatric/Behavioral: Negative for agitation, behavioral problems, confusion, decreased concentration and sleep disturbance.       Objective:     Vital Signs  Temp:  [96.8 °F (36 °C)-98.4 °F (36.9 °C)] 98 °F (36.7 °C)  Heart Rate:  [66-78] 74  Resp:  [18] 18  BP: (138-165)/(56-74) 165/74  Body mass index is 34.51 kg/m².    Physical Exam  Physical Exam  Constitutional:       General: She is not in acute distress.     Appearance: She is not ill-appearing.   HENT:      Head: Normocephalic and atraumatic.      Mouth/Throat:      Mouth: Mucous membranes are moist.      Pharynx: Oropharynx is clear. No oropharyngeal exudate or posterior oropharyngeal erythema.   Eyes:      General: No scleral icterus.     Conjunctiva/sclera: Conjunctivae normal.      Pupils: Pupils are equal, round, and reactive to light.   Neck:      Musculoskeletal: Normal range of motion. No muscular tenderness.      Vascular: No carotid bruit.   Cardiovascular:      Rate and Rhythm: Normal rate and regular rhythm.      Pulses: Normal pulses.      Heart sounds: No murmur.   Pulmonary:      Effort: Pulmonary effort is normal. No respiratory distress.      Breath sounds: No stridor. No wheezing, rhonchi or rales.   Abdominal:      General: Bowel sounds are normal. There is no distension.      Palpations: Abdomen is soft. There is no mass.      Tenderness: There is no abdominal tenderness. There is no guarding.   Musculoskeletal: Normal range of motion.         General: Swelling present. No tenderness, deformity or signs of injury.   Lymphadenopathy:      Cervical: No cervical adenopathy.   Skin:     General: Skin is warm and dry.      Coloration: Skin is not jaundiced or pale.      Findings: No bruising.      Comments: Right hallux toe nail very long (2 Inches) with changes consistent with onychomycosis with underlying ulceration  with hemorraghic crust on the nail bed.   Neurological:      General: No focal deficit  present.      Mental Status: She is alert and oriented to person, place, and time.   Psychiatric:         Mood and Affect: Mood normal.         Behavior: Behavior normal.         Thought Content: Thought content normal.         Medication Review    Current Facility-Administered Medications:   •  acetaminophen (TYLENOL) tablet 650 mg, 650 mg, Oral, Q4H PRN, Mayra Rossi MD  •  albuterol (PROVENTIL) nebulizer solution 0.083% 2.5 mg/3mL, 2.5 mg, Nebulization, Q6H PRN, Mayra Rossi MD  •  carvedilol (COREG) tablet 25 mg, 25 mg, Oral, BID With Meals, Mayra Rossi MD, 25 mg at 10/25/20 1700  •  cetirizine (zyrTEC) tablet 10 mg, 10 mg, Oral, Daily, Mayra Rossi MD, 10 mg at 10/25/20 0804  •  dextrose (D50W) 25 g/ 50mL Intravenous Solution 25 g, 25 g, Intravenous, Q15 Min PRN, Mayra Rossi MD, 25 g at 10/24/20 1638  •  dextrose (D50W) 25 g/ 50mL Intravenous Solution 50 mL, 50 mL, Intravenous, Q1H PRN, Mayra Rossi MD, 50 mL at 10/24/20 0103  •  dextrose (GLUTOSE) oral gel 15 g, 15 g, Oral, Q15 Min PRN, Mayra Rossi MD  •  docusate sodium (COLACE) capsule 100 mg, 100 mg, Oral, BID PRN, Mayra Rossi MD  •  glucagon (human recombinant) (GLUCAGEN DIAGNOSTIC) injection 1 mg, 1 mg, Subcutaneous, Q15 Min PRN, Mayra Rossi MD  •  hydrALAZINE (APRESOLINE) tablet 50 mg, 50 mg, Oral, TID, Mayra Rossi MD, 50 mg at 10/25/20 2032  •  levoFLOXacin (LEVAQUIN) tablet 250 mg, 250 mg, Oral, Daily, Mayra Rossi MD, 250 mg at 10/25/20 0804  •  losartan (COZAAR) tablet 25 mg, 25 mg, Oral, Q24H, Mayra Rossi MD, 25 mg at 10/25/20 0804  •  Magnesium Oxide tablet 400 mg, 400 mg, Oral, BID, Mayra Rossi MD, 400 mg at 10/25/20 2032  •  ondansetron (ZOFRAN) tablet 4 mg, 4 mg, Oral, Q6H PRN, Mayra Rossi MD  •  oxybutynin (DITROPAN) half tablet 2.5 mg, 2.5 mg, Oral, BID PRN, Mayra Rossi MD  •  oxyCODONE-acetaminophen (PERCOCET) 7.5-325 MG per tablet 1 tablet, 1 tablet, Oral, Q4H PRN, Mayra Rossi MD, 1  tablet at 10/25/20 2031  •  pantoprazole (PROTONIX) EC tablet 40 mg, 40 mg, Oral, QAM, Mayra Rossi MD, 40 mg at 10/25/20 0837  •  polyethylene glycol (MIRALAX) packet 17 g, 17 g, Oral, Daily PRN, Mayra Rossi MD  •  pravastatin (PRAVACHOL) tablet 40 mg, 40 mg, Oral, Nightly, Mayra Rossi MD, 40 mg at 10/25/20 2032  •  sodium chloride 0.9 % flush 10 mL, 10 mL, Intravenous, PRN, Mayra Rossi MD  •  sodium chloride 0.9 % flush 10 mL, 10 mL, Intravenous, Q12H, Mayra Rossi MD, 10 mL at 10/25/20 2032  •  sodium chloride 0.9 % flush 10 mL, 10 mL, Intravenous, PRN, Mayra Rossi MD  •  sodium chloride 0.9 % infusion, 75 mL/hr, Intravenous, Continuous, Mayra Rossi MD, Last Rate: 75 mL/hr at 10/26/20 0620, 75 mL/hr at 10/26/20 0620     Diagnostic Data    Lab Results (last 24 hours)     Procedure Component Value Units Date/Time    POC Glucose Once [[id number]]  (Normal) Collected: 10/26/20 0617    Specimen: Blood Updated: 10/26/20 0635     Glucose 115 mg/dL      Comment: RN NotifiedOperator: [id number] SAMIA KEYESMeter ID: [id number]       Comprehensive Metabolic Panel [[id number]]  (Abnormal) Collected: 10/26/20 0600    Specimen: Blood Updated: 10/26/20 0630     Glucose 111 mg/dL      BUN 13 mg/dL      Creatinine 1.22 mg/dL      Sodium 136 mmol/L      Potassium 4.3 mmol/L      Chloride 103 mmol/L      CO2 23.0 mmol/L      Calcium 9.0 mg/dL      Total Protein 6.5 g/dL      Albumin 3.30 g/dL      ALT (SGPT) 6 U/L      AST (SGOT) 10 U/L      Alkaline Phosphatase 107 U/L      Total Bilirubin 0.2 mg/dL      eGFR Non African Amer 44 mL/min/1.73      Globulin 3.2 gm/dL      A/G Ratio 1.0 g/dL      BUN/Creatinine Ratio 10.7     Anion Gap 10.0 mmol/L     Narrative:      GFR Normal >60  Chronic Kidney Disease <60  Kidney Failure <15      CBC & Differential [[id number]]  (Abnormal) Collected: 10/26/20 0600    Specimen: Blood Updated: 10/26/20 0613    Narrative:      The following orders were created for panel  order CBC & Differential.  Procedure                               Abnormality         Status                     ---------                               -----------         ------                     CBC Auto Differential[[id number]]        Abnormal            Final result                 Please view results for these tests on the individual orders.    CBC Auto Differential [[id number]]  (Abnormal) Collected: 10/26/20 0600    Specimen: Blood Updated: 10/26/20 0613     WBC 10.76 10*3/mm3      RBC 3.89 10*6/mm3      Hemoglobin 9.3 g/dL      Hematocrit 29.6 %      MCV 76.1 fL      MCH 23.9 pg      MCHC 31.4 g/dL      RDW 15.9 %      RDW-SD 43.8 fl      MPV 9.4 fL      Platelets 290 10*3/mm3      Neutrophil % 64.9 %      Lymphocyte % 22.5 %      Monocyte % 6.9 %      Eosinophil % 3.6 %      Basophil % 0.6 %      Immature Grans % 1.5 %      Neutrophils, Absolute 6.99 10*3/mm3      Lymphocytes, Absolute 2.42 10*3/mm3      Monocytes, Absolute 0.74 10*3/mm3      Eosinophils, Absolute 0.39 10*3/mm3      Basophils, Absolute 0.06 10*3/mm3      Immature Grans, Absolute 0.16 10*3/mm3      nRBC 0.0 /100 WBC     Blood Culture - Blood, Hand, Right [[id number]] Collected: 10/24/20 0458    Specimen: Blood from Hand, Right Updated: 10/26/20 0500     Blood Culture No growth at 2 days    Blood Culture - Blood, Arm, Left [[id number]] Collected: 10/24/20 0040    Specimen: Blood from Arm, Left Updated: 10/26/20 0100     Blood Culture No growth at 2 days    POC Glucose Once [[id number]]  (Abnormal) Collected: 10/25/20 2014    Specimen: Blood Updated: 10/25/20 2041     Glucose 189 mg/dL      Comment: RN NotifiedOperator: [id number] SAMIA KEYESLACYEssence ID: [id number]       POC Glucose Once [[id number]]  (Abnormal) Collected: 10/25/20 1609    Specimen: Blood Updated: 10/25/20 1624     Glucose 158 mg/dL      Comment: RN NotifiedOperator: [id number] ERICA VERGARAJaya ID: [id number]       Urine Culture - Urine, Urine, Clean Catch [[id number]]   (Abnormal) Collected: 10/24/20 0116    Specimen: Urine, Clean Catch Updated: 10/25/20 1046     Urine Culture Yeast isolated    Narrative:      No further workup    Urine Culture - Urine, Urine, Clean Catch [[id number]]  (Abnormal) Collected: 10/24/20 0552    Specimen: Urine, Clean Catch Updated: 10/25/20 1046     Urine Culture Yeast isolated    Narrative:      No further workup    POC Glucose Once [[id number]]  (Abnormal) Collected: 10/25/20 1017    Specimen: Blood Updated: 10/25/20 1037     Glucose 193 mg/dL      Comment: RN NotifiedOperator: [id number] BAYRON NOAHMeter ID: [id number]       Iron Profile [[id number]]  (Abnormal) Collected: 10/25/20 0622    Specimen: Blood Updated: 10/25/20 0917     Iron 21 mcg/dL      Iron Saturation 7 %      Transferrin 195 mg/dL      TIBC 291 mcg/dL             I reviewed the patient's new clinical results.    Assessment/Plan:     **ELMA (acute kidney injury) (CMS/[facility])               Results from last 7 days   Lab Units 10/25/20  0622 10/24/20  0458 10/24/20  0034   CREATININE mg/dL 1.25* 1.82* 2.06*   Cr - 1.22 back to baseline today.      Baseline at 1.3  Ivf at 75 cc/hr  Will hold lasix         • Hypoglycemia              Results from last 7 days   Lab Units 10/25/20  0622 10/24/20  0458 10/24/20  0034   GLUCOSE mg/dL 104* 148* 46*      Glucose today - 111  -Hemoglobin A1c of 6.1.  Patient has appropriate hemoglobin A1c for her age  -Oral hypoglycemics and sliding scale insulin has been discontinued  -Hypoglycemia episode likely due to side effect of last use oral hypoglycemic.  Blood glucose improved after D50.  We will continue to monitor.            • CAD (coronary artery disease)       Hx of 2 stents placed by Dr. Kent in 2016   Continue with statin, bb  Will hold asa as patient recently had lithotripsy and has not been told to resume asa yet      • Nephrolithiasis s/p lithotripsy       Patient had a Cystoscopy, left retrograde, ureteroscopy, laser lithotripsy, stent  placement done with Dr. Rojas on 10/21/20  Ct abdomen today shows resolution of the kidney stone   Continue with levaquin         • Stage 3a chronic kidney disease              Results from last 7 days   Lab Units 10/25/20  0622 10/24/20  0458 10/24/20  0034   CREATININE mg/dL 1.25* 1.82* 2.06*      CR - 1.22 Today  Baseline around 1.2-1.3  Creatinine on admission 2.06  Will provide iv hydration         • Diabetic ulcer of toe of right foot associated with type 2 diabetes mellitus (CMS/[facility])       Will consult podiatry         • Obesity, Class II, BMI 35-39.9       Body mass index is 33.87 kg/m².  Nutrition consult         • HFrEF (heart failure with reduced ejection fraction) (CMS/[facility])       Continue with acei, bb, statin   Echo done in 2018 with Calculated EF = 46%.  Will repeat echo  Daily standing weights  Patient currently has a elma so will not restrict water at this time   Sodium restricted diet  Will hold lasix while we are correcting patients elma       • Essential hypertension       Continue with coreg, benicar, hydralazine, and lasix (lasix d/c at this time due to ELMA)       • Hyperlipemia       Continue with statin       • Type 2 diabetes mellitus without complication (CMS/[facility])              Results from last 7 days   Lab Units 10/25/20  0622 10/24/20  0458 10/24/20  0034   GLUCOSE mg/dL 104* 148* 46*   Glucose today - 111      -Hemoglobin A1c of 6.1.  Patient has appropriate hemoglobin A1c for her age  -Oral hypoglycemics and sliding scale insulin has been discontinued          - R. Hallux toe nail pain - Podiatry has been consulted.          DVT prophylaxis: SCDs/TEDs  Code status is   Code Status and Medical Interventions:   Ordered at: 10/24/20 0428     Level Of Support Discussed With:    Patient     Code Status:    CPR     Medical Interventions (Level of Support Prior to Arrest):    Full       Plan for disposition:Where: home today?       Time: Discharge 15 min min

## 2020-10-26 NOTE — OUTREACH NOTE
Prep Survey      Responses   [facility] facility patient discharged from?  [place]   Is LACE score < 7 ?  No   Eligibility  [facility]   Date of Admission  10/24/20   Date of Discharge  10/26/20   Discharge Disposition  Home or Self Care   Discharge diagnosis  hypoglycemia, diabetic ulcer right foot, ELMA   Does the patient have one of the following disease processes/diagnoses(primary or secondary)?  Other   Does the patient have Home health ordered?  No   Is there a DME ordered?  No   Prep survey completed?  Yes          Beverly Salvador RN

## 2020-10-26 NOTE — PROGRESS NOTES
FAMILY MEDICINE DAILY PROGRESS NOTE  NAME: Afia Adams  : 1950  MRN: [id number]     LOS: 0 days     PROVIDER OF SERVICE: Edel Santiago MD    Chief Complaint: ELMA (acute kidney injury) (CMS/[facility])    Subjective:     Interval History:  History taken from: patient  Patient is a 69-year-old female was admitted to our service for hypoglycemia, ELMA, and diabetic foot ulcer.       Patient has a current medical history of coronary artery disease status post 2 stent placement in 2016, HFrEF with EF 43%, hypertension, hyperlipidemia, type 2 diabetes mellitus, chronic kidney disease, and recent cystoscopy with lithotripsy and stent placement on 10/21/2020 for which she is currently on Levaquin.      Oral hypoglycemics and sliding scale insulin has been discontinued given hemoglobin A1c level of 6.1.Holding aspirin due to recent lithotripsy and given that patient has not been told to resume aspirin yet.  Receiving IV fluids at 75 cc/h while we hold patient's Lasix that way her creatinine improved to her baseline.  Continue Levaquin for recent cystoscopy with lithotripsy and stent placement.      No acute overnight events.  Vital signs stable.  Blood glucose has been in appropriate range.  Patient's creatinine has improved from 1.25-1.22 this morning.    Review of Systems:   Review of Systems   Constitutional: Negative for chills and fever.   HENT: Negative for facial swelling and trouble swallowing.    Eyes: Negative for photophobia and visual disturbance.   Respiratory: Negative for apnea, chest tightness and shortness of breath.    Cardiovascular: Negative for chest pain.   Gastrointestinal: Negative for abdominal distention, diarrhea, nausea and vomiting.   Genitourinary: Negative for pelvic pain.   Musculoskeletal: Negative for arthralgias and myalgias.   Neurological: Negative for seizures and speech difficulty.   Psychiatric/Behavioral: Negative for confusion and hallucinations.       Objective:      Vital Signs  Temp:  [96.8 °F (36 °C)-98.4 °F (36.9 °C)] 98 °F (36.7 °C)  Heart Rate:  [66-78] 74  Resp:  [18] 18  BP: (138-165)/(56-74) 165/74    Physical Exam  Physical Exam  Constitutional:       Appearance: She is well-developed.   HENT:      Head: Normocephalic and atraumatic.      Nose: Nose normal.   Eyes:      Conjunctiva/sclera: Conjunctivae normal.      Pupils: Pupils are equal, round, and reactive to light.   Neck:      Musculoskeletal: Normal range of motion and neck supple.   Cardiovascular:      Rate and Rhythm: Normal rate and regular rhythm.      Pulses: Normal pulses.      Heart sounds: Normal heart sounds.   Pulmonary:      Effort: Pulmonary effort is normal. No respiratory distress.      Breath sounds: Normal breath sounds.   Abdominal:      General: Abdomen is flat. Bowel sounds are normal. There is no distension.      Palpations: Abdomen is soft.   Musculoskeletal: Normal range of motion.      Comments: Trace lower extremity edema   Skin:     General: Skin is warm and dry.      Comments: right great toe with nail thickened & overgrown    Neurological:      General: No focal deficit present.      Mental Status: She is alert and oriented to person, place, and time. Mental status is at baseline.      Cranial Nerves: No cranial nerve deficit.   Psychiatric:         Mood and Affect: Mood normal.         Behavior: Behavior normal.         Medication Review    Current Facility-Administered Medications:   •  acetaminophen (TYLENOL) tablet 650 mg, 650 mg, Oral, Q4H PRN, Mayra Rossi MD  •  albuterol (PROVENTIL) nebulizer solution 0.083% 2.5 mg/3mL, 2.5 mg, Nebulization, Q6H PRN, Mayra Rossi MD  •  carvedilol (COREG) tablet 25 mg, 25 mg, Oral, BID With Meals, Mayra Rossi MD, 25 mg at 10/25/20 1700  •  cetirizine (zyrTEC) tablet 10 mg, 10 mg, Oral, Daily, Mayra Rossi MD, 10 mg at 10/25/20 0804  •  dextrose (D50W) 25 g/ 50mL Intravenous Solution 25 g, 25 g, Intravenous, Q15 Min PRN, Flo  MD Mayra, 25 g at 10/24/20 1638  •  dextrose (D50W) 25 g/ 50mL Intravenous Solution 50 mL, 50 mL, Intravenous, Q1H PRN, Mayra Rossi MD, 50 mL at 10/24/20 0103  •  dextrose (GLUTOSE) oral gel 15 g, 15 g, Oral, Q15 Min PRN, Mayra Rossi MD  •  docusate sodium (COLACE) capsule 100 mg, 100 mg, Oral, BID PRN, Mayra Rossi MD  •  glucagon (human recombinant) (GLUCAGEN DIAGNOSTIC) injection 1 mg, 1 mg, Subcutaneous, Q15 Min PRN, Mayra Rossi MD  •  hydrALAZINE (APRESOLINE) tablet 50 mg, 50 mg, Oral, TID, Mayra Rossi MD, 50 mg at 10/25/20 2032  •  levoFLOXacin (LEVAQUIN) tablet 250 mg, 250 mg, Oral, Daily, Mayra Rossi MD, 250 mg at 10/25/20 0804  •  losartan (COZAAR) tablet 25 mg, 25 mg, Oral, Q24H, Mayra Rossi MD, 25 mg at 10/25/20 0804  •  Magnesium Oxide tablet 400 mg, 400 mg, Oral, BID, Mayra Rossi MD, 400 mg at 10/25/20 2032  •  ondansetron (ZOFRAN) tablet 4 mg, 4 mg, Oral, Q6H PRN, Mayra Rossi MD  •  oxybutynin (DITROPAN) half tablet 2.5 mg, 2.5 mg, Oral, BID PRN, Mayra Rossi MD  •  oxyCODONE-acetaminophen (PERCOCET) 7.5-325 MG per tablet 1 tablet, 1 tablet, Oral, Q4H PRN, Mayra Rossi MD, 1 tablet at 10/25/20 2031  •  pantoprazole (PROTONIX) EC tablet 40 mg, 40 mg, Oral, QAM, Mayra Rossi MD, 40 mg at 10/25/20 0837  •  polyethylene glycol (MIRALAX) packet 17 g, 17 g, Oral, Daily PRN, Mayra Rossi MD  •  pravastatin (PRAVACHOL) tablet 40 mg, 40 mg, Oral, Nightly, Mayra Rossi MD, 40 mg at 10/25/20 2032  •  sodium chloride 0.9 % flush 10 mL, 10 mL, Intravenous, PRN, Mayra Rossi MD  •  sodium chloride 0.9 % flush 10 mL, 10 mL, Intravenous, Q12H, Mayra Rossi MD, 10 mL at 10/25/20 2032  •  sodium chloride 0.9 % flush 10 mL, 10 mL, Intravenous, PRN, Mayra Rossi MD  •  sodium chloride 0.9 % infusion, 75 mL/hr, Intravenous, Continuous, Mayra Rossi MD, Last Rate: 75 mL/hr at 10/26/20 0620, 75 mL/hr at 10/26/20 0620     Diagnostic Data    Lab Results (last 24  hours)     Procedure Component Value Units Date/Time    POC Glucose Once [[id number]]  (Normal) Collected: 10/26/20 0617    Specimen: Blood Updated: 10/26/20 0635     Glucose 115 mg/dL      Comment: RN NotifiedOperator: [id number] SAMIA Rebolledo ID: [id number]       Comprehensive Metabolic Panel [[id number]]  (Abnormal) Collected: 10/26/20 0600    Specimen: Blood Updated: 10/26/20 0630     Glucose 111 mg/dL      BUN 13 mg/dL      Creatinine 1.22 mg/dL      Sodium 136 mmol/L      Potassium 4.3 mmol/L      Chloride 103 mmol/L      CO2 23.0 mmol/L      Calcium 9.0 mg/dL      Total Protein 6.5 g/dL      Albumin 3.30 g/dL      ALT (SGPT) 6 U/L      AST (SGOT) 10 U/L      Alkaline Phosphatase 107 U/L      Total Bilirubin 0.2 mg/dL      eGFR Non African Amer 44 mL/min/1.73      Globulin 3.2 gm/dL      A/G Ratio 1.0 g/dL      BUN/Creatinine Ratio 10.7     Anion Gap 10.0 mmol/L     Narrative:      GFR Normal >60  Chronic Kidney Disease <60  Kidney Failure <15      CBC & Differential [[id number]]  (Abnormal) Collected: 10/26/20 0600    Specimen: Blood Updated: 10/26/20 0613    Narrative:      The following orders were created for panel order CBC & Differential.  Procedure                               Abnormality         Status                     ---------                               -----------         ------                     CBC Auto Differential[[id number]]        Abnormal            Final result                 Please view results for these tests on the individual orders.    CBC Auto Differential [[id number]]  (Abnormal) Collected: 10/26/20 0600    Specimen: Blood Updated: 10/26/20 0613     WBC 10.76 10*3/mm3      RBC 3.89 10*6/mm3      Hemoglobin 9.3 g/dL      Hematocrit 29.6 %      MCV 76.1 fL      MCH 23.9 pg      MCHC 31.4 g/dL      RDW 15.9 %      RDW-SD 43.8 fl      MPV 9.4 fL      Platelets 290 10*3/mm3      Neutrophil % 64.9 %      Lymphocyte % 22.5 %      Monocyte % 6.9 %      Eosinophil % 3.6 %       Basophil % 0.6 %      Immature Grans % 1.5 %      Neutrophils, Absolute 6.99 10*3/mm3      Lymphocytes, Absolute 2.42 10*3/mm3      Monocytes, Absolute 0.74 10*3/mm3      Eosinophils, Absolute 0.39 10*3/mm3      Basophils, Absolute 0.06 10*3/mm3      Immature Grans, Absolute 0.16 10*3/mm3      nRBC 0.0 /100 WBC     Blood Culture - Blood, Hand, Right [[id number]] Collected: 10/24/20 0458    Specimen: Blood from Hand, Right Updated: 10/26/20 0500     Blood Culture No growth at 2 days    Blood Culture - Blood, Arm, Left [[id number]] Collected: 10/24/20 0040    Specimen: Blood from Arm, Left Updated: 10/26/20 0100     Blood Culture No growth at 2 days    POC Glucose Once [[id number]]  (Abnormal) Collected: 10/25/20 2014    Specimen: Blood Updated: 10/25/20 2041     Glucose 189 mg/dL      Comment: RN NotifiedOperator: [id number] SAMIA SARAHMeter ID: [id number]       POC Glucose Once [[id number]]  (Abnormal) Collected: 10/25/20 1609    Specimen: Blood Updated: 10/25/20 1624     Glucose 158 mg/dL      Comment: RN NotifiedOperator: [id number] ERICA Lui ID: [id number]       Urine Culture - Urine, Urine, Clean Catch [[id number]]  (Abnormal) Collected: 10/24/20 0116    Specimen: Urine, Clean Catch Updated: 10/25/20 1046     Urine Culture Yeast isolated    Narrative:      No further workup    Urine Culture - Urine, Urine, Clean Catch [[id number]]  (Abnormal) Collected: 10/24/20 0552    Specimen: Urine, Clean Catch Updated: 10/25/20 1046     Urine Culture Yeast isolated    Narrative:      No further workup    POC Glucose Once [[id number]]  (Abnormal) Collected: 10/25/20 1017    Specimen: Blood Updated: 10/25/20 1037     Glucose 193 mg/dL      Comment: RN NotifiedOperator: [id number] BAYRON NOAHMeter ID: [id number]       Iron Profile [[id number]]  (Abnormal) Collected: 10/25/20 0622    Specimen: Blood Updated: 10/25/20 0917     Iron 21 mcg/dL      Iron Saturation 7 %      Transferrin 195 mg/dL      TIBC 291 mcg/dL             Imaging Results (Last 24 Hours)     ** No results found for the last 24 hours. **          I reviewed the patient's new clinical results.    Assessment/Plan:     Active Hospital Problems    Diagnosis   • **ELMA (acute kidney injury) (CMS/[facility])     Results from last 7 days   Lab Units 10/26/20  0600 10/25/20  0622 10/24/20  0458   CREATININE mg/dL 1.22* 1.25* 1.82*     Baseline at 1.3  Ivf at 75 cc/hr  Will hold lasix       • Hypoglycemia     Results from last 7 days   Lab Units 10/26/20  0600 10/25/20  0622 10/24/20  0458   GLUCOSE mg/dL 111* 104* 148*     -Hemoglobin A1c of 6.1.  Patient has appropriate hemoglobin A1c for her age  -Oral hypoglycemics and sliding scale insulin has been discontinued  -Hypoglycemia episode likely due to side effect of last use oral hypoglycemic.  Blood glucose improved after D50.  We will continue to monitor.         • CAD (coronary artery disease)     Hx of 2 stents placed by Dr. Kent in 2016   Continue with statin, bb  Will hold asa as patient recently had lithotripsy and has not been told to resume asa yet     • Nephrolithiasis s/p lithotripsy     Patient had a Cystoscopy, left retrograde, ureteroscopy, laser lithotripsy, stent placement done with Dr. Rojas on 10/21/20  Ct abdomen today shows resolution of the kidney stone   Continue with levaquin       • Stage 3a chronic kidney disease     Results from last 7 days   Lab Units 10/26/20  0600 10/25/20  0622 10/24/20  0458   CREATININE mg/dL 1.22* 1.25* 1.82*     Baseline around 1.2-1.3  Creatinine on admission 2.06  Will provide iv hydration       • Diabetic ulcer of toe of right foot associated with type 2 diabetes mellitus (CMS/[facility])     Follow up with podiatry outpatient       • Obesity, Class II, BMI 35-39.9     Body mass index is 33.87 kg/m².  Nutrition consult       • HFrEF (heart failure with reduced ejection fraction) (CMS/[facility])     Continue with acei, bb, statin   Echo done in 2018 with Calculated EF = 46%.  Will  repeat echo  Daily standing weights  Patient currently has a sandra so will not restrict water at this time   Sodium restricted diet  Will hold lasix while we are correcting patients sandra      • Essential hypertension     Continue with coreg, benicar, hydralazine, and lasix     • Hyperlipemia     Continue with statin      • Type 2 diabetes mellitus without complication (CMS/[facility])     Results from last 7 days   Lab Units 10/25/20  0622 10/24/20  0458 10/24/20  0034   GLUCOSE mg/dL 104* 148* 46*     -Hemoglobin A1c of 6.1.  Patient has appropriate hemoglobin A1c for her age  -Oral hypoglycemics and sliding scale insulin has been discontinued             DVT prophylaxis: SCDs  Code Status and Medical Interventions:   Ordered at: 10/24/20 0428     Level Of Support Discussed With:    Patient     Code Status:    CPR     Medical Interventions (Level of Support Prior to Arrest):    Full       Plan for disposition:Home today      Time: 15 min               This document has been electronically signed by Edel Santiago MD on October 26, 2020 08:19 CDT

## 2020-10-26 NOTE — DISCHARGE INSTR - APPOINTMENTS
Patient will need a referral from primary care provider to see Dr. Dozier for evaluation of right great toe and nail

## 2020-10-27 ENCOUNTER — OFFICE VISIT (OUTPATIENT)
Dept: FAMILY MEDICINE CLINIC | Facility: CLINIC | Age: 70
End: 2020-10-27

## 2020-10-27 ENCOUNTER — TRANSITIONAL CARE MANAGEMENT TELEPHONE ENCOUNTER (OUTPATIENT)
Dept: CALL CENTER | Facility: HOSPITAL | Age: 70
End: 2020-10-27

## 2020-10-27 VITALS
HEIGHT: 63 IN | HEART RATE: 79 BPM | SYSTOLIC BLOOD PRESSURE: 109 MMHG | BODY MASS INDEX: 34.2 KG/M2 | RESPIRATION RATE: 21 BRPM | OXYGEN SATURATION: 97 % | TEMPERATURE: 97.5 F | DIASTOLIC BLOOD PRESSURE: 60 MMHG | WEIGHT: 193 LBS

## 2020-10-27 DIAGNOSIS — E11.9 TYPE 2 DIABETES MELLITUS WITHOUT COMPLICATION, WITHOUT LONG-TERM CURRENT USE OF INSULIN (HCC): Primary | ICD-10-CM

## 2020-10-27 PROCEDURE — 99213 OFFICE O/P EST LOW 20 MIN: CPT | Performed by: NURSE PRACTITIONER

## 2020-10-27 RX ORDER — BLOOD SUGAR DIAGNOSTIC
STRIP MISCELLANEOUS
Qty: 100 EACH | Refills: 12 | Status: SHIPPED | OUTPATIENT
Start: 2020-10-27 | End: 2020-10-27

## 2020-10-27 RX ORDER — GLIMEPIRIDE 1 MG/1
TABLET ORAL
Qty: 180 TABLET | Refills: 1 | Status: SHIPPED | OUTPATIENT
Start: 2020-10-27 | End: 2020-10-27 | Stop reason: SDUPTHER

## 2020-10-27 RX ORDER — BLOOD-GLUCOSE METER
EACH MISCELLANEOUS
Qty: 1 KIT | Refills: 0 | Status: SHIPPED | OUTPATIENT
Start: 2020-10-27 | End: 2020-10-27 | Stop reason: SDUPTHER

## 2020-10-27 RX ORDER — BLOOD-GLUCOSE METER
EACH MISCELLANEOUS
Qty: 1 KIT | Refills: 0 | Status: SHIPPED | OUTPATIENT
Start: 2020-10-27 | End: 2021-12-08

## 2020-10-27 RX ORDER — GLIMEPIRIDE 1 MG/1
TABLET ORAL
Qty: 180 TABLET | Refills: 1 | Status: ON HOLD | OUTPATIENT
Start: 2020-10-27 | End: 2020-11-04

## 2020-10-27 RX ORDER — BLOOD SUGAR DIAGNOSTIC
STRIP MISCELLANEOUS
Qty: 100 EACH | Refills: 12 | Status: SHIPPED | OUTPATIENT
Start: 2020-10-27 | End: 2021-12-08

## 2020-10-27 RX ORDER — BLOOD-GLUCOSE METER
EACH MISCELLANEOUS
Qty: 1 KIT | Refills: 0 | Status: SHIPPED | OUTPATIENT
Start: 2020-10-27 | End: 2020-10-27

## 2020-10-27 NOTE — PATIENT INSTRUCTIONS
Calorie Counting for Weight Loss  Calories are units of energy. Your body needs a certain amount of calories from food to keep you going throughout the day. When you eat more calories than your body needs, your body stores the extra calories as fat. When you eat fewer calories than your body needs, your body burns fat to get the energy it needs.  Calorie counting means keeping track of how many calories you eat and drink each day. Calorie counting can be helpful if you need to lose weight. If you make sure to eat fewer calories than your body needs, you should lose weight. Ask your health care provider what a healthy weight is for you.  For calorie counting to work, you will need to eat the right number of calories in a day in order to lose a healthy amount of weight per week. A dietitian can help you determine how many calories you need in a day and will give you suggestions on how to reach your calorie goal.  · A healthy amount of weight to lose per week is usually 1-2 lb (0.5-0.9 kg). This usually means that your daily calorie intake should be reduced by 500-750 calories.  · Eating 1,200 - 1,500 calories per day can help most women lose weight.  · Eating 1,500 - 1,800 calories per day can help most men lose weight.  What is my plan?  My goal is to have __________ calories per day.  If I have this many calories per day, I should lose around __________ pounds per week.  What do I need to know about calorie counting?  In order to meet your daily calorie goal, you will need to:  · Find out how many calories are in each food you would like to eat. Try to do this before you eat.  · Decide how much of the food you plan to eat.  · Write down what you ate and how many calories it had. Doing this is called keeping a food log.  To successfully lose weight, it is important to balance calorie counting with a healthy lifestyle that includes regular activity. Aim for 150 minutes of moderate exercise (such as walking) or 75  minutes of vigorous exercise (such as running) each week.  Where do I find calorie information?    The number of calories in a food can be found on a Nutrition Facts label. If a food does not have a Nutrition Facts label, try to look up the calories online or ask your dietitian for help.  Remember that calories are listed per serving. If you choose to have more than one serving of a food, you will have to multiply the calories per serving by the amount of servings you plan to eat. For example, the label on a package of bread might say that a serving size is 1 slice and that there are 90 calories in a serving. If you eat 1 slice, you will have eaten 90 calories. If you eat 2 slices, you will have eaten 180 calories.  How do I keep a food log?  Immediately after each meal, record the following information in your food log:  · What you ate. Don't forget to include toppings, sauces, and other extras on the food.  · How much you ate. This can be measured in cups, ounces, or number of items.  · How many calories each food and drink had.  · The total number of calories in the meal.  Keep your food log near you, such as in a small notebook in your pocket, or use a mobile sam or website. Some programs will calculate calories for you and show you how many calories you have left for the day to meet your goal.  What are some calorie counting tips?    · Use your calories on foods and drinks that will fill you up and not leave you hungry:  ? Some examples of foods that fill you up are nuts and nut butters, vegetables, lean proteins, and high-fiber foods like whole grains. High-fiber foods are foods with more than 5 g fiber per serving.  ? Drinks such as sodas, specialty coffee drinks, alcohol, and juices have a lot of calories, yet do not fill you up.  · Eat nutritious foods and avoid empty calories. Empty calories are calories you get from foods or beverages that do not have many vitamins or protein, such as candy, sweets, and  "soda. It is better to have a nutritious high-calorie food (such as an avocado) than a food with few nutrients (such as a bag of chips).  · Know how many calories are in the foods you eat most often. This will help you calculate calorie counts faster.  · Pay attention to calories in drinks. Low-calorie drinks include water and unsweetened drinks.  · Pay attention to nutrition labels for \"low fat\" or \"fat free\" foods. These foods sometimes have the same amount of calories or more calories than the full fat versions. They also often have added sugar, starch, or salt, to make up for flavor that was removed with the fat.  · Find a way of tracking calories that works for you. Get creative. Try different apps or programs if writing down calories does not work for you.  What are some portion control tips?  · Know how many calories are in a serving. This will help you know how many servings of a certain food you can have.  · Use a measuring cup to measure serving sizes. You could also try weighing out portions on a kitchen scale. With time, you will be able to estimate serving sizes for some foods.  · Take some time to put servings of different foods on your favorite plates, bowls, and cups so you know what a serving looks like.  · Try not to eat straight from a bag or box. Doing this can lead to overeating. Put the amount you would like to eat in a cup or on a plate to make sure you are eating the right portion.  · Use smaller plates, glasses, and bowls to prevent overeating.  · Try not to multitask (for example, watch TV or use your computer) while eating. If it is time to eat, sit down at a table and enjoy your food. This will help you to know when you are full. It will also help you to be aware of what you are eating and how much you are eating.  What are tips for following this plan?  Reading food labels  · Check the calorie count compared to the serving size. The serving size may be smaller than what you are used to " "eating.  · Check the source of the calories. Make sure the food you are eating is high in vitamins and protein and low in saturated and trans fats.  Shopping  · Read nutrition labels while you shop. This will help you make healthy decisions before you decide to purchase your food.  · Make a grocery list and stick to it.  Cooking  · Try to cook your favorite foods in a healthier way. For example, try baking instead of frying.  · Use low-fat dairy products.  Meal planning  · Use more fruits and vegetables. Half of your plate should be fruits and vegetables.  · Include lean proteins like poultry and fish.  How do I count calories when eating out?  · Ask for smaller portion sizes.  · Consider sharing an entree and sides instead of getting your own entree.  · If you get your own entree, eat only half. Ask for a box at the beginning of your meal and put the rest of your entree in it so you are not tempted to eat it.  · If calories are listed on the menu, choose the lower calorie options.  · Choose dishes that include vegetables, fruits, whole grains, low-fat dairy products, and lean protein.  · Choose items that are boiled, broiled, grilled, or steamed. Stay away from items that are buttered, battered, fried, or served with cream sauce. Items labeled \"crispy\" are usually fried, unless stated otherwise.  · Choose water, low-fat milk, unsweetened iced tea, or other drinks without added sugar. If you want an alcoholic beverage, choose a lower calorie option such as a glass of wine or light beer.  · Ask for dressings, sauces, and syrups on the side. These are usually high in calories, so you should limit the amount you eat.  · If you want a salad, choose a garden salad and ask for grilled meats. Avoid extra toppings like matute, cheese, or fried items. Ask for the dressing on the side, or ask for olive oil and vinegar or lemon to use as dressing.  · Estimate how many servings of a food you are given. For example, a serving of " cooked rice is ½ cup or about the size of half a baseball. Knowing serving sizes will help you be aware of how much food you are eating at restaurants. The list below tells you how big or small some common portion sizes are based on everyday objects:  ? 1 oz--4 stacked dice.  ? 3 oz--1 deck of cards.  ? 1 tsp--1 die.  ? 1 Tbsp--½ a ping-pong ball.  ? 2 Tbsp--1 ping-pong ball.  ? ½ cup--½ baseball.  ? 1 cup--1 baseball.  Summary  · Calorie counting means keeping track of how many calories you eat and drink each day. If you eat fewer calories than your body needs, you should lose weight.  · A healthy amount of weight to lose per week is usually 1-2 lb (0.5-0.9 kg). This usually means reducing your daily calorie intake by 500-750 calories.  · The number of calories in a food can be found on a Nutrition Facts label. If a food does not have a Nutrition Facts label, try to look up the calories online or ask your dietitian for help.  · Use your calories on foods and drinks that will fill you up, and not on foods and drinks that will leave you hungry.  · Use smaller plates, glasses, and bowls to prevent overeating.  This information is not intended to replace advice given to you by your health care provider. Make sure you discuss any questions you have with your health care provider.  Document Released: 12/18/2006 Document Revised: 09/06/2019 Document Reviewed: 11/17/2017  [employer] Patient Education © 2020 [employer]      Exercising to Lose Weight  Exercise is structured, repetitive physical activity to improve fitness and health. Getting regular exercise is important for everyone. It is especially important if you are overweight. Being overweight increases your risk of heart disease, stroke, diabetes, high blood pressure, and several types of cancer. Reducing your calorie intake and exercising can help you lose weight.  Exercise is usually categorized as moderate or vigorous intensity. To lose weight, most people need  to do a certain amount of moderate-intensity or vigorous-intensity exercise each week.  Moderate-intensity exercise    Moderate-intensity exercise is any activity that gets you moving enough to burn at least three times more energy (calories) than if you were sitting.  Examples of moderate exercise include:  · Walking a mile in 15 minutes.  · Doing light yard work.  · Biking at an easy pace.  Most people should get at least 150 minutes (2 hours and 30 minutes) a week of moderate-intensity exercise to maintain their body weight.  Vigorous-intensity exercise  Vigorous-intensity exercise is any activity that gets you moving enough to burn at least six times more calories than if you were sitting. When you exercise at this intensity, you should be working hard enough that you are not able to carry on a conversation.  Examples of vigorous exercise include:  · Running.  · Playing a team sport, such as football, basketball, and soccer.  · Jumping rope.  Most people should get at least 75 minutes (1 hour and 15 minutes) a week of vigorous-intensity exercise to maintain their body weight.  How can exercise affect me?  When you exercise enough to burn more calories than you eat, you lose weight. Exercise also reduces body fat and builds muscle. The more muscle you have, the more calories you burn. Exercise also:  · Improves mood.  · Reduces stress and tension.  · Improves your overall fitness, flexibility, and endurance.  · Increases bone strength.  The amount of exercise you need to lose weight depends on:  · Your age.  · The type of exercise.  · Any health conditions you have.  · Your overall physical ability.  Talk to your health care provider about how much exercise you need and what types of activities are safe for you.  What actions can I take to lose weight?  Nutrition    · Make changes to your diet as told by your health care provider or diet and nutrition specialist (dietitian). This may include:  ? Eating fewer  calories.  ? Eating more protein.  ? Eating less unhealthy fats.  ? Eating a diet that includes fresh fruits and vegetables, whole grains, low-fat dairy products, and lean protein.  ? Avoiding foods with added fat, salt, and sugar.  · Drink plenty of water while you exercise to prevent dehydration or heat stroke.  Activity  · Choose an activity that you enjoy and set realistic goals. Your health care provider can help you make an exercise plan that works for you.  · Exercise at a moderate or vigorous intensity most days of the week.  ? The intensity of exercise may vary from person to person. You can tell how intense a workout is for you by paying attention to your breathing and heartbeat. Most people will notice their breathing and heartbeat get faster with more intense exercise.  · Do resistance training twice each week, such as:  ? Push-ups.  ? Sit-ups.  ? Lifting weights.  ? Using resistance bands.  · Getting short amounts of exercise can be just as helpful as long structured periods of exercise. If you have trouble finding time to exercise, try to include exercise in your daily routine.  ? Get up, stretch, and walk around every 30 minutes throughout the day.  ? Go for a walk during your lunch break.  ? Park your car farther away from your destination.  ? If you take public transportation, get off one stop early and walk the rest of the way.  ? Make phone calls while standing up and walking around.  ? Take the stairs instead of elevators or escalators.  · Wear comfortable clothes and shoes with good support.  · Do not exercise so much that you hurt yourself, feel dizzy, or get very short of breath.  Where to find more information  · [place] Department of Health and Human Services: www.hhs.gov  · Centers for Disease Control and Prevention (CDC): www.cdc.gov  Contact a health care provider:  · Before starting a new exercise program.  · If you have questions or concerns about your weight.  · If you have a medical  problem that keeps you from exercising.  Get help right away if you have any of the following while exercising:  · Injury.  · Dizziness.  · Difficulty breathing or shortness of breath that does not go away when you stop exercising.  · Chest pain.  · Rapid heartbeat.  Summary  · Being overweight increases your risk of heart disease, stroke, diabetes, high blood pressure, and several types of cancer.  · Losing weight happens when you burn more calories than you eat.  · Reducing the amount of calories you eat in addition to getting regular moderate or vigorous exercise each week helps you lose weight.  This information is not intended to replace advice given to you by your health care provider. Make sure you discuss any questions you have with your health care provider.  Document Released: 01/20/2012 Document Revised: 12/31/2018 Document Reviewed: 12/31/2018  [employer] Patient Education © 2020 [employer]

## 2020-10-27 NOTE — OUTREACH NOTE
Call Center TCM Note      Responses   [facility] patient discharged from?  [place]   Does the patient have one of the following disease processes/diagnoses(primary or secondary)?  Other   TCM attempt successful?  No   Unsuccessful attempts  Attempt 1 [verbal release is over a year old]          Silvia Cummins RN    10/27/2020, 12:44 EDT

## 2020-10-27 NOTE — OUTREACH NOTE
Call Center TCM Note      Responses   [facility] patient discharged from?  [place]   Does the patient have one of the following disease processes/diagnoses(primary or secondary)?  Other   TCM attempt successful?  No   Unsuccessful attempts  Attempt 2          Silvia Cummins RN    10/27/2020, 12:54 EDT

## 2020-10-28 ENCOUNTER — PREP FOR SURGERY (OUTPATIENT)
Dept: OTHER | Facility: HOSPITAL | Age: 70
End: 2020-10-28

## 2020-10-28 ENCOUNTER — TRANSITIONAL CARE MANAGEMENT TELEPHONE ENCOUNTER (OUTPATIENT)
Dept: CALL CENTER | Facility: HOSPITAL | Age: 70
End: 2020-10-28

## 2020-10-28 DIAGNOSIS — T19.1XXS FOREIGN BODY IN BLADDER AND URETHRA, SEQUELA: Primary | ICD-10-CM

## 2020-10-28 DIAGNOSIS — T19.0XXS FOREIGN BODY IN BLADDER AND URETHRA, SEQUELA: Primary | ICD-10-CM

## 2020-10-28 PROBLEM — T19.0XXA FOREIGN BODY IN BLADDER AND URETHRA: Status: ACTIVE | Noted: 2020-10-28

## 2020-10-28 PROBLEM — T19.1XXA FOREIGN BODY IN BLADDER AND URETHRA: Status: ACTIVE | Noted: 2020-10-28

## 2020-10-28 NOTE — OUTREACH NOTE
Call Center TCM Note      Responses   [facility] patient discharged from?  [place]   Does the patient have one of the following disease processes/diagnoses(primary or secondary)?  Other   TCM attempt successful?  No   Unsuccessful attempts  Attempt 3          Sharlene Arriaga LPN    10/28/2020, 15:07 EDT

## 2020-10-29 LAB
BACTERIA SPEC AEROBE CULT: NORMAL
BACTERIA SPEC AEROBE CULT: NORMAL

## 2020-11-01 ENCOUNTER — LAB (OUTPATIENT)
Dept: LAB | Facility: HOSPITAL | Age: 70
End: 2020-11-01

## 2020-11-01 DIAGNOSIS — Z01.818 PREOP TESTING: Primary | ICD-10-CM

## 2020-11-01 LAB — SARS-COV-2 N GENE RESP QL NAA+PROBE: NOT DETECTED

## 2020-11-01 PROCEDURE — 87635 SARS-COV-2 COVID-19 AMP PRB: CPT

## 2020-11-01 PROCEDURE — C9803 HOPD COVID-19 SPEC COLLECT: HCPCS

## 2020-11-04 ENCOUNTER — HOSPITAL ENCOUNTER (OUTPATIENT)
Facility: HOSPITAL | Age: 70
Setting detail: HOSPITAL OUTPATIENT SURGERY
Discharge: HOME | End: 2020-11-04
Attending: UROLOGY | Admitting: UROLOGY

## 2020-11-04 ENCOUNTER — ANESTHESIA (OUTPATIENT)
Dept: PERIOP | Facility: HOSPITAL | Age: 70
End: 2020-11-04

## 2020-11-04 ENCOUNTER — ANESTHESIA EVENT (OUTPATIENT)
Dept: PERIOP | Facility: HOSPITAL | Age: 70
End: 2020-11-04

## 2020-11-04 ENCOUNTER — READMISSION MANAGEMENT (OUTPATIENT)
Dept: CALL CENTER | Facility: HOSPITAL | Age: 70
End: 2020-11-04

## 2020-11-04 VITALS
HEART RATE: 71 BPM | SYSTOLIC BLOOD PRESSURE: 121 MMHG | BODY MASS INDEX: 33.09 KG/M2 | OXYGEN SATURATION: 95 % | WEIGHT: 186.73 LBS | HEIGHT: 63 IN | RESPIRATION RATE: 18 BRPM | DIASTOLIC BLOOD PRESSURE: 77 MMHG | TEMPERATURE: 97.1 F

## 2020-11-04 DIAGNOSIS — T19.0XXS FOREIGN BODY IN BLADDER AND URETHRA, SEQUELA: ICD-10-CM

## 2020-11-04 DIAGNOSIS — T19.1XXS FOREIGN BODY IN BLADDER AND URETHRA, SEQUELA: ICD-10-CM

## 2020-11-04 LAB — GLUCOSE BLDC GLUCOMTR-MCNC: 103 MG/DL (ref 70–130)

## 2020-11-04 PROCEDURE — 25010000002 MIDAZOLAM PER 1 MG: Performed by: NURSE ANESTHETIST, CERTIFIED REGISTERED

## 2020-11-04 PROCEDURE — 82962 GLUCOSE BLOOD TEST: CPT

## 2020-11-04 PROCEDURE — 25010000002 PROPOFOL 10 MG/ML EMULSION: Performed by: NURSE ANESTHETIST, CERTIFIED REGISTERED

## 2020-11-04 PROCEDURE — 25010000002 CEFTRIAXONE: Performed by: UROLOGY

## 2020-11-04 PROCEDURE — 87070 CULTURE OTHR SPECIMN AEROBIC: CPT | Performed by: UROLOGY

## 2020-11-04 PROCEDURE — 87205 SMEAR GRAM STAIN: CPT | Performed by: UROLOGY

## 2020-11-04 PROCEDURE — 25010000002 FENTANYL CITRATE (PF) 100 MCG/2ML SOLUTION: Performed by: NURSE ANESTHETIST, CERTIFIED REGISTERED

## 2020-11-04 RX ORDER — SODIUM CHLORIDE 9 MG/ML
1000 INJECTION, SOLUTION INTRAVENOUS CONTINUOUS
Status: DISCONTINUED | OUTPATIENT
Start: 2020-11-04 | End: 2020-11-04 | Stop reason: HOSPADM

## 2020-11-04 RX ORDER — FENTANYL CITRATE 50 UG/ML
INJECTION, SOLUTION INTRAMUSCULAR; INTRAVENOUS AS NEEDED
Status: DISCONTINUED | OUTPATIENT
Start: 2020-11-04 | End: 2020-11-04 | Stop reason: SURG

## 2020-11-04 RX ORDER — GLIMEPIRIDE 1 MG/1
0.5 TABLET ORAL
COMMUNITY
End: 2021-04-26

## 2020-11-04 RX ORDER — MIDAZOLAM HYDROCHLORIDE 1 MG/ML
INJECTION INTRAMUSCULAR; INTRAVENOUS AS NEEDED
Status: DISCONTINUED | OUTPATIENT
Start: 2020-11-04 | End: 2020-11-04 | Stop reason: SURG

## 2020-11-04 RX ORDER — LIDOCAINE HYDROCHLORIDE 20 MG/ML
INJECTION, SOLUTION INFILTRATION; PERINEURAL AS NEEDED
Status: DISCONTINUED | OUTPATIENT
Start: 2020-11-04 | End: 2020-11-04 | Stop reason: SURG

## 2020-11-04 RX ORDER — PROPOFOL 10 MG/ML
VIAL (ML) INTRAVENOUS AS NEEDED
Status: DISCONTINUED | OUTPATIENT
Start: 2020-11-04 | End: 2020-11-04 | Stop reason: SURG

## 2020-11-04 RX ADMIN — MIDAZOLAM HYDROCHLORIDE 1 MG: 2 INJECTION, SOLUTION INTRAMUSCULAR; INTRAVENOUS at 19:02

## 2020-11-04 RX ADMIN — FENTANYL CITRATE 50 MCG: 50 INJECTION, SOLUTION INTRAMUSCULAR; INTRAVENOUS at 19:06

## 2020-11-04 RX ADMIN — LIDOCAINE HYDROCHLORIDE 50 MG: 20 INJECTION, SOLUTION INFILTRATION; PERINEURAL at 19:06

## 2020-11-04 RX ADMIN — CEFTRIAXONE 1 G: 1 INJECTION, POWDER, FOR SOLUTION INTRAMUSCULAR; INTRAVENOUS at 19:03

## 2020-11-04 RX ADMIN — PROPOFOL 50 MG: 10 INJECTION, EMULSION INTRAVENOUS at 19:10

## 2020-11-04 RX ADMIN — SODIUM CHLORIDE 1000 ML: 9 INJECTION, SOLUTION INTRAVENOUS at 12:53

## 2020-11-04 RX ADMIN — PROPOFOL 50 MG: 10 INJECTION, EMULSION INTRAVENOUS at 19:05

## 2020-11-04 NOTE — ANESTHESIA PREPROCEDURE EVALUATION
Anesthesia Evaluation     Patient summary reviewed and Nursing notes reviewed   no history of anesthetic complications:  NPO Solid Status: > 8 hours  NPO Liquid Status: > 8 hours           Airway   Mallampati: II  TM distance: >3 FB  Neck ROM: full  possible difficult intubation and Small opening  Dental    (+) upper dentures and lower dentures    Pulmonary    (+) asthma,decreased breath sounds,   (-) COPD, sleep apnea, wheezes, not a smoker, no home oxygen    ROS comment: FINDINGS:  Cardiac silhouette is normal in size. Thoracic aorta contains  atherosclerotic calcification. Mild pulmonary vascular  congestion.  No pleural effusion or pneumothorax.     IMPRESSION:  CONCLUSION:  Pulmonary vascular congestion.     Electronically signed by:  Salas Palomo MD  9/29/2020 12:27 PM  Cardiovascular   Exercise tolerance: poor (<4 METS)    ECG reviewed  Beta blocker given within 24 hours of surgery  Rhythm: regular  Rate: normal    (+) hypertension well controlled 2 medications or greater, valvular problems/murmurs murmur, MR and TI, past MI  >12 months, CAD, cardiac stents (2 stents 2015.) more than 12 months ago CHF Systolic <55%, murmur (Grade II/VI systolic), hyperlipidemia,   (-) pacemaker, dysrhythmias, carotid bruits, DVT    ROS comment: Date/Time: 9/29/2020 1:40 PM   Performed by: Robert Taylor MD   Authorized by: Robert Taylor MD   Interpreted by physician   Rhythm comments: Accelerated junctional   Rate: normal   BPM: 70   QRS axis: left   Clinical impression: abnormal ECG   Comments: Inferoseptal Q waves.   Interpreted by Robert Taylor MD on 9/29/2020  2:05 PM    · Left Ventricle: Left ventricular systolic function is mildly decreased. Calculated EF = 46%.  · Left ventricular wall thickness is consistent with mild-to-moderate concentric hypertrophy. Diastolic function is normal.  · Right Ventricle: Normal right ventricular cavity size, wall thickness, systolic function and septal motion noted  · Mild mitral valve  regurgitation is present.  · Mild tricuspid valve regurgitation is present. Estimated right ventricular systolic pressure from tricuspid regurgitation is mildly elevated (35-45 mmHg).  · No evidence of pulmonary hypertension is present.  · There is no evidence of pericardial effusion.       Neuro/Psych- negative ROS  (-) seizures, TIA, CVA  GI/Hepatic/Renal/Endo    (+) obesity,  GERD well controlled, PUD,  liver disease history of elevated LFT, renal disease (Creatinine 1.84) stones, diabetes mellitus type 2,     ROS Comment: HGB 9.8 HCT 31.8    Musculoskeletal     Abdominal   (+) obese,    Substance History - negative use     OB/GYN negative ob/gyn ROS         Other   arthritis,      ROS/Med Hx Other: Follows with Dr. Kent- last seen 2 months ago    Last eaten this AM @8am. Oats and coffee    Albuterol use for asthma- once a month    TTE 4/2/2018:  · Left Ventricle: Left ventricular systolic function is mildly decreased. Calculated EF = 46%.  · Left ventricular wall thickness is consistent with mild-to-moderate concentric hypertrophy. Diastolic function is normal.  · Right Ventricle: Normal right ventricular cavity size, wall thickness, systolic function and septal motion noted  · Mild mitral valve regurgitation is present.  · Mild tricuspid valve regurgitation is present. Estimated right ventricular systolic pressure from tricuspid regurgitation is mildly elevated (35-45 mmHg).  · No evidence of pulmonary hypertension is present.  · There is no evidence of pericardial effusion.    2016 had 2 stents placed      Phys Exam Other: Final airway type: supraglottic airway        Successful airway: I-gel  Size 4   Number of attempts at approach: 1  Assessment: lips, teeth, and gum same as pre-op                  Anesthesia Plan    ASA 4     general   ( Follows closely with Dr. Kent every 6 months. Discussed general anesthesia with pt and spouse and they agree to risks and possible complications. )  intravenous  induction     Anesthetic plan, all risks, benefits, and alternatives have been provided, discussed and informed consent has been obtained with: patient.

## 2020-11-04 NOTE — OUTREACH NOTE
Medical Week 2 Survey      Responses   [facility] patient discharged from?  [place]   Does the patient have one of the following disease processes/diagnoses(primary or secondary)?  Other   Week 2 attempt successful?  Yes   Call start time  1251   Discharge diagnosis  hypoglycemia, diabetic ulcer right foot, ELMA   Revoke  Readmitted   Call end time  1252          Tanya Park RN

## 2020-11-04 NOTE — H&P
UROLOGY [facility] History and Physical  MAHENDRA MARKHAM  69-year-old; :1950;;female  [address];[place] [address]  Ins: 1) [employer]  MRN:[id number]  SHANA CHAPIN MD  UROLOGY [facility]  Allergies  Bactrim DS Unknown  Current Medications  Proventil HFA 90 mcg/inh inhalation aerosol  aspirin 81 mg oral tablet, chewable  carvedilol 25 mg oral tablet  furosemide 20 mg oral tablet  glimepiride 1 mg oral tablet  hydrALAZINE 50 mg oral tablet  Claritin 10 mg oral tablet  Mag-Ox 400 oral tablet  Benicar 40 mg oral tablet  pravastatin 40 mg oral tablet  MiraLax oral powder for reconstitution  * Medications Reconciled  Problem List  Kidney stone 10/08/2020  History of urinary tract infection  Medical History  Type 2 Diabetes Mellitus Without Complications  Essential Primary Hypertension  Unspecified Cataract  Patient reports having had a colonoscopy within  the past 9 years.  Surgical History  HYSTERECTOMY UNSP  Obstetric/Gynecologic History  She no longer has menstrual periods.  Psychiatric History  Patient is generally satisfied with life. No  depression, anxiety or thoughts of suicide.  Family History  Father Neoplasm Of Unspecified Behavior Of  Respiratory System  Father Heart Disease, Unspecified  Brother Heart Disease, Unspecified  Mother Essential Primary Hypertension  Brother Essential Primary Hypertension  Mother Alive  Father   Brother   Brother Alive  Social History  Never smoked. Non drinker. Retired.  &  lives with  & son.  Imm/Inj/Office Meds History Comments  Patient has had influenza vaccination for this  season.  Patient has had pneumococcal vaccination.  Chief Complaint  Kidney stone  History of Present Illness  MAHENDRA MARKHAM is a 69-year-old female here for evaluation. She has a history of left CRULLS with stent placement 1 week ago. She is voiding well. KUB today shows numerous small stones in the  left kidney. No ureteral stone. Stent in place. She is in a wheelchair. She is unable to get off and on an exam table. She also has a history of severe cystocele, rectocele with polypoid cystitis. She finished her antibiotics.  Location: kidney, ureter  Onset / Duration: 2 weeks  Modifying factors: CRULLS, stent  Associated signs and symptoms: No dysuria noted. She has nocturia per night. She has frequency. There is no incontinence. There is urgency. There is hematuria. There is no hesitancy. She has a intermittent stream. She has good control. She has no odor. She occasionally does not empty well.  Review of Systems  Eyes: Denies: blurry vision, pain in the eyes and double vision  ENMT: Denies: ear pain, sore throat and sinus problems  Cardiovascular: Denies: chest pain, varicose veins, angina and syncope  Respiratory: Denies: shortness of breath, wheezing and frequent cough  Gastrointestinal: Denies: abdominal pain, nausea, vomiting, indigestion and  heartburn  Genitourinary: Denies: other symptoms (see HPI)  Musculoskeletal: Denies: joint pain, neck pain and back pain  Integumentary: Reports: persistent itch; Denies: skin rash and boils  Neurological: Denies: tremors, dizzy spells and numbness / tingling  Psychiatric: Reports: generally satisfied with life; Denies: depression, suicidal  ideation and anxiety  Endocrine: Reports: heat intolerance; Denies: Excessive thirst, cold intolerance and tired/sluggish  Hematologic/Lymphatic: Denies: swollen glands and blood clotting problem  Allergic/Immunologic: Denies: hayfever  Constitutional: Denies: fever, chills and weight loss  Vital Signs  VITAL SIGNS NOT TAKEN DUE TO COVID 19 RESTRICTIONS  Weight: 195 (lb) Resp Rate: 18 (/min)  Height: 63 (in) BMI: 34.54  Smoking Status: Never smoker  Exam  General appearance: The patient appears well developed and well nourished, in no apparent acute distress.  Examination of pupils and irises: No redness or drainage  noted.  Assessment of hearing: Responds to verbal command.  Examination of neck: Neck appears supple. No masses noted.  Assessment of respiratory effort: Respiratory effort appears normal. No apparent  distress.  Examination of gait and station: Normal gait and stature.  Head and neck (Assessment of range of motion): Adequate ROM noted in all  extremities.  Head and neck (Assessment of muscle strength and tone): Muscle strength and tone normal for age.  Inspection of skin and subcutaneous tissue: Exam of the skin is within normal limits. The color and skin turgor are normal. No lesions, bruises, rashes, or jaundice.  Orientation to time, place and person: Oriented to person, place, and time.  Mood and affect: Normal mood and affect.  Data Review  Medications and chart reviewed. History and physical form/ROS reviewed and no  changes noted. Previous encounter reviewed. KUB ordered and reviewed by PROVIDER today.  Assessment - Kidney stone  DX:  Kidney stone  SNO: [id number], ICD-9: 592.0, ICD-10: N20.0  Foreign body in ureter  SNO: [id number], ICD-9: 939.9, ICD-10: T19.8XXD  Recurrent urinary tract infection  SNO: [id number], ICD-9: 595.2, ICD-10: N39.0  Cystocele and rectocele co-occurrent with incomplete uterovaginal prolapse  SNO: [id number], ICD-9: 618.2, ICD-10: N81.2  Plan  Plan Notes:  Cystoscopy, left j-stent removal.  Discussion:  Discussed my findings and plan of action and reasoning behind decision making. All questions were answered. FINDINGS WERE DISCUSSED WITH PATIENT AND DAUGHTER. RISKS AND BENEFITS EXPLAINED. PATIENT WISHES TO  PROCEED.  Instructions:  Patient is instructed to call with any problems. Patient is instructed to call if the  condition worsens. Patient is instructed to call with any changes in condition. Patient is instructed to call if she has any intractable pain, fever, chills, nausea, or vomiting.

## 2020-11-05 NOTE — ADDENDUM NOTE
Addendum  created 11/04/20 1929 by Bee Martin DO    Attestation recorded in Intraprocedure, Intraprocedure Attestations filed, Intraprocedure Staff edited

## 2020-11-05 NOTE — OP NOTE
CYSTOSCOPY  Procedure Note    Afia Adams  11/4/2020    Pre-op Diagnosis:   Foreign body in bladder and urethra, sequela [T19.1XXS, T19.0XXS]    Post-op Diagnosis:     Post-Op Diagnosis Codes:     * Foreign body in bladder and urethra, sequela [T19.1XXS, T19.0XXS]    Procedure(s):  CYSTOSCOPY; LEFT J-STENT REMOVAL             (STRETCHER)   FLEXIBLE SCOPE    Surgeon(s):  Richar Rojas MD    Anesthesia: Monitored Anesthesia Care    Staff:   Circulator: Tiesha Prakash RN  Scrub Person: Rajan Granado          Estimated Blood Loss: minimal    Specimens:                ID Type Source Tests Collected by Time   1 (Not marked as sent) :  Tissue Ureter, Left TISSUE / BONE CULTURE Richar Rojas MD 11/4/2020 1915         Drains: * No LDAs found *    Findings: Retained J stent    Complications: None    Indications: Same    Description of Procedure: Patient brought surgery placed in supine position follows a sterile prep and drape genitalia routine fashion asked flexible cystoscope into the bladder the tent was engaged and extracted from the ureter we drained the bladder remove the scope and the patient was taken recovery out of procedure well    Richar Rojas MD     Date: 11/4/2020  Time: 19:30 CST

## 2020-11-05 NOTE — ANESTHESIA POSTPROCEDURE EVALUATION
Patient: Afia Adams    Procedure Summary     Date: 11/04/20 Room / Location: [facility] OR 02 / [facility] OR    Anesthesia Start: 1903 Anesthesia Stop: 1927    Procedure: CYSTOSCOPY; LEFT J-STENT REMOVAL             (STRETCHER)   FLEXIBLE SCOPE (Left Urethra) Diagnosis:       Foreign body in bladder and urethra, sequela      (Foreign body in bladder and urethra, sequela [T19.1XXS, T19.0XXS])    Surgeon: Richar Rojas MD Provider: Richar Duque CRNA    Anesthesia Type: general ASA Status: 4          Anesthesia Type: general    Vitals  No vitals data found for the desired time range.          Post Anesthesia Care and Evaluation    Patient location during evaluation: bedside  Patient participation: complete - patient participated  Level of consciousness: awake and alert  Pain score: 1  Pain management: adequate  Airway patency: patent  Anesthetic complications: No anesthetic complications  PONV Status: none  Cardiovascular status: acceptable  Respiratory status: acceptable  Hydration status: acceptable

## 2020-11-07 LAB
BACTERIA SPEC AEROBE CULT: NORMAL
GRAM STN SPEC: NORMAL
GRAM STN SPEC: NORMAL

## 2020-11-23 ENCOUNTER — OFFICE VISIT (OUTPATIENT)
Dept: PODIATRY | Facility: CLINIC | Age: 70
End: 2020-11-23

## 2020-11-23 VITALS — HEIGHT: 63 IN | OXYGEN SATURATION: 98 % | BODY MASS INDEX: 32.96 KG/M2 | WEIGHT: 186 LBS | HEART RATE: 72 BPM

## 2020-11-23 DIAGNOSIS — L84 CALLUS OF FOOT: ICD-10-CM

## 2020-11-23 DIAGNOSIS — E11.9 ENCOUNTER FOR DIABETIC FOOT EXAM (HCC): Primary | ICD-10-CM

## 2020-11-23 DIAGNOSIS — E11.9 TYPE 2 DIABETES MELLITUS WITHOUT COMPLICATION, UNSPECIFIED WHETHER LONG TERM INSULIN USE (HCC): ICD-10-CM

## 2020-11-23 DIAGNOSIS — B35.1 ONYCHOMYCOSIS: ICD-10-CM

## 2020-11-23 DIAGNOSIS — G89.29 CHRONIC TOE PAIN, BILATERAL: ICD-10-CM

## 2020-11-23 DIAGNOSIS — M79.674 CHRONIC TOE PAIN, BILATERAL: ICD-10-CM

## 2020-11-23 DIAGNOSIS — M79.675 CHRONIC TOE PAIN, BILATERAL: ICD-10-CM

## 2020-11-23 PROCEDURE — 11056 PARNG/CUTG B9 HYPRKR LES 2-4: CPT | Performed by: PODIATRIST

## 2020-11-23 PROCEDURE — 11721 DEBRIDE NAIL 6 OR MORE: CPT | Performed by: PODIATRIST

## 2020-11-23 PROCEDURE — 99203 OFFICE O/P NEW LOW 30 MIN: CPT | Performed by: PODIATRIST

## 2020-11-23 RX ORDER — FUROSEMIDE 20 MG/1
20 TABLET ORAL DAILY
Status: ON HOLD | COMMUNITY
Start: 2020-11-01 | End: 2022-05-10

## 2020-11-23 NOTE — PROGRESS NOTES
Afia Adams  1950  69 y.o. female   ALICIA Chou - 10/27/2020  BS - 103    Patient presents today for diabetic foot care.    11/23/2020    Chief Complaint   Patient presents with   • Left Foot - diabetic foot care   • Right Foot - diabetic foot care       History of Present Illness    Afia Adams is a 69 y.o.female who presents to clinic today for diabetic foot exam and care.  Patient relates to being a type II diabetic.  States that her blood sugars are well controlled.  Denies numbness, burning or tingling in her feet.  Relates to long, thickened painful toenails.  Pain is aggravated with weightbearing and close toed shoes.  She rates the pain as a 4 out of 10.  Described as aching.  She is unable to trim them herself due to the thickness.  She denies any injuries or trauma to her feet.  She has no other complaints.    Past Medical History:   Diagnosis Date   • Abdominal pain 11/20/2015    unspecified   • Acute gastritis    • Acute on chronic systolic congestive heart failure (CMS/HCC) 04/13/2016   • Ankle pain 04/27/2015   • Candidiasis, skin or nails 12/22/2011    controlled   • Chronic systolic congestive heart failure (CMS/HCC) 08/01/2016   • Congestive heart failure (CMS/HCC) 08/01/2016   • Coronary arteriosclerosis 06/21/2016    multi-vessel, 2 stents, followed by Dr. Kent   • Cough 09/25/2014    proabably due to allergy, chest x ray is normal      • Edema of foot 03/24/2016   • Encounter for long-term (current) drug use     therapy   • Epigastric pain 12/03/2015   • Essential hypertension 06/21/2016   • Gout    • Heart attack (CMS/HCC)     2016 2 stents by Dr. Kent still follows with him/last seen 6 -2020 told doing ok   • History of echocardiogram 03/14/2016    Left atrium normal with mild CLVH wit normal aortic root size.Evidence of posterolateral wall hypokinesis. Severely depressed LV systolic function of 20-25%.Mitral valve thickened Aortic sclerosis.Diastolic  dysfunction   • Hyperlipidemia 03/31/2016    unspecified   • Hypertensive disorder 03/24/2016   • Left lower quadrant pain 07/12/2013    ruling out diverticular disease      • Myocardial infarction (CMS/HCC) 03/24/2016   • Nausea 11/20/2015   • Osteoarthritis of knee 06/23/2016   • Pain in limb 01/25/2011   • Pediculosis capitis 12/22/2011    controlled   • Peptic ulcer    • Pneumonia    • Polyuria 01/25/2011   • Pruritic rash 12/09/2014   • Superficial foreign body hip without major open wound, no infection 12/09/2011    ??? back   • Type 2 diabetes mellitus (CMS/HCC) 06/23/2016    new onset   • Weakness 06/23/2016   • Wears dentures    • Wears glasses          Past Surgical History:   Procedure Laterality Date   • COLONOSCOPY W/ POLYPECTOMY  03/07/2016    Transverse colon polyps,descending polyps,Sigmoid polyps, all resected and retrieved. Diverticulosis without perforation or abscess without bleeding. Erica Thomas   • CYSTOSCOPY Left 11/4/2020    Procedure: CYSTOSCOPY; LEFT J-STENT REMOVAL             (STRETCHER)   FLEXIBLE SCOPE;  Surgeon: Richar Rojas MD;  Location: [facility];  Service: Urology;  Laterality: Left;   • CYSTOSCOPY, URETEROSCOPY, RETROGRADE PYELOGRAM, STENT INSERTION Left 9/30/2020    Procedure: CYSTOSCOPY LEFT URETEROSCOPY RETROGRADE PYELOGRAM STENT INSERTION;  Surgeon: Richar Rojas MD;  Location: [facility];  Service: Urology;  Laterality: Left;   • CYSTOSCOPY, URETEROSCOPY, RETROGRADE PYELOGRAM, STENT INSERTION Left 10/21/2020    Procedure: CYSTOSCOPY, LEFT RETROGRADE, URETEROSCOPY, LASER LITHOTRIPSY, STENT PLACEMENT;  Surgeon: Richar Rojas MD;  Location: [facility];  Service: Urology;  Laterality: Left;   • HYSTERECTOMY      ovary preserv   • INJECTION OF MEDICATION  09/11/2014    Celestone (betamethasone) (2)           Family History   Problem Relation Age of Onset   • Alcohol abuse Other    • Asthma Other    • Lung cancer Other    • Heart attack Father    • Lung cancer Father     • Hypertension Father    • Heart disease Father    • Heart attack Brother    • Hypertension Brother    • Heart disease Brother        Allergies   Allergen Reactions   • Bactrim [Sulfamethoxazole-Trimethoprim] Other (See Comments)     Profuse sweating       Social History     Socioeconomic History   • Marital status:      Spouse name: Not on file   • Number of children: Not on file   • Years of education: Not on file   • Highest education level: Not on file   Tobacco Use   • Smoking status: Never Smoker   • Smokeless tobacco: Never Used   Substance and Sexual Activity   • Alcohol use: No   • Drug use: No   • Sexual activity: Defer         Current Outpatient Medications   Medication Sig Dispense Refill   • albuterol sulfate  (90 Base) MCG/ACT inhaler Inhale 2 puffs Every 4 (Four) Hours As Needed for Wheezing. 1 inhaler 0   • aspirin 81 MG tablet Take 81 mg by mouth daily.     • Blood Glucose Monitoring Suppl (Accu-Chek Lauryn Plus) w/Device kit To check b/s twice daily.  E11.9 1 kit 0   • carvedilol (COREG) 25 MG tablet Take 1 tablet by mouth 2 (Two) Times a Day With Meals. 60 tablet 5   • Continuous Blood Gluc Sensor ([employer] BENNETT SENSOR SYSTEM) 1 each Every 7 (Seven) Days. 4 each 11   • glimepiride (AMARYL) 1 MG tablet Take 0.5 mg by mouth Every Morning Before Breakfast.     • glucose blood (Accu-Chek Lauryn Plus) test strip Use to check b/s twice daily e11.9 100 each 12   • glucose monitor monitoring kit 1 each As Needed (Follow Accu-Cheks 3 times daily and at at bedtime.). 1 each 0   • hydrALAZINE (APRESOLINE) 50 MG tablet Take 1 tablet by mouth 3 (Three) Times a Day. 90 tablet 5   • loratadine (CLARITIN) 10 MG tablet Take 1 tablet by mouth Daily. 30 tablet 3   • magnesium oxide (MAGOX) 400 (241.3 Mg) MG tablet tablet Take 400 mg by mouth 2 (Two) Times a Day.     • olmesartan (BENICAR) 40 MG tablet Take 1 tablet by mouth Daily With Dinner. 90 tablet 3   • omeprazole (priLOSEC) 40 MG capsule Take 1  "capsule by mouth Daily. 90 capsule 1   • ondansetron (Zofran) 4 MG tablet Take 1 tablet by mouth Every 8 (Eight) Hours As Needed for Nausea or Vomiting. 30 tablet 2   • oxybutynin (DITROPAN) 5 MG tablet Take 0.5 tablets by mouth 2 (Two) Times a Day As Needed (increased frequency or bladder spasms). 30 tablet 0   • polyethylene glycol (MIRALAX) 17 g packet Take 17 g by mouth Daily As Needed (constipation). 100 each 1   • pravastatin (PRAVACHOL) 40 MG tablet Take 1 tablet by mouth Every Night. 30 tablet 5   • furosemide (LASIX) 20 MG tablet        No current facility-administered medications for this visit.        Review of Systems   Constitutional: Negative.    HENT: Negative.    Eyes: Negative.    Respiratory: Negative.    Cardiovascular: Negative.    Gastrointestinal: Negative.    Endocrine: Negative.    Genitourinary: Negative.    Musculoskeletal:        Bilateral foot pain     Skin:        Dry and calloused skin   Psychiatric/Behavioral: Negative.          OBJECTIVE    Pulse 72   Ht 160 cm (63\")   Wt 84.4 kg (186 lb)   LMP  (LMP Unknown)   SpO2 98%   BMI 32.95 kg/m²       Physical Exam  Vitals signs reviewed.   Constitutional:       General: She is not in acute distress.     Appearance: She is well-developed.   HENT:      Head: Normocephalic and atraumatic.      Nose: Nose normal.   Eyes:      Conjunctiva/sclera: Conjunctivae normal.      Pupils: Pupils are equal, round, and reactive to light.   Cardiovascular:      Pulses:           Dorsalis pedis pulses are 2+ on the right side and 2+ on the left side.        Posterior tibial pulses are detected w/ Doppler on the right side and detected w/ Doppler on the left side.   Pulmonary:      Effort: Pulmonary effort is normal. No respiratory distress.      Breath sounds: No wheezing.   Musculoskeletal:         General: Tenderness present.      Right foot: Decreased range of motion. Deformity, bunion and prominent metatarsal heads present. No foot drop.      Left " foot: Decreased range of motion. Deformity, bunion and prominent metatarsal heads present. No foot drop.   Feet:      Right foot:      Protective Sensation: 5 sites tested. 5 sites sensed.      Skin integrity: Callus and dry skin present. No ulcer.      Toenail Condition: Right toenails are abnormally thick and long. Fungal disease present.     Left foot:      Protective Sensation: 5 sites tested.      Skin integrity: Dry skin present. No ulcer or callus.      Toenail Condition: Left toenails are abnormally thick and long. Fungal disease present.  Skin:     General: Skin is warm and dry.      Capillary Refill: Capillary refill takes less than 2 seconds.   Neurological:      Mental Status: She is alert and oriented to person, place, and time.   Psychiatric:         Behavior: Behavior normal.         Thought Content: Thought content normal.       Diabetic Foot Exam Performed and Monofilament Test Performed     Procedures        ASSESSMENT AND PLAN    Diagnoses and all orders for this visit:    1. Encounter for diabetic foot exam (CMS/[facility]) (Primary)    2. Type 2 diabetes mellitus without complication, unspecified whether long term insulin use (CMS/[facility])    3. Chronic toe pain, bilateral    4. Onychomycosis    5. Callus of foot        - A diabetic foot screening exam was performed and the patient was educated on the foot complications related to diabetes,  preventative foot care and what signs and symptoms to watch for.  Instructed to contact our office if any foot problems develop before next visit.  - Nails 1-5 bilateral were debrided in length and thickness with nail nipper and electric  to decrease fungal load and risk of infection.  -Trimmed hyperkeratotic lesions noted in objective with a #15 blade without incident.  - All the patients questions were answered.  - RTC 3 months or sooner if needed.             This document has been electronically signed by Ravin Dozier DPM on November 23, 2020 11:25 CST      11/23/2020  11:25 CST

## 2021-01-18 RX ORDER — OMEPRAZOLE 40 MG/1
40 CAPSULE, DELAYED RELEASE ORAL DAILY
Qty: 90 CAPSULE | Refills: 1 | Status: SHIPPED | OUTPATIENT
Start: 2021-01-18

## 2021-02-10 ENCOUNTER — TELEPHONE (OUTPATIENT)
Dept: FAMILY MEDICINE CLINIC | Facility: CLINIC | Age: 71
End: 2021-02-10

## 2021-02-10 NOTE — TELEPHONE ENCOUNTER
CALller: VALENTINA MARKHAM    Relationship: Emergency Contact    Best call back number: [phone]    Medication needed:   Requested Prescriptions     Pending Prescriptions Disp Refills   • magnesium oxide (MAGOX) 400 (241.3 Mg) MG tablet tablet 30 each      Sig: Take 1 tablet by mouth 2 (Two) Times a Day.       When do you need the refill by: 02/10/21    What details did the patient provide when requesting the medication: PATIENT ONLY HAS 3 PILLS LEFT. PLEASE CALL AND ADVISE.    Does the patient have less than a 3 day supply:  [x] Yes  [] No    What is the patient's preferred pharmacy: [id number] [address] [facility] - [phone] [facility] [phone] FX

## 2021-03-08 ENCOUNTER — OFFICE VISIT (OUTPATIENT)
Dept: FAMILY MEDICINE CLINIC | Facility: CLINIC | Age: 71
End: 2021-03-08

## 2021-03-08 VITALS
WEIGHT: 185 LBS | BODY MASS INDEX: 32.78 KG/M2 | HEIGHT: 63 IN | HEART RATE: 65 BPM | OXYGEN SATURATION: 96 % | DIASTOLIC BLOOD PRESSURE: 52 MMHG | SYSTOLIC BLOOD PRESSURE: 117 MMHG | TEMPERATURE: 97.1 F

## 2021-03-08 DIAGNOSIS — I10 ESSENTIAL HYPERTENSION: Primary | ICD-10-CM

## 2021-03-08 DIAGNOSIS — E11.69 TYPE 2 DIABETES MELLITUS WITH OTHER SPECIFIED COMPLICATION, WITHOUT LONG-TERM CURRENT USE OF INSULIN (HCC): ICD-10-CM

## 2021-03-08 PROCEDURE — 99213 OFFICE O/P EST LOW 20 MIN: CPT | Performed by: NURSE PRACTITIONER

## 2021-03-19 NOTE — PATIENT INSTRUCTIONS
Calorie Counting for Weight Loss  Calories are units of energy. Your body needs a certain amount of calories from food to keep you going throughout the day. When you eat more calories than your body needs, your body stores the extra calories as fat. When you eat fewer calories than your body needs, your body burns fat to get the energy it needs.  Calorie counting means keeping track of how many calories you eat and drink each day. Calorie counting can be helpful if you need to lose weight. If you make sure to eat fewer calories than your body needs, you should lose weight. Ask your health care provider what a healthy weight is for you.  For calorie counting to work, you will need to eat the right number of calories in a day in order to lose a healthy amount of weight per week. A dietitian can help you determine how many calories you need in a day and will give you suggestions on how to reach your calorie goal.  · A healthy amount of weight to lose per week is usually 1-2 lb (0.5-0.9 kg). This usually means that your daily calorie intake should be reduced by 500-750 calories.  · Eating 1,200 - 1,500 calories per day can help most women lose weight.  · Eating 1,500 - 1,800 calories per day can help most men lose weight.  What is my plan?  My goal is to have __________ calories per day.  If I have this many calories per day, I should lose around __________ pounds per week.  What do I need to know about calorie counting?  In order to meet your daily calorie goal, you will need to:  · Find out how many calories are in each food you would like to eat. Try to do this before you eat.  · Decide how much of the food you plan to eat.  · Write down what you ate and how many calories it had. Doing this is called keeping a food log.  To successfully lose weight, it is important to balance calorie counting with a healthy lifestyle that includes regular activity. Aim for 150 minutes of moderate exercise (such as walking) or 75  minutes of vigorous exercise (such as running) each week.  Where do I find calorie information?    The number of calories in a food can be found on a Nutrition Facts label. If a food does not have a Nutrition Facts label, try to look up the calories online or ask your dietitian for help.  Remember that calories are listed per serving. If you choose to have more than one serving of a food, you will have to multiply the calories per serving by the amount of servings you plan to eat. For example, the label on a package of bread might say that a serving size is 1 slice and that there are 90 calories in a serving. If you eat 1 slice, you will have eaten 90 calories. If you eat 2 slices, you will have eaten 180 calories.  How do I keep a food log?  Immediately after each meal, record the following information in your food log:  · What you ate. Don't forget to include toppings, sauces, and other extras on the food.  · How much you ate. This can be measured in cups, ounces, or number of items.  · How many calories each food and drink had.  · The total number of calories in the meal.  Keep your food log near you, such as in a small notebook in your pocket, or use a mobile sam or website. Some programs will calculate calories for you and show you how many calories you have left for the day to meet your goal.  What are some calorie counting tips?    · Use your calories on foods and drinks that will fill you up and not leave you hungry:  ? Some examples of foods that fill you up are nuts and nut butters, vegetables, lean proteins, and high-fiber foods like whole grains. High-fiber foods are foods with more than 5 g fiber per serving.  ? Drinks such as sodas, specialty coffee drinks, alcohol, and juices have a lot of calories, yet do not fill you up.  · Eat nutritious foods and avoid empty calories. Empty calories are calories you get from foods or beverages that do not have many vitamins or protein, such as candy, sweets, and  "soda. It is better to have a nutritious high-calorie food (such as an avocado) than a food with few nutrients (such as a bag of chips).  · Know how many calories are in the foods you eat most often. This will help you calculate calorie counts faster.  · Pay attention to calories in drinks. Low-calorie drinks include water and unsweetened drinks.  · Pay attention to nutrition labels for \"low fat\" or \"fat free\" foods. These foods sometimes have the same amount of calories or more calories than the full fat versions. They also often have added sugar, starch, or salt, to make up for flavor that was removed with the fat.  · Find a way of tracking calories that works for you. Get creative. Try different apps or programs if writing down calories does not work for you.  What are some portion control tips?  · Know how many calories are in a serving. This will help you know how many servings of a certain food you can have.  · Use a measuring cup to measure serving sizes. You could also try weighing out portions on a kitchen scale. With time, you will be able to estimate serving sizes for some foods.  · Take some time to put servings of different foods on your favorite plates, bowls, and cups so you know what a serving looks like.  · Try not to eat straight from a bag or box. Doing this can lead to overeating. Put the amount you would like to eat in a cup or on a plate to make sure you are eating the right portion.  · Use smaller plates, glasses, and bowls to prevent overeating.  · Try not to multitask (for example, watch TV or use your computer) while eating. If it is time to eat, sit down at a table and enjoy your food. This will help you to know when you are full. It will also help you to be aware of what you are eating and how much you are eating.  What are tips for following this plan?  Reading food labels  · Check the calorie count compared to the serving size. The serving size may be smaller than what you are used to " "eating.  · Check the source of the calories. Make sure the food you are eating is high in vitamins and protein and low in saturated and trans fats.  Shopping  · Read nutrition labels while you shop. This will help you make healthy decisions before you decide to purchase your food.  · Make a grocery list and stick to it.  Cooking  · Try to cook your favorite foods in a healthier way. For example, try baking instead of frying.  · Use low-fat dairy products.  Meal planning  · Use more fruits and vegetables. Half of your plate should be fruits and vegetables.  · Include lean proteins like poultry and fish.  How do I count calories when eating out?  · Ask for smaller portion sizes.  · Consider sharing an entree and sides instead of getting your own entree.  · If you get your own entree, eat only half. Ask for a box at the beginning of your meal and put the rest of your entree in it so you are not tempted to eat it.  · If calories are listed on the menu, choose the lower calorie options.  · Choose dishes that include vegetables, fruits, whole grains, low-fat dairy products, and lean protein.  · Choose items that are boiled, broiled, grilled, or steamed. Stay away from items that are buttered, battered, fried, or served with cream sauce. Items labeled \"crispy\" are usually fried, unless stated otherwise.  · Choose water, low-fat milk, unsweetened iced tea, or other drinks without added sugar. If you want an alcoholic beverage, choose a lower calorie option such as a glass of wine or light beer.  · Ask for dressings, sauces, and syrups on the side. These are usually high in calories, so you should limit the amount you eat.  · If you want a salad, choose a garden salad and ask for grilled meats. Avoid extra toppings like matute, cheese, or fried items. Ask for the dressing on the side, or ask for olive oil and vinegar or lemon to use as dressing.  · Estimate how many servings of a food you are given. For example, a serving of " cooked rice is ½ cup or about the size of half a baseball. Knowing serving sizes will help you be aware of how much food you are eating at restaurants. The list below tells you how big or small some common portion sizes are based on everyday objects:  ? 1 oz--4 stacked dice.  ? 3 oz--1 deck of cards.  ? 1 tsp--1 die.  ? 1 Tbsp--½ a ping-pong ball.  ? 2 Tbsp--1 ping-pong ball.  ? ½ cup--½ baseball.  ? 1 cup--1 baseball.  Summary  · Calorie counting means keeping track of how many calories you eat and drink each day. If you eat fewer calories than your body needs, you should lose weight.  · A healthy amount of weight to lose per week is usually 1-2 lb (0.5-0.9 kg). This usually means reducing your daily calorie intake by 500-750 calories.  · The number of calories in a food can be found on a Nutrition Facts label. If a food does not have a Nutrition Facts label, try to look up the calories online or ask your dietitian for help.  · Use your calories on foods and drinks that will fill you up, and not on foods and drinks that will leave you hungry.  · Use smaller plates, glasses, and bowls to prevent overeating.  This information is not intended to replace advice given to you by your health care provider. Make sure you discuss any questions you have with your health care provider.  Document Revised: 09/06/2019 Document Reviewed: 11/17/2017  [employer] Patient Education © 2020 [employer]      Exercising to Lose Weight  Exercise is structured, repetitive physical activity to improve fitness and health. Getting regular exercise is important for everyone. It is especially important if you are overweight. Being overweight increases your risk of heart disease, stroke, diabetes, high blood pressure, and several types of cancer. Reducing your calorie intake and exercising can help you lose weight.  Exercise is usually categorized as moderate or vigorous intensity. To lose weight, most people need to do a certain amount of  moderate-intensity or vigorous-intensity exercise each week.  Moderate-intensity exercise    Moderate-intensity exercise is any activity that gets you moving enough to burn at least three times more energy (calories) than if you were sitting.  Examples of moderate exercise include:  · Walking a mile in 15 minutes.  · Doing light yard work.  · Biking at an easy pace.  Most people should get at least 150 minutes (2 hours and 30 minutes) a week of moderate-intensity exercise to maintain their body weight.  Vigorous-intensity exercise  Vigorous-intensity exercise is any activity that gets you moving enough to burn at least six times more calories than if you were sitting. When you exercise at this intensity, you should be working hard enough that you are not able to carry on a conversation.  Examples of vigorous exercise include:  · Running.  · Playing a team sport, such as football, basketball, and soccer.  · Jumping rope.  Most people should get at least 75 minutes (1 hour and 15 minutes) a week of vigorous-intensity exercise to maintain their body weight.  How can exercise affect me?  When you exercise enough to burn more calories than you eat, you lose weight. Exercise also reduces body fat and builds muscle. The more muscle you have, the more calories you burn. Exercise also:  · Improves mood.  · Reduces stress and tension.  · Improves your overall fitness, flexibility, and endurance.  · Increases bone strength.  The amount of exercise you need to lose weight depends on:  · Your age.  · The type of exercise.  · Any health conditions you have.  · Your overall physical ability.  Talk to your health care provider about how much exercise you need and what types of activities are safe for you.  What actions can I take to lose weight?  Nutrition    · Make changes to your diet as told by your health care provider or diet and nutrition specialist (dietitian). This may include:  ? Eating fewer calories.  ? Eating more  protein.  ? Eating less unhealthy fats.  ? Eating a diet that includes fresh fruits and vegetables, whole grains, low-fat dairy products, and lean protein.  ? Avoiding foods with added fat, salt, and sugar.  · Drink plenty of water while you exercise to prevent dehydration or heat stroke.  Activity  · Choose an activity that you enjoy and set realistic goals. Your health care provider can help you make an exercise plan that works for you.  · Exercise at a moderate or vigorous intensity most days of the week.  ? The intensity of exercise may vary from person to person. You can tell how intense a workout is for you by paying attention to your breathing and heartbeat. Most people will notice their breathing and heartbeat get faster with more intense exercise.  · Do resistance training twice each week, such as:  ? Push-ups.  ? Sit-ups.  ? Lifting weights.  ? Using resistance bands.  · Getting short amounts of exercise can be just as helpful as long structured periods of exercise. If you have trouble finding time to exercise, try to include exercise in your daily routine.  ? Get up, stretch, and walk around every 30 minutes throughout the day.  ? Go for a walk during your lunch break.  ? Park your car farther away from your destination.  ? If you take public transportation, get off one stop early and walk the rest of the way.  ? Make phone calls while standing up and walking around.  ? Take the stairs instead of elevators or escalators.  · Wear comfortable clothes and shoes with good support.  · Do not exercise so much that you hurt yourself, feel dizzy, or get very short of breath.  Where to find more information  · [place] Department of Health and Human Services: www.hhs.gov  · Centers for Disease Control and Prevention (CDC): www.cdc.gov  Contact a health care provider:  · Before starting a new exercise program.  · If you have questions or concerns about your weight.  · If you have a medical problem that keeps you from  exercising.  Get help right away if you have any of the following while exercising:  · Injury.  · Dizziness.  · Difficulty breathing or shortness of breath that does not go away when you stop exercising.  · Chest pain.  · Rapid heartbeat.  Summary  · Being overweight increases your risk of heart disease, stroke, diabetes, high blood pressure, and several types of cancer.  · Losing weight happens when you burn more calories than you eat.  · Reducing the amount of calories you eat in addition to getting regular moderate or vigorous exercise each week helps you lose weight.  This information is not intended to replace advice given to you by your health care provider. Make sure you discuss any questions you have with your health care provider.  Document Revised: 12/31/2018 Document Reviewed: 12/31/2018  [employer]vier Patient Education © 2021 [employer]

## 2021-03-19 NOTE — PROGRESS NOTES
Subjective   Afia Adams is a 70 y.o. female.     Afia Adams age 70 comes in today for recheck on her diabetes and her hypertension. She is not checking her blood sugar 7 a family member does check it for her. She was treated late last year by Dr. Rojas for kidney stones. States she is feeling much better she denies chest pain shortness of breath or edema lower extremities.    Diabetes  She presents for her follow-up diabetic visit. She has type 2 diabetes mellitus. Her disease course has been stable. There are no hypoglycemic associated symptoms. Pertinent negatives for hypoglycemia include no headaches or sweats. There are no diabetic associated symptoms. Pertinent negatives for diabetes include no blurred vision and no chest pain. There are no hypoglycemic complications. Symptoms are stable. Diabetic complications include heart disease. Pertinent negatives for diabetic complications include no autonomic neuropathy, CVA, impotence, nephropathy, peripheral neuropathy, PVD or retinopathy. Risk factors for coronary artery disease include diabetes mellitus, dyslipidemia, obesity, hypertension, post-menopausal and sedentary lifestyle. She is compliant with treatment all of the time. She is following a diabetic diet. Meal planning includes avoidance of concentrated sweets. She has not had a previous visit with a dietitian. She rarely participates in exercise. She monitors blood glucose at home 1-2 x per week. Blood glucose monitoring compliance is poor. There is no change in her home blood glucose trend. An ACE inhibitor/angiotensin II receptor blocker is being taken. She does not see a podiatrist.Eye exam is current.   Hypertension  This is a chronic problem. The current episode started more than 1 year ago. The problem is unchanged. The problem is controlled. Pertinent negatives include no anxiety, blurred vision, chest pain, headaches, malaise/fatigue, neck pain, orthopnea, palpitations,  peripheral edema, PND, shortness of breath or sweats. There are no associated agents to hypertension. Risk factors for coronary artery disease include diabetes mellitus, dyslipidemia, obesity, post-menopausal state and sedentary lifestyle. Past treatments include angiotensin blockers, beta blockers and diuretics. Current antihypertension treatment includes angiotensin blockers, beta blockers and diuretics. The current treatment provides significant improvement. There are no compliance problems.  Hypertensive end-organ damage includes CAD/MI. There is no history of angina, kidney disease, CVA, heart failure, left ventricular hypertrophy, PVD or retinopathy.        The following portions of the patient's history were reviewed and updated as appropriate: allergies, current medications, past family history, past medical history, past social history, past surgical history and problem list.    Review of Systems   Constitutional: Negative.  Negative for malaise/fatigue.   HENT: Negative.    Eyes: Negative.  Negative for blurred vision.   Respiratory: Negative.  Negative for shortness of breath.    Cardiovascular: Negative.  Negative for chest pain, palpitations, orthopnea and PND.   Gastrointestinal: Negative.    Genitourinary: Negative.  Negative for impotence.   Musculoskeletal: Negative.  Negative for neck pain.   Skin: Negative.    Neurological: Negative.  Negative for headaches.   Psychiatric/Behavioral: Negative.        Objective   Physical Exam  Vitals and nursing note reviewed.   Constitutional:       General: She is not in acute distress.     Appearance: She is well-developed.   HENT:      Head: Normocephalic and atraumatic.      Right Ear: External ear normal.      Left Ear: External ear normal.      Nose: Nose normal.      Mouth/Throat:      Pharynx: No oropharyngeal exudate.   Eyes:      Pupils: Pupils are equal, round, and reactive to light.   Neck:      Thyroid: No thyromegaly.   Cardiovascular:      Rate and  Rhythm: Normal rate and regular rhythm.      Heart sounds: Normal heart sounds. No murmur heard.   No friction rub.   Pulmonary:      Effort: Pulmonary effort is normal. No respiratory distress.      Breath sounds: Normal breath sounds. No wheezing or rales.   Abdominal:      Palpations: Abdomen is soft.   Musculoskeletal:         General: Normal range of motion.      Cervical back: Normal range of motion and neck supple.   Feet:      Right foot:      Skin integrity: Skin integrity normal.      Left foot:      Skin integrity: Skin integrity normal.   Skin:     General: Skin is warm and dry.   Neurological:      Mental Status: She is alert and oriented to person, place, and time.   Psychiatric:         Thought Content: Thought content normal.         Assessment/Plan   Diagnoses and all orders for this visit:    1. Essential hypertension (Primary)    2. Type 2 diabetes mellitus with other specified complication, without long-term current use of insulin (CMS/[facility])      Needs no refills today.    Lab Results   Component Value Date    HGBA1C 6.10 (H) 10/24/2020     bmi is noted be 38.2 which is considered obese.  Diet and exercise information will be provided to patient.

## 2021-04-26 RX ORDER — OLMESARTAN MEDOXOMIL 40 MG/1
40 TABLET ORAL
Qty: 90 TABLET | Refills: 1 | Status: SHIPPED | OUTPATIENT
Start: 2021-04-26 | End: 2021-11-04

## 2021-04-26 RX ORDER — GLIMEPIRIDE 1 MG/1
TABLET ORAL
Qty: 180 TABLET | Refills: 1 | Status: SHIPPED | OUTPATIENT
Start: 2021-04-26 | End: 2021-09-08 | Stop reason: DRUGHIGH

## 2021-04-26 NOTE — TELEPHONE ENCOUNTER
Appt to establish 9/8/21.  Numerous labs done from 9/29//20 at [facility].  Last office labs on 9/8/20.

## 2021-09-08 ENCOUNTER — OFFICE VISIT (OUTPATIENT)
Dept: FAMILY MEDICINE CLINIC | Facility: CLINIC | Age: 71
End: 2021-09-08

## 2021-09-08 ENCOUNTER — LAB (OUTPATIENT)
Dept: LAB | Facility: HOSPITAL | Age: 71
End: 2021-09-08

## 2021-09-08 VITALS
HEIGHT: 63 IN | BODY MASS INDEX: 32.99 KG/M2 | HEART RATE: 67 BPM | RESPIRATION RATE: 20 BRPM | TEMPERATURE: 97.7 F | WEIGHT: 186.2 LBS | DIASTOLIC BLOOD PRESSURE: 60 MMHG | OXYGEN SATURATION: 99 % | SYSTOLIC BLOOD PRESSURE: 118 MMHG

## 2021-09-08 DIAGNOSIS — Z76.89 ENCOUNTER TO ESTABLISH CARE: Primary | ICD-10-CM

## 2021-09-08 DIAGNOSIS — E78.5 HYPERLIPIDEMIA, UNSPECIFIED HYPERLIPIDEMIA TYPE: ICD-10-CM

## 2021-09-08 DIAGNOSIS — D64.9 ANEMIA, UNSPECIFIED TYPE: ICD-10-CM

## 2021-09-08 DIAGNOSIS — T19.1XXS FOREIGN BODY IN BLADDER AND URETHRA, SEQUELA: ICD-10-CM

## 2021-09-08 DIAGNOSIS — Z13.29 THYROID DISORDER SCREEN: ICD-10-CM

## 2021-09-08 DIAGNOSIS — E55.9 VITAMIN D DEFICIENCY: ICD-10-CM

## 2021-09-08 DIAGNOSIS — D17.0 LIPOMA OF NECK: ICD-10-CM

## 2021-09-08 DIAGNOSIS — E11.69 TYPE 2 DIABETES MELLITUS WITH OTHER SPECIFIED COMPLICATION, WITHOUT LONG-TERM CURRENT USE OF INSULIN (HCC): ICD-10-CM

## 2021-09-08 DIAGNOSIS — I10 ESSENTIAL HYPERTENSION: ICD-10-CM

## 2021-09-08 DIAGNOSIS — Z86.2 HISTORY OF ANEMIA: ICD-10-CM

## 2021-09-08 DIAGNOSIS — T19.0XXS FOREIGN BODY IN BLADDER AND URETHRA, SEQUELA: ICD-10-CM

## 2021-09-08 PROBLEM — T19.0XXA FOREIGN BODY IN BLADDER AND URETHRA: Status: RESOLVED | Noted: 2020-10-28 | Resolved: 2021-09-08

## 2021-09-08 PROBLEM — N39.0 ACUTE UTI: Status: RESOLVED | Noted: 2020-09-25 | Resolved: 2021-09-08

## 2021-09-08 PROBLEM — R05.9 COUGH: Status: RESOLVED | Noted: 2017-02-17 | Resolved: 2021-09-08

## 2021-09-08 PROBLEM — T19.1XXA FOREIGN BODY IN BLADDER AND URETHRA: Status: RESOLVED | Noted: 2020-10-28 | Resolved: 2021-09-08

## 2021-09-08 LAB
25(OH)D3 SERPL-MCNC: 8.2 NG/ML
ALBUMIN SERPL-MCNC: 4.3 G/DL (ref 3.5–5.2)
ALBUMIN UR-MCNC: 1.5 MG/DL
ALBUMIN/GLOB SERPL: 1.2 G/DL
ALP SERPL-CCNC: 131 U/L (ref 39–117)
ALT SERPL W P-5'-P-CCNC: 10 U/L (ref 1–33)
ANION GAP SERPL CALCULATED.3IONS-SCNC: 8.5 MMOL/L (ref 5–15)
AST SERPL-CCNC: 13 U/L (ref 1–32)
BILIRUB SERPL-MCNC: 0.4 MG/DL (ref 0–1.2)
BILIRUB UR QL STRIP: NEGATIVE
BUN SERPL-MCNC: 13 MG/DL (ref 8–23)
BUN/CREAT SERPL: 11.6 (ref 7–25)
CALCIUM SPEC-SCNC: 9.5 MG/DL (ref 8.6–10.5)
CHLORIDE SERPL-SCNC: 100 MMOL/L (ref 98–107)
CHOLEST SERPL-MCNC: 197 MG/DL (ref 0–200)
CLARITY UR: ABNORMAL
CO2 SERPL-SCNC: 28.5 MMOL/L (ref 22–29)
COLOR UR: YELLOW
CREAT SERPL-MCNC: 1.12 MG/DL (ref 0.57–1)
DEPRECATED RDW RBC AUTO: 41.7 FL (ref 37–54)
ERYTHROCYTE [DISTWIDTH] IN BLOOD BY AUTOMATED COUNT: 16.8 % (ref 12.3–15.4)
EXPIRATION DATE: ABNORMAL
FOLATE SERPL-MCNC: 4.92 NG/ML (ref 4.78–24.2)
GFR SERPL CREATININE-BSD FRML MDRD: 48 ML/MIN/1.73
GLOBULIN UR ELPH-MCNC: 3.5 GM/DL
GLUCOSE SERPL-MCNC: 110 MG/DL (ref 65–99)
GLUCOSE UR STRIP-MCNC: NEGATIVE MG/DL
HBA1C MFR BLD: 6.3 %
HCT VFR BLD AUTO: 33.7 % (ref 34–46.6)
HDLC SERPL-MCNC: 36 MG/DL (ref 40–60)
HGB BLD-MCNC: 10.7 G/DL (ref 12–15.9)
HGB BLDA-MCNC: 9.6 G/DL (ref 12–17)
HGB UR QL STRIP.AUTO: NEGATIVE
IRON 24H UR-MRATE: 25 MCG/DL (ref 37–145)
IRON SATN MFR SERPL: 5 % (ref 20–50)
KETONES UR QL STRIP: NEGATIVE
LDLC SERPL CALC-MCNC: 127 MG/DL (ref 0–100)
LDLC/HDLC SERPL: 3.4 {RATIO}
LEUKOCYTE ESTERASE UR QL STRIP.AUTO: ABNORMAL
Lab: 831
MCH RBC QN AUTO: 22.2 PG (ref 26.6–33)
MCHC RBC AUTO-ENTMCNC: 31.8 G/DL (ref 31.5–35.7)
MCV RBC AUTO: 69.8 FL (ref 79–97)
NITRITE UR QL STRIP: NEGATIVE
PH UR STRIP.AUTO: 7.5 [PH] (ref 5–8)
PLATELET # BLD AUTO: 303 10*3/MM3 (ref 140–450)
PMV BLD AUTO: 11.3 FL (ref 6–12)
POTASSIUM SERPL-SCNC: 4.8 MMOL/L (ref 3.5–5.2)
PROT SERPL-MCNC: 7.8 G/DL (ref 6–8.5)
PROT UR QL STRIP: ABNORMAL
RBC # BLD AUTO: 4.83 10*6/MM3 (ref 3.77–5.28)
SODIUM SERPL-SCNC: 137 MMOL/L (ref 136–145)
SP GR UR STRIP: 1.02 (ref 1–1.03)
T4 FREE SERPL-MCNC: 1.11 NG/DL (ref 0.93–1.7)
TIBC SERPL-MCNC: 524 MCG/DL (ref 298–536)
TRANSFERRIN SERPL-MCNC: 352 MG/DL (ref 200–360)
TRIGL SERPL-MCNC: 193 MG/DL (ref 0–150)
TSH SERPL DL<=0.05 MIU/L-ACNC: 4.5 UIU/ML (ref 0.27–4.2)
UROBILINOGEN UR QL STRIP: ABNORMAL
VIT B12 BLD-MCNC: 369 PG/ML (ref 211–946)
VLDLC SERPL-MCNC: 34 MG/DL (ref 5–40)
WBC # BLD AUTO: 11.03 10*3/MM3 (ref 3.4–10.8)

## 2021-09-08 PROCEDURE — 83036 HEMOGLOBIN GLYCOSYLATED A1C: CPT | Performed by: NURSE PRACTITIONER

## 2021-09-08 PROCEDURE — 83540 ASSAY OF IRON: CPT

## 2021-09-08 PROCEDURE — 82746 ASSAY OF FOLIC ACID SERUM: CPT

## 2021-09-08 PROCEDURE — 82043 UR ALBUMIN QUANTITATIVE: CPT

## 2021-09-08 PROCEDURE — 85018 HEMOGLOBIN: CPT | Performed by: NURSE PRACTITIONER

## 2021-09-08 PROCEDURE — 82306 VITAMIN D 25 HYDROXY: CPT

## 2021-09-08 PROCEDURE — 84466 ASSAY OF TRANSFERRIN: CPT

## 2021-09-08 PROCEDURE — 85027 COMPLETE CBC AUTOMATED: CPT

## 2021-09-08 PROCEDURE — 82607 VITAMIN B-12: CPT

## 2021-09-08 PROCEDURE — 84443 ASSAY THYROID STIM HORMONE: CPT

## 2021-09-08 PROCEDURE — 80061 LIPID PANEL: CPT

## 2021-09-08 PROCEDURE — 80053 COMPREHEN METABOLIC PANEL: CPT

## 2021-09-08 PROCEDURE — 99215 OFFICE O/P EST HI 40 MIN: CPT | Performed by: NURSE PRACTITIONER

## 2021-09-08 PROCEDURE — 81003 URINALYSIS AUTO W/O SCOPE: CPT

## 2021-09-08 PROCEDURE — 84439 ASSAY OF FREE THYROXINE: CPT

## 2021-09-08 RX ORDER — GLIMEPIRIDE 2 MG/1
2 TABLET ORAL
Qty: 90 TABLET | Refills: 1 | Status: SHIPPED | OUTPATIENT
Start: 2021-09-08 | End: 2021-12-27

## 2021-09-08 RX ORDER — LANOLIN ALCOHOL/MO/W.PET/CERES
325 CREAM (GRAM) TOPICAL 2 TIMES DAILY WITH MEALS
Qty: 60 TABLET | Refills: 5 | Status: SHIPPED | OUTPATIENT
Start: 2021-09-08

## 2021-09-08 NOTE — PROGRESS NOTES
Chief Complaint  Establish Care and Hypertension    Subjective    History of Present Illness {CC  Problem List  Visit  Diagnosis   Encounters  Notes  Medications  Labs  Result Review Imaging  Media :23}     Afia Adams presents to [facility] [place] for   Establish care visit and f/u on chronic health issues. Denies any new c/o or any issues at this time. Medical history, labs, and medications reviewed. Previous PCP, Nancy Chou NP - retired. Last PCP visit 3/8/21 last PCP labs 9/8/20 - lipid trig 306, HDL 31, VLDL 61.2; CMP potassium 5.4, creatinine 1.3, eGFR 41; A1c 6.16.  is with pt at visit and states she was hospitalized in Oct last year d/t extremely low BS in 30s pt was having mental status changes - was hospitalized for several days and diabetic meds adjusted - currently on only glimepiride - does not check BS regularly at home. Other chronic health issues include HTN, CHF, GERD, hyperlipidemia, anemia, incontinence and constipation. Reports that she sees Dr. Kent yearly and last appt was Feb 2021 - no records avail for review. Also reports hx of renal stones and sees Dr. Rojas for mgmt. Has seen Dr. Dozier, podiatry, in past for diabetic foot exam/care - is due f/u.        Objective     Physical Exam  Vitals and nursing note reviewed.   Constitutional:       General: She is not in acute distress.     Appearance: Normal appearance. She is obese. She is ill-appearing (chronic).      Comments: Ambulating via WC today - , [place], at visit today   HENT:      Head: Normocephalic and atraumatic.      Right Ear: Tympanic membrane, ear canal and external ear normal.      Left Ear: Tympanic membrane, ear canal and external ear normal.      Nose: Nose normal.      Mouth/Throat:      Mouth: Mucous membranes are moist.      Pharynx: Oropharynx is clear.   Eyes:      Conjunctiva/sclera: Conjunctivae normal.      Pupils: Pupils are  "equal, round, and reactive to light.   Neck:      Vascular: No carotid bruit.   Cardiovascular:      Rate and Rhythm: Normal rate and regular rhythm.      Pulses: Normal pulses.      Heart sounds: Normal heart sounds.   Pulmonary:      Effort: Pulmonary effort is normal.      Breath sounds: Normal breath sounds.   Abdominal:      General: Bowel sounds are normal. There is no distension.      Palpations: There is no mass.      Tenderness: There is no abdominal tenderness.   Musculoskeletal:         General: Swelling present.      Cervical back: Normal range of motion. No tenderness.      Right lower leg: No tenderness. 1+ Edema present.      Left lower leg: No tenderness. 1+ Edema present.      Right ankle: Swelling present. No tenderness. Normal pulse.      Left ankle: Swelling present. No tenderness. Normal pulse.      Right foot: Swelling present. No tenderness. Normal pulse.      Left foot: Swelling present. No tenderness. Normal pulse.   Skin:     General: Skin is warm and dry.      Capillary Refill: Capillary refill takes 2 to 3 seconds.      Coloration: Skin is pale.      Nails: There is no clubbing.          Neurological:      General: No focal deficit present.      Mental Status: She is alert and oriented to person, place, and time.   Psychiatric:         Mood and Affect: Mood normal.         Behavior: Behavior normal.        Result Review  Data Reviewed:{ Labs  Result Review  Imaging  Med Tab  Media :23}   The following data was reviewed by (Optional):[id number]}       Vital Signs:   /60 (BP Location: Left arm, Patient Position: Sitting, Cuff Size: Adult)   Pulse 67   Temp 97.7 °F (36.5 °C) (Temporal)   Resp 20   Ht 160 cm (63\")   Wt 84.5 kg (186 lb 3.2 oz)   SpO2 99%   BMI 32.98 kg/m²          Assessment and Plan {CC Problem List  Visit Diagnosis  ROS  Review (Popup)  Health Maintenance  Quality  BestPractice  Medications  SmartSets  SnapShot Encounters  Media :23}   Problem List " Items Addressed This Visit        Cardiac and Vasculature    Essential hypertension    Overview     Continue with coreg, benicar, hydralazine, and lasix         Hyperlipemia    Overview     Continue with statin          Relevant Orders    Lipid panel      Other Visit Diagnoses     Type 2 diabetes mellitus with other specified complication, without long-term current use of insulin (CMS/[facility])    -  Primary    Relevant Medications    glimepiride (Amaryl) 2 MG tablet    Other Relevant Orders    POCT glycated hemoglobin, total (Completed)    Comprehensive metabolic panel    MicroAlbumin, Urine, Random - Urine, Clean Catch    History of anemia        Relevant Medications    ferrous sulfate 325 (65 FE) MG EC tablet    Other Relevant Orders    POCT hemoglobin (Completed)    CBC No Differential    Vitamin D deficiency        Relevant Orders    Vitamin D 25 hydroxy    Thyroid disorder screen        Relevant Orders    TSH    T4, free         Diagnosis Plan   1. Encounter to establish care     2. Essential hypertension     3. Type 2 diabetes mellitus with other specified complication, without long-term current use of insulin (CMS/[facility])  POCT glycated hemoglobin, total    Comprehensive metabolic panel    MicroAlbumin, Urine, Random - Urine, Clean Catch    glimepiride (Amaryl) 2 MG tablet   4. Hyperlipidemia, unspecified hyperlipidemia type  Lipid panel   5. Lipoma of neck     6. Anemia, unspecified type  POCT hemoglobin    CBC No Differential    ferrous sulfate 325 (65 FE) MG EC tablet    Iron and TIBC    Vitamin B12    Folate   7. Vitamin D deficiency  Vitamin D 25 hydroxy   8. Thyroid disorder screen  TSH    T4, free     - Est Care visit - previous visit notes and labs reviewed and noted in HPI  - HTN - controlled - cont f/u with Dr. Kent, cardiology, for mgmt. Cont carvedilol, furosemide, hydralazine, olmesartan at current dosings. No refills needed.  - T2DM - POCT A1c 6.3 - controlled. Continue glimepiride but dosing  changed from 1mg PO BID to 2mg QAM with first meal. Pt v/u of change. CMP and microalbumin urine ordered.   - Hyperlipidemia - continue pravastatin 40mg daily. No refill needed today.   - Lipoma of neck - appears benign - pt reports no changes and has been present for years - advised to monitor and will readdress if lesion changes.   - Anemia - hx of low hgb in previous labs - POCT hgb 9.6 - restart ferrous sulfate BID - can start taking once daily with meal and increase as tolerated. Advised to start OTC stool softener daily for constipation r/t fe use. CBC, iron/TIBC, Vit B12, folate ordered.   - Vit D def - lab ordered  - Thyroid screening labs ordered.   - Continue f/u with Dr. Rojas for mgmt of renal stones.   - Advised to contact Dr. Dozier, podiatry, for f/u appt and mgmt of diabetic foot issues.  - RTC 3 mos for f/u or PRN    I spent 40 minutes caring for Afia Adams on this date of service. This time includes time spent by me in the following activities: preparing for the visit, reviewing tests, obtaining and/or reviewing a separately obtained history, performing a medically appropriate examination and/or evaluation, counseling and educating the patient/family/caregiver, ordering medications, tests, or procedures, documenting information in the medical record, independently interpreting results and communicating that information with the patient/family/caregiver and care coordination.     Follow Up {Instructions Charge Capture  Follow-up Communications :23}   Return in about 3 months (around 12/8/2021) for Medicare Wellness, Recheck.  Patient was given instructions and counseling regarding her condition or for health maintenance advice. Please see specific information pulled into the AVS if appropriate            .  This document has been electronically signed by BILL Taylor Student on September 8, 2021 11:00 CDT

## 2021-09-10 DIAGNOSIS — E55.9 VITAMIN D DEFICIENCY: Primary | ICD-10-CM

## 2021-09-10 RX ORDER — ERGOCALCIFEROL 1.25 MG/1
50000 CAPSULE ORAL
Qty: 5 CAPSULE | Refills: 4 | Status: SHIPPED | OUTPATIENT
Start: 2021-09-10 | End: 2021-09-10 | Stop reason: SDUPTHER

## 2021-09-14 DIAGNOSIS — N28.9 DECREASED RENAL FUNCTION: Primary | ICD-10-CM

## 2021-09-27 DIAGNOSIS — I10 ESSENTIAL HYPERTENSION: ICD-10-CM

## 2021-10-05 RX ORDER — HYDRALAZINE HYDROCHLORIDE 50 MG/1
50 TABLET, FILM COATED ORAL 3 TIMES DAILY
Qty: 90 TABLET | Refills: 5 | Status: SHIPPED | OUTPATIENT
Start: 2021-10-05 | End: 2022-03-28

## 2021-10-27 ENCOUNTER — OFFICE VISIT (OUTPATIENT)
Dept: FAMILY MEDICINE CLINIC | Facility: CLINIC | Age: 71
End: 2021-10-27

## 2021-10-27 VITALS
DIASTOLIC BLOOD PRESSURE: 74 MMHG | RESPIRATION RATE: 20 BRPM | OXYGEN SATURATION: 99 % | SYSTOLIC BLOOD PRESSURE: 160 MMHG | TEMPERATURE: 97.5 F | HEART RATE: 63 BPM | HEIGHT: 63 IN | BODY MASS INDEX: 32.98 KG/M2

## 2021-10-27 DIAGNOSIS — Z00.00 MEDICARE ANNUAL WELLNESS VISIT, SUBSEQUENT: Primary | ICD-10-CM

## 2021-10-27 DIAGNOSIS — E66.9 OBESITY, CLASS I, BMI 30-34.9: ICD-10-CM

## 2021-10-27 PROCEDURE — G0439 PPPS, SUBSEQ VISIT: HCPCS | Performed by: NURSE PRACTITIONER

## 2021-10-27 PROCEDURE — 1170F FXNL STATUS ASSESSED: CPT | Performed by: NURSE PRACTITIONER

## 2021-10-27 PROCEDURE — 1126F AMNT PAIN NOTED NONE PRSNT: CPT | Performed by: NURSE PRACTITIONER

## 2021-10-27 PROCEDURE — 1159F MED LIST DOCD IN RCRD: CPT | Performed by: NURSE PRACTITIONER

## 2021-10-27 NOTE — PROGRESS NOTES
The ABCs of the Annual Wellness Visit  Subsequent Medicare Wellness Visit    Chief Complaint   Patient presents with   • Annual Exam     Medicare Wellness      Subjective    History of Present Illness:  Afia Adams is a 70 y.o. female who presents for a Subsequent Medicare Wellness Visit.    The following portions of the patient's history were reviewed and   updated as appropriate: allergies, current medications, past family history, past medical history, past social history, past surgical history and problem list.    Compared to one year ago, the patient feels her physical   health is better.    Compared to one year ago, the patient feels her mental   health is better.    Recent Hospitalizations:  She was not admitted to the hospital during the last year.       Current Medical Providers:  Patient Care Team:  Nieves Childress APRN as PCP - General (Family Medicine)    Outpatient Medications Prior to Visit   Medication Sig Dispense Refill   • albuterol sulfate  (90 Base) MCG/ACT inhaler Inhale 2 puffs Every 4 (Four) Hours As Needed for Wheezing. 1 inhaler 0   • aspirin 81 MG tablet Take 81 mg by mouth daily.     • Blood Glucose Monitoring Suppl (Accu-Chek Lauryn Plus) w/Device kit To check b/s twice daily.  E11.9 1 kit 0   • carvedilol (COREG) 25 MG tablet Take 1 tablet by mouth 2 (Two) Times a Day With Meals. 60 tablet 5   • Continuous Blood Gluc Sensor ([employer] BENNETT SENSOR SYSTEM) 1 each Every 7 (Seven) Days. 4 each 11   • ferrous sulfate 325 (65 FE) MG EC tablet Take 1 tablet by mouth 2 (Two) Times a Day With Meals. 60 tablet 5   • furosemide (LASIX) 20 MG tablet      • glimepiride (Amaryl) 2 MG tablet Take 1 tablet by mouth Every Morning Before Breakfast. 90 tablet 1   • glucose blood (Accu-Chek Lauryn Plus) test strip Use to check b/s twice daily e11.9 100 each 12   • glucose monitor monitoring kit 1 each As Needed (Follow Accu-Cheks 3 times daily and at at bedtime.). 1 each 0   •  hydrALAZINE (APRESOLINE) 50 MG tablet Take 1 tablet by mouth 3 (Three) Times a Day. 90 tablet 5   • loratadine (CLARITIN) 10 MG tablet Take 1 tablet by mouth Daily. 30 tablet 3   • magnesium oxide (MAGOX) 400 (241.3 Mg) MG tablet tablet Take 1 tablet by mouth 2 (Two) Times a Day. 30 each 3   • olmesartan (BENICAR) 40 MG tablet TAKE 1 TABLET BY MOUTH DAILY WITH DINNER 90 tablet 1   • omeprazole (priLOSEC) 40 MG capsule TAKE 1 CAPSULE BY MOUTH DAILY 90 capsule 1   • ondansetron (Zofran) 4 MG tablet Take 1 tablet by mouth Every 8 (Eight) Hours As Needed for Nausea or Vomiting. 30 tablet 2   • oxybutynin (DITROPAN) 5 MG tablet Take 0.5 tablets by mouth 2 (Two) Times a Day As Needed (increased frequency or bladder spasms). 30 tablet 0   • polyethylene glycol (MIRALAX) 17 g packet Take 17 g by mouth Daily As Needed (constipation). 100 each 1   • pravastatin (PRAVACHOL) 40 MG tablet Take 1 tablet by mouth Every Night. 30 tablet 5     No facility-administered medications prior to visit.       No opioid medication identified on active medication list. I have reviewed chart for other potential  high risk medication/s and harmful drug interactions in the elderly.          Aspirin is on active medication list. Aspirin use is indicated based on review of current medical condition/s. Pros and cons of this therapy have been discussed today. Benefits of this medication outweigh potential harm.  Patient has been encouraged to continue taking this medication.  .      Patient Active Problem List   Diagnosis   • Diabetes mellitus ([facility])   • Essential hypertension   • Hyperlipemia   • HFrEF (heart failure with reduced ejection fraction) ([facility])   • Coronary arteriosclerosis   • Obesity, Class II, BMI 35-39.9   • Drug therapy   • Morbidly obese ([facility])   • Obstructive uropathy   • Calculus of kidney   • CAD (coronary artery disease)   • Stage 3a chronic kidney disease ([facility])   • Diabetic ulcer of toe of right foot associated with type 2  "diabetes mellitus (HCC)   • Lipoma of neck   • Anemia     Advance Care Planning  Advance Directive is not on file.  ACP discussion was held with the patient during this visit. Patient does not have an advance directive, information provided.          Objective    Vitals:    10/27/21 1024   BP: 160/74  Comment: did not take medcication this morning   BP Location: Right arm   Patient Position: Sitting   Cuff Size: Adult   Pulse: 63   Resp: 20   Temp: 97.5 °F (36.4 °C)   TempSrc: Temporal   SpO2: 99%   Weight: Comment: pt in wheelchair   Height: 160 cm (63\")   PainSc: 0-No pain     BMI Readings from Last 1 Encounters:   10/27/21 32.98 kg/m²   BMI is above normal parameters. Recommendations include: educational material, exercise counseling and nutrition counseling    Does the patient have evidence of cognitive impairment? Yes    Physical Exam  Lab Results   Component Value Date    TRIG 193 (H) 09/08/2021    HDL 36 (L) 09/08/2021     (H) 09/08/2021    VLDL 34 09/08/2021    HGBA1C 6.3 09/08/2021            HEALTH RISK ASSESSMENT    Smoking Status:  Social History     Tobacco Use   Smoking Status Never Smoker   Smokeless Tobacco Never Used     Alcohol Consumption:  Social History     Substance and Sexual Activity   Alcohol Use No     Fall Risk Screen:    STEADI Fall Risk Assessment was completed, and patient is at LOW risk for falls.Assessment completed on:10/27/2021    Depression Screening:  PHQ-2/PHQ-9 Depression Screening 10/27/2021   Little interest or pleasure in doing things 1   Feeling down, depressed, or hopeless 0   Total Score 1       Health Habits and Functional and Cognitive Screening:  Functional & Cognitive Status 10/27/2021   Do you have difficulty preparing food and eating? No   Do you have difficulty bathing yourself, getting dressed or grooming yourself? No   Do you have difficulty using the toilet? No   Do you have difficulty moving around from place to place? Yes   Do you have trouble with steps " or getting out of a bed or a chair? Yes   Current Diet Other   Dental Exam Other        Dental Exam Comment Dentures   Eye Exam Not up to date   Exercise (times per week) 0 times per week   Current Exercises Include No Regular Exercise   Do you need help using the phone?  No   Are you deaf or do you have serious difficulty hearing?  No   Do you need help with transportation? Yes   Do you need help shopping? No   Do you need help preparing meals?  No   Do you need help with housework?  Yes   Do you need help with laundry? Yes   Do you need help taking your medications? No   Do you need help managing money? No   Do you ever drive or ride in a car without wearing a seat belt? No   Have you felt unusual stress, anger or loneliness in the last month? No   Who do you live with? Spouse   If you need help, do you have trouble finding someone available to you? No   Have you been bothered in the last four weeks by sexual problems? No   Do you have difficulty concentrating, remembering or making decisions? Yes       Age-appropriate Screening Schedule:  Refer to the list below for future screening recommendations based on patient's age, sex and/or medical conditions. Orders for these recommended tests are listed in the plan section. The patient has been provided with a written plan.    Health Maintenance   Topic Date Due   • INFLUENZA VACCINE  03/31/2022 (Originally 8/1/2021)   • MAMMOGRAM  10/28/2022 (Originally 4/12/2021)   • DIABETIC EYE EXAM  10/28/2022 (Originally 9/8/2021)   • DXA SCAN  10/28/2022 (Originally 1950)   • ZOSTER VACCINE (2 of 2) 10/28/2022 (Originally 4/13/2017)   • DIABETIC FOOT EXAM  11/23/2021   • HEMOGLOBIN A1C  03/08/2022   • LIPID PANEL  09/08/2022   • URINE MICROALBUMIN  09/08/2022   • TDAP/TD VACCINES (2 - Td or Tdap) 02/16/2027   • PAP SMEAR  Discontinued              Assessment/Plan   CMS Preventative Services Quick Reference  Risk Factors Identified During Encounter  Cardiovascular  Disease  Dementia/Memory   Fall Risk-High or Moderate  Immunizations Discussed/Encouraged (specific Immunizations; Influenza, Pneumococcal 23, Shingrix and Declined all recommended vaccinations  Inadequate Social Support, Isolation, Loneliness, Lack of Transportation, Financial Difficulties, or Caregiver Stress   Inactivity/Sedentary  Obesity/Overweight   Polypharmacy  The above risks/problems have been discussed with the patient.  Follow up actions/plans if indicated are seen below in the Assessment/Plan Section.  Pertinent information has been shared with the patient in the After Visit Summary.    Diagnoses and all orders for this visit:    1. Medicare annual wellness visit, subsequent (Primary)    2. Obesity, Class I, BMI 30-34.9        Follow Up:   Return in about 1 year (around 10/27/2022) for Medicare Wellness.     An After Visit Summary and PPPS were made available to the patient.                 This document has been electronically signed by BILL Wong on October 27, 2021 11:18 CDT

## 2021-10-27 NOTE — PATIENT INSTRUCTIONS
Medicare Wellness  Personal Prevention Plan of Service     Date of Office Visit:  10/27/2021  Encounter Provider:  BILL Wong  Place of Service:  [facility] - [place]  Patient Name: Afia Adams  :  1950    As part of the Medicare Wellness portion of your visit today, we are providing you with this personalized preventive plan of services (PPPS). This plan is based upon recommendations of the [place] Preventive Services Task Force (USPSTF) and the Advisory Committee on Immunization Practices (ACIP).    This lists the preventive care services that should be considered, and provides dates of when you are due. Items listed as completed are up-to-date and do not require any further intervention.    Health Maintenance   Topic Date Due   • INFLUENZA VACCINE  2022 (Originally 2021)   • Pneumococcal Vaccine 65+ (1 of 2 - PPSV23) 10/28/2022 (Originally 1956)   • MAMMOGRAM  10/28/2022 (Originally 2021)   • DIABETIC EYE EXAM  10/28/2022 (Originally 2021)   • DXA SCAN  10/28/2022 (Originally 1950)   • ZOSTER VACCINE (2 of 2) 10/28/2022 (Originally 2017)   • DIABETIC FOOT EXAM  2021   • COVID-19 Vaccine (3 - Pfizer booster) 2021   • HEMOGLOBIN A1C  2022   • LIPID PANEL  2022   • URINE MICROALBUMIN  2022   • ANNUAL WELLNESS VISIT  10/27/2022   • TDAP/TD VACCINES (2 - Td or Tdap) 2027   • COLORECTAL CANCER SCREENING  2027   • HEPATITIS C SCREENING  Completed   • PAP SMEAR  Discontinued       No orders of the defined types were placed in this encounter.      Return in about 1 year (around 10/27/2022) for Medicare Wellness.        Healthy Eating  Following a healthy eating pattern may help you to achieve and maintain a healthy body weight, reduce the risk of chronic disease, and live a long and productive life. It is important to follow a healthy eating pattern at an  "appropriate calorie level for your body. Your nutritional needs should be met primarily through food by choosing a variety of nutrient-rich foods.  What are tips for following this plan?  Reading food labels  · Read labels and choose the following:  ? Reduced or low sodium.  ? Juices with 100% fruit juice.  ? Foods with low saturated fats and high polyunsaturated and monounsaturated fats.  ? Foods with whole grains, such as whole wheat, cracked wheat, brown rice, and wild rice.  ? Whole grains that are fortified with folic acid. This is recommended for women who are pregnant or who want to become pregnant.  · Read labels and avoid the following:  ? Foods with a lot of added sugars. These include foods that contain brown sugar, corn sweetener, corn syrup, dextrose, fructose, glucose, high-fructose corn syrup, honey, invert sugar, lactose, malt syrup, maltose, molasses, raw sugar, sucrose, trehalose, or turbinado sugar.  § Do not eat more than the following amounts of added sugar per day:  § 6 teaspoons (25 g) for women.  § 9 teaspoons (38 g) for men.  ? Foods that contain processed or refined starches and grains.  ? Refined grain products, such as white flour, degermed cornmeal, white bread, and white rice.  Shopping  · Choose nutrient-rich snacks, such as vegetables, whole fruits, and nuts. Avoid high-calorie and high-sugar snacks, such as potato chips, fruit snacks, and candy.  · Use oil-based dressings and spreads on foods instead of solid fats such as butter, stick margarine, or cream cheese.  · Limit pre-made sauces, mixes, and \"instant\" products such as flavored rice, instant noodles, and ready-made pasta.  · Try more plant-protein sources, such as tofu, tempeh, black beans, edamame, lentils, nuts, and seeds.  · Explore eating plans such as the Mediterranean diet or vegetarian diet.  Cooking  · Use oil to sauté or stir-maier foods instead of solid fats such as butter, stick margarine, or lard.  · Try baking, " boiling, grilling, or broiling instead of frying.  · Remove the fatty part of meats before cooking.  · Steam vegetables in water or broth.  Meal planning    · At meals, imagine dividing your plate into fourths:  ? One-half of your plate is fruits and vegetables.  ? One-fourth of your plate is whole grains.  ? One-fourth of your plate is protein, especially lean meats, poultry, eggs, tofu, beans, or nuts.  · Include low-fat dairy as part of your daily diet.    Lifestyle  · Choose healthy options in all settings, including home, work, school, restaurants, or stores.  · Prepare your food safely:  ? Wash your hands after handling raw meats.  ? Keep food preparation surfaces clean by regularly washing with hot, soapy water.  ? Keep raw meats separate from ready-to-eat foods, such as fruits and vegetables.  ? , meat, poultry, and eggs to the recommended internal temperature.  ? Store foods at safe temperatures. In general:  § Keep cold foods at 40°F (4.4°C) or below.  § Keep hot foods at 140°F (60°C) or above.  § Keep your freezer at 0°F (-17.8°C) or below.  § Foods are no longer safe to eat when they have been between the temperatures of 40°-140°F (4.4-60°C) for more than 2 hours.  What foods should I eat?  Fruits  Aim to eat 2 cup-equivalents of fresh, canned (in natural juice), or frozen fruits each day. Examples of 1 cup-equivalent of fruit include 1 small apple, 8 large strawberries, 1 cup canned fruit, ½ cup dried fruit, or 1 cup 100% juice.  Vegetables  Aim to eat 2½-3 cup-equivalents of fresh and frozen vegetables each day, including different varieties and colors. Examples of 1 cup-equivalent of vegetables include 2 medium carrots, 2 cups raw, leafy greens, 1 cup chopped vegetable (raw or cooked), or 1 medium baked potato.  Grains  Aim to eat 6 ounce-equivalents of whole grains each day. Examples of 1 ounce-equivalent of grains include 1 slice of bread, 1 cup ready-to-eat cereal, 3 cups popcorn, or ½  cup cooked rice, pasta, or cereal.  Meats and other proteins  Aim to eat 5-6 ounce-equivalents of protein each day. Examples of 1 ounce-equivalent of protein include 1 egg, 1/2 cup nuts or seeds, or 1 tablespoon (16 g) peanut butter. A cut of meat or fish that is the size of a deck of cards is about 3-4 ounce-equivalents.  · Of the protein you eat each week, try to have at least 8 ounces come from seafood. This includes salmon, trout, herring, and anchovies.  Dairy  Aim to eat 3 cup-equivalents of fat-free or low-fat dairy each day. Examples of 1 cup-equivalent of dairy include 1 cup (240 mL) milk, 8 ounces (250 g) yogurt, 1½ ounces (44 g) natural cheese, or 1 cup (240 mL) fortified soy milk.  Fats and oils  · Aim for about 5 teaspoons (21 g) per day. Choose monounsaturated fats, such as canola and olive oils, avocados, peanut butter, and most nuts, or polyunsaturated fats, such as sunflower, corn, and soybean oils, walnuts, pine nuts, sesame seeds, sunflower seeds, and flaxseed.  Beverages  · Aim for six 8-oz glasses of water per day. Limit coffee to three to five 8-oz cups per day.  · Limit caffeinated beverages that have added calories, such as soda and energy drinks.  · Limit alcohol intake to no more than 1 drink a day for nonpregnant women and 2 drinks a day for men. One drink equals 12 oz of beer (355 mL), 5 oz of wine (148 mL), or 1½ oz of hard liquor (44 mL).  Seasoning and other foods  · Avoid adding excess amounts of salt to your foods. Try flavoring foods with herbs and spices instead of salt.  · Avoid adding sugar to foods.  · Try using oil-based dressings, sauces, and spreads instead of solid fats.  This information is based on general [place] nutrition guidelines. For more information, visit choosemyplate.gov. Exact amounts may vary based on your nutrition needs.  Summary  · A healthy eating plan may help you to maintain a healthy weight, reduce the risk of chronic diseases, and stay active throughout  your life.  · Plan your meals. Make sure you eat the right portions of a variety of nutrient-rich foods.  · Try baking, boiling, grilling, or broiling instead of frying.  · Choose healthy options in all settings, including home, work, school, restaurants, or stores.  This information is not intended to replace advice given to you by your health care provider. Make sure you discuss any questions you have with your health care provider.  Document Revised: 04/01/2019 Document Reviewed: 04/01/2019  Elsevier Patient Education © 2021 [employer]

## 2021-11-04 RX ORDER — OLMESARTAN MEDOXOMIL 40 MG/1
40 TABLET ORAL
Qty: 90 TABLET | Refills: 1 | Status: SHIPPED | OUTPATIENT
Start: 2021-11-04 | End: 2022-04-26

## 2021-12-07 ENCOUNTER — TELEPHONE (OUTPATIENT)
Dept: FAMILY MEDICINE CLINIC | Facility: CLINIC | Age: 71
End: 2021-12-07

## 2021-12-08 ENCOUNTER — OFFICE VISIT (OUTPATIENT)
Dept: FAMILY MEDICINE CLINIC | Facility: CLINIC | Age: 71
End: 2021-12-08

## 2021-12-08 ENCOUNTER — LAB (OUTPATIENT)
Dept: LAB | Facility: HOSPITAL | Age: 71
End: 2021-12-08

## 2021-12-08 VITALS
HEIGHT: 63 IN | HEART RATE: 86 BPM | SYSTOLIC BLOOD PRESSURE: 124 MMHG | TEMPERATURE: 98.4 F | WEIGHT: 189.2 LBS | DIASTOLIC BLOOD PRESSURE: 58 MMHG | OXYGEN SATURATION: 97 % | BODY MASS INDEX: 33.52 KG/M2

## 2021-12-08 DIAGNOSIS — N18.31 STAGE 3A CHRONIC KIDNEY DISEASE (HCC): ICD-10-CM

## 2021-12-08 DIAGNOSIS — Z79.899 ENCOUNTER FOR LONG-TERM CURRENT USE OF MEDICATION: ICD-10-CM

## 2021-12-08 DIAGNOSIS — R79.89 ELEVATED TSH: ICD-10-CM

## 2021-12-08 DIAGNOSIS — E11.69 TYPE 2 DIABETES MELLITUS WITH OTHER SPECIFIED COMPLICATION, WITHOUT LONG-TERM CURRENT USE OF INSULIN (HCC): ICD-10-CM

## 2021-12-08 DIAGNOSIS — I10 ESSENTIAL HYPERTENSION: ICD-10-CM

## 2021-12-08 DIAGNOSIS — R82.90 ABNORMAL URINALYSIS: ICD-10-CM

## 2021-12-08 DIAGNOSIS — I10 ESSENTIAL HYPERTENSION: Primary | ICD-10-CM

## 2021-12-08 LAB
ALBUMIN SERPL-MCNC: 4.5 G/DL (ref 3.5–5.2)
ALBUMIN/GLOB SERPL: 1.4 G/DL
ALP SERPL-CCNC: 128 U/L (ref 39–117)
ALT SERPL W P-5'-P-CCNC: 6 U/L (ref 1–33)
ANION GAP SERPL CALCULATED.3IONS-SCNC: 9.7 MMOL/L (ref 5–15)
AST SERPL-CCNC: 14 U/L (ref 1–32)
BILIRUB BLD-MCNC: ABNORMAL MG/DL
BILIRUB SERPL-MCNC: 0.3 MG/DL (ref 0–1.2)
BUN SERPL-MCNC: 21 MG/DL (ref 8–23)
BUN/CREAT SERPL: 17.5 (ref 7–25)
CALCIUM SPEC-SCNC: 9.8 MG/DL (ref 8.6–10.5)
CHLORIDE SERPL-SCNC: 99 MMOL/L (ref 98–107)
CLARITY, POC: CLEAR
CO2 SERPL-SCNC: 29.3 MMOL/L (ref 22–29)
COLOR UR: YELLOW
CREAT SERPL-MCNC: 1.2 MG/DL (ref 0.57–1)
DEPRECATED RDW RBC AUTO: 50.5 FL (ref 37–54)
ERYTHROCYTE [DISTWIDTH] IN BLOOD BY AUTOMATED COUNT: 18.8 % (ref 12.3–15.4)
EXPIRATION DATE: ABNORMAL
GFR SERPL CREATININE-BSD FRML MDRD: 45 ML/MIN/1.73
GLOBULIN UR ELPH-MCNC: 3.2 GM/DL
GLUCOSE SERPL-MCNC: 109 MG/DL (ref 65–99)
GLUCOSE UR STRIP-MCNC: NEGATIVE MG/DL
HCT VFR BLD AUTO: 39.7 % (ref 34–46.6)
HGB BLD-MCNC: 13 G/DL (ref 12–15.9)
KETONES UR QL: NEGATIVE
LEUKOCYTE EST, POC: ABNORMAL
Lab: ABNORMAL
MCH RBC QN AUTO: 25.1 PG (ref 26.6–33)
MCHC RBC AUTO-ENTMCNC: 32.7 G/DL (ref 31.5–35.7)
MCV RBC AUTO: 76.6 FL (ref 79–97)
NITRITE UR-MCNC: NEGATIVE MG/ML
PH UR: 6 [PH] (ref 5–8)
PLATELET # BLD AUTO: 282 10*3/MM3 (ref 140–450)
PMV BLD AUTO: 11 FL (ref 6–12)
POTASSIUM SERPL-SCNC: 5 MMOL/L (ref 3.5–5.2)
PROT SERPL-MCNC: 7.7 G/DL (ref 6–8.5)
PROT UR STRIP-MCNC: ABNORMAL MG/DL
RBC # BLD AUTO: 5.18 10*6/MM3 (ref 3.77–5.28)
RBC # UR STRIP: NEGATIVE /UL
SODIUM SERPL-SCNC: 138 MMOL/L (ref 136–145)
SP GR UR: 1.02 (ref 1–1.03)
TSH SERPL DL<=0.05 MIU/L-ACNC: 4.81 UIU/ML (ref 0.27–4.2)
UROBILINOGEN UR QL: NORMAL
WBC NRBC COR # BLD: 10.72 10*3/MM3 (ref 3.4–10.8)

## 2021-12-08 PROCEDURE — 85027 COMPLETE CBC AUTOMATED: CPT

## 2021-12-08 PROCEDURE — 81003 URINALYSIS AUTO W/O SCOPE: CPT | Performed by: NURSE PRACTITIONER

## 2021-12-08 PROCEDURE — 87086 URINE CULTURE/COLONY COUNT: CPT | Performed by: NURSE PRACTITIONER

## 2021-12-08 PROCEDURE — 84443 ASSAY THYROID STIM HORMONE: CPT

## 2021-12-08 PROCEDURE — 99215 OFFICE O/P EST HI 40 MIN: CPT | Performed by: NURSE PRACTITIONER

## 2021-12-08 PROCEDURE — 80053 COMPREHEN METABOLIC PANEL: CPT

## 2021-12-08 PROCEDURE — 3062F POS MACROALBUMINURIA REV: CPT | Performed by: NURSE PRACTITIONER

## 2021-12-08 NOTE — PROGRESS NOTES
Chief Complaint  Hypertension, Diabetes, Hyperlipidemia, Anemia, and Vitamin D Deficiency    Subjective    History of Present Illness {CC  Problem List  Visit  Diagnosis   Encounters  Notes  Medications  Labs  Result Review Imaging  Media :23}     Afia Adams presents to [facility] - [place] for   3 mos f/u on HTN, DM, HLD, anemia and Vit D def. BP appears controlled - denies HA, blurry vision, CP, SOA or edema - does have hx of heart failure and CAD and was hospitalized 10/24/20 at  in [place] and followed by cardiology at that time but it is unclear if she still sees anyone. Does not check BP or BS at home - last A1c 6.3 9/8/21. Does have noted decreased renal fxn - states she has seen nephrology in the past - Dr. Emerson but has not seen anyone since he passed away - referral was placed for nephrology at her last visit but pt states she does not want to be on dialysis so she never went to that appt - explained in depth that her renal fxn does not indicate need for dialysis at this time but that we want to prevent that in the future - pt agreeable to seeing nephrology. Has seen Dr. Rojas in past for renal stones.        Objective     Physical Exam  Vitals and nursing note reviewed.   Constitutional:       General: She is not in acute distress.     Appearance: Normal appearance. She is obese. She is ill-appearing (chronic).      Comments: Ambulating via WC today - , Nahun, at visit today   HENT:      Head: Normocephalic and atraumatic.   Eyes:      Conjunctiva/sclera: Conjunctivae normal.      Pupils: Pupils are equal, round, and reactive to light.   Neck:      Vascular: No carotid bruit.   Cardiovascular:      Rate and Rhythm: Normal rate and regular rhythm.      Pulses: Normal pulses.      Heart sounds: Normal heart sounds. No murmur heard.      Pulmonary:      Effort: Pulmonary effort is normal.      Breath sounds: Normal  breath sounds. No wheezing or rhonchi.   Musculoskeletal:         General: Swelling present.      Cervical back: Normal range of motion and neck supple. No tenderness.      Right lower leg: No tenderness. 1+ Edema present.      Left lower leg: No tenderness. 1+ Edema present.      Right ankle: Swelling present. No tenderness. Normal pulse.      Left ankle: Swelling present. No tenderness. Normal pulse.      Right foot: Swelling present. No tenderness. Normal pulse.      Left foot: Swelling present. No tenderness. Normal pulse.   Skin:     General: Skin is warm and dry.      Coloration: Skin is pale.      Nails: There is no clubbing.          Neurological:      General: No focal deficit present.      Mental Status: She is alert and oriented to person, place, and time.   Psychiatric:         Mood and Affect: Mood normal.         Behavior: Behavior normal.        Result Review  Data Reviewed:{ Labs  Result Review  Imaging  Med Tab  Media :23}   The following data was reviewed by (Optional):[id number]}  Common labs    Common Labsle 9/8/21 9/8/21 9/8/21 9/8/21 9/8/21 9/8/21 12/8/21 12/8/21    1028 1029 1050 1050 1050 1050 1130 1130   Glucose     110 (A)   109 (A)   BUN     13   21   Creatinine     1.12 (A)   1.20 (A)   eGFR Non  Am     48 (A)   45 (A)   Sodium     137   138   Potassium     4.8   5.0   Chloride     100   99   Calcium     9.5   9.8   Albumin     4.30   4.50   Total Bilirubin     0.4   0.3   Alkaline Phosphatase     131 (A)   128 (A)   AST (SGOT)     13   14   ALT (SGPT)     10   6   WBC   11.03 (A)    10.72    Hemoglobin  9.6 (A) 10.7 (A)    13.0    Hematocrit   33.7 (A)    39.7    Platelets   303    282    Total Cholesterol    197       Triglycerides    193 (A)       HDL Cholesterol    36 (A)       LDL Cholesterol     127 (A)       Hemoglobin A1C 6.3          Microalbumin, Urine      1.5     (A) Abnormal value          No Images in the past 120 days found..  No orders to display          Vital  "Signs:   /58 (BP Location: Right arm, Patient Position: Sitting, Cuff Size: Large Adult)   Pulse 86   Temp 98.4 °F (36.9 °C)   Ht 160 cm (63\")   Wt 85.8 kg (189 lb 3.2 oz)   SpO2 97%   BMI 33.52 kg/m²          Assessment and Plan {CC Problem List  Visit Diagnosis  ROS  Review (Popup)  Health Maintenance  Quality  BestPractice  Medications  SmartSets  SnapShot Encounters  Media :23}   Problem List Items Addressed This Visit        Cardiac and Vasculature    Essential hypertension - Primary    Relevant Orders    CBC No Differential (Completed)       Endocrine and Metabolic    Diabetes mellitus (HCC)    Overview                Relevant Orders    POCT urinalysis dipstick, automated (Completed)       Genitourinary and Reproductive     Stage 3a chronic kidney disease (HCC)    Overview                Relevant Orders    Comprehensive metabolic panel (Completed)       Health Encounters    Encounter for long-term current use of medication    Relevant Orders    TSH (Completed)       Symptoms and Signs    Elevated TSH    Relevant Orders    TSH (Completed)      Other Visit Diagnoses     Abnormal urinalysis        Relevant Orders    Urine Culture - Urine, Urine, Clean Catch (Completed)         Diagnosis Plan   1. Essential hypertension  CBC No Differential   2. Type 2 diabetes mellitus with other specified complication, without long-term current use of insulin (HCC)  POCT urinalysis dipstick, automated   3. Stage 3a chronic kidney disease (HCC)  Comprehensive metabolic panel   4. Abnormal urinalysis  Urine Culture - Urine, Urine, Clean Catch   5. Elevated TSH  TSH   6. Encounter for long-term current use of medication  TSH     - HTN - controlled - continue carvedilol, hydralazine, olmesartan and lasix as prescribed - denies need for refills - It is unclear if pt is still seeing cardiology - mentioned seeing Dr Kent in [place] but we have no records to review.   - T2DM - appears controlled based on " last A1c 9/8/21 - noted in HPI. POCT UA ordered - will send for cx.  - CKD - discussed in depth the need to see nephrology to preserve renal fxn - pt was worried that she immediately would be placed on dialysis and this was reason she did not go to her appt - It was explained that she does not need dialysis at this point and we are trying pt improve her renal fxn to prevent the need for dialysis - pt v/u and is agreeable to seeing nephrology. Will check to see if new referral is needed.   - TSH level elevated - will repeat TSH  - RTC 3 mos for f/u on chronic health issues or PRN.     I spent 40 minutes caring for Afia Adams on this date of service. This time includes time spent by me in the following activities: preparing for the visit, reviewing tests, obtaining and/or reviewing a separately obtained history, performing a medically appropriate examination and/or evaluation, counseling and educating the patient/family/caregiver, ordering medications, tests, or procedures, referring and communicating with other health care professionals, documenting information in the medical record and care coordination.     Follow Up {Instructions Charge Capture  Follow-up Communications :23}   Return in about 3 months (around 3/8/2022), or if symptoms worsen or fail to improve, for Recheck.  Patient was given instructions and counseling regarding her condition or for health maintenance advice. Please see specific information pulled into the AVS if appropriate            .  This document has been electronically signed by BILL Wong on December 19, 2021 22:31 CST

## 2021-12-11 LAB
BACTERIA UR CULT: NORMAL
BACTERIA UR CULT: NORMAL

## 2021-12-19 PROBLEM — E11.621 DIABETIC ULCER OF TOE OF RIGHT FOOT ASSOCIATED WITH TYPE 2 DIABETES MELLITUS (HCC): Status: RESOLVED | Noted: 2020-10-24 | Resolved: 2021-12-19

## 2021-12-19 PROBLEM — N13.9 OBSTRUCTIVE UROPATHY: Status: RESOLVED | Noted: 2020-09-29 | Resolved: 2021-12-19

## 2021-12-19 PROBLEM — N20.0 CALCULUS OF KIDNEY: Status: RESOLVED | Noted: 2020-10-09 | Resolved: 2021-12-19

## 2021-12-19 PROBLEM — L97.519 DIABETIC ULCER OF TOE OF RIGHT FOOT ASSOCIATED WITH TYPE 2 DIABETES MELLITUS (HCC): Status: RESOLVED | Noted: 2020-10-24 | Resolved: 2021-12-19

## 2021-12-19 PROBLEM — R79.89 ELEVATED TSH: Status: ACTIVE | Noted: 2021-12-19

## 2021-12-20 DIAGNOSIS — E03.9 HYPOTHYROIDISM, UNSPECIFIED TYPE: Primary | ICD-10-CM

## 2021-12-20 DIAGNOSIS — N18.31 STAGE 3A CHRONIC KIDNEY DISEASE (HCC): Primary | ICD-10-CM

## 2021-12-20 RX ORDER — LEVOTHYROXINE SODIUM 0.03 MG/1
25 TABLET ORAL
Qty: 30 TABLET | Refills: 3 | Status: SHIPPED | OUTPATIENT
Start: 2021-12-20 | End: 2022-04-22

## 2021-12-27 DIAGNOSIS — E11.69 TYPE 2 DIABETES MELLITUS WITH OTHER SPECIFIED COMPLICATION, WITHOUT LONG-TERM CURRENT USE OF INSULIN (HCC): ICD-10-CM

## 2021-12-27 RX ORDER — GLIMEPIRIDE 2 MG/1
2 TABLET ORAL
Qty: 90 TABLET | Refills: 0 | Status: SHIPPED | OUTPATIENT
Start: 2021-12-27 | End: 2022-03-30

## 2022-03-04 ENCOUNTER — TELEPHONE (OUTPATIENT)
Dept: FAMILY MEDICINE CLINIC | Facility: CLINIC | Age: 72
End: 2022-03-04

## 2022-03-04 NOTE — TELEPHONE ENCOUNTER
Left detailed message to remind of appointment; bring ID, insurance card(s), and medications to appt, and to reschedule if necessary according to covid screening.

## 2022-03-11 DIAGNOSIS — R07.9 CHEST PAIN, UNSPECIFIED TYPE: Primary | ICD-10-CM

## 2022-03-27 DIAGNOSIS — I10 ESSENTIAL HYPERTENSION: ICD-10-CM

## 2022-03-28 RX ORDER — HYDRALAZINE HYDROCHLORIDE 50 MG/1
TABLET, FILM COATED ORAL
Qty: 90 TABLET | Refills: 5 | Status: SHIPPED | OUTPATIENT
Start: 2022-03-28 | End: 2022-05-05

## 2022-03-29 DIAGNOSIS — E11.69 TYPE 2 DIABETES MELLITUS WITH OTHER SPECIFIED COMPLICATION, WITHOUT LONG-TERM CURRENT USE OF INSULIN: ICD-10-CM

## 2022-03-30 DIAGNOSIS — E11.69 TYPE 2 DIABETES MELLITUS WITH OTHER SPECIFIED COMPLICATION, WITHOUT LONG-TERM CURRENT USE OF INSULIN: Primary | ICD-10-CM

## 2022-03-30 RX ORDER — GLIMEPIRIDE 2 MG/1
2 TABLET ORAL
Qty: 90 TABLET | Refills: 0 | Status: SHIPPED | OUTPATIENT
Start: 2022-03-30 | End: 2022-07-03 | Stop reason: SDUPTHER

## 2022-04-12 DIAGNOSIS — E03.9 HYPOTHYROIDISM, UNSPECIFIED TYPE: ICD-10-CM

## 2022-04-14 ENCOUNTER — TELEPHONE (OUTPATIENT)
Dept: FAMILY MEDICINE CLINIC | Facility: CLINIC | Age: 72
End: 2022-04-14

## 2022-04-14 ENCOUNTER — LAB (OUTPATIENT)
Dept: LAB | Facility: HOSPITAL | Age: 72
End: 2022-04-14

## 2022-04-14 DIAGNOSIS — E03.9 HYPOTHYROIDISM, UNSPECIFIED TYPE: ICD-10-CM

## 2022-04-14 DIAGNOSIS — E11.69 TYPE 2 DIABETES MELLITUS WITH OTHER SPECIFIED COMPLICATION, WITHOUT LONG-TERM CURRENT USE OF INSULIN: ICD-10-CM

## 2022-04-14 PROCEDURE — 84443 ASSAY THYROID STIM HORMONE: CPT

## 2022-04-14 PROCEDURE — 83036 HEMOGLOBIN GLYCOSYLATED A1C: CPT

## 2022-04-14 NOTE — TELEPHONE ENCOUNTER
Patient's , Nahun, called about rescheduling an ultrasound for her. Informed him that patient did not have an appointment for ultrasound but instead for her referral to nephrology. Also explained she had labs to complete. He v/u and requested the referral for nephrology be in [place] as they don't want to travel far.    Call back number: [phone]

## 2022-04-14 NOTE — TELEPHONE ENCOUNTER
Left a voicemail with daughter-n-law Yu for her to give me a call back regarding what exactly they are needing.

## 2022-04-15 LAB
HBA1C MFR BLD: 6.1 % (ref 4.8–5.6)
TSH SERPL DL<=0.05 MIU/L-ACNC: 4.25 UIU/ML (ref 0.27–4.2)

## 2022-04-18 ENCOUNTER — HOSPITAL ENCOUNTER (OUTPATIENT)
Dept: NUCLEAR MEDICINE | Facility: HOSPITAL | Age: 72
Discharge: HOME | End: 2022-04-18

## 2022-04-18 ENCOUNTER — HOSPITAL ENCOUNTER (OUTPATIENT)
Dept: CARDIOLOGY | Facility: HOSPITAL | Age: 72
Discharge: HOME | End: 2022-04-18

## 2022-04-18 DIAGNOSIS — I25.2 MYOCARDIAL INFARCT, OLD: ICD-10-CM

## 2022-04-18 DIAGNOSIS — Z98.61 HISTORY OF PTCA: ICD-10-CM

## 2022-04-18 DIAGNOSIS — I25.10 ATHEROSCLEROSIS OF NATIVE CORONARY ARTERY OF NATIVE HEART, UNSPECIFIED WHETHER ANGINA PRESENT: ICD-10-CM

## 2022-04-18 DIAGNOSIS — R07.9 CHEST PAIN, UNSPECIFIED TYPE: ICD-10-CM

## 2022-04-18 PROCEDURE — 78452 HT MUSCLE IMAGE SPECT MULT: CPT

## 2022-04-18 PROCEDURE — 93017 CV STRESS TEST TRACING ONLY: CPT

## 2022-04-18 PROCEDURE — A9500 TC99M SESTAMIBI: HCPCS | Performed by: INTERNAL MEDICINE

## 2022-04-18 PROCEDURE — 0 TECHNETIUM SESTAMIBI: Performed by: INTERNAL MEDICINE

## 2022-04-18 PROCEDURE — 25010000002 REGADENOSON 0.4 MG/5ML SOLUTION: Performed by: INTERNAL MEDICINE

## 2022-04-18 RX ORDER — SODIUM CHLORIDE 0.9 % (FLUSH) 0.9 %
10 SYRINGE (ML) INJECTION ONCE
Status: COMPLETED | OUTPATIENT
Start: 2022-04-18 | End: 2022-04-18

## 2022-04-18 RX ADMIN — TECHNETIUM TC 99M SESTAMIBI 1 DOSE: 1 INJECTION INTRAVENOUS at 10:24

## 2022-04-18 RX ADMIN — TECHNETIUM TC 99M SESTAMIBI 1 DOSE: 1 INJECTION INTRAVENOUS at 09:10

## 2022-04-18 RX ADMIN — REGADENOSON 0.4 MG: 0.08 INJECTION, SOLUTION INTRAVENOUS at 10:22

## 2022-04-18 RX ADMIN — Medication 10 ML: at 10:24

## 2022-04-18 NOTE — H&P
Cardiology History and Physical Note        Patient Name: Afia Adams  Age/Sex: 71 y.o. female  : 1950  MRN: [id number]    Date of Admission : 2022    Primary care Physician: Nieves Childress APRN    Reason for Admission:  Chest pain Lexiscan Cardiolite stress test      Subjective:       Chief Complaint: Chest pain    History of Present Illness:  Afia Adams is a 71 y.o. female     With a height of 5 feet 3 inches weight 1280 pounds with a past medical history significant for atherosclerotic coronary artery disease s/p aborted anterior wall myocardial infarction done in 2016 with ischemic cardiomyopathy with left ventricular systolic dysfunction with improvement in the ejection fraction from 30% to 40%, ostial circumflex artery with 30 to 40% stenosis, first diagonal branch with 70 to 80% stenosis, arterial hypertension, hypertensive heart disease, nonrheumatic mitral and tricuspid regurgitation, hyperlipidemia, non-insulin-dependent diabetes, gastroesophageal reflux disease, obesity, history of gastritis, history of renal calculi, history of osteoarthritis, gouty arthritis, and family history for coronary artery disease.    Patient presents for follow-up evaluation.  Patient on questioning complains of having symptoms of heartburn.  Patient describes the discomfort similar in quality as the one which he had experienced prior to the myocardial infarction in 2016.  Patient had undergone evaluation with improvement in the ejection fraction from 25 to 30% to 40%.    Patient on questioning denies excessive intake of salt.  Patient denies any hemoptysis hematuria bright red blood per rectum.    Patient denies any symptoms of palpitation lightheaded dizziness or near syncope.    Patient resting electrocardiogram done revealed sinus rhythm at the rate of 89 bpm with a first-degree AV block.  Patient blood pressure 162/86 pulse of 68 respiration of 18 oxygen saturation of  97%.    Patient 10 point review of system except for what stated in the history of present is negative..                  Concurrent  Medical History:    1.  Chest pain.  2.  Atherosclerotic coronary artery disease.  3.  Aborted anterior wall myocardial infarction in March 2016.  4.  Rescue PTCA and stenting of the 100% occluded left anterior descending artery with deployment of a 2.75 mm x 12 mm and a 2.5 mm x 23 mm Alpine drug-eluting stent.  4.  Coronary angiogram done in March 2018 had revealed:  a.   The right coronary artery was diffusely diseased.  b.   A 100% occluded proximal left anterior descending artery, which was the infarct-related vessel.  c.   Ostial circumflex artery with 30% to 40% narrowing and in some views appeared to be 60% to 70%.  d.   The 1st diagonal branch in the proximal portion with 70% to 80% stenosis.  5.  Arterial hypertension.  6.  Hypertensive heart disease.  7.  Ischemic cardiomyopathy with an ejection fraction of 40% with improvement from 25 to 30% post myocardial infarction.  8.  Nonrheumatic mild mitral and nonrheumatic mild tricuspid regurgitation.  9.  Hyperlipidemia.  10.  Non-insulin-dependent diabetes.  11.  History of gastroesophageal reflux disease with gastritis.  12.  Gouty arthritis.  13.  History of osteoarthritis.  14.  History of obstructive uropathy.  15.  Chronic kidney disease          Past Surgical History:    1.  Coronary angiogram and PTCA and stenting done in 2016.  2.  Colonoscopy with polypectomy.  3.  Cystoscopy with J stent placement and removal.  4.  Hysterectomy.  5.  Steroid injection.      Family History:Family history significant for brother who has coronary artery disease and father who has coronary artery disease      Social History:No history of tobacco alcohol intake       Cardiac Risk factor:     1.  Postmenopausal.  2.  Arterial hypertension.  3.  Hyperlipidemia.  4.  Diabetes.  5.  Mild obesity.  6.  Family history for coronary artery  disease    Allergies:  Allergies   Allergen Reactions   • Bactrim [Sulfamethoxazole-Trimethoprim] Other (See Comments)     Profuse sweating       Home Medication::  Prior to Admission medications    Medication Sig Start Date End Date Taking? Authorizing Provider   aspirin 81 MG tablet Take 81 mg by mouth daily.    ProviderRangel MD   carvedilol (COREG) 25 MG tablet Take 1 tablet by mouth 2 (Two) Times a Day With Meals. 10/9/20   Gabi Rondon APRN   ferrous sulfate 325 (65 FE) MG EC tablet Take 1 tablet by mouth 2 (Two) Times a Day With Meals. 9/8/21   Nieves Childress APRN   furosemide (LASIX) 20 MG tablet  11/1/20   ProviderRangel MD   glimepiride (AMARYL) 2 MG tablet TAKE 1 TABLET BY MOUTH EVERY MORNING BEFORE BREAKFAST 3/30/22   Nieves Childress APRN   hydrALAZINE (APRESOLINE) 50 MG tablet TAKE 1 TABLET BY MOUTH THREE TIMES DAILY 3/28/22   Nieves Childress APRN   levothyroxine (SYNTHROID, LEVOTHROID) 25 MCG tablet Take 1 tablet by mouth Every Morning. 12/20/21   Nieves Childress APRN   loratadine (CLARITIN) 10 MG tablet Take 1 tablet by mouth Daily. 10/9/20   Gabi Rondon APRN   magnesium oxide (MAGOX) 400 (241.3 Mg) MG tablet tablet Take 1 tablet by mouth 2 (Two) Times a Day. 2/22/21   Gabi Rondon APRN   olmesartan (BENICAR) 40 MG tablet TAKE 1 TABLET BY MOUTH DAILY WITH DINNER 11/4/21   Nieves Childress APRN   omeprazole (priLOSEC) 40 MG capsule TAKE 1 CAPSULE BY MOUTH DAILY 1/18/21   Gabi Rondon APRN   polyethylene glycol (MIRALAX) 17 g packet Take 17 g by mouth Daily As Needed (constipation). 9/25/20   Mookie Aggarwal APRN   pravastatin (PRAVACHOL) 40 MG tablet Take 1 tablet by mouth Every Night. 3/19/20   Gabi Rondon APRN         Review of Systems   Constitutional: Positive for fatigue. Negative for chills, fever and unexpected weight change.   HENT: Negative for hearing loss and nosebleeds.    Eyes: Negative for visual disturbance.    Respiratory: Positive for chest tightness and shortness of breath. Negative for cough and wheezing.    Cardiovascular: Positive for chest pain. Negative for palpitations and leg swelling.   Gastrointestinal: Negative for abdominal pain, blood in stool, constipation, diarrhea, nausea and vomiting.   Endocrine: Negative for cold intolerance, heat intolerance, polydipsia, polyphagia and polyuria.   Genitourinary: Negative for hematuria.   Musculoskeletal: Positive for arthralgias. Negative for joint swelling, myalgias and neck pain.   Skin: Negative for color change, rash and wound.   Neurological: Negative for dizziness, seizures, syncope, light-headedness, numbness and headaches.   Hematological: Does not bruise/bleed easily.         Objective:     LMP  (LMP Unknown)   Blood pressure 162/86 pulse of 68 respiration of 18 oxygen saturation of 97%  Constitutional:       Appearance: Well-developed.   Eyes:      General: No scleral icterus.     Conjunctiva/sclera: Conjunctivae normal.      Pupils: Pupils are equal, round, and reactive to light.   HENT:      Head: Normocephalic and atraumatic.      Left Ear: External ear normal.      Nose: Nose normal.   Neck:      Thyroid: No thyromegaly.      Vascular: No JVD.      Trachea: No tracheal deviation.      Lymphadenopathy: No cervical adenopathy.   Pulmonary:      Breath sounds: Normal breath sounds. No stridor.   Cardiovascular:      Normal rate. Regular rhythm.      Murmurs:  Regular S1 and S2 with a soft systolic murmur best heard at the apex no appreciable click   Abdominal:      General: Bowel sounds are normal.   Musculoskeletal: Normal range of motion.      Cervical back: Normal range of motion and neck supple. Skin:     General: Skin is warm and dry.   Neurological:      Mental Status: Alert and oriented to person, place, and time.      Deep Tendon Reflexes: Reflexes are normal and symmetric.   Psychiatric:         Behavior: Behavior normal.         Thought Content:  Thought content normal.         Judgment: Judgment normal.         Lab Review:           Invalid input(s): LABALNIKKI GARCIA                          Results from last 7 days   Lab Units 04/14/22  1234   TSH uIU/mL 4.250*           EKG:   ECG/EMG Results (last 24 hours)     ** No results found for the last 24 hours. **          Imaging:  Imaging Results (Last 24 Hours)     ** No results found for the last 24 hours. **          I personally viewed and interpreted the patient's EKG/Telemetry data.    Assessment:   1.  Chest pain.  2.  Atherosclerotic coronary artery disease.  3.  Status post aborted anterior wall myocardial infarction with PTCA and stenting of the left anterior descending artery.  4.  Ischemic cardiomyopathy with left ventricular systolic dysfunction.  5.  Arterial hypertension.  6.  Hypertensive heart disease.  7.  Hyperlipidemia.  8.  Diabetes.  9.  Peptic ulcer disease.  10.  History of renal calculi          Plan:   1.  Chest pain.  Patient has documented history of atherosclerotic coronary artery disease with previous aborted anterior wall myocardial infarction in 2016 with rescue PTCA and stenting of the left anterior descending artery.  Patient had left ventricular systolic dysfunction with an ejection fraction of 25 to 30%.  Patient had ostial circumflex artery and diagonal branch stenosis which was not intervened.  Patient resting electrocardiogram done revealed sinus rhythm with a 4 degree AV block.  Patient continues to have symptoms of chest pain.  Patient in the present time has been recommended to undergo a Lexiscan Cardiolite stress test.  Respiratory treatment options for the Lexiscan Cardiolite stress test were discussed with the patient and informed consent was obtained.  Myocardial perfusion scintigraphy (MPS)  was recommended to the patient as the best non-invasive modality for the assessment of obstructive CAD.  I  did spend some time discussing the procedure, as well as risks and  benefits.  I also informed the patient that the risk of nonfatal MI or major cardiac complication is about  0.02%. The patient was also informed about the risks and benefits of MPS. Patient was also provided with a handout describing the test, as well as patient instructions prior to testing.      I also discussed the results of MPS as divided into risks.The NPV of this test is as high as 98%.  I also specifically discussed the risk of cardiac death with the following percentages:    Low-risk MPS:                          0.5% per year  Mildly abnormal MPS:              2.7%  Moderately abnormal:             2.9%  Severely abnormal:                 4.2%        2.  Ischemic cardiomyopathy.  Patient has left ventricular systolic dysfunction with an ejection fraction of 40%.  Patient previous echocardiogram had revealed an ejection fraction of 25 to 30%.  Patient had undergone a MUGA scan which had revealed an ejection fraction of 33%.  Patient last transthoracic echocardiogram had revealed an ejection fraction of 40%.  Clinically at the present time patient is not in congestive heart failure.  Patient has been recommended to continue with the present dose of afterload reduction with hydralazine and Benicar and to continue with the present dose of the carvedilol and the Lasix.  Clinically at the present time patient is euvolemic and not in congestive heart failure.    3.  Arterial hypertension.  Patient blood pressure has been labile.  Patient has not taken her antihypertensive medication for the last 3 days.  Patient has been recommended to be compliant with hydralazine 50 mg 3 times a day, carvedilol 25 mg twice a day and Benicar 40 mg daily.  Patient has been counseled to decrease her salt intake.    4.  Hypertensive heart disease with nonrheumatic mitral intractability duration.  Clinically at the present time patient is euvolemic and not in congestive heart failure.    5.  Hyperlipidemia.  Patient has been  counseled on low-fat low-cholesterol diet and to continue the present dose of the pravastatin.    6.  Non-insulin-dependent diabetes.  Patient has been counseled on [employer] diet and to continue the present dose of the Amaryl 1 mg daily.    7.  History of gouty arthritis is noted.  Patient has not had any recent gouty arthritis flareup.    8.  History of renal calculi with obstructive uropathy.  Patient has had previous evaluation by Dr. Rojas.  Patient has not complained of having any lower back discomfort.  Patient has not had any hematuria.    9.  Strong family history of atherosclerotic coronary artery disease noted.    The above plan of management were discussed with the patient.        Time: time spent in face-to-face evaluation of greater than 55  minutes and interacting and formulating examining and discussing the plan with the patient with 50% of greater time spent in face-to-face interaction.    Electronically signed by Luke Kent MD, 04/18/22, 1:00 AM CDT.      Dictated utilizing Dragon dictation.

## 2022-04-22 ENCOUNTER — PREP FOR SURGERY (OUTPATIENT)
Dept: OTHER | Facility: HOSPITAL | Age: 72
End: 2022-04-22

## 2022-04-22 DIAGNOSIS — I25.2 MYOCARDIAL INFARCT, OLD: ICD-10-CM

## 2022-04-22 DIAGNOSIS — Z98.61 HISTORY OF PTCA: ICD-10-CM

## 2022-04-22 DIAGNOSIS — I25.10 ATHEROSCLEROSIS OF NATIVE CORONARY ARTERY OF NATIVE HEART, UNSPECIFIED WHETHER ANGINA PRESENT: Primary | ICD-10-CM

## 2022-04-22 DIAGNOSIS — R94.39 ABNORMAL NUCLEAR STRESS TEST: ICD-10-CM

## 2022-04-22 DIAGNOSIS — E03.9 HYPOTHYROIDISM, UNSPECIFIED TYPE: Primary | ICD-10-CM

## 2022-04-22 RX ORDER — SODIUM CHLORIDE 0.9 % (FLUSH) 0.9 %
3 SYRINGE (ML) INJECTION EVERY 12 HOURS SCHEDULED
Status: CANCELLED | OUTPATIENT
Start: 2022-04-29

## 2022-04-22 RX ORDER — SODIUM CHLORIDE 0.9 % (FLUSH) 0.9 %
10 SYRINGE (ML) INJECTION AS NEEDED
Status: CANCELLED | OUTPATIENT
Start: 2022-04-29

## 2022-04-22 RX ORDER — SODIUM CHLORIDE 9 MG/ML
75 INJECTION, SOLUTION INTRAVENOUS CONTINUOUS
Status: CANCELLED | OUTPATIENT
Start: 2022-04-29

## 2022-04-22 RX ORDER — LEVOTHYROXINE SODIUM 0.03 MG/1
25 TABLET ORAL
Qty: 30 TABLET | Refills: 3 | Status: SHIPPED | OUTPATIENT
Start: 2022-04-22

## 2022-04-24 PROBLEM — I25.2 MYOCARDIAL INFARCT, OLD: Status: ACTIVE | Noted: 2022-04-24

## 2022-04-24 PROBLEM — Z98.61 HISTORY OF PTCA: Status: ACTIVE | Noted: 2022-04-24

## 2022-04-24 PROBLEM — R94.39 ABNORMAL NUCLEAR STRESS TEST: Status: ACTIVE | Noted: 2022-04-24

## 2022-04-24 LAB
BH CV REST NUCLEAR ISOTOPE DOSE: 10.4 MCI
BH CV STRESS BP STAGE 1: NORMAL
BH CV STRESS COMMENTS STAGE 1: NORMAL
BH CV STRESS DOSE REGADENOSON STAGE 1: 0.4
BH CV STRESS DURATION MIN STAGE 1: 0
BH CV STRESS DURATION SEC STAGE 1: 10
BH CV STRESS HR STAGE 1: 75
BH CV STRESS NUCLEAR ISOTOPE DOSE: 35.4 MCI
BH CV STRESS PROTOCOL 1: NORMAL
BH CV STRESS RECOVERY BP: NORMAL MMHG
BH CV STRESS RECOVERY HR: 76 BPM
BH CV STRESS STAGE 1: 1
LV EF NUC BP: 60 %
MAXIMAL PREDICTED HEART RATE: 150 BPM
PERCENT MAX PREDICTED HR: 60 %
STRESS BASELINE BP: NORMAL MMHG
STRESS BASELINE HR: 67 BPM
STRESS PERCENT HR: 71 %
STRESS POST ESTIMATED WORKLOAD: 1 METS
STRESS POST EXERCISE DUR SEC: 43 SEC
STRESS POST PEAK BP: NORMAL MMHG
STRESS POST PEAK HR: 90 BPM
STRESS TARGET HR: 128 BPM

## 2022-04-26 RX ORDER — OLMESARTAN MEDOXOMIL 40 MG/1
40 TABLET ORAL
Qty: 90 TABLET | Refills: 0 | Status: SHIPPED | OUTPATIENT
Start: 2022-04-26 | End: 2022-06-01

## 2022-04-27 PROCEDURE — 87635 SARS-COV-2 COVID-19 AMP PRB: CPT | Performed by: NURSE PRACTITIONER

## 2022-04-29 ENCOUNTER — HOSPITAL ENCOUNTER (OUTPATIENT)
Facility: HOSPITAL | Age: 72
Setting detail: HOSPITAL OUTPATIENT SURGERY
Discharge: HOME | End: 2022-04-29
Attending: INTERNAL MEDICINE | Admitting: INTERNAL MEDICINE

## 2022-04-29 VITALS
HEIGHT: 63 IN | DIASTOLIC BLOOD PRESSURE: 64 MMHG | OXYGEN SATURATION: 96 % | BODY MASS INDEX: 32.81 KG/M2 | RESPIRATION RATE: 20 BRPM | WEIGHT: 185.19 LBS | SYSTOLIC BLOOD PRESSURE: 138 MMHG | TEMPERATURE: 97.3 F | HEART RATE: 83 BPM

## 2022-04-29 DIAGNOSIS — I25.10 CORONARY ARTERY DISEASE INVOLVING NATIVE CORONARY ARTERY OF NATIVE HEART WITHOUT ANGINA PECTORIS: ICD-10-CM

## 2022-04-29 DIAGNOSIS — Z98.61 HISTORY OF PTCA: ICD-10-CM

## 2022-04-29 DIAGNOSIS — I25.10 ATHEROSCLEROSIS OF NATIVE CORONARY ARTERY OF NATIVE HEART, UNSPECIFIED WHETHER ANGINA PRESENT: ICD-10-CM

## 2022-04-29 DIAGNOSIS — Z01.818 PRE-OP EVALUATION: Primary | ICD-10-CM

## 2022-04-29 DIAGNOSIS — I25.2 MYOCARDIAL INFARCT, OLD: ICD-10-CM

## 2022-04-29 DIAGNOSIS — I79.8 OTHER DISORDERS OF ARTERIES, ARTERIOLES AND CAPILLARIES IN DISEASES CLASSIFIED ELSEWHERE: ICD-10-CM

## 2022-04-29 DIAGNOSIS — R94.39 ABNORMAL NUCLEAR STRESS TEST: ICD-10-CM

## 2022-04-29 LAB
ANION GAP SERPL CALCULATED.3IONS-SCNC: 8 MMOL/L (ref 5–15)
BASOPHILS # BLD AUTO: 0.03 10*3/MM3 (ref 0–0.2)
BASOPHILS NFR BLD AUTO: 0.3 % (ref 0–1.5)
BUN SERPL-MCNC: 16 MG/DL (ref 8–23)
BUN/CREAT SERPL: 15.7 (ref 7–25)
CALCIUM SPEC-SCNC: 10.2 MG/DL (ref 8.6–10.5)
CHLORIDE SERPL-SCNC: 97 MMOL/L (ref 98–107)
CO2 SERPL-SCNC: 30 MMOL/L (ref 22–29)
CREAT SERPL-MCNC: 1.02 MG/DL (ref 0.57–1)
DEPRECATED RDW RBC AUTO: 45.8 FL (ref 37–54)
EGFRCR SERPLBLD CKD-EPI 2021: 59.3 ML/MIN/1.73
EOSINOPHIL # BLD AUTO: 0.19 10*3/MM3 (ref 0–0.4)
EOSINOPHIL NFR BLD AUTO: 2.2 % (ref 0.3–6.2)
ERYTHROCYTE [DISTWIDTH] IN BLOOD BY AUTOMATED COUNT: 15.9 % (ref 12.3–15.4)
GLUCOSE SERPL-MCNC: 126 MG/DL (ref 65–99)
HCT VFR BLD AUTO: 43 % (ref 34–46.6)
HGB BLD-MCNC: 14.2 G/DL (ref 12–15.9)
IMM GRANULOCYTES # BLD AUTO: 0.04 10*3/MM3 (ref 0–0.05)
IMM GRANULOCYTES NFR BLD AUTO: 0.5 % (ref 0–0.5)
INR PPP: 1.01 (ref 0.8–1.2)
LYMPHOCYTES # BLD AUTO: 1.84 10*3/MM3 (ref 0.7–3.1)
LYMPHOCYTES NFR BLD AUTO: 20.9 % (ref 19.6–45.3)
MCH RBC QN AUTO: 26.8 PG (ref 26.6–33)
MCHC RBC AUTO-ENTMCNC: 33 G/DL (ref 31.5–35.7)
MCV RBC AUTO: 81.1 FL (ref 79–97)
MONOCYTES # BLD AUTO: 0.65 10*3/MM3 (ref 0.1–0.9)
MONOCYTES NFR BLD AUTO: 7.4 % (ref 5–12)
NEUTROPHILS NFR BLD AUTO: 6.06 10*3/MM3 (ref 1.7–7)
NEUTROPHILS NFR BLD AUTO: 68.7 % (ref 42.7–76)
NRBC BLD AUTO-RTO: 0 /100 WBC (ref 0–0.2)
PLATELET # BLD AUTO: 197 10*3/MM3 (ref 140–450)
PMV BLD AUTO: 10.9 FL (ref 6–12)
POTASSIUM SERPL-SCNC: 4.9 MMOL/L (ref 3.5–5.2)
PROTHROMBIN TIME: 13.1 SECONDS (ref 11.1–15.3)
RBC # BLD AUTO: 5.3 10*6/MM3 (ref 3.77–5.28)
SODIUM SERPL-SCNC: 135 MMOL/L (ref 136–145)
WBC NRBC COR # BLD: 8.81 10*3/MM3 (ref 3.4–10.8)
WHOLE BLOOD HOLD SPECIMEN: NORMAL

## 2022-04-29 PROCEDURE — 25010000002 FENTANYL CITRATE (PF) 50 MCG/ML SOLUTION: Performed by: INTERNAL MEDICINE

## 2022-04-29 PROCEDURE — 93458 L HRT ARTERY/VENTRICLE ANGIO: CPT | Performed by: INTERNAL MEDICINE

## 2022-04-29 PROCEDURE — C1894 INTRO/SHEATH, NON-LASER: HCPCS | Performed by: INTERNAL MEDICINE

## 2022-04-29 PROCEDURE — 99152 MOD SED SAME PHYS/QHP 5/>YRS: CPT | Performed by: INTERNAL MEDICINE

## 2022-04-29 PROCEDURE — C1760 CLOSURE DEV, VASC: HCPCS | Performed by: INTERNAL MEDICINE

## 2022-04-29 PROCEDURE — 85610 PROTHROMBIN TIME: CPT | Performed by: INTERNAL MEDICINE

## 2022-04-29 PROCEDURE — C1769 GUIDE WIRE: HCPCS | Performed by: INTERNAL MEDICINE

## 2022-04-29 PROCEDURE — 80048 BASIC METABOLIC PNL TOTAL CA: CPT | Performed by: INTERNAL MEDICINE

## 2022-04-29 PROCEDURE — 85025 COMPLETE CBC W/AUTO DIFF WBC: CPT | Performed by: INTERNAL MEDICINE

## 2022-04-29 PROCEDURE — 25010000002 MIDAZOLAM PER 1 MG: Performed by: INTERNAL MEDICINE

## 2022-04-29 PROCEDURE — 0 IOPAMIDOL PER 1 ML: Performed by: INTERNAL MEDICINE

## 2022-04-29 PROCEDURE — 99153 MOD SED SAME PHYS/QHP EA: CPT | Performed by: INTERNAL MEDICINE

## 2022-04-29 PROCEDURE — 25010000002 HEPARIN (PORCINE) PER 1000 UNITS: Performed by: INTERNAL MEDICINE

## 2022-04-29 RX ORDER — SODIUM CHLORIDE 9 MG/ML
100 INJECTION, SOLUTION INTRAVENOUS CONTINUOUS
Status: DISCONTINUED | OUTPATIENT
Start: 2022-04-29 | End: 2022-04-29 | Stop reason: HOSPADM

## 2022-04-29 RX ORDER — ACETAMINOPHEN 325 MG/1
650 TABLET ORAL ONCE
Status: DISCONTINUED | OUTPATIENT
Start: 2022-04-29 | End: 2022-04-29

## 2022-04-29 RX ORDER — MIDAZOLAM HYDROCHLORIDE 1 MG/ML
INJECTION INTRAMUSCULAR; INTRAVENOUS AS NEEDED
Status: DISCONTINUED | OUTPATIENT
Start: 2022-04-29 | End: 2022-04-29 | Stop reason: HOSPADM

## 2022-04-29 RX ORDER — SODIUM CHLORIDE 0.9 % (FLUSH) 0.9 %
3 SYRINGE (ML) INJECTION EVERY 12 HOURS SCHEDULED
Status: DISCONTINUED | OUTPATIENT
Start: 2022-04-29 | End: 2022-04-29 | Stop reason: HOSPADM

## 2022-04-29 RX ORDER — ACETAMINOPHEN 325 MG/1
650 TABLET ORAL EVERY 4 HOURS PRN
Status: CANCELLED | OUTPATIENT
Start: 2022-04-29

## 2022-04-29 RX ORDER — SODIUM CHLORIDE 0.9 % (FLUSH) 0.9 %
10 SYRINGE (ML) INJECTION AS NEEDED
Status: DISCONTINUED | OUTPATIENT
Start: 2022-04-29 | End: 2022-04-29 | Stop reason: HOSPADM

## 2022-04-29 RX ORDER — FENTANYL CITRATE 50 UG/ML
INJECTION, SOLUTION INTRAMUSCULAR; INTRAVENOUS AS NEEDED
Status: DISCONTINUED | OUTPATIENT
Start: 2022-04-29 | End: 2022-04-29 | Stop reason: HOSPADM

## 2022-04-29 RX ORDER — LIDOCAINE HYDROCHLORIDE 20 MG/ML
INJECTION, SOLUTION INFILTRATION; PERINEURAL AS NEEDED
Status: DISCONTINUED | OUTPATIENT
Start: 2022-04-29 | End: 2022-04-29 | Stop reason: HOSPADM

## 2022-04-29 RX ORDER — ACETAMINOPHEN 325 MG/1
650 TABLET ORAL ONCE
Status: COMPLETED | OUTPATIENT
Start: 2022-04-29 | End: 2022-04-29

## 2022-04-29 RX ORDER — SODIUM CHLORIDE 9 MG/ML
75 INJECTION, SOLUTION INTRAVENOUS CONTINUOUS
Status: DISCONTINUED | OUTPATIENT
Start: 2022-04-29 | End: 2022-04-29

## 2022-04-29 RX ADMIN — SODIUM CHLORIDE 75 ML/HR: 9 INJECTION, SOLUTION INTRAVENOUS at 07:57

## 2022-04-29 RX ADMIN — ACETAMINOPHEN 650 MG: 325 TABLET, FILM COATED ORAL at 11:27

## 2022-05-03 ENCOUNTER — PROCEDURE VISIT (OUTPATIENT)
Dept: PULMONOLOGY | Facility: CLINIC | Age: 72
End: 2022-05-03

## 2022-05-03 DIAGNOSIS — Z01.818 PRE-OP EVALUATION: ICD-10-CM

## 2022-05-03 DIAGNOSIS — I25.10 CORONARY ARTERY DISEASE INVOLVING NATIVE CORONARY ARTERY OF NATIVE HEART WITHOUT ANGINA PECTORIS: ICD-10-CM

## 2022-05-03 PROCEDURE — 94060 EVALUATION OF WHEEZING: CPT | Performed by: INTERNAL MEDICINE

## 2022-05-03 PROCEDURE — 94727 GAS DIL/WSHOT DETER LNG VOL: CPT | Performed by: INTERNAL MEDICINE

## 2022-05-03 PROCEDURE — 94729 DIFFUSING CAPACITY: CPT | Performed by: INTERNAL MEDICINE

## 2022-05-03 NOTE — PROGRESS NOTES
FULL PFT WITH BRONCHODILATOR ATTEMPTED.     POOR-QUESTIONABLE EFFORT AND COOPERATION FROM PT. PT DID NOT SEEM TO FULLY UNDERSTAND DIRECTIONS DESPITE MULTIPLE RETEACHING ATTEMPTS.     BETTER EFFORT ON POST SPIROMETRY THAN ON PRE SPIROMETRY.     POOR EFFORT ON DLCO, KEPT TAKING MOUTH OFF THE MOUTHPIECE.     1-2 SECOND EXHALATION ON PRE-SPIROMETRY; 4-5 SECOND EXHALATION ON POST SPIROMETRY.     ORDERED BY BILL BARNES; READ BY DR. ANNELISE DUMONT.

## 2022-05-05 ENCOUNTER — ANESTHESIA EVENT (OUTPATIENT)
Dept: PERIOP | Facility: HOSPITAL | Age: 72
End: 2022-05-05

## 2022-05-05 ENCOUNTER — PRE-ADMISSION TESTING (OUTPATIENT)
Dept: PREADMISSION TESTING | Facility: HOSPITAL | Age: 72
End: 2022-05-05

## 2022-05-05 ENCOUNTER — HOSPITAL ENCOUNTER (OUTPATIENT)
Dept: GENERAL RADIOLOGY | Facility: HOSPITAL | Age: 72
Discharge: HOME | End: 2022-05-05

## 2022-05-05 ENCOUNTER — OFFICE VISIT (OUTPATIENT)
Dept: CARDIAC SURGERY | Facility: CLINIC | Age: 72
End: 2022-05-05

## 2022-05-05 VITALS
OXYGEN SATURATION: 97 % | DIASTOLIC BLOOD PRESSURE: 70 MMHG | HEIGHT: 60 IN | RESPIRATION RATE: 20 BRPM | HEART RATE: 64 BPM | SYSTOLIC BLOOD PRESSURE: 146 MMHG | BODY MASS INDEX: 36.52 KG/M2 | WEIGHT: 186 LBS

## 2022-05-05 VITALS
HEIGHT: 63 IN | SYSTOLIC BLOOD PRESSURE: 140 MMHG | DIASTOLIC BLOOD PRESSURE: 82 MMHG | BODY MASS INDEX: 32.78 KG/M2 | WEIGHT: 185 LBS | HEART RATE: 66 BPM | OXYGEN SATURATION: 99 %

## 2022-05-05 DIAGNOSIS — E11.22 TYPE 2 DIABETES MELLITUS WITH STAGE 3A CHRONIC KIDNEY DISEASE, WITHOUT LONG-TERM CURRENT USE OF INSULIN: ICD-10-CM

## 2022-05-05 DIAGNOSIS — J43.1 PANLOBULAR EMPHYSEMA: ICD-10-CM

## 2022-05-05 DIAGNOSIS — I10 ESSENTIAL HYPERTENSION: ICD-10-CM

## 2022-05-05 DIAGNOSIS — R93.89 ABNORMAL FINDINGS ON DIAGNOSTIC IMAGING OF OTHER SPECIFIED BODY STRUCTURES: ICD-10-CM

## 2022-05-05 DIAGNOSIS — E66.09 CLASS 1 OBESITY DUE TO EXCESS CALORIES WITH SERIOUS COMORBIDITY AND BODY MASS INDEX (BMI) OF 32.0 TO 32.9 IN ADULT: ICD-10-CM

## 2022-05-05 DIAGNOSIS — I25.118 CORONARY ARTERY DISEASE OF NATIVE ARTERY OF NATIVE HEART WITH STABLE ANGINA PECTORIS: Primary | ICD-10-CM

## 2022-05-05 DIAGNOSIS — N18.31 TYPE 2 DIABETES MELLITUS WITH STAGE 3A CHRONIC KIDNEY DISEASE, WITHOUT LONG-TERM CURRENT USE OF INSULIN: ICD-10-CM

## 2022-05-05 DIAGNOSIS — I25.2 MYOCARDIAL INFARCT, OLD: ICD-10-CM

## 2022-05-05 DIAGNOSIS — I50.20 HFREF (HEART FAILURE WITH REDUCED EJECTION FRACTION): ICD-10-CM

## 2022-05-05 DIAGNOSIS — E78.2 MIXED HYPERLIPIDEMIA: ICD-10-CM

## 2022-05-05 DIAGNOSIS — N18.31 STAGE 3A CHRONIC KIDNEY DISEASE: ICD-10-CM

## 2022-05-05 DIAGNOSIS — I25.118 CORONARY ARTERY DISEASE OF NATIVE ARTERY OF NATIVE HEART WITH STABLE ANGINA PECTORIS: ICD-10-CM

## 2022-05-05 DIAGNOSIS — Z98.61 HISTORY OF PTCA: ICD-10-CM

## 2022-05-05 PROBLEM — E66.9 OBESITY, CLASS II, BMI 35-39.9: Status: RESOLVED | Noted: 2017-09-18 | Resolved: 2022-05-05

## 2022-05-05 LAB
ABO GROUP BLD: NORMAL
BLD GP AB SCN SERPL QL: NEGATIVE
Lab: NORMAL
MRSA DNA SPEC QL NAA+PROBE: NEGATIVE
RH BLD: POSITIVE
T&S EXPIRATION DATE: NORMAL
TSH SERPL DL<=0.05 MIU/L-ACNC: 3.69 UIU/ML (ref 0.27–4.2)

## 2022-05-05 PROCEDURE — 84443 ASSAY THYROID STIM HORMONE: CPT

## 2022-05-05 PROCEDURE — 86900 BLOOD TYPING SEROLOGIC ABO: CPT

## 2022-05-05 PROCEDURE — 87641 MR-STAPH DNA AMP PROBE: CPT

## 2022-05-05 PROCEDURE — 93005 ELECTROCARDIOGRAM TRACING: CPT

## 2022-05-05 PROCEDURE — 71046 X-RAY EXAM CHEST 2 VIEWS: CPT

## 2022-05-05 PROCEDURE — 93010 ELECTROCARDIOGRAM REPORT: CPT | Performed by: INTERNAL MEDICINE

## 2022-05-05 PROCEDURE — 86850 RBC ANTIBODY SCREEN: CPT

## 2022-05-05 PROCEDURE — 86901 BLOOD TYPING SEROLOGIC RH(D): CPT

## 2022-05-05 PROCEDURE — 36415 COLL VENOUS BLD VENIPUNCTURE: CPT

## 2022-05-05 PROCEDURE — 99205 OFFICE O/P NEW HI 60 MIN: CPT | Performed by: THORACIC SURGERY (CARDIOTHORACIC VASCULAR SURGERY)

## 2022-05-05 RX ORDER — SODIUM CHLORIDE 0.9 % (FLUSH) 0.9 %
30 SYRINGE (ML) INJECTION ONCE AS NEEDED
Status: CANCELLED | OUTPATIENT
Start: 2022-05-10

## 2022-05-05 RX ORDER — HYDRALAZINE HYDROCHLORIDE 50 MG/1
50 TABLET, FILM COATED ORAL 3 TIMES DAILY
COMMUNITY
End: 2022-05-17 | Stop reason: HOSPADM

## 2022-05-05 RX ORDER — NITROGLYCERIN 0.4 MG/1
0.4 TABLET SUBLINGUAL
Status: CANCELLED | OUTPATIENT
Start: 2022-05-10

## 2022-05-05 RX ORDER — NICOTINE POLACRILEX 4 MG
15 LOZENGE BUCCAL
Status: CANCELLED | OUTPATIENT
Start: 2022-05-10

## 2022-05-05 RX ORDER — SODIUM CHLORIDE 9 MG/ML
30 INJECTION, SOLUTION INTRAVENOUS CONTINUOUS PRN
Status: CANCELLED | OUTPATIENT
Start: 2022-05-10

## 2022-05-05 RX ORDER — SODIUM CHLORIDE 9 MG/ML
100 INJECTION, SOLUTION INTRAVENOUS CONTINUOUS
Status: CANCELLED | OUTPATIENT
Start: 2022-05-10

## 2022-05-05 RX ORDER — DEXTROSE MONOHYDRATE 25 G/50ML
10-50 INJECTION, SOLUTION INTRAVENOUS
Status: CANCELLED | OUTPATIENT
Start: 2022-05-10

## 2022-05-05 RX ORDER — ACETAMINOPHEN 500 MG
1000 TABLET ORAL ONCE
Status: CANCELLED | OUTPATIENT
Start: 2022-05-10

## 2022-05-05 RX ORDER — BUPIVACAINE HCL/0.9 % NACL/PF 0.1 %
2 PLASTIC BAG, INJECTION (ML) EPIDURAL ONCE
Status: CANCELLED | OUTPATIENT
Start: 2022-05-10

## 2022-05-05 NOTE — PROGRESS NOTES
5/5/2022    Afia Adams  1950    Chief Complaint:    Chief Complaint   Patient presents with   • Coronary Artery Disease       HPI:      PCP:  Nieves Childress APRN  Cardiology:  Dr Kent     71 y.o. female with HTN(stable, increased risk stroke, rupture), Hyperlipidemia(stable, increased risk cardiovascular events), Diabetes Mellitus(stable, increased risk cardiovascular events), Obesity(uncontrolled, increased risk cardiovascular events), COPD(stable, increased risk pulmonary complications) and Chronic Kidney Disease(stable, increased risk renal failure) , CAD(chronic severe progression, increase risk cardiovascular events).  never smoked.  Moderate fatigue, decreased exercise tolerance x 1 year.  PCI 2016.  In wheelchair today..  No TIA stroke amaurosis.  No MI claudication. No other associated signs, symptoms or modifying factors.    5/2022 vein mapping:  RIGHT GSV 1.6-3.7mm, LEFT GSV 2.6-4.7mm.  5/2022 Carotid Duplex:  JENNIFER 0-49% (91/17cm/s, ratio 1.0), LICA 0-49% (90/21cm/s, ratio 0.9) antegrade verts.    10/2020 Echocardiogram:  EF 40%. LA 28mm, LV 54mm, RVS{ 27mmHg, trace MR, mild TR.  4/2022 Stress Test:  EF 60%, medium infarct anteroseptal, felix-infarct ischemia  4/2022 Cardiac Catheterization:  LAD occluded stent prox, CX 50%, RCA 40%, PLB 70%.  5/2022 PFT:  FVC 1.4 (51%), FEV1 1.1 (53%), DLCO 112%    The following portions of the patient's history were reviewed and updated as appropriate: allergies, current medications, past family history, past medical history, past social history, past surgical history and problem list.  Recent images independently reviewed.  Available laboratory values reviewed.    PMH:  Past Medical History:   Diagnosis Date   • Abdominal pain 11/20/2015    unspecified   • Acute gastritis    • Acute on chronic systolic congestive heart failure (HCC) 04/13/2016   • Ankle pain 04/27/2015   • Asthma    • Calculus of kidney 10/09/2020    Added automatically from  request for surgery [id number]   • Candidiasis, skin or nails 12/22/2011    controlled   • Chronic systolic congestive heart failure (HCC) 08/01/2016   • Congestive heart failure (HCC) 08/01/2016   • Coronary arteriosclerosis 06/21/2016    multi-vessel, 2 stents, followed by Dr. Knet   • Cough 09/25/2014    proabably due to allergy, chest x ray is normal      • Diabetic ulcer of toe of right foot associated with type 2 diabetes mellitus (HCC) 10/24/2020    Follow up with podiatry outpatient    • Disease of thyroid gland    • Edema of foot 03/24/2016   • Encounter for long-term (current) drug use     therapy   • Epigastric pain 12/03/2015   • Essential hypertension 06/21/2016   • GERD (gastroesophageal reflux disease)    • Gout    • Heart attack ([facility])     2016 2 stents by Dr. Kent still follows with him/last seen 6 -2020 told doing ok   • History of echocardiogram 03/14/2016    Left atrium normal with mild CLVH wit normal aortic root size.Evidence of posterolateral wall hypokinesis. Severely depressed LV systolic function of 20-25%.Mitral valve thickened Aortic sclerosis.Diastolic dysfunction   • Hyperlipidemia 03/31/2016    unspecified   • Hypertensive disorder 03/24/2016   • Left lower quadrant pain 07/12/2013    ruling out diverticular disease      • Myocardial infarction (HCC) 03/24/2016   • Nausea 11/20/2015   • Obstructive uropathy 09/29/2020   • Osteoarthritis of knee 06/23/2016   • Pain in limb 01/25/2011   • Pediculosis capitis 12/22/2011    controlled   • Peptic ulcer    • Pneumonia    • Polyuria 01/25/2011   • Pruritic rash 12/09/2014   • Superficial foreign body hip without major open wound, no infection 12/09/2011    ??? back   • Type 2 diabetes mellitus (HCC) 06/23/2016    new onset   • Weakness 06/23/2016   • Wears dentures    • Wears glasses      Past Surgical History:   Procedure Laterality Date   • COLONOSCOPY W/ POLYPECTOMY  03/07/2016    Transverse colon polyps,descending polyps,Sigmoid polyps,  all resected and retrieved. Diverticulosis without perforation or abscess without bleeding. Erica Thomas   • CYSTOSCOPY Left 11/4/2020    Procedure: CYSTOSCOPY; LEFT J-STENT REMOVAL             (STRETCHER)   FLEXIBLE SCOPE;  Surgeon: Richar Rojas MD;  Location: [facility];  Service: Urology;  Laterality: Left;   • CYSTOSCOPY, URETEROSCOPY, RETROGRADE PYELOGRAM, STENT INSERTION Left 9/30/2020    Procedure: CYSTOSCOPY LEFT URETEROSCOPY RETROGRADE PYELOGRAM STENT INSERTION;  Surgeon: Richar Rojas MD;  Location: [facility];  Service: Urology;  Laterality: Left;   • CYSTOSCOPY, URETEROSCOPY, RETROGRADE PYELOGRAM, STENT INSERTION Left 10/21/2020    Procedure: CYSTOSCOPY, LEFT RETROGRADE, URETEROSCOPY, LASER LITHOTRIPSY, STENT PLACEMENT;  Surgeon: Richar Rojas MD;  Location: [facility];  Service: Urology;  Laterality: Left;   • HYSTERECTOMY      ovary preserv   • INJECTION OF MEDICATION  09/11/2014    Celestone (betamethasone) (2)       Family History   Problem Relation Age of Onset   • Alcohol abuse Other    • Asthma Other    • Lung cancer Other    • Heart attack Father    • Lung cancer Father    • Hypertension Father    • Heart disease Father    • Heart attack Brother    • Hypertension Brother    • Heart disease Brother      Social History     Tobacco Use   • Smoking status: Never Smoker   • Smokeless tobacco: Never Used   Vaping Use   • Vaping Use: Never used   Substance Use Topics   • Alcohol use: No   • Drug use: No       ALLERGIES:  Allergies   Allergen Reactions   • Bactrim [Sulfamethoxazole-Trimethoprim] Other (See Comments)     Profuse sweating         MEDICATIONS:    Current Outpatient Medications:   •  aspirin 81 MG tablet, Take 81 mg by mouth daily., Disp: , Rfl:   •  carvedilol (COREG) 25 MG tablet, Take 1 tablet by mouth 2 (Two) Times a Day With Meals., Disp: 60 tablet, Rfl: 5  •  ferrous sulfate 325 (65 FE) MG EC tablet, Take 1 tablet by mouth 2 (Two) Times a Day With Meals., Disp: 60 tablet,  "Rfl: 5  •  furosemide (LASIX) 20 MG tablet, Take 20 mg by mouth Daily., Disp: , Rfl:   •  glimepiride (AMARYL) 2 MG tablet, TAKE 1 TABLET BY MOUTH EVERY MORNING BEFORE BREAKFAST, Disp: 90 tablet, Rfl: 0  •  levothyroxine (SYNTHROID, LEVOTHROID) 25 MCG tablet, TAKE 1 TABLET BY MOUTH EVERY MORNING, Disp: 30 tablet, Rfl: 3  •  loratadine (CLARITIN) 10 MG tablet, Take 1 tablet by mouth Daily., Disp: 30 tablet, Rfl: 3  •  magnesium oxide (MAGOX) 400 (241.3 Mg) MG tablet tablet, Take 1 tablet by mouth 2 (Two) Times a Day., Disp: 30 each, Rfl: 3  •  olmesartan (BENICAR) 40 MG tablet, TAKE 1 TABLET BY MOUTH DAILY WITH DINNER, Disp: 90 tablet, Rfl: 0  •  omeprazole (priLOSEC) 40 MG capsule, TAKE 1 CAPSULE BY MOUTH DAILY, Disp: 90 capsule, Rfl: 1  •  polyethylene glycol (MIRALAX) 17 g packet, Take 17 g by mouth Daily As Needed (constipation)., Disp: 100 each, Rfl: 1  •  pravastatin (PRAVACHOL) 40 MG tablet, Take 1 tablet by mouth Every Night., Disp: 30 tablet, Rfl: 5  •  hydrALAZINE (APRESOLINE) 50 MG tablet, Take 50 mg by mouth 3 (Three) Times a Day., Disp: , Rfl:   •  mupirocin (BACTROBAN) 2 % ointment, Bilateral nares BID, Disp: 22 g, Rfl: 0    Review of Systems   Review of Systems   Constitutional: Positive for malaise/fatigue. Negative for weight loss.   Cardiovascular: Positive for dyspnea on exertion. Negative for chest pain and claudication.   Respiratory: Negative for cough and shortness of breath.    Skin: Negative for color change and poor wound healing.   Musculoskeletal: Positive for muscle weakness.   Neurological: Positive for weakness. Negative for dizziness and numbness.       Physical Exam   Vitals:    05/05/22 1429   BP: 140/82   BP Location: Left arm   Patient Position: Sitting   Cuff Size: Adult   Pulse: 66   SpO2: 99%   Weight: 83.9 kg (185 lb)   Height: 160 cm (63\")     Body surface area is 1.87 meters squared.  Body mass index is 32.77 kg/m².  Physical Exam  Constitutional:       General: She is not in " acute distress.     Appearance: She is not ill-appearing.   HENT:      Right Ear: Hearing normal.      Left Ear: Hearing normal.      Nose: No nasal deformity.      Mouth/Throat:      Dentition: Normal dentition. Does not have dentures.   Cardiovascular:      Rate and Rhythm: Normal rate and regular rhythm.      Pulses:           Carotid pulses are 2+ on the right side and 2+ on the left side.       Radial pulses are 2+ on the right side and 2+ on the left side.        Dorsalis pedis pulses are 2+ on the right side and 2+ on the left side.        Posterior tibial pulses are 1+ on the right side and 1+ on the left side.      Heart sounds: No murmur heard.  Pulmonary:      Effort: Pulmonary effort is normal.      Breath sounds: Normal breath sounds.   Abdominal:      General: There is no distension.      Palpations: Abdomen is soft. There is no mass.      Tenderness: There is no abdominal tenderness.   Musculoskeletal:         General: No deformity.      Comments: Gait normal.    Skin:     General: Skin is warm and dry.      Coloration: Skin is not pale.      Findings: No erythema.      Comments: No venous staining   Neurological:      Mental Status: She is alert and oriented to person, place, and time.   Psychiatric:         Speech: Speech normal.         Behavior: Behavior is cooperative.         Thought Content: Thought content normal.         Judgment: Judgment normal.         BUN   Date Value Ref Range Status   04/29/2022 16 8 - 23 mg/dL Final     Creatinine   Date Value Ref Range Status   04/29/2022 1.02 (H) 0.57 - 1.00 mg/dL Final     eGFR Non  Amer   Date Value Ref Range Status   12/08/2021 45 (L) >60 mL/min/1.73 Final     eGFR   Amer   Date Value Ref Range Status   09/29/2020   Final     Comment:     <15 Indicative of kidney failure.       ASSESSMENT:  Diagnoses and all orders for this visit:    1. Coronary artery disease of native artery of native heart with stable angina pectoris (HCC)  (Primary)  -     Instruct Patient on Coughing, Deep Breathing and Incentive Spirometry; Future  -     Type & Screen; Future  -     Chest X-Ray PA & Lateral; Future  -     Case Request; Standing  -     COVID PRE-OP / PRE-PROCEDURE SCREENING ORDER (NO ISOLATION) - Swab, Nasopharynx; Future  -     TSH; Future  -     Case Request    2. Abnormal findings on diagnostic imaging of other specified body structures   -     TSH; Future    3. Type 2 diabetes mellitus with stage 3a chronic kidney disease, without long-term current use of insulin ([facility])    4. Essential hypertension    5. Mixed hyperlipidemia    6. Class 1 obesity due to excess calories with serious comorbidity and body mass index (BMI) of 32.0 to 32.9 in adult    7. Stage 3a chronic kidney disease ([facility])    8. HFrEF (heart failure with reduced ejection fraction) ([facility])    9. History of PTCA    10. Myocardial infarct, old    11. Panlobular emphysema ([facility])    Other orders  -     Notify Surgeon if Patient Currently Taking Plavix, Effient, Ticlid, Brillinta, Pradaxa, Xarelto, Eliquis or Savaysa  -     Give Patient Pre-Op Education Booklet  -     Provide Patient Instuctions on NPO Status  -     Hold Home Medications Morning of Surgery As Directed on Patient Instruction Sheet  -     Height & Weight  -     Check BP in Both Arms & Document on Cardiac Surgery Information Sheet  -     Five Meter Walk Test; Future  -     mupirocin (BACTROBAN) 2 % ointment; Bilateral nares BID  Dispense: 22 g; Refill: 0      PLAN:  Detailed discussion with Afia Adams regarding situation options and plans.  Severe symptomatic CAD.  Coronary Artery Bypass Grafting is advisable.  Will likely require inpt rehab following hospitalization.    Risks including but not limited to Mortality 5%, Stroke 2%, bleeding (return to surgery), transfusion, infection, pulmonary (prolonged mechanical ventilation, tracheostomy), renal dysfunction (dialysis), prolonged hospitalization and recovery.   Benefits:  Relief of symptoms, reduction in cardiac events and hospitalization, and improved survival.  Options:  Medical therapy, multivessel PCI discussed.  individualized STS risk assessment performed.  Understands and wishes to proceed.    Coronary Artery Bypass Grafting, ON, transexamic acid, Ancef, radial arterial line, pulmonary artery catheter.  GEN.  SDS.  5/10/2022    Return after above studies complete  Obesity Class  1. Increased risk cardiovascular events, sleep and breathing disorders, joint issues, type 2 diabetes mellitus. Options for weight management, heart healthy diet, exercise programs, and associated health risks of obesity discussed.    Recommended regular physical activity, progressive walking program.  Continue current medications as directed.  Will Obtain relevant old records.  Advance Care Planning   ACP discussion was declined by the patient. Patient does not have an advance directive, declines further assistance.     Thank you for the opportunity to participate in this patient's care.    Copy to primary care provider.

## 2022-05-05 NOTE — PAT
Chlorhexidine scrub given with instruction sheet. Instructions reviewed in PAT, understanding verbalized.    Patient has been instructed by MD/office that she does not have to stop Aspirin prior to procedure.    5 meter walk test completed.    Incentive spirometer teaching completed. Return demonstration provided by patient with good tolerance.    Bactroban has been sent by MD to patient's pharmacy and patient is aware of importance of using as prescribed.    Patient just came from Dr. Melgar's office. APRN does not need to see.    Kurt, perfusionist, states he will see patient on day of surgery.    Dr. Magdaleno here to speak with patient. No orders received.    Patient was taken to chest xray today at preop appointment 5/5.    Heart surgery patient pathway given and explained to patient.    Patient had echo 02/2022 at Dr. Kent's office. Spoke with Elisha in [facility]'s office and she is faxing echo report over. All other test results already available in chart.

## 2022-05-05 NOTE — DISCHARGE INSTRUCTIONS
[facility]  Pre-op Information and Guidelines    You will be called after 2 p.m. the day before your surgery (Friday for Monday surgery) and notified of your time for arrival and approximate surgery time.  If you have not received a call by 4P.M., please contact Same Day Surgery at [phone] of if outside [facility] call [phone].    Please Follow these Important Safety Guidelines:    The morning of your procedure, take only the medications listed below with   A sip of water:_____________________________________________       ______________________________________________    DO NOT eat or drink anything after 12:00 midnight the night before surgery  Specific instructions concerning drinking clear liquids will be discussed during  the pre-surgery instruction call the day before your surgery.    If you take a blood thinner (ex. Plavix, Coumadin, aspirin), ask your doctor when to stop it before surgery  STOP DATE: _________________    Only 2 visitors are allowed in patient rooms at a time  Your visitors will be asked to wait in the lobby until the admission process is complete with the exception of a parent with a child and patients in need of special assistance.    YOU CANNOT DRIVE YOURSELF HOME  You must be accompanied by someone who will be responsible for driving you home after surgery and for your care at home.    DO NOT chew gum, use breath mints, hard candy, or smoke the day of surgery  DO NOT drink alcohol for at least 24 hours before your surgery  DO NOT wear any jewelry and remove all body piercing before coming to the hospital  DO NOT wear make-up to the hospital  If you are having surgery on an extremity (arm/leg/foot) remove nail polish/artificial nails on the surgical side  Clothing, glasses, contacts, dentures, and hairpieces must be removed before surgery  Bathe the night before or the morning of your surgery and do not use powders/lotions on skin.

## 2022-05-05 NOTE — H&P (VIEW-ONLY)
5/5/2022    Afia Adams  1950    Chief Complaint:    Chief Complaint   Patient presents with   • Coronary Artery Disease       HPI:      PCP:  Neives Childress APRN  Cardiology:  Dr Kent     71 y.o. female with HTN(stable, increased risk stroke, rupture), Hyperlipidemia(stable, increased risk cardiovascular events), Diabetes Mellitus(stable, increased risk cardiovascular events), Obesity(uncontrolled, increased risk cardiovascular events), COPD(stable, increased risk pulmonary complications) and Chronic Kidney Disease(stable, increased risk renal failure) , CAD(chronic severe progression, increase risk cardiovascular events).  never smoked.  Moderate fatigue, decreased exercise tolerance x 1 year.  PCI 2016.  In wheelchair today..  No TIA stroke amaurosis.  No MI claudication. No other associated signs, symptoms or modifying factors.    5/2022 vein mapping:  RIGHT GSV 1.6-3.7mm, LEFT GSV 2.6-4.7mm.  5/2022 Carotid Duplex:  JENNIFER 0-49% (91/17cm/s, ratio 1.0), LICA 0-49% (90/21cm/s, ratio 0.9) antegrade verts.    10/2020 Echocardiogram:  EF 40%. LA 28mm, LV 54mm, RVS{ 27mmHg, trace MR, mild TR.  4/2022 Stress Test:  EF 60%, medium infarct anteroseptal, felix-infarct ischemia  4/2022 Cardiac Catheterization:  LAD occluded stent prox, CX 50%, RCA 40%, PLB 70%.  5/2022 PFT:  FVC 1.4 (51%), FEV1 1.1 (53%), DLCO 112%    The following portions of the patient's history were reviewed and updated as appropriate: allergies, current medications, past family history, past medical history, past social history, past surgical history and problem list.  Recent images independently reviewed.  Available laboratory values reviewed.    PMH:  Past Medical History:   Diagnosis Date   • Abdominal pain 11/20/2015    unspecified   • Acute gastritis    • Acute on chronic systolic congestive heart failure (HCC) 04/13/2016   • Ankle pain 04/27/2015   • Asthma    • Calculus of kidney 10/09/2020    Added automatically from  request for surgery [id number]   • Candidiasis, skin or nails 12/22/2011    controlled   • Chronic systolic congestive heart failure (HCC) 08/01/2016   • Congestive heart failure (HCC) 08/01/2016   • Coronary arteriosclerosis 06/21/2016    multi-vessel, 2 stents, followed by Dr. Kent   • Cough 09/25/2014    proabably due to allergy, chest x ray is normal      • Diabetic ulcer of toe of right foot associated with type 2 diabetes mellitus (HCC) 10/24/2020    Follow up with podiatry outpatient    • Disease of thyroid gland    • Edema of foot 03/24/2016   • Encounter for long-term (current) drug use     therapy   • Epigastric pain 12/03/2015   • Essential hypertension 06/21/2016   • GERD (gastroesophageal reflux disease)    • Gout    • Heart attack ([facility])     2016 2 stents by Dr. Kent still follows with him/last seen 6 -2020 told doing ok   • History of echocardiogram 03/14/2016    Left atrium normal with mild CLVH wit normal aortic root size.Evidence of posterolateral wall hypokinesis. Severely depressed LV systolic function of 20-25%.Mitral valve thickened Aortic sclerosis.Diastolic dysfunction   • Hyperlipidemia 03/31/2016    unspecified   • Hypertensive disorder 03/24/2016   • Left lower quadrant pain 07/12/2013    ruling out diverticular disease      • Myocardial infarction (HCC) 03/24/2016   • Nausea 11/20/2015   • Obstructive uropathy 09/29/2020   • Osteoarthritis of knee 06/23/2016   • Pain in limb 01/25/2011   • Pediculosis capitis 12/22/2011    controlled   • Peptic ulcer    • Pneumonia    • Polyuria 01/25/2011   • Pruritic rash 12/09/2014   • Superficial foreign body hip without major open wound, no infection 12/09/2011    ??? back   • Type 2 diabetes mellitus (HCC) 06/23/2016    new onset   • Weakness 06/23/2016   • Wears dentures    • Wears glasses      Past Surgical History:   Procedure Laterality Date   • COLONOSCOPY W/ POLYPECTOMY  03/07/2016    Transverse colon polyps,descending polyps,Sigmoid polyps,  all resected and retrieved. Diverticulosis without perforation or abscess without bleeding. Erica Thomas   • CYSTOSCOPY Left 11/4/2020    Procedure: CYSTOSCOPY; LEFT J-STENT REMOVAL             (STRETCHER)   FLEXIBLE SCOPE;  Surgeon: Richar Rojas MD;  Location: [facility];  Service: Urology;  Laterality: Left;   • CYSTOSCOPY, URETEROSCOPY, RETROGRADE PYELOGRAM, STENT INSERTION Left 9/30/2020    Procedure: CYSTOSCOPY LEFT URETEROSCOPY RETROGRADE PYELOGRAM STENT INSERTION;  Surgeon: Richar Rojas MD;  Location: [facility];  Service: Urology;  Laterality: Left;   • CYSTOSCOPY, URETEROSCOPY, RETROGRADE PYELOGRAM, STENT INSERTION Left 10/21/2020    Procedure: CYSTOSCOPY, LEFT RETROGRADE, URETEROSCOPY, LASER LITHOTRIPSY, STENT PLACEMENT;  Surgeon: Richar Rojas MD;  Location: [facility];  Service: Urology;  Laterality: Left;   • HYSTERECTOMY      ovary preserv   • INJECTION OF MEDICATION  09/11/2014    Celestone (betamethasone) (2)       Family History   Problem Relation Age of Onset   • Alcohol abuse Other    • Asthma Other    • Lung cancer Other    • Heart attack Father    • Lung cancer Father    • Hypertension Father    • Heart disease Father    • Heart attack Brother    • Hypertension Brother    • Heart disease Brother      Social History     Tobacco Use   • Smoking status: Never Smoker   • Smokeless tobacco: Never Used   Vaping Use   • Vaping Use: Never used   Substance Use Topics   • Alcohol use: No   • Drug use: No       ALLERGIES:  Allergies   Allergen Reactions   • Bactrim [Sulfamethoxazole-Trimethoprim] Other (See Comments)     Profuse sweating         MEDICATIONS:    Current Outpatient Medications:   •  aspirin 81 MG tablet, Take 81 mg by mouth daily., Disp: , Rfl:   •  carvedilol (COREG) 25 MG tablet, Take 1 tablet by mouth 2 (Two) Times a Day With Meals., Disp: 60 tablet, Rfl: 5  •  ferrous sulfate 325 (65 FE) MG EC tablet, Take 1 tablet by mouth 2 (Two) Times a Day With Meals., Disp: 60 tablet,  "Rfl: 5  •  furosemide (LASIX) 20 MG tablet, Take 20 mg by mouth Daily., Disp: , Rfl:   •  glimepiride (AMARYL) 2 MG tablet, TAKE 1 TABLET BY MOUTH EVERY MORNING BEFORE BREAKFAST, Disp: 90 tablet, Rfl: 0  •  levothyroxine (SYNTHROID, LEVOTHROID) 25 MCG tablet, TAKE 1 TABLET BY MOUTH EVERY MORNING, Disp: 30 tablet, Rfl: 3  •  loratadine (CLARITIN) 10 MG tablet, Take 1 tablet by mouth Daily., Disp: 30 tablet, Rfl: 3  •  magnesium oxide (MAGOX) 400 (241.3 Mg) MG tablet tablet, Take 1 tablet by mouth 2 (Two) Times a Day., Disp: 30 each, Rfl: 3  •  olmesartan (BENICAR) 40 MG tablet, TAKE 1 TABLET BY MOUTH DAILY WITH DINNER, Disp: 90 tablet, Rfl: 0  •  omeprazole (priLOSEC) 40 MG capsule, TAKE 1 CAPSULE BY MOUTH DAILY, Disp: 90 capsule, Rfl: 1  •  polyethylene glycol (MIRALAX) 17 g packet, Take 17 g by mouth Daily As Needed (constipation)., Disp: 100 each, Rfl: 1  •  pravastatin (PRAVACHOL) 40 MG tablet, Take 1 tablet by mouth Every Night., Disp: 30 tablet, Rfl: 5  •  hydrALAZINE (APRESOLINE) 50 MG tablet, Take 50 mg by mouth 3 (Three) Times a Day., Disp: , Rfl:   •  mupirocin (BACTROBAN) 2 % ointment, Bilateral nares BID, Disp: 22 g, Rfl: 0    Review of Systems   Review of Systems   Constitutional: Positive for malaise/fatigue. Negative for weight loss.   Cardiovascular: Positive for dyspnea on exertion. Negative for chest pain and claudication.   Respiratory: Negative for cough and shortness of breath.    Skin: Negative for color change and poor wound healing.   Musculoskeletal: Positive for muscle weakness.   Neurological: Positive for weakness. Negative for dizziness and numbness.       Physical Exam   Vitals:    05/05/22 1429   BP: 140/82   BP Location: Left arm   Patient Position: Sitting   Cuff Size: Adult   Pulse: 66   SpO2: 99%   Weight: 83.9 kg (185 lb)   Height: 160 cm (63\")     Body surface area is 1.87 meters squared.  Body mass index is 32.77 kg/m².  Physical Exam  Constitutional:       General: She is not in " acute distress.     Appearance: She is not ill-appearing.   HENT:      Right Ear: Hearing normal.      Left Ear: Hearing normal.      Nose: No nasal deformity.      Mouth/Throat:      Dentition: Normal dentition. Does not have dentures.   Cardiovascular:      Rate and Rhythm: Normal rate and regular rhythm.      Pulses:           Carotid pulses are 2+ on the right side and 2+ on the left side.       Radial pulses are 2+ on the right side and 2+ on the left side.        Dorsalis pedis pulses are 2+ on the right side and 2+ on the left side.        Posterior tibial pulses are 1+ on the right side and 1+ on the left side.      Heart sounds: No murmur heard.  Pulmonary:      Effort: Pulmonary effort is normal.      Breath sounds: Normal breath sounds.   Abdominal:      General: There is no distension.      Palpations: Abdomen is soft. There is no mass.      Tenderness: There is no abdominal tenderness.   Musculoskeletal:         General: No deformity.      Comments: Gait normal.    Skin:     General: Skin is warm and dry.      Coloration: Skin is not pale.      Findings: No erythema.      Comments: No venous staining   Neurological:      Mental Status: She is alert and oriented to person, place, and time.   Psychiatric:         Speech: Speech normal.         Behavior: Behavior is cooperative.         Thought Content: Thought content normal.         Judgment: Judgment normal.         BUN   Date Value Ref Range Status   04/29/2022 16 8 - 23 mg/dL Final     Creatinine   Date Value Ref Range Status   04/29/2022 1.02 (H) 0.57 - 1.00 mg/dL Final     eGFR Non  Amer   Date Value Ref Range Status   12/08/2021 45 (L) >60 mL/min/1.73 Final     eGFR   Amer   Date Value Ref Range Status   09/29/2020   Final     Comment:     <15 Indicative of kidney failure.       ASSESSMENT:  Diagnoses and all orders for this visit:    1. Coronary artery disease of native artery of native heart with stable angina pectoris (HCC)  (Primary)  -     Instruct Patient on Coughing, Deep Breathing and Incentive Spirometry; Future  -     Type & Screen; Future  -     Chest X-Ray PA & Lateral; Future  -     Case Request; Standing  -     COVID PRE-OP / PRE-PROCEDURE SCREENING ORDER (NO ISOLATION) - Swab, Nasopharynx; Future  -     TSH; Future  -     Case Request    2. Abnormal findings on diagnostic imaging of other specified body structures   -     TSH; Future    3. Type 2 diabetes mellitus with stage 3a chronic kidney disease, without long-term current use of insulin ([facility])    4. Essential hypertension    5. Mixed hyperlipidemia    6. Class 1 obesity due to excess calories with serious comorbidity and body mass index (BMI) of 32.0 to 32.9 in adult    7. Stage 3a chronic kidney disease ([facility])    8. HFrEF (heart failure with reduced ejection fraction) ([facility])    9. History of PTCA    10. Myocardial infarct, old    11. Panlobular emphysema ([facility])    Other orders  -     Notify Surgeon if Patient Currently Taking Plavix, Effient, Ticlid, Brillinta, Pradaxa, Xarelto, Eliquis or Savaysa  -     Give Patient Pre-Op Education Booklet  -     Provide Patient Instuctions on NPO Status  -     Hold Home Medications Morning of Surgery As Directed on Patient Instruction Sheet  -     Height & Weight  -     Check BP in Both Arms & Document on Cardiac Surgery Information Sheet  -     Five Meter Walk Test; Future  -     mupirocin (BACTROBAN) 2 % ointment; Bilateral nares BID  Dispense: 22 g; Refill: 0      PLAN:  Detailed discussion with Afia Adams regarding situation options and plans.  Severe symptomatic CAD.  Coronary Artery Bypass Grafting is advisable.  Will likely require inpt rehab following hospitalization.    Risks including but not limited to Mortality 5%, Stroke 2%, bleeding (return to surgery), transfusion, infection, pulmonary (prolonged mechanical ventilation, tracheostomy), renal dysfunction (dialysis), prolonged hospitalization and recovery.   Benefits:  Relief of symptoms, reduction in cardiac events and hospitalization, and improved survival.  Options:  Medical therapy, multivessel PCI discussed.  individualized STS risk assessment performed.  Understands and wishes to proceed.    Coronary Artery Bypass Grafting, ON, transexamic acid, Ancef, radial arterial line, pulmonary artery catheter.  GEN.  SDS.  5/10/2022    Return after above studies complete  Obesity Class  1. Increased risk cardiovascular events, sleep and breathing disorders, joint issues, type 2 diabetes mellitus. Options for weight management, heart healthy diet, exercise programs, and associated health risks of obesity discussed.    Recommended regular physical activity, progressive walking program.  Continue current medications as directed.  Will Obtain relevant old records.  Advance Care Planning   ACP discussion was declined by the patient. Patient does not have an advance directive, declines further assistance.     Thank you for the opportunity to participate in this patient's care.    Copy to primary care provider.

## 2022-05-05 NOTE — ANESTHESIA PREPROCEDURE EVALUATION
Anesthesia Evaluation     Patient summary reviewed and Nursing notes reviewed   no history of anesthetic complications:  NPO Solid Status: > 8 hours  NPO Liquid Status: > 2 hours           Airway   Mallampati: II  TM distance: >3 FB  Neck ROM: full  possible difficult intubation, Small opening and Large neck circumference  Comment: General anesthesia 9/30/20. MAC #3, ETT 7.0, Grade 1  Dental    (+) upper dentures and lower dentures    Pulmonary    (+) asthma,decreased breath sounds,   (-) COPD, shortness of breath, sleep apnea, wheezes, not a smoker, no home oxygen    ROS comment: FINDINGS:  Cardiac silhouette is normal in size. Thoracic aorta contains  atherosclerotic calcification. Mild pulmonary vascular  congestion.  No pleural effusion or pneumothorax.     IMPRESSION:  CONCLUSION:  Pulmonary vascular congestion.     Electronically signed by:  Salas Palomo MD  9/29/2020 12:27 PM  Cardiovascular   Exercise tolerance: poor (<4 METS)    ECG reviewed  Patient on routine beta blocker and Beta blocker given within 24 hours of surgery  Rhythm: regular  Rate: normal    (+) hypertension poorly controlled 2 medications or greater, valvular problems/murmurs murmur, MR and TI, past MI (2016)  >12 months, CAD, cardiac stents (2 stents 2016) more than 12 months ago CHF Systolic <55%, ALVAREZ, murmur (Grade II/VI systolic), hyperlipidemia,   (-) pacemaker, dysrhythmias, angina, carotid bruits, DVT    ROS comment:    Sinus rhythm with 1st degree AV block  Left axis deviation  Inferior infarct , age undetermined  Anteroseptal infarct (cited on or before 02-FEB-2016)  Abnormal ECG  When compared with ECG of 18-APR-2022 10:19,  Inferior infarct is now Present  Questionable change in initial forces of Anterior leads    Impression:  A.  100% occlusion of the left anterior descending artery after the origin of the first diagonal branch with the distal left anterior descending artery filling in from left to left collateral.  B.  First  major diagonal branch in the proximal portion had 50 to 60% stenosis.  C.  Distal left main minimal plaquing.  D.  Proximal circumflex artery with 30 to 40% stenosis with some ostial stenosis with the first obtuse marginal branch with 50 to 60% stenosis.  E.  Dominant right coronary artery with diffuse disease with the posterolateral branch with 95% stenosis in the distal right coronary artery with 60% stenosis.  F.  Anteroapical wall hypokinesis with an ejection fraction of 45%.        · The study is technically difficult for diagnosis. The quality of the study is limited due to patient body habitus, patient positioning and lung disease. Verbal consent was obtained from the patient to use Definity image enhancer in order to optimize the study.  · The left ventricular cavity is mildly dilated.  · Estimated left ventricular EF = 43% Left ventricular ejection fraction appears to be 41 - 45%. Left ventricular systolic function is mildly decreased. Mid/ Distal septum Dyskinetic  · Left ventricular diastolic function is consistent with (grade Ia w/high LAP) impaired relaxation.  · The left atrial cavity is mildly dilated.  · The right ventricular cavity is mildly dilated.  · Trace mitral valve regurgitation is present.  · Mild tricuspid valve regurgitation is present.  · Estimated right ventricular systolic pressure from tricuspid regurgitation is normal (<35 mmHg).        Neuro/Psych- negative ROS  (-) seizures, TIA, CVA, headaches, numbness, psychiatric history  GI/Hepatic/Renal/Endo    (+) obesity,  GERD well controlled, PUD,  liver disease history of elevated LFT, renal disease (Creatinine 1.02) stones, diabetes mellitus (glu 125) type 2 well controlled, thyroid problem hypothyroidism  (-) hepatitis    ROS Comment: HGB 9.8 HCT 31.8    Musculoskeletal     Abdominal   (+) obese,    Substance History - negative use  (-) alcohol use, drug use     OB/GYN negative ob/gyn ROS         Other   arthritis,      (-) history of  cancer                  Anesthesia Plan    ASA 4     general   ( Discussed central line, Dows Jostin catheter,arterial line,post op ventilation and patient, understand possible complications,risks and agree.)  intravenous induction   Postoperative Plan: Expected vent after surgery  Anesthetic plan, all risks, benefits, and alternatives have been provided, discussed and informed consent has been obtained with: patient and spouse/significant other.  Use of blood products discussed with patient and spouse/significant other  Consented to blood products.

## 2022-05-09 ENCOUNTER — LAB (OUTPATIENT)
Dept: LAB | Facility: HOSPITAL | Age: 72
End: 2022-05-09

## 2022-05-09 LAB — SARS-COV-2 N GENE RESP QL NAA+PROBE: NOT DETECTED

## 2022-05-09 PROCEDURE — 87635 SARS-COV-2 COVID-19 AMP PRB: CPT | Performed by: THORACIC SURGERY (CARDIOTHORACIC VASCULAR SURGERY)

## 2022-05-09 RX ORDER — TRANEXAMIC ACID 100 MG/ML
2.5 INJECTION, SOLUTION INTRAVENOUS ONCE
Status: DISCONTINUED | OUTPATIENT
Start: 2022-05-10 | End: 2022-05-10

## 2022-05-10 ENCOUNTER — HOSPITAL ENCOUNTER (INPATIENT)
Facility: HOSPITAL | Age: 72
LOS: 7 days | Discharge: NURSING, REHAB OR LONG-TERM CARE | End: 2022-05-17
Attending: THORACIC SURGERY (CARDIOTHORACIC VASCULAR SURGERY) | Admitting: THORACIC SURGERY (CARDIOTHORACIC VASCULAR SURGERY)

## 2022-05-10 ENCOUNTER — APPOINTMENT (OUTPATIENT)
Dept: GENERAL RADIOLOGY | Facility: HOSPITAL | Age: 72
End: 2022-05-10

## 2022-05-10 ENCOUNTER — ANESTHESIA (OUTPATIENT)
Dept: PERIOP | Facility: HOSPITAL | Age: 72
End: 2022-05-10

## 2022-05-10 DIAGNOSIS — Z74.09 IMPAIRED FUNCTIONAL MOBILITY, BALANCE, GAIT, AND ENDURANCE: ICD-10-CM

## 2022-05-10 DIAGNOSIS — Z78.9 IMPAIRED MOBILITY AND ADLS: ICD-10-CM

## 2022-05-10 DIAGNOSIS — Z48.812 SURGICAL AFTERCARE, CIRCULATORY SYSTEM: Primary | ICD-10-CM

## 2022-05-10 DIAGNOSIS — I25.118 CORONARY ARTERY DISEASE OF NATIVE ARTERY OF NATIVE HEART WITH STABLE ANGINA PECTORIS: ICD-10-CM

## 2022-05-10 DIAGNOSIS — Z74.09 IMPAIRED MOBILITY AND ADLS: ICD-10-CM

## 2022-05-10 LAB
A-A DO2: 54.9 MMHG
ABO GROUP BLD: NORMAL
ACT BLD: 139 SECONDS (ref 82–152)
ACT BLD: 148 SECONDS (ref 82–152)
ACT BLD: 552 SECONDS (ref 82–152)
ACT BLD: 726 SECONDS (ref 82–152)
ACT BLD: 820 SECONDS (ref 82–152)
ACT BLD: 905 SECONDS (ref 82–152)
ALBUMIN SERPL-MCNC: 3.9 G/DL (ref 3.5–5.2)
ANION GAP SERPL CALCULATED.3IONS-SCNC: 13 MMOL/L (ref 5–15)
APTT PPP: 33.9 SECONDS (ref 20–40.3)
ARTERIAL PATENCY WRIST A: ABNORMAL
ATMOSPHERIC PRESS: 749 MMHG
ATMOSPHERIC PRESS: 750 MMHG
ATMOSPHERIC PRESS: 750 MMHG
ATMOSPHERIC PRESS: 751 MMHG
BASE EXCESS BLDA CALC-SCNC: -0.6 MMOL/L (ref 0–2)
BASE EXCESS BLDA CALC-SCNC: -2.6 MMOL/L (ref 0–2)
BASE EXCESS BLDA CALC-SCNC: -2.9 MMOL/L (ref 0–2)
BASE EXCESS BLDA CALC-SCNC: -7 MMOL/L (ref -5–5)
BASE EXCESS BLDA CALC-SCNC: 0.9 MMOL/L (ref 0–2)
BASE EXCESS BLDA CALC-SCNC: 1 MMOL/L (ref -5–5)
BASE EXCESS BLDA CALC-SCNC: 1 MMOL/L (ref 0–2)
BASE EXCESS BLDA CALC-SCNC: 1.3 MMOL/L (ref 0–2)
BASE EXCESS BLDA CALC-SCNC: 3 MMOL/L (ref -5–5)
BASE EXCESS BLDA CALC-SCNC: 4 MMOL/L (ref -5–5)
BASE EXCESS BLDA CALC-SCNC: 6 MMOL/L (ref -5–5)
BASOPHILS # BLD AUTO: 0.05 10*3/MM3 (ref 0–0.2)
BASOPHILS NFR BLD AUTO: 0.3 % (ref 0–1.5)
BDY SITE: ABNORMAL
BLD GP AB SCN SERPL QL: NEGATIVE
BUN SERPL-MCNC: 11 MG/DL (ref 8–23)
BUN/CREAT SERPL: 11.8 (ref 7–25)
CA-I BLD-MCNC: 4.35 MG/DL (ref 4.6–5.6)
CA-I BLD-MCNC: 4.53 MG/DL (ref 4.6–5.6)
CA-I BLD-MCNC: 4.93 MG/DL (ref 4.6–5.6)
CA-I BLDA-SCNC: 0.88 MMOL/L (ref 1.2–1.32)
CA-I BLDA-SCNC: 0.92 MMOL/L (ref 1.2–1.32)
CA-I BLDA-SCNC: 0.96 MMOL/L (ref 1.2–1.32)
CA-I BLDA-SCNC: 0.96 MMOL/L (ref 1.2–1.32)
CA-I BLDA-SCNC: 1.01 MMOL/L (ref 1.2–1.32)
CA-I BLDA-SCNC: 1.11 MMOL/L (ref 1.2–1.32)
CA-I BLDA-SCNC: 1.22 MMOL/L (ref 1.2–1.32)
CALCIUM SPEC-SCNC: 9 MG/DL (ref 8.6–10.5)
CHLORIDE SERPL-SCNC: 105 MMOL/L (ref 98–107)
CO2 BLDA-SCNC: 20 MMOL/L (ref 24–29)
CO2 BLDA-SCNC: 27 MMOL/L (ref 24–29)
CO2 BLDA-SCNC: 29 MMOL/L (ref 24–29)
CO2 BLDA-SCNC: 30 MMOL/L (ref 24–29)
CO2 BLDA-SCNC: 30 MMOL/L (ref 24–29)
CO2 SERPL-SCNC: 21 MMOL/L (ref 22–29)
COHGB MFR BLD: 1.2 % (ref 0–5)
CREAT SERPL-MCNC: 0.93 MG/DL (ref 0.57–1)
DEPRECATED RDW RBC AUTO: 43.5 FL (ref 37–54)
EGFRCR SERPLBLD CKD-EPI 2021: 66.3 ML/MIN/1.73
EOSINOPHIL # BLD AUTO: 0.13 10*3/MM3 (ref 0–0.4)
EOSINOPHIL NFR BLD AUTO: 0.8 % (ref 0.3–6.2)
ERYTHROCYTE [DISTWIDTH] IN BLOOD BY AUTOMATED COUNT: 15.1 % (ref 12.3–15.4)
GLUCOSE BLDA-MCNC: 121 MMOL/L (ref 65–95)
GLUCOSE BLDC GLUCOMTR-MCNC: 106 MG/DL (ref 70–130)
GLUCOSE BLDC GLUCOMTR-MCNC: 106 MG/DL (ref 70–130)
GLUCOSE BLDC GLUCOMTR-MCNC: 108 MG/DL (ref 70–130)
GLUCOSE BLDC GLUCOMTR-MCNC: 123 MG/DL (ref 70–130)
GLUCOSE BLDC GLUCOMTR-MCNC: 123 MG/DL (ref 70–130)
GLUCOSE BLDC GLUCOMTR-MCNC: 125 MG/DL (ref 70–130)
GLUCOSE BLDC GLUCOMTR-MCNC: 131 MG/DL (ref 70–130)
GLUCOSE BLDC GLUCOMTR-MCNC: 86 MG/DL (ref 70–130)
GLUCOSE SERPL-MCNC: 116 MG/DL (ref 65–99)
HCO3 BLDA-SCNC: 18.8 MMOL/L (ref 22–26)
HCO3 BLDA-SCNC: 23.6 MMOL/L (ref 20–26)
HCO3 BLDA-SCNC: 23.8 MMOL/L (ref 20–26)
HCO3 BLDA-SCNC: 23.9 MMOL/L (ref 20–26)
HCO3 BLDA-SCNC: 23.9 MMOL/L (ref 20–26)
HCO3 BLDA-SCNC: 24.8 MMOL/L (ref 20–26)
HCO3 BLDA-SCNC: 25.4 MMOL/L (ref 22–26)
HCO3 BLDA-SCNC: 25.9 MMOL/L (ref 20–26)
HCO3 BLDA-SCNC: 27.4 MMOL/L (ref 22–26)
HCO3 BLDA-SCNC: 27.4 MMOL/L (ref 22–26)
HCO3 BLDA-SCNC: 28 MMOL/L (ref 22–26)
HCO3 BLDA-SCNC: 28.9 MMOL/L (ref 22–26)
HCO3 BLDA-SCNC: 29.1 MMOL/L (ref 22–26)
HCT VFR BLD AUTO: 27.8 % (ref 34–46.6)
HCT VFR BLD CALC: 28.4 % (ref 38–51)
HCT VFR BLDA CALC: 21 % (ref 38–51)
HCT VFR BLDA CALC: 21 % (ref 38–51)
HCT VFR BLDA CALC: 22 % (ref 38–51)
HCT VFR BLDA CALC: 22 % (ref 38–51)
HCT VFR BLDA CALC: 23 % (ref 38–51)
HCT VFR BLDA CALC: 23 % (ref 38–51)
HCT VFR BLDA CALC: 29 % (ref 38–51)
HGB BLD-MCNC: 9.4 G/DL (ref 12–15.9)
HGB BLDA-MCNC: 7.1 G/DL (ref 12–17)
HGB BLDA-MCNC: 7.1 G/DL (ref 12–17)
HGB BLDA-MCNC: 7.5 G/DL (ref 12–17)
HGB BLDA-MCNC: 7.5 G/DL (ref 12–17)
HGB BLDA-MCNC: 7.8 G/DL (ref 12–17)
HGB BLDA-MCNC: 7.8 G/DL (ref 12–17)
HGB BLDA-MCNC: 9.3 G/DL (ref 13.5–17.5)
HGB BLDA-MCNC: 9.9 G/DL (ref 12–17)
HOLD SPECIMEN: NORMAL
IMM GRANULOCYTES # BLD AUTO: 0.13 10*3/MM3 (ref 0–0.05)
IMM GRANULOCYTES NFR BLD AUTO: 0.8 % (ref 0–0.5)
INHALED O2 CONCENTRATION: 40 %
INHALED O2 CONCENTRATION: 50 %
INR PPP: 1.6 (ref 0.8–1.2)
LYMPHOCYTES # BLD AUTO: 1.67 10*3/MM3 (ref 0.7–3.1)
LYMPHOCYTES NFR BLD AUTO: 10.7 % (ref 19.6–45.3)
Lab: ABNORMAL
Lab: NORMAL
MAGNESIUM SERPL-MCNC: 2.8 MG/DL (ref 1.6–2.4)
MAGNESIUM SERPL-MCNC: 3.4 MG/DL (ref 1.6–2.4)
MCH RBC QN AUTO: 26.8 PG (ref 26.6–33)
MCHC RBC AUTO-ENTMCNC: 33.8 G/DL (ref 31.5–35.7)
MCV RBC AUTO: 79.2 FL (ref 79–97)
METHGB BLD QL: 0.4 % (ref 0–3)
MODALITY: ABNORMAL
MONOCYTES # BLD AUTO: 0.68 10*3/MM3 (ref 0.1–0.9)
MONOCYTES NFR BLD AUTO: 4.4 % (ref 5–12)
NEUTROPHILS NFR BLD AUTO: 12.94 10*3/MM3 (ref 1.7–7)
NEUTROPHILS NFR BLD AUTO: 83 % (ref 42.7–76)
NOTE: ABNORMAL
NRBC BLD AUTO-RTO: 0 /100 WBC (ref 0–0.2)
OXYHGB MFR BLDV: 82.5 % (ref 94–99)
PCO2 BLDA: 31 MM HG (ref 35–45)
PCO2 BLDA: 33.3 MM HG (ref 35–45)
PCO2 BLDA: 35.4 MM HG (ref 35–45)
PCO2 BLDA: 38 MM HG (ref 35–45)
PCO2 BLDA: 38.1 MM HG (ref 35–45)
PCO2 BLDA: 39.6 MM HG (ref 35–45)
PCO2 BLDA: 39.9 MM HG (ref 35–45)
PCO2 BLDA: 40.2 MM HG (ref 35–45)
PCO2 BLDA: 41.7 MM HG (ref 35–45)
PCO2 BLDA: 42.3 MM HG (ref 35–45)
PCO2 BLDA: 44 MM HG (ref 35–45)
PCO2 BLDA: 46.3 MM HG (ref 35–45)
PCO2 BLDA: 50 MM HG (ref 35–45)
PCO2 TEMP ADJ BLD: ABNORMAL MM[HG]
PEEP RESPIRATORY: 5 CM[H2O]
PH BLDA: 7.29 PH UNITS (ref 7.35–7.45)
PH BLDA: 7.32 PH UNITS (ref 7.35–7.45)
PH BLDA: 7.36 PH UNITS (ref 7.35–7.6)
PH BLDA: 7.41 PH UNITS (ref 7.35–7.45)
PH BLDA: 7.41 PH UNITS (ref 7.35–7.6)
PH BLDA: 7.42 PH UNITS (ref 7.35–7.45)
PH BLDA: 7.42 PH UNITS (ref 7.35–7.6)
PH BLDA: 7.42 PH UNITS (ref 7.35–7.6)
PH BLDA: 7.44 PH UNITS (ref 7.35–7.6)
PH BLDA: 7.46 PH UNITS (ref 7.35–7.45)
PH BLDA: 7.46 PH UNITS (ref 7.35–7.6)
PH BLDA: 7.47 PH UNITS (ref 7.35–7.6)
PH BLDA: 7.49 PH UNITS (ref 7.35–7.45)
PH, TEMP CORRECTED: ABNORMAL
PHOSPHATE SERPL-MCNC: 2.7 MG/DL (ref 2.5–4.5)
PLATELET # BLD AUTO: 144 10*3/MM3 (ref 140–450)
PMV BLD AUTO: 9.7 FL (ref 6–12)
PO2 BLDA: 108 MM HG (ref 83–108)
PO2 BLDA: 115 MM HG (ref 83–108)
PO2 BLDA: 130 MM HG (ref 83–108)
PO2 BLDA: 32 MMHG (ref 80–105)
PO2 BLDA: 426 MMHG (ref 80–105)
PO2 BLDA: 433 MMHG (ref 80–105)
PO2 BLDA: 46 MMHG (ref 80–105)
PO2 BLDA: 47.7 MM HG (ref 83–108)
PO2 BLDA: 484 MMHG (ref 80–105)
PO2 BLDA: 485 MMHG (ref 80–105)
PO2 BLDA: 487 MMHG (ref 80–105)
PO2 BLDA: 92.1 MM HG (ref 83–108)
PO2 BLDA: 98.8 MM HG (ref 83–108)
PO2 TEMP ADJ BLD: ABNORMAL MM[HG]
POTASSIUM BLDA-SCNC: 2.9 MMOL/L (ref 3.5–4.9)
POTASSIUM BLDA-SCNC: 3.5 MMOL/L (ref 3.5–4.9)
POTASSIUM BLDA-SCNC: 3.6 MMOL/L (ref 3.4–4.5)
POTASSIUM BLDA-SCNC: 3.7 MMOL/L (ref 3.5–4.9)
POTASSIUM BLDA-SCNC: 4.1 MMOL/L (ref 3.5–4.9)
POTASSIUM BLDA-SCNC: 4.2 MMOL/L (ref 3.5–4.9)
POTASSIUM BLDA-SCNC: 4.2 MMOL/L (ref 3.5–4.9)
POTASSIUM BLDA-SCNC: 4.3 MMOL/L (ref 3.5–4.9)
POTASSIUM SERPL-SCNC: 3.6 MMOL/L (ref 3.5–5.2)
POTASSIUM SERPL-SCNC: 4.6 MMOL/L (ref 3.5–5.2)
POTASSIUM SERPL-SCNC: 4.7 MMOL/L (ref 3.5–5.2)
PROTHROMBIN TIME: 18.9 SECONDS (ref 11.1–15.3)
PSV: 15 CMH2O
PSV: 8 CMH2O
RBC # BLD AUTO: 3.51 10*6/MM3 (ref 3.77–5.28)
RH BLD: POSITIVE
SAO2 % BLDA: 100 % (ref 95–98)
SAO2 % BLDA: 60 % (ref 95–98)
SAO2 % BLDA: 82 % (ref 95–98)
SAO2 % BLDCOA: 83.8 % (ref 94–99)
SAO2 % BLDCOA: 96.7 % (ref 94–99)
SAO2 % BLDCOA: 97.6 % (ref 94–99)
SAO2 % BLDCOA: 98.9 % (ref 94–99)
SAO2 % BLDCOA: 99 % (ref 94–99)
SAO2 % BLDCOA: 99.5 % (ref 94–99)
SET MECH RESP RATE: 12
SET MECH RESP RATE: 16
SET MECH RESP RATE: 18
SET MECH RESP RATE: 6
SODIUM BLD-SCNC: 135 MMOL/L (ref 138–146)
SODIUM BLD-SCNC: 136 MMOL/L (ref 138–146)
SODIUM BLD-SCNC: 137 MMOL/L (ref 138–146)
SODIUM BLD-SCNC: 139 MMOL/L (ref 138–146)
SODIUM BLD-SCNC: 143 MMOL/L (ref 138–146)
SODIUM BLDA-SCNC: 140 MMOL/L (ref 136–146)
SODIUM SERPL-SCNC: 139 MMOL/L (ref 136–145)
T&S EXPIRATION DATE: NORMAL
TOTAL RATE: 12 BREATHS/MINUTE
TOTAL RATE: 16 BREATHS/MINUTE
TOTAL RATE: 18 BREATHS/MINUTE
VENTILATOR MODE: ABNORMAL
VT ON VENT VENT: 500 ML
VT ON VENT VENT: 550 ML
WBC NRBC COR # BLD: 15.6 10*3/MM3 (ref 3.4–10.8)

## 2022-05-10 PROCEDURE — 33517 CABG ARTERY-VEIN SINGLE: CPT | Performed by: PHYSICIAN ASSISTANT

## 2022-05-10 PROCEDURE — 33508 ENDOSCOPIC VEIN HARVEST: CPT | Performed by: THORACIC SURGERY (CARDIOTHORACIC VASCULAR SURGERY)

## 2022-05-10 PROCEDURE — 25010000002 EPINEPHRINE 0.05 MG/ML SOLUTION: Performed by: THORACIC SURGERY (CARDIOTHORACIC VASCULAR SURGERY)

## 2022-05-10 PROCEDURE — 85730 THROMBOPLASTIN TIME PARTIAL: CPT | Performed by: NURSE PRACTITIONER

## 2022-05-10 PROCEDURE — 25010000002 PAPAVERINE PER 60 MG: Performed by: THORACIC SURGERY (CARDIOTHORACIC VASCULAR SURGERY)

## 2022-05-10 PROCEDURE — 94799 UNLISTED PULMONARY SVC/PX: CPT

## 2022-05-10 PROCEDURE — C1729 CATH, DRAINAGE: HCPCS | Performed by: THORACIC SURGERY (CARDIOTHORACIC VASCULAR SURGERY)

## 2022-05-10 PROCEDURE — C1713 ANCHOR/SCREW BN/BN,TIS/BN: HCPCS | Performed by: THORACIC SURGERY (CARDIOTHORACIC VASCULAR SURGERY)

## 2022-05-10 PROCEDURE — 86900 BLOOD TYPING SEROLOGIC ABO: CPT | Performed by: THORACIC SURGERY (CARDIOTHORACIC VASCULAR SURGERY)

## 2022-05-10 PROCEDURE — 5A1221Z PERFORMANCE OF CARDIAC OUTPUT, CONTINUOUS: ICD-10-PCS | Performed by: THORACIC SURGERY (CARDIOTHORACIC VASCULAR SURGERY)

## 2022-05-10 PROCEDURE — 25010000002 CEFAZOLIN PER 500 MG: Performed by: THORACIC SURGERY (CARDIOTHORACIC VASCULAR SURGERY)

## 2022-05-10 PROCEDURE — 02100Z9 BYPASS CORONARY ARTERY, ONE ARTERY FROM LEFT INTERNAL MAMMARY, OPEN APPROACH: ICD-10-PCS | Performed by: THORACIC SURGERY (CARDIOTHORACIC VASCULAR SURGERY)

## 2022-05-10 PROCEDURE — 83735 ASSAY OF MAGNESIUM: CPT | Performed by: NURSE PRACTITIONER

## 2022-05-10 PROCEDURE — C1751 CATH, INF, PER/CENT/MIDLINE: HCPCS | Performed by: ANESTHESIOLOGY

## 2022-05-10 PROCEDURE — 33533 CABG ARTERIAL SINGLE: CPT | Performed by: PHYSICIAN ASSISTANT

## 2022-05-10 PROCEDURE — 25010000002 CEFAZOLIN PER 500 MG: Performed by: NURSE PRACTITIONER

## 2022-05-10 PROCEDURE — 33517 CABG ARTERY-VEIN SINGLE: CPT | Performed by: THORACIC SURGERY (CARDIOTHORACIC VASCULAR SURGERY)

## 2022-05-10 PROCEDURE — 86901 BLOOD TYPING SEROLOGIC RH(D): CPT | Performed by: THORACIC SURGERY (CARDIOTHORACIC VASCULAR SURGERY)

## 2022-05-10 PROCEDURE — 33533 CABG ARTERIAL SINGLE: CPT | Performed by: THORACIC SURGERY (CARDIOTHORACIC VASCULAR SURGERY)

## 2022-05-10 PROCEDURE — 84295 ASSAY OF SERUM SODIUM: CPT

## 2022-05-10 PROCEDURE — 63710000001 INSULIN REGULAR HUMAN PER 5 UNITS

## 2022-05-10 PROCEDURE — A4648 IMPLANTABLE TISSUE MARKER: HCPCS | Performed by: THORACIC SURGERY (CARDIOTHORACIC VASCULAR SURGERY)

## 2022-05-10 PROCEDURE — 25010000002 PHENYLEPHRINE 10 MG/ML SOLUTION

## 2022-05-10 PROCEDURE — 82962 GLUCOSE BLOOD TEST: CPT

## 2022-05-10 PROCEDURE — 0 POTASSIUM CHLORIDE PER 2 MEQ: Performed by: NURSE PRACTITIONER

## 2022-05-10 PROCEDURE — 25010000002 FENTANYL CITRATE (PF) 50 MCG/ML SOLUTION

## 2022-05-10 PROCEDURE — 021009W BYPASS CORONARY ARTERY, ONE ARTERY FROM AORTA WITH AUTOLOGOUS VENOUS TISSUE, OPEN APPROACH: ICD-10-PCS | Performed by: THORACIC SURGERY (CARDIOTHORACIC VASCULAR SURGERY)

## 2022-05-10 PROCEDURE — 63710000001 INSULIN DETEMIR PER 5 UNITS: Performed by: NURSE PRACTITIONER

## 2022-05-10 PROCEDURE — 25010000002 PROPOFOL 1000 MG/100ML EMULSION

## 2022-05-10 PROCEDURE — 84132 ASSAY OF SERUM POTASSIUM: CPT

## 2022-05-10 PROCEDURE — 94002 VENT MGMT INPAT INIT DAY: CPT

## 2022-05-10 PROCEDURE — 86850 RBC ANTIBODY SCREEN: CPT | Performed by: THORACIC SURGERY (CARDIOTHORACIC VASCULAR SURGERY)

## 2022-05-10 PROCEDURE — 25010000002 MIDAZOLAM PER 1 MG

## 2022-05-10 PROCEDURE — 25010000002 PROTAMINE SULFATE PER 10 MG

## 2022-05-10 PROCEDURE — 82375 ASSAY CARBOXYHB QUANT: CPT

## 2022-05-10 PROCEDURE — 97605 NEG PRS WND THER DME<=50SQCM: CPT | Performed by: THORACIC SURGERY (CARDIOTHORACIC VASCULAR SURGERY)

## 2022-05-10 PROCEDURE — 85347 COAGULATION TIME ACTIVATED: CPT

## 2022-05-10 PROCEDURE — 82947 ASSAY GLUCOSE BLOOD QUANT: CPT

## 2022-05-10 PROCEDURE — 33508 ENDOSCOPIC VEIN HARVEST: CPT | Performed by: PHYSICIAN ASSISTANT

## 2022-05-10 PROCEDURE — C1887 CATHETER, GUIDING: HCPCS | Performed by: THORACIC SURGERY (CARDIOTHORACIC VASCULAR SURGERY)

## 2022-05-10 PROCEDURE — 82330 ASSAY OF CALCIUM: CPT

## 2022-05-10 PROCEDURE — 85014 HEMATOCRIT: CPT

## 2022-05-10 PROCEDURE — 71045 X-RAY EXAM CHEST 1 VIEW: CPT

## 2022-05-10 PROCEDURE — 93010 ELECTROCARDIOGRAM REPORT: CPT | Performed by: INTERNAL MEDICINE

## 2022-05-10 PROCEDURE — 85025 COMPLETE CBC W/AUTO DIFF WBC: CPT | Performed by: NURSE PRACTITIONER

## 2022-05-10 PROCEDURE — 80069 RENAL FUNCTION PANEL: CPT | Performed by: NURSE PRACTITIONER

## 2022-05-10 PROCEDURE — 84132 ASSAY OF SERUM POTASSIUM: CPT | Performed by: THORACIC SURGERY (CARDIOTHORACIC VASCULAR SURGERY)

## 2022-05-10 PROCEDURE — 25010000002 MORPHINE PER 10 MG: Performed by: NURSE PRACTITIONER

## 2022-05-10 PROCEDURE — 93005 ELECTROCARDIOGRAM TRACING: CPT | Performed by: NURSE PRACTITIONER

## 2022-05-10 PROCEDURE — 3E080GC INTRODUCTION OF OTHER THERAPEUTIC SUBSTANCE INTO HEART, OPEN APPROACH: ICD-10-PCS | Performed by: THORACIC SURGERY (CARDIOTHORACIC VASCULAR SURGERY)

## 2022-05-10 PROCEDURE — 82330 ASSAY OF CALCIUM: CPT | Performed by: THORACIC SURGERY (CARDIOTHORACIC VASCULAR SURGERY)

## 2022-05-10 PROCEDURE — 06BQ4ZZ EXCISION OF LEFT SAPHENOUS VEIN, PERCUTANEOUS ENDOSCOPIC APPROACH: ICD-10-PCS | Performed by: THORACIC SURGERY (CARDIOTHORACIC VASCULAR SURGERY)

## 2022-05-10 PROCEDURE — 82330 ASSAY OF CALCIUM: CPT | Performed by: NURSE PRACTITIONER

## 2022-05-10 PROCEDURE — 82805 BLOOD GASES W/O2 SATURATION: CPT

## 2022-05-10 PROCEDURE — 83050 HGB METHEMOGLOBIN QUAN: CPT

## 2022-05-10 PROCEDURE — 85610 PROTHROMBIN TIME: CPT | Performed by: NURSE PRACTITIONER

## 2022-05-10 PROCEDURE — 86923 COMPATIBILITY TEST ELECTRIC: CPT

## 2022-05-10 PROCEDURE — 82803 BLOOD GASES ANY COMBINATION: CPT

## 2022-05-10 PROCEDURE — 25010000002 HEPARIN (PORCINE) PER 1000 UNITS

## 2022-05-10 PROCEDURE — 25010000002 SUCCINYLCHOLINE PER 20 MG

## 2022-05-10 DEVICE — CLIP LIGAT VASC HORIZON TI SM/WD RED 24CT: Type: IMPLANTABLE DEVICE | Site: CHEST | Status: FUNCTIONAL

## 2022-05-10 DEVICE — ABSORBABLE HEMOSTAT (OXIDIZED REGENERATED CELLULOSE, U.S.P.)
Type: IMPLANTABLE DEVICE | Site: CHEST | Status: FUNCTIONAL
Brand: SURGICEL

## 2022-05-10 DEVICE — SS SUTURE, 3 PER SLEEVE
Type: IMPLANTABLE DEVICE | Site: CHEST | Status: FUNCTIONAL
Brand: MYO/WIRE II

## 2022-05-10 DEVICE — IMPLANTABLE DEVICE: Type: IMPLANTABLE DEVICE | Site: CHEST | Status: FUNCTIONAL

## 2022-05-10 DEVICE — HEMOST ABS SURGIFOAM 8X6.25CM 10MM: Type: IMPLANTABLE DEVICE | Site: CHEST | Status: FUNCTIONAL

## 2022-05-10 DEVICE — WR SUT NONABS MF SS V40 1/2CIR TC 6/0 18IN M649G: Type: IMPLANTABLE DEVICE | Site: CHEST | Status: FUNCTIONAL

## 2022-05-10 DEVICE — PLEDGET INCISIONLINE REINF TFE SFT PTFE 1.5X4.8X9.5MM WHT: Type: IMPLANTABLE DEVICE | Site: CHEST | Status: FUNCTIONAL

## 2022-05-10 DEVICE — CLIP LIGAT VASC HORIZON TI MD BLU 6CT: Type: IMPLANTABLE DEVICE | Site: CHEST | Status: FUNCTIONAL

## 2022-05-10 RX ORDER — ASPIRIN 81 MG/1
81 TABLET ORAL DAILY
Status: DISCONTINUED | OUTPATIENT
Start: 2022-05-11 | End: 2022-05-17 | Stop reason: HOSPADM

## 2022-05-10 RX ORDER — SODIUM CHLORIDE, SODIUM GLUCONATE, SODIUM ACETATE, POTASSIUM CHLORIDE AND MAGNESIUM CHLORIDE 526; 502; 368; 37; 30 MG/100ML; MG/100ML; MG/100ML; MG/100ML; MG/100ML
INJECTION, SOLUTION INTRAVENOUS CONTINUOUS PRN
Status: DISCONTINUED | OUTPATIENT
Start: 2022-05-10 | End: 2022-05-10 | Stop reason: SURG

## 2022-05-10 RX ORDER — DEXTROSE AND SODIUM CHLORIDE 5; .45 G/100ML; G/100ML
30 INJECTION, SOLUTION INTRAVENOUS CONTINUOUS
Status: DISCONTINUED | OUTPATIENT
Start: 2022-05-10 | End: 2022-05-11

## 2022-05-10 RX ORDER — PHENYLEPHRINE HYDROCHLORIDE 10 MG/ML
INJECTION INTRAVENOUS AS NEEDED
Status: DISCONTINUED | OUTPATIENT
Start: 2022-05-10 | End: 2022-05-10 | Stop reason: SURG

## 2022-05-10 RX ORDER — SODIUM CHLORIDE 9 MG/ML
INJECTION, SOLUTION INTRAVENOUS CONTINUOUS PRN
Status: DISCONTINUED | OUTPATIENT
Start: 2022-05-10 | End: 2022-05-10 | Stop reason: SURG

## 2022-05-10 RX ORDER — SUCCINYLCHOLINE CHLORIDE 20 MG/ML
INJECTION INTRAMUSCULAR; INTRAVENOUS AS NEEDED
Status: DISCONTINUED | OUTPATIENT
Start: 2022-05-10 | End: 2022-05-10 | Stop reason: SURG

## 2022-05-10 RX ORDER — FENTANYL CITRATE 50 UG/ML
INJECTION, SOLUTION INTRAMUSCULAR; INTRAVENOUS AS NEEDED
Status: DISCONTINUED | OUTPATIENT
Start: 2022-05-10 | End: 2022-05-10 | Stop reason: SURG

## 2022-05-10 RX ORDER — EPINEPHRINE 0.1 MG/ML
SYRINGE (ML) INJECTION AS NEEDED
Status: DISCONTINUED | OUTPATIENT
Start: 2022-05-10 | End: 2022-05-10 | Stop reason: HOSPADM

## 2022-05-10 RX ORDER — SODIUM CHLORIDE 9 MG/ML
100 INJECTION, SOLUTION INTRAVENOUS CONTINUOUS
Status: DISCONTINUED | OUTPATIENT
Start: 2022-05-10 | End: 2022-05-10

## 2022-05-10 RX ORDER — VECURONIUM BROMIDE 1 MG/ML
INJECTION, POWDER, LYOPHILIZED, FOR SOLUTION INTRAVENOUS AS NEEDED
Status: DISCONTINUED | OUTPATIENT
Start: 2022-05-10 | End: 2022-05-10 | Stop reason: SURG

## 2022-05-10 RX ORDER — NICOTINE POLACRILEX 4 MG
15 LOZENGE BUCCAL
Status: DISCONTINUED | OUTPATIENT
Start: 2022-05-10 | End: 2022-05-11

## 2022-05-10 RX ORDER — CLOPIDOGREL BISULFATE 75 MG/1
75 TABLET ORAL DAILY
Status: DISCONTINUED | OUTPATIENT
Start: 2022-05-11 | End: 2022-05-17 | Stop reason: HOSPADM

## 2022-05-10 RX ORDER — ONDANSETRON 2 MG/ML
4 INJECTION INTRAMUSCULAR; INTRAVENOUS EVERY 6 HOURS PRN
Status: DISCONTINUED | OUTPATIENT
Start: 2022-05-10 | End: 2022-05-17 | Stop reason: HOSPADM

## 2022-05-10 RX ORDER — AMOXICILLIN 250 MG
2 CAPSULE ORAL 2 TIMES DAILY
Status: DISCONTINUED | OUTPATIENT
Start: 2022-05-10 | End: 2022-05-17 | Stop reason: HOSPADM

## 2022-05-10 RX ORDER — DEXTROSE MONOHYDRATE 25 G/50ML
10-50 INJECTION, SOLUTION INTRAVENOUS
Status: DISCONTINUED | OUTPATIENT
Start: 2022-05-10 | End: 2022-05-11

## 2022-05-10 RX ORDER — BUPIVACAINE HCL/0.9 % NACL/PF 0.1 %
2 PLASTIC BAG, INJECTION (ML) EPIDURAL ONCE
Status: COMPLETED | OUTPATIENT
Start: 2022-05-10 | End: 2022-05-10

## 2022-05-10 RX ORDER — PHENYLEPHRINE HCL IN 0.9% NACL 0.5 MG/5ML
.5-3 SYRINGE (ML) INTRAVENOUS CONTINUOUS PRN
Status: DISCONTINUED | OUTPATIENT
Start: 2022-05-10 | End: 2022-05-11

## 2022-05-10 RX ORDER — OXYCODONE HYDROCHLORIDE 5 MG/1
10 TABLET ORAL EVERY 4 HOURS PRN
Status: DISCONTINUED | OUTPATIENT
Start: 2022-05-10 | End: 2022-05-11

## 2022-05-10 RX ORDER — POTASSIUM CHLORIDE 29.8 MG/ML
20 INJECTION INTRAVENOUS
Status: DISCONTINUED | OUTPATIENT
Start: 2022-05-10 | End: 2022-05-17 | Stop reason: HOSPADM

## 2022-05-10 RX ORDER — IPRATROPIUM BROMIDE AND ALBUTEROL SULFATE 2.5; .5 MG/3ML; MG/3ML
3 SOLUTION RESPIRATORY (INHALATION)
Status: DISCONTINUED | OUTPATIENT
Start: 2022-05-10 | End: 2022-05-17 | Stop reason: HOSPADM

## 2022-05-10 RX ORDER — FAMOTIDINE 20 MG/1
20 TABLET, FILM COATED ORAL 2 TIMES DAILY
Status: DISCONTINUED | OUTPATIENT
Start: 2022-05-10 | End: 2022-05-17 | Stop reason: HOSPADM

## 2022-05-10 RX ORDER — BUPIVACAINE HCL/0.9 % NACL/PF 0.1 %
2 PLASTIC BAG, INJECTION (ML) EPIDURAL EVERY 8 HOURS
Status: COMPLETED | OUTPATIENT
Start: 2022-05-10 | End: 2022-05-12

## 2022-05-10 RX ORDER — ACETAMINOPHEN 500 MG
1000 TABLET ORAL ONCE
Status: COMPLETED | OUTPATIENT
Start: 2022-05-10 | End: 2022-05-10

## 2022-05-10 RX ORDER — EPHEDRINE SULFATE 50 MG/ML
INJECTION INTRAVENOUS AS NEEDED
Status: DISCONTINUED | OUTPATIENT
Start: 2022-05-10 | End: 2022-05-10 | Stop reason: SURG

## 2022-05-10 RX ORDER — SODIUM CHLORIDE, SODIUM GLUCONATE, SODIUM ACETATE, POTASSIUM CHLORIDE AND MAGNESIUM CHLORIDE 526; 502; 368; 37; 30 MG/100ML; MG/100ML; MG/100ML; MG/100ML; MG/100ML
INJECTION, SOLUTION INTRAVENOUS CONTINUOUS PRN
Status: COMPLETED | OUTPATIENT
Start: 2022-05-10 | End: 2022-05-10

## 2022-05-10 RX ORDER — ACETAMINOPHEN 500 MG
1000 TABLET ORAL EVERY 6 HOURS
Status: DISCONTINUED | OUTPATIENT
Start: 2022-05-10 | End: 2022-05-17 | Stop reason: HOSPADM

## 2022-05-10 RX ORDER — MIDAZOLAM HYDROCHLORIDE 1 MG/ML
INJECTION INTRAMUSCULAR; INTRAVENOUS AS NEEDED
Status: DISCONTINUED | OUTPATIENT
Start: 2022-05-10 | End: 2022-05-10 | Stop reason: SURG

## 2022-05-10 RX ORDER — LEVOTHYROXINE SODIUM 0.03 MG/1
25 TABLET ORAL
Status: DISCONTINUED | OUTPATIENT
Start: 2022-05-11 | End: 2022-05-17 | Stop reason: HOSPADM

## 2022-05-10 RX ORDER — SODIUM CHLORIDE 9 MG/ML
30 INJECTION, SOLUTION INTRAVENOUS CONTINUOUS PRN
Status: DISCONTINUED | OUTPATIENT
Start: 2022-05-10 | End: 2022-05-10 | Stop reason: HOSPADM

## 2022-05-10 RX ORDER — NITROGLYCERIN 0.4 MG/1
0.4 TABLET SUBLINGUAL
Status: DISCONTINUED | OUTPATIENT
Start: 2022-05-10 | End: 2022-05-10 | Stop reason: HOSPADM

## 2022-05-10 RX ORDER — VERAPAMIL HYDROCHLORIDE 2.5 MG/ML
INJECTION, SOLUTION INTRAVENOUS AS NEEDED
Status: DISCONTINUED | OUTPATIENT
Start: 2022-05-10 | End: 2022-05-10 | Stop reason: HOSPADM

## 2022-05-10 RX ORDER — CARVEDILOL 3.12 MG/1
3.12 TABLET ORAL 2 TIMES DAILY WITH MEALS
Status: DISCONTINUED | OUTPATIENT
Start: 2022-05-11 | End: 2022-05-12

## 2022-05-10 RX ORDER — NICOTINE POLACRILEX 4 MG
15 LOZENGE BUCCAL
Status: DISCONTINUED | OUTPATIENT
Start: 2022-05-10 | End: 2022-05-10 | Stop reason: HOSPADM

## 2022-05-10 RX ORDER — OXYCODONE HYDROCHLORIDE 5 MG/1
5 TABLET ORAL EVERY 4 HOURS PRN
Status: DISCONTINUED | OUTPATIENT
Start: 2022-05-10 | End: 2022-05-14

## 2022-05-10 RX ORDER — DEXTROSE MONOHYDRATE 25 G/50ML
10-50 INJECTION, SOLUTION INTRAVENOUS
Status: DISCONTINUED | OUTPATIENT
Start: 2022-05-10 | End: 2022-05-10 | Stop reason: HOSPADM

## 2022-05-10 RX ORDER — MORPHINE SULFATE 2 MG/ML
2 INJECTION, SOLUTION INTRAMUSCULAR; INTRAVENOUS
Status: DISCONTINUED | OUTPATIENT
Start: 2022-05-10 | End: 2022-05-11

## 2022-05-10 RX ORDER — NOREPINEPHRINE BIT/0.9 % NACL 8 MG/250ML
.02-.3 INFUSION BOTTLE (ML) INTRAVENOUS CONTINUOUS PRN
Status: DISCONTINUED | OUTPATIENT
Start: 2022-05-10 | End: 2022-05-11

## 2022-05-10 RX ORDER — PROTAMINE SULFATE 10 MG/ML
INJECTION, SOLUTION INTRAVENOUS AS NEEDED
Status: DISCONTINUED | OUTPATIENT
Start: 2022-05-10 | End: 2022-05-10 | Stop reason: SURG

## 2022-05-10 RX ORDER — DEXMEDETOMIDINE HYDROCHLORIDE 4 UG/ML
.2-1.5 INJECTION, SOLUTION INTRAVENOUS
Status: DISCONTINUED | OUTPATIENT
Start: 2022-05-10 | End: 2022-05-11

## 2022-05-10 RX ORDER — NITROGLYCERIN 20 MG/100ML
5-200 INJECTION INTRAVENOUS CONTINUOUS PRN
Status: DISCONTINUED | OUTPATIENT
Start: 2022-05-10 | End: 2022-05-11

## 2022-05-10 RX ORDER — POLYETHYLENE GLYCOL 3350 17 G/17G
17 POWDER, FOR SOLUTION ORAL DAILY
Status: DISCONTINUED | OUTPATIENT
Start: 2022-05-11 | End: 2022-05-17 | Stop reason: HOSPADM

## 2022-05-10 RX ORDER — PROPOFOL 10 MG/ML
INJECTION, EMULSION INTRAVENOUS AS NEEDED
Status: DISCONTINUED | OUTPATIENT
Start: 2022-05-10 | End: 2022-05-10 | Stop reason: SURG

## 2022-05-10 RX ORDER — SODIUM CHLORIDE 0.9 % (FLUSH) 0.9 %
30 SYRINGE (ML) INJECTION ONCE AS NEEDED
Status: DISCONTINUED | OUTPATIENT
Start: 2022-05-10 | End: 2022-05-10 | Stop reason: HOSPADM

## 2022-05-10 RX ORDER — HEPARIN SODIUM 1000 [USP'U]/ML
INJECTION, SOLUTION INTRAVENOUS; SUBCUTANEOUS AS NEEDED
Status: DISCONTINUED | OUTPATIENT
Start: 2022-05-10 | End: 2022-05-10 | Stop reason: SURG

## 2022-05-10 RX ORDER — MAGNESIUM SULFATE HEPTAHYDRATE 40 MG/ML
2 INJECTION, SOLUTION INTRAVENOUS AS NEEDED
Status: DISCONTINUED | OUTPATIENT
Start: 2022-05-10 | End: 2022-05-11

## 2022-05-10 RX ADMIN — SODIUM CHLORIDE, SODIUM GLUCONATE, SODIUM ACETATE, POTASSIUM CHLORIDE AND MAGNESIUM CHLORIDE: 526; 502; 368; 37; 30 INJECTION, SOLUTION INTRAVENOUS at 07:48

## 2022-05-10 RX ADMIN — FENTANYL CITRATE 150 MCG: 50 INJECTION, SOLUTION INTRAMUSCULAR; INTRAVENOUS at 08:00

## 2022-05-10 RX ADMIN — DEXMEDETOMIDINE HYDROCHLORIDE 0.2 MCG/KG/HR: 100 INJECTION, SOLUTION INTRAVENOUS at 12:15

## 2022-05-10 RX ADMIN — SODIUM CHLORIDE 100 ML/HR: 9 INJECTION, SOLUTION INTRAVENOUS at 06:03

## 2022-05-10 RX ADMIN — TRANEXAMIC ACID 211 MG: 100 INJECTION, SOLUTION INTRAVENOUS at 07:00

## 2022-05-10 RX ADMIN — SODIUM CHLORIDE: 9 INJECTION, SOLUTION INTRAVENOUS at 07:48

## 2022-05-10 RX ADMIN — MIDAZOLAM HYDROCHLORIDE 2 MG: 1 INJECTION, SOLUTION INTRAMUSCULAR; INTRAVENOUS at 07:35

## 2022-05-10 RX ADMIN — MORPHINE SULFATE 2 MG: 2 INJECTION, SOLUTION INTRAMUSCULAR; INTRAVENOUS at 12:52

## 2022-05-10 RX ADMIN — MIDAZOLAM HYDROCHLORIDE 2 MG: 1 INJECTION, SOLUTION INTRAMUSCULAR; INTRAVENOUS at 10:54

## 2022-05-10 RX ADMIN — VECURONIUM BROMIDE 10 MG: 1 INJECTION, POWDER, LYOPHILIZED, FOR SOLUTION INTRAVENOUS at 07:16

## 2022-05-10 RX ADMIN — HEPARIN SODIUM 5000 UNITS: 1000 INJECTION, SOLUTION INTRAVENOUS; SUBCUTANEOUS at 07:53

## 2022-05-10 RX ADMIN — PROPOFOL 90 MG: 10 INJECTION, EMULSION INTRAVENOUS at 07:10

## 2022-05-10 RX ADMIN — VECURONIUM BROMIDE 66.67 MCG/MIN: 1 INJECTION, POWDER, LYOPHILIZED, FOR SOLUTION INTRAVENOUS at 07:48

## 2022-05-10 RX ADMIN — DEXTROSE AND SODIUM CHLORIDE 30 ML/HR: 5; 450 INJECTION, SOLUTION INTRAVENOUS at 12:15

## 2022-05-10 RX ADMIN — GLYCOPYRROLATE 0.2 MCG: 0.2 INJECTION, SOLUTION INTRAMUSCULAR; INTRAVITREAL at 07:54

## 2022-05-10 RX ADMIN — FENTANYL CITRATE 100 MCG: 50 INJECTION, SOLUTION INTRAMUSCULAR; INTRAVENOUS at 08:57

## 2022-05-10 RX ADMIN — FENTANYL CITRATE 50 MCG: 50 INJECTION, SOLUTION INTRAMUSCULAR; INTRAVENOUS at 09:03

## 2022-05-10 RX ADMIN — MIDAZOLAM HYDROCHLORIDE 2 MG: 1 INJECTION, SOLUTION INTRAMUSCULAR; INTRAVENOUS at 11:12

## 2022-05-10 RX ADMIN — EPHEDRINE SULFATE 10 MG: 50 INJECTION INTRAVENOUS at 07:16

## 2022-05-10 RX ADMIN — FENTANYL CITRATE 100 MCG: 50 INJECTION, SOLUTION INTRAMUSCULAR; INTRAVENOUS at 08:22

## 2022-05-10 RX ADMIN — FENTANYL CITRATE 100 MCG: 50 INJECTION, SOLUTION INTRAMUSCULAR; INTRAVENOUS at 07:57

## 2022-05-10 RX ADMIN — EPHEDRINE SULFATE 10 MG: 50 INJECTION INTRAVENOUS at 07:22

## 2022-05-10 RX ADMIN — SODIUM CHLORIDE 1 UNITS/HR: 900 INJECTION INTRAVENOUS at 10:25

## 2022-05-10 RX ADMIN — HEPARIN SODIUM 26000 UNITS: 1000 INJECTION, SOLUTION INTRAVENOUS; SUBCUTANEOUS at 09:05

## 2022-05-10 RX ADMIN — POTASSIUM CHLORIDE 20 MEQ: 29.8 INJECTION, SOLUTION INTRAVENOUS at 15:18

## 2022-05-10 RX ADMIN — FENTANYL CITRATE 100 MCG: 50 INJECTION, SOLUTION INTRAMUSCULAR; INTRAVENOUS at 08:09

## 2022-05-10 RX ADMIN — SODIUM CHLORIDE 5 MG/HR: 9 INJECTION, SOLUTION INTRAVENOUS at 23:06

## 2022-05-10 RX ADMIN — POTASSIUM CHLORIDE 20 MEQ: 29.8 INJECTION, SOLUTION INTRAVENOUS at 14:11

## 2022-05-10 RX ADMIN — ACETAMINOPHEN 1000 MG: 500 TABLET, FILM COATED ORAL at 06:03

## 2022-05-10 RX ADMIN — SUCCINYLCHOLINE CHLORIDE 180 MG: 20 INJECTION, SOLUTION INTRAMUSCULAR; INTRAVENOUS at 07:11

## 2022-05-10 RX ADMIN — EPHEDRINE SULFATE 10 MG: 50 INJECTION INTRAVENOUS at 07:23

## 2022-05-10 RX ADMIN — CALCIUM CHLORIDE 1 G: 100 INJECTION INTRAVENOUS; INTRAVENTRICULAR at 13:39

## 2022-05-10 RX ADMIN — Medication 2 G: at 07:26

## 2022-05-10 RX ADMIN — PHENYLEPHRINE HYDROCHLORIDE 100 MCG: 10 INJECTION, SOLUTION INTRAVENOUS at 07:24

## 2022-05-10 RX ADMIN — TRANEXAMIC ACID 844 MG: 100 INJECTION, SOLUTION INTRAVENOUS at 07:45

## 2022-05-10 RX ADMIN — FENTANYL CITRATE 50 MCG: 50 INJECTION, SOLUTION INTRAMUSCULAR; INTRAVENOUS at 11:12

## 2022-05-10 RX ADMIN — MORPHINE SULFATE 2 MG: 2 INJECTION, SOLUTION INTRAMUSCULAR; INTRAVENOUS at 19:09

## 2022-05-10 RX ADMIN — CEFAZOLIN 2 G: 10 INJECTION, POWDER, FOR SOLUTION INTRAVENOUS; PARENTERAL at 18:49

## 2022-05-10 RX ADMIN — SODIUM CHLORIDE 1500 ML: 9 INJECTION, SOLUTION INTRAVENOUS at 15:00

## 2022-05-10 RX ADMIN — FENTANYL CITRATE 50 MCG: 50 INJECTION, SOLUTION INTRAMUSCULAR; INTRAVENOUS at 08:13

## 2022-05-10 RX ADMIN — TRANEXAMIC ACID 1013 MG: 100 INJECTION, SOLUTION INTRAVENOUS at 08:07

## 2022-05-10 RX ADMIN — PROTAMINE SULFATE 300 MG: 10 INJECTION, SOLUTION INTRAVENOUS at 10:59

## 2022-05-10 RX ADMIN — Medication 1 G: at 11:25

## 2022-05-10 RX ADMIN — FENTANYL CITRATE 100 MCG: 50 INJECTION, SOLUTION INTRAMUSCULAR; INTRAVENOUS at 07:00

## 2022-05-10 RX ADMIN — MIDAZOLAM HYDROCHLORIDE 2 MG: 1 INJECTION, SOLUTION INTRAMUSCULAR; INTRAVENOUS at 07:00

## 2022-05-10 RX ADMIN — INSULIN DETEMIR 25 UNITS: 100 INJECTION, SOLUTION SUBCUTANEOUS at 21:14

## 2022-05-10 NOTE — PLAN OF CARE
Goal Outcome Evaluation:  Plan of Care Reviewed With: patient           Outcome Evaluation: arrived to ccu from OR, vss with cardene infusing, pain controlled, potassium and calcium replaced, pt waking up and following commands

## 2022-05-10 NOTE — ANESTHESIA PROCEDURE NOTES
Arterial Line      Patient reassessed immediately prior to procedure    Patient location during procedure: OR  Start time: 5/10/2022 7:08 AM  Stop Time:5/10/2022 7:09 AM       Line placed for hemodynamic monitoring.  Performed By   CRNA/CAA: Giancarlo Lubin, ALBERTANA: Edmond Iniguez SRNA  Preanesthetic Checklist  Completed: patient identified, IV checked, site marked, risks and benefits discussed, surgical consent, monitors and equipment checked, pre-op evaluation and timeout performed  Arterial Line Prep   Sterile Tech: gloves and mask  Prep: alcohol swabs and ChloraPrep  Patient monitoring: blood pressure monitoring, continuous pulse oximetry and EKG  Arterial Line Procedure   Laterality:left  Location:  radial artery  Catheter size: 20 G   Guidance: landmark technique  Number of attempts: 1  Successful placement: yes  Post Assessment   Dressing Type: occlusive dressing applied, secured with tape and wrist guard applied.   Complications no  Circ/Move/Sens Assessment: normal and unchanged.   Patient Tolerance: patient tolerated the procedure well with no apparent complications

## 2022-05-10 NOTE — ANESTHESIA PROCEDURE NOTES
Central Line      Patient location during procedure: OR  Start time: 5/10/2022 7:25 AM  Stop Time:5/10/2022 7:31 AM  Indications: vascular access, central pressure monitoring and MD/Surgeon request  Staff  Anesthesiologist: William Parham MD  Preanesthetic Checklist  Completed: patient identified, IV checked, site marked, risks and benefits discussed, surgical consent, monitors and equipment checked, pre-op evaluation and timeout performed  Central Line Prep  Sterile Tech:cap, gloves, gown, mask and sterile barriers  Prep: chloraprep  Patient monitoring: EKG, continuous pulse oximetry and blood pressure monitoring  Central Line Procedure  Laterality:right  Location:internal jugular  Catheter Type:Rainier-Jostin  Catheter Size:7 Fr  Guidance:landmark technique  PROCEDURE NOTE/ULTRASOUND INTERPRETATION.  Using ultrasound guidance the potential vascular sites for insertion of the catheter were visualized to determine the patency of the vessel to be used for vascular access.  After selecting the appropriate site for insertion, the needle was visualized under ultrasound being inserted into the internal jugular vein, followed by ultrasound confirmation of wire and catheter placement. There were no abnormalities seen on ultrasound; an image was taken; and the patient tolerated the procedure with no complications.   Assessment  Post procedure:biopatch applied, line sutured and occlusive dressing applied  Complications:no  Patient Tolerance:patient tolerated the procedure well with no apparent complications  Additional Notes  Floated to 43cm

## 2022-05-10 NOTE — PROCEDURES
Pulmonary Function Test  Performed by: Leila Dias APRN  Authorized by: Leila Dias APRN      Pre Drug % Predicted    FVC: 50%   FEV1: 51%   FEF 25-75%: 56%   FEV1/FVC: 80%   T%   RV: 97%   DLCO: 112%   D/VAsb: 142%     Post Drug % Predicted    FVC: 51%   FEV1: 53%   FEF 25-75%: 59%   FEV1/FVC: 81%      Change in % (calculated):    FVC: 2   FEV1: 3   FEF 25-75%: 6   FEV1/FVC: 1    Interpretation   Spirometry   Spirometry shows moderate restriction. There is reduced midflow suggesting small airway/airflow obstruction. The patient's response to bronchodilators has insignificant change.   Lung Volume Measurements  Measurements show reduced lung volumes consistent with restriction.   Diffusion Capacity  The patient's diffusion capacity is normal.  Diffusion capacity is normal when corrected for alveolar volume.   Overall comments: The patient's spirometry is consistent with a moderate bordering on severe restrictive ventilatory defect with a coexisting decrease in midflows.  There is no significant change in spirometry postbronchodilator.  Lung volumes confirm a restrictive ventilatory defect with a decrease in the patient's FVC just into the severe range at 49% of predicted.  Diffusion capacity is within normal limits and when corrected for alveolar volume is supranormal.  The patient had some difficulty in performing the diffusion capacity maneuver which could affect the results of this study somewhat.

## 2022-05-10 NOTE — ANESTHESIA POSTPROCEDURE EVALUATION
Patient: Afia Adams    Procedure Summary     Date: 05/10/22 Room / Location: [facility] OR 04 / [facility] OR    Anesthesia Start: 0701 Anesthesia Stop: 1216    Procedure: CORONARY ARTERY BYPASS GRAFTING ENDOSCOPIC VEIN HARVEST         (CELL SAVER) (N/A Chest) Diagnosis:       Coronary artery disease of native artery of native heart with stable angina pectoris (HCC)      (Coronary artery disease of native artery of native heart with stable angina pectoris (HCC) [I25.118])    Surgeons: Brayden Melgar MD Provider: William Parham MD    Anesthesia Type: general ASA Status: 4          Anesthesia Type: general    Vitals  Vitals Value Taken Time   BP     Temp     Pulse 80 05/10/22 1213   Resp     SpO2 100 % 05/10/22 1213   Vitals shown include unvalidated device data.        Post Anesthesia Care and Evaluation    Patient location during evaluation: ICU  Patient participation: complete - patient cannot participate  Post-procedure mental status: Patient still intubated and sedated.  Pain score: 0  Pain management: adequate  Airway patency: patent  Anesthetic complications: No anesthetic complications  PONV Status: none  Cardiovascular status: acceptable  Respiratory status: acceptable and intubated  Hydration status: acceptable  No anesthesia care post op

## 2022-05-10 NOTE — OP NOTE
OPERATIVE NOTE  Afia Adams  1950  5/10/2022    PREOP DIAGNOSES:  Coronary artery disease of native artery of native heart with stable angina pectoris (HCC) [I25.118]    POSTOP DIAGNOSES:  Coronary artery disease of native artery of native heart with stable angina pectoris (HCC) [I25.118]    PROCEDURE:   CORONARY ARTERY BYPASS GRAFTING   ENDOSCOPIC VEIN HARVEST           LEFT INTERNAL MAMMARY ARTERY TO LEFT ANTERIOR DESCENDING CORONARY ARTERY  SAPHENOUS VEIN GRAFT FROM ASCENDING AORTA TO POSTERIOR LATERAL BRANCH  Negative pressure wound therapy (>50cm2, Prevena)    SURGEON: GERRY Melgar MD FACS RPVI    ASSISTANT: Puma Snider CFA provided critical assistance during the case including suctioning, exposure, retraction, and reduction of blood loss.    ANESTHESIA: General ET  General    ESTIMATED BLOOD LOSS: 500 ml     SPECIMEN:  None    COMPLICATIONS: None    DESCRIPTION OF OPERATION:   Patient taken to the operating room and placed in supine position. General endotracheal anesthesia was induced. Patient was prepped and draped in sterile fashion. Radial arterial line and right internal jugular central venous line and pulmonary catheter were placed by Anesthesia. Good quality LEFT saphenous vein was harvested using endoscopic technique.     Primary median sternotomy was performed. Pericardium was opened in inverted T fashion. Pedicle LEFT internal mammary artery was harvested using direct vision. Systemic heparin was given. Aorta was cannulated with 7fr Soft-Flow aortic cannula. Venous cannulation was performed with 29/36 two-stage venous cannula. 15Fr Retrograde cardioplegia cannula was inserted into the coronary sinus. Cardiopulmonary bypass was instituted at a flow of 2.2L/min/m2 for 91 min with the patient at 32°F throughout much of the perfusion. Aorta was cross clamped for a total of 72min. The antegrade cardioplegia needle was inserted into the anterolateral aspect of the ascending  aorta. Cold blood cardioplegia was infused into the aortic root and the coronary sinus for a total of 500 mL each. Subsequent doses of antegrade, retrograde and vein graft cardioplegia were given at 15-20 minute intervals. A warm dose of antegrade and retrograde cardioplegia were given prior to removal of the cross-clamp. The left ventricle was enlarged grade 2, right ventricle enlarged grade 3. LAD was 1.75mm and moderately diffusely diseased, PLB was 1.75 mm and moderately diffusely diseased. Suction device was placed to the apex to expose PLB. End segment of vein was anastomosed end-to-side to the PLB using 6-0 Prolene. Heart was returned to neutral position. Vein graft cut to length at the ascending aorta. LIMA was brought through a wide lateral pericardial slit and anastomosed end-to-side to the LAD. The LIMA pedicle was attached to the epicardium using 6-0 Prolene. The proximal end of the vein graft was anastomosed to a punched-out site on the ascending aorta using 6-0 Prolene. Usual debubbling techniques were employed. Vein graft marker was used. Hot shot was given. 2 atrial pacing wires were placed, 2 ventricular pacing wires were placed. Two 24 [place] multi-hole chest tubes were placed in the mediastinum extending into each pleural space, 28F inferior pericardial tube was placed. After resumption of regular rhythm, cardiopulmonary bypass was gradually discontinued, the antegrade and retrograde cannulas were removed. The venous cannula was removed. After maintaining satisfactory hemodynamics, the aortic cannula was removed. Full dose protamine reversal was used, tranexamic acid infusion was used. The left pericardium and pleura were repaired, mediastinal fat was closed over the ascending aorta and graft. Hemostasis was obtained. Incision was closed in layers of sternal wires, double wires, 2-0 PDS, 2-0 PDS, 4-0 Monocryl in the skin. Diffuse tissue edema. Prevena negative pressure wound dressing was  applied([id number]). Patient transferred to CCU in critical condition.          This document has been electronically signed by Brayden Melgar MD on May 10, 2022 11:31 CDT

## 2022-05-10 NOTE — ANESTHESIA PROCEDURE NOTES
Central Line      Patient reassessed immediately prior to procedure    Patient location during procedure: OR  Start time: 5/10/2022 7:17 AM  Stop Time:5/10/2022 8:24 AM  Indications: vascular access, central pressure monitoring and MD/Surgeon request  Staff  Anesthesiologist: William Parham MD  Preanesthetic Checklist  Completed: patient identified, IV checked, site marked, risks and benefits discussed, surgical consent, monitors and equipment checked, pre-op evaluation and timeout performed  Central Line Prep  Sterile Tech:cap, gloves, gown, mask and sterile barriers  Prep: chloraprep  Patient monitoring: EKG, continuous pulse oximetry and blood pressure monitoring  Central Line Procedure  Laterality:right  Location:internal jugular  Catheter Type:triple lumen  Catheter Size:7 Fr  Guidance:landmark technique  PROCEDURE NOTE/ULTRASOUND INTERPRETATION.  Using ultrasound guidance the potential vascular sites for insertion of the catheter were visualized to determine the patency of the vessel to be used for vascular access.  After selecting the appropriate site for insertion, the needle was visualized under ultrasound being inserted into the internal jugular vein, followed by ultrasound confirmation of wire and catheter placement. There were no abnormalities seen on ultrasound; an image was taken; and the patient tolerated the procedure with no complications.   Assessment  Post procedure:biopatch applied, line sutured and occlusive dressing applied  Assessement:blood return through all ports, free fluid flow and Yuan Test  Complications:no  Patient Tolerance:patient tolerated the procedure well with no apparent complications

## 2022-05-10 NOTE — ANESTHESIA PROCEDURE NOTES
Airway  Urgency: elective    Date/Time: 5/10/2022 7:13 AM  Airway not difficult    General Information and Staff    Patient location during procedure: OR    Indications and Patient Condition  Indications for airway management: airway protection    Preoxygenated: yes  MILS maintained throughout  Mask difficulty assessment: 1 - vent by mask    Final Airway Details  Final airway type: endotracheal airway      Successful airway: ETT  Cuffed: yes   Successful intubation technique: video laryngoscopy  Facilitating devices/methods: intubating stylet  Endotracheal tube insertion site: oral  Blade: [place]  Blade size: 3  ETT size (mm): 7.5  Cormack-Lehane Classification: grade I - full view of glottis  Placement verified by: chest auscultation and capnometry   Inital cuff pressure (cm H2O): 8  Measured from: lips  ETT/EBT  to lips (cm): 21  Number of attempts at approach: 1  Assessment: lips, teeth, and gum same as pre-op and atraumatic intubation

## 2022-05-11 ENCOUNTER — APPOINTMENT (OUTPATIENT)
Dept: GENERAL RADIOLOGY | Facility: HOSPITAL | Age: 72
End: 2022-05-11

## 2022-05-11 LAB
ALBUMIN SERPL-MCNC: 4.1 G/DL (ref 3.5–5.2)
ALBUMIN/GLOB SERPL: 2.1 G/DL
ALP SERPL-CCNC: 79 U/L (ref 39–117)
ALT SERPL W P-5'-P-CCNC: 6 U/L (ref 1–33)
ANION GAP SERPL CALCULATED.3IONS-SCNC: 10 MMOL/L (ref 5–15)
ARTERIAL PATENCY WRIST A: ABNORMAL
AST SERPL-CCNC: 24 U/L (ref 1–32)
ATMOSPHERIC PRESS: 750 MMHG
ATMOSPHERIC PRESS: 750 MMHG
ATMOSPHERIC PRESS: 751 MMHG
ATMOSPHERIC PRESS: 752 MMHG
BASE EXCESS BLDA CALC-SCNC: -0.9 MMOL/L (ref 0–2)
BASE EXCESS BLDA CALC-SCNC: -1.3 MMOL/L (ref 0–2)
BASE EXCESS BLDA CALC-SCNC: -1.4 MMOL/L (ref 0–2)
BASE EXCESS BLDA CALC-SCNC: -1.5 MMOL/L (ref 0–2)
BASOPHILS # BLD AUTO: 0.04 10*3/MM3 (ref 0–0.2)
BASOPHILS NFR BLD AUTO: 0.2 % (ref 0–1.5)
BDY SITE: ABNORMAL
BILIRUB SERPL-MCNC: 0.3 MG/DL (ref 0–1.2)
BUN SERPL-MCNC: 9 MG/DL (ref 8–23)
BUN/CREAT SERPL: 11.3 (ref 7–25)
CALCIUM SPEC-SCNC: 8.4 MG/DL (ref 8.6–10.5)
CHLORIDE SERPL-SCNC: 105 MMOL/L (ref 98–107)
CO2 SERPL-SCNC: 23 MMOL/L (ref 22–29)
CREAT SERPL-MCNC: 0.8 MG/DL (ref 0.57–1)
DEPRECATED RDW RBC AUTO: 45.9 FL (ref 37–54)
EGFRCR SERPLBLD CKD-EPI 2021: 79.4 ML/MIN/1.73
EOSINOPHIL # BLD AUTO: 0 10*3/MM3 (ref 0–0.4)
EOSINOPHIL NFR BLD AUTO: 0 % (ref 0.3–6.2)
EPAP: 6
ERYTHROCYTE [DISTWIDTH] IN BLOOD BY AUTOMATED COUNT: 15.7 % (ref 12.3–15.4)
GAS FLOW AIRWAY: 3 LPM
GAS FLOW AIRWAY: 3 LPM
GLOBULIN UR ELPH-MCNC: 2 GM/DL
GLUCOSE BLDC GLUCOMTR-MCNC: 120 MG/DL (ref 70–130)
GLUCOSE BLDC GLUCOMTR-MCNC: 122 MG/DL (ref 70–130)
GLUCOSE BLDC GLUCOMTR-MCNC: 130 MG/DL (ref 70–130)
GLUCOSE BLDC GLUCOMTR-MCNC: 132 MG/DL (ref 70–130)
GLUCOSE BLDC GLUCOMTR-MCNC: 135 MG/DL (ref 70–130)
GLUCOSE BLDC GLUCOMTR-MCNC: 138 MG/DL (ref 70–130)
GLUCOSE BLDC GLUCOMTR-MCNC: 139 MG/DL (ref 70–130)
GLUCOSE BLDC GLUCOMTR-MCNC: 143 MG/DL (ref 70–130)
GLUCOSE BLDC GLUCOMTR-MCNC: 143 MG/DL (ref 70–130)
GLUCOSE BLDC GLUCOMTR-MCNC: 146 MG/DL (ref 70–130)
GLUCOSE BLDC GLUCOMTR-MCNC: 147 MG/DL (ref 70–130)
GLUCOSE BLDC GLUCOMTR-MCNC: 157 MG/DL (ref 70–130)
GLUCOSE BLDC GLUCOMTR-MCNC: 158 MG/DL (ref 70–130)
GLUCOSE BLDC GLUCOMTR-MCNC: 159 MG/DL (ref 70–130)
GLUCOSE BLDC GLUCOMTR-MCNC: 161 MG/DL (ref 70–130)
GLUCOSE BLDC GLUCOMTR-MCNC: 162 MG/DL (ref 70–130)
GLUCOSE BLDC GLUCOMTR-MCNC: 166 MG/DL (ref 70–130)
GLUCOSE BLDC GLUCOMTR-MCNC: 172 MG/DL (ref 70–130)
GLUCOSE BLDC GLUCOMTR-MCNC: 176 MG/DL (ref 70–130)
GLUCOSE BLDC GLUCOMTR-MCNC: 184 MG/DL (ref 70–130)
GLUCOSE BLDC GLUCOMTR-MCNC: 186 MG/DL (ref 70–130)
GLUCOSE BLDC GLUCOMTR-MCNC: 191 MG/DL (ref 70–130)
GLUCOSE BLDC GLUCOMTR-MCNC: 199 MG/DL (ref 70–130)
GLUCOSE SERPL-MCNC: 149 MG/DL (ref 65–99)
HCO3 BLDA-SCNC: 24.3 MMOL/L (ref 20–26)
HCO3 BLDA-SCNC: 24.6 MMOL/L (ref 20–26)
HCO3 BLDA-SCNC: 25.2 MMOL/L (ref 20–26)
HCO3 BLDA-SCNC: 25.5 MMOL/L (ref 20–26)
HCT VFR BLD AUTO: 31.7 % (ref 34–46.6)
HGB BLD-MCNC: 10.2 G/DL (ref 12–15.9)
IMM GRANULOCYTES # BLD AUTO: 0.13 10*3/MM3 (ref 0–0.05)
IMM GRANULOCYTES NFR BLD AUTO: 0.7 % (ref 0–0.5)
INHALED O2 CONCENTRATION: 40 %
INHALED O2 CONCENTRATION: 45 %
INHALED O2 CONCENTRATION: <21 %
INHALED O2 CONCENTRATION: <21 %
INR PPP: 1.34 (ref 0.8–1.2)
IPAP: 14
LYMPHOCYTES # BLD AUTO: 0.76 10*3/MM3 (ref 0.7–3.1)
LYMPHOCYTES NFR BLD AUTO: 4.2 % (ref 19.6–45.3)
Lab: ABNORMAL
MAGNESIUM SERPL-MCNC: 2.2 MG/DL (ref 1.6–2.4)
MCH RBC QN AUTO: 26.2 PG (ref 26.6–33)
MCHC RBC AUTO-ENTMCNC: 32.2 G/DL (ref 31.5–35.7)
MCV RBC AUTO: 81.3 FL (ref 79–97)
MODALITY: ABNORMAL
MONOCYTES # BLD AUTO: 1.14 10*3/MM3 (ref 0.1–0.9)
MONOCYTES NFR BLD AUTO: 6.3 % (ref 5–12)
NEUTROPHILS NFR BLD AUTO: 15.94 10*3/MM3 (ref 1.7–7)
NEUTROPHILS NFR BLD AUTO: 88.6 % (ref 42.7–76)
NRBC BLD AUTO-RTO: 0 /100 WBC (ref 0–0.2)
PCO2 BLDA: 44.5 MM HG (ref 35–45)
PCO2 BLDA: 45.8 MM HG (ref 35–45)
PCO2 BLDA: 49.1 MM HG (ref 35–45)
PCO2 BLDA: 49.7 MM HG (ref 35–45)
PEEP RESPIRATORY: 5 CM[H2O]
PH BLDA: 7.31 PH UNITS (ref 7.35–7.45)
PH BLDA: 7.32 PH UNITS (ref 7.35–7.45)
PH BLDA: 7.34 PH UNITS (ref 7.35–7.45)
PH BLDA: 7.35 PH UNITS (ref 7.35–7.45)
PLATELET # BLD AUTO: 177 10*3/MM3 (ref 140–450)
PMV BLD AUTO: 10.2 FL (ref 6–12)
PO2 BLDA: 120 MM HG (ref 83–108)
PO2 BLDA: 139 MM HG (ref 83–108)
PO2 BLDA: 76.7 MM HG (ref 83–108)
PO2 BLDA: 79 MM HG (ref 83–108)
POTASSIUM SERPL-SCNC: 4.5 MMOL/L (ref 3.5–5.2)
PROT SERPL-MCNC: 6.1 G/DL (ref 6–8.5)
PROTHROMBIN TIME: 16.4 SECONDS (ref 11.1–15.3)
PSV: 8 CMH2O
RBC # BLD AUTO: 3.9 10*6/MM3 (ref 3.77–5.28)
SAO2 % BLDCOA: 95.5 % (ref 94–99)
SAO2 % BLDCOA: 95.5 % (ref 94–99)
SAO2 % BLDCOA: 99.2 % (ref 94–99)
SAO2 % BLDCOA: 99.5 % (ref 94–99)
SET MECH RESP RATE: 18
SODIUM SERPL-SCNC: 138 MMOL/L (ref 136–145)
VENTILATOR MODE: ABNORMAL
WBC NRBC COR # BLD: 18.01 10*3/MM3 (ref 3.4–10.8)

## 2022-05-11 PROCEDURE — 94799 UNLISTED PULMONARY SVC/PX: CPT

## 2022-05-11 PROCEDURE — 36600 WITHDRAWAL OF ARTERIAL BLOOD: CPT

## 2022-05-11 PROCEDURE — 94660 CPAP INITIATION&MGMT: CPT

## 2022-05-11 PROCEDURE — 94640 AIRWAY INHALATION TREATMENT: CPT

## 2022-05-11 PROCEDURE — 97166 OT EVAL MOD COMPLEX 45 MIN: CPT

## 2022-05-11 PROCEDURE — 85025 COMPLETE CBC W/AUTO DIFF WBC: CPT | Performed by: NURSE PRACTITIONER

## 2022-05-11 PROCEDURE — 83735 ASSAY OF MAGNESIUM: CPT | Performed by: NURSE PRACTITIONER

## 2022-05-11 PROCEDURE — 71045 X-RAY EXAM CHEST 1 VIEW: CPT

## 2022-05-11 PROCEDURE — 85610 PROTHROMBIN TIME: CPT | Performed by: NURSE PRACTITIONER

## 2022-05-11 PROCEDURE — 97162 PT EVAL MOD COMPLEX 30 MIN: CPT

## 2022-05-11 PROCEDURE — 25010000002 KETOROLAC TROMETHAMINE PER 15 MG: Performed by: NURSE PRACTITIONER

## 2022-05-11 PROCEDURE — 25010000002 ONDANSETRON PER 1 MG: Performed by: NURSE PRACTITIONER

## 2022-05-11 PROCEDURE — 93005 ELECTROCARDIOGRAM TRACING: CPT | Performed by: NURSE PRACTITIONER

## 2022-05-11 PROCEDURE — 82962 GLUCOSE BLOOD TEST: CPT

## 2022-05-11 PROCEDURE — 63710000001 INSULIN DETEMIR PER 5 UNITS: Performed by: NURSE PRACTITIONER

## 2022-05-11 PROCEDURE — 63710000001 INSULIN REGULAR HUMAN PER 5 UNITS: Performed by: NURSE PRACTITIONER

## 2022-05-11 PROCEDURE — 82803 BLOOD GASES ANY COMBINATION: CPT

## 2022-05-11 PROCEDURE — 94760 N-INVAS EAR/PLS OXIMETRY 1: CPT

## 2022-05-11 PROCEDURE — 25010000002 FUROSEMIDE PER 20 MG: Performed by: NURSE PRACTITIONER

## 2022-05-11 PROCEDURE — 93010 ELECTROCARDIOGRAM REPORT: CPT | Performed by: INTERNAL MEDICINE

## 2022-05-11 PROCEDURE — 80053 COMPREHEN METABOLIC PANEL: CPT | Performed by: NURSE PRACTITIONER

## 2022-05-11 PROCEDURE — 99024 POSTOP FOLLOW-UP VISIT: CPT | Performed by: NURSE PRACTITIONER

## 2022-05-11 PROCEDURE — 25010000002 CEFAZOLIN PER 500 MG: Performed by: NURSE PRACTITIONER

## 2022-05-11 RX ORDER — NICOTINE POLACRILEX 4 MG
15 LOZENGE BUCCAL
Status: DISCONTINUED | OUTPATIENT
Start: 2022-05-11 | End: 2022-05-17 | Stop reason: HOSPADM

## 2022-05-11 RX ORDER — ASCORBIC ACID 500 MG
500 TABLET ORAL 2 TIMES DAILY
Status: DISCONTINUED | OUTPATIENT
Start: 2022-05-11 | End: 2022-05-17 | Stop reason: HOSPADM

## 2022-05-11 RX ORDER — DEXTROSE MONOHYDRATE 25 G/50ML
25 INJECTION, SOLUTION INTRAVENOUS
Status: DISCONTINUED | OUTPATIENT
Start: 2022-05-11 | End: 2022-05-17 | Stop reason: HOSPADM

## 2022-05-11 RX ORDER — KETOROLAC TROMETHAMINE 10 MG/1
10 TABLET, FILM COATED ORAL EVERY 8 HOURS SCHEDULED
Status: DISCONTINUED | OUTPATIENT
Start: 2022-05-11 | End: 2022-05-14

## 2022-05-11 RX ORDER — INSULIN ASPART 100 [IU]/ML
0-24 INJECTION, SOLUTION INTRAVENOUS; SUBCUTANEOUS
Status: DISCONTINUED | OUTPATIENT
Start: 2022-05-11 | End: 2022-05-17 | Stop reason: HOSPADM

## 2022-05-11 RX ORDER — KETOROLAC TROMETHAMINE 30 MG/ML
30 INJECTION, SOLUTION INTRAMUSCULAR; INTRAVENOUS ONCE
Status: COMPLETED | OUTPATIENT
Start: 2022-05-11 | End: 2022-05-11

## 2022-05-11 RX ORDER — TRAMADOL HYDROCHLORIDE 50 MG/1
100 TABLET ORAL EVERY 4 HOURS PRN
Status: DISCONTINUED | OUTPATIENT
Start: 2022-05-11 | End: 2022-05-17 | Stop reason: HOSPADM

## 2022-05-11 RX ORDER — LOSARTAN POTASSIUM 50 MG/1
50 TABLET ORAL
Status: DISCONTINUED | OUTPATIENT
Start: 2022-05-11 | End: 2022-05-17

## 2022-05-11 RX ORDER — FUROSEMIDE 10 MG/ML
20 INJECTION INTRAMUSCULAR; INTRAVENOUS ONCE
Status: COMPLETED | OUTPATIENT
Start: 2022-05-11 | End: 2022-05-11

## 2022-05-11 RX ADMIN — ONDANSETRON 4 MG: 2 INJECTION INTRAMUSCULAR; INTRAVENOUS at 02:18

## 2022-05-11 RX ADMIN — Medication 400 MG: at 10:18

## 2022-05-11 RX ADMIN — CARVEDILOL 3.12 MG: 3.12 TABLET, FILM COATED ORAL at 17:00

## 2022-05-11 RX ADMIN — DOCUSATE SODIUM 50 MG AND SENNOSIDES 8.6 MG 2 TABLET: 8.6; 5 TABLET, FILM COATED ORAL at 02:08

## 2022-05-11 RX ADMIN — FAMOTIDINE 20 MG: 20 TABLET ORAL at 20:45

## 2022-05-11 RX ADMIN — IPRATROPIUM BROMIDE AND ALBUTEROL SULFATE 3 ML: 2.5; .5 SOLUTION RESPIRATORY (INHALATION) at 19:51

## 2022-05-11 RX ADMIN — OXYCODONE HYDROCHLORIDE AND ACETAMINOPHEN 500 MG: 500 TABLET ORAL at 10:18

## 2022-05-11 RX ADMIN — CLOPIDOGREL BISULFATE 75 MG: 75 TABLET ORAL at 08:10

## 2022-05-11 RX ADMIN — ASPIRIN 81 MG: 81 TABLET, FILM COATED ORAL at 08:10

## 2022-05-11 RX ADMIN — ONDANSETRON 4 MG: 2 INJECTION INTRAMUSCULAR; INTRAVENOUS at 11:51

## 2022-05-11 RX ADMIN — FAMOTIDINE 20 MG: 20 TABLET ORAL at 08:10

## 2022-05-11 RX ADMIN — CARVEDILOL 3.12 MG: 3.12 TABLET, FILM COATED ORAL at 08:10

## 2022-05-11 RX ADMIN — ACETAMINOPHEN 1000 MG: 500 TABLET, FILM COATED ORAL at 15:25

## 2022-05-11 RX ADMIN — CEFAZOLIN 2 G: 10 INJECTION, POWDER, FOR SOLUTION INTRAVENOUS; PARENTERAL at 03:07

## 2022-05-11 RX ADMIN — KETOROLAC TROMETHAMINE 10 MG: 10 TABLET, FILM COATED ORAL at 15:25

## 2022-05-11 RX ADMIN — POLYETHYLENE GLYCOL 3350 17 G: 17 POWDER, FOR SOLUTION ORAL at 09:00

## 2022-05-11 RX ADMIN — ACETAMINOPHEN 1000 MG: 500 TABLET, FILM COATED ORAL at 01:42

## 2022-05-11 RX ADMIN — ACETAMINOPHEN 1000 MG: 500 TABLET, FILM COATED ORAL at 06:50

## 2022-05-11 RX ADMIN — OXYCODONE 5 MG: 5 TABLET ORAL at 08:23

## 2022-05-11 RX ADMIN — HUMAN INSULIN 4 UNITS: 100 INJECTION, SOLUTION SUBCUTANEOUS at 09:20

## 2022-05-11 RX ADMIN — OXYCODONE HYDROCHLORIDE AND ACETAMINOPHEN 500 MG: 500 TABLET ORAL at 20:45

## 2022-05-11 RX ADMIN — DOCUSATE SODIUM 50 MG AND SENNOSIDES 8.6 MG 2 TABLET: 8.6; 5 TABLET, FILM COATED ORAL at 20:45

## 2022-05-11 RX ADMIN — CEFAZOLIN 2 G: 10 INJECTION, POWDER, FOR SOLUTION INTRAVENOUS; PARENTERAL at 12:12

## 2022-05-11 RX ADMIN — CEFAZOLIN 2 G: 10 INJECTION, POWDER, FOR SOLUTION INTRAVENOUS; PARENTERAL at 20:46

## 2022-05-11 RX ADMIN — FAMOTIDINE 20 MG: 20 TABLET ORAL at 02:08

## 2022-05-11 RX ADMIN — DOCUSATE SODIUM 50 MG AND SENNOSIDES 8.6 MG 2 TABLET: 8.6; 5 TABLET, FILM COATED ORAL at 08:10

## 2022-05-11 RX ADMIN — IPRATROPIUM BROMIDE AND ALBUTEROL SULFATE 3 ML: 2.5; .5 SOLUTION RESPIRATORY (INHALATION) at 07:25

## 2022-05-11 RX ADMIN — KETOROLAC TROMETHAMINE 30 MG: 30 INJECTION, SOLUTION INTRAMUSCULAR; INTRAVENOUS at 10:18

## 2022-05-11 RX ADMIN — INSULIN DETEMIR 25 UNITS: 100 INJECTION, SOLUTION SUBCUTANEOUS at 20:45

## 2022-05-11 RX ADMIN — FUROSEMIDE 20 MG: 10 INJECTION, SOLUTION INTRAMUSCULAR; INTRAVENOUS at 08:59

## 2022-05-11 RX ADMIN — IPRATROPIUM BROMIDE AND ALBUTEROL SULFATE 3 ML: 2.5; .5 SOLUTION RESPIRATORY (INHALATION) at 10:42

## 2022-05-11 RX ADMIN — ONDANSETRON 4 MG: 2 INJECTION INTRAMUSCULAR; INTRAVENOUS at 18:09

## 2022-05-11 RX ADMIN — LOSARTAN POTASSIUM 50 MG: 50 TABLET, FILM COATED ORAL at 10:18

## 2022-05-11 RX ADMIN — ACETAMINOPHEN 1000 MG: 500 TABLET, FILM COATED ORAL at 20:45

## 2022-05-11 RX ADMIN — MUPIROCIN 1 APPLICATION: 20 OINTMENT TOPICAL at 09:00

## 2022-05-11 RX ADMIN — LEVOTHYROXINE SODIUM 25 MCG: 25 TABLET ORAL at 06:11

## 2022-05-11 RX ADMIN — Medication 400 MG: at 20:45

## 2022-05-11 RX ADMIN — IPRATROPIUM BROMIDE AND ALBUTEROL SULFATE 3 ML: 2.5; .5 SOLUTION RESPIRATORY (INHALATION) at 14:40

## 2022-05-11 NOTE — PROGRESS NOTES
"  Cardiothoracic - Vascular Surgery Daily Note        LOS: 1 day   Patient Care Team:  Nieves Childress APRN as PCP - General (Family Medicine)    POD#1 CABGx2  BAUTISTA-LAD, SVG-PLB    Chief Complaint: CAD      Subjective     The following portions of the patient's history were reviewed and updated as appropriate: allergies, current medications, past family history, past medical history, past social history, past surgical history and problem list.     Subjective:  Symptoms:  Stable.  She reports chest pain (surgical).    Diet:  Poor intake.    Activity level: Impaired due to pain.    Pain:  She complains of pain that is moderate.  Pain is well controlled and requiring pain medication.          Objective     Vital Signs  Temp:  [95.3 °F (35.2 °C)-98.6 °F (37 °C)] 97.3 °F (36.3 °C)  Heart Rate:  [61-81] 63  Resp:  [12-26] 21  BP: (117-147)/(58-74) 146/69  Arterial Line BP: (103-151)/(50-72) 141/53  FiO2 (%):  [40 %-100 %] 40 %  Body mass index is 38.5 kg/m².    Intake/Output Summary (Last 24 hours) at 5/11/2022 0933  Last data filed at 5/11/2022 0800  Gross per 24 hour   Intake 2790.49 ml   Output 2759 ml   Net 31.49 ml     I/O this shift:  In: -   Out: 40 [Chest Tube:40]    Wt Readings from Last 3 Encounters:   05/11/22 86.5 kg (190 lb 9.6 oz)   05/05/22 84.4 kg (186 lb)   05/05/22 83.9 kg (185 lb)     CT 500cc serosanginous  No air leak, waterseal    Physical Exam   Objective:  General Appearance:  Comfortable and ill-appearing.    Vital signs: (most recent): Blood pressure 146/69, pulse 63, temperature 97.3 °F (36.3 °C), temperature source Temporal, resp. rate 21, height 149.9 cm (59\"), weight 86.5 kg (190 lb 9.6 oz), SpO2 100 %, not currently breastfeeding.  Vital signs are normal.  No fever.    Output: Producing urine and no stool output.    HEENT: Normal HEENT exam.    Lungs:  Normal effort and normal respiratory rate.  Breath sounds clear to auscultation.  There are decreased breath sounds.  (CT no air " leak)  Heart: Normal rate.  Regular rhythm.    Chest: (AV wires)  Abdomen: Abdomen is soft.  Hypoactive bowel sounds.     Neurological: Patient is alert and oriented to person, place and time.    Skin:  Warm and dry.  (Sternal provena intact  EVH CDI)              Results Review:    Lab Results   Component Value Date    WBC 18.01 (H) 05/11/2022    HGB 10.2 (L) 05/11/2022    HCT 31.7 (L) 05/11/2022    MCV 81.3 05/11/2022     05/11/2022     Lab Results   Component Value Date    GLUCOSE 149 (H) 05/11/2022    BUN 9 05/11/2022    CREATININE 0.80 05/11/2022    EGFRIFNONA 45 (L) 12/08/2021    EGFRIFAFRI  09/29/2020      Comment:      <15 Indicative of kidney failure.    BCR 11.3 05/11/2022    K 4.5 05/11/2022    CO2 23.0 05/11/2022    CALCIUM 8.4 (L) 05/11/2022    ALBUMIN 4.10 05/11/2022    AST 24 05/11/2022    ALT 6 05/11/2022       Calcium Calcium   Date/Time Value Ref Range Status   05/11/2022 0455 8.4 (L) 8.6 - 10.5 mg/dL Final   05/10/2022 1250 9.0 8.6 - 10.5 mg/dL Final      Magnesium Magnesium   Date/Time Value Ref Range Status   05/11/2022 0455 2.2 1.6 - 2.4 mg/dL Final   05/10/2022 1523 2.8 (H) 1.6 - 2.4 mg/dL Final   05/10/2022 1250 3.4 (H) 1.6 - 2.4 mg/dL Final        Imaging Results (Last 24 Hours)     Procedure Component Value Units Date/Time    XR Chest 1 View [[id number]] Collected: 05/11/22 0444     Updated: 05/11/22 0727    Narrative:        PROCEDURE: Single chest view portable    REASON FOR EXAM:Post-Op Heart Surgery, I25.118 Atherosclerotic  heart disease of native coronary artery with other forms of  angina pectoris    FINDINGS: Comparison exam dated May 10, 2022. Postsurgical  changes consistent with CABG. Bilateral chest tubes in place. No  pneumothorax. Right jugular [place]-Jostin catheter with tip within  the region of the main pulmonary artery. Mild cardiomegaly. Right  upper lobe perihilar as well as right lung base infrahilar small  interstitial groundglass opacities. Left lung base  infrahilar  small interstitial groundglass opacity. Lungs are otherwise  clear. No acute osseous abnormality.      Impression:      1.  Postsurgical changes consistent with CABG.  2.  Bilateral chest tubes in place. No pneumothorax.   3.  Right jugular Plaza-Jostin catheter with tip within the region  of the main pulmonary artery.  4.  Mild cardiomegaly.  5.  Right upper lobe perihilar as well as right lung base  infrahilar small interstitial groundglass opacities. Left lung  base infrahilar small interstitial groundglass opacity. The  differential for these findings would include very mild pulmonary  edema and/or subsegmental atelectasis versus less likely  pneumonia.    Electronically signed by:  Gordo Silva MD  5/11/2022 7:25 AM CDT  Workstation: [id number]    XR Chest 1 View [[id number]] Collected: 05/10/22 1244     Updated: 05/10/22 1342    Narrative:      EXAM DESCRIPTION:     XR CHEST 1 VW    CLINICAL HISTORY:     71 years  Female  Post-Op Check Line & Tube Placement, I25.118  Atherosclerotic heart disease of native coronary artery with  other forms of angina pectoris    COMPARISON:     May 5, 2022    TECHNIQUE:     One view-AP portable radiograph the chest    FINDINGS:     The lungs are well-expanded. The patient is status post interval  median sternotomy. Sternal wires are noted with bilateral chest  tubes and mediastinal drains as well as an endotracheal tube and  Plaza-Jostin catheter. The support tubes and lines are  well-positioned. There is no measurable pneumothorax. There is  mild left basilar atelectasis.      Impression:          1. Interval median sternotomy with satisfactory positioning of  the support tubes and lines.  2. Mild left basilar atelectasis.        Electronically signed by:  Sharlene Reed MD  5/10/2022 1:40 PM  CDT Workstation: [id number]                              acetaminophen, 1,000 mg, Oral, Q6H  vitamin C, 500 mg, Oral, BID  aspirin, 81 mg, Oral, Daily  carvedilol, 3.125 mg, Oral, BID  With Meals  ceFAZolin, 2 g, Intravenous, Q8H  clopidogrel, 75 mg, Oral, Daily  famotidine, 20 mg, Oral, BID  Insulin Aspart, 0-24 Units, Subcutaneous, TID AC  insulin detemir, 25 Units, Subcutaneous, Nightly  ipratropium-albuterol, 3 mL, Nebulization, 4x Daily - RT  ketorolac, 30 mg, Intravenous, Once  ketorolac, 10 mg, Oral, Q8H  levothyroxine, 25 mcg, Oral, Q AM  losartan, 50 mg, Oral, Q24H  magnesium oxide, 400 mg, Oral, BID  mupirocin, 1 application, Each Nare, Daily  polyethylene glycol, 17 g, Oral, Daily  senna-docusate sodium, 2 tablet, Oral, BID                 ASSESSMENT/PLAN     Active Hospital Problems    Diagnosis  POA   • **CAD (coronary artery disease) [I25.10]  Unknown     Hx of 2 stents placed by Dr. Kent in 2016        • Stage 3a chronic kidney disease ([facility]) [N18.31]  Yes            • Encounter for long-term current use of medication [Z79.899]  Not Applicable   • HFrEF (heart failure with reduced ejection fraction) ([facility]) [I50.20]  Yes   • Essential hypertension [I10]  Yes     Stable Post Op CABG: Progressing well. Lasix.     CAD: Medical Management: Aspirin, Plavix, Statin, Beta. ACE/ARB added.    Acute respiratory failure: hypoxic SpO2 <88% on RA. Increase work of breathing/poor effort. O2 support nasal cannula. Weaning as tolerated. Nebs PRN. Incentive Spirometry. Aggressive pulmonary toilet. OPEP    Expected blood loss anemia felix-operatively. Asymptomatic. Monitor CBC. Hemodynamically stable. Monitor CT output. Iron supplementation: deferred    Transient hyperglycemia post operative. Medical management: Insulin drip. Current FSBS 135. DC drip, transition to SSI coverage. Levemir nightly. Carb consistent diet.     Postop Pain Management: Scheduled Tylenol. Decrease OxyIR. Add Toradol. Tramadolol    Rehab: OOB to chair. Ambulate. PT/OT    Disposition: Stable. Transfer 3W            This document has been electronically signed by BILL Velasquez on May 11, 2022 09:38 CDT

## 2022-05-11 NOTE — PLAN OF CARE
Patient assessed multiple times for extubation criteria/readiness this night. Patient was placed on SBT PS 8/5 and 40% FiO2 at 0030 on 5/11/2022. Patient is awake, following commands, and maintaining her breathing on SBT. ABG collected to acceptable and expected results on SBT. Patient performed NIF maneuver and resulted [id number]. Patient has positive cuff leak at this time. Patient successfully extubated at 0114 with GISELE Quezada at bedside assisting. 3L nasal cannula placed on patient at this time, she is awake and communicating with staff at bedside.    Parker Durán RRT  05/11/2022 0114

## 2022-05-11 NOTE — PLAN OF CARE
Goal Outcome Evaluation:  Plan of Care Reviewed With: patient           Outcome Evaluation: PT eval completed. Patient up in chair and somewhat drowsy. Patient instructed on stacked breathing and sternal percautions and patient was able to show acceptable teach back with verbal cues. Bed mobility: Not tested patient up in chair. Transfers: ModAx2 for sit<>stand and bed<>chair RW. Anticipate SNF if patient is unable to progress and has difficulty with safety.

## 2022-05-11 NOTE — PLAN OF CARE
Goal Outcome Evaluation:  Plan of Care Reviewed With: patient           Outcome Evaluation: initial OT evaluation complete. co-eval with PT. pt was pleasant and cooperative throughout, but somewhat drowsy. complains of pain throughout. educated on sternal precautions, and requires verbal and tactile cues for proper adherance. pt was up in chair at beginning and end of session. mod A x 2 persons for sit to stand transfer and BSC transfer with use of RW. max A for toileting tasks. vital signs stable throughout. recommendfurther skilled OT services to address functional mobility and ADL deficits, as well as balance, strength, and endurance. recommend SNF placement at discharge before return home. goals established.

## 2022-05-11 NOTE — CONSULTS
"Adult Nutrition  Assessment    Patient Name:  Afia Adams  YOB: 1950  MRN: [id number]  Admit Date:  5/10/2022    Assessment Date:  5/11/2022    Comments:  Pt is post op CABG X 2.  She reports a poor appetite at this time.  Eating \"fair\" pta but denies any hx of wt loss  Her wt hx shows a relatively stable wt.  She denies any Gi distress or eating difficulties. Will add Boost GC to Optimize po and protein intake.  Nutrient dense foods and beverages encouraged at this time as well as at discharge promote good Nutrition and overall good health.  Supplements can be used if needed to achieve goals.        Reason for Assessment     Row Name 05/11/22 1303          Reason for Assessment    Reason For Assessment nurse/nurse practitioner consult     Diagnosis cardiac disease;surgery/postoperative complications     Identified At Risk by Screening Criteria no indicators present                Nutrition/Diet History     Row Name 05/11/22 1304          Nutrition/Diet History    Typical Intake (Food/Fluid/EN/PN) Pt reports a poor appetite at this time  Not hungry.  NO Gi distress just not hungry.  She will try Boost.  No eating difficulties                Anthropometrics     Row Name 05/11/22 1306 05/11/22 1237       Anthropometrics    Height -- 149.9 cm (59.02\")    Weight -- 86.6 kg (191 lb)    Height for Calculation 1.499 m (4' 11\") --    Weight for Calculation 84.4 kg (186 lb)  Standing scale wt on admit --    Row Name 05/11/22 0600          Anthropometrics    Weight 86.5 kg (190 lb 9.6 oz)                Labs/Tests/Procedures/Meds     Row Name 05/11/22 1305          Labs/Procedures/Meds    Lab Results Reviewed reviewed, pertinent     Lab Results Comments wnl            Diagnostic Tests/Procedures    Diagnostic Test/Procedure Reviewed reviewed, pertinent     Diagnostic Test/Procedures Comments CABG x 2            Medications    Pertinent Medications Reviewed reviewed, pertinent     Pertinent " "Medications Comments VitC; SSI; Levemir; MagOxide; Miralax; Pericolace                Physical Findings     Row Name 05/11/22 1305          Physical Findings    Overall Physical Appearance Pt sitting up in chair.                Estimated/Assessed Needs - Anthropometrics     Row Name 05/11/22 1306 05/11/22 1237       Anthropometrics    Height -- 149.9 cm (59.02\")    Weight -- 86.6 kg (191 lb)    Height for Calculation 1.499 m (4' 11\") --    Weight for Calculation 84.4 kg (186 lb)  Standing scale wt on admit --    Row Name 05/11/22 0600          Anthropometrics    Weight 86.5 kg (190 lb 9.6 oz)                Nutrition Prescription Ordered     Row Name 05/11/22 1306          Nutrition Prescription PO    Current PO Diet Regular  po diet just started     Common Modifiers Consistent Carbohydrate                       Electronically signed by:  Eda Douglas RD  05/11/22 13:10 CDT  "

## 2022-05-11 NOTE — PLAN OF CARE
Goal Outcome Evaluation:           Progress: improving         Up from CCU today; has been in normal sinus rhythm; up in chair since arriving to floor. C/o nausea, worse after getting up. Has had some incontinence, but up to bedside commode twice. Unable to void significantly on bedside toilet; pt bladder scanned and it was <300 ml. Needs significant help ambulating. Has ambulated an estimated 10ft total since arriving to 3W.

## 2022-05-11 NOTE — THERAPY EVALUATION
Patient Name: Afia Adams  : 1950    MRN: [id number]                              Today's Date: 2022       Admit Date: 5/10/2022    Visit Dx:     ICD-10-CM ICD-9-CM   1. Coronary artery disease of native artery of native heart with stable angina pectoris ([facility])  I25.118 414.01     413.9   2. Impaired mobility and ADLs  Z74.09 V49.89    Z78.9      Patient Active Problem List   Diagnosis   • Diabetes mellitus ([facility])   • Essential hypertension   • HFrEF (heart failure with reduced ejection fraction) ([facility])   • Encounter for long-term current use of medication   • CAD (coronary artery disease)   • Stage 3a chronic kidney disease ([facility])   • Lipoma of neck   • Anemia   • Elevated TSH   • History of PTCA   • Abnormal nuclear stress test   • Myocardial infarct, old   • Class 1 obesity due to excess calories with serious comorbidity and body mass index (BMI) of 32.0 to 32.9 in adult   • Mixed hyperlipidemia   • Abnormal findings on diagnostic imaging of other specified body structures    • Panlobular emphysema ([facility])     Past Medical History:   Diagnosis Date   • Abdominal pain 2015    unspecified   • Acute gastritis    • Acute on chronic systolic congestive heart failure (HCC) 2016   • Ankle pain 2015   • Asthma    • Calculus of kidney 10/09/2020    Added automatically from request for surgery [id number]   • Candidiasis, skin or nails 2011    controlled   • Chronic systolic congestive heart failure (HCC) 2016   • Congestive heart failure ([facility]) 2016   • Coronary arteriosclerosis 2016    multi-vessel, 2 stents, followed by Dr. Kent   • Cough 2014    proabably due to allergy, chest x ray is normal      • Diabetic ulcer of toe of right foot associated with type 2 diabetes mellitus (HCC) 10/24/2020    Follow up with podiatry outpatient    • Disease of thyroid gland    • Edema of foot 2016   • Encounter for long-term (current) drug use     therapy   •  Epigastric pain 12/03/2015   • Essential hypertension 06/21/2016   • GERD (gastroesophageal reflux disease)    • Gout    • Heart attack (HCC)     2016 2 stents by Dr. Kent still follows with him/last seen 6 -2020 told doing ok   • History of echocardiogram 03/14/2016    Left atrium normal with mild CLVH wit normal aortic root size.Evidence of posterolateral wall hypokinesis. Severely depressed LV systolic function of 20-25%.Mitral valve thickened Aortic sclerosis.Diastolic dysfunction   • Hyperlipidemia 03/31/2016    unspecified   • Hypertensive disorder 03/24/2016   • Left lower quadrant pain 07/12/2013    ruling out diverticular disease      • Myocardial infarction (HCC) 03/24/2016   • Nausea 11/20/2015   • Obstructive uropathy 09/29/2020   • Osteoarthritis of knee 06/23/2016   • Pain in limb 01/25/2011   • Pediculosis capitis 12/22/2011    controlled   • Peptic ulcer    • Pneumonia    • Polyuria 01/25/2011   • Pruritic rash 12/09/2014   • Superficial foreign body hip without major open wound, no infection 12/09/2011    ??? back   • Type 2 diabetes mellitus (HCC) 06/23/2016    new onset   • Weakness 06/23/2016   • Wears dentures    • Wears glasses      Past Surgical History:   Procedure Laterality Date   • CARDIAC CATHETERIZATION Left 4/29/2022    Procedure: Left Heart Cath;  Surgeon: Luke Kent MD;  Location: [facility] CATH INVASIVE LOCATION;  Service: Cardiology;  Laterality: Left;   • COLONOSCOPY W/ POLYPECTOMY  03/07/2016    Transverse colon polyps,descending polyps,Sigmoid polyps, all resected and retrieved. Diverticulosis without perforation or abscess without bleeding. Erica Thomas   • CYSTOSCOPY Left 11/4/2020    Procedure: CYSTOSCOPY; LEFT J-STENT REMOVAL             (STRETCHER)   FLEXIBLE SCOPE;  Surgeon: Richar Rojas MD;  Location: [facility] OR;  Service: Urology;  Laterality: Left;   • CYSTOSCOPY, URETEROSCOPY, RETROGRADE PYELOGRAM, STENT INSERTION Left 9/30/2020    Procedure: CYSTOSCOPY LEFT  URETEROSCOPY RETROGRADE PYELOGRAM STENT INSERTION;  Surgeon: Richar Rojas MD;  Location: [facility];  Service: Urology;  Laterality: Left;   • CYSTOSCOPY, URETEROSCOPY, RETROGRADE PYELOGRAM, STENT INSERTION Left 10/21/2020    Procedure: CYSTOSCOPY, LEFT RETROGRADE, URETEROSCOPY, LASER LITHOTRIPSY, STENT PLACEMENT;  Surgeon: Richar Rojas MD;  Location: [facility];  Service: Urology;  Laterality: Left;   • HYSTERECTOMY      ovary preserv   • INJECTION OF MEDICATION  09/11/2014    Celestone (betamethasone) (2)        General Information     Row Name 05/11/22 0854          OT Time and Intention    Document Type evaluation  -ME     Mode of Treatment co-treatment;occupational therapy;physical therapy  -ME     Row Name 05/11/22 0854          General Information    Patient Profile Reviewed yes  -ME     Prior Level of Function independent:;all household mobility;community mobility  -ME     Existing Precautions/Restrictions fall;sternal;oxygen therapy device and L/min  -ME     Barriers to Rehab medically complex;previous functional deficit  -ME     Row Name 05/11/22 0854          Living Environment    People in Home spouse  -ME     Row Name 05/11/22 0854          Home Main Entrance    Number of Stairs, Main Entrance two  -ME     Stair Railings, Main Entrance railings on both sides of stairs  -ME     Row Name 05/11/22 0854          Stairs Within Home, Primary    Stairs, Within Home, Primary regular toilet, sponge bath because she is unable to get into tub shower; has BSC and sock aid; household ambulation only; has a rollator but does not use  -ME     Number of Stairs, Within Home, Primary none  -ME     Row Name 05/11/22 0854          Cognition    Orientation Status (Cognition) oriented x 4  -ME     Row Name 05/11/22 0854          Safety Issues, Functional Mobility    Safety Issues Affecting Function (Mobility) awareness of need for assistance;insight into deficits/self-awareness;safety precaution awareness;safety  precautions follow-through/compliance  -ME     Impairments Affecting Function (Mobility) balance;strength;endurance/activity tolerance;pain;coordination  -ME           User Key  (r) = Recorded By, (t) = Taken By, (c) = Cosigned By    Initials Name Provider Type    ME Amrit Grant OTR/L Occupational Therapist                 Mobility/ADL's     Row Name 05/11/22 0854          Bed Mobility    Comment, (Bed Mobility) up in chair at beginning and end of session  -ME     Row Name 05/11/22 0854          Transfers    Transfers sit-stand transfer;toilet transfer  -ME     Sit-Stand [place] (Transfers) moderate assist (50% patient effort);2 person assist  -ME     [place] Level (Toilet Transfer) moderate assist (50% patient effort);2 person assist  -ME     Assistive Device (Toilet Transfer) commode, bedside without drop arms;walker, front-wheeled  -[facility] Name 05/11/22 0854          Sit-Stand Transfer    Assistive Device (Sit-Stand Transfers) walker, front-wheeled  -ME     Row Name 05/11/22 0854          Toilet Transfer    Type (Toilet Transfer) sit-stand;stand-sit  -ME     Row Name 05/11/22 0854          Activities of Daily Living    BADL Assessment/Intervention toileting  -ME     Row Name 05/11/22 0854          Toileting Assessment/Training    [place] Level (Toileting) adjust/manage clothing;perform perineal hygiene;maximum assist (25% patient effort)  -ME           User Key  (r) = Recorded By, (t) = Taken By, (c) = Cosigned By    Initials Name Provider Type    ME Amrit Grant OTR/L Occupational Therapist               Obj/Interventions     Row Name 05/11/22 0854          Sensory Assessment (Somatosensory)    Sensory Assessment (Somatosensory) UE sensation intact  -ME     Row Name 05/11/22 0854          Range of Motion Comprehensive    General Range of Motion no range of motion deficits identified;bilateral upper extremity ROM WFL  -ME     Comment, General Range of Motion tested within sternal  precaution ranges only  -ME     Row Name 05/11/22 0854          Strength Comprehensive (MMT)    General Manual Muscle Testing (MMT) Assessment other (see comments)  -ME     Comment, General Manual Muscle Testing (MMT) Assessment bilateral  strength is grossly 4/5, did not test overall strength; pt is right handed  -ME           User Key  (r) = Recorded By, (t) = Taken By, (c) = Cosigned By    Initials Name Provider Type    ME Amrit Grant, OTR/L Occupational Therapist               Goals/Plan     Row Name 05/11/22 0854          Transfer Goal 1 (OT)    Activity/Assistive Device (Transfer Goal 1, OT) toilet  -ME     [place] Level/Cues Needed (Transfer Goal 1, OT) supervision required  -ME     Time Frame (Transfer Goal 1, OT) long term goal (LTG);by discharge  -ME     Progress/Outcome (Transfer Goal 1, OT) goal not met  -ME     Row Name 05/11/22 0854          Bathing Goal 1 (OT)    Activity/Device (Bathing Goal 1, OT) lower body bathing  AD as needed  -ME     [place] Level/Cues Needed (Bathing Goal 1, OT) minimum assist (75% or more patient effort)  -ME     Time Frame (Bathing Goal 1, OT) long term goal (LTG);by discharge  -ME     Progress/Outcomes (Bathing Goal 1, OT) goal not met  -ME     Row Name 05/11/22 0854          Dressing Goal 1 (OT)    Activity/Device (Dressing Goal 1, OT) lower body dressing  AD as needed  -ME     [place]/Cues Needed (Dressing Goal 1, OT) minimum assist (75% or more patient effort)  -ME     Time Frame (Dressing Goal 1, OT) long term goal (LTG);by discharge  -ME     Progress/Outcome (Dressing Goal 1, OT) goal not met  -ME     Row Name 05/11/22 0854          Problem Specific Goal 1 (OT)    Problem Specific Goal 1 (OT) pt will verbalize and demonstrate proper understanding of sternal precautions with min verbal cues required  -ME     Time Frame (Problem Specific Goal 1, OT) long term goal (LTG);by discharge  -ME     Progress/Outcome (Problem Specific Goal 1, OT) goal not  met  -[facility] Name 05/11/22 0854          Therapy Assessment/Plan (OT)    Planned Therapy Interventions (OT) activity tolerance training;BADL retraining;adaptive equipment training;functional balance retraining;occupation/activity based interventions;cognitive/visual perception retraining;patient/caregiver education/training;ROM/therapeutic exercise;strengthening exercise;transfer/mobility retraining  -ME           User Key  (r) = Recorded By, (t) = Taken By, (c) = Cosigned By    Initials Name Provider Type    ME Amrit Grant OTR/L Occupational Therapist               Clinical Impression     [facility] Name 05/11/22 0854          Pain Assessment    Pretreatment Pain Rating 8/10  -ME     Posttreatment Pain Rating 8/10  -ME     Pain Location - chest  -ME     Pain Intervention(s) Ambulation/increased activity;Repositioned;Distraction  -ME     Row Name 05/11/22 0854          Plan of Care Review    Plan of Care Reviewed With patient  -ME     Row Name 05/11/22 0854          Therapy Assessment/Plan (OT)    Patient/Family Therapy Goal Statement (OT) to return home  -ME     Rehab Potential (OT) good, to achieve stated therapy goals  -ME     Criteria for Skilled Therapeutic Interventions Met (OT) yes;meets criteria;skilled treatment is necessary  -ME     Therapy Frequency (OT) daily  -ME     Predicted Duration of Therapy Intervention (OT) until d/c or all goals met  -[facility] Name 05/11/22 0854          Vital Signs    Pre Systolic BP Rehab 146  -ME     Pre Treatment Diastolic BP 69  -ME     Post Systolic BP Rehab 152  -ME     Post Treatment Diastolic BP 72  -ME     Pretreatment Heart Rate (beats/min) 66  -ME     Posttreatment Heart Rate (beats/min) 66  -ME     Pre SpO2 (%) 100  -ME     O2 Delivery Pre Treatment nasal cannula  -ME     Post SpO2 (%) 100  -ME     O2 Delivery Post Treatment nasal cannula  -ME     Pre Patient Position Sitting  -ME     Post Patient Position Sitting  -ME     Row Name 05/11/22 0854           Positioning and Restraints    Pre-Treatment Position sitting in chair/recliner  -ME     Post Treatment Position chair  -ME     In Chair notified nsg;reclined;call light within reach;encouraged to call for assist;patient within staff view  -ME           User Key  (r) = Recorded By, (t) = Taken By, (c) = Cosigned By    Initials Name Provider Type    Amrit Remy, OTR/L Occupational Therapist               Outcome Measures     Row Name 05/11/22 0854          How much help from another is currently needed...    Putting on and taking off regular lower body clothing? 2  -ME     Bathing (including washing, rinsing, and drying) 2  -ME     Toileting (which includes using toilet bed pan or urinal) 2  -ME     Putting on and taking off regular upper body clothing 2  -ME     Taking care of personal grooming (such as brushing teeth) 3  -ME     Eating meals 4  -ME     AM-PAC 6 Clicks Score (OT) 15  -ME     Row Name 05/11/22 0855          How much help from another person do you currently need...    Turning from your back to your side while in flat bed without using bedrails? 1  -LR     Moving from lying on back to sitting on the side of a flat bed without bedrails? 1  -LR     Moving to and from a bed to a chair (including a wheelchair)? 1  -LR     Standing up from a chair using your arms (e.g., wheelchair, bedside chair)? 1  -LR     Climbing 3-5 steps with a railing? 1  -LR     To walk in hospital room? 1  -LR     AM-PAC 6 Clicks Score (PT) 6  -LR     Highest level of mobility 2 --> Turned self in bed/ bed activities  -LR     Row Name 05/11/22 0855 05/11/22 0854       Functional Assessment    Outcome Measure Options AM-PAC 6 Clicks Basic Mobility (PT)  -LR AM-PAC 6 Clicks Daily Activity (OT)  -ME          User Key  (r) = Recorded By, (t) = Taken By, (c) = Cosigned By    Initials Name Provider Type    Kenyon Arthur Physical Therapist    Amrit Remy, OTR/L Occupational Therapist                Occupational Therapy  Education                 Title: PT OT SLP Therapies (In Progress)     Topic: Occupational Therapy (In Progress)     Point: ADL training (Not Started)     Description:   Instruct learner(s) on proper safety adaptation and remediation techniques during self care or transfers.   Instruct in proper use of assistive devices.              Learner Progress:  Not documented in this visit.          Point: Home exercise program (Not Started)     Description:   Instruct learner(s) on appropriate technique for monitoring, assisting and/or progressing therapeutic exercises/activities.              Learner Progress:  Not documented in this visit.          Point: Precautions (Done)     Description:   Instruct learner(s) on prescribed precautions during self-care and functional transfers.              Learning Progress Summary           Patient Acceptance, E, VU,NR by ME at 5/11/2022 0937    Comment: Educated on OT and POC. Educated to call for assistance. Educated on safety precautions. Educated on sternal precautions.                   Point: Body mechanics (Done)     Description:   Instruct learner(s) on proper positioning and spine alignment during self-care, functional mobility activities and/or exercises.              Learning Progress Summary           Patient Acceptance, E, VU,NR by ME at 5/11/2022 0937    Comment: Educated on OT and POC. Educated to call for assistance. Educated on safety precautions. Educated on sternal precautions.                               User Key     Initials Effective Dates Name Provider Type Discipline    ME 06/16/21 -  Amrit Grant OTR/L Occupational Therapist OT              OT Recommendation and Plan  Planned Therapy Interventions (OT): activity tolerance training, BADL retraining, adaptive equipment training, functional balance retraining, occupation/activity based interventions, cognitive/visual perception retraining, patient/caregiver education/training, ROM/therapeutic exercise,  strengthening exercise, transfer/mobility retraining  Therapy Frequency (OT): daily  Plan of Care Review  Plan of Care Reviewed With: patient  Outcome Evaluation: initial OT evaluation complete. co-eval with PT. pt was pleasant and cooperative throughout, but somewhat drowsy. complains of pain throughout. educated on sternal precautions, and requires verbal and tactile cues for proper adherance. pt was up in chair at beginning and end of session. mod A x 2 persons for sit to stand transfer and BSC transfer with use of RW. max A for toileting tasks. vital signs stable throughout. recommendfurther skilled OT services to address functional mobility and ADL deficits, as well as balance, strength, and endurance. recommend SNF placement at discharge before return home. goals established.     Time Calculation:    Time Calculation- OT     Row Name 05/11/22 1229             Time Calculation- OT    OT Start Time 0854  -ME      OT Stop Time 0938  -ME      OT Time Calculation (min) 44 min  -ME      OT Received On 05/11/22  -ME      OT Goal Re-Cert Due Date 05/24/22  -ME              Untimed Charges    OT Eval/Re-eval Minutes 44  -ME              Total Minutes    Untimed Charges Total Minutes 44  -ME       Total Minutes 44  -ME            User Key  (r) = Recorded By, (t) = Taken By, (c) = Cosigned By    Initials Name Provider Type    ME Amrit Grant OTR/L Occupational Therapist              Therapy Charges for Today     Code Description Service Date Service Provider Modifiers Qty    [id number]  OT EVAL MOD COMPLEXITY 3 5/11/2022 Amrit Grant OTR/L GO 1               Amrit Grant OTR/L  5/11/2022

## 2022-05-11 NOTE — PLAN OF CARE
Goal Outcome Evaluation:  Plan of Care Reviewed With: caregiver, patient        Progress: no change  Outcome Evaluation: Pt is s/p CABG X 2.  Po diet just stsarted.  She reports a poor appetite at this time.  Will add Bloost GC and monitor her po and POC

## 2022-05-11 NOTE — PLAN OF CARE
Goal Outcome Evaluation:              Pt has been drowsy and oriented x 4 through the PM. A line, odom, and PA cath removed this am. Urine output has been adequate. Pt is currently up to the chair and on bipap.VSS at this time.

## 2022-05-11 NOTE — SIGNIFICANT NOTE
Patient assessed at this time for readiness to wean/ be placed on SBT. Patient awakens when spoken to and follows commands, however, she is not wide awake. When attempting to switch her to an SBT PS 8/5 at this time, she became tachypneic with shallow tidal volumes. Will continue to monitor patient for readiness to extubate.

## 2022-05-11 NOTE — THERAPY EVALUATION
Patient Name: Afia Adams  : 1950    MRN: [id number]                              Today's Date: 2022       Admit Date: 5/10/2022    Visit Dx:     ICD-10-CM ICD-9-CM   1. Coronary artery disease of native artery of native heart with stable angina pectoris (HCC)  I25.118 414.01     413.9   2. Impaired mobility and ADLs  Z74.09 V49.89    Z78.9    3. Impaired functional mobility, balance, gait, and endurance  Z74.09 V49.89     Patient Active Problem List   Diagnosis   • Diabetes mellitus (Formerly [facility])   • Essential hypertension   • HFrEF (heart failure with reduced ejection fraction) (Formerly [facility])   • Encounter for long-term current use of medication   • CAD (coronary artery disease)   • Stage 3a chronic kidney disease (Formerly [facility])   • Lipoma of neck   • Anemia   • Elevated TSH   • History of PTCA   • Abnormal nuclear stress test   • Myocardial infarct, old   • Class 1 obesity due to excess calories with serious comorbidity and body mass index (BMI) of 32.0 to 32.9 in adult   • Mixed hyperlipidemia   • Abnormal findings on diagnostic imaging of other specified body structures    • Panlobular emphysema (HCC)     Past Medical History:   Diagnosis Date   • Abdominal pain 2015    unspecified   • Acute gastritis    • Acute on chronic systolic congestive heart failure (HCC) 2016   • Ankle pain 2015   • Asthma    • Calculus of kidney 10/09/2020    Added automatically from request for surgery [id number]   • Candidiasis, skin or nails 2011    controlled   • Chronic systolic congestive heart failure (HCC) 2016   • Congestive heart failure (HCC) 2016   • Coronary arteriosclerosis 2016    multi-vessel, 2 stents, followed by Dr. Kent   • Cough 2014    proabably due to allergy, chest x ray is normal      • Diabetic ulcer of toe of right foot associated with type 2 diabetes mellitus (HCC) 10/24/2020    Follow up with podiatry outpatient    • Disease of thyroid gland    • Edema of  foot 03/24/2016   • Encounter for long-term (current) drug use     therapy   • Epigastric pain 12/03/2015   • Essential hypertension 06/21/2016   • GERD (gastroesophageal reflux disease)    • Gout    • Heart attack (HCC)     2016 2 stents by Dr. Kent still follows with him/last seen 6 -2020 told doing ok   • History of echocardiogram 03/14/2016    Left atrium normal with mild CLVH wit normal aortic root size.Evidence of posterolateral wall hypokinesis. Severely depressed LV systolic function of 20-25%.Mitral valve thickened Aortic sclerosis.Diastolic dysfunction   • Hyperlipidemia 03/31/2016    unspecified   • Hypertensive disorder 03/24/2016   • Left lower quadrant pain 07/12/2013    ruling out diverticular disease      • Myocardial infarction (HCC) 03/24/2016   • Nausea 11/20/2015   • Obstructive uropathy 09/29/2020   • Osteoarthritis of knee 06/23/2016   • Pain in limb 01/25/2011   • Pediculosis capitis 12/22/2011    controlled   • Peptic ulcer    • Pneumonia    • Polyuria 01/25/2011   • Pruritic rash 12/09/2014   • Superficial foreign body hip without major open wound, no infection 12/09/2011    ??? back   • Type 2 diabetes mellitus (HCC) 06/23/2016    new onset   • Weakness 06/23/2016   • Wears dentures    • Wears glasses      Past Surgical History:   Procedure Laterality Date   • CARDIAC CATHETERIZATION Left 4/29/2022    Procedure: Left Heart Cath;  Surgeon: Luke Kent MD;  Location: [facility] CATH INVASIVE LOCATION;  Service: Cardiology;  Laterality: Left;   • COLONOSCOPY W/ POLYPECTOMY  03/07/2016    Transverse colon polyps,descending polyps,Sigmoid polyps, all resected and retrieved. Diverticulosis without perforation or abscess without bleeding. Erica Thomas   • CYSTOSCOPY Left 11/4/2020    Procedure: CYSTOSCOPY; LEFT J-STENT REMOVAL             (STRETCHER)   FLEXIBLE SCOPE;  Surgeon: Richar Rojas MD;  Location: [facility] OR;  Service: Urology;  Laterality: Left;   • CYSTOSCOPY, URETEROSCOPY,  RETROGRADE PYELOGRAM, STENT INSERTION Left 9/30/2020    Procedure: CYSTOSCOPY LEFT URETEROSCOPY RETROGRADE PYELOGRAM STENT INSERTION;  Surgeon: Richar Rojas MD;  Location: [facility];  Service: Urology;  Laterality: Left;   • CYSTOSCOPY, URETEROSCOPY, RETROGRADE PYELOGRAM, STENT INSERTION Left 10/21/2020    Procedure: CYSTOSCOPY, LEFT RETROGRADE, URETEROSCOPY, LASER LITHOTRIPSY, STENT PLACEMENT;  Surgeon: Richar Rojas MD;  Location: [facility];  Service: Urology;  Laterality: Left;   • HYSTERECTOMY      ovary preserv   • INJECTION OF MEDICATION  09/11/2014    Celestone (betamethasone) (2)        General Information     Row Name 05/11/22 0855          Physical Therapy Time and Intention    Document Type evaluation  -LR     Mode of Treatment individual therapy;physical therapy  -LR     Row Name 05/11/22 0855          General Information    Patient Profile Reviewed yes  -LR     Prior Level of Function independent:;all household mobility;gait;transfer;bed mobility  -LR     Existing Precautions/Restrictions fall;sternal  -LR     Barriers to Rehab medically complex;previous functional deficit  -LR     Row Name 05/11/22 0855          Living Environment    People in Home spouse  Son, dtr coming to assist  -LR     Row Name 05/11/22 0855          Home Main Entrance    Number of Stairs, Main Entrance two  -LR     Stair Railings, Main Entrance railings on both sides of stairs  -LR     Row Name 05/11/22 0855          Stairs Within Home, Primary    Stairs, Within Home, Primary Regular toliet, sponge bath because she cant get into tub/shower, Has BSC and sock aid, Household ambulation, has rollator but does not use  -LR     Number of Stairs, Within Home, Primary none  -LR     Row Name 05/11/22 0855          Cognition    Orientation Status (Cognition) oriented x 4  -LR     Row Name 05/11/22 0855          Safety Issues, Functional Mobility    Safety Issues Affecting Function (Mobility) ability to follow commands;at risk  behavior observed;awareness of need for assistance;safety precaution awareness;safety precautions follow-through/compliance;sequencing abilities  -LR     Impairments Affecting Function (Mobility) balance;endurance/activity tolerance;pain;shortness of breath;strength;coordination  -           User Key  (r) = Recorded By, (t) = Taken By, (c) = Cosigned By    Initials Name Provider Type    Kenyon Arthur Physical Therapist               Mobility     Row Name 05/11/22 0855          Bed Mobility    Bed Mobility supine-sit  -LR     Supine-Sit [place] (Bed Mobility) not tested  -     Comment, (Bed Mobility) patient up in chair  -     Row Name 05/11/22 0855          Bed-Chair Transfer    Bed-Chair [place] (Transfers) moderate assist (50% patient effort);2 person assist  -LR     Assistive Device (Bed-Chair Transfers) walker, front-wheeled  -     Row Name 05/11/22 0855          Sit-Stand Transfer    Sit-Stand [place] (Transfers) moderate assist (50% patient effort);2 person assist  -LR     Assistive Device (Sit-Stand Transfers) walker, front-wheeled  -     Row Name 05/11/22 0855          Gait/Stairs (Locomotion)    [place] Level (Gait) not tested  -           User Key  (r) = Recorded By, (t) = Taken By, (c) = Cosigned By    Initials Name Provider Type    LR Kenyon Lanza Physical Therapist               Obj/Interventions     Row Name 05/11/22 0855          Range of Motion Comprehensive    General Range of Motion no range of motion deficits identified  -     Row Name 05/11/22 0855          Strength Comprehensive (MMT)    Comment, General Manual Muscle Testing (MMT) Assessment BLE grossly 4/5 except hip flexion 3-/5  -     Row Name 05/11/22 0855          Sensory Assessment (Somatosensory)    Sensory Assessment (Somatosensory) sensation intact;LE sensation intact  -           User Key  (r) = Recorded By, (t) = Taken By, (c) = Cosigned By    Initials Name Provider Type    HEATHER Lanza  Kenyon Physical Therapist               Goals/Plan     Row Name 05/11/22 0855          Bed Mobility Goal 1 (PT)    Activity/Assistive Device (Bed Mobility Goal 1, PT) sit to supine;supine to sit  -LR     [place] Level/Cues Needed (Bed Mobility Goal 1, PT) standby assist  -LR     Time Frame (Bed Mobility Goal 1, PT) by discharge  -LR     Strategies/Barriers (Bed Mobility Goal 1, PT) HOB down and bed rails  -LR     Progress/Outcomes (Bed Mobility Goal 1, PT) goal not met  -LR     Row Name 05/11/22 0855          Transfer Goal 1 (PT)    Activity/Assistive Device (Transfer Goal 1, PT) sit-to-stand/stand-to-sit;bed-to-chair/chair-to-bed  -LR     [place] Level/Cues Needed (Transfer Goal 1, PT) independent  -LR     Time Frame (Transfer Goal 1, PT) by discharge  -LR     Progress/Outcome (Transfer Goal 1, PT) goal not met  -LR     Row Name 05/11/22 0855          Gait Training Goal 1 (PT)    Activity/Assistive Device (Gait Training Goal 1, PT) gait (walking locomotion);assistive device use  -LR     [place] Level (Gait Training Goal 1, PT) modified independence  -LR     Distance (Gait Training Goal 1, PT) 50'x1  -LR     Time Frame (Gait Training Goal 1, PT) by discharge  -LR     Progress/Outcome (Gait Training Goal 1, PT) goal not met  -LR     Row Name 05/11/22 0855          Stairs Goal 1 (PT)    Activity/Assistive Device (Stairs Goal 1, PT) stairs, all skills;ascending stairs;descending stairs  -LR     [place] Level/Cues Needed (Stairs Goal 1, PT) contact guard required  -LR     Number of Stairs (Stairs Goal 1, PT) 2  -LR     Time Frame (Stairs Goal 1, PT) by discharge  -LR     Progress/Outcome (Stairs Goal 1, PT) goal not met  -LR     Row Name 05/11/22 0855          Therapy Assessment/Plan (PT)    Planned Therapy Interventions (PT) balance training;bed mobility training;gait training;home exercise program;joint mobilization;lumbar stabilization;patient/family education;orthotic fitting/training;neuromuscular  re-education;motor coordination training;postural re-education;prosthetic fitting/training;ROM (range of motion);stair training;strengthening;stretching;transfer training;vestibular therapy;wheelchair management/propulsion training  -LR           User Key  (r) = Recorded By, (t) = Taken By, (c) = Cosigned By    Initials Name Provider Type    LR Kenyon Lanza Physical Therapist               Clinical Impression     Row Name 05/11/22 0855          Pain    Pretreatment Pain Rating 8/10  -LR     Posttreatment Pain Rating 8/10  -LR     Pain Location anterior  -LR     Pain Location - chest  -LR     Pain Intervention(s) Repositioned  -LR     Row Name 05/11/22 0855          Plan of Care Review    Plan of Care Reviewed With patient  -LR     Outcome Evaluation PT eval completed. Patient up in chair and somewhat drowsy. Patient instructed on stacked breathing and sternal percautions and patient was able to show acceptable teach back with verbal cues. Bed mobility: Not tested patient up in chair. Transfers: ModAx2 for sit<>stand and bed<>chair RW. Anticipate SNF if patient is unable to progress and has difficulty with safety.  -     Row Name 05/11/22 0855          Therapy Assessment/Plan (PT)    Patient/Family Therapy Goals Statement (PT) Patient wants to return home.  -LR     Rehab Potential (PT) good, to achieve stated therapy goals  -LR     Criteria for Skilled Interventions Met (PT) yes;meets criteria;skilled treatment is necessary  -LR     Therapy Frequency (PT) daily  -LR     Predicted Duration of Therapy Intervention (PT) Until d/c or until all goals met.  -LR     Row Name 05/11/22 0855          Vital Signs    Pre Systolic BP Rehab 146  -LR     Pre Treatment Diastolic BP 69  -LR     Post Systolic BP Rehab 152  -LR     Post Treatment Diastolic BP 72  -LR     Pretreatment Heart Rate (beats/min) 66  -LR     Posttreatment Heart Rate (beats/min) 66  -LR     Pre SpO2 (%) 100  -LR     O2 Delivery Pre Treatment supplemental  O2  -LR     Post SpO2 (%) 100  -LR     O2 Delivery Post Treatment supplemental O2  -LR     Pre Patient Position Sitting  -LR     Post Patient Position Sitting  -LR     Row Name 05/11/22 0855          Positioning and Restraints    Pre-Treatment Position sitting in chair/recliner  -LR     Post Treatment Position chair  -LR     In Chair notified nsg;encouraged to call for assist;call light within reach  -LR           User Key  (r) = Recorded By, (t) = Taken By, (c) = Cosigned By    Initials Name Provider Type    LR Kenyon Lanza Physical Therapist               Outcome Measures     Row Name 05/11/22 0855          How much help from another person do you currently need...    Turning from your back to your side while in flat bed without using bedrails? 1  -LR     Moving from lying on back to sitting on the side of a flat bed without bedrails? 1  -LR     Moving to and from a bed to a chair (including a wheelchair)? 1  -LR     Standing up from a chair using your arms (e.g., wheelchair, bedside chair)? 1  -LR     Climbing 3-5 steps with a railing? 1  -LR     To walk in hospital room? 1  -LR     AM-PAC 6 Clicks Score (PT) 6  -LR     Highest level of mobility 2 --> Turned self in bed/ bed activities  -LR     Row Name 05/11/22 0855 05/11/22 0854       Functional Assessment    Outcome Measure Options AM-PAC 6 Clicks Basic Mobility (PT)  -LR AM-PAC 6 Clicks Daily Activity (OT)  -ME          User Key  (r) = Recorded By, (t) = Taken By, (c) = Cosigned By    Initials Name Provider Type    LR Kenyon Lanza Physical Therapist    ME Amrit Grant OTR/L Occupational Therapist                             Physical Therapy Education                 Title: PT OT SLP Therapies (In Progress)     Topic: Physical Therapy (Done)     Point: Mobility training (Done)     Learning Progress Summary           Patient Acceptance, E,TB, VU,NR by LR at 5/11/2022 0936    Comment: Educated on PT POC, goals, stacked breathing, and sternal  percautions.                   Point: Home exercise program (Done)     Learning Progress Summary           Patient Acceptance, E,TB, VU,NR by LR at 5/11/2022 0936    Comment: Educated on PT POC, goals, stacked breathing, and sternal percautions.                   Point: Body mechanics (Done)     Learning Progress Summary           Patient Acceptance, E,TB, VU,NR by LR at 5/11/2022 0936    Comment: Educated on PT POC, goals, stacked breathing, and sternal percautions.                   Point: Precautions (Done)     Learning Progress Summary           Patient Acceptance, E,TB, VU,NR by LR at 5/11/2022 0936    Comment: Educated on PT POC, goals, stacked breathing, and sternal percautions.                               User Key     Initials Effective Dates Name Provider Type Discipline    LR 06/16/21 -  Kenyon Lanza Physical Therapist PT              PT Recommendation and Plan  Planned Therapy Interventions (PT): balance training, bed mobility training, gait training, home exercise program, joint mobilization, lumbar stabilization, patient/family education, orthotic fitting/training, neuromuscular re-education, motor coordination training, postural re-education, prosthetic fitting/training, ROM (range of motion), stair training, strengthening, stretching, transfer training, vestibular therapy, wheelchair management/propulsion training  Plan of Care Reviewed With: patient  Outcome Evaluation: PT eval completed. Patient up in chair and somewhat drowsy. Patient instructed on stacked breathing and sternal percautions and patient was able to show acceptable teach back with verbal cues. Bed mobility: Not tested patient up in chair. Transfers: ModAx2 for sit<>stand and bed<>chair RW. Anticipate SNF if patient is unable to progress and has difficulty with safety.     Time Calculation:    PT Charges     Row Name 05/11/22 1243             Time Calculation    Start Time 0855  -LR      Stop Time 0935  -LR      Time Calculation  (min) 40 min  -LR      PT Received On 05/11/22  -LR      PT Goal Re-Cert Due Date 05/24/22  -LR              Untimed Charges    PT Eval/Re-eval Minutes 40  -LR              Total Minutes    Untimed Charges Total Minutes 40  -LR       Total Minutes 40  -LR            User Key  (r) = Recorded By, (t) = Taken By, (c) = Cosigned By    Initials Name Provider Type    LR Kenyon Lanza Physical Therapist              Therapy Charges for Today     Code Description Service Date Service Provider Modifiers Qty    [id number] HC PT EVAL MOD COMPLEXITY 3 5/11/2022 Kenyon Lanza GP 1          PT G-Codes  Outcome Measure Options: AM-PAC 6 Clicks Basic Mobility (PT)  AM-PAC 6 Clicks Score (PT): 6  AM-PAC 6 Clicks Score (OT): 15    Kenyon Lanza  5/11/2022

## 2022-05-12 ENCOUNTER — APPOINTMENT (OUTPATIENT)
Dept: GENERAL RADIOLOGY | Facility: HOSPITAL | Age: 72
End: 2022-05-12

## 2022-05-12 LAB
GLUCOSE BLDC GLUCOMTR-MCNC: 105 MG/DL (ref 70–130)
GLUCOSE BLDC GLUCOMTR-MCNC: 120 MG/DL (ref 70–130)
GLUCOSE BLDC GLUCOMTR-MCNC: 186 MG/DL (ref 70–130)
GLUCOSE BLDC GLUCOMTR-MCNC: 291 MG/DL (ref 70–130)

## 2022-05-12 PROCEDURE — 94799 UNLISTED PULMONARY SVC/PX: CPT

## 2022-05-12 PROCEDURE — 63710000001 INSULIN DETEMIR PER 5 UNITS: Performed by: NURSE PRACTITIONER

## 2022-05-12 PROCEDURE — 97116 GAIT TRAINING THERAPY: CPT

## 2022-05-12 PROCEDURE — 25010000002 CEFAZOLIN PER 500 MG: Performed by: NURSE PRACTITIONER

## 2022-05-12 PROCEDURE — 25010000002 FUROSEMIDE PER 20 MG: Performed by: NURSE PRACTITIONER

## 2022-05-12 PROCEDURE — 71046 X-RAY EXAM CHEST 2 VIEWS: CPT

## 2022-05-12 PROCEDURE — 25010000002 ONDANSETRON PER 1 MG: Performed by: NURSE PRACTITIONER

## 2022-05-12 PROCEDURE — 94760 N-INVAS EAR/PLS OXIMETRY 1: CPT

## 2022-05-12 PROCEDURE — 97535 SELF CARE MNGMENT TRAINING: CPT

## 2022-05-12 PROCEDURE — 97530 THERAPEUTIC ACTIVITIES: CPT

## 2022-05-12 PROCEDURE — 63710000001 INSULIN ASPART PER 5 UNITS: Performed by: NURSE PRACTITIONER

## 2022-05-12 PROCEDURE — 99024 POSTOP FOLLOW-UP VISIT: CPT | Performed by: NURSE PRACTITIONER

## 2022-05-12 PROCEDURE — 82962 GLUCOSE BLOOD TEST: CPT

## 2022-05-12 RX ORDER — FUROSEMIDE 10 MG/ML
20 INJECTION INTRAMUSCULAR; INTRAVENOUS ONCE
Status: COMPLETED | OUTPATIENT
Start: 2022-05-12 | End: 2022-05-12

## 2022-05-12 RX ORDER — FUROSEMIDE 10 MG/ML
20 INJECTION INTRAMUSCULAR; INTRAVENOUS DAILY
Status: DISCONTINUED | OUTPATIENT
Start: 2022-05-12 | End: 2022-05-17 | Stop reason: HOSPADM

## 2022-05-12 RX ORDER — CARVEDILOL 6.25 MG/1
6.25 TABLET ORAL 2 TIMES DAILY WITH MEALS
Status: DISCONTINUED | OUTPATIENT
Start: 2022-05-12 | End: 2022-05-13

## 2022-05-12 RX ORDER — CALCIUM CARBONATE 200(500)MG
2 TABLET,CHEWABLE ORAL 3 TIMES DAILY PRN
Status: DISCONTINUED | OUTPATIENT
Start: 2022-05-12 | End: 2022-05-17 | Stop reason: HOSPADM

## 2022-05-12 RX ORDER — TAMSULOSIN HYDROCHLORIDE 0.4 MG/1
0.4 CAPSULE ORAL DAILY
Status: DISCONTINUED | OUTPATIENT
Start: 2022-05-12 | End: 2022-05-17 | Stop reason: HOSPADM

## 2022-05-12 RX ORDER — CARVEDILOL 3.12 MG/1
3.12 TABLET ORAL ONCE
Status: COMPLETED | OUTPATIENT
Start: 2022-05-12 | End: 2022-05-12

## 2022-05-12 RX ADMIN — FUROSEMIDE 20 MG: 10 INJECTION, SOLUTION INTRAMUSCULAR; INTRAVENOUS at 10:27

## 2022-05-12 RX ADMIN — LEVOTHYROXINE SODIUM 25 MCG: 25 TABLET ORAL at 06:00

## 2022-05-12 RX ADMIN — CARVEDILOL 6.25 MG: 6.25 TABLET, FILM COATED ORAL at 17:05

## 2022-05-12 RX ADMIN — Medication 400 MG: at 09:06

## 2022-05-12 RX ADMIN — CLOPIDOGREL BISULFATE 75 MG: 75 TABLET ORAL at 09:06

## 2022-05-12 RX ADMIN — LOSARTAN POTASSIUM 50 MG: 50 TABLET, FILM COATED ORAL at 09:06

## 2022-05-12 RX ADMIN — POLYETHYLENE GLYCOL 3350 17 G: 17 POWDER, FOR SOLUTION ORAL at 09:06

## 2022-05-12 RX ADMIN — ACETAMINOPHEN 1000 MG: 500 TABLET, FILM COATED ORAL at 21:11

## 2022-05-12 RX ADMIN — TAMSULOSIN HYDROCHLORIDE 0.4 MG: 0.4 CAPSULE ORAL at 10:27

## 2022-05-12 RX ADMIN — ONDANSETRON 4 MG: 2 INJECTION INTRAMUSCULAR; INTRAVENOUS at 04:38

## 2022-05-12 RX ADMIN — INSULIN DETEMIR 25 UNITS: 100 INJECTION, SOLUTION SUBCUTANEOUS at 21:12

## 2022-05-12 RX ADMIN — MUPIROCIN 1 APPLICATION: 20 OINTMENT TOPICAL at 09:06

## 2022-05-12 RX ADMIN — FAMOTIDINE 20 MG: 20 TABLET ORAL at 21:11

## 2022-05-12 RX ADMIN — IPRATROPIUM BROMIDE AND ALBUTEROL SULFATE 3 ML: 2.5; .5 SOLUTION RESPIRATORY (INHALATION) at 06:56

## 2022-05-12 RX ADMIN — CARVEDILOL 3.12 MG: 3.12 TABLET, FILM COATED ORAL at 09:06

## 2022-05-12 RX ADMIN — ASPIRIN 81 MG: 81 TABLET, FILM COATED ORAL at 09:06

## 2022-05-12 RX ADMIN — INSULIN ASPART 4 UNITS: 100 INJECTION, SOLUTION INTRAVENOUS; SUBCUTANEOUS at 10:32

## 2022-05-12 RX ADMIN — FUROSEMIDE 20 MG: 10 INJECTION, SOLUTION INTRAMUSCULAR; INTRAVENOUS at 14:51

## 2022-05-12 RX ADMIN — ACETAMINOPHEN 1000 MG: 500 TABLET, FILM COATED ORAL at 14:51

## 2022-05-12 RX ADMIN — OXYCODONE HYDROCHLORIDE AND ACETAMINOPHEN 500 MG: 500 TABLET ORAL at 09:06

## 2022-05-12 RX ADMIN — ONDANSETRON 4 MG: 2 INJECTION INTRAMUSCULAR; INTRAVENOUS at 14:51

## 2022-05-12 RX ADMIN — Medication 400 MG: at 21:11

## 2022-05-12 RX ADMIN — DOCUSATE SODIUM 50 MG AND SENNOSIDES 8.6 MG 2 TABLET: 8.6; 5 TABLET, FILM COATED ORAL at 09:06

## 2022-05-12 RX ADMIN — CARVEDILOL 3.12 MG: 3.12 TABLET, FILM COATED ORAL at 10:27

## 2022-05-12 RX ADMIN — KETOROLAC TROMETHAMINE 10 MG: 10 TABLET, FILM COATED ORAL at 06:00

## 2022-05-12 RX ADMIN — FAMOTIDINE 20 MG: 20 TABLET ORAL at 09:06

## 2022-05-12 RX ADMIN — KETOROLAC TROMETHAMINE 10 MG: 10 TABLET, FILM COATED ORAL at 21:13

## 2022-05-12 RX ADMIN — DOCUSATE SODIUM 50 MG AND SENNOSIDES 8.6 MG 2 TABLET: 8.6; 5 TABLET, FILM COATED ORAL at 21:11

## 2022-05-12 RX ADMIN — OXYCODONE HYDROCHLORIDE AND ACETAMINOPHEN 500 MG: 500 TABLET ORAL at 21:11

## 2022-05-12 RX ADMIN — CEFAZOLIN 2 G: 10 INJECTION, POWDER, FOR SOLUTION INTRAVENOUS; PARENTERAL at 04:08

## 2022-05-12 RX ADMIN — CALCIUM CARBONATE (ANTACID) CHEW TAB 500 MG 2 TABLET: 500 CHEW TAB at 22:45

## 2022-05-12 RX ADMIN — ONDANSETRON 4 MG: 2 INJECTION INTRAMUSCULAR; INTRAVENOUS at 21:11

## 2022-05-12 RX ADMIN — KETOROLAC TROMETHAMINE 10 MG: 10 TABLET, FILM COATED ORAL at 14:51

## 2022-05-12 RX ADMIN — ACETAMINOPHEN 1000 MG: 500 TABLET, FILM COATED ORAL at 09:07

## 2022-05-12 RX ADMIN — IPRATROPIUM BROMIDE AND ALBUTEROL SULFATE 3 ML: 2.5; .5 SOLUTION RESPIRATORY (INHALATION) at 14:08

## 2022-05-12 RX ADMIN — IPRATROPIUM BROMIDE AND ALBUTEROL SULFATE 3 ML: 2.5; .5 SOLUTION RESPIRATORY (INHALATION) at 20:14

## 2022-05-12 NOTE — PLAN OF CARE
Goal Outcome Evaluation:  Plan of Care Reviewed With: patient           Outcome Evaluation: sit-stand mod of 2,sit-stand min of 2;amb 15'x2 with rw and min of 2;reviewed hep/sternal precautions with pt

## 2022-05-12 NOTE — THERAPY TREATMENT NOTE
Patient Name: Afia Adams  : 1950    MRN: [id number]                              Today's Date: 2022       Admit Date: 5/10/2022    Visit Dx:     ICD-10-CM ICD-9-CM   1. Coronary artery disease of native artery of native heart with stable angina pectoris (HCC)  I25.118 414.01     413.9   2. Impaired mobility and ADLs  Z74.09 V49.89    Z78.9    3. Impaired functional mobility, balance, gait, and endurance  Z74.09 V49.89     Patient Active Problem List   Diagnosis   • Diabetes mellitus ([facility])   • Essential hypertension   • HFrEF (heart failure with reduced ejection fraction) ([facility])   • Encounter for long-term current use of medication   • CAD (coronary artery disease)   • Stage 3a chronic kidney disease ([facility])   • Lipoma of neck   • Anemia   • Elevated TSH   • History of PTCA   • Abnormal nuclear stress test   • Myocardial infarct, old   • Class 1 obesity due to excess calories with serious comorbidity and body mass index (BMI) of 32.0 to 32.9 in adult   • Mixed hyperlipidemia   • Abnormal findings on diagnostic imaging of other specified body structures    • Panlobular emphysema (HCC)     Past Medical History:   Diagnosis Date   • Abdominal pain 2015    unspecified   • Acute gastritis    • Acute on chronic systolic congestive heart failure (HCC) 2016   • Ankle pain 2015   • Asthma    • Calculus of kidney 10/09/2020    Added automatically from request for surgery [id number]   • Candidiasis, skin or nails 2011    controlled   • Chronic systolic congestive heart failure (HCC) 2016   • Congestive heart failure (HCC) 2016   • Coronary arteriosclerosis 2016    multi-vessel, 2 stents, followed by Dr. Kent   • Cough 2014    proabably due to allergy, chest x ray is normal      • Diabetic ulcer of toe of right foot associated with type 2 diabetes mellitus (HCC) 10/24/2020    Follow up with podiatry outpatient    • Disease of thyroid gland    • Edema of  foot 03/24/2016   • Encounter for long-term (current) drug use     therapy   • Epigastric pain 12/03/2015   • Essential hypertension 06/21/2016   • GERD (gastroesophageal reflux disease)    • Gout    • Heart attack (HCC)     2016 2 stents by Dr. Kent still follows with him/last seen 6 -2020 told doing ok   • History of echocardiogram 03/14/2016    Left atrium normal with mild CLVH wit normal aortic root size.Evidence of posterolateral wall hypokinesis. Severely depressed LV systolic function of 20-25%.Mitral valve thickened Aortic sclerosis.Diastolic dysfunction   • Hyperlipidemia 03/31/2016    unspecified   • Hypertensive disorder 03/24/2016   • Left lower quadrant pain 07/12/2013    ruling out diverticular disease      • Myocardial infarction (HCC) 03/24/2016   • Nausea 11/20/2015   • Obstructive uropathy 09/29/2020   • Osteoarthritis of knee 06/23/2016   • Pain in limb 01/25/2011   • Pediculosis capitis 12/22/2011    controlled   • Peptic ulcer    • Pneumonia    • Polyuria 01/25/2011   • Pruritic rash 12/09/2014   • Superficial foreign body hip without major open wound, no infection 12/09/2011    ??? back   • Type 2 diabetes mellitus (HCC) 06/23/2016    new onset   • Weakness 06/23/2016   • Wears dentures    • Wears glasses      Past Surgical History:   Procedure Laterality Date   • CARDIAC CATHETERIZATION Left 4/29/2022    Procedure: Left Heart Cath;  Surgeon: Luke Kent MD;  Location: [facility] CATH INVASIVE LOCATION;  Service: Cardiology;  Laterality: Left;   • COLONOSCOPY W/ POLYPECTOMY  03/07/2016    Transverse colon polyps,descending polyps,Sigmoid polyps, all resected and retrieved. Diverticulosis without perforation or abscess without bleeding. Erica Thomas   • CYSTOSCOPY Left 11/4/2020    Procedure: CYSTOSCOPY; LEFT J-STENT REMOVAL             (STRETCHER)   FLEXIBLE SCOPE;  Surgeon: Richar Rojas MD;  Location: [facility] OR;  Service: Urology;  Laterality: Left;   • CYSTOSCOPY, URETEROSCOPY,  RETROGRADE PYELOGRAM, STENT INSERTION Left 9/30/2020    Procedure: CYSTOSCOPY LEFT URETEROSCOPY RETROGRADE PYELOGRAM STENT INSERTION;  Surgeon: Richar Rojas MD;  Location: [facility];  Service: Urology;  Laterality: Left;   • CYSTOSCOPY, URETEROSCOPY, RETROGRADE PYELOGRAM, STENT INSERTION Left 10/21/2020    Procedure: CYSTOSCOPY, LEFT RETROGRADE, URETEROSCOPY, LASER LITHOTRIPSY, STENT PLACEMENT;  Surgeon: Richar Rojas MD;  Location: [facility];  Service: Urology;  Laterality: Left;   • HYSTERECTOMY      ovary preserv   • INJECTION OF MEDICATION  09/11/2014    Celestone (betamethasone) (2)        General Information     Row Name 05/12/22 1100          OT Time and Intention    Document Type therapy note (daily note)  -CS     Mode of Treatment occupational therapy  -CS     Row Name 05/12/22 1100          General Information    Patient Profile Reviewed yes  -CS     Existing Precautions/Restrictions fall;sternal  -CS     Row Name 05/12/22 1100          Cognition    Orientation Status (Cognition) oriented x 4  -CS     Row Name 05/12/22 1100          Safety Issues, Functional Mobility    Impairments Affecting Function (Mobility) balance;endurance/activity tolerance;pain;shortness of breath;strength;coordination  -           User Key  (r) = Recorded By, (t) = Taken By, (c) = Cosigned By    Initials Name Provider Type    CS Vanessa Petit COTA Occupational Therapist Assistant                 Mobility/ADL's     Row Name 05/12/22 1100          Bed Mobility    Bed Mobility supine-sit  -     Supine-Sit [place] (Bed Mobility) minimum assist (75% patient effort)  -     Assistive Device (Bed Mobility) head of bed elevated  -     Row Name 05/12/22 1100          Transfers    Transfers sit-stand transfer;bed-chair transfer;stand-sit transfer;toilet transfer  -     Bed-Chair [place] (Transfers) minimum assist (75% patient effort);moderate assist (50% patient effort)  -CS     Assistive Device (Bed-Chair  Transfers) walker, front-wheeled  -CS     Sit-Stand [place] (Transfers) minimum assist (75% patient effort)  -CS     Stand-Sit [place] (Transfers) minimum assist (75% patient effort)  -CS     [place] Level (Toilet Transfer) minimum assist (75% patient effort);moderate assist (50% patient effort)  -CS     Assistive Device (Toilet Transfer) commode, bedside without drop arms;walker, front-wheeled  -CS     Row Name 05/12/22 1100          Sit-Stand Transfer    Assistive Device (Sit-Stand Transfers) walker, front-wheeled  -CS     Row Name 05/12/22 1100          Stand-Sit Transfer    Assistive Device (Stand-Sit Transfers) walker, front-wheeled  -CS     Row Name 05/12/22 1100          Toilet Transfer    Type (Toilet Transfer) sit-stand;stand-sit  -CS     Row Name 05/12/22 1100          Functional Mobility    Functional Mobility- Ind. Level minimum assist (75% patient effort)  -     Functional Mobility- Device walker, front-wheeled  -CS     Functional Mobility-Distance (Feet) 5  -CS     Row Name 05/12/22 1100          Activities of Daily Living    BADL Assessment/Intervention grooming;toileting;feeding  -CS     Row Name 05/12/22 1100          Toileting Assessment/Training    [place] Level (Toileting) toileting skills;moderate assist (50% patient effort)  -CS     Position (Toileting) unsupported sitting  -CS     Row Name 05/12/22 1100          Grooming Assessment/Training    [place] Level (Grooming) grooming skills;minimum assist (75% patient effort)  -CS     Position (Grooming) supported sitting  -CS     Row Name 05/12/22 1100          Self-Feeding Assessment/Training    [place] Level (Feeding) feeding skills;set up  -CS     Assistive Devices (Feeding) elongated straw  -CS     Position (Self-Feeding) supported sitting  -CS           User Key  (r) = Recorded By, (t) = Taken By, (c) = Cosigned By    Initials Name Provider Type    CS Vanessa Petit COTA Occupational Therapist Assistant                Obj/Interventions    No documentation.                Goals/Plan     Row Name 05/12/22 1100          Transfer Goal 1 (OT)    Activity/Assistive Device (Transfer Goal 1, OT) toilet  -CS     [place] Level/Cues Needed (Transfer Goal 1, OT) supervision required  -CS     Time Frame (Transfer Goal 1, OT) long term goal (LTG);by discharge  -CS     Progress/Outcome (Transfer Goal 1, OT) goal not met  -CS     Row Name 05/12/22 1100          Bathing Goal 1 (OT)    Activity/Device (Bathing Goal 1, OT) lower body bathing  AD as needed  -CS     [place] Level/Cues Needed (Bathing Goal 1, OT) minimum assist (75% or more patient effort)  -CS     Time Frame (Bathing Goal 1, OT) long term goal (LTG);by discharge  -CS     Progress/Outcomes (Bathing Goal 1, OT) goal not met  -CS     Row Name 05/12/22 1100          Dressing Goal 1 (OT)    Activity/Device (Dressing Goal 1, OT) lower body dressing  AD as needed  -CS     [place]/Cues Needed (Dressing Goal 1, OT) minimum assist (75% or more patient effort)  -CS     Time Frame (Dressing Goal 1, OT) long term goal (LTG);by discharge  -CS     Progress/Outcome (Dressing Goal 1, OT) goal not met  -CS     Row Name 05/12/22 1100          Problem Specific Goal 1 (OT)    Problem Specific Goal 1 (OT) pt will verbalize and demonstrate proper understanding of sternal precautions with min verbal cues required  -CS     Time Frame (Problem Specific Goal 1, OT) long term goal (LTG);by discharge  -CS     Progress/Outcome (Problem Specific Goal 1, OT) goal not met  -CS           User Key  (r) = Recorded By, (t) = Taken By, (c) = Cosigned By    Initials Name Provider Type    CS Vanessa Petit COTA Occupational Therapist Assistant               Clinical Impression     Row Name 05/12/22 1100          Pain Assessment    Pretreatment Pain Rating 5/10  -CS     Posttreatment Pain Rating 5/10  -CS     Pain Location - chest  -CS     Pain Intervention(s) Repositioned;Rest  -CS     Row Name  05/12/22 1100          Plan of Care Review    Plan of Care Reviewed With patient  -CS     Progress improving  -CS     Outcome Evaluation Pt tolerated tx well this date. Pt was min A with sup-sit. Pt was min/mod A with sit-stand-sit. Pt was min/mod  A with bed-chair and toilet t/f. Pt was was min A with grooming task. Pt was educated on sternal precautions. Continue OT POC.  -CS     Row Name 05/12/22 1100          Therapy Assessment/Plan (OT)    Rehab Potential (OT) good, to achieve stated therapy goals  -CS     Criteria for Skilled Therapeutic Interventions Met (OT) yes;meets criteria;skilled treatment is necessary  -CS     Therapy Frequency (OT) daily  -CS     Row Name 05/12/22 1100          Vital Signs    Pre Patient Position Supine  -CS     Post Patient Position Sitting  -CS     Row Name 05/12/22 1100          Positioning and Restraints    Pre-Treatment Position in bed  -CS     Post Treatment Position chair  -CS     In Chair reclined;call light within reach;encouraged to call for assist;exit alarm on  -CS           User Key  (r) = Recorded By, (t) = Taken By, (c) = Cosigned By    Initials Name Provider Type    Vanessa Jerez COTA Occupational Therapist Assistant               Outcome Measures     Row Name 05/12/22 1100          How much help from another is currently needed...    Putting on and taking off regular lower body clothing? 2  -CS     Bathing (including washing, rinsing, and drying) 2  -CS     Toileting (which includes using toilet bed pan or urinal) 2  -CS     Putting on and taking off regular upper body clothing 2  -CS     Taking care of personal grooming (such as brushing teeth) 3  -CS     Eating meals 4  -CS     AM-PAC 6 Clicks Score (OT) 15  -CS           User Key  (r) = Recorded By, (t) = Taken By, (c) = Cosigned By    Initials Name Provider Type    Vanessa Jerez COTA Occupational Therapist Assistant                Occupational Therapy Education                 Title: PT OT SLP  Therapies (In Progress)     Topic: Occupational Therapy (In Progress)     Point: ADL training (Not Started)     Description:   Instruct learner(s) on proper safety adaptation and remediation techniques during self care or transfers.   Instruct in proper use of assistive devices.              Learner Progress:  Not documented in this visit.          Point: Home exercise program (Not Started)     Description:   Instruct learner(s) on appropriate technique for monitoring, assisting and/or progressing therapeutic exercises/activities.              Learner Progress:  Not documented in this visit.          Point: Precautions (Done)     Description:   Instruct learner(s) on prescribed precautions during self-care and functional transfers.              Learning Progress Summary           Patient Acceptance, E, VU,NR by ME at 5/11/2022 0937    Comment: Educated on OT and POC. Educated to call for assistance. Educated on safety precautions. Educated on sternal precautions.                   Point: Body mechanics (Done)     Description:   Instruct learner(s) on proper positioning and spine alignment during self-care, functional mobility activities and/or exercises.              Learning Progress Summary           Patient Acceptance, E, VU,NR by ME at 5/11/2022 0937    Comment: Educated on OT and POC. Educated to call for assistance. Educated on safety precautions. Educated on sternal precautions.                               User Key     Initials Effective Dates Name Provider Type Discipline    ME 06/16/21 -  Amrit Grant, OTR/L Occupational Therapist OT              OT Recommendation and Plan  Therapy Frequency (OT): daily  Plan of Care Review  Plan of Care Reviewed With: patient  Progress: improving  Outcome Evaluation: Pt tolerated tx well this date. Pt was min A with sup-sit. Pt was min/mod A with sit-stand-sit. Pt was min/mod  A with bed-chair and toilet t/f. Pt was was min A with grooming task. Pt was educated on  sternal precautions. Continue OT POC.     Time Calculation:    Time Calculation- OT     Row Name 05/12/22 1327             Time Calculation- OT    OT Start Time 1100  -CS      OT Stop Time 1153  -CS      OT Time Calculation (min) 53 min  -CS      Total Timed Code Minutes- OT 53 minute(s)  -CS      OT Received On 05/12/22  -CS              Timed Charges    93139 - OT Therapeutic Activity Minutes 23  -CS      12759 - OT Self Care/Mgmt Minutes 30  -CS              Total Minutes    Timed Charges Total Minutes 53  -CS       Total Minutes 53  -CS            User Key  (r) = Recorded By, (t) = Taken By, (c) = Cosigned By    Initials Name Provider Type    CS Vanessa Petit COTA Occupational Therapist Assistant              Therapy Charges for Today     Code Description Service Date Service Provider Modifiers Qty    [id number] HC OT THERAPEUTIC ACT EA 15 MIN 5/12/2022 Vanessa Petit COTA GO 2    [id number] HC OT SELF CARE/MGMT/TRAIN EA 15 MIN 5/12/2022 Vanessa Petit COTA GO 2               DUNCAN Becerril  5/12/2022

## 2022-05-12 NOTE — THERAPY TREATMENT NOTE
Acute Care - Physical Therapy Treatment Note  [facility]     Patient Name: Afia Adams  : 1950  MRN: [id number]  Today's Date: 2022      Visit Dx:     ICD-10-CM ICD-9-CM   1. Coronary artery disease of native artery of native heart with stable angina pectoris (HCC)  I25.118 414.01     413.9   2. Impaired mobility and ADLs  Z74.09 V49.89    Z78.9    3. Impaired functional mobility, balance, gait, and endurance  Z74.09 V49.89     Patient Active Problem List   Diagnosis   • Diabetes mellitus ([facility])   • Essential hypertension   • HFrEF (heart failure with reduced ejection fraction) ([facility])   • Encounter for long-term current use of medication   • CAD (coronary artery disease)   • Stage 3a chronic kidney disease ([facility])   • Lipoma of neck   • Anemia   • Elevated TSH   • History of PTCA   • Abnormal nuclear stress test   • Myocardial infarct, old   • Class 1 obesity due to excess calories with serious comorbidity and body mass index (BMI) of 32.0 to 32.9 in adult   • Mixed hyperlipidemia   • Abnormal findings on diagnostic imaging of other specified body structures    • Panlobular emphysema ([facility])     Past Medical History:   Diagnosis Date   • Abdominal pain 2015    unspecified   • Acute gastritis    • Acute on chronic systolic congestive heart failure (HCC) 2016   • Ankle pain 2015   • Asthma    • Calculus of kidney 10/09/2020    Added automatically from request for surgery [id number]   • Candidiasis, skin or nails 2011    controlled   • Chronic systolic congestive heart failure (HCC) 2016   • Congestive heart failure (HCC) 2016   • Coronary arteriosclerosis 2016    multi-vessel, 2 stents, followed by Dr. Kent   • Cough 2014    proabably due to allergy, chest x ray is normal      • Diabetic ulcer of toe of right foot associated with type 2 diabetes mellitus (HCC) 10/24/2020    Follow up with podiatry outpatient    • Disease of thyroid gland    •  Edema of foot 03/24/2016   • Encounter for long-term (current) drug use     therapy   • Epigastric pain 12/03/2015   • Essential hypertension 06/21/2016   • GERD (gastroesophageal reflux disease)    • Gout    • Heart attack (HCC)     2016 2 stents by Dr. Kent still follows with him/last seen 6 -2020 told doing ok   • History of echocardiogram 03/14/2016    Left atrium normal with mild CLVH wit normal aortic root size.Evidence of posterolateral wall hypokinesis. Severely depressed LV systolic function of 20-25%.Mitral valve thickened Aortic sclerosis.Diastolic dysfunction   • Hyperlipidemia 03/31/2016    unspecified   • Hypertensive disorder 03/24/2016   • Left lower quadrant pain 07/12/2013    ruling out diverticular disease      • Myocardial infarction (HCC) 03/24/2016   • Nausea 11/20/2015   • Obstructive uropathy 09/29/2020   • Osteoarthritis of knee 06/23/2016   • Pain in limb 01/25/2011   • Pediculosis capitis 12/22/2011    controlled   • Peptic ulcer    • Pneumonia    • Polyuria 01/25/2011   • Pruritic rash 12/09/2014   • Superficial foreign body hip without major open wound, no infection 12/09/2011    ??? back   • Type 2 diabetes mellitus (HCC) 06/23/2016    new onset   • Weakness 06/23/2016   • Wears dentures    • Wears glasses      Past Surgical History:   Procedure Laterality Date   • CARDIAC CATHETERIZATION Left 4/29/2022    Procedure: Left Heart Cath;  Surgeon: Luke Kent MD;  Location: [facility] CATH INVASIVE LOCATION;  Service: Cardiology;  Laterality: Left;   • COLONOSCOPY W/ POLYPECTOMY  03/07/2016    Transverse colon polyps,descending polyps,Sigmoid polyps, all resected and retrieved. Diverticulosis without perforation or abscess without bleeding. Erica Thomas   • CYSTOSCOPY Left 11/4/2020    Procedure: CYSTOSCOPY; LEFT J-STENT REMOVAL             (STRETCHER)   FLEXIBLE SCOPE;  Surgeon: Richar Rojas MD;  Location: [facility] OR;  Service: Urology;  Laterality: Left;   • CYSTOSCOPY,  URETEROSCOPY, RETROGRADE PYELOGRAM, STENT INSERTION Left 9/30/2020    Procedure: CYSTOSCOPY LEFT URETEROSCOPY RETROGRADE PYELOGRAM STENT INSERTION;  Surgeon: Richar Rojas MD;  Location: [facility];  Service: Urology;  Laterality: Left;   • CYSTOSCOPY, URETEROSCOPY, RETROGRADE PYELOGRAM, STENT INSERTION Left 10/21/2020    Procedure: CYSTOSCOPY, LEFT RETROGRADE, URETEROSCOPY, LASER LITHOTRIPSY, STENT PLACEMENT;  Surgeon: Richar Rojas MD;  Location: [facility];  Service: Urology;  Laterality: Left;   • HYSTERECTOMY      ovary preserv   • INJECTION OF MEDICATION  09/11/2014    Celestone (betamethasone) (2)       PT Assessment (last 12 hours)     PT Evaluation and Treatment     Row Name 05/12/22 1430          Physical Therapy Time and Intention    Subjective Information complains of;fatigue;nausea/vomiting  -LN     Document Type therapy note (daily note)  -LN     Mode of Treatment individual therapy;physical therapy  -LN     Comment pt keeps repeating she is really tired and sick to her stomach-agreed to gt and review of hep/sternal precautions-nsg aware  -LN     Row Name 05/12/22 1430          General Information    Patient Profile Reviewed yes  -LN     Existing Precautions/Restrictions fall;sternal  -LN     Row Name 05/12/22 1430          Pain    Pretreatment Pain Rating 0/10 - no pain  -LN     Posttreatment Pain Rating 0/10 - no pain  -LN     Row Name 05/12/22 1430          Cognition    Orientation Status (Cognition) oriented x 4  -LN     Row Name 05/12/22 1430          Range of Motion Comprehensive    General Range of Motion no range of motion deficits identified  -LN     Row Name 05/12/22 1430          Bed Mobility    Bed Mobility supine-sit  -LN     Supine-Sit [place] (Bed Mobility) not tested  -LN     Row Name 05/12/22 1430          Transfers    Bed-Chair [place] (Transfers) not tested  -LN     Assistive Device (Bed-Chair Transfers) walker, front-wheeled  -LN     Sit-Stand Siler  (Transfers) moderate assist (50% patient effort);2 person assist;verbal cues  -LN     Stand-Sit [place] (Transfers) minimum assist (75% patient effort);2 person assist;verbal cues  -LN     Row Name 05/12/22 1430          Sit-Stand Transfer    Assistive Device (Sit-Stand Transfers) walker, front-wheeled  -LN     Row Name 05/12/22 1430          Stand-Sit Transfer    Assistive Device (Stand-Sit Transfers) walker, front-wheeled  -LN     Row Name 05/12/22 1430          Gait/Stairs (Locomotion)    [place] Level (Gait) minimum assist (75% patient effort);2 person assist  -LN     Assistive Device (Gait) walker, front-wheeled  -LN     Distance in Feet (Gait) --  15'x2  -LN     Deviations/Abnormal Patterns (Gait) gait speed decreased;stride length decreased  -LN     Bilateral Gait Deviations forward flexed posture  -LN     Row Name 05/12/22 1430          Safety Issues, Functional Mobility    Impairments Affecting Function (Mobility) balance;endurance/activity tolerance;pain;shortness of breath;strength;coordination  -LN     Row Name             Wound 05/10/22 0802 sternal Incision    Wound - Properties Group Placement Date: 05/10/22  - Placement Time: 0802  -EH Present on Hospital Admission: N  -EH Location: sternal  -EH Primary Wound Type: Incision  -EH     Retired Wound - Properties Group Placement Date: 05/10/22  - Placement Time: 0802  -EH Present on Hospital Admission: N  -EH Location: sternal  -EH Primary Wound Type: Incision  -EH     Retired Wound - Properties Group Date first assessed: 05/10/22  - Time first assessed: 0802  -EH Present on Hospital Admission: N  -EH Location: sternal  -EH Primary Wound Type: Incision  -EH     Row Name             Wound 05/10/22 0754 Left lower leg Incision    Wound - Properties Group Placement Date: 05/10/22  - Placement Time: 0754  -EH Present on Hospital Admission: N  -EH Side: Left  -EH Orientation: lower  -EH Location: leg  -EH Primary Wound Type: Incision  -EH,  EVH      Retired Wound - Properties Group Placement Date: 05/10/22  - Placement Time: 0754  -EH Present on Hospital Admission: N  -EH Side: Left  -EH Orientation: lower  -EH Location: leg  -EH Primary Wound Type: Incision  -EH, EVH      Retired Wound - Properties Group Date first assessed: 05/10/22  - Time first assessed: 0754  -EH Present on Hospital Admission: N  -EH Side: Left  -EH Location: leg  -EH Primary Wound Type: Incision  -EH, EVH      Row Name             NPWT (Negative Pressure Wound Therapy) 05/10/22 CHEST    NPWT (Negative Pressure Wound Therapy) - Properties Group Placement Date: 05/10/22  - Location: CHEST  -EH Additional Comments: PREVENA  -EH     Retired NPWT (Negative Pressure Wound Therapy) - Properties Group Placement Date: 05/10/22  - Location: CHEST  -EH Additional Comments: PREVENA  -     Retired NPWT (Negative Pressure Wound Therapy) - Properties Group Placement Date: 05/10/22  - Location: CHEST  -EH Additional Comments: PREVENA  -EH     Row Name 05/12/22 1430          Plan of Care Review    Plan of Care Reviewed With patient  -LN     Outcome Evaluation sit-stand mod of 2,sit-stand min of 2;amb 15'x2 with rw and min of 2;reviewed hep/sternal precautions with pt  -LN     Row Name 05/12/22 1430          Vital Signs    Pre Systolic BP Rehab 106  -LN     Pre Treatment Diastolic BP 58  -LN     Post Systolic BP Rehab 123  -LN     Post Treatment Diastolic BP 57  -LN     Pretreatment Heart Rate (beats/min) 66  -LN     Intratreatment Heart Rate (beats/min) 69  -LN     Posttreatment Heart Rate (beats/min) 74  -LN     Pre SpO2 (%) 92  -LN     O2 Delivery Pre Treatment room air  -LN     Intra SpO2 (%) 91  -LN     Post SpO2 (%) 94  -LN     Pre Patient Position Sitting  -LN     Intra Patient Position Standing  -LN     Post Patient Position Sitting  -LN     Row Name 05/12/22 1430          Positioning and Restraints    Post Treatment Position chair  -LN     In Chair notified nsg;reclined;call  light within reach;encouraged to call for assist;legs elevated  -LN     Row Name 05/12/22 1430          Therapy Assessment/Plan (PT)    Rehab Potential (PT) good, to achieve stated therapy goals  -LN     Criteria for Skilled Interventions Met (PT) yes;meets criteria;skilled treatment is necessary  -LN     Therapy Frequency (PT) daily  -LN     Row Name 05/12/22 1430          Bed Mobility Goal 1 (PT)    Activity/Assistive Device (Bed Mobility Goal 1, PT) sit to supine;supine to sit  -LN     [place] Level/Cues Needed (Bed Mobility Goal 1, PT) standby assist  -LN     Time Frame (Bed Mobility Goal 1, PT) by discharge  -LN     Strategies/Barriers (Bed Mobility Goal 1, PT) HOB down and bed rails  -LN     Progress/Outcomes (Bed Mobility Goal 1, PT) goal not met  -LN     Row Name 05/12/22 1430          Transfer Goal 1 (PT)    Activity/Assistive Device (Transfer Goal 1, PT) sit-to-stand/stand-to-sit;bed-to-chair/chair-to-bed  -LN     [place] Level/Cues Needed (Transfer Goal 1, PT) independent  -LN     Time Frame (Transfer Goal 1, PT) by discharge  -LN     Progress/Outcome (Transfer Goal 1, PT) goal not met  -LN     Row Name 05/12/22 1430          Gait Training Goal 1 (PT)    Activity/Assistive Device (Gait Training Goal 1, PT) gait (walking locomotion);assistive device use  -LN     [place] Level (Gait Training Goal 1, PT) modified independence  -LN     Distance (Gait Training Goal 1, PT) 50'x1  -LN     Time Frame (Gait Training Goal 1, PT) by discharge  -LN     Progress/Outcome (Gait Training Goal 1, PT) goal not met  -LN     Row Name 05/12/22 1430          Stairs Goal 1 (PT)    Activity/Assistive Device (Stairs Goal 1, PT) stairs, all skills;ascending stairs;descending stairs  -LN     [place] Level/Cues Needed (Stairs Goal 1, PT) contact guard required  -LN     Number of Stairs (Stairs Goal 1, PT) 2  -LN     Time Frame (Stairs Goal 1, PT) by discharge  -LN     Progress/Outcome (Stairs Goal 1, PT) goal not  met  -LN           User Key  (r) = Recorded By, (t) = Taken By, (c) = Cosigned By    Initials Name Provider Type    LN Shyanne Curtis PTA Physical Therapist Assistant    Nay Canela RN Registered Nurse                Physical Therapy Education                 Title: PT OT SLP Therapies (In Progress)     Topic: Physical Therapy (Done)     Point: Mobility training (Done)     Learning Progress Summary           Patient Acceptance, E,TB, VU,NR by LR at 5/11/2022 0936    Comment: Educated on PT POC, goals, stacked breathing, and sternal percautions.                   Point: Home exercise program (Done)     Learning Progress Summary           Patient Acceptance, E,TB, VU,NR by LR at 5/11/2022 0936    Comment: Educated on PT POC, goals, stacked breathing, and sternal percautions.                   Point: Body mechanics (Done)     Learning Progress Summary           Patient Acceptance, E,TB, VU,NR by LR at 5/11/2022 0936    Comment: Educated on PT POC, goals, stacked breathing, and sternal percautions.                   Point: Precautions (Done)     Learning Progress Summary           Patient Acceptance, E,TB, VU,NR by LR at 5/11/2022 0936    Comment: Educated on PT POC, goals, stacked breathing, and sternal percautions.                               User Key     Initials Effective Dates Name Provider Type Discipline    LR 06/16/21 -  Kenyon Lanza Physical Therapist PT              PT Recommendation and Plan  Anticipated Discharge Disposition (PT): skilled nursing facility  Therapy Frequency (PT): daily  Plan of Care Reviewed With: patient  Outcome Evaluation: sit-stand mod of 2,sit-stand min of 2;amb 15'x2 with rw and min of 2;reviewed hep/sternal precautions with pt       Time Calculation:    PT Charges     Row Name 05/12/22 1521             Time Calculation    Start Time 1430  -LN      Stop Time 1500  -LN      Time Calculation (min) 30 min  -LN      PT Received On 05/12/22  -LN              Time Calculation- PT     Total Timed Code Minutes- PT 30 minute(s)  -MARIO            User Key  (r) = Recorded By, (t) = Taken By, (c) = Cosigned By    Initials Name Provider Type    Shyanne Starr PTA Physical Therapist Assistant              Therapy Charges for Today     Code Description Service Date Service Provider Modifiers Qty    90074812909 HC GAIT TRAINING EA 15 MIN 5/12/2022 Shyanne Curtis PTA GP 1    42308078946 HC PT SELF CARE/MGMT/TRAIN EA 15 MIN 5/12/2022 Shyanne Curtsi PTA GP 1          PT G-Codes  Outcome Measure Options: AM-PAC 6 Clicks Basic Mobility (PT)  AM-PAC 6 Clicks Score (PT): 6  AM-PAC 6 Clicks Score (OT): 15    Shyanne Curtis PTA  5/12/2022

## 2022-05-12 NOTE — PLAN OF CARE
Goal Outcome Evaluation:  Plan of Care Reviewed With: patient        Progress: improving  Outcome Evaluation: Pt tolerated tx well this date. Pt was min A with sup-sit. Pt was min/mod A with sit-stand-sit. Pt was min/mod  A with bed-chair and toilet t/f. Pt was was min A with grooming task. Pt was educated on sternal precautions. Continue OT POC.

## 2022-05-12 NOTE — PROGRESS NOTES
"  Cardiothoracic - Vascular Surgery Daily Note        LOS: 2 days   Patient Care Team:  Nieves Childress APRN as PCP - General (Family Medicine)    POD#2 CABGx2  LIMA-LAD, SVG-PLB    Chief Complaint: CAD      Subjective     The following portions of the patient's history were reviewed and updated as appropriate: allergies, current medications, past family history, past medical history, past social history, past surgical history and problem list.     Subjective:  Symptoms:  Stable.  She reports chest pain (surgical).    Diet:  Poor intake.  She reports  nausea.    Activity level: Impaired due to pain.    Pain:  She complains of pain that is moderate.  Pain is well controlled and requiring pain medication.          Objective     Vital Signs  Temp:  [96.2 °F (35.7 °C)-98.3 °F (36.8 °C)] 98.3 °F (36.8 °C)  Heart Rate:  [59-78] 77  Resp:  [16-20] 18  BP: (123-153)/(58-78) 146/78  Body mass index is 39.85 kg/m².    Intake/Output Summary (Last 24 hours) at 5/12/2022 0944  Last data filed at 5/12/2022 0844  Gross per 24 hour   Intake 240 ml   Output 860 ml   Net -620 ml     I/O this shift:  In: -   Out: 460 [Urine:400; Chest Tube:60]    Wt Readings from Last 3 Encounters:   05/12/22 89.5 kg (197 lb 6.4 oz)   05/05/22 84.4 kg (186 lb)   05/05/22 83.9 kg (185 lb)     CT 240cc serosanginous  No air leak, waterseal    Physical Exam   Objective:  General Appearance:  Comfortable and ill-appearing.    Vital signs: (most recent): Blood pressure 146/78, pulse 77, temperature 98.3 °F (36.8 °C), temperature source Temporal, resp. rate 18, height 149.9 cm (59.02\"), weight 89.5 kg (197 lb 6.4 oz), SpO2 97 %, not currently breastfeeding.  Vital signs are normal.  No fever.    Output: Producing urine and no stool output.    HEENT: Normal HEENT exam.    Lungs:  Normal effort and normal respiratory rate.  Breath sounds clear to auscultation.  There are decreased breath sounds.  (CT DC)  Heart: Normal rate.  Regular rhythm.    Chest: (AV " wires)  Abdomen: Abdomen is soft.  Bowel sounds are normal.     Neurological: Patient is alert and oriented to person, place and time.    Skin:  Warm and dry.  (Sternal provena intact  EVH CDI)              Results Review:    Lab Results   Component Value Date    WBC 18.01 (H) 05/11/2022    HGB 10.2 (L) 05/11/2022    HCT 31.7 (L) 05/11/2022    MCV 81.3 05/11/2022     05/11/2022     Lab Results   Component Value Date    GLUCOSE 149 (H) 05/11/2022    BUN 9 05/11/2022    CREATININE 0.80 05/11/2022    EGFRIFNONA 45 (L) 12/08/2021    EGFRIFAFRI  09/29/2020      Comment:      <15 Indicative of kidney failure.    BCR 11.3 05/11/2022    K 4.5 05/11/2022    CO2 23.0 05/11/2022    CALCIUM 8.4 (L) 05/11/2022    ALBUMIN 4.10 05/11/2022    AST 24 05/11/2022    ALT 6 05/11/2022       Calcium Calcium   Date/Time Value Ref Range Status   05/11/2022 0455 8.4 (L) 8.6 - 10.5 mg/dL Final   05/10/2022 1250 9.0 8.6 - 10.5 mg/dL Final      Magnesium Magnesium   Date/Time Value Ref Range Status   05/11/2022 0455 2.2 1.6 - 2.4 mg/dL Final   05/10/2022 1523 2.8 (H) 1.6 - 2.4 mg/dL Final   05/10/2022 1250 3.4 (H) 1.6 - 2.4 mg/dL Final        Imaging Results (Last 24 Hours)     ** No results found for the last 24 hours. **                              acetaminophen, 1,000 mg, Oral, Q6H  vitamin C, 500 mg, Oral, BID  aspirin, 81 mg, Oral, Daily  carvedilol, 3.125 mg, Oral, Once  carvedilol, 6.25 mg, Oral, BID With Meals  clopidogrel, 75 mg, Oral, Daily  famotidine, 20 mg, Oral, BID  furosemide, 20 mg, Intravenous, Daily  Insulin Aspart, 0-24 Units, Subcutaneous, TID AC  insulin detemir, 25 Units, Subcutaneous, Nightly  ipratropium-albuterol, 3 mL, Nebulization, 4x Daily - RT  ketorolac, 10 mg, Oral, Q8H  levothyroxine, 25 mcg, Oral, Q AM  losartan, 50 mg, Oral, Q24H  magnesium oxide, 400 mg, Oral, BID  mupirocin, 1 application, Each Nare, Daily  polyethylene glycol, 17 g, Oral, Daily  senna-docusate sodium, 2 tablet, Oral,  BID  tamsulosin, 0.4 mg, Oral, Daily                 ASSESSMENT/PLAN     Active Hospital Problems    Diagnosis  POA   • **CAD (coronary artery disease) [I25.10]  Unknown     Hx of 2 stents placed by Dr. Kent in 2016        • Stage 3a chronic kidney disease (HCC) [N18.31]  Yes            • Encounter for long-term current use of medication [Z79.899]  Not Applicable   • HFrEF (heart failure with reduced ejection fraction) ([facility]) [I50.20]  Yes   • Essential hypertension [I10]  Yes     Stable Post Op CABG: Progressing well. Lasix.     CAD: Medical Management: Aspirin, Plavix, Statin, Beta. ACE/ARB added.    Acute respiratory failure: hypoxic SpO2 <88% on RA. Increase work of breathing/poor effort. O2 support nasal cannula. Weaning as tolerated. Nebs PRN. Incentive Spirometry. Aggressive pulmonary toilet. OPEP    Expected blood loss anemia felix-operatively. Asymptomatic. Monitor CBC. Hemodynamically stable.  CT DC Iron supplementation: deferred    Transient hyperglycemia post operative. Medical management:  SSI coverage. Levemir nightly. Carb consistent diet.     Postop Pain Management: Scheduled Tylenol.  OxyIR.  Toradol. Tramadolol    Rehab: OOB to chair. Ambulate. PT/OT    Disposition: Stable. Labs AM. Pre-cert for SNF rehab. DC planning 1-2 days.            This document has been electronically signed by BILL Velasquez on May 12, 2022 09:44 CDT

## 2022-05-13 LAB
ALBUMIN SERPL-MCNC: 3.6 G/DL (ref 3.5–5.2)
ALBUMIN/GLOB SERPL: 1.3 G/DL
ALP SERPL-CCNC: 87 U/L (ref 39–117)
ALT SERPL W P-5'-P-CCNC: <5 U/L (ref 1–33)
ANION GAP SERPL CALCULATED.3IONS-SCNC: 8 MMOL/L (ref 5–15)
AST SERPL-CCNC: 13 U/L (ref 1–32)
BILIRUB SERPL-MCNC: 0.4 MG/DL (ref 0–1.2)
BUN SERPL-MCNC: 22 MG/DL (ref 8–23)
BUN/CREAT SERPL: 18.3 (ref 7–25)
CALCIUM SPEC-SCNC: 8.9 MG/DL (ref 8.6–10.5)
CHLORIDE SERPL-SCNC: 96 MMOL/L (ref 98–107)
CO2 SERPL-SCNC: 28 MMOL/L (ref 22–29)
CREAT SERPL-MCNC: 1.2 MG/DL (ref 0.57–1)
DEPRECATED RDW RBC AUTO: 47.1 FL (ref 37–54)
EGFRCR SERPLBLD CKD-EPI 2021: 48.8 ML/MIN/1.73
ERYTHROCYTE [DISTWIDTH] IN BLOOD BY AUTOMATED COUNT: 15.9 % (ref 12.3–15.4)
GLOBULIN UR ELPH-MCNC: 2.7 GM/DL
GLUCOSE BLDC GLUCOMTR-MCNC: 117 MG/DL (ref 70–130)
GLUCOSE BLDC GLUCOMTR-MCNC: 136 MG/DL (ref 70–130)
GLUCOSE BLDC GLUCOMTR-MCNC: 140 MG/DL (ref 70–130)
GLUCOSE SERPL-MCNC: 105 MG/DL (ref 65–99)
HCT VFR BLD AUTO: 34.9 % (ref 34–46.6)
HGB BLD-MCNC: 11.2 G/DL (ref 12–15.9)
MAGNESIUM SERPL-MCNC: 2.4 MG/DL (ref 1.6–2.4)
MCH RBC QN AUTO: 26.2 PG (ref 26.6–33)
MCHC RBC AUTO-ENTMCNC: 32.1 G/DL (ref 31.5–35.7)
MCV RBC AUTO: 81.7 FL (ref 79–97)
PLATELET # BLD AUTO: 216 10*3/MM3 (ref 140–450)
PMV BLD AUTO: 10.2 FL (ref 6–12)
POTASSIUM SERPL-SCNC: 4.3 MMOL/L (ref 3.5–5.2)
PROT SERPL-MCNC: 6.3 G/DL (ref 6–8.5)
RBC # BLD AUTO: 4.27 10*6/MM3 (ref 3.77–5.28)
SARS-COV-2 RNA PNL SPEC NAA+PROBE: NOT DETECTED
SODIUM SERPL-SCNC: 132 MMOL/L (ref 136–145)
WBC NRBC COR # BLD: 12.87 10*3/MM3 (ref 3.4–10.8)

## 2022-05-13 PROCEDURE — 93005 ELECTROCARDIOGRAM TRACING: CPT | Performed by: NURSE PRACTITIONER

## 2022-05-13 PROCEDURE — 97110 THERAPEUTIC EXERCISES: CPT

## 2022-05-13 PROCEDURE — 97535 SELF CARE MNGMENT TRAINING: CPT

## 2022-05-13 PROCEDURE — 82962 GLUCOSE BLOOD TEST: CPT

## 2022-05-13 PROCEDURE — 80053 COMPREHEN METABOLIC PANEL: CPT | Performed by: NURSE PRACTITIONER

## 2022-05-13 PROCEDURE — 25010000002 ONDANSETRON PER 1 MG: Performed by: NURSE PRACTITIONER

## 2022-05-13 PROCEDURE — 93010 ELECTROCARDIOGRAM REPORT: CPT | Performed by: INTERNAL MEDICINE

## 2022-05-13 PROCEDURE — 87635 SARS-COV-2 COVID-19 AMP PRB: CPT | Performed by: NURSE PRACTITIONER

## 2022-05-13 PROCEDURE — 94799 UNLISTED PULMONARY SVC/PX: CPT

## 2022-05-13 PROCEDURE — 63710000001 INSULIN DETEMIR PER 5 UNITS: Performed by: NURSE PRACTITIONER

## 2022-05-13 PROCEDURE — 83735 ASSAY OF MAGNESIUM: CPT | Performed by: NURSE PRACTITIONER

## 2022-05-13 PROCEDURE — 25010000002 FUROSEMIDE PER 20 MG: Performed by: NURSE PRACTITIONER

## 2022-05-13 PROCEDURE — 94660 CPAP INITIATION&MGMT: CPT

## 2022-05-13 PROCEDURE — 97530 THERAPEUTIC ACTIVITIES: CPT

## 2022-05-13 PROCEDURE — 99024 POSTOP FOLLOW-UP VISIT: CPT | Performed by: NURSE PRACTITIONER

## 2022-05-13 PROCEDURE — 97116 GAIT TRAINING THERAPY: CPT

## 2022-05-13 PROCEDURE — 85027 COMPLETE CBC AUTOMATED: CPT | Performed by: NURSE PRACTITIONER

## 2022-05-13 RX ORDER — CARVEDILOL 12.5 MG/1
12.5 TABLET ORAL 2 TIMES DAILY WITH MEALS
Status: DISCONTINUED | OUTPATIENT
Start: 2022-05-13 | End: 2022-05-17 | Stop reason: HOSPADM

## 2022-05-13 RX ADMIN — KETOROLAC TROMETHAMINE 10 MG: 10 TABLET, FILM COATED ORAL at 13:25

## 2022-05-13 RX ADMIN — KETOROLAC TROMETHAMINE 10 MG: 10 TABLET, FILM COATED ORAL at 22:51

## 2022-05-13 RX ADMIN — LOSARTAN POTASSIUM 50 MG: 50 TABLET, FILM COATED ORAL at 08:37

## 2022-05-13 RX ADMIN — ASPIRIN 81 MG: 81 TABLET, FILM COATED ORAL at 08:37

## 2022-05-13 RX ADMIN — OXYCODONE HYDROCHLORIDE AND ACETAMINOPHEN 500 MG: 500 TABLET ORAL at 20:04

## 2022-05-13 RX ADMIN — ONDANSETRON 4 MG: 2 INJECTION INTRAMUSCULAR; INTRAVENOUS at 04:08

## 2022-05-13 RX ADMIN — Medication 400 MG: at 08:37

## 2022-05-13 RX ADMIN — MUPIROCIN 1 APPLICATION: 20 OINTMENT TOPICAL at 08:37

## 2022-05-13 RX ADMIN — FUROSEMIDE 20 MG: 10 INJECTION, SOLUTION INTRAMUSCULAR; INTRAVENOUS at 08:36

## 2022-05-13 RX ADMIN — DOCUSATE SODIUM 50 MG AND SENNOSIDES 8.6 MG 2 TABLET: 8.6; 5 TABLET, FILM COATED ORAL at 20:03

## 2022-05-13 RX ADMIN — POLYETHYLENE GLYCOL 3350 17 G: 17 POWDER, FOR SOLUTION ORAL at 08:34

## 2022-05-13 RX ADMIN — IPRATROPIUM BROMIDE AND ALBUTEROL SULFATE 3 ML: 2.5; .5 SOLUTION RESPIRATORY (INHALATION) at 19:52

## 2022-05-13 RX ADMIN — FAMOTIDINE 20 MG: 20 TABLET ORAL at 20:04

## 2022-05-13 RX ADMIN — ACETAMINOPHEN 1000 MG: 500 TABLET, FILM COATED ORAL at 08:36

## 2022-05-13 RX ADMIN — KETOROLAC TROMETHAMINE 10 MG: 10 TABLET, FILM COATED ORAL at 06:19

## 2022-05-13 RX ADMIN — CARVEDILOL 6.25 MG: 6.25 TABLET, FILM COATED ORAL at 08:37

## 2022-05-13 RX ADMIN — LEVOTHYROXINE SODIUM 25 MCG: 25 TABLET ORAL at 06:19

## 2022-05-13 RX ADMIN — ACETAMINOPHEN 1000 MG: 500 TABLET, FILM COATED ORAL at 20:04

## 2022-05-13 RX ADMIN — TAMSULOSIN HYDROCHLORIDE 0.4 MG: 0.4 CAPSULE ORAL at 08:37

## 2022-05-13 RX ADMIN — OXYCODONE HYDROCHLORIDE AND ACETAMINOPHEN 500 MG: 500 TABLET ORAL at 08:37

## 2022-05-13 RX ADMIN — FAMOTIDINE 20 MG: 20 TABLET ORAL at 08:37

## 2022-05-13 RX ADMIN — CARVEDILOL 12.5 MG: 12.5 TABLET, FILM COATED ORAL at 17:18

## 2022-05-13 RX ADMIN — Medication 400 MG: at 20:04

## 2022-05-13 RX ADMIN — INSULIN DETEMIR 25 UNITS: 100 INJECTION, SOLUTION SUBCUTANEOUS at 20:04

## 2022-05-13 RX ADMIN — DOCUSATE SODIUM 50 MG AND SENNOSIDES 8.6 MG 2 TABLET: 8.6; 5 TABLET, FILM COATED ORAL at 08:37

## 2022-05-13 RX ADMIN — OXYCODONE 5 MG: 5 TABLET ORAL at 11:58

## 2022-05-13 RX ADMIN — DOCUSATE SODIUM 1 ENEMA: 283 LIQUID RECTAL at 08:31

## 2022-05-13 RX ADMIN — CLOPIDOGREL BISULFATE 75 MG: 75 TABLET ORAL at 08:37

## 2022-05-13 NOTE — PLAN OF CARE
Goal Outcome Evaluation:  Plan of Care Reviewed With: patient        Progress: no change  Outcome Evaluation: Intake 50% - 2x, 25% - 2x, 0% - 1x.  Encourage intake of meals and supplement.

## 2022-05-13 NOTE — DISCHARGE PLACEMENT REQUEST
"Afia Adams (71 y.o. Female)             Date of Birth   1950    Social Security Number       Address   [address] [place], [place] [address]    Home Phone   [phone]    MRN   [id number]       [employer] [place]    Marital Status                               Admission Date   5/10/22    Admission Type   Elective    Admitting Provider   Brayden Melgar MD    Attending Provider   Brayden Melgar MD    Department, Room/Bed   [address], 333/1       Discharge Date       Discharge Disposition       Discharge Destination                               Attending Provider: Brayden Melgar MD    Allergies: Bactrim [Sulfamethoxazole-trimethoprim]    Isolation: None   Infection: None   Code Status: CPR   Advance Care Planning Activity    Ht: 149.9 cm (59.02\")   Wt: 87.1 kg (192 lb)    Admission Cmt: None   Principal Problem: CAD (coronary artery disease) [I25.10] More...                 Active Insurance as of 5/10/2022     Primary Coverage     Payor Plan Insurance Group Employer/Plan Group    ANTHEM MEDICARE REPLACEMENT ANTHEM MEDICARE ADVANTAGE [id number]     Payor Plan Address Payor Plan Phone Number Payor Plan Fax Number Effective Dates    [address] [phone]  8/1/2017 - None Entered    [facility] [address]       Subscriber Name Subscriber Birth Date Member ID       AFIA ADAMS 1950 [id number]                 Emergency Contacts      (Rel.) Home Phone Work Phone Mobile Phone    Nahun Adams (Spouse) [phone] -- [phone]    DAMASO MOMIN (Relative) [phone] -- [phone]    ROULA ANDRES (Daughter) -- -- [phone]            Insurance Information                ANTHEM MEDICARE REPLACEMENT/ANTHEM MEDICARE ADVANTAGE Phone: [phone]    Subscriber: Afia Adams Subscriber#: [id number]    Group#: [id number] Precert#: --          "

## 2022-05-13 NOTE — PLAN OF CARE
Goal Outcome Evaluation:  Plan of Care Reviewed With: patient           Outcome Evaluation: sit-sup mod of 1;sit-stand mod/min of 1;amb 50',12' with rw and min of 1-soa with gt and while sitting in chair and talking;reviewed sternal precautions

## 2022-05-13 NOTE — PROGRESS NOTES
"  Cardiothoracic - Vascular Surgery Daily Note        LOS: 3 days   Patient Care Team:  Nieves Childress APRN as PCP - General (Family Medicine)    POD#3 CABGx2  BAUTISTA-LAD, SVG-PLB    Chief Complaint: CAD      Subjective     The following portions of the patient's history were reviewed and updated as appropriate: allergies, current medications, past family history, past medical history, past social history, past surgical history and problem list.     Subjective:  Symptoms:  Stable.  She reports chest pain (surgical) and weakness.    Diet:  Adequate intake.  No nausea.    Activity level: Impaired due to pain.    Pain:  She complains of pain that is moderate.  Pain is well controlled and requiring pain medication.          Objective     Vital Signs  Temp:  [97.6 °F (36.4 °C)-98.3 °F (36.8 °C)] 97.9 °F (36.6 °C)  Heart Rate:  [60-81] 75  Resp:  [16-18] 16  BP: (104-148)/(56-88) 148/75  Body mass index is 38.76 kg/m².    Intake/Output Summary (Last 24 hours) at 5/13/2022 1001  Last data filed at 5/13/2022 0840  Gross per 24 hour   Intake 480 ml   Output 800 ml   Net -320 ml     I/O this shift:  In: 120 [P.O.:120]  Out: -     Wt Readings from Last 3 Encounters:   05/13/22 87.1 kg (192 lb)   05/05/22 84.4 kg (186 lb)   05/05/22 83.9 kg (185 lb)       Physical Exam   Objective:  General Appearance:  Comfortable and ill-appearing.    Vital signs: (most recent): Blood pressure 148/75, pulse 75, temperature 97.9 °F (36.6 °C), temperature source Temporal, resp. rate 16, height 149.9 cm (59.02\"), weight 87.1 kg (192 lb), SpO2 97 %, not currently breastfeeding.  Vital signs are normal.  No fever.    Output: Producing urine and producing stool.    HEENT: Normal HEENT exam.    Lungs:  Normal effort and normal respiratory rate.  Breath sounds clear to auscultation.  There are decreased breath sounds.    Heart: Normal rate.  Regular rhythm.    Chest: (AV wires)  Abdomen: Abdomen is soft.  Bowel sounds are normal.     Neurological: " Patient is alert and oriented to person, place and time.    Skin:  Warm and dry.  (Sternal provena intact  EVH CDI)              Results Review:    Lab Results   Component Value Date    WBC 12.87 (H) 05/13/2022    HGB 11.2 (L) 05/13/2022    HCT 34.9 05/13/2022    MCV 81.7 05/13/2022     05/13/2022     Lab Results   Component Value Date    GLUCOSE 105 (H) 05/13/2022    BUN 22 05/13/2022    CREATININE 1.20 (H) 05/13/2022    EGFRIFNONA 45 (L) 12/08/2021    EGFRIFAFRI  09/29/2020      Comment:      <15 Indicative of kidney failure.    BCR 18.3 05/13/2022    K 4.3 05/13/2022    CO2 28.0 05/13/2022    CALCIUM 8.9 05/13/2022    ALBUMIN 3.60 05/13/2022    AST 13 05/13/2022    ALT <5 05/13/2022       Calcium Calcium   Date/Time Value Ref Range Status   05/13/2022 0839 8.9 8.6 - 10.5 mg/dL Final   05/11/2022 0455 8.4 (L) 8.6 - 10.5 mg/dL Final   05/10/2022 1250 9.0 8.6 - 10.5 mg/dL Final      Magnesium Magnesium   Date/Time Value Ref Range Status   05/13/2022 0839 2.4 1.6 - 2.4 mg/dL Final   05/11/2022 0455 2.2 1.6 - 2.4 mg/dL Final   05/10/2022 1523 2.8 (H) 1.6 - 2.4 mg/dL Final   05/10/2022 1250 3.4 (H) 1.6 - 2.4 mg/dL Final        Imaging Results (Last 24 Hours)     Procedure Component Value Units Date/Time    XR Chest 2 View [[id number]] Collected: 05/12/22 1214     Updated: 05/12/22 1244    Narrative:      PROCEDURE: Chest x-ray with two views.    INDICATION: CT DC, I25.118 Atherosclerotic heart disease of  native coronary artery with other forms of angina pectoris Z74.09  Other reduced mobility Z78.9 Other specified health status Z74.09  Other reduced mobility    COMPARISON: 5/11/2022 chest x-ray and earlier chest exams.    FINDINGS:    Stable sternal sutures.  Interval removal of the right jugular [place]-Jostin catheter and  sheath.  Interval removal of bilateral chest tubes. No pneumothorax.    Improving bilateral pulmonary infiltrates and/or edema with mild  residual greatest in the bases .    Small bilateral  pleural effusions best seen on lateral views.      Impression:      Interval removal of chest tubes and [place]-Jostin.    No pneumothorax.    Improving bilateral infiltrates or edema with mild residual in  the lung bases.    Small bilateral pleural effusions.    [id number]      Electronically signed by:  Rajesh Gutierrez MD  5/12/2022 12:42  PM CDT Workstation: [id number]                              acetaminophen, 1,000 mg, Oral, Q6H  vitamin C, 500 mg, Oral, BID  aspirin, 81 mg, Oral, Daily  carvedilol, 6.25 mg, Oral, BID With Meals  clopidogrel, 75 mg, Oral, Daily  famotidine, 20 mg, Oral, BID  furosemide, 20 mg, Intravenous, Daily  Insulin Aspart, 0-24 Units, Subcutaneous, TID AC  insulin detemir, 25 Units, Subcutaneous, Nightly  ipratropium-albuterol, 3 mL, Nebulization, 4x Daily - RT  ketorolac, 10 mg, Oral, Q8H  levothyroxine, 25 mcg, Oral, Q AM  losartan, 50 mg, Oral, Q24H  magnesium oxide, 400 mg, Oral, BID  mupirocin, 1 application, Each Nare, Daily  polyethylene glycol, 17 g, Oral, Daily  senna-docusate sodium, 2 tablet, Oral, BID  tamsulosin, 0.4 mg, Oral, Daily                 ASSESSMENT/PLAN     Active Hospital Problems    Diagnosis  POA   • **CAD (coronary artery disease) [I25.10]  Unknown     Hx of 2 stents placed by Dr. Kent in 2016        • Stage 3a chronic kidney disease ([facility]) [N18.31]  Yes            • Encounter for long-term current use of medication [Z79.899]  Not Applicable   • HFrEF (heart failure with reduced ejection fraction) ([facility]) [I50.20]  Yes   • Essential hypertension [I10]  Yes     Stable Post Op CABG: Progressing slowly. Lasix.     CAD: Medical Management: Aspirin, Plavix, Statin, Beta. ACE/ARB     Acute respiratory failure: hypoxic SpO2 <88% on RA. Increase work of breathing/poor effort. O2 support nasal cannula. Weaning as tolerated. Nebs PRN. Incentive Spirometry. Aggressive pulmonary toilet. OPEP    Expected blood loss anemia felix-operatively. Asymptomatic. Monitor CBC. Hemodynamically  stable.  Iron supplementation: deferred    Transient hyperglycemia post operative. Medical management:  SSI coverage. Levemir nightly. Carb consistent diet.     Postop Pain Management: Scheduled Tylenol.  OxyIR.  Toradol. Tramadolol    Rehab: OOB to chair. Ambulate. PT/OT. Weak.    Disposition: Stable.  Pre-cert for SNF rehab. DC planning 1-2 days.            This document has been electronically signed by BILL Velasquez on May 13, 2022 10:01 CDT

## 2022-05-13 NOTE — THERAPY TREATMENT NOTE
Acute Care - Physical Therapy Treatment Note  [facility]     Patient Name: Afia Adams  : 1950  MRN: [id number]  Today's Date: 2022      Visit Dx:     ICD-10-CM ICD-9-CM   1. Coronary artery disease of native artery of native heart with stable angina pectoris (HCC)  I25.118 414.01     413.9   2. Impaired mobility and ADLs  Z74.09 V49.89    Z78.9    3. Impaired functional mobility, balance, gait, and endurance  Z74.09 V49.89     Patient Active Problem List   Diagnosis   • Diabetes mellitus ([facility])   • Essential hypertension   • HFrEF (heart failure with reduced ejection fraction) ([facility])   • Encounter for long-term current use of medication   • CAD (coronary artery disease)   • Stage 3a chronic kidney disease ([facility])   • Lipoma of neck   • Anemia   • Elevated TSH   • History of PTCA   • Abnormal nuclear stress test   • Myocardial infarct, old   • Class 1 obesity due to excess calories with serious comorbidity and body mass index (BMI) of 32.0 to 32.9 in adult   • Mixed hyperlipidemia   • Abnormal findings on diagnostic imaging of other specified body structures    • Panlobular emphysema ([facility])     Past Medical History:   Diagnosis Date   • Abdominal pain 2015    unspecified   • Acute gastritis    • Acute on chronic systolic congestive heart failure (HCC) 2016   • Ankle pain 2015   • Asthma    • Calculus of kidney 10/09/2020    Added automatically from request for surgery [id number]   • Candidiasis, skin or nails 2011    controlled   • Chronic systolic congestive heart failure (HCC) 2016   • Congestive heart failure (HCC) 2016   • Coronary arteriosclerosis 2016    multi-vessel, 2 stents, followed by Dr. Kent   • Cough 2014    proabably due to allergy, chest x ray is normal      • Diabetic ulcer of toe of right foot associated with type 2 diabetes mellitus (HCC) 10/24/2020    Follow up with podiatry outpatient    • Disease of thyroid gland    •  Edema of foot 03/24/2016   • Encounter for long-term (current) drug use     therapy   • Epigastric pain 12/03/2015   • Essential hypertension 06/21/2016   • GERD (gastroesophageal reflux disease)    • Gout    • Heart attack (HCC)     2016 2 stents by Dr. Kent still follows with him/last seen 6 -2020 told doing ok   • History of echocardiogram 03/14/2016    Left atrium normal with mild CLVH wit normal aortic root size.Evidence of posterolateral wall hypokinesis. Severely depressed LV systolic function of 20-25%.Mitral valve thickened Aortic sclerosis.Diastolic dysfunction   • Hyperlipidemia 03/31/2016    unspecified   • Hypertensive disorder 03/24/2016   • Left lower quadrant pain 07/12/2013    ruling out diverticular disease      • Myocardial infarction (HCC) 03/24/2016   • Nausea 11/20/2015   • Obstructive uropathy 09/29/2020   • Osteoarthritis of knee 06/23/2016   • Pain in limb 01/25/2011   • Pediculosis capitis 12/22/2011    controlled   • Peptic ulcer    • Pneumonia    • Polyuria 01/25/2011   • Pruritic rash 12/09/2014   • Superficial foreign body hip without major open wound, no infection 12/09/2011    ??? back   • Type 2 diabetes mellitus (HCC) 06/23/2016    new onset   • Weakness 06/23/2016   • Wears dentures    • Wears glasses      Past Surgical History:   Procedure Laterality Date   • CARDIAC CATHETERIZATION Left 4/29/2022    Procedure: Left Heart Cath;  Surgeon: Luke Kent MD;  Location: [facility] CATH INVASIVE LOCATION;  Service: Cardiology;  Laterality: Left;   • COLONOSCOPY W/ POLYPECTOMY  03/07/2016    Transverse colon polyps,descending polyps,Sigmoid polyps, all resected and retrieved. Diverticulosis without perforation or abscess without bleeding. Erica Thomas   • CYSTOSCOPY Left 11/4/2020    Procedure: CYSTOSCOPY; LEFT J-STENT REMOVAL             (STRETCHER)   FLEXIBLE SCOPE;  Surgeon: Richar Rojas MD;  Location: [facility] OR;  Service: Urology;  Laterality: Left;   • CYSTOSCOPY,  "URETEROSCOPY, RETROGRADE PYELOGRAM, STENT INSERTION Left 9/30/2020    Procedure: CYSTOSCOPY LEFT URETEROSCOPY RETROGRADE PYELOGRAM STENT INSERTION;  Surgeon: Richar Rojas MD;  Location: [facility];  Service: Urology;  Laterality: Left;   • CYSTOSCOPY, URETEROSCOPY, RETROGRADE PYELOGRAM, STENT INSERTION Left 10/21/2020    Procedure: CYSTOSCOPY, LEFT RETROGRADE, URETEROSCOPY, LASER LITHOTRIPSY, STENT PLACEMENT;  Surgeon: Richar Rojas MD;  Location: [facility];  Service: Urology;  Laterality: Left;   • HYSTERECTOMY      ovary preserv   • INJECTION OF MEDICATION  09/11/2014    Celestone (betamethasone) (2)       PT Assessment (last 12 hours)     PT Evaluation and Treatment     Row Name 05/13/22 1346 05/13/22 0907       Physical Therapy Time and Intention    Subjective Information complains of;weakness;fatigue;dyspnea  -LN complains of;weakness;fatigue;dyspnea  \"gotta pee but it just won't come out\"  -LN    Document Type therapy note (daily note)  -LN therapy note (daily note)  -LN    Mode of Treatment individual therapy;physical therapy  -LN individual therapy;physical therapy  -LN    Row Name 05/13/22 1346 05/13/22 0907       General Information    Patient Profile Reviewed yes  -LN yes  -LN    Existing Precautions/Restrictions fall;sternal  -LN fall;sternal  -LN    Row Name 05/13/22 1346 05/13/22 0907       Pain    Pretreatment Pain Rating 0/10 - no pain  -LN 7/10  -LN    Posttreatment Pain Rating 0/10 - no pain  -LN 0/10 - no pain  -LN    Pain Location - -- abdomen  -LN    Pain Intervention(s) -- Repositioned  -LN    Row Name 05/13/22 1346 05/13/22 0907       Cognition    Orientation Status (Cognition) oriented x 4  -LN oriented x 4  -LN    Row Name 05/13/22 1346 05/13/22 0907       Range of Motion Comprehensive    General Range of Motion no range of motion deficits identified  -LN no range of motion deficits identified  -LN    Row Name 05/13/22 1346 05/13/22 0907       Bed Mobility    Bed Mobility supine-sit  " -LN supine-sit  -LN    Supine-Sit [place] (Bed Mobility) minimum assist (75% patient effort);1 person assist  -LN not tested  -LN    Sit-Supine [place] (Bed Mobility) not tested  -LN moderate assist (50% patient effort);1 person assist  -LN    Row Name 05/13/22 1346 05/13/22 0907       Transfers    Bed-Chair [place] (Transfers) minimum assist (75% patient effort)  -LN not tested  -LN    Chair-Bed [place] (Transfers) not tested  -LN minimum assist (75% patient effort)  -LN    Assistive Device (Bed-Chair Transfers) walker, front-wheeled  -LN walker, front-wheeled  -LN    Sit-Stand [place] (Transfers) verbal cues;1 person assist;minimum assist (75% patient effort)  -LN moderate assist (50% patient effort);verbal cues;1 person assist  -LN    Stand-Sit [place] (Transfers) minimum assist (75% patient effort);verbal cues;1 person assist  -LN minimum assist (75% patient effort);verbal cues;1 person assist  -LN    [place] Level (Toilet Transfer) moderate assist (50% patient effort)  to stand from commode-2 trips to the bs  -LN moderate assist (50% patient effort)  to stand from commode-2 trips to the [facility]  -LN    Assistive Device (Toilet Transfer) commode, bedside without drop arms;walker, front-wheeled  -LN commode, bedside without drop arms;walker, front-wheeled  -LN    Row Name 05/13/22 1346 05/13/22 0907       Chair-Bed Transfer    Assistive Device (Chair-Bed Transfers) walker, front-wheeled  -LN walker, front-wheeled  -LN    Comment, (Chair-Bed Transfer) -- --  mod to lay down  -LN    Row Name 05/13/22 1346 05/13/22 0907       Sit-Stand Transfer    Assistive Device (Sit-Stand Transfers) walker, front-wheeled  -LN walker, front-wheeled  -LN    Row Name 05/13/22 1346 05/13/22 0907       Stand-Sit Transfer    Assistive Device (Stand-Sit Transfers) walker, front-wheeled  -LN walker, front-wheeled  -LN    Row Name 05/13/22 1346 05/13/22 0907       Toilet Transfer    Type (Toilet Transfer)  sit-stand;stand-sit  -LN sit-stand;stand-sit  -LN    Row Name 05/13/22 1346 05/13/22 0907       Gait/Stairs (Locomotion)    [place] Level (Gait) minimum assist (75% patient effort);1 person assist  -LN minimum assist (75% patient effort);1 person assist  -LN    Assistive Device (Gait) walker, front-wheeled  -LN walker, front-wheeled  -LN    Distance in Feet (Gait) 46  -LN 50,5,12  -LN    Deviations/Abnormal Patterns (Gait) gait speed decreased;stride length decreased  -LN gait speed decreased;stride length decreased  -LN    Bilateral Gait Deviations forward flexed posture  -LN forward flexed posture  -LN    Row Name 05/13/22 1346 05/13/22 0907       Safety Issues, Functional Mobility    Impairments Affecting Function (Mobility) balance;endurance/activity tolerance;pain;shortness of breath;strength;coordination  -LN balance;endurance/activity tolerance;pain;shortness of breath;strength;coordination  -LN    Row Name 05/13/22 0907          Motor Skills    Therapeutic Exercise hip;knee;ankle  -LN     Row Name 05/13/22 1346 05/13/22 0907       Hip (Therapeutic Exercise)    Hip (Therapeutic Exercise) AROM (active range of motion)  -LN AROM (active range of motion)  -LN    Hip AROM (Therapeutic Exercise) bilateral;flexion;aBduction;aDduction  -LN bilateral;flexion;aDduction;aBduction  -LN    Row Name 05/13/22 1346 05/13/22 0907       Knee (Therapeutic Exercise)    Knee (Therapeutic Exercise) AROM (active range of motion)  -LN AROM (active range of motion)  -LN    Knee AROM (Therapeutic Exercise) bilateral;LAQ (long arc quad)  -LN bilateral;LAQ (long arc quad)  -LN    Row Name 05/13/22 1346 05/13/22 0907       Ankle (Therapeutic Exercise)    Ankle (Therapeutic Exercise) AROM (active range of motion)  -LN AROM (active range of motion)  -LN    Ankle AROM (Therapeutic Exercise) bilateral;dorsiflexion;plantarflexion  -LN bilateral;dorsiflexion;plantarflexion  -LN    Row Name             Wound 05/10/22 0802 sternal Incision     Wound - Properties Group Placement Date: 05/10/22  - Placement Time: 0802  -EH Present on Hospital Admission: N  -EH Location: sternal  -EH Primary Wound Type: Incision  -EH     Retired Wound - Properties Group Placement Date: 05/10/22  - Placement Time: 0802  -EH Present on Hospital Admission: N  -EH Location: sternal  -EH Primary Wound Type: Incision  -EH     Retired Wound - Properties Group Date first assessed: 05/10/22  - Time first assessed: 0802  -EH Present on Hospital Admission: N  -EH Location: sternal  -EH Primary Wound Type: Incision  -EH     Row Name             Wound 05/10/22 0754 Left lower leg Incision    Wound - Properties Group Placement Date: 05/10/22  - Placement Time: 0754  -EH Present on Hospital Admission: N  -EH Side: Left  -EH Orientation: lower  -EH Location: leg  -EH Primary Wound Type: Incision  -EH, EVH      Retired Wound - Properties Group Placement Date: 05/10/22  - Placement Time: 0754  -EH Present on Hospital Admission: N  -EH Side: Left  -EH Orientation: lower  -EH Location: leg  -EH Primary Wound Type: Incision  -EH, EVH      Retired Wound - Properties Group Date first assessed: 05/10/22  - Time first assessed: 0754  -EH Present on Hospital Admission: N  -EH Side: Left  -EH Location: leg  -EH Primary Wound Type: Incision  -EH, EVH      Row Name             NPWT (Negative Pressure Wound Therapy) 05/10/22 CHEST    NPWT (Negative Pressure Wound Therapy) - Properties Group Placement Date: 05/10/22  - Location: CHEST  -EH Additional Comments: [facility]  -     Retired NPWT (Negative Pressure Wound Therapy) - Properties Group Placement Date: 05/10/22  - [facility]  [facility]  [facility]     Retired NPWT (Negative Pressure Wound Therapy) - Properties Group Placement Date: 05/10/22  - Location: CHEST  -EH Additional Comments: [facility]  -EH     Row Name 05/13/22 1346 05/13/22 0907       Plan of Care Review    Plan of Care Reviewed With patient  -LN  patient  -LN    Outcome Evaluation sup-sit min of 1,sit-stand-sit min of 1,mod of 1 from bsc;amb with rw and min of 1 46';needs vc's for sternal precautions-remains soa with activity  -LN sit-sup mod of 1;sit-stand mod/min of 1;amb 50',12' with rw and min of 1-soa with gt and while sitting in chair and talking;reviewed sternal precautions  -LN    Row Name 05/13/22 1346 05/13/22 0907       Vital Signs    Pre Systolic BP Rehab -- 135  -LN    Pre Treatment Diastolic BP -- 75  -LN    Post Systolic BP Rehab 115  -  -LN    Post Treatment Diastolic BP 56  -LN 71  -LN    Pretreatment Heart Rate (beats/min) -- 80  -LN    Intratreatment Heart Rate (beats/min) 71  -LN 78  -LN    Posttreatment Heart Rate (beats/min) 73  -LN 77  -LN    Pre SpO2 (%) 92  -LN 94  -LN    O2 Delivery Pre Treatment supplemental O2  -LN supplemental O2  -LN    Intra SpO2 (%) -- 90  -LN    O2 Delivery Intra Treatment -- room air  -LN    Post SpO2 (%) 93  -LN 95  -LN    O2 Delivery Post Treatment -- supplemental O2  -LN    Pre Patient Position Supine  -LN Sitting  -LN    Intra Patient Position Standing  -LN Standing  -LN    Post Patient Position Sitting  -LN Supine  -LN    Row Name 05/13/22 1346 05/13/22 0907       Positioning and Restraints    Post Treatment Position -- bed  -LN    In Bed notified nsg;sitting;call light within reach;encouraged to call for assist  -LN notified nsg;supine;call light within reach;encouraged to call for assist;exit alarm on  -LN    Row Name 05/13/22 1346 05/13/22 0907       Therapy Assessment/Plan (PT)    Rehab Potential (PT) good, to achieve stated therapy goals  -LN good, to achieve stated therapy goals  -LN    Criteria for Skilled Interventions Met (PT) yes;meets criteria;skilled treatment is necessary  -LN yes;meets criteria;skilled treatment is necessary  -LN    Therapy Frequency (PT) daily  -LN daily  -LN    Row Name 05/13/22 1346 05/13/22 0907       Bed Mobility Goal 1 (PT)    Activity/Assistive Device (Bed  Mobility Goal 1, PT) sit to supine;supine to sit  -LN sit to supine;supine to sit  -LN    [place] Level/Cues Needed (Bed Mobility Goal 1, PT) standby assist  -LN standby assist  -LN    Time Frame (Bed Mobility Goal 1, PT) by discharge  -LN by discharge  -LN    Strategies/Barriers (Bed Mobility Goal 1, PT) HOB down and bed rails  -LN HOB down and bed rails  -LN    Progress/Outcomes (Bed Mobility Goal 1, PT) goal not met  -LN goal not met  -LN    Row Name 05/13/22 1346 05/13/22 0907       Transfer Goal 1 (PT)    Activity/Assistive Device (Transfer Goal 1, PT) sit-to-stand/stand-to-sit;bed-to-chair/chair-to-bed  -LN sit-to-stand/stand-to-sit;bed-to-chair/chair-to-bed  -LN    [place] Level/Cues Needed (Transfer Goal 1, PT) independent  -LN independent  -LN    Time Frame (Transfer Goal 1, PT) by discharge  -LN by discharge  -LN    Progress/Outcome (Transfer Goal 1, PT) goal not met  -LN goal not met  -LN    Row Name 05/13/22 1346 05/13/22 0907       Gait Training Goal 1 (PT)    Activity/Assistive Device (Gait Training Goal 1, PT) gait (walking locomotion);assistive device use  -LN gait (walking locomotion);assistive device use  -LN    [place] Level (Gait Training Goal 1, PT) modified independence  -LN modified independence  -LN    Distance (Gait Training Goal 1, PT) 50'x1  -LN 50'x1  -LN    Time Frame (Gait Training Goal 1, PT) by discharge  -LN by discharge  -LN    Progress/Outcome (Gait Training Goal 1, PT) goal not met  -LN goal not met  -LN    Row Name 05/13/22 1346 05/13/22 0907       Stairs Goal 1 (PT)    Activity/Assistive Device (Stairs Goal 1, PT) stairs, all skills;ascending stairs;descending stairs  -LN stairs, all skills;ascending stairs;descending stairs  -LN    [place] Level/Cues Needed (Stairs Goal 1, PT) contact guard required  -LN contact guard required  -LN    Number of Stairs (Stairs Goal 1, PT) 2  -LN 2  -LN    Time Frame (Stairs Goal 1, PT) by discharge  -LN by discharge  -LN     Progress/Outcome (Stairs Goal 1, PT) goal not met  -LN goal not met  -LN          User Key  (r) = Recorded By, (t) = Taken By, (c) = Cosigned By    Initials Name Provider Type    LN Shyanne Curtis PTA Physical Therapist Assistant    Nay Canela RN Registered Nurse                Physical Therapy Education                 Title: PT OT SLP Therapies (In Progress)     Topic: Physical Therapy (Done)     Point: Mobility training (Done)     Learning Progress Summary           Patient Acceptance, E,TB, VU,NR by LR at 5/11/2022 0936    Comment: Educated on PT POC, goals, stacked breathing, and sternal percautions.                   Point: Home exercise program (Done)     Learning Progress Summary           Patient Acceptance, E,TB, VU,NR by LR at 5/11/2022 0936    Comment: Educated on PT POC, goals, stacked breathing, and sternal percautions.                   Point: Body mechanics (Done)     Learning Progress Summary           Patient Acceptance, E,TB, VU,NR by LR at 5/11/2022 0936    Comment: Educated on PT POC, goals, stacked breathing, and sternal percautions.                   Point: Precautions (Done)     Learning Progress Summary           Patient Acceptance, E,TB, VU,NR by LR at 5/11/2022 0936    Comment: Educated on PT POC, goals, stacked breathing, and sternal percautions.                               User Key     Initials Effective Dates Name Provider Type Discipline    LR 06/16/21 -  Kenyon Lanza Physical Therapist PT              PT Recommendation and Plan  Anticipated Discharge Disposition (PT): skilled nursing facility  Therapy Frequency (PT): daily  Plan of Care Reviewed With: patient  Outcome Evaluation: sup-sit min of 1,sit-stand-sit min of 1,mod of 1 from bsc;amb with rw and min of 1 46';needs vc's for sternal precautions-remains soa with activity       Time Calculation:    PT Charges     Row Name 05/13/22 1534 05/13/22 1258          Time Calculation    Start Time 1346  -LN 0907  -LN     Stop  Time 1430  -LN 1025  -LN     Time Calculation (min) 44 min  -LN 78 min  -LN     PT Received On 05/13/22  -LN 05/13/22  -LN            Time Calculation- PT    Total Timed Code Minutes- PT 44 minute(s)  -LN 78 minute(s)  -LN           User Key  (r) = Recorded By, (t) = Taken By, (c) = Cosigned By    Initials Name Provider Type    LN Shyanne Curtis S, PTA Physical Therapist Assistant              Therapy Charges for Today     Code Description Service Date Service Provider Modifiers Qty    20714673730 HC GAIT TRAINING EA 15 MIN 5/12/2022 Kirsten, Shyanne S, PTA GP 1    18819410003 HC PT SELF CARE/MGMT/TRAIN EA 15 MIN 5/12/2022 Kirsten, Shyanne S, PTA GP 1    40916637560 HC PT SELF CARE/MGMT/TRAIN EA 15 MIN 5/13/2022 Kirsten, Shyanne S, PTA GP 1    95169282082 HC GAIT TRAINING EA 15 MIN 5/13/2022 Kirsten, Shyanne S, PTA GP 1    68073553316 HC PT THER PROC EA 15 MIN 5/13/2022 Kirsten, Shyanne S, PTA GP 1    95912347593 HC PT THERAPEUTIC ACT EA 15 MIN 5/13/2022 Kirsten, Shyanne S, PTA GP 2    40241646095 HC PT THERAPEUTIC ACT EA 15 MIN 5/13/2022 Kirsten, Shyanne S, PTA GP 1    37225467194 HC GAIT TRAINING EA 15 MIN 5/13/2022 Kirsten, Shyanne S, PTA GP 1    95859891353 HC PT THER PROC EA 15 MIN 5/13/2022 Kirsten, Shyanne S, PTA GP 1          PT G-Codes  Outcome Measure Options: AM-PAC 6 Clicks Basic Mobility (PT)  AM-PAC 6 Clicks Score (PT): 6  AM-PAC 6 Clicks Score (OT): 15    Shyanne S Kirsten PTA  5/13/2022

## 2022-05-13 NOTE — CONSULTS
"Adult Nutrition  Assessment    Patient Name:  Afia Adams  YOB: 1950  MRN: [id number]  Admit Date:  5/10/2022    Assessment Date:  5/13/2022    Comments:  Pt indicates she doesn't have much of an appetite.  Says the Boost Blucose Control is OK.  Voiced beverage preference.   Intake 50% - 2x, 25% - 2x, 0% - 1x.  Pt reports having nausea, diarrhea.  Pepcid, PRN Zofran prescribed.  Blood glucose is elevated.  Levemir insulin, sliding scale novolog prescribed.  Pt not appropriate for dit ed due to decreased appetite/intake.  Will maintain pt on prescribed diet and supplement and encourage intake.        Reason for Assessment     Row Name 05/13/22 1652          Reason for Assessment    Reason For Assessment follow-up protocol                  Anthropometrics     Row Name 05/13/22 1655          Anthropometrics    Age for Calculations 71     Height for Calculation 1.499 m (4' 11.02\")     Weight for Calculation 54.8 kg (120 lb 13 oz)                Labs/Tests/Procedures/Meds     Row Name 05/13/22 1652          Labs/Procedures/Meds    Lab Results Reviewed reviewed, pertinent            Medications    Pertinent Medications Reviewed reviewed, pertinent                Physical Findings     Row Name 05/13/22 1653          Physical Findings    Overall Physical Appearance sternal incision, left lower leg incision                Estimated/Assessed Needs - Anthropometrics     Row Name 05/13/22 1655          Anthropometrics    Age for Calculations 71     Height for Calculation 1.499 m (4' 11.02\")     Weight for Calculation 54.8 kg (120 lb 13 oz)            Estimated/Assessed Needs    Additional Documentation Fluid Requirements (Group);Avery-St. Jeor Equation (Group);Protein Requirements (Group)            Avery-St. Jeor Equation    RMR (Avery-St. Jeor Equation) 973.879     Avery-St. Jeor (Considerations) 1200 eboni/day            Protein Requirements    Weight Used For Protein Calculations 54.8 kg " (120 lb 13 oz)     Est Protein Requirement Amount (gms/kg) 1.2 gm protein     Estimated Protein Requirements (gms/day) 65.76            Fluid Requirements    Fluid Requirements (mL/day) 1372     RDA Method (mL) 1372                Nutrition Prescription Ordered     Row Name 05/13/22 1654          Nutrition Prescription PO    Current PO Diet Regular     Supplement Boost Glucose Control (Glucerna Shake)     Supplement Frequency 3 times a day     Common Modifiers Consistent Carbohydrate                Evaluation of Received Nutrient/Fluid Intake     Row Name 05/13/22 1654          PO Evaluation    Number of Meals 5     % PO Intake 50% - 2x, 25% - 2x, 0% - 1x                     Electronically signed by:  Pamela Velasco, MARY JANE  05/13/22 16:59 CDT

## 2022-05-13 NOTE — PLAN OF CARE
Goal Outcome Evaluation:  Plan of Care Reviewed With: patient        Progress: improving  Outcome Evaluation: Pt tolerated tx well this date. Pt was mod A with sup-sit-sup. Pt was min/mod A with sit-stand and toilet t/f. Pt was mod A with toileting. Pt was min A with grooming task. Pt gave good effort with UE ther ex. Continue OT POC.

## 2022-05-13 NOTE — THERAPY TREATMENT NOTE
Patient Name: Afia Adams  : 1950    MRN: [id number]                              Today's Date: 2022       Admit Date: 5/10/2022    Visit Dx:     ICD-10-CM ICD-9-CM   1. Coronary artery disease of native artery of native heart with stable angina pectoris (HCC)  I25.118 414.01     413.9   2. Impaired mobility and ADLs  Z74.09 V49.89    Z78.9    3. Impaired functional mobility, balance, gait, and endurance  Z74.09 V49.89     Patient Active Problem List   Diagnosis   • Diabetes mellitus ([facility])   • Essential hypertension   • HFrEF (heart failure with reduced ejection fraction) ([facility])   • Encounter for long-term current use of medication   • CAD (coronary artery disease)   • Stage 3a chronic kidney disease ([facility])   • Lipoma of neck   • Anemia   • Elevated TSH   • History of PTCA   • Abnormal nuclear stress test   • Myocardial infarct, old   • Class 1 obesity due to excess calories with serious comorbidity and body mass index (BMI) of 32.0 to 32.9 in adult   • Mixed hyperlipidemia   • Abnormal findings on diagnostic imaging of other specified body structures    • Panlobular emphysema (HCC)     Past Medical History:   Diagnosis Date   • Abdominal pain 2015    unspecified   • Acute gastritis    • Acute on chronic systolic congestive heart failure (HCC) 2016   • Ankle pain 2015   • Asthma    • Calculus of kidney 10/09/2020    Added automatically from request for surgery [id number]   • Candidiasis, skin or nails 2011    controlled   • Chronic systolic congestive heart failure (HCC) 2016   • Congestive heart failure (HCC) 2016   • Coronary arteriosclerosis 2016    multi-vessel, 2 stents, followed by Dr. Kent   • Cough 2014    proabably due to allergy, chest x ray is normal      • Diabetic ulcer of toe of right foot associated with type 2 diabetes mellitus (HCC) 10/24/2020    Follow up with podiatry outpatient    • Disease of thyroid gland    • Edema of  foot 03/24/2016   • Encounter for long-term (current) drug use     therapy   • Epigastric pain 12/03/2015   • Essential hypertension 06/21/2016   • GERD (gastroesophageal reflux disease)    • Gout    • Heart attack (HCC)     2016 2 stents by Dr. Kent still follows with him/last seen 6 -2020 told doing ok   • History of echocardiogram 03/14/2016    Left atrium normal with mild CLVH wit normal aortic root size.Evidence of posterolateral wall hypokinesis. Severely depressed LV systolic function of 20-25%.Mitral valve thickened Aortic sclerosis.Diastolic dysfunction   • Hyperlipidemia 03/31/2016    unspecified   • Hypertensive disorder 03/24/2016   • Left lower quadrant pain 07/12/2013    ruling out diverticular disease      • Myocardial infarction (HCC) 03/24/2016   • Nausea 11/20/2015   • Obstructive uropathy 09/29/2020   • Osteoarthritis of knee 06/23/2016   • Pain in limb 01/25/2011   • Pediculosis capitis 12/22/2011    controlled   • Peptic ulcer    • Pneumonia    • Polyuria 01/25/2011   • Pruritic rash 12/09/2014   • Superficial foreign body hip without major open wound, no infection 12/09/2011    ??? back   • Type 2 diabetes mellitus (HCC) 06/23/2016    new onset   • Weakness 06/23/2016   • Wears dentures    • Wears glasses      Past Surgical History:   Procedure Laterality Date   • CARDIAC CATHETERIZATION Left 4/29/2022    Procedure: Left Heart Cath;  Surgeon: Luke Kent MD;  Location: [facility] CATH INVASIVE LOCATION;  Service: Cardiology;  Laterality: Left;   • COLONOSCOPY W/ POLYPECTOMY  03/07/2016    Transverse colon polyps,descending polyps,Sigmoid polyps, all resected and retrieved. Diverticulosis without perforation or abscess without bleeding. Erica Thomas   • CYSTOSCOPY Left 11/4/2020    Procedure: CYSTOSCOPY; LEFT J-STENT REMOVAL             (STRETCHER)   FLEXIBLE SCOPE;  Surgeon: Richar Rojas MD;  Location: [facility] OR;  Service: Urology;  Laterality: Left;   • CYSTOSCOPY, URETEROSCOPY,  RETROGRADE PYELOGRAM, STENT INSERTION Left 9/30/2020    Procedure: CYSTOSCOPY LEFT URETEROSCOPY RETROGRADE PYELOGRAM STENT INSERTION;  Surgeon: Richar Rojas MD;  Location: [facility];  Service: Urology;  Laterality: Left;   • CYSTOSCOPY, URETEROSCOPY, RETROGRADE PYELOGRAM, STENT INSERTION Left 10/21/2020    Procedure: CYSTOSCOPY, LEFT RETROGRADE, URETEROSCOPY, LASER LITHOTRIPSY, STENT PLACEMENT;  Surgeon: Richar Rojas MD;  Location: [facility];  Service: Urology;  Laterality: Left;   • HYSTERECTOMY      ovary preserv   • INJECTION OF MEDICATION  09/11/2014    Celestone (betamethasone) (2)        General Information     Row Name 05/13/22 1045          OT Time and Intention    Document Type therapy note (daily note)  -CS     Mode of Treatment occupational therapy  -CS     Row Name 05/13/22 1045          General Information    Patient Profile Reviewed yes  -CS     Existing Precautions/Restrictions fall;sternal  -CS     Row Name 05/13/22 1045          Cognition    Orientation Status (Cognition) oriented x 4  -CS     Row Name 05/13/22 1045          Safety Issues, Functional Mobility    Impairments Affecting Function (Mobility) balance;endurance/activity tolerance;pain;shortness of breath;strength;coordination  -CS           User Key  (r) = Recorded By, (t) = Taken By, (c) = Cosigned By    Initials Name Provider Type    CS Vanessa Petit COTA Occupational Therapist Assistant                 Mobility/ADL's     Row Name 05/13/22 1045          Bed Mobility    Bed Mobility supine-sit;sit-supine;scooting/bridging  -     Supine-Sit [place] (Bed Mobility) moderate assist (50% patient effort)  -     Sit-Supine [place] (Bed Mobility) moderate assist (50% patient effort);1 person assist  -     Assistive Device (Bed Mobility) head of bed elevated  -CS     Row Name 05/13/22 1045          Transfers    Transfers sit-stand transfer;stand-sit transfer;toilet transfer  -     Sit-Stand [place] (Transfers)  moderate assist (50% patient effort);verbal cues;1 person assist  -CS     Stand-Sit [place] (Transfers) minimum assist (75% patient effort);verbal cues;1 person assist  -CS     [place] Level (Toilet Transfer) moderate assist (50% patient effort)  -CS     Assistive Device (Toilet Transfer) commode, bedside without drop arms;walker, front-wheeled  -CS     Row Name 05/13/22 1045          Sit-Stand Transfer    Assistive Device (Sit-Stand Transfers) walker, front-wheeled  -CS     Row Name 05/13/22 1045          Stand-Sit Transfer    Assistive Device (Stand-Sit Transfers) walker, front-wheeled  -CS     Row Name 05/13/22 1045          Toilet Transfer    Type (Toilet Transfer) sit-stand;stand-sit  -CS     Row Name 05/13/22 1045          Activities of Daily Living    BADL Assessment/Intervention toileting;grooming  -     Row Name 05/13/22 1045          Toileting Assessment/Training    [place] Level (Toileting) toileting skills;moderate assist (50% patient effort)  -CS     Position (Toileting) unsupported sitting  -CS     Row Name 05/13/22 1045          Grooming Assessment/Training    [place] Level (Grooming) grooming skills;hair care, combing/brushing;wash face, hands;minimum assist (75% patient effort);other (see comments)  shampoo cap  -CS     Position (Grooming) supported sitting  -CS           User Key  (r) = Recorded By, (t) = Taken By, (c) = Cosigned By    Initials Name Provider Type    CS Vanessa Petit COTA Occupational Therapist Assistant               Obj/Interventions     Row Name 05/13/22 1045          Shoulder (Therapeutic Exercise)    Shoulder (Therapeutic Exercise) AROM (active range of motion)  -     Shoulder AROM (Therapeutic Exercise) bilateral;flexion;extension;external rotation;internal rotation  -     Row Name 05/13/22 1045          Elbow/Forearm (Therapeutic Exercise)    Elbow/Forearm (Therapeutic Exercise) AROM (active range of motion)  -     Elbow/Forearm AROM  (Therapeutic Exercise) bilateral;flexion;extension;supination;pronation  -[facility] Name 05/13/22 1045          Wrist (Therapeutic Exercise)    Wrist (Therapeutic Exercise) AROM (active range of motion)  -     Wrist AROM (Therapeutic Exercise) bilateral;flexion;extension  -[facility] Name 05/13/22 1045          Hand (Therapeutic Exercise)    Hand (Therapeutic Exercise) AROM (active range of motion)  -     Hand AROM/AAROM (Therapeutic Exercise) bilateral;finger flexion;finger extension  -[facility] Name 05/13/22 1045          Motor Skills    Therapeutic Exercise shoulder;elbow/forearm;wrist;hand  -           User Key  (r) = Recorded By, (t) = Taken By, (c) = Cosigned By    Initials Name Provider Type     Vanessa Petit COTA Occupational Therapist Assistant               Goals/Plan     [facility] Name 05/13/22 1045          Transfer Goal 1 (OT)    Activity/Assistive Device (Transfer Goal 1, OT) toilet  -CS     [place] Level/Cues Needed (Transfer Goal 1, OT) supervision required  -CS     Time Frame (Transfer Goal 1, OT) long term goal (LTG);by discharge  -CS     Progress/Outcome (Transfer Goal 1, OT) goal not met  -[facility] Name 05/13/22 1045          Bathing Goal 1 (OT)    Activity/Device (Bathing Goal 1, OT) lower body bathing  AD as needed  -CS     [place] Level/Cues Needed (Bathing Goal 1, OT) minimum assist (75% or more patient effort)  -CS     Time Frame (Bathing Goal 1, OT) long term goal (LTG);by discharge  -CS     Progress/Outcomes (Bathing Goal 1, OT) goal not met  -[facility] Name 05/13/22 1045          Dressing Goal 1 (OT)    Activity/Device (Dressing Goal 1, OT) lower body dressing  AD as needed  -CS     [place]/Cues Needed (Dressing Goal 1, OT) minimum assist (75% or more patient effort)  -CS     Time Frame (Dressing Goal 1, OT) long term goal (LTG);by discharge  -CS     Progress/Outcome (Dressing Goal 1, OT) goal not met  -CS     Row Name 05/13/22 1045          Problem Specific  Goal 1 (OT)    Problem Specific Goal 1 (OT) pt will verbalize and demonstrate proper understanding of sternal precautions with min verbal cues required  -CS     Time Frame (Problem Specific Goal 1, OT) long term goal (LTG);by discharge  -CS     Progress/Outcome (Problem Specific Goal 1, OT) goal not met  -CS           User Key  (r) = Recorded By, (t) = Taken By, (c) = Cosigned By    Initials Name Provider Type    CS Vanessa Petit COTA Occupational Therapist Assistant               Clinical Impression     Row Name 05/13/22 1045          Pain Assessment    Pretreatment Pain Rating 7/10  -CS     Posttreatment Pain Rating 7/10  -CS     Pain Location - chest  -CS     Pain Intervention(s) Repositioned;Rest;Distraction;Medication (See MAR)  -CS     Row Name 05/13/22 1045          Plan of Care Review    Plan of Care Reviewed With patient  -CS     Progress improving  -CS     Outcome Evaluation Pt tolerated tx well this date. Pt was mod A with sup-sit-sup. Pt was min/mod A with sit-stand and toilet t/f. Pt was mod A with toileting. Pt was min A with grooming task. Pt gave good effort with UE ther ex. Continue OT POC.  -CS     Row Name 05/13/22 1045          Therapy Assessment/Plan (OT)    Rehab Potential (OT) good, to achieve stated therapy goals  -CS     Criteria for Skilled Therapeutic Interventions Met (OT) yes;meets criteria;skilled treatment is necessary  -CS     Therapy Frequency (OT) daily  -CS     Row Name 05/13/22 1045          Vital Signs    Pre Patient Position Supine  -CS     Intra Patient Position Sitting  -CS     Post Patient Position Supine  -CS     Row Name 05/13/22 1045          Positioning and Restraints    Pre-Treatment Position in bed  -CS     Post Treatment Position bed  -CS     In Bed fowlers;call light within reach;encouraged to call for assist;exit alarm on;with family/caregiver  -CS           User Key  (r) = Recorded By, (t) = Taken By, (c) = Cosigned By    Initials Name Provider Type    CS  Vanessa Petit COTA Occupational Therapist Assistant               Outcome Measures    No documentation.                 Occupational Therapy Education                 Title: PT OT SLP Therapies (In Progress)     Topic: Occupational Therapy (In Progress)     Point: ADL training (Not Started)     Description:   Instruct learner(s) on proper safety adaptation and remediation techniques during self care or transfers.   Instruct in proper use of assistive devices.              Learner Progress:  Not documented in this visit.          Point: Home exercise program (Not Started)     Description:   Instruct learner(s) on appropriate technique for monitoring, assisting and/or progressing therapeutic exercises/activities.              Learner Progress:  Not documented in this visit.          Point: Precautions (Done)     Description:   Instruct learner(s) on prescribed precautions during self-care and functional transfers.              Learning Progress Summary           Patient Acceptance, E, VU,NR by ME at 5/11/2022 0937    Comment: Educated on OT and POC. Educated to call for assistance. Educated on safety precautions. Educated on sternal precautions.                   Point: Body mechanics (Done)     Description:   Instruct learner(s) on proper positioning and spine alignment during self-care, functional mobility activities and/or exercises.              Learning Progress Summary           Patient Acceptance, E, VU,NR by ME at 5/11/2022 0937    Comment: Educated on OT and POC. Educated to call for assistance. Educated on safety precautions. Educated on sternal precautions.                               User Key     Initials Effective Dates Name Provider Type Discipline    ME 06/16/21 -  Amrit Grant OTR/L Occupational Therapist OT              OT Recommendation and Plan  Therapy Frequency (OT): daily  Plan of Care Review  Plan of Care Reviewed With: patient  Progress: improving  Outcome Evaluation: Pt tolerated tx  well this date. Pt was mod A with sup-sit-sup. Pt was min/mod A with sit-stand and toilet t/f. Pt was mod A with toileting. Pt was min A with grooming task. Pt gave good effort with UE ther ex. Continue OT POC.     Time Calculation:    Time Calculation- OT     Row Name 05/13/22 1305             Time Calculation- OT    OT Start Time 1045  -CS      OT Stop Time 1138  -CS      OT Time Calculation (min) 53 min  -CS      Total Timed Code Minutes- OT 53 minute(s)  -CS      OT Received On 05/13/22  -CS              Timed Charges    48937 - OT Therapeutic Exercise Minutes 30  -CS      47653 - OT Self Care/Mgmt Minutes 23  -CS              Total Minutes    Timed Charges Total Minutes 53  -CS       Total Minutes 53  -CS            User Key  (r) = Recorded By, (t) = Taken By, (c) = Cosigned By    Initials Name Provider Type    CS Vanessa Petit COTA Occupational Therapist Assistant              Therapy Charges for Today     Code Description Service Date Service Provider Modifiers Qty    37684926311 HC OT THERAPEUTIC ACT EA 15 MIN 5/12/2022 Vanessa Petit COTA GO 2    45553527345 HC OT SELF CARE/MGMT/TRAIN EA 15 MIN 5/12/2022 Vanessa Petit COTA GO 2    24853930042 HC OT THER PROC EA 15 MIN 5/13/2022 Vanessa Petit COTA GO 2    91834367763 HC OT SELF CARE/MGMT/TRAIN EA 15 MIN 5/13/2022 Vanessa Petit COTA GO 2               DUNCAN Becerril  5/13/2022

## 2022-05-13 NOTE — THERAPY TREATMENT NOTE
Acute Care - Physical Therapy Treatment Note  [facility]     Patient Name: Afia Adams  : 1950  MRN: [id number]  Today's Date: 2022      Visit Dx:     ICD-10-CM ICD-9-CM   1. Coronary artery disease of native artery of native heart with stable angina pectoris (HCC)  I25.118 414.01     413.9   2. Impaired mobility and ADLs  Z74.09 V49.89    Z78.9    3. Impaired functional mobility, balance, gait, and endurance  Z74.09 V49.89     Patient Active Problem List   Diagnosis   • Diabetes mellitus ([facility])   • Essential hypertension   • HFrEF (heart failure with reduced ejection fraction) ([facility])   • Encounter for long-term current use of medication   • CAD (coronary artery disease)   • Stage 3a chronic kidney disease ([facility])   • Lipoma of neck   • Anemia   • Elevated TSH   • History of PTCA   • Abnormal nuclear stress test   • Myocardial infarct, old   • Class 1 obesity due to excess calories with serious comorbidity and body mass index (BMI) of 32.0 to 32.9 in adult   • Mixed hyperlipidemia   • Abnormal findings on diagnostic imaging of other specified body structures    • Panlobular emphysema ([facility])     Past Medical History:   Diagnosis Date   • Abdominal pain 2015    unspecified   • Acute gastritis    • Acute on chronic systolic congestive heart failure (HCC) 2016   • Ankle pain 2015   • Asthma    • Calculus of kidney 10/09/2020    Added automatically from request for surgery [id number]   • Candidiasis, skin or nails 2011    controlled   • Chronic systolic congestive heart failure (HCC) 2016   • Congestive heart failure (HCC) 2016   • Coronary arteriosclerosis 2016    multi-vessel, 2 stents, followed by Dr. Kent   • Cough 2014    proabably due to allergy, chest x ray is normal      • Diabetic ulcer of toe of right foot associated with type 2 diabetes mellitus (HCC) 10/24/2020    Follow up with podiatry outpatient    • Disease of thyroid gland    •  Edema of foot 03/24/2016   • Encounter for long-term (current) drug use     therapy   • Epigastric pain 12/03/2015   • Essential hypertension 06/21/2016   • GERD (gastroesophageal reflux disease)    • Gout    • Heart attack (HCC)     2016 2 stents by Dr. Kent still follows with him/last seen 6 -2020 told doing ok   • History of echocardiogram 03/14/2016    Left atrium normal with mild CLVH wit normal aortic root size.Evidence of posterolateral wall hypokinesis. Severely depressed LV systolic function of 20-25%.Mitral valve thickened Aortic sclerosis.Diastolic dysfunction   • Hyperlipidemia 03/31/2016    unspecified   • Hypertensive disorder 03/24/2016   • Left lower quadrant pain 07/12/2013    ruling out diverticular disease      • Myocardial infarction (HCC) 03/24/2016   • Nausea 11/20/2015   • Obstructive uropathy 09/29/2020   • Osteoarthritis of knee 06/23/2016   • Pain in limb 01/25/2011   • Pediculosis capitis 12/22/2011    controlled   • Peptic ulcer    • Pneumonia    • Polyuria 01/25/2011   • Pruritic rash 12/09/2014   • Superficial foreign body hip without major open wound, no infection 12/09/2011    ??? back   • Type 2 diabetes mellitus (HCC) 06/23/2016    new onset   • Weakness 06/23/2016   • Wears dentures    • Wears glasses      Past Surgical History:   Procedure Laterality Date   • CARDIAC CATHETERIZATION Left 4/29/2022    Procedure: Left Heart Cath;  Surgeon: Luke Kent MD;  Location: [facility] CATH INVASIVE LOCATION;  Service: Cardiology;  Laterality: Left;   • COLONOSCOPY W/ POLYPECTOMY  03/07/2016    Transverse colon polyps,descending polyps,Sigmoid polyps, all resected and retrieved. Diverticulosis without perforation or abscess without bleeding. Erica Thomas   • CYSTOSCOPY Left 11/4/2020    Procedure: CYSTOSCOPY; LEFT J-STENT REMOVAL             (STRETCHER)   FLEXIBLE SCOPE;  Surgeon: Richar Rojas MD;  Location: [facility] OR;  Service: Urology;  Laterality: Left;   • CYSTOSCOPY,  "URETEROSCOPY, RETROGRADE PYELOGRAM, STENT INSERTION Left 9/30/2020    Procedure: CYSTOSCOPY LEFT URETEROSCOPY RETROGRADE PYELOGRAM STENT INSERTION;  Surgeon: Richar Rojas MD;  Location: [facility];  Service: Urology;  Laterality: Left;   • CYSTOSCOPY, URETEROSCOPY, RETROGRADE PYELOGRAM, STENT INSERTION Left 10/21/2020    Procedure: CYSTOSCOPY, LEFT RETROGRADE, URETEROSCOPY, LASER LITHOTRIPSY, STENT PLACEMENT;  Surgeon: Richar Rojas MD;  Location: [facility];  Service: Urology;  Laterality: Left;   • HYSTERECTOMY      ovary preserv   • INJECTION OF MEDICATION  09/11/2014    Celestone (betamethasone) (2)       PT Assessment (last 12 hours)     PT Evaluation and Treatment     Row Name 05/13/22 0907          Physical Therapy Time and Intention    Subjective Information complains of;weakness;fatigue;dyspnea  \"gotta pee but it just won't come out\"  -LN     Document Type therapy note (daily note)  -LN     Mode of Treatment individual therapy;physical therapy  -LN     Row Name 05/13/22 0907          General Information    Patient Profile Reviewed yes  -LN     Existing Precautions/Restrictions fall;sternal  -LN     Row Name 05/13/22 0907          Pain    Pretreatment Pain Rating 7/10  -LN     Posttreatment Pain Rating 0/10 - no pain  -LN     Pain Location - abdomen  -LN     Pain Intervention(s) Repositioned  -LN     Row Name 05/13/22 0907          Cognition    Orientation Status (Cognition) oriented x 4  -LN     Row Name 05/13/22 0907          Range of Motion Comprehensive    General Range of Motion no range of motion deficits identified  -LN     Row Name 05/13/22 0907          Bed Mobility    Bed Mobility supine-sit  -LN     Supine-Sit [place] (Bed Mobility) not tested  -LN     Sit-Supine [place] (Bed Mobility) moderate assist (50% patient effort);1 person assist  -LN     Row Name 05/13/22 0907          Transfers    Bed-Chair [place] (Transfers) not tested  -LN     Chair-Bed [place] (Transfers) " minimum assist (75% patient effort)  -LN     Assistive Device (Bed-Chair Transfers) walker, front-wheeled  -LN     Sit-Stand [place] (Transfers) moderate assist (50% patient effort);verbal cues;1 person assist  -LN     Stand-Sit [place] (Transfers) minimum assist (75% patient effort);verbal cues;1 person assist  -LN     [place] Level (Toilet Transfer) moderate assist (50% patient effort)  to stand from commode-2 trips to the [facility]  -LN     Assistive Device (Toilet Transfer) commode, bedside without drop arms;walker, front-wheeled  -LN     Row Name 05/13/22 0907          Chair-Bed Transfer    Assistive Device (Chair-Bed Transfers) walker, front-wheeled  -LN     Comment, (Chair-Bed Transfer) --  mod to lay down  -     Row Name 05/13/22 0907          Sit-Stand Transfer    Assistive Device (Sit-Stand Transfers) walker, front-wheeled  -LN     Row Name 05/13/22 0907          Stand-Sit Transfer    Assistive Device (Stand-Sit Transfers) walker, front-wheeled  -LN     Row Name 05/13/22 0907          Toilet Transfer    Type (Toilet Transfer) sit-stand;stand-sit  -LN     Row Name 05/13/22 0907          Gait/Stairs (Locomotion)    [place] Level (Gait) minimum assist (75% patient effort);1 person assist  -LN     Assistive Device (Gait) walker, front-wheeled  -LN     Distance in Feet (Gait) 50,5,12  -LN     Deviations/Abnormal Patterns (Gait) gait speed decreased;stride length decreased  -LN     Bilateral Gait Deviations forward flexed posture  -     Row Name 05/13/22 0907          Safety Issues, Functional Mobility    Impairments Affecting Function (Mobility) balance;endurance/activity tolerance;pain;shortness of breath;strength;coordination  -     Row Name 05/13/22 0907          Motor Skills    Therapeutic Exercise hip;knee;ankle  -     Row Name 05/13/22 0907          Hip (Therapeutic Exercise)    Hip (Therapeutic Exercise) AROM (active range of motion)  -LN     Hip AROM (Therapeutic Exercise)  bilateral;flexion;aDduction;aBduction  -LN     Row Name 05/13/22 0907          Knee (Therapeutic Exercise)    Knee (Therapeutic Exercise) AROM (active range of motion)  -LN     Knee AROM (Therapeutic Exercise) bilateral;LAQ (long arc quad)  -LN     Row Name 05/13/22 0907          Ankle (Therapeutic Exercise)    Ankle (Therapeutic Exercise) AROM (active range of motion)  -LN     Ankle AROM (Therapeutic Exercise) bilateral;dorsiflexion;plantarflexion  -LN     Row Name             Wound 05/10/22 0802 sternal Incision    Wound - Properties Group Placement Date: 05/10/22  - Placement Time: 0802  -EH Present on Hospital Admission: N  -EH Location: sternal  -EH Primary Wound Type: Incision  -EH     Retired Wound - Properties Group Placement Date: 05/10/22  - Placement Time: 0802  -EH Present on Hospital Admission: N  -EH Location: sternal  -EH Primary Wound Type: Incision  -EH     Retired Wound - Properties Group Date first assessed: 05/10/22  - Time first assessed: 0802  -EH Present on Hospital Admission: N  -EH Location: sternal  -EH Primary Wound Type: Incision  -EH     Row Name             Wound 05/10/22 0754 Left lower leg Incision    Wound - Properties Group Placement Date: 05/10/22  - Placement Time: 0754  -EH Present on Hospital Admission: N  -EH Side: Left  -EH Orientation: lower  -EH Location: leg  -EH Primary Wound Type: Incision  -EH, EVH      Retired Wound - Properties Group Placement Date: 05/10/22  - Placement Time: 0754  -EH Present on Hospital Admission: N  -EH Side: Left  -EH Orientation: lower  -EH Location: leg  -EH Primary Wound Type: Incision  -EH, EVH      Retired Wound - Properties Group Date first assessed: 05/10/22  - Time first assessed: 0754  -EH Present on Hospital Admission: N  -EH Side: Left  -EH Location: leg  -EH Primary Wound Type: Incision  -EH, EVH      Row Name             NPWT (Negative Pressure Wound Therapy) 05/10/22 CHEST    NPWT (Negative Pressure Wound Therapy) -  Properties Group Placement Date: 05/10/22  - Location: CHEST  - Additional Comments: PREVENA  -EH     Retired NPWT (Negative Pressure Wound Therapy) - Properties Group Placement Date: 05/10/22  - Location: CHEST  - Additional Comments: PREVENA  -EH     Retired NPWT (Negative Pressure Wound Therapy) - Properties Group Placement Date: 05/10/22  - Location: CHEST  - Additional Comments: PREVENA  -EH     Row Name 05/13/22 0907          Plan of Care Review    Plan of Care Reviewed With patient  -LN     Outcome Evaluation sit-sup mod of 1;sit-stand mod/min of 1;amb 50',12' with rw and min of 1-soa with gt and while sitting in chair and talking;reviewed sternal precautions  -LN     Row Name 05/13/22 0907          Vital Signs    Pre Systolic BP Rehab 135  -LN     Pre Treatment Diastolic BP 75  -LN     Post Systolic BP Rehab 136  -LN     Post Treatment Diastolic BP 71  -LN     Pretreatment Heart Rate (beats/min) 80  -LN     Intratreatment Heart Rate (beats/min) 78  -LN     Posttreatment Heart Rate (beats/min) 77  -LN     Pre SpO2 (%) 94  -LN     O2 Delivery Pre Treatment supplemental O2  -LN     Intra SpO2 (%) 90  -LN     O2 Delivery Intra Treatment room air  -LN     Post SpO2 (%) 95  -LN     O2 Delivery Post Treatment supplemental O2  -LN     Pre Patient Position Sitting  -LN     Intra Patient Position Standing  -LN     Post Patient Position Supine  -LN     Row Name 05/13/22 0907          Positioning and Restraints    Post Treatment Position bed  -LN     In Bed notified nsg;supine;call light within reach;encouraged to call for assist;exit alarm on  -LN     Row Name 05/13/22 0907          Therapy Assessment/Plan (PT)    Rehab Potential (PT) good, to achieve stated therapy goals  -LN     Criteria for Skilled Interventions Met (PT) yes;meets criteria;skilled treatment is necessary  -LN     Therapy Frequency (PT) daily  -LN     Row Name 05/13/22 0907          Bed Mobility Goal 1 (PT)    Activity/Assistive Device  (Bed Mobility Goal 1, PT) sit to supine;supine to sit  -LN     [place] Level/Cues Needed (Bed Mobility Goal 1, PT) standby assist  -LN     Time Frame (Bed Mobility Goal 1, PT) by discharge  -LN     Strategies/Barriers (Bed Mobility Goal 1, PT) HOB down and bed rails  -LN     Progress/Outcomes (Bed Mobility Goal 1, PT) goal not met  -LN     Row Name 05/13/22 0907          Transfer Goal 1 (PT)    Activity/Assistive Device (Transfer Goal 1, PT) sit-to-stand/stand-to-sit;bed-to-chair/chair-to-bed  -LN     [place] Level/Cues Needed (Transfer Goal 1, PT) independent  -LN     Time Frame (Transfer Goal 1, PT) by discharge  -LN     Progress/Outcome (Transfer Goal 1, PT) goal not met  -LN     Row Name 05/13/22 0907          Gait Training Goal 1 (PT)    Activity/Assistive Device (Gait Training Goal 1, PT) gait (walking locomotion);assistive device use  -LN     [place] Level (Gait Training Goal 1, PT) modified independence  -LN     Distance (Gait Training Goal 1, PT) 50'x1  -LN     Time Frame (Gait Training Goal 1, PT) by discharge  -LN     Progress/Outcome (Gait Training Goal 1, PT) goal not met  -LN     Row Name 05/13/22 0907          Stairs Goal 1 (PT)    Activity/Assistive Device (Stairs Goal 1, PT) stairs, all skills;ascending stairs;descending stairs  -LN     [place] Level/Cues Needed (Stairs Goal 1, PT) contact guard required  -LN     Number of Stairs (Stairs Goal 1, PT) 2  -LN     Time Frame (Stairs Goal 1, PT) by discharge  -LN     Progress/Outcome (Stairs Goal 1, PT) goal not met  -LN           User Key  (r) = Recorded By, (t) = Taken By, (c) = Cosigned By    Initials Name Provider Type    LN Shyanne Curtis PTA Physical Therapist Assistant    Nay Canela RN Registered Nurse                Physical Therapy Education                 Title: PT OT SLP Therapies (In Progress)     Topic: Physical Therapy (Done)     Point: Mobility training (Done)     Learning Progress Summary           Patient  Acceptance, E,TB, VU,NR by LR at 5/11/2022 0936    Comment: Educated on PT POC, goals, stacked breathing, and sternal percautions.                   Point: Home exercise program (Done)     Learning Progress Summary           Patient Acceptance, E,TB, VU,NR by LR at 5/11/2022 0936    Comment: Educated on PT POC, goals, stacked breathing, and sternal percautions.                   Point: Body mechanics (Done)     Learning Progress Summary           Patient Acceptance, E,TB, VU,NR by LR at 5/11/2022 0936    Comment: Educated on PT POC, goals, stacked breathing, and sternal percautions.                   Point: Precautions (Done)     Learning Progress Summary           Patient Acceptance, E,TB, VU,NR by LR at 5/11/2022 0936    Comment: Educated on PT POC, goals, stacked breathing, and sternal percautions.                               User Key     Initials Effective Dates Name Provider Type Discipline    LR 06/16/21 -  Kenyon Lanza Physical Therapist PT              PT Recommendation and Plan  Anticipated Discharge Disposition (PT): skilled nursing facility  Therapy Frequency (PT): daily  Plan of Care Reviewed With: patient  Outcome Evaluation: sit-sup mod of 1;sit-stand mod/min of 1;amb 50',12' with rw and min of 1-soa with gt and while sitting in chair and talking;reviewed sternal precautions       Time Calculation:    PT Charges     Row Name 05/13/22 1258             Time Calculation    Start Time 0907  -LN      Stop Time 1025  -LN      Time Calculation (min) 78 min  -LN      PT Received On 05/13/22  -LN              Time Calculation- PT    Total Timed Code Minutes- PT 78 minute(s)  -LN            User Key  (r) = Recorded By, (t) = Taken By, (c) = Cosigned By    Initials Name Provider Type    LN Shyanne Curtis PTA Physical Therapist Assistant              Therapy Charges for Today     Code Description Service Date Service Provider Modifiers Qty    00103457631 HC GAIT TRAINING EA 15 MIN 5/12/2022 Shyanne Curtis PTA  GP 1    34403726349 HC PT SELF CARE/MGMT/TRAIN EA 15 MIN 5/12/2022 Shyanne Curtis, PTA GP 1    22943959927 HC PT SELF CARE/MGMT/TRAIN EA 15 MIN 5/13/2022 Shyanne Curtis, PTA GP 1    06703579504 HC GAIT TRAINING EA 15 MIN 5/13/2022 Shyanne Curtis, PTA GP 1    17345637844 HC PT THER PROC EA 15 MIN 5/13/2022 Shyanne Curtis, PTA GP 1    39544813731 HC PT THERAPEUTIC ACT EA 15 MIN 5/13/2022 Shyanne Curtis, PTA GP 2          PT G-Codes  Outcome Measure Options: AM-PAC 6 Clicks Basic Mobility (PT)  AM-PAC 6 Clicks Score (PT): 6  AM-PAC 6 Clicks Score (OT): 15    Shyanne S GOLDEN Curtis  5/13/2022

## 2022-05-13 NOTE — PLAN OF CARE
Goal Outcome Evaluation:  Plan of Care Reviewed With: patient           Outcome Evaluation: sup-sit min of 1,sit-stand-sit min of 1,mod of 1 from bsc;amb with rw and min of 1 46';needs vc's for sternal precautions-remains soa with activity

## 2022-05-13 NOTE — DISCHARGE PLACEMENT REQUEST
"Afia Adams (71 y.o. Female)             Date of Birth   1950    Social Security Number       Address   [address] [place], [place] [address]    Home Phone   [phone]    MRN   [id number]       [employer] [place]    Marital Status                               Admission Date   5/10/22    Admission Type   Elective    Admitting Provider   Brayden Melgar MD    Attending Provider   Brayden Melgar MD    Department, Room/Bed   [address], 333/1       Discharge Date       Discharge Disposition       Discharge Destination                               Attending Provider: Brayden Melgar MD    Allergies: Bactrim [Sulfamethoxazole-trimethoprim]    Isolation: None   Infection: None   Code Status: CPR   Advance Care Planning Activity    Ht: 149.9 cm (59.02\")   Wt: 87.1 kg (192 lb)    Admission Cmt: None   Principal Problem: CAD (coronary artery disease) [I25.10] More...                 Active Insurance as of 5/10/2022     Primary Coverage     Payor Plan Insurance Group Employer/Plan Group    ANTH MEDICARE REPLACEMENT ANTH MEDICARE ADVANTAGE [id number]     Payor Plan Address Payor Plan Phone Number Payor Plan Fax Number Effective Dates    [address] [phone]  8/1/2017 - None Entered    [facility] [id number]       Subscriber Name Subscriber Birth Date Member ID       AFIA ADAMS 1950 [id number]                 Emergency Contacts      (Rel.) Home Phone Work Phone Mobile Phone    Nahun Adams (Spouse) [phone] -- [phone]    DAMASO MOMIN (Relative) [phone] -- [phone]    ROULA ANDRES (Daughter) -- -- [phone]            Emergency Contact Information     Name Relation Home Work Mobile    Nahun Adams Spouse [phone]    DAMASO MOMIN Relative [phone]    ROULA ANDRES Daughter   [phone]          Insurance " Information                ANTHEM MEDICARE REPLACEMENT/ANTHEM MEDICARE ADVANTAGE Phone: [phone]    Subscriber: Afia Adams Subscriber#: [id number]    Group#: [id number] Precert#: --          Vital Signs (last day)     Date/Time Temp Temp src Pulse Resp BP Patient Position SpO2    05/13/22 0746 97.9 (36.6) Temporal 75 16 148/75 Sitting 97    05/13/22 0406 98.3 (36.8) Temporal 81 -- 142/88 Lying 92    05/13/22 0127 -- -- 74 -- -- -- --    05/12/22 2350 -- -- -- -- -- -- 92    05/12/22 2344 98.2 (36.8) Temporal 77 16 129/56 Lying 86    05/12/22 2022 -- -- 77 -- -- -- 100    05/12/22 2014 -- -- 74 16 -- -- 90    05/12/22 2003 98.3 (36.8) Temporal 72 17 114/61 Sitting 92    05/12/22 1626 -- -- 67 -- -- -- --    05/12/22 1504 97.6 (36.4) Temporal 60 18 104/58 -- 92    05/12/22 1408 -- -- 64 16 -- -- 98    05/12/22 1107 98 (36.7) Temporal 66 18 139/63 Lying 96    05/12/22 0740 -- -- 77 -- -- -- --    05/12/22 0738 98.3 (36.8) Temporal 78 18 146/78 Lying 97    05/12/22 0706 -- -- 73 -- -- -- --    05/12/22 0656 -- -- 70 18 -- -- 99    05/12/22 0422 96.5 (35.8) Oral 74 16 150/72 Sitting 95    05/12/22 0207 -- -- 59 -- -- -- --    05/12/22 0020 96.2 (35.7) Oral 68 16 141/67 Lying 95            Current Facility-Administered Medications   Medication Dose Route Frequency Provider Last Rate Last Admin   • acetaminophen (TYLENOL) tablet 1,000 mg  1,000 mg Oral Q6H Leila Dias APRN   1,000 mg at 05/13/22 0836   • ascorbic acid (VITAMIN C) tablet 500 mg  500 mg Oral BID Leila Dias APRN   500 mg at 05/13/22 0837   • aspirin EC tablet 81 mg  81 mg Oral Daily Leila Dias APRN   81 mg at 05/13/22 0837   • calcium carbonate (TUMS) chewable tablet 500 mg (200 mg elemental)  2 tablet Oral TID PRN Leila Dias APRN   2 tablet at 05/12/22 2245   • carvedilol (COREG) tablet 12.5 mg  12.5 mg Oral BID With Meals Leila Dias APRN       • clopidogrel (PLAVIX) tablet 75 mg  75 mg Oral  Daily Leila Dias, APRN   75 mg at 05/13/22 0837   • dextrose (D50W) (25 g/50 mL) IV injection 25 g  25 g Intravenous Q15 Min PRN Arun Leila Josephine, APRN       • dextrose (GLUTOSE) oral gel 15 g  15 g Oral Q15 Min PRN Arun Leila Josephine, APRN       • famotidine (PEPCID) tablet 20 mg  20 mg Oral BID Leila Dias, APRN   20 mg at 05/13/22 0837   • furosemide (LASIX) injection 20 mg  20 mg Intravenous Daily Leila Dias, APRN   20 mg at 05/13/22 0836   • glucagon (human recombinant) (GLUCAGEN DIAGNOSTIC) injection 1 mg  1 mg Intramuscular Q15 Min PRN Arun Leila Josephine, APRN       • Insulin Aspart (novoLOG) injection 0-24 Units  0-24 Units Subcutaneous TID AC Leila Dias APRN   4 Units at 05/12/22 1032   • insulin detemir (LEVEMIR) injection 25 Units  25 Units Subcutaneous Nightly Leila Dias, APRN   25 Units at 05/12/22 2112   • ipratropium-albuterol (DUO-NEB) nebulizer solution 3 mL  3 mL Nebulization 4x Daily - RT Leila Dias, APRN   3 mL at 05/12/22 2014   • ketorolac (TORADOL) tablet 10 mg  10 mg Oral Q8H Leila Dias, APRN   10 mg at 05/13/22 0619   • levothyroxine (SYNTHROID, LEVOTHROID) tablet 25 mcg  25 mcg Oral Q AM Leila Dias, APRN   25 mcg at 05/13/22 0619   • losartan (COZAAR) tablet 50 mg  50 mg Oral Q24H ArunLeila, APRN   50 mg at 05/13/22 0837   • magnesium oxide (MAG-OX) tablet 400 mg  400 mg Oral BID Leila Dias, APRN   400 mg at 05/13/22 0837   • mupirocin (BACTROBAN) 2 % nasal ointment 1 application  1 application Each Nare Daily Leila Dias, APRN   1 application at 05/13/22 0837   • ondansetron (ZOFRAN) injection 4 mg  4 mg Intravenous Q6H PRN Leila Dias APRN   4 mg at 05/13/22 0408   • oxyCODONE (ROXICODONE) immediate release tablet 5 mg  5 mg Oral Q4H PRN Leila Dias APRN   5 mg at 05/11/22 0823   • polyethylene glycol (MIRALAX) packet 17 g  17 g Oral Daily Leila Dias APRN    17 g at 05/13/22 0834   • potassium chloride 20 mEq in 50 mL IVPB  20 mEq Intravenous Q1H PRN Arun, Leila Josephine, APRN        Or   • potassium chloride 20 mEq in 50 mL IVPB  20 mEq Intravenous Q1H PRN Arun, Leila Josephine, APRN   Stopped at 05/10/22 1620   • sennosides-docusate (PERICOLACE) 8.6-50 MG per tablet 2 tablet  2 tablet Oral BID Arun, Leila Josephine, APRN   2 tablet at 05/13/22 0837   • tamsulosin (FLOMAX) 24 hr capsule 0.4 mg  0.4 mg Oral Daily Arun, Leila Josephine, APRN   0.4 mg at 05/13/22 0837   • traMADol (ULTRAM) tablet 100 mg  100 mg Oral Q4H PRN Arun, Leila Josephine, APRN             Wounds      Active Wounds    Wound 05/10/22 0802 sternal Incision   Placement date 05/10/22      Placement time 0802  Days 3   Present on Hospital Admission: N  Location: sternal    Primary Wound Type: Incision        Assessments    Assessments   Row Name 05/13/22 0840 05/12/22 2000 05/12/22 0715 05/11/22 2000 05/11/22 1300   Dressing Appearance dry;intact  --  --  --  dry;intact    Closure AMANUEL  AMANUEL  AMANUEL  AMANUEL  AMANUEL    Base dressing in place, unable to visualize  dressing in place, unable to visualize  dressing in place, unable to visualize  dressing in place, unable to visualize  dressing in place, unable to visualize    Periwound dry;intact  dry;intact  dry;intact  dry;intact  dry;intact    Periwound Temperature warm  warm  warm  warm  warm    Periwound Skin Turgor soft  soft  soft  soft  soft    Care, Wound negative pressure wound therapy  --  negative pressure wound therapy  --  negative pressure wound therapy    Assessments   Row Name 05/11/22 0800 05/11/22 0400 05/11/22 0000 05/10/22 2000 05/10/22 1600   Dressing Appearance dry;intact  intact;dry  intact;dry  dry;intact  --    Closure AMANUEL  AMANUEL  AMANUEL  AMANUEL  --  prevena    Base dressing in place, unable to visualize  dressing in place, unable to visualize  dressing in place, unable to visualize  dressing in place, unable to visualize  dressing in place, unable to  visualize    Periwound dry;intact  dry;intact  dry;intact  dry;intact  dry;intact    Periwound Temperature warm  warm  warm  warm  warm    Periwound Skin Turgor soft  soft  soft  soft  soft    Care, Wound negative pressure wound therapy  negative pressure wound therapy  negative pressure wound therapy  negative pressure wound therapy  --    Assessments   Row Name 05/10/22 1215 05/10/22 0821   Dressing Appearance dry;intact  --    Closure --  prevena  --    Base dressing in place, unable to visualize  --    Periwound dry;intact  --    Periwound Temperature warm  --    Periwound Skin Turgor soft  --    Care, Wound negative pressure wound therapy  --    Wound 05/10/22 0754 Left lower leg Incision   Placement date 05/10/22      Placement time 0754  Days 3   Present on Hospital Admission: N  Side: Left    Orientation: lower  Location: leg    Primary Wound Type: Incision  [facility]        Assessments    Assessments   Row Name 05/13/22 0840 05/12/22 2000 05/12/22 0715 05/11/22 2000 05/11/22 1300   Dressing Appearance dry;intact  --  --  --  dry;intact    Closure AMANUEL  AMANUEL  AMANUEL  AMANUEL  AMANUEL    Base dressing in place, unable to visualize  dressing in place, unable to visualize  dressing in place, unable to visualize  dressing in place, unable to visualize  dressing in place, unable to visualize    Periwound intact;dry  intact;dry  intact;dry  intact;dry  intact;dry    Periwound Temperature warm  warm  warm  warm  warm    Periwound Skin Turgor soft  soft  soft  soft  soft    Dressing Care --  --  --  --  --  covered with AHMET hose    Assessments   Row Name 05/11/22 0800 05/11/22 0400 05/11/22 0000 05/10/22 2000 05/10/22 1600   Dressing Appearance intact;dry  dry;intact  dry;intact  dry;intact  --    Closure AMANUEL  AMANUEL  AMANUEL  AMANUEL  AMANUEL    Base dressing in place, unable to visualize  dressing in place, unable to visualize  dressing in place, unable to visualize  dressing in place, unable to visualize  dressing in place, unable to visualize     Periwound intact;dry  intact;dry  intact;dry  intact;dry  --    Periwound Temperature warm  warm  warm  warm  --    Periwound Skin Turgor soft  soft  soft  soft  --    Dressing Care --  --  --  --  --    Assessments   Row Name 05/10/22 1215 05/10/22 0821   Dressing Appearance dry;intact  --    Closure AMANUEL  Approximated;Sutures    Base dressing in place, unable to visualize  --    Periwound --  --    Periwound Temperature --  --    Periwound Skin Turgor --  --    Dressing Care --  wrapped in ace wrap  dressing applied  EXOFIN, KERLEX, ACE    NPWT (Negative Pressure Wound Therapy) 05/10/22 CHEST   Placement date 05/10/22      Placement time --  Days 3   Location: CHEST  Additional Comments: PREVENA      Assessments    Assessments   Row Name 05/13/22 0840 05/12/22 2000 05/12/22 0715 05/11/22 2000 05/11/22 1300   Therapy Setting continuous therapy  continuous therapy  continuous therapy  continuous therapy  continuous therapy    Dressing --  purple  --  --  prevena; purple foam  --  --  purple foam    Pressure Setting 125 mmHg  --  --  --  125 mmHg    Assessments   Row Name 05/11/22 0800 05/11/22 0400 05/11/22 0000 05/10/22 2000 05/10/22 1600   Therapy Setting continuous therapy  continuous therapy  continuous therapy  continuous therapy  continuous therapy    Dressing other (see comments)  purple foam  other (see comments)  Purple foam  other (see comments)  purple foam  other (see comments)  Purple foam  --    Pressure Setting 125 mmHg  125 mmHg  125 mmHg  125 mmHg  --    Assessments   Row Name 05/10/22 1215 05/10/22 0821   Therapy Setting continuous therapy  --    Dressing --  foam,purple  --    Pressure Setting --  --      Inactive Wounds    None

## 2022-05-14 LAB
BH BB BLOOD EXPIRATION DATE: NORMAL
BH BB BLOOD EXPIRATION DATE: NORMAL
BH BB BLOOD TYPE BARCODE: 6200
BH BB BLOOD TYPE BARCODE: NORMAL
BH BB DISPENSE STATUS: NORMAL
BH BB DISPENSE STATUS: NORMAL
BH BB PRODUCT CODE: NORMAL
BH BB PRODUCT CODE: NORMAL
BH BB UNIT NUMBER: NORMAL
BH BB UNIT NUMBER: NORMAL
CROSSMATCH INTERPRETATION: NORMAL
CROSSMATCH INTERPRETATION: NORMAL
GLUCOSE BLDC GLUCOMTR-MCNC: 136 MG/DL (ref 70–130)
GLUCOSE BLDC GLUCOMTR-MCNC: 155 MG/DL (ref 70–130)
GLUCOSE BLDC GLUCOMTR-MCNC: 175 MG/DL (ref 70–130)
GLUCOSE BLDC GLUCOMTR-MCNC: 72 MG/DL (ref 70–130)
UNIT  ABO: NORMAL
UNIT  ABO: NORMAL
UNIT  RH: NORMAL
UNIT  RH: NORMAL

## 2022-05-14 PROCEDURE — 94799 UNLISTED PULMONARY SVC/PX: CPT

## 2022-05-14 PROCEDURE — 97535 SELF CARE MNGMENT TRAINING: CPT

## 2022-05-14 PROCEDURE — 25010000002 FUROSEMIDE PER 20 MG: Performed by: NURSE PRACTITIONER

## 2022-05-14 PROCEDURE — 94760 N-INVAS EAR/PLS OXIMETRY 1: CPT

## 2022-05-14 PROCEDURE — 82962 GLUCOSE BLOOD TEST: CPT

## 2022-05-14 PROCEDURE — 99024 POSTOP FOLLOW-UP VISIT: CPT | Performed by: NURSE PRACTITIONER

## 2022-05-14 RX ORDER — CALCIUM CARBONATE 200(500)MG
2 TABLET,CHEWABLE ORAL 3 TIMES DAILY PRN
Start: 2022-05-14

## 2022-05-14 RX ORDER — ASPIRIN 81 MG/1
81 TABLET ORAL DAILY
Start: 2022-05-15

## 2022-05-14 RX ORDER — ASCORBIC ACID 500 MG
500 TABLET ORAL 2 TIMES DAILY
Start: 2022-05-14 | End: 2022-07-19

## 2022-05-14 RX ORDER — LOSARTAN POTASSIUM 50 MG/1
50 TABLET ORAL
Start: 2022-05-15 | End: 2022-05-17 | Stop reason: HOSPADM

## 2022-05-14 RX ORDER — AMOXICILLIN 250 MG
2 CAPSULE ORAL 2 TIMES DAILY
Start: 2022-05-14 | End: 2022-07-19

## 2022-05-14 RX ORDER — CARVEDILOL 12.5 MG/1
12.5 TABLET ORAL 2 TIMES DAILY WITH MEALS
Start: 2022-05-14 | End: 2022-05-28 | Stop reason: HOSPADM

## 2022-05-14 RX ORDER — CLOPIDOGREL BISULFATE 75 MG/1
75 TABLET ORAL DAILY
Qty: 30 TABLET
Start: 2022-05-15

## 2022-05-14 RX ORDER — TRAMADOL HYDROCHLORIDE 50 MG/1
100 TABLET ORAL EVERY 4 HOURS PRN
Qty: 36 TABLET | Refills: 0 | Status: SHIPPED | OUTPATIENT
Start: 2022-05-14 | End: 2022-05-17 | Stop reason: SDUPTHER

## 2022-05-14 RX ORDER — FUROSEMIDE 20 MG/1
20 TABLET ORAL DAILY
Qty: 30 TABLET | Refills: 0
Start: 2022-05-14 | End: 2023-05-14

## 2022-05-14 RX ORDER — TAMSULOSIN HYDROCHLORIDE 0.4 MG/1
0.4 CAPSULE ORAL DAILY
Qty: 30 CAPSULE
Start: 2022-05-15

## 2022-05-14 RX ADMIN — CARVEDILOL 12.5 MG: 12.5 TABLET, FILM COATED ORAL at 08:10

## 2022-05-14 RX ADMIN — DOCUSATE SODIUM 50 MG AND SENNOSIDES 8.6 MG 2 TABLET: 8.6; 5 TABLET, FILM COATED ORAL at 08:10

## 2022-05-14 RX ADMIN — FAMOTIDINE 20 MG: 20 TABLET ORAL at 08:10

## 2022-05-14 RX ADMIN — TAMSULOSIN HYDROCHLORIDE 0.4 MG: 0.4 CAPSULE ORAL at 08:10

## 2022-05-14 RX ADMIN — POLYETHYLENE GLYCOL 3350 17 G: 17 POWDER, FOR SOLUTION ORAL at 08:10

## 2022-05-14 RX ADMIN — ACETAMINOPHEN 1000 MG: 500 TABLET, FILM COATED ORAL at 14:09

## 2022-05-14 RX ADMIN — ACETAMINOPHEN 1000 MG: 500 TABLET, FILM COATED ORAL at 20:37

## 2022-05-14 RX ADMIN — IPRATROPIUM BROMIDE AND ALBUTEROL SULFATE 3 ML: 2.5; .5 SOLUTION RESPIRATORY (INHALATION) at 07:15

## 2022-05-14 RX ADMIN — FAMOTIDINE 20 MG: 20 TABLET ORAL at 20:37

## 2022-05-14 RX ADMIN — CLOPIDOGREL BISULFATE 75 MG: 75 TABLET ORAL at 08:10

## 2022-05-14 RX ADMIN — LEVOTHYROXINE SODIUM 25 MCG: 25 TABLET ORAL at 05:49

## 2022-05-14 RX ADMIN — ACETAMINOPHEN 1000 MG: 500 TABLET, FILM COATED ORAL at 01:25

## 2022-05-14 RX ADMIN — ACETAMINOPHEN 1000 MG: 500 TABLET, FILM COATED ORAL at 08:10

## 2022-05-14 RX ADMIN — IPRATROPIUM BROMIDE AND ALBUTEROL SULFATE 3 ML: 2.5; .5 SOLUTION RESPIRATORY (INHALATION) at 19:18

## 2022-05-14 RX ADMIN — FUROSEMIDE 20 MG: 10 INJECTION, SOLUTION INTRAMUSCULAR; INTRAVENOUS at 08:10

## 2022-05-14 RX ADMIN — ASPIRIN 81 MG: 81 TABLET, FILM COATED ORAL at 08:10

## 2022-05-14 RX ADMIN — CARVEDILOL 12.5 MG: 12.5 TABLET, FILM COATED ORAL at 17:33

## 2022-05-14 RX ADMIN — LOSARTAN POTASSIUM 50 MG: 50 TABLET, FILM COATED ORAL at 08:10

## 2022-05-14 RX ADMIN — KETOROLAC TROMETHAMINE 10 MG: 10 TABLET, FILM COATED ORAL at 05:49

## 2022-05-14 RX ADMIN — Medication 400 MG: at 20:36

## 2022-05-14 RX ADMIN — MUPIROCIN 1 APPLICATION: 20 OINTMENT TOPICAL at 08:12

## 2022-05-14 RX ADMIN — OXYCODONE HYDROCHLORIDE AND ACETAMINOPHEN 500 MG: 500 TABLET ORAL at 20:36

## 2022-05-14 RX ADMIN — Medication 400 MG: at 08:10

## 2022-05-14 RX ADMIN — OXYCODONE HYDROCHLORIDE AND ACETAMINOPHEN 500 MG: 500 TABLET ORAL at 08:10

## 2022-05-14 NOTE — DISCHARGE SUMMARY
CVTS DISCHARGE SUMMARY  Date of Admission: 5/10/2022  5:13 AM  Date of Discharge:  5/14/2022    Admission Diagnosis:   Coronary artery disease of native artery of native heart with stable angina pectoris (formerly [facility]) [I25.118]    Discharge Diagnosis:   Post-Op Diagnosis Codes:     * Coronary artery disease of native artery of native heart with stable angina pectoris (HCC) [I25.118]  Active Hospital Problems    Diagnosis  POA   • **CAD (coronary artery disease) [I25.10]  Yes   • Stage 3a chronic kidney disease (formerly [facility]) [N18.31]  Yes   • Encounter for long-term current use of medication [Z79.899]  Not Applicable   • HFrEF (heart failure with reduced ejection fraction) (formerly [facility]) [I50.20]  Yes   • Essential hypertension [I10]  Yes      Resolved Hospital Problems   No resolved problems to display.       Consults:   Consults     No orders found from 4/11/2022 to 5/11/2022.          Procedures Performed  Procedure(s):  CORONARY ARTERY BYPASS GRAFTING ENDOSCOPIC VEIN HARVEST         (CELL SAVER)       Discharge Medications     Discharge Medications      New Medications      Instructions Start Date   ascorbic acid 500 MG tablet  Commonly known as: VITAMIN C   500 mg, Oral, 2 Times Daily      aspirin 81 MG EC tablet   81 mg, Oral, Daily   Start Date: May 15, 2022     calcium carbonate 500 MG chewable tablet  Commonly known as: TUMS   2 tablets, Oral, 3 Times Daily PRN      clopidogrel 75 MG tablet  Commonly known as: PLAVIX   75 mg, Oral, Daily   Start Date: May 15, 2022     furosemide 20 MG tablet  Commonly known as: Lasix   20 mg, Oral, Daily      losartan 50 MG tablet  Commonly known as: COZAAR   50 mg, Oral, Every 24 Hours Scheduled   Start Date: May 15, 2022     sennosides-docusate 8.6-50 MG per tablet  Commonly known as: PERICOLACE   2 tablets, Oral, 2 Times Daily      tamsulosin 0.4 MG capsule 24 hr capsule  Commonly known as: FLOMAX   0.4 mg, Oral, Daily   Start Date: May 15, 2022     traMADol 50 MG tablet  Commonly known as:  ULTRAM   100 mg, Oral, Every 4 Hours PRN         Changes to Medications      Instructions Start Date   carvedilol 12.5 MG tablet  Commonly known as: COREG  What changed:   · medication strength  · how much to take   12.5 mg, Oral, 2 Times Daily With Meals         Continue These Medications      Instructions Start Date   ferrous sulfate 325 (65 FE) MG EC tablet   325 mg, Oral, 2 Times Daily With Meals      glimepiride 2 MG tablet  Commonly known as: AMARYL   2 mg, Oral, Every Morning Before Breakfast      levothyroxine 25 MCG tablet  Commonly known as: SYNTHROID, LEVOTHROID   25 mcg, Oral, Every Early Morning      loratadine 10 MG tablet  Commonly known as: CLARITIN   10 mg, Oral, Daily      magnesium oxide 400 (241.3 Mg) MG tablet tablet  Commonly known as: MAGOX   400 mg, Oral, 2 Times Daily      omeprazole 40 MG capsule  Commonly known as: priLOSEC   40 mg, Oral, Daily      polyethylene glycol 17 g packet  Commonly known as: MIRALAX   17 g, Oral, Daily PRN      pravastatin 40 MG tablet  Commonly known as: PRAVACHOL   40 mg, Oral, Nightly         Stop These Medications    hydrALAZINE 50 MG tablet  Commonly known as: APRESOLINE     mupirocin 2 % ointment  Commonly known as: BACTROBAN     olmesartan 40 MG tablet  Commonly known as: ALEX Zuniga ARPN Reviewed risks, benefits, and habit forming potential and weaning from narcotic medication. Patient understands and wishes to receive prescription.  Prescription written for tramadol for post-surgical pain after Og placed on file.    Discharge Diet:   Diet Instructions     Diet: Regular, Consistent Carbohydrate, Cardiac      Discharge Diet:  Regular  Consistent Carbohydrate  Cardiac             Discharge Disposition: Stable for d/c to SNF today, [place].     History of Present Illness/Hospital Course:   PCP:  Nieves Childress APRN  Cardiology:  Dr Kent      71 y.o. female with HTN(stable, increased risk stroke, rupture), Hyperlipidemia(stable,  increased risk cardiovascular events), Diabetes Mellitus(stable, increased risk cardiovascular events), Obesity(uncontrolled, increased risk cardiovascular events), COPD(stable, increased risk pulmonary complications) and Chronic Kidney Disease(stable, increased risk renal failure) , CAD(chronic severe progression, increase risk cardiovascular events).  never smoked.  Moderate fatigue, decreased exercise tolerance x 1 year.  PCI 2016.  In wheelchair today..  No TIA stroke amaurosis.  No MI claudication. No other associated signs, symptoms or modifying factors.     5/2022 vein mapping:  RIGHT GSV 1.6-3.7mm, LEFT GSV 2.6-4.7mm.  5/2022 Carotid Duplex:  JENNIFER 0-49% (91/17cm/s, ratio 1.0), LICA 0-49% (90/21cm/s, ratio 0.9) antegrade verts.     10/2020 Echocardiogram:  EF 40%. LA 28mm, LV 54mm, RVS{ 27mmHg, trace MR, mild TR.  4/2022 Stress Test:  EF 60%, medium infarct anteroseptal, felix-infarct ischemia  4/2022 Cardiac Catheterization:  LAD occluded stent prox, CX 50%, RCA 40%, PLB 70%.  5/2022 PFT:  FVC 1.4 (51%), FEV1 1.1 (53%), DLCO 112%    After adequate pre-op studies completed the patient was scheduled electively. Day of surgery the patient was admitted through [facility], taken to the operating suite placed under general anesthesia, and underwent said procedure without complications or difficulty.  The patient was weaned easily from cardiopulmonary bypass with the assistance in drips of bessy, nitro, insulin, precedex and transferred to CCU in stable condition.  The patient began weaning mechanical ventilation with successful extubation and was placed on oxygen per nasal cannula .  A total of 0 in blood products were given during the patient's hospital stay.  POD1 the patient was out of bed to the chair and tolerated breakfast with stable glucose between the hours of 18-24 postoperatively.  Some issues with urinary retention, required in and out cath, flomax.  Insulin drip was discontinued and the patient was continued on  "sliding scale insulin coverage as well as Lantus nightly.  Hemodynamics and neuro status remained stable for transfer to  telemetry. The patient continued with PT/OT participation found to require more extensive rehabilitation to safely function at home, case management was consulted for rehab placement. Chest tubes were without air leak and removed with satisfactory post removal CXR viewed on 5/12/22.  Hemodynamics remained stable, the patient remained afebrile postoperatively, and in regular sinus rhythm for satisfactory discharge to skilled nursing facility for rehab in stable condition on POD 4.        Vital Signs  Temp:  [97.7 °F (36.5 °C)-98.2 °F (36.8 °C)] 97.8 °F (36.6 °C)  Heart Rate:  [65-78] 68  Resp:  [15-18] 18  BP: (104-145)/(58-69) 109/58      05/12/22  0422 05/13/22  0406 05/14/22  0502   Weight: 89.5 kg (197 lb 6.4 oz) 87.1 kg (192 lb) 87.4 kg (192 lb 9.6 oz)       Pertinent Test Results:  Lab Results   Component Value Date    WBC 12.87 (H) 05/13/2022    HGB 11.2 (L) 05/13/2022    HCT 34.9 05/13/2022    MCV 81.7 05/13/2022     05/13/2022     Lab Results   Component Value Date    GLUCOSE 105 (H) 05/13/2022    BUN 22 05/13/2022    CREATININE 1.20 (H) 05/13/2022    EGFRIFNONA 45 (L) 12/08/2021    EGFRIFAFRI  09/29/2020      Comment:      <15 Indicative of kidney failure.    BCR 18.3 05/13/2022    K 4.3 05/13/2022    CO2 28.0 05/13/2022    CALCIUM 8.9 05/13/2022    ALBUMIN 3.60 05/13/2022    AST 13 05/13/2022    ALT <5 05/13/2022       Physical Exam   Objective:  General Appearance:  Comfortable and ill-appearing.    Vital signs: (most recent): Blood pressure 109/58, pulse 68, temperature 97.8 °F (36.6 °C), temperature source Temporal, resp. rate 18, height 149.9 cm (59.02\"), weight 87.4 kg (192 lb 9.6 oz), SpO2 94 %, not currently breastfeeding.  Vital signs are normal.  No fever.    Output: Producing urine and producing stool.    HEENT: Normal HEENT exam.    Lungs:  Normal effort and normal " respiratory rate.  Breath sounds clear to auscultation.  There are decreased breath sounds.    Heart: Normal rate.  Regular rhythm.    Chest: (AV wires removed)  Abdomen: Abdomen is soft.  Bowel sounds are normal.     Neurological: Patient is alert and oriented to person, place and time.    Skin:  Warm and dry.  (Sternal provena intact  [facility] CDI)    Additional Instructions for the Follow-ups that You Need to Schedule     Ambulatory Referral to Physical Therapy POST OP   As directed      Specialty needed: POST OP         Call MD With Problems / Concerns   As directed      Instructions: Instructions: notify provider for uncontrolled pain, shortness of breath, fever or chills, purulent drainage or swelling from the surgical incision site.    Business hours call [phone]    After business hours may call hospital and speak to surgeon on call [phone]    Order Comments: Instructions: Instructions: notify provider for uncontrolled pain, shortness of breath, fever or chills, purulent drainage or swelling from the surgical incision site.  Business hours call [phone]  After business hours may call hospital and speak to surgeon on call [phone]          Discharge Follow-up with PCP   As directed       Currently Documented PCP:    Nieves Childress APRN    PCP Phone Number:    [phone]     Follow Up Details: as scheduled         Discharge Follow-up with Specified Provider: NILAY KHAN; 2 Weeks   As directed      To: NILAY KHAN    Follow Up: 2 Weeks               Discharge Instructions: Discharge instructions include no heavy lifting anything greater than 10lbs for approximately 12 weeks.  No sex or driving for 3-6 weeks. Printed information given to the patient with advancement of activities weekly.  Risks and benefits of narcotic medications and weaning postoperatively have been discussed. Clean operative site with antibacterial soap/water, pat dry. Keep open to air unless draining, then may apply dry dressing.   No ointments or creams unless prescribed by provider. For signs and symptoms of infection including drainage from operative site, redness, swelling, with associated fever and/or chills [facility] immediately for wound check.  Patient verbalizes understanding of discharge instructions, all questions are answered, follow-up appointments have been made, the patient is discharged home in stable condition.         Follow-up Appointments  Future Appointments   Date Time Provider Department Center   10/31/2022 10:00 AM Nieves Childress, [facility]       Test Results Pending at Discharge        Time: Discharge 45 min

## 2022-05-14 NOTE — PROGRESS NOTES
"  Cardiothoracic - Vascular Surgery Daily Note        LOS: 4 days   Patient Care Team:  Nieves Childress APRN as PCP - General (Family Medicine)    POD#4 CABGx2  BAUTISTA-LAD, SVG-PLB    Chief Complaint: CAD      Subjective     The following portions of the patient's history were reviewed and updated as appropriate: allergies, current medications, past family history, past medical history, past social history, past surgical history and problem list.     Subjective:  Symptoms:  Stable.  She reports chest pain (surgical) and weakness.    Diet:  Adequate intake.  No nausea.    Activity level: Impaired due to pain.    Pain:  She complains of pain that is moderate.  Pain is well controlled and requiring pain medication.          Objective     Vital Signs  Temp:  [97.7 °F (36.5 °C)-98.2 °F (36.8 °C)] 97.8 °F (36.6 °C)  Heart Rate:  [65-78] 68  Resp:  [15-18] 18  BP: (104-145)/(58-69) 109/58  Body mass index is 38.88 kg/m².    Intake/Output Summary (Last 24 hours) at 5/14/2022 1008  Last data filed at 5/14/2022 0814  Gross per 24 hour   Intake 560 ml   Output 650 ml   Net -90 ml     I/O this shift:  In: 120 [P.O.:120]  Out: 0     Wt Readings from Last 3 Encounters:   05/14/22 87.4 kg (192 lb 9.6 oz)   05/05/22 84.4 kg (186 lb)   05/05/22 83.9 kg (185 lb)       Physical Exam   Objective:  General Appearance:  Comfortable and ill-appearing.    Vital signs: (most recent): Blood pressure 109/58, pulse 68, temperature 97.8 °F (36.6 °C), temperature source Temporal, resp. rate 18, height 149.9 cm (59.02\"), weight 87.4 kg (192 lb 9.6 oz), SpO2 94 %, not currently breastfeeding.  Vital signs are normal.  No fever.    Output: Producing urine and producing stool.    HEENT: Normal HEENT exam.    Lungs:  Normal effort and normal respiratory rate.  Breath sounds clear to auscultation.  There are decreased breath sounds.    Heart: Normal rate.  Regular rhythm.    Chest: (AV wires removed)  Abdomen: Abdomen is soft.  Bowel sounds are normal. "     Neurological: Patient is alert and oriented to person, place and time.    Skin:  Warm and dry.  (Sternal provena intact  EVH CDI)              Results Review:    Lab Results   Component Value Date    WBC 12.87 (H) 05/13/2022    HGB 11.2 (L) 05/13/2022    HCT 34.9 05/13/2022    MCV 81.7 05/13/2022     05/13/2022     Lab Results   Component Value Date    GLUCOSE 105 (H) 05/13/2022    BUN 22 05/13/2022    CREATININE 1.20 (H) 05/13/2022    EGFRIFNONA 45 (L) 12/08/2021    EGFRIFAFRI  09/29/2020      Comment:      <15 Indicative of kidney failure.    BCR 18.3 05/13/2022    K 4.3 05/13/2022    CO2 28.0 05/13/2022    CALCIUM 8.9 05/13/2022    ALBUMIN 3.60 05/13/2022    AST 13 05/13/2022    ALT <5 05/13/2022       Calcium Calcium   Date/Time Value Ref Range Status   05/13/2022 0839 8.9 8.6 - 10.5 mg/dL Final      Magnesium Magnesium   Date/Time Value Ref Range Status   05/13/2022 0839 2.4 1.6 - 2.4 mg/dL Final        Imaging Results (Last 24 Hours)     ** No results found for the last 24 hours. **                              acetaminophen, 1,000 mg, Oral, Q6H  vitamin C, 500 mg, Oral, BID  aspirin, 81 mg, Oral, Daily  carvedilol, 12.5 mg, Oral, BID With Meals  clopidogrel, 75 mg, Oral, Daily  famotidine, 20 mg, Oral, BID  furosemide, 20 mg, Intravenous, Daily  Insulin Aspart, 0-24 Units, Subcutaneous, TID AC  insulin detemir, 25 Units, Subcutaneous, Nightly  ipratropium-albuterol, 3 mL, Nebulization, 4x Daily - RT  ketorolac, 10 mg, Oral, Q8H  levothyroxine, 25 mcg, Oral, Q AM  losartan, 50 mg, Oral, Q24H  magnesium oxide, 400 mg, Oral, BID  mupirocin, 1 application, Each Nare, Daily  polyethylene glycol, 17 g, Oral, Daily  senna-docusate sodium, 2 tablet, Oral, BID  tamsulosin, 0.4 mg, Oral, Daily                 ASSESSMENT/PLAN     Active Hospital Problems    Diagnosis  POA   • **CAD (coronary artery disease) [I25.10]  Unknown     Hx of 2 stents placed by Dr. Kent in 2016        • Stage 3a chronic kidney  disease ([facility]) [N18.31]  Yes            • Encounter for long-term current use of medication [Z79.899]  Not Applicable   • HFrEF (heart failure with reduced ejection fraction) ([facility]) [I50.20]  Yes   • Essential hypertension [I10]  Yes     Stable Post Op CABG: Progressing slowly. Looks well this AM, A/O, up to chair this AM.    CAD: Medical Management: Aspirin, Plavix, Statin, Beta. ACE/ARB     Acute respiratory failure: hypoxic SpO2 <88% on RA.  O2 support Room air. Weaning as tolerated. Nebs PRN. Incentive Spirometry. Aggressive pulmonary toilet. OPEP    Expected blood loss anemia felix-operatively. Asymptomatic. Monitor CBC. Hemodynamically stable.  Iron supplementation: deferred    Transient hyperglycemia post operative. Medical management:  SSI coverage. Levemir nightly. Carb consistent diet.     Postop Pain Management: Scheduled Tylenol. Toradol. Tramadolol    Rehab: OOB to chair. Ambulate. PT/OT. Weak.    Disposition: Stable for D/C to SNF for rehab to home          This document has been electronically signed by DERRELL Rodriguez  On May 14, 2022 10:11 CDT

## 2022-05-14 NOTE — NURSING NOTE
Report called to [facility].  Waiting for family to transport her.  VSS at this time, will continue to monitor.

## 2022-05-14 NOTE — PLAN OF CARE
Goal Outcome Evaluation:  Plan of Care Reviewed With: patient        Progress: no change  Outcome Evaluation: Pt was seated in recliner when SONG arrived. Pt states she does not feel well. Pt perf UBB with SBA, UB dressing with Min A for doffing/donning gown and Max A for adjusting/redonning heart hugger, grooming with Min A for combing hair. Pt demo incont BM episode this AM. Nsg notifed. Pt completed x2 sit >< stand t/fs w/ Min A, chair > BSC stand piviot t/f with Min-Mod A. Pt was able to verbalize 3 sternal precautions. Edu provided on all sternal precautions. Pt seated on BSC w/ nsg notfied and call light within reach when SONG left.

## 2022-05-14 NOTE — NURSING NOTE
Spoke with Dayana (infection control nurse) about the patient having bedbugs on her clothing brought in by family. Dayana stated there is no treatment for bedbugs. She said that patient needed to have a regular bath, clothes and linen changed and if available to switch rooms. All items in room needed to be bagged up and sent home with family. Family in room and was notified of situation and agreeable with plan of care.

## 2022-05-14 NOTE — THERAPY TREATMENT NOTE
Patient Name: Afia Adams  : 1950    MRN: [id number]                              Today's Date: 2022       Admit Date: 5/10/2022    Visit Dx:     ICD-10-CM ICD-9-CM   1. Surgical aftercare, circulatory system  Z48.812 V58.73   2. Coronary artery disease of native artery of native heart with stable angina pectoris ([facility])  I25.118 414.01     413.9   3. Impaired mobility and ADLs  Z74.09 V49.89    Z78.9    4. Impaired functional mobility, balance, gait, and endurance  Z74.09 V49.89     Patient Active Problem List   Diagnosis   • Diabetes mellitus ([facility])   • Essential hypertension   • HFrEF (heart failure with reduced ejection fraction) ([facility])   • Encounter for long-term current use of medication   • CAD (coronary artery disease)   • Stage 3a chronic kidney disease ([facility])   • Lipoma of neck   • Anemia   • Elevated TSH   • History of PTCA   • Abnormal nuclear stress test   • Myocardial infarct, old   • Class 1 obesity due to excess calories with serious comorbidity and body mass index (BMI) of 32.0 to 32.9 in adult   • Mixed hyperlipidemia   • Abnormal findings on diagnostic imaging of other specified body structures    • Panlobular emphysema (HCC)     Past Medical History:   Diagnosis Date   • Abdominal pain 2015    unspecified   • Acute gastritis    • Acute on chronic systolic congestive heart failure (HCC) 2016   • Ankle pain 2015   • Asthma    • Calculus of kidney 10/09/2020    Added automatically from request for surgery [id number]   • Candidiasis, skin or nails 2011    controlled   • Chronic systolic congestive heart failure (HCC) 2016   • Congestive heart failure (HCC) 2016   • Coronary arteriosclerosis 2016    multi-vessel, 2 stents, followed by Dr. Kent   • Cough 2014    proabably due to allergy, chest x ray is normal      • Diabetic ulcer of toe of right foot associated with type 2 diabetes mellitus (HCC) 10/24/2020    Follow up with  podiatry outpatient    • Disease of thyroid gland    • Edema of foot 03/24/2016   • Encounter for long-term (current) drug use     therapy   • Epigastric pain 12/03/2015   • Essential hypertension 06/21/2016   • GERD (gastroesophageal reflux disease)    • Gout    • Heart attack (HCC)     2016 2 stents by Dr. Kent still follows with him/last seen 6 -2020 told doing ok   • History of echocardiogram 03/14/2016    Left atrium normal with mild CLVH wit normal aortic root size.Evidence of posterolateral wall hypokinesis. Severely depressed LV systolic function of 20-25%.Mitral valve thickened Aortic sclerosis.Diastolic dysfunction   • Hyperlipidemia 03/31/2016    unspecified   • Hypertensive disorder 03/24/2016   • Left lower quadrant pain 07/12/2013    ruling out diverticular disease      • Myocardial infarction (HCC) 03/24/2016   • Nausea 11/20/2015   • Obstructive uropathy 09/29/2020   • Osteoarthritis of knee 06/23/2016   • Pain in limb 01/25/2011   • Pediculosis capitis 12/22/2011    controlled   • Peptic ulcer    • Pneumonia    • Polyuria 01/25/2011   • Pruritic rash 12/09/2014   • Superficial foreign body hip without major open wound, no infection 12/09/2011    ??? back   • Type 2 diabetes mellitus (HCC) 06/23/2016    new onset   • Weakness 06/23/2016   • Wears dentures    • Wears glasses      Past Surgical History:   Procedure Laterality Date   • CARDIAC CATHETERIZATION Left 4/29/2022    Procedure: Left Heart Cath;  Surgeon: Luke Kent MD;  Location: [facility] CATH INVASIVE LOCATION;  Service: Cardiology;  Laterality: Left;   • COLONOSCOPY W/ POLYPECTOMY  03/07/2016    Transverse colon polyps,descending polyps,Sigmoid polyps, all resected and retrieved. Diverticulosis without perforation or abscess without bleeding. Erica Thomas   • CYSTOSCOPY Left 11/4/2020    Procedure: CYSTOSCOPY; LEFT J-STENT REMOVAL             (STRETCHER)   FLEXIBLE SCOPE;  Surgeon: Richar Rojas MD;  Location: [facility] OR;   Service: Urology;  Laterality: Left;   • CYSTOSCOPY, URETEROSCOPY, RETROGRADE PYELOGRAM, STENT INSERTION Left 9/30/2020    Procedure: CYSTOSCOPY LEFT URETEROSCOPY RETROGRADE PYELOGRAM STENT INSERTION;  Surgeon: Richar Rojas MD;  Location: [facility];  Service: Urology;  Laterality: Left;   • CYSTOSCOPY, URETEROSCOPY, RETROGRADE PYELOGRAM, STENT INSERTION Left 10/21/2020    Procedure: CYSTOSCOPY, LEFT RETROGRADE, URETEROSCOPY, LASER LITHOTRIPSY, STENT PLACEMENT;  Surgeon: Richar Rojas MD;  Location: [facility];  Service: Urology;  Laterality: Left;   • HYSTERECTOMY      ovary preserv   • INJECTION OF MEDICATION  09/11/2014    Celestone (betamethasone) (2)        General Information     Row Name 05/14/22 0915          OT Time and Intention    Document Type therapy note (daily note)  -CR     Mode of Treatment occupational therapy;individual therapy  -CR     Row Name 05/14/22 0915          General Information    Patient Profile Reviewed yes  -CR     Existing Precautions/Restrictions fall;sternal  -CR     Row Name 05/14/22 0915          Cognition    Orientation Status (Cognition) oriented x 4  -CR     Row Name 05/14/22 0915          Safety Issues, Functional Mobility    Impairments Affecting Function (Mobility) balance;endurance/activity tolerance;pain;shortness of breath;strength;coordination  -CR           User Key  (r) = Recorded By, (t) = Taken By, (c) = Cosigned By    Initials Name Provider Type    CR Michela Parham COTA Occupational Therapist Assistant                 Mobility/ADL's     Row Name 05/14/22 0915          Transfers    Transfers sit-stand transfer;stand-sit transfer;toilet transfer  -CR     Bed-Chair [place] (Transfers) minimum assist (75% patient effort)  -CR     Assistive Device (Bed-Chair Transfers) walker, front-wheeled  -CR     Sit-Stand [place] (Transfers) verbal cues;1 person assist;minimum assist (75% patient effort)  -CR     Stand-Sit [place] (Transfers) minimum assist  (75% patient effort);verbal cues;1 person assist  -CR     [place] Level (Toilet Transfer) moderate assist (50% patient effort);minimum assist (75% patient effort)  -CR     Assistive Device (Toilet Transfer) commode, bedside without drop arms;walker, front-wheeled  -CR     Row Name 05/14/22 0915          Sit-Stand Transfer    Assistive Device (Sit-Stand Transfers) walker, front-wheeled  -CR     Row Name 05/14/22 0915          Stand-Sit Transfer    Assistive Device (Stand-Sit Transfers) walker, front-wheeled  -CR     Row Name 05/14/22 0915          Toilet Transfer    Type (Toilet Transfer) sit-stand;stand-sit  -CR     Comment, (Toilet Transfer) Pt t/f to [facility] with Min-Mod A using RW  -CR     Row Name 05/14/22 0915          Activities of Daily Living    BADL Assessment/Intervention upper body dressing;bathing;grooming  -CR     Row Name 05/14/22 0915          Grooming Assessment/Training    [place] Level (Grooming) grooming skills;hair care, combing/brushing;wash face, hands;minimum assist (75% patient effort);other (see comments)  -CR     Position (Grooming) supported sitting  -CR     Row Name 05/14/22 0915          Upper Body Dressing Assessment/Training    [place] Level (Upper Body Dressing) upper body dressing skills;doff;don;bra/undergarment;minimum assist (75% patient effort)  -CR     Position (Upper Body Dressing) supported sitting  -CR     Row Name 05/14/22 0915          Bathing Assessment/Intervention    [place] Level (Bathing) bathing skills;upper body;standby assist  -CR     Position (Bathing) supported sitting  -CR           User Key  (r) = Recorded By, (t) = Taken By, (c) = Cosigned By    Initials Name Provider Type    Michela Morales COTA Occupational Therapist Assistant               Obj/Interventions    No documentation.                Goals/Plan     Row Name 05/14/22 0838          Transfer Goal 1 (OT)    Activity/Assistive Device (Transfer Goal 1, OT) toilet  -CR     [place]  Level/Cues Needed (Transfer Goal 1, OT) supervision required  -CR     Time Frame (Transfer Goal 1, OT) long term goal (LTG);by discharge  -CR     Progress/Outcome (Transfer Goal 1, OT) goal not met  -CR     Row Name 05/14/22 08          Bathing Goal 1 (OT)    Activity/Device (Bathing Goal 1, OT) lower body bathing  AD as needed  -CR     [place] Level/Cues Needed (Bathing Goal 1, OT) minimum assist (75% or more patient effort)  -CR     Time Frame (Bathing Goal 1, OT) long term goal (LTG);by discharge  -CR     Progress/Outcomes (Bathing Goal 1, OT) goal not met  -CR     Row Name 05/14/22 08          Dressing Goal 1 (OT)    Activity/Device (Dressing Goal 1, OT) lower body dressing  AD as needed  -CR     [place]/Cues Needed (Dressing Goal 1, OT) minimum assist (75% or more patient effort)  -CR     Time Frame (Dressing Goal 1, OT) long term goal (LTG);by discharge  -CR     Progress/Outcome (Dressing Goal 1, OT) goal not met  -CR     Row Name 05/14/22 08          Problem Specific Goal 1 (OT)    Problem Specific Goal 1 (OT) pt will verbalize and demonstrate proper understanding of sternal precautions with min verbal cues required  -CR     Time Frame (Problem Specific Goal 1, OT) long term goal (LTG);by discharge  -CR     Progress/Outcome (Problem Specific Goal 1, OT) goal not met  -CR           User Key  (r) = Recorded By, (t) = Taken By, (c) = Cosigned By    Initials Name Provider Type    CR Michela Parham COTA Occupational Therapist Assistant               Clinical Impression     Row Name 05/14/22 0838          Pain Assessment    Pretreatment Pain Rating 0/10 - no pain  -CR     Posttreatment Pain Rating 0/10 - no pain  -CR     Row Name 05/14/22 08          Plan of Care Review    Plan of Care Reviewed With patient  -CR     Progress no change  -CR     Outcome Evaluation Pt was seated in recliner when SONG arrived. Pt states she does not feel well. Pt perf UBB with SBA, UB dressing with Min A for  doffing/donning gown and Max A for adjusting/redonning heart hugger, grooming with Min A for combing hair. Pt demo incont BM episode this AM. Nsg notifed. Pt completed x2 sit >< stand t/fs w/ Min A, chair > BSC stand piviot t/f with Min-Mod A. Pt was able to verbalize 3 sternal precautions. Edu provided on all sternal precautions. Pt seated on BSC w/ nsg notfied and call light within reach when SONG left.  -CR     Row Name 05/14/22 08          Therapy Assessment/Plan (OT)    Rehab Potential (OT) good, to achieve stated therapy goals  -CR     Criteria for Skilled Therapeutic Interventions Met (OT) yes;meets criteria;skilled treatment is necessary  -CR     Therapy Frequency (OT) daily  -CR     Row Name 05/14/22 0838          Vital Signs    Pre Patient Position Sitting  -CR     Intra Patient Position Standing  -CR     Post Patient Position Sitting  -CR     Row Name 05/14/22 0838          Positioning and Restraints    Pre-Treatment Position in bed  -CR     Post Treatment Position bs  -CR     On BS commode notified nsg;call light within reach  -CR           User Key  (r) = Recorded By, (t) = Taken By, (c) = Cosigned By    Initials Name Provider Type    Michela Morales COTA Occupational Therapist Assistant               Outcome Measures     Row Name 05/14/22 08          How much help from another is currently needed...    Putting on and taking off regular lower body clothing? 2  -CR     Bathing (including washing, rinsing, and drying) 2  -CR     Toileting (which includes using toilet bed pan or urinal) 2  -CR     Putting on and taking off regular upper body clothing 2  -CR     Taking care of personal grooming (such as brushing teeth) 3  -CR     Eating meals 4  -CR     AM-PAC 6 Clicks Score (OT) 15  -CR           User Key  (r) = Recorded By, (t) = Taken By, (c) = Cosigned By    Initials Name Provider Type    Michela Morales COTA Occupational Therapist Assistant                Occupational Therapy Education                  Title: PT OT SLP Therapies (In Progress)     Topic: Occupational Therapy (In Progress)     Point: ADL training (Not Started)     Description:   Instruct learner(s) on proper safety adaptation and remediation techniques during self care or transfers.   Instruct in proper use of assistive devices.              Learner Progress:  Not documented in this visit.          Point: Home exercise program (Not Started)     Description:   Instruct learner(s) on appropriate technique for monitoring, assisting and/or progressing therapeutic exercises/activities.              Learner Progress:  Not documented in this visit.          Point: Precautions (Done)     Description:   Instruct learner(s) on prescribed precautions during self-care and functional transfers.              Learning Progress Summary           Patient Acceptance, E, VU,NR by ME at 5/11/2022 0937    Comment: Educated on OT and POC. Educated to call for assistance. Educated on safety precautions. Educated on sternal precautions.                   Point: Body mechanics (Done)     Description:   Instruct learner(s) on proper positioning and spine alignment during self-care, functional mobility activities and/or exercises.              Learning Progress Summary           Patient Acceptance, E, VU,NR by ME at 5/11/2022 0937    Comment: Educated on OT and POC. Educated to call for assistance. Educated on safety precautions. Educated on sternal precautions.                               User Key     Initials Effective Dates Name Provider Type Discipline    ME 06/16/21 -  Amrit Grant, OTR/L Occupational Therapist OT              OT Recommendation and Plan  Therapy Frequency (OT): daily  Plan of Care Review  Plan of Care Reviewed With: patient  Progress: no change  Outcome Evaluation: Pt was seated in recliner when SONG arrived. Pt states she does not feel well. Pt perf UBB with SBA, UB dressing with Min A for doffing/donning gown and Max A for  adjusting/redonning heart hugger, grooming with Min A for combing hair. Pt demo incont BM episode this AM. Nsg notifed. Pt completed x2 sit >< stand t/fs w/ Min A, chair > BSC stand piviot t/f with Min-Mod A. Pt was able to verbalize 3 sternal precautions. Edu provided on all sternal precautions. Pt seated on BSC w/ nsg notfied and call light within reach when SONG left.     Time Calculation:    Time Calculation- OT     Row Name 05/14/22 0838             Time Calculation- OT    OT Start Time 0838  -CR      OT Stop Time 0916  -CR      OT Time Calculation (min) 38 min  -CR      Total Timed Code Minutes- OT 38 minute(s)  -CR      OT Received On 05/14/22  -CR              Timed Charges    98152 - OT Self Care/Mgmt Minutes 38  -CR              Total Minutes    Timed Charges Total Minutes 38  -CR       Total Minutes 38  -CR            User Key  (r) = Recorded By, (t) = Taken By, (c) = Cosigned By    Initials Name Provider Type    CR Michela Parham COTA Occupational Therapist Assistant              Therapy Charges for Today     Code Description Service Date Service Provider Modifiers Qty    [id number] HC OT SELF CARE/MGMT/TRAIN EA 15 MIN 5/14/2022 Michela Parham COTA GO 3               DUNCAN Haile  5/14/2022

## 2022-05-14 NOTE — NURSING NOTE
Patient was bathed, clothes and linen changed, and patient transferred to room 301. All belongings sent home with family. Patient placed on contact precautions. Will continue to observe.

## 2022-05-14 NOTE — NURSING NOTE
Dr. Solomon notified that bedbugs were found on patient's clothes that were brought in by the family. [facility] refused to take patient today. Will continue to monitor.

## 2022-05-15 LAB
GLUCOSE BLDC GLUCOMTR-MCNC: 115 MG/DL (ref 70–130)
GLUCOSE BLDC GLUCOMTR-MCNC: 123 MG/DL (ref 70–130)
GLUCOSE BLDC GLUCOMTR-MCNC: 133 MG/DL (ref 70–130)
GLUCOSE BLDC GLUCOMTR-MCNC: 140 MG/DL (ref 70–130)
GLUCOSE BLDC GLUCOMTR-MCNC: 164 MG/DL (ref 70–130)

## 2022-05-15 PROCEDURE — 99024 POSTOP FOLLOW-UP VISIT: CPT | Performed by: THORACIC SURGERY (CARDIOTHORACIC VASCULAR SURGERY)

## 2022-05-15 PROCEDURE — 25010000002 ONDANSETRON PER 1 MG: Performed by: NURSE PRACTITIONER

## 2022-05-15 PROCEDURE — 94664 DEMO&/EVAL PT USE INHALER: CPT

## 2022-05-15 PROCEDURE — 97530 THERAPEUTIC ACTIVITIES: CPT

## 2022-05-15 PROCEDURE — 25010000002 FUROSEMIDE PER 20 MG: Performed by: NURSE PRACTITIONER

## 2022-05-15 PROCEDURE — 82962 GLUCOSE BLOOD TEST: CPT

## 2022-05-15 PROCEDURE — 94799 UNLISTED PULMONARY SVC/PX: CPT

## 2022-05-15 PROCEDURE — 94760 N-INVAS EAR/PLS OXIMETRY 1: CPT

## 2022-05-15 PROCEDURE — 97110 THERAPEUTIC EXERCISES: CPT

## 2022-05-15 RX ADMIN — ASPIRIN 81 MG: 81 TABLET, FILM COATED ORAL at 08:49

## 2022-05-15 RX ADMIN — CARVEDILOL 12.5 MG: 12.5 TABLET, FILM COATED ORAL at 08:49

## 2022-05-15 RX ADMIN — FUROSEMIDE 20 MG: 10 INJECTION, SOLUTION INTRAMUSCULAR; INTRAVENOUS at 08:49

## 2022-05-15 RX ADMIN — ONDANSETRON 4 MG: 2 INJECTION INTRAMUSCULAR; INTRAVENOUS at 05:09

## 2022-05-15 RX ADMIN — TRAMADOL HYDROCHLORIDE 100 MG: 50 TABLET, COATED ORAL at 03:57

## 2022-05-15 RX ADMIN — IPRATROPIUM BROMIDE AND ALBUTEROL SULFATE 3 ML: 2.5; .5 SOLUTION RESPIRATORY (INHALATION) at 19:45

## 2022-05-15 RX ADMIN — FAMOTIDINE 20 MG: 20 TABLET ORAL at 08:49

## 2022-05-15 RX ADMIN — IPRATROPIUM BROMIDE AND ALBUTEROL SULFATE 3 ML: 2.5; .5 SOLUTION RESPIRATORY (INHALATION) at 06:56

## 2022-05-15 RX ADMIN — CARVEDILOL 12.5 MG: 12.5 TABLET, FILM COATED ORAL at 17:59

## 2022-05-15 RX ADMIN — LOSARTAN POTASSIUM 50 MG: 50 TABLET, FILM COATED ORAL at 08:49

## 2022-05-15 RX ADMIN — DOCUSATE SODIUM 50 MG AND SENNOSIDES 8.6 MG 2 TABLET: 8.6; 5 TABLET, FILM COATED ORAL at 20:57

## 2022-05-15 RX ADMIN — CLOPIDOGREL BISULFATE 75 MG: 75 TABLET ORAL at 08:49

## 2022-05-15 RX ADMIN — MUPIROCIN 1 APPLICATION: 20 OINTMENT TOPICAL at 09:28

## 2022-05-15 RX ADMIN — IPRATROPIUM BROMIDE AND ALBUTEROL SULFATE 3 ML: 2.5; .5 SOLUTION RESPIRATORY (INHALATION) at 11:17

## 2022-05-15 RX ADMIN — ACETAMINOPHEN 1000 MG: 500 TABLET, FILM COATED ORAL at 01:29

## 2022-05-15 RX ADMIN — OXYCODONE HYDROCHLORIDE AND ACETAMINOPHEN 500 MG: 500 TABLET ORAL at 20:57

## 2022-05-15 RX ADMIN — OXYCODONE HYDROCHLORIDE AND ACETAMINOPHEN 500 MG: 500 TABLET ORAL at 08:49

## 2022-05-15 RX ADMIN — Medication 400 MG: at 20:57

## 2022-05-15 RX ADMIN — ONDANSETRON 4 MG: 2 INJECTION INTRAMUSCULAR; INTRAVENOUS at 22:14

## 2022-05-15 RX ADMIN — ACETAMINOPHEN 1000 MG: 500 TABLET, FILM COATED ORAL at 08:49

## 2022-05-15 RX ADMIN — TRAMADOL HYDROCHLORIDE 100 MG: 50 TABLET, COATED ORAL at 20:57

## 2022-05-15 RX ADMIN — ACETAMINOPHEN 1000 MG: 500 TABLET, FILM COATED ORAL at 13:18

## 2022-05-15 RX ADMIN — Medication 400 MG: at 08:49

## 2022-05-15 RX ADMIN — FAMOTIDINE 20 MG: 20 TABLET ORAL at 20:57

## 2022-05-15 RX ADMIN — LEVOTHYROXINE SODIUM 25 MCG: 25 TABLET ORAL at 08:49

## 2022-05-15 RX ADMIN — ACETAMINOPHEN 1000 MG: 500 TABLET, FILM COATED ORAL at 20:57

## 2022-05-15 RX ADMIN — TAMSULOSIN HYDROCHLORIDE 0.4 MG: 0.4 CAPSULE ORAL at 08:49

## 2022-05-15 NOTE — PLAN OF CARE
Goal Outcome Evaluation:  Plan of Care Reviewed With: patient           Outcome Evaluation: pt sit>sup with SBA with HOB up. sit<>stand with min assist, pt transfered chair>bed without AD via stand pivot with min assist of 1. pt would benefit from 24/7 care & continued PT services

## 2022-05-15 NOTE — PROGRESS NOTES
"  Cardiothoracic - Vascular Surgery Daily Note        LOS: 5 days   Patient Care Team:  Nieves Childress APRN as PCP - General (Family Medicine)    POD#5 CABGx2  BAUTISTA-LAD, SVG-PLB    Chief Complaint: CAD      Subjective     The following portions of the patient's history were reviewed and updated as appropriate: allergies, current medications, past family history, past medical history, past social history, past surgical history and problem list.     Subjective:  Symptoms:  Stable.  She reports chest pain (surgical) and weakness.    Diet:  Adequate intake.  No nausea.    Activity level: Impaired due to pain.    Pain:  She complains of pain that is moderate.  Pain is well controlled and requiring pain medication.          Objective     Vital Signs  Temp:  [97.9 °F (36.6 °C)-99.1 °F (37.3 °C)] 98.4 °F (36.9 °C)  Heart Rate:  [65-77] 68  Resp:  [18-20] 18  BP: (118-150)/(60-76) 142/68  Body mass index is 38.33 kg/m².    Intake/Output Summary (Last 24 hours) at 5/15/2022 0833  Last data filed at 5/14/2022 1800  Gross per 24 hour   Intake 480 ml   Output --   Net 480 ml     No intake/output data recorded.    Wt Readings from Last 3 Encounters:   05/15/22 86.1 kg (189 lb 14.4 oz)   05/05/22 84.4 kg (186 lb)   05/05/22 83.9 kg (185 lb)       Physical Exam   Objective:  General Appearance:  Comfortable and ill-appearing.    Vital signs: (most recent): Blood pressure 142/68, pulse 68, temperature 98.4 °F (36.9 °C), temperature source Temporal, resp. rate 18, height 149.9 cm (59.02\"), weight 86.1 kg (189 lb 14.4 oz), SpO2 91 %, not currently breastfeeding.  Vital signs are normal.  No fever.    Output: Producing urine and producing stool.    HEENT: Normal HEENT exam.    Lungs:  Normal effort and normal respiratory rate.  Breath sounds clear to auscultation.  There are decreased breath sounds.    Heart: Normal rate.  Regular rhythm.    Chest: (AV wires removed)  Abdomen: Abdomen is soft.  Bowel sounds are normal.   "   Neurological: Patient is alert and oriented to person, place and time.    Skin:  Warm and dry.  (Sternal provena intact  EVH CDI)              Results Review:    Lab Results   Component Value Date    WBC 12.87 (H) 05/13/2022    HGB 11.2 (L) 05/13/2022    HCT 34.9 05/13/2022    MCV 81.7 05/13/2022     05/13/2022     Lab Results   Component Value Date    GLUCOSE 105 (H) 05/13/2022    BUN 22 05/13/2022    CREATININE 1.20 (H) 05/13/2022    EGFRIFNONA 45 (L) 12/08/2021    EGFRIFAFRI  09/29/2020      Comment:      <15 Indicative of kidney failure.    BCR 18.3 05/13/2022    K 4.3 05/13/2022    CO2 28.0 05/13/2022    CALCIUM 8.9 05/13/2022    ALBUMIN 3.60 05/13/2022    AST 13 05/13/2022    ALT <5 05/13/2022       Calcium Calcium   Date/Time Value Ref Range Status   05/13/2022 0839 8.9 8.6 - 10.5 mg/dL Final      Magnesium Magnesium   Date/Time Value Ref Range Status   05/13/2022 0839 2.4 1.6 - 2.4 mg/dL Final        Imaging Results (Last 24 Hours)     ** No results found for the last 24 hours. **                              acetaminophen, 1,000 mg, Oral, Q6H  vitamin C, 500 mg, Oral, BID  aspirin, 81 mg, Oral, Daily  carvedilol, 12.5 mg, Oral, BID With Meals  clopidogrel, 75 mg, Oral, Daily  famotidine, 20 mg, Oral, BID  furosemide, 20 mg, Intravenous, Daily  Insulin Aspart, 0-24 Units, Subcutaneous, TID AC  insulin detemir, 25 Units, Subcutaneous, Nightly  ipratropium-albuterol, 3 mL, Nebulization, 4x Daily - RT  levothyroxine, 25 mcg, Oral, Q AM  losartan, 50 mg, Oral, Q24H  magnesium oxide, 400 mg, Oral, BID  mupirocin, 1 application, Each Nare, Daily  polyethylene glycol, 17 g, Oral, Daily  senna-docusate sodium, 2 tablet, Oral, BID  tamsulosin, 0.4 mg, Oral, Daily                 ASSESSMENT/PLAN     Active Hospital Problems    Diagnosis  POA   • **CAD (coronary artery disease) [I25.10]  Yes     Hx of 2 stents placed by Dr. Kent in 2016        • Stage 3a chronic kidney disease (HCC) [N18.31]  Yes             • Encounter for long-term current use of medication [Z79.899]  Not Applicable   • HFrEF (heart failure with reduced ejection fraction) ([facility]) [I50.20]  Yes   • Essential hypertension [I10]  Yes     Stable Post Op CABG: Progressing slowly. Looks well this AM, A/O, up to chair this AM.    CAD: Medical Management: Aspirin, Plavix, Statin, Beta. ACE/ARB     Acute respiratory failure: hypoxic SpO2 <88% on RA.  O2 support Room air. Weaning as tolerated. Nebs PRN. Incentive Spirometry. Aggressive pulmonary toilet. OPEP    Expected blood loss anemia felix-operatively. Asymptomatic. Monitor CBC. Hemodynamically stable.  Iron supplementation: deferred    Transient hyperglycemia post operative. Medical management:  SSI coverage. Levemir nightly. Carb consistent diet.     Postop Pain Management: Scheduled Tylenol. Toradol. Tramadolol    Rehab: OOB to chair. Ambulate. PT/OT. Weak.    Disposition: Discharge was held up yesterday due to patient's close brought in by family having bedbugs.  Patient was vladimir to close were sent home with family and rooms were changed.  Stable for D/C to SNF for rehab to home when accepted and bed available          This document has been electronically signed by RIO RodriguezCNP-BC @  On May 15, 2022 08:33 CDT

## 2022-05-15 NOTE — SIGNIFICANT NOTE
05/15/22 1005   OTHER   Discipline occupational therapy assistant   Rehab Time/Intention   Session Not Performed patient/family declined treatment;other (see comments)  (Pt declined OT tx, stating she had a rough night and did not sleep well. SONG will attempt this PM if time allows)

## 2022-05-15 NOTE — THERAPY TREATMENT NOTE
Acute Care - Physical Therapy Treatment Note  [facility]     Patient Name: Afia Adams  : 1950  MRN: [id number]  Today's Date: 5/15/2022      Visit Dx:     ICD-10-CM ICD-9-CM   1. Surgical aftercare, circulatory system  Z48.812 V58.73   2. Coronary artery disease of native artery of native heart with stable angina pectoris ([facility])  I25.118 414.01     413.9   3. Impaired mobility and ADLs  Z74.09 V49.89    Z78.9    4. Impaired functional mobility, balance, gait, and endurance  Z74.09 V49.89     Patient Active Problem List   Diagnosis   • Diabetes mellitus ([facility])   • Essential hypertension   • HFrEF (heart failure with reduced ejection fraction) ([facility])   • Encounter for long-term current use of medication   • CAD (coronary artery disease)   • Stage 3a chronic kidney disease ([facility])   • Lipoma of neck   • Anemia   • Elevated TSH   • History of PTCA   • Abnormal nuclear stress test   • Myocardial infarct, old   • Class 1 obesity due to excess calories with serious comorbidity and body mass index (BMI) of 32.0 to 32.9 in adult   • Mixed hyperlipidemia   • Abnormal findings on diagnostic imaging of other specified body structures    • Panlobular emphysema (HCC)     Past Medical History:   Diagnosis Date   • Abdominal pain 2015    unspecified   • Acute gastritis    • Acute on chronic systolic congestive heart failure (HCC) 2016   • Ankle pain 2015   • Asthma    • Calculus of kidney 10/09/2020    Added automatically from request for surgery [id number]   • Candidiasis, skin or nails 2011    controlled   • Chronic systolic congestive heart failure (HCC) 2016   • Congestive heart failure (HCC) 2016   • Coronary arteriosclerosis 2016    multi-vessel, 2 stents, followed by Dr. Kent   • Cough 2014    proabably due to allergy, chest x ray is normal      • Diabetic ulcer of toe of right foot associated with type 2 diabetes mellitus (HCC) 10/24/2020    Follow up  with podiatry outpatient    • Disease of thyroid gland    • Edema of foot 03/24/2016   • Encounter for long-term (current) drug use     therapy   • Epigastric pain 12/03/2015   • Essential hypertension 06/21/2016   • GERD (gastroesophageal reflux disease)    • Gout    • Heart attack (HCC)     2016 2 stents by Dr. Kent still follows with him/last seen 6 -2020 told doing ok   • History of echocardiogram 03/14/2016    Left atrium normal with mild CLVH wit normal aortic root size.Evidence of posterolateral wall hypokinesis. Severely depressed LV systolic function of 20-25%.Mitral valve thickened Aortic sclerosis.Diastolic dysfunction   • Hyperlipidemia 03/31/2016    unspecified   • Hypertensive disorder 03/24/2016   • Left lower quadrant pain 07/12/2013    ruling out diverticular disease      • Myocardial infarction (HCC) 03/24/2016   • Nausea 11/20/2015   • Obstructive uropathy 09/29/2020   • Osteoarthritis of knee 06/23/2016   • Pain in limb 01/25/2011   • Pediculosis capitis 12/22/2011    controlled   • Peptic ulcer    • Pneumonia    • Polyuria 01/25/2011   • Pruritic rash 12/09/2014   • Superficial foreign body hip without major open wound, no infection 12/09/2011    ??? back   • Type 2 diabetes mellitus (HCC) 06/23/2016    new onset   • Weakness 06/23/2016   • Wears dentures    • Wears glasses      Past Surgical History:   Procedure Laterality Date   • CARDIAC CATHETERIZATION Left 4/29/2022    Procedure: Left Heart Cath;  Surgeon: Luke Kent MD;  Location: [facility] CATH INVASIVE LOCATION;  Service: Cardiology;  Laterality: Left;   • COLONOSCOPY W/ POLYPECTOMY  03/07/2016    Transverse colon polyps,descending polyps,Sigmoid polyps, all resected and retrieved. Diverticulosis without perforation or abscess without bleeding. Erica Thomas   • CYSTOSCOPY Left 11/4/2020    Procedure: CYSTOSCOPY; LEFT J-STENT REMOVAL             (STRETCHER)   FLEXIBLE SCOPE;  Surgeon: Richar Rojas MD;  Location: [facility] OR;   Service: Urology;  Laterality: Left;   • CYSTOSCOPY, URETEROSCOPY, RETROGRADE PYELOGRAM, STENT INSERTION Left 9/30/2020    Procedure: CYSTOSCOPY LEFT URETEROSCOPY RETROGRADE PYELOGRAM STENT INSERTION;  Surgeon: Richar Rojas MD;  Location: [facility];  Service: Urology;  Laterality: Left;   • CYSTOSCOPY, URETEROSCOPY, RETROGRADE PYELOGRAM, STENT INSERTION Left 10/21/2020    Procedure: CYSTOSCOPY, LEFT RETROGRADE, URETEROSCOPY, LASER LITHOTRIPSY, STENT PLACEMENT;  Surgeon: Richar Rojas MD;  Location: [facility];  Service: Urology;  Laterality: Left;   • HYSTERECTOMY      ovary preserv   • INJECTION OF MEDICATION  09/11/2014    Celestone (betamethasone) (2)       PT Assessment (last 12 hours)     PT Evaluation and Treatment     Row Name 05/15/22 6356          Physical Therapy Time and Intention    Subjective Information complains of;fatigue  -TA     Document Type therapy note (daily note)  -TA     Mode of Treatment individual therapy;physical therapy  -TA     Comment contact- bed bugs  -TA     Row Name 05/15/22 8485          General Information    Patient Profile Reviewed yes  -TA     Existing Precautions/Restrictions fall;sternal  -TA     Row Name 05/15/22 8886          Pain    Pretreatment Pain Rating 0/10 - no pain  -TA     Posttreatment Pain Rating 0/10 - no pain  -TA     Row Name 05/15/22 5883          Cognition    Orientation Status (Cognition) oriented x 4  -TA     Follows Commands (Cognition) 25-49% accuracy  pt did not follow instructions for chest precautions/transfers  -TA     Personal Safety Interventions fall prevention program maintained;gait belt;muscle strengthening facilitated;nonskid shoes/slippers when out of bed;supervised activity  -TA     Row Name 05/15/22 8159          Range of Motion Comprehensive    General Range of Motion no range of motion deficits identified  -TA     Row Name 05/15/22 0796          Bed Mobility    Bed Mobility scooting/bridging;supine-sit  -TA     Scooting/Bridging  [place] (Bed Mobility) dependent (less than 25% patient effort);2 person assist  with drawsheet > HOB  -TA     Supine-Sit [place] (Bed Mobility) not tested  -TA     Sit-Supine [place] (Bed Mobility) standby assist;verbal cues  -TA     Row Name 05/15/22 1345          Transfers    Transfers chair-bed transfer  -TA     Bed-Chair [place] (Transfers) minimum assist (75% patient effort);verbal cues  -TA     Chair-Bed [place] (Transfers) not tested  -TA     Assistive Device (Bed-Chair Transfers) --  stand-pivot no AD  -TA     Sit-Stand [place] (Transfers) verbal cues;1 person assist;minimum assist (75% patient effort)  -TA     Stand-Sit [place] (Transfers) minimum assist (75% patient effort);verbal cues;1 person assist  -TA     Row Name 05/15/22 1345          Sit-Stand Transfer    Assistive Device (Sit-Stand Transfers) --  no AD  -TA     Row Name 05/15/22 1345          Stand-Sit Transfer    Assistive Device (Stand-Sit Transfers) --  No AD  -TA     Row Name 05/15/22 1345          Safety Issues, Functional Mobility    Impairments Affecting Function (Mobility) balance;endurance/activity tolerance;pain;shortness of breath;strength;coordination  -TA     Row Name 05/15/22 1345          Hip (Therapeutic Exercise)    Hip (Therapeutic Exercise) AROM (active range of motion)  -TA     Hip AROM (Therapeutic Exercise) bilateral;flexion;aBduction;aDduction;sitting;10 repetitions  -TA     Row Name 05/15/22 1345          Knee (Therapeutic Exercise)    Knee (Therapeutic Exercise) AROM (active range of motion)  -TA     Knee AROM (Therapeutic Exercise) bilateral;LAQ (long arc quad);10 repetitions  -TA     Row Name 05/15/22 1345          Ankle (Therapeutic Exercise)    Ankle (Therapeutic Exercise) AROM (active range of motion)  -TA     Ankle AROM (Therapeutic Exercise) bilateral;dorsiflexion;plantarflexion;20 repititions  -TA     Row Name             Wound 05/10/22 0802 sternal Incision    Wound - Properties  Group Placement Date: 05/10/22  - Placement Time: 0802  -EH Present on Hospital Admission: N  -EH Location: sternal  -EH Primary Wound Type: Incision  -EH     Retired Wound - Properties Group Placement Date: 05/10/22  - Placement Time: 0802  -EH Present on Hospital Admission: N  -EH Location: sternal  -EH Primary Wound Type: Incision  -EH     Retired Wound - Properties Group Date first assessed: 05/10/22  - Time first assessed: 0802  -EH Present on Hospital Admission: N  -EH Location: sternal  -EH Primary Wound Type: Incision  -EH     Row Name             Wound 05/10/22 0754 Left lower leg Incision    Wound - Properties Group Placement Date: 05/10/22  - Placement Time: 0754  -EH Present on Hospital Admission: N  -EH Side: Left  -EH Orientation: lower  -EH Location: leg  -EH Primary Wound Type: Incision  -EH, EVH      Retired Wound - Properties Group Placement Date: 05/10/22  - Placement Time: 0754  -EH Present on Hospital Admission: N  -EH Side: Left  -EH Orientation: lower  -EH Location: leg  -EH Primary Wound Type: Incision  -EH, EVH      Retired Wound - Properties Group Date first assessed: 05/10/22  - Time first assessed: 0754  -EH Present on Hospital Admission: N  -EH Side: Left  -EH Location: leg  -EH Primary Wound Type: Incision  -EH, EVH      Row Name             [REMOVED] NPWT (Negative Pressure Wound Therapy) 05/10/22 CHEST    NPWT (Negative Pressure Wound Therapy) - Properties Group Placement Date: 05/10/22  - Location: CHEST  -EH Additional Comments: PREVENA  -EH Removal Date: 05/15/22  -CH Removal Time: 0500  -CH     Retired NPWT (Negative Pressure Wound Therapy) - Properties Group Placement Date: 05/10/22  - Location: CHEST  -EH Additional Comments: PREVENA  -EH Removal Date: 05/15/22  -CH Removal Time: 0500  -CH     Retired NPWT (Negative Pressure Wound Therapy) - Properties Group Placement Date: 05/10/22  - Location: CHEST  -EH Additional Comments: PREVENA  -EH Removal Date:  05/15/22  -CH Removal Time: 0500  -CH     Row Name 05/15/22 7218          Plan of Care Review    Plan of Care Reviewed With patient  -TA     Outcome Evaluation pt sit>sup with SBA with HOB up. sit<>stand with min assist, pt transfered chair>bed without AD via stand pivot with min assist of 1. pt would benefit from 24/7 care & continued PT services  -TA     Row Name 05/15/22 134          Vital Signs    Pre Systolic BP Rehab 126  -TA     Pre Treatment Diastolic BP 69  -TA     Post Systolic BP Rehab 140  -TA     Post Treatment Diastolic BP 73  -TA     Pretreatment Heart Rate (beats/min) 76  -TA     Posttreatment Heart Rate (beats/min) 74  -TA     Pre SpO2 (%) 92  -TA     O2 Delivery Pre Treatment supplemental O2  -TA     Post SpO2 (%) 93  -TA     O2 Delivery Post Treatment supplemental O2  -TA     Pre Patient Position Sitting  -TA     Post Patient Position Supine  -TA     Row Name 05/15/22 134          Positioning and Restraints    Pre-Treatment Position sitting in chair/recliner  -TA     Post Treatment Position bed  -TA     In Bed supine;call light within reach;exit alarm on;with other staff  -TA     Row Name 05/15/22 6909          Therapy Assessment/Plan (PT)    Rehab Potential (PT) good, to achieve stated therapy goals  -TA     Criteria for Skilled Interventions Met (PT) yes;meets criteria;skilled treatment is necessary  -TA     Therapy Frequency (PT) daily  -TA     Comment, Therapy Assessment/Plan (PT) continue  -TA     Row Name 05/15/22 0487          Bed Mobility Goal 1 (PT)    Activity/Assistive Device (Bed Mobility Goal 1, PT) sit to supine;supine to sit  -TA     [place] Level/Cues Needed (Bed Mobility Goal 1, PT) standby assist  -TA     Time Frame (Bed Mobility Goal 1, PT) by discharge  -TA     Strategies/Barriers (Bed Mobility Goal 1, PT) HOB down and bed rails  -TA     Progress/Outcomes (Bed Mobility Goal 1, PT) goal not met  -TA     Row Name 05/15/22 1770          Transfer Goal 1 (PT)     Activity/Assistive Device (Transfer Goal 1, PT) sit-to-stand/stand-to-sit;bed-to-chair/chair-to-bed  -TA     [place] Level/Cues Needed (Transfer Goal 1, PT) independent  -TA     Time Frame (Transfer Goal 1, PT) by discharge  -TA     Progress/Outcome (Transfer Goal 1, PT) goal not met  -TA     Row Name 05/15/22 1345          Gait Training Goal 1 (PT)    Activity/Assistive Device (Gait Training Goal 1, PT) gait (walking locomotion);assistive device use  -TA     [place] Level (Gait Training Goal 1, PT) modified independence  -TA     Distance (Gait Training Goal 1, PT) 50'x1  -TA     Time Frame (Gait Training Goal 1, PT) by discharge  -TA     Progress/Outcome (Gait Training Goal 1, PT) goal not met  -TA     Row Name 05/15/22 1340          Stairs Goal 1 (PT)    Activity/Assistive Device (Stairs Goal 1, PT) stairs, all skills;ascending stairs;descending stairs  -TA     [place] Level/Cues Needed (Stairs Goal 1, PT) contact guard required  -TA     Number of Stairs (Stairs Goal 1, PT) 2  -TA     Time Frame (Stairs Goal 1, PT) by discharge  -TA     Progress/Outcome (Stairs Goal 1, PT) goal not met  -TA           User Key  (r) = Recorded By, (t) = Taken By, (c) = Cosigned By    Initials Name Provider Type    Elisha Knapp, PTA Physical Therapist Assistant    Carley Mitchell RN Registered Nurse    Nay Canela RN Registered Nurse                Physical Therapy Education                 Title: PT OT SLP Therapies (In Progress)     Topic: Physical Therapy (Done)     Point: Mobility training (Done)     Learning Progress Summary           Patient Acceptance, E,TB, VU,NR by LR at 5/11/2022 0936    Comment: Educated on PT POC, goals, stacked breathing, and sternal percautions.                   Point: Home exercise program (Done)     Learning Progress Summary           Patient Acceptance, E,TB, VU,NR by LR at 5/11/2022 0936    Comment: Educated on PT POC, goals, stacked breathing, and sternal  percautions.                   Point: Body mechanics (Done)     Learning Progress Summary           Patient Acceptance, E,TB, VU,NR by LR at 5/11/2022 0936    Comment: Educated on PT POC, goals, stacked breathing, and sternal percautions.                   Point: Precautions (Done)     Learning Progress Summary           Patient Acceptance, E,TB, VU,NR by LR at 5/11/2022 0936    Comment: Educated on PT POC, goals, stacked breathing, and sternal percautions.                               User Key     Initials Effective Dates Name Provider Type Discipline    LR 06/16/21 -  Kenyon Lanza Physical Therapist PT              PT Recommendation and Plan  Anticipated Discharge Disposition (PT): skilled nursing facility  Therapy Frequency (PT): daily  Plan of Care Reviewed With: patient  Outcome Evaluation: pt sit>sup with SBA with HOB up. sit<>stand with min assist, pt transfered chair>bed without AD via stand pivot with min assist of 1. pt would benefit from 24/7 care & continued PT services   Outcome Measures     Row Name 05/15/22 1500             How much help from another person do you currently need...    Turning from your back to your side while in flat bed without using bedrails? 2  -TA      Moving from lying on back to sitting on the side of a flat bed without bedrails? 2  -TA      Moving to and from a bed to a chair (including a wheelchair)? 3  -TA      Standing up from a chair using your arms (e.g., wheelchair, bedside chair)? 3  -TA      Climbing 3-5 steps with a railing? 1  -TA      To walk in hospital room? 1  -TA      AM-PAC 6 Clicks Score (PT) 12  -TA              Functional Assessment    Outcome Measure Options AM-PAC 6 Clicks Basic Mobility (PT)  -TA            User Key  (r) = Recorded By, (t) = Taken By, (c) = Cosigned By    Initials Name Provider Type    Elisha Knapp PTA Physical Therapist Assistant                 Time Calculation:    PT Charges     Row Name 05/15/22 1909             Time  Calculation    Start Time 1345  -TA      Stop Time 1418  -TA      Time Calculation (min) 33 min  -TA      PT Received On 05/15/22  -TA              Time Calculation- PT    Total Timed Code Minutes- PT 33 minute(s)  -TA              Timed Charges    27291 - PT Therapeutic Exercise Minutes 18  -TA      04529 - PT Therapeutic Activity Minutes 15  -TA              Total Minutes    Timed Charges Total Minutes 33  -TA       Total Minutes 33  -TA            User Key  (r) = Recorded By, (t) = Taken By, (c) = Cosigned By    Initials Name Provider Type    Elisha Knapp PTA Physical Therapist Assistant              Therapy Charges for Today     Code Description Service Date Service Provider Modifiers Qty    56190307834 HC PT THER PROC EA 15 MIN 5/15/2022 Elisha Corey PTA GP 1    16175001063 HC PT THERAPEUTIC ACT EA 15 MIN 5/15/2022 Elisha Corey PTA GP 1          PT G-Codes  Outcome Measure Options: AM-PAC 6 Clicks Basic Mobility (PT)  AM-PAC 6 Clicks Score (PT): 12  AM-PAC 6 Clicks Score (OT): 15    Elisha Corey PTA  5/15/2022

## 2022-05-16 LAB
GLUCOSE BLDC GLUCOMTR-MCNC: 104 MG/DL (ref 70–130)
GLUCOSE BLDC GLUCOMTR-MCNC: 131 MG/DL (ref 70–130)
GLUCOSE BLDC GLUCOMTR-MCNC: 145 MG/DL (ref 70–130)
GLUCOSE BLDC GLUCOMTR-MCNC: 161 MG/DL (ref 70–130)

## 2022-05-16 PROCEDURE — 94799 UNLISTED PULMONARY SVC/PX: CPT

## 2022-05-16 PROCEDURE — 99024 POSTOP FOLLOW-UP VISIT: CPT | Performed by: NURSE PRACTITIONER

## 2022-05-16 PROCEDURE — 94760 N-INVAS EAR/PLS OXIMETRY 1: CPT

## 2022-05-16 PROCEDURE — 97530 THERAPEUTIC ACTIVITIES: CPT

## 2022-05-16 PROCEDURE — 63710000001 INSULIN DETEMIR PER 5 UNITS: Performed by: NURSE PRACTITIONER

## 2022-05-16 PROCEDURE — 82962 GLUCOSE BLOOD TEST: CPT

## 2022-05-16 PROCEDURE — 25010000002 FUROSEMIDE PER 20 MG: Performed by: NURSE PRACTITIONER

## 2022-05-16 PROCEDURE — 97110 THERAPEUTIC EXERCISES: CPT

## 2022-05-16 RX ADMIN — ACETAMINOPHEN 1000 MG: 500 TABLET, FILM COATED ORAL at 20:57

## 2022-05-16 RX ADMIN — CARVEDILOL 12.5 MG: 12.5 TABLET, FILM COATED ORAL at 17:11

## 2022-05-16 RX ADMIN — IPRATROPIUM BROMIDE AND ALBUTEROL SULFATE 3 ML: 2.5; .5 SOLUTION RESPIRATORY (INHALATION) at 10:51

## 2022-05-16 RX ADMIN — TAMSULOSIN HYDROCHLORIDE 0.4 MG: 0.4 CAPSULE ORAL at 08:13

## 2022-05-16 RX ADMIN — FUROSEMIDE 20 MG: 10 INJECTION, SOLUTION INTRAMUSCULAR; INTRAVENOUS at 08:14

## 2022-05-16 RX ADMIN — POLYETHYLENE GLYCOL 3350 17 G: 17 POWDER, FOR SOLUTION ORAL at 08:14

## 2022-05-16 RX ADMIN — MUPIROCIN 1 APPLICATION: 20 OINTMENT TOPICAL at 08:18

## 2022-05-16 RX ADMIN — IPRATROPIUM BROMIDE AND ALBUTEROL SULFATE 3 ML: 2.5; .5 SOLUTION RESPIRATORY (INHALATION) at 14:22

## 2022-05-16 RX ADMIN — LOSARTAN POTASSIUM 50 MG: 50 TABLET, FILM COATED ORAL at 08:13

## 2022-05-16 RX ADMIN — Medication 400 MG: at 08:13

## 2022-05-16 RX ADMIN — IPRATROPIUM BROMIDE AND ALBUTEROL SULFATE 3 ML: 2.5; .5 SOLUTION RESPIRATORY (INHALATION) at 06:38

## 2022-05-16 RX ADMIN — IPRATROPIUM BROMIDE AND ALBUTEROL SULFATE 3 ML: 2.5; .5 SOLUTION RESPIRATORY (INHALATION) at 18:55

## 2022-05-16 RX ADMIN — ASPIRIN 81 MG: 81 TABLET, FILM COATED ORAL at 08:13

## 2022-05-16 RX ADMIN — Medication: at 10:30

## 2022-05-16 RX ADMIN — Medication 400 MG: at 20:58

## 2022-05-16 RX ADMIN — OXYCODONE HYDROCHLORIDE AND ACETAMINOPHEN 500 MG: 500 TABLET ORAL at 08:13

## 2022-05-16 RX ADMIN — OXYCODONE HYDROCHLORIDE AND ACETAMINOPHEN 500 MG: 500 TABLET ORAL at 20:57

## 2022-05-16 RX ADMIN — CARVEDILOL 12.5 MG: 12.5 TABLET, FILM COATED ORAL at 08:13

## 2022-05-16 RX ADMIN — LEVOTHYROXINE SODIUM 25 MCG: 25 TABLET ORAL at 06:26

## 2022-05-16 RX ADMIN — CLOPIDOGREL BISULFATE 75 MG: 75 TABLET ORAL at 08:13

## 2022-05-16 RX ADMIN — INSULIN DETEMIR 25 UNITS: 100 INJECTION, SOLUTION SUBCUTANEOUS at 20:58

## 2022-05-16 RX ADMIN — DOCUSATE SODIUM 50 MG AND SENNOSIDES 8.6 MG 2 TABLET: 8.6; 5 TABLET, FILM COATED ORAL at 08:13

## 2022-05-16 RX ADMIN — FAMOTIDINE 20 MG: 20 TABLET ORAL at 20:58

## 2022-05-16 RX ADMIN — FAMOTIDINE 20 MG: 20 TABLET ORAL at 08:13

## 2022-05-16 RX ADMIN — ACETAMINOPHEN 1000 MG: 500 TABLET, FILM COATED ORAL at 08:13

## 2022-05-16 NOTE — THERAPY TREATMENT NOTE
Acute Care - Physical Therapy Treatment Note  [facility]     Patient Name: Afia Adams  : 1950  MRN: [id number]  Today's Date: 2022      Visit Dx:     ICD-10-CM ICD-9-CM   1. Surgical aftercare, circulatory system  Z48.812 V58.73   2. Coronary artery disease of native artery of native heart with stable angina pectoris ([facility])  I25.118 414.01     413.9   3. Impaired mobility and ADLs  Z74.09 V49.89    Z78.9    4. Impaired functional mobility, balance, gait, and endurance  Z74.09 V49.89     Patient Active Problem List   Diagnosis   • Diabetes mellitus ([facility])   • Essential hypertension   • HFrEF (heart failure with reduced ejection fraction) ([facility])   • Encounter for long-term current use of medication   • CAD (coronary artery disease)   • Stage 3a chronic kidney disease ([facility])   • Lipoma of neck   • Anemia   • Elevated TSH   • History of PTCA   • Abnormal nuclear stress test   • Myocardial infarct, old   • Class 1 obesity due to excess calories with serious comorbidity and body mass index (BMI) of 32.0 to 32.9 in adult   • Mixed hyperlipidemia   • Abnormal findings on diagnostic imaging of other specified body structures    • Panlobular emphysema (HCC)     Past Medical History:   Diagnosis Date   • Abdominal pain 2015    unspecified   • Acute gastritis    • Acute on chronic systolic congestive heart failure (HCC) 2016   • Ankle pain 2015   • Asthma    • Calculus of kidney 10/09/2020    Added automatically from request for surgery [id number]   • Candidiasis, skin or nails 2011    controlled   • Chronic systolic congestive heart failure (HCC) 2016   • Congestive heart failure (HCC) 2016   • Coronary arteriosclerosis 2016    multi-vessel, 2 stents, followed by Dr. Kent   • Cough 2014    proabably due to allergy, chest x ray is normal      • Diabetic ulcer of toe of right foot associated with type 2 diabetes mellitus (HCC) 10/24/2020    Follow up  with podiatry outpatient    • Disease of thyroid gland    • Edema of foot 03/24/2016   • Encounter for long-term (current) drug use     therapy   • Epigastric pain 12/03/2015   • Essential hypertension 06/21/2016   • GERD (gastroesophageal reflux disease)    • Gout    • Heart attack (HCC)     2016 2 stents by Dr. Kent still follows with him/last seen 6 -2020 told doing ok   • History of echocardiogram 03/14/2016    Left atrium normal with mild CLVH wit normal aortic root size.Evidence of posterolateral wall hypokinesis. Severely depressed LV systolic function of 20-25%.Mitral valve thickened Aortic sclerosis.Diastolic dysfunction   • Hyperlipidemia 03/31/2016    unspecified   • Hypertensive disorder 03/24/2016   • Left lower quadrant pain 07/12/2013    ruling out diverticular disease      • Myocardial infarction (HCC) 03/24/2016   • Nausea 11/20/2015   • Obstructive uropathy 09/29/2020   • Osteoarthritis of knee 06/23/2016   • Pain in limb 01/25/2011   • Pediculosis capitis 12/22/2011    controlled   • Peptic ulcer    • Pneumonia    • Polyuria 01/25/2011   • Pruritic rash 12/09/2014   • Superficial foreign body hip without major open wound, no infection 12/09/2011    ??? back   • Type 2 diabetes mellitus (HCC) 06/23/2016    new onset   • Weakness 06/23/2016   • Wears dentures    • Wears glasses      Past Surgical History:   Procedure Laterality Date   • CARDIAC CATHETERIZATION Left 4/29/2022    Procedure: Left Heart Cath;  Surgeon: Luke Kent MD;  Location: [facility] CATH INVASIVE LOCATION;  Service: Cardiology;  Laterality: Left;   • COLONOSCOPY W/ POLYPECTOMY  03/07/2016    Transverse colon polyps,descending polyps,Sigmoid polyps, all resected and retrieved. Diverticulosis without perforation or abscess without bleeding. Erica Thomas   • CYSTOSCOPY Left 11/4/2020    Procedure: CYSTOSCOPY; LEFT J-STENT REMOVAL             (STRETCHER)   FLEXIBLE SCOPE;  Surgeon: Richar Rojas MD;  Location: [facility] OR;   Service: Urology;  Laterality: Left;   • CYSTOSCOPY, URETEROSCOPY, RETROGRADE PYELOGRAM, STENT INSERTION Left 9/30/2020    Procedure: CYSTOSCOPY LEFT URETEROSCOPY RETROGRADE PYELOGRAM STENT INSERTION;  Surgeon: Richar Rojas MD;  Location: [facility];  Service: Urology;  Laterality: Left;   • CYSTOSCOPY, URETEROSCOPY, RETROGRADE PYELOGRAM, STENT INSERTION Left 10/21/2020    Procedure: CYSTOSCOPY, LEFT RETROGRADE, URETEROSCOPY, LASER LITHOTRIPSY, STENT PLACEMENT;  Surgeon: Richar Rojas MD;  Location: [facility];  Service: Urology;  Laterality: Left;   • HYSTERECTOMY      ovary preserv   • INJECTION OF MEDICATION  09/11/2014    Celestone (betamethasone) (2)       PT Assessment (last 12 hours)     PT Evaluation and Treatment     Row Name 05/16/22 0941          Physical Therapy Time and Intention    Subjective Information complains of;weakness;fatigue  -TW     Document Type therapy note (daily note)  -TW     Mode of Treatment individual therapy;physical therapy  -TW     Patient Effort adequate  -TW     Row Name 05/16/22 0941          General Information    Patient Profile Reviewed yes  -TW     Patient Observations alert;agree to therapy;cooperative  -TW     Patient/Family/Caregiver Comments/Observations none  -TW     General Observations of Patient Pt up in recliner requesting to return to bed.  -TW     Existing Precautions/Restrictions fall;sternal  -TW     Row Name 05/16/22 0941          Pain    Pretreatment Pain Rating 0/10 - no pain  -TW     Posttreatment Pain Rating 0/10 - no pain  -TW     Row Name 05/16/22 0941          Cognition    Affect/Mental Status (Cognition) WFL  -TW     Orientation Status (Cognition) oriented x 4  -TW     Follows Commands (Cognition) 25-49% accuracy  pt did not follow instructions for chest precautions/transfers  -TW     Cognitive Function WFL  -TW     Personal Safety Interventions fall prevention program maintained;muscle strengthening facilitated;nonskid shoes/slippers when out of  bed  -[facility] Name 05/16/22 0941          Range of Motion Comprehensive    General Range of Motion no range of motion deficits identified  -[facility] Name 05/16/22 0941          Bed Mobility    Bed Mobility scooting/bridging;supine-sit  -TW     Scooting/Bridging [place] (Bed Mobility) dependent (less than 25% patient effort);2 person assist  with drawsheet > HOB  -TW     Supine-Sit [place] (Bed Mobility) not tested  -     Sit-Supine [place] (Bed Mobility) standby assist;verbal cues  -[facility] Name 05/16/22 0941          Transfers    Transfers chair-bed transfer  -TW     Bed-Chair [place] (Transfers) minimum assist (75% patient effort);verbal cues  -TW     Chair-Bed [place] (Transfers) not tested  -     Assistive Device (Bed-Chair Transfers) other (see comments)  [facility]     Sit-Stand [place] (Transfers) verbal cues;1 person assist;minimum assist (75% patient effort)  -TW     Stand-Sit [place] (Transfers) minimum assist (75% patient effort);verbal cues;1 person assist  -TW     Row Name 05/16/22 0941          Sit-Stand Transfer    Assistive Device (Sit-Stand Transfers) --  [facility]  -[facility] 05/16/22 0941          Stand-Sit Transfer    Assistive Device (Stand-Sit Transfers) --  [facility] 05/16/22 0941          Gait/Stairs (Locomotion)    Comment, (Gait/Stairs) Pt declined gait this tx.  -[facility] Name 05/16/22 0941          Safety Issues, Functional Mobility    Impairments Affecting Function (Mobility) balance;endurance/activity tolerance;pain;shortness of breath;strength;coordination  -[facility] Name 05/16/22 0941          Hip (Therapeutic Exercise)    Hip AROM (Therapeutic Exercise) bilateral;sitting  -TW     Row Name 05/16/22 0941          Knee (Therapeutic Exercise)    Knee AROM (Therapeutic Exercise) bilateral;sitting  -[facility] Name 05/16/22 0941          Ankle (Therapeutic Exercise)    Ankle AROM (Therapeutic Exercise) bilateral;sitting  -      Row Name             Wound 05/10/22 0802 sternal Incision    Wound - Properties Group Placement Date: 05/10/22  - Placement Time: 0802  -EH Present on Hospital Admission: N  -EH Location: sternal  -EH Primary Wound Type: Incision  -EH     Retired Wound - Properties Group Placement Date: 05/10/22  - Placement Time: 0802  -EH Present on Hospital Admission: N  -EH Location: sternal  -EH Primary Wound Type: Incision  -EH     Retired Wound - Properties Group Date first assessed: 05/10/22  - Time first assessed: 0802  -EH Present on Hospital Admission: N  -EH Location: sternal  -EH Primary Wound Type: Incision  -EH     Row Name             Wound 05/10/22 0754 Left lower leg Incision    Wound - Properties Group Placement Date: 05/10/22  - Placement Time: 0754  -EH Present on Hospital Admission: N  -EH Side: Left  -EH Orientation: lower  -EH Location: leg  -EH Primary Wound Type: Incision  -EH, EVH      Retired Wound - Properties Group Placement Date: 05/10/22  - Placement Time: 0754  -EH Present on Hospital Admission: N  -EH Side: Left  -EH Orientation: lower  -EH Location: leg  -EH Primary Wound Type: Incision  -EH, EVH      Retired Wound - Properties Group Date first assessed: 05/10/22  - Time first assessed: 0754  -EH Present on Hospital Admission: N  -EH Side: Left  -EH Location: leg  -EH Primary Wound Type: Incision  -EH, EVH      Row Name 05/16/22 0941          Plan of Care Review    Plan of Care Reviewed With patient  -TW     Progress no change  -TW     Outcome Evaluation Pt up in recliner with O2 doffed and hanging on chair arm. Pt with low sats at 87% but quickly recovered when O2 donned. Pt  declined gait at this time but did agree to B LE ther ex. Pt requested to return to bed after therex and was able to stand with CGA and amb to bed with HHA. Pt returned to sup with SBA and VC for proper tech. Pt left with all needs met and would cont to benefit from therapy upon DC.  -     Row Name 05/16/22 0941           Vital Signs    Pretreatment Heart Rate (beats/min) 78  -TW     Intratreatment Heart Rate (beats/min) 82  -TW     Posttreatment Heart Rate (beats/min) 66  -TW     Pre SpO2 (%) 87  -TW     O2 Delivery Pre Treatment room air  -TW     Intra SpO2 (%) 92  -TW     O2 Delivery Intra Treatment supplemental O2  -TW     Post SpO2 (%) 93  -TW     O2 Delivery Post Treatment supplemental O2  -TW     Pre Patient Position Sitting  -TW     Intra Patient Position Standing  -TW     Post Patient Position Supine  -TW     Row Name 05/16/22 0941          Positioning and Restraints    Pre-Treatment Position sitting in chair/recliner  -TW     Post Treatment Position bed  -TW     In Bed supine;call light within reach;encouraged to call for assist;exit alarm on  -TW     Row Name 05/16/22 0941          Therapy Assessment/Plan (PT)    Rehab Potential (PT) good, to achieve stated therapy goals  -TW     Criteria for Skilled Interventions Met (PT) yes;meets criteria;skilled treatment is necessary  -TW     Therapy Frequency (PT) daily  -TW     Row Name 05/16/22 0941          Bed Mobility Goal 1 (PT)    Activity/Assistive Device (Bed Mobility Goal 1, PT) sit to supine;supine to sit  -TW     [place] Level/Cues Needed (Bed Mobility Goal 1, PT) standby assist  -TW     Time Frame (Bed Mobility Goal 1, PT) by discharge  -TW     Strategies/Barriers (Bed Mobility Goal 1, PT) HOB down and bed rails  -TW     Progress/Outcomes (Bed Mobility Goal 1, PT) goal not met  -TW     Row Name 05/16/22 0941          Transfer Goal 1 (PT)    Activity/Assistive Device (Transfer Goal 1, PT) sit-to-stand/stand-to-sit;bed-to-chair/chair-to-bed  -TW     [place] Level/Cues Needed (Transfer Goal 1, PT) independent  -TW     Time Frame (Transfer Goal 1, PT) by discharge  -TW     Progress/Outcome (Transfer Goal 1, PT) goal not met  -TW     Row Name 05/16/22 0941          Gait Training Goal 1 (PT)    Activity/Assistive Device (Gait Training Goal 1, PT) gait  (walking locomotion);assistive device use  -TW     [place] Level (Gait Training Goal 1, PT) modified independence  -TW     Distance (Gait Training Goal 1, PT) 50'x1  -TW     Time Frame (Gait Training Goal 1, PT) by discharge  -TW     Progress/Outcome (Gait Training Goal 1, PT) goal not met  -TW     Row Name 05/16/22 0941          Stairs Goal 1 (PT)    Activity/Assistive Device (Stairs Goal 1, PT) stairs, all skills;ascending stairs;descending stairs  -TW     [place] Level/Cues Needed (Stairs Goal 1, PT) contact guard required  -TW     Number of Stairs (Stairs Goal 1, PT) 2  -TW     Time Frame (Stairs Goal 1, PT) by discharge  -TW     Progress/Outcome (Stairs Goal 1, PT) goal not met  -TW           User Key  (r) = Recorded By, (t) = Taken By, (c) = Cosigned By    Initials Name Provider Type    TW Umesh Blanchard PTA Physical Therapist Assistant    Nay Canela RN Registered Nurse                Physical Therapy Education                 Title: PT OT SLP Therapies (In Progress)     Topic: Physical Therapy (Done)     Point: Mobility training (Done)     Learning Progress Summary           Patient Acceptance, E,TB, VU,NR by LR at 5/11/2022 0936    Comment: Educated on PT POC, goals, stacked breathing, and sternal percautions.                   Point: Home exercise program (Done)     Learning Progress Summary           Patient Acceptance, E,TB, VU,NR by LR at 5/11/2022 0936    Comment: Educated on PT POC, goals, stacked breathing, and sternal percautions.                   Point: Body mechanics (Done)     Learning Progress Summary           Patient Acceptance, E,TB, VU,NR by LR at 5/11/2022 0936    Comment: Educated on PT POC, goals, stacked breathing, and sternal percautions.                   Point: Precautions (Done)     Learning Progress Summary           Patient Acceptance, E,TB, VU,NR by LR at 5/11/2022 0936    Comment: Educated on PT POC, goals, stacked breathing, and sternal percautions.                                User Key     Initials Effective Dates Name Provider Type Discipline    LR 06/16/21 -  Kenyon Lanza Physical Therapist PT              PT Recommendation and Plan  Anticipated Discharge Disposition (PT): skilled nursing facility  Therapy Frequency (PT): daily  Plan of Care Reviewed With: patient  Progress: no change  Outcome Evaluation: Pt up in recliner with O2 doffed and hanging on chair arm. Pt with low sats at 87% but quickly recovered when O2 donned. Pt  declined gait at this time but did agree to B LE ther ex. Pt requested to return to bed after therex and was able to stand with CGA and amb to bed with HHA. Pt returned to sup with SBA and VC for proper tech. Pt left with all needs met and would cont to benefit from therapy upon DC.   Outcome Measures     Row Name 05/15/22 1500             How much help from another person do you currently need...    Turning from your back to your side while in flat bed without using bedrails? 2  -TA      Moving from lying on back to sitting on the side of a flat bed without bedrails? 2  -TA      Moving to and from a bed to a chair (including a wheelchair)? 3  -TA      Standing up from a chair using your arms (e.g., wheelchair, bedside chair)? 3  -TA      Climbing 3-5 steps with a railing? 1  -TA      To walk in hospital room? 1  -TA      AM-PAC 6 Clicks Score (PT) 12  -TA              Functional Assessment    Outcome Measure Options AM-PAC 6 Clicks Basic Mobility (PT)  -TA            User Key  (r) = Recorded By, (t) = Taken By, (c) = Cosigned By    Initials Name Provider Type    Elisha Knapp PTA Physical Therapist Assistant                 Time Calculation:    PT Charges     Row Name 05/16/22 1303             Time Calculation    Start Time 0941  -TW      Stop Time 1005  -TW      Time Calculation (min) 24 min  -TW              Time Calculation- PT    Total Timed Code Minutes- PT 24 minute(s)  -TW            User Key  (r) = Recorded By, (t) =  Taken By, (c) = Cosigned By    Initials Name Provider Type    TW Umesh Blanchard PTA Physical Therapist Assistant              Therapy Charges for Today     Code Description Service Date Service Provider Modifiers Qty    37029076389 HC PT THERAPEUTIC ACT EA 15 MIN 5/16/2022 Umesh Blanchard PTA GP 1    66979096168 HC PT THER PROC EA 15 MIN 5/16/2022 Umesh Blanchard PTA GP 1          PT G-Codes  Outcome Measure Options: AM-PAC 6 Clicks Basic Mobility (PT)  AM-PAC 6 Clicks Score (PT): 12  AM-PAC 6 Clicks Score (OT): 15    Umesh Blanchard PTA  5/16/2022

## 2022-05-16 NOTE — PROGRESS NOTES
"  Cardiothoracic - Vascular Surgery Daily Note        LOS: 6 days   Patient Care Team:  Nieves Childress APRN as PCP - General (Family Medicine)    POD#6 CABGx2  BAUTISTA-LAD, SVG-PLB    Chief Complaint: CAD      Subjective     The following portions of the patient's history were reviewed and updated as appropriate: allergies, current medications, past family history, past medical history, past social history, past surgical history and problem list.     Subjective:  Symptoms:  Stable.  She reports chest pain (surgical) and weakness.    Diet:  Adequate intake.  No nausea.    Activity level: Impaired due to pain.    Pain:  She complains of pain that is moderate.  Pain is well controlled and requiring pain medication.          Objective     Vital Signs  Temp:  [96.7 °F (35.9 °C)-98 °F (36.7 °C)] 96.7 °F (35.9 °C)  Heart Rate:  [62-76] 62  Resp:  [16-20] 18  BP: (139-171)/(72-79) 164/78  Body mass index is 38.35 kg/m².    Intake/Output Summary (Last 24 hours) at 5/16/2022 1333  Last data filed at 5/16/2022 1200  Gross per 24 hour   Intake 480 ml   Output 600 ml   Net -120 ml     I/O this shift:  In: 240 [P.O.:240]  Out: 300 [Urine:300]    Wt Readings from Last 3 Encounters:   05/16/22 86.2 kg (190 lb)   05/05/22 84.4 kg (186 lb)   05/05/22 83.9 kg (185 lb)       Physical Exam   Objective:  General Appearance:  Comfortable and ill-appearing.    Vital signs: (most recent): Blood pressure 164/78, pulse 62, temperature 96.7 °F (35.9 °C), temperature source Infrared, resp. rate 18, height 149.9 cm (59.02\"), weight 86.2 kg (190 lb), SpO2 92 %, not currently breastfeeding.  Vital signs are normal.  No fever.    Output: Producing urine and producing stool.    HEENT: Normal HEENT exam.    Lungs:  Normal effort and normal respiratory rate.  Breath sounds clear to auscultation.  There are decreased breath sounds.    Heart: Normal rate.  Regular rhythm.    Chest: (AV wires removed)  Abdomen: Abdomen is soft.  Bowel sounds are normal.   "   Neurological: Patient is alert and oriented to person, place and time.    Skin:  Warm and dry.  (Sternal provena intact  [facility] CDI)              Results Review:    Lab Results   Component Value Date    WBC 12.87 (H) 05/13/2022    HGB 11.2 (L) 05/13/2022    HCT 34.9 05/13/2022    MCV 81.7 05/13/2022     05/13/2022     Lab Results   Component Value Date    GLUCOSE 105 (H) 05/13/2022    BUN 22 05/13/2022    CREATININE 1.20 (H) 05/13/2022    EGFRIFNONA 45 (L) 12/08/2021    EGFRIFAFRI  09/29/2020      Comment:      <15 Indicative of kidney failure.    BCR 18.3 05/13/2022    K 4.3 05/13/2022    CO2 28.0 05/13/2022    CALCIUM 8.9 05/13/2022    ALBUMIN 3.60 05/13/2022    AST 13 05/13/2022    ALT <5 05/13/2022       Calcium No results found for: CALCIUM   Magnesium No results found for: MG     Imaging Results (Last 24 Hours)     ** No results found for the last 24 hours. **                              acetaminophen, 1,000 mg, Oral, Q6H  vitamin C, 500 mg, Oral, BID  aspirin, 81 mg, Oral, Daily  carvedilol, 12.5 mg, Oral, BID With Meals  clopidogrel, 75 mg, Oral, Daily  famotidine, 20 mg, Oral, BID  furosemide, 20 mg, Intravenous, Daily  Insulin Aspart, 0-24 Units, Subcutaneous, TID AC  insulin detemir, 25 Units, Subcutaneous, Nightly  ipratropium-albuterol, 3 mL, Nebulization, 4x Daily - RT  levothyroxine, 25 mcg, Oral, Q AM  losartan, 50 mg, Oral, Q24H  magnesium oxide, 400 mg, Oral, BID  mupirocin, 1 application, Each Nare, Daily  polyethylene glycol, 17 g, Oral, Daily  senna-docusate sodium, 2 tablet, Oral, BID  tamsulosin, 0.4 mg, Oral, Daily                 ASSESSMENT/PLAN     Active Hospital Problems    Diagnosis  POA   • **CAD (coronary artery disease) [I25.10]  Yes     Hx of 2 stents placed by Dr. Kent in 2016        • Stage 3a chronic kidney disease (HCC) [N18.31]  Yes            • Encounter for long-term current use of medication [Z79.899]  Not Applicable   • HFrEF (heart failure with reduced ejection  fraction) (HCC) [I50.20]  Yes   • Essential hypertension [I10]  Yes     Stable Post Op CABG: Progressing slowly. Looks well this AM, A/O, up to chair this AM.    CAD: Medical Management: Aspirin, Plavix, Statin, Beta. ACE/ARB     Acute respiratory failure: hypoxic SpO2 <88% on RA.  O2 support Room air. Weaning as tolerated. Nebs PRN. Incentive Spirometry. Aggressive pulmonary toilet. OPEP    Expected blood loss anemia felix-operatively. Asymptomatic. Monitor CBC. Hemodynamically stable.  Iron supplementation: deferred    Transient hyperglycemia post operative. Medical management:  SSI coverage. Levemir nightly. Carb consistent diet.     Postop Pain Management: Scheduled Tylenol. Toradol. Tramadolol    Rehab: OOB to chair. Ambulate. PT/OT. Weak.    P. humanus capitis: Nix treatment done today    Disposition: Remains stable for d/c to SNF for rehab          This document has been electronically signed by Yemi Zuniga AGACNP-BC @  On May 16, 2022 13:33 CDT

## 2022-05-16 NOTE — PLAN OF CARE
Goal Outcome Evaluation:  Plan of Care Reviewed With: patient        Progress: improving  Outcome Evaluation: Intake 100% - 1x, 75% 0 2x, 50% - 1x, 25% - 2x.  Pt has discharge orders.

## 2022-05-16 NOTE — THERAPY TREATMENT NOTE
Patient Name: Afia Adams  : 1950    MRN: [id number]                              Today's Date: 2022       Admit Date: 5/10/2022    Visit Dx:     ICD-10-CM ICD-9-CM   1. Surgical aftercare, circulatory system  Z48.812 V58.73   2. Coronary artery disease of native artery of native heart with stable angina pectoris ([facility])  I25.118 414.01     413.9   3. Impaired mobility and ADLs  Z74.09 V49.89    Z78.9    4. Impaired functional mobility, balance, gait, and endurance  Z74.09 V49.89     Patient Active Problem List   Diagnosis   • Diabetes mellitus ([facility])   • Essential hypertension   • HFrEF (heart failure with reduced ejection fraction) ([facility])   • Encounter for long-term current use of medication   • CAD (coronary artery disease)   • Stage 3a chronic kidney disease ([facility])   • Lipoma of neck   • Anemia   • Elevated TSH   • History of PTCA   • Abnormal nuclear stress test   • Myocardial infarct, old   • Class 1 obesity due to excess calories with serious comorbidity and body mass index (BMI) of 32.0 to 32.9 in adult   • Mixed hyperlipidemia   • Abnormal findings on diagnostic imaging of other specified body structures    • Panlobular emphysema (HCC)     Past Medical History:   Diagnosis Date   • Abdominal pain 2015    unspecified   • Acute gastritis    • Acute on chronic systolic congestive heart failure (HCC) 2016   • Ankle pain 2015   • Asthma    • Calculus of kidney 10/09/2020    Added automatically from request for surgery [id number]   • Candidiasis, skin or nails 2011    controlled   • Chronic systolic congestive heart failure (HCC) 2016   • Congestive heart failure (HCC) 2016   • Coronary arteriosclerosis 2016    multi-vessel, 2 stents, followed by Dr. Kent   • Cough 2014    proabably due to allergy, chest x ray is normal      • Diabetic ulcer of toe of right foot associated with type 2 diabetes mellitus (HCC) 10/24/2020    Follow up with  podiatry outpatient    • Disease of thyroid gland    • Edema of foot 03/24/2016   • Encounter for long-term (current) drug use     therapy   • Epigastric pain 12/03/2015   • Essential hypertension 06/21/2016   • GERD (gastroesophageal reflux disease)    • Gout    • Heart attack (HCC)     2016 2 stents by Dr. Kent still follows with him/last seen 6 -2020 told doing ok   • History of echocardiogram 03/14/2016    Left atrium normal with mild CLVH wit normal aortic root size.Evidence of posterolateral wall hypokinesis. Severely depressed LV systolic function of 20-25%.Mitral valve thickened Aortic sclerosis.Diastolic dysfunction   • Hyperlipidemia 03/31/2016    unspecified   • Hypertensive disorder 03/24/2016   • Left lower quadrant pain 07/12/2013    ruling out diverticular disease      • Myocardial infarction (HCC) 03/24/2016   • Nausea 11/20/2015   • Obstructive uropathy 09/29/2020   • Osteoarthritis of knee 06/23/2016   • Pain in limb 01/25/2011   • Pediculosis capitis 12/22/2011    controlled   • Peptic ulcer    • Pneumonia    • Polyuria 01/25/2011   • Pruritic rash 12/09/2014   • Superficial foreign body hip without major open wound, no infection 12/09/2011    ??? back   • Type 2 diabetes mellitus (HCC) 06/23/2016    new onset   • Weakness 06/23/2016   • Wears dentures    • Wears glasses      Past Surgical History:   Procedure Laterality Date   • CARDIAC CATHETERIZATION Left 4/29/2022    Procedure: Left Heart Cath;  Surgeon: Luke Kent MD;  Location: [facility] CATH INVASIVE LOCATION;  Service: Cardiology;  Laterality: Left;   • COLONOSCOPY W/ POLYPECTOMY  03/07/2016    Transverse colon polyps,descending polyps,Sigmoid polyps, all resected and retrieved. Diverticulosis without perforation or abscess without bleeding. Erica Thomas   • CYSTOSCOPY Left 11/4/2020    Procedure: CYSTOSCOPY; LEFT J-STENT REMOVAL             (STRETCHER)   FLEXIBLE SCOPE;  Surgeon: Richar Rojas MD;  Location: [facility] OR;   Service: Urology;  Laterality: Left;   • CYSTOSCOPY, URETEROSCOPY, RETROGRADE PYELOGRAM, STENT INSERTION Left 9/30/2020    Procedure: CYSTOSCOPY LEFT URETEROSCOPY RETROGRADE PYELOGRAM STENT INSERTION;  Surgeon: Richar Rojas MD;  Location: [facility];  Service: Urology;  Laterality: Left;   • CYSTOSCOPY, URETEROSCOPY, RETROGRADE PYELOGRAM, STENT INSERTION Left 10/21/2020    Procedure: CYSTOSCOPY, LEFT RETROGRADE, URETEROSCOPY, LASER LITHOTRIPSY, STENT PLACEMENT;  Surgeon: Richar Rojas MD;  Location: [facility];  Service: Urology;  Laterality: Left;   • HYSTERECTOMY      ovary preserv   • INJECTION OF MEDICATION  09/11/2014    Celestone (betamethasone) (2)        General Information     Row Name 05/16/22 1245          OT Time and Intention    Document Type therapy note (daily note)  -KD     Mode of Treatment individual therapy;occupational therapy  -KD     Row Name 05/16/22 1245          General Information    Patient Profile Reviewed yes  -KD     Existing Precautions/Restrictions fall;sternal  -KD     Row Name 05/16/22 1245          Cognition    Orientation Status (Cognition) oriented x 4  -KD     Row Name 05/16/22 1245          Safety Issues, Functional Mobility    Impairments Affecting Function (Mobility) balance;endurance/activity tolerance;pain;shortness of breath;strength;coordination  -KD           User Key  (r) = Recorded By, (t) = Taken By, (c) = Cosigned By    Initials Name Provider Type    Sherlyn Gary COTA Occupational Therapist Assistant                 Mobility/ADL's     Row Name 05/16/22 1245          Bed Mobility    Comment, (Bed Mobility) Pt just returned to bed and deferred any OOB @ this time.  -KD           User Key  (r) = Recorded By, (t) = Taken By, (c) = Cosigned By    Initials Name Provider Type    Sherlyn Gary COTA Occupational Therapist Assistant               Obj/Interventions     Row Name 05/16/22 1244          Shoulder (Therapeutic Exercise)    Shoulder AROM  (Therapeutic Exercise) bilateral;flexion;extension;aBduction;aDduction;external rotation;internal rotation  -KD     Row Name 05/16/22 1245          Elbow/Forearm (Therapeutic Exercise)    Elbow/Forearm (Therapeutic Exercise) AROM (active range of motion)  -KD     Elbow/Forearm AROM (Therapeutic Exercise) bilateral;flexion;extension;supination;pronation  -KD     Row Name 05/16/22 1245          Wrist (Therapeutic Exercise)    Wrist (Therapeutic Exercise) AROM (active range of motion)  -KD     Wrist AROM (Therapeutic Exercise) bilateral;flexion;extension  -KD     Row Name 05/16/22 1245          Hand (Therapeutic Exercise)    Hand (Therapeutic Exercise) AROM (active range of motion)  -KD     Hand AROM/AAROM (Therapeutic Exercise) bilateral;finger flexion;finger extension  -KD     Row Name 05/16/22 1245          Motor Skills    Therapeutic Exercise shoulder;elbow/forearm;wrist;hand  -KD           User Key  (r) = Recorded By, (t) = Taken By, (c) = Cosigned By    Initials Name Provider Type     Sherlyn Caracmo COTA Occupational Therapist Assistant               Goals/Plan    No documentation.                Clinical Impression     Row Name 05/16/22 1439 05/16/22 1245       Pain Assessment    Pretreatment Pain Rating 0/10 - no pain  -KD 0/10 - no pain  -KD    Posttreatment Pain Rating 0/10 - no pain  -KD 0/10 - no pain  -KD    Row Name 05/16/22 1439 05/16/22 1245       Plan of Care Review    Plan of Care Reviewed With patient  -KD patient  -KD    Progress improving  -KD improving  -KD    Outcome Evaluation Pt deferred any OOB @ this time, reporting she just returned to bed. Pt required vc's to bridge to \[facility] [place] [facility]\"". Pt was agreeable to bed ex. Pt tolerated BUE Ther ex in all planes w/ 2lb HW and good tolerance w/ RB's PRN. Discussed sternal precautions w/ pt. No goals met this date. Cont OT POC.  -KD --    Row Name 05/16/22 1439          Therapy Assessment/Plan (OT)    Therapy Frequency (OT) daily  -KD     Row Name 05/16/22 1433           Vital Signs    Pre Systolic BP Rehab 152  -KD     Pre Treatment Diastolic BP 72  -KD     Pretreatment Heart Rate (beats/min) 67  -KD     Pre SpO2 (%) 94  -KD     O2 Delivery Pre Treatment room air  -KD     Pre Patient Position Supine  -KD     Intra Patient Position Sitting  -KD     Post Patient Position Supine  -KD     Row Name 05/16/22 1439          Positioning and Restraints    Pre-Treatment Position in bed  -KD     Post Treatment Position bed  -KD     In Bed fowlers;exit alarm on;encouraged to call for assist;call light within reach  -KD           User Key  (r) = Recorded By, (t) = Taken By, (c) = Cosigned By    Initials Name Provider Type    Sherlyn Gary COTA Occupational Therapist Assistant               Outcome Measures     Row Name 05/16/22 1245          How much help from another is currently needed...    Putting on and taking off regular lower body clothing? 2  -KD     Bathing (including washing, rinsing, and drying) 2  -KD     Toileting (which includes using toilet bed pan or urinal) 2  -KD     Putting on and taking off regular upper body clothing 2  -KD     Taking care of personal grooming (such as brushing teeth) 3  -KD     Eating meals 4  -KD     AM-PAC 6 Clicks Score (OT) 15  -KD           User Key  (r) = Recorded By, (t) = Taken By, (c) = Cosigned By    Initials Name Provider Type    Sherlyn Gary COTA Occupational Therapist Assistant                Occupational Therapy Education                 Title: PT OT SLP Therapies (In Progress)     Topic: Occupational Therapy (In Progress)     Point: ADL training (Not Started)     Description:   Instruct learner(s) on proper safety adaptation and remediation techniques during self care or transfers.   Instruct in proper use of assistive devices.              Learner Progress:  Not documented in this visit.          Point: Home exercise program (Not Started)     Description:   Instruct learner(s) on appropriate technique for monitoring,  assisting and/or progressing therapeutic exercises/activities.              Learner Progress:  Not documented in this visit.          Point: Precautions (Done)     Description:   Instruct learner(s) on prescribed precautions during self-care and functional transfers.              Learning Progress Summary           Patient Acceptance, E, VU,NR by ME at 5/11/2022 0937    Comment: Educated on OT and POC. Educated to call for assistance. Educated on safety precautions. Educated on sternal precautions.                   Point: Body mechanics (Done)     Description:   Instruct learner(s) on proper positioning and spine alignment during self-care, functional mobility activities and/or exercises.              Learning Progress Summary           Patient Acceptance, E, VU,NR by ME at 5/11/2022 0937    Comment: Educated on OT and POC. Educated to call for assistance. Educated on safety precautions. Educated on sternal precautions.                               User Key     Initials Effective Dates Name Provider Type Discipline    ME 06/16/21 -  Amrit Grant, OTR/L Occupational Therapist OT              OT Recommendation and Plan  Therapy Frequency (OT): daily  Plan of Care Review  Plan of Care Reviewed With: patient  Progress: improving  Outcome Evaluation: Pt deferred any OOB @ this time, reporting she just returned to bed. Pt required vc's to bridge to HOB. Pt was agreeable to bed ex. Pt tolerated BUE Ther ex in all planes w/ 2lb HW and good tolerance w/ RB's PRN. Discussed sternal precautions w/ pt. No goals met this date. Cont OT POC.     Time Calculation:    Time Calculation- OT     Row Name 05/16/22 1245             Time Calculation- OT    OT Start Time 1245  -KD      OT Stop Time 1308  -KD      OT Time Calculation (min) 23 min  -KD      Total Timed Code Minutes- OT 23 minute(s)  -KD      OT Received On 05/16/22  -KD              Timed Charges    43847 - OT Therapeutic Exercise Minutes 23  -KD              Total  Minutes    Timed Charges Total Minutes 23  -KD       Total Minutes 23  -KD            User Key  (r) = Recorded By, (t) = Taken By, (c) = Cosigned By    Initials Name Provider Type    Sherlyn Gary COTA Occupational Therapist Assistant              Therapy Charges for Today     Code Description Service Date Service Provider Modifiers Qty    [id number]  OT THER PROC EA 15 MIN 5/16/2022 Sherlyn Carcamo COTA GO 2               DUNCAN Zaidi  5/16/2022

## 2022-05-16 NOTE — PLAN OF CARE
Goal Outcome Evaluation:  Plan of Care Reviewed With: patient        Progress: improving  Outcome Evaluation: Pt deferred any OOB @ this time, reporting she just returned to bed. Pt required vc's to bridge to HOB. Pt was agreeable to bed ex. Pt tolerated BUE Ther ex in all planes w/ 2lb HW and good tolerance w/ RB's PRN. Discussed sternal precautions w/ pt. No goals met this date. Cont OT POC.

## 2022-05-16 NOTE — PLAN OF CARE
Goal Outcome Evaluation:  Plan of Care Reviewed With: patient        Progress: no change  Outcome Evaluation: Pt up in recliner with O2 doffed and hanging on chair arm. Pt with low sats at 87% but quickly recovered when O2 donned. Pt  declined gait at this time but did agree to B LE ther ex. Pt requested to return to bed after therex and was able to stand with CGA and amb to bed with HHA. Pt returned to sup with SBA and VC for proper tech. Pt left with all needs met and would cont to benefit from therapy upon DC.

## 2022-05-16 NOTE — CONSULTS
Adult Nutrition  Assessment    Patient Name:  Afia Adams  YOB: 1950  MRN: [id number]  Admit Date:  5/10/2022    Assessment Date:  5/16/2022    Comments:  Pt is in Contact Precautions.  Pt indicated she was cold and requseted a blanket.  Nursing contacted.  Pt reported poor appetite indicating the food wasn't seasoned.  Indicated she didn't like the Boost Glucose Control but was williing to receive yogurt with meals.  Intake 100% - 1x, 75% - 2x, 50% - 1x, 25% - 2x.  Pt reported some nausea.  Pepcid, PRN Zofran prescribed.  Blood glucose is usually elevated.  Levemir insulin, sliding scale novolog prescribed.  Creat is elevated consistent with dx CKD.  Pt has discharge orders.         Reason for Assessment     Row Name 05/16/22 1558          Reason for Assessment    Reason For Assessment follow-up protocol                    Labs/Tests/Procedures/Meds     Row Name 05/16/22 1553          Labs/Procedures/Meds    Lab Results Reviewed reviewed, pertinent            Medications    Pertinent Medications Reviewed reviewed, pertinent                Physical Findings     Row Name 05/16/22 1559          Physical Findings    Overall Physical Appearance sternal incision, left lower leg incision                  Nutrition Prescription Ordered     Row Name 05/16/22 4246          Nutrition Prescription PO    Current PO Diet Regular     Supplement Boost Glucose Control (Glucerna Shake)     Supplement Frequency 3 times a day     Common Modifiers Consistent Carbohydrate                Evaluation of Received Nutrient/Fluid Intake     Row Name 05/16/22 1600          PO Evaluation    Number of Meals 6     % PO Intake 100% - 1x, 75% - 2x, 50% - 1x, 25% - 2x                     Electronically signed by:  Pamela Velasco RD  05/16/22 16:02 CDT

## 2022-05-17 VITALS
DIASTOLIC BLOOD PRESSURE: 80 MMHG | RESPIRATION RATE: 20 BRPM | OXYGEN SATURATION: 90 % | HEIGHT: 59 IN | BODY MASS INDEX: 37.5 KG/M2 | HEART RATE: 85 BPM | SYSTOLIC BLOOD PRESSURE: 157 MMHG | TEMPERATURE: 96.9 F | WEIGHT: 186 LBS

## 2022-05-17 LAB
FLUAV SUBTYP SPEC NAA+PROBE: NOT DETECTED
FLUBV RNA ISLT QL NAA+PROBE: NOT DETECTED
GLUCOSE BLDC GLUCOMTR-MCNC: 86 MG/DL (ref 70–130)
GLUCOSE BLDC GLUCOMTR-MCNC: 91 MG/DL (ref 70–130)
GLUCOSE BLDC GLUCOMTR-MCNC: 93 MG/DL (ref 70–130)
SARS-COV-2 RNA PNL SPEC NAA+PROBE: NOT DETECTED

## 2022-05-17 PROCEDURE — 94799 UNLISTED PULMONARY SVC/PX: CPT

## 2022-05-17 PROCEDURE — 94760 N-INVAS EAR/PLS OXIMETRY 1: CPT

## 2022-05-17 PROCEDURE — 87636 SARSCOV2 & INF A&B AMP PRB: CPT | Performed by: THORACIC SURGERY (CARDIOTHORACIC VASCULAR SURGERY)

## 2022-05-17 PROCEDURE — 99024 POSTOP FOLLOW-UP VISIT: CPT | Performed by: NURSE PRACTITIONER

## 2022-05-17 PROCEDURE — 82962 GLUCOSE BLOOD TEST: CPT

## 2022-05-17 PROCEDURE — 97535 SELF CARE MNGMENT TRAINING: CPT

## 2022-05-17 PROCEDURE — 25010000002 FUROSEMIDE PER 20 MG: Performed by: NURSE PRACTITIONER

## 2022-05-17 RX ORDER — TRAMADOL HYDROCHLORIDE 50 MG/1
100 TABLET ORAL EVERY 4 HOURS PRN
Qty: 36 TABLET | Refills: 0 | Status: SHIPPED | OUTPATIENT
Start: 2022-05-17 | End: 2022-05-31 | Stop reason: ALTCHOICE

## 2022-05-17 RX ORDER — LOSARTAN POTASSIUM 50 MG/1
100 TABLET ORAL
Status: DISCONTINUED | OUTPATIENT
Start: 2022-05-18 | End: 2022-05-17 | Stop reason: HOSPADM

## 2022-05-17 RX ORDER — LOSARTAN POTASSIUM 50 MG/1
50 TABLET ORAL ONCE
Status: COMPLETED | OUTPATIENT
Start: 2022-05-17 | End: 2022-05-17

## 2022-05-17 RX ADMIN — DOCUSATE SODIUM 50 MG AND SENNOSIDES 8.6 MG 2 TABLET: 8.6; 5 TABLET, FILM COATED ORAL at 09:22

## 2022-05-17 RX ADMIN — FUROSEMIDE 20 MG: 10 INJECTION, SOLUTION INTRAMUSCULAR; INTRAVENOUS at 09:23

## 2022-05-17 RX ADMIN — MUPIROCIN 1 APPLICATION: 20 OINTMENT TOPICAL at 09:23

## 2022-05-17 RX ADMIN — OXYCODONE HYDROCHLORIDE AND ACETAMINOPHEN 500 MG: 500 TABLET ORAL at 09:22

## 2022-05-17 RX ADMIN — LEVOTHYROXINE SODIUM 25 MCG: 25 TABLET ORAL at 05:32

## 2022-05-17 RX ADMIN — IPRATROPIUM BROMIDE AND ALBUTEROL SULFATE 3 ML: 2.5; .5 SOLUTION RESPIRATORY (INHALATION) at 06:29

## 2022-05-17 RX ADMIN — ACETAMINOPHEN 1000 MG: 500 TABLET, FILM COATED ORAL at 09:22

## 2022-05-17 RX ADMIN — TAMSULOSIN HYDROCHLORIDE 0.4 MG: 0.4 CAPSULE ORAL at 09:22

## 2022-05-17 RX ADMIN — LOSARTAN POTASSIUM 50 MG: 50 TABLET, FILM COATED ORAL at 11:27

## 2022-05-17 RX ADMIN — CARVEDILOL 12.5 MG: 12.5 TABLET, FILM COATED ORAL at 09:22

## 2022-05-17 RX ADMIN — Medication 400 MG: at 09:22

## 2022-05-17 RX ADMIN — ASPIRIN 81 MG: 81 TABLET, FILM COATED ORAL at 09:22

## 2022-05-17 RX ADMIN — FAMOTIDINE 20 MG: 20 TABLET ORAL at 09:22

## 2022-05-17 RX ADMIN — CLOPIDOGREL BISULFATE 75 MG: 75 TABLET ORAL at 09:22

## 2022-05-17 RX ADMIN — LOSARTAN POTASSIUM 50 MG: 50 TABLET, FILM COATED ORAL at 09:22

## 2022-05-17 NOTE — THERAPY TREATMENT NOTE
Patient Name: Afia Adams  : 1950    MRN: [id number]                              Today's Date: 2022       Admit Date: 5/10/2022    Visit Dx:     ICD-10-CM ICD-9-CM   1. Surgical aftercare, circulatory system  Z48.812 V58.73   2. Coronary artery disease of native artery of native heart with stable angina pectoris (Formerly [facility])  I25.118 414.01     413.9   3. Impaired mobility and ADLs  Z74.09 V49.89    Z78.9    4. Impaired functional mobility, balance, gait, and endurance  Z74.09 V49.89     Patient Active Problem List   Diagnosis   • Diabetes mellitus (Formerly [facility])   • Essential hypertension   • HFrEF (heart failure with reduced ejection fraction) (Formerly [facility])   • Encounter for long-term current use of medication   • CAD (coronary artery disease)   • Stage 3a chronic kidney disease (Formerly [facility])   • Lipoma of neck   • Anemia   • Elevated TSH   • History of PTCA   • Abnormal nuclear stress test   • Myocardial infarct, old   • Class 1 obesity due to excess calories with serious comorbidity and body mass index (BMI) of 32.0 to 32.9 in adult   • Mixed hyperlipidemia   • Abnormal findings on diagnostic imaging of other specified body structures    • Panlobular emphysema (HCC)     Past Medical History:   Diagnosis Date   • Abdominal pain 2015    unspecified   • Acute gastritis    • Acute on chronic systolic congestive heart failure (HCC) 2016   • Ankle pain 2015   • Asthma    • Calculus of kidney 10/09/2020    Added automatically from request for surgery [id number]   • Candidiasis, skin or nails 2011    controlled   • Chronic systolic congestive heart failure (HCC) 2016   • Congestive heart failure (HCC) 2016   • Coronary arteriosclerosis 2016    multi-vessel, 2 stents, followed by Dr. Kent   • Cough 2014    proabably due to allergy, chest x ray is normal      • Diabetic ulcer of toe of right foot associated with type 2 diabetes mellitus (HCC) 10/24/2020    Follow up with  podiatry outpatient    • Disease of thyroid gland    • Edema of foot 03/24/2016   • Encounter for long-term (current) drug use     therapy   • Epigastric pain 12/03/2015   • Essential hypertension 06/21/2016   • GERD (gastroesophageal reflux disease)    • Gout    • Heart attack (HCC)     2016 2 stents by Dr. Kent still follows with him/last seen 6 -2020 told doing ok   • History of echocardiogram 03/14/2016    Left atrium normal with mild CLVH wit normal aortic root size.Evidence of posterolateral wall hypokinesis. Severely depressed LV systolic function of 20-25%.Mitral valve thickened Aortic sclerosis.Diastolic dysfunction   • Hyperlipidemia 03/31/2016    unspecified   • Hypertensive disorder 03/24/2016   • Left lower quadrant pain 07/12/2013    ruling out diverticular disease      • Myocardial infarction (HCC) 03/24/2016   • Nausea 11/20/2015   • Obstructive uropathy 09/29/2020   • Osteoarthritis of knee 06/23/2016   • Pain in limb 01/25/2011   • Pediculosis capitis 12/22/2011    controlled   • Peptic ulcer    • Pneumonia    • Polyuria 01/25/2011   • Pruritic rash 12/09/2014   • Superficial foreign body hip without major open wound, no infection 12/09/2011    ??? back   • Type 2 diabetes mellitus (HCC) 06/23/2016    new onset   • Weakness 06/23/2016   • Wears dentures    • Wears glasses      Past Surgical History:   Procedure Laterality Date   • CARDIAC CATHETERIZATION Left 4/29/2022    Procedure: Left Heart Cath;  Surgeon: Luke Kent MD;  Location: [facility] CATH INVASIVE LOCATION;  Service: Cardiology;  Laterality: Left;   • COLONOSCOPY W/ POLYPECTOMY  03/07/2016    Transverse colon polyps,descending polyps,Sigmoid polyps, all resected and retrieved. Diverticulosis without perforation or abscess without bleeding. Erica Thomas   • CYSTOSCOPY Left 11/4/2020    Procedure: CYSTOSCOPY; LEFT J-STENT REMOVAL             (STRETCHER)   FLEXIBLE SCOPE;  Surgeon: Richar Rojas MD;  Location: [facility] OR;   Service: Urology;  Laterality: Left;   • CYSTOSCOPY, URETEROSCOPY, RETROGRADE PYELOGRAM, STENT INSERTION Left 9/30/2020    Procedure: CYSTOSCOPY LEFT URETEROSCOPY RETROGRADE PYELOGRAM STENT INSERTION;  Surgeon: Richar Rojas MD;  Location: [facility];  Service: Urology;  Laterality: Left;   • CYSTOSCOPY, URETEROSCOPY, RETROGRADE PYELOGRAM, STENT INSERTION Left 10/21/2020    Procedure: CYSTOSCOPY, LEFT RETROGRADE, URETEROSCOPY, LASER LITHOTRIPSY, STENT PLACEMENT;  Surgeon: Richar Rojas MD;  Location: [facility];  Service: Urology;  Laterality: Left;   • HYSTERECTOMY      ovary preserv   • INJECTION OF MEDICATION  09/11/2014    Celestone (betamethasone) (2)        General Information     Row Name 05/17/22 1250 05/17/22 0748       OT Time and Intention    Document Type therapy note (daily note)  -KD therapy note (daily note)  -KD    Mode of Treatment individual therapy;occupational therapy  -KD individual therapy;occupational therapy  -KD    Row Name 05/17/22 1250 05/17/22 0748       General Information    Patient Profile Reviewed yes  -KD yes  -KD    Existing Precautions/Restrictions fall;sternal  -KD fall;sternal  -KD    Row Name 05/17/22 1250 05/17/22 0748       Cognition    Orientation Status (Cognition) oriented x 4  -KD oriented x 4  -KD    Row Name 05/17/22 1250 05/17/22 0748       Safety Issues, Functional Mobility    Impairments Affecting Function (Mobility) balance;endurance/activity tolerance;pain;shortness of breath;strength;coordination  -KD balance;endurance/activity tolerance;pain;shortness of breath;strength;coordination  -KD          User Key  (r) = Recorded By, (t) = Taken By, (c) = Cosigned By    Initials Name Provider Type    KD Sherlyn Carcamo COTA Occupational Therapist Assistant                 Mobility/ADL's     Row Name 05/17/22 1250 05/17/22 0748       Bed Mobility    Supine-Sit [place] (Bed Mobility) -- standby assist  -KD    Sit-Supine [place] (Bed Mobility) standby assist   -KD standby assist  -KD    Assistive Device (Bed Mobility) -- head of bed elevated  -KD    Row Name 05/17/22 1250 05/17/22 0748       Transfers    Bed-Chair [place] (Transfers) -- standby assist  -KD    Assistive Device (Bed-Chair Transfers) -- walker, front-wheeled  -KD    Sit-Stand [place] (Transfers) standby assist  -KD standby assist  -KD    Stand-Sit [place] (Transfers) standby assist  -KD standby assist  -KD    [place] Level (Toilet Transfer) contact guard  -KD contact guard  -KD    Assistive Device (Toilet Transfer) commode, bedside without drop arms;walker, front-wheeled  -KD commode, bedside without drop arms;walker, front-wheeled  -KD    Row Name 05/17/22 1250 05/17/22 0748       Sit-Stand Transfer    Assistive Device (Sit-Stand Transfers) walker, front-wheeled  -KD walker, front-wheeled  -KD    Row Name 05/17/22 1250 05/17/22 0748       Stand-Sit Transfer    Assistive Device (Stand-Sit Transfers) walker, front-wheeled  -KD walker, front-wheeled  -KD    Row Name 05/17/22 1250 05/17/22 0748       Toilet Transfer    Type (Toilet Transfer) sit-stand;stand-sit  -KD sit-stand;stand-sit  -KD    Row Name 05/17/22 1250 05/17/22 0748       Functional Mobility    Functional Mobility- Ind. Level contact guard assist  -KD contact guard assist  -KD    Functional Mobility- Device walker, front-wheeled  -KD walker, front-wheeled  -KD    Functional Mobility-Distance (Feet) 10  -KD 10  -KD    Row Name 05/17/22 1250 05/17/22 0748       Activities of Daily Living    BADL Assessment/Intervention toileting  -KD bathing;upper body dressing;lower body dressing;grooming;toileting  -KD    Row Name 05/17/22 1250 05/17/22 0748       Toileting Assessment/Training    [place] Level (Toileting) toileting skills;perform perineal hygiene;moderate assist (50% patient effort)  -KD toileting skills;perform perineal hygiene;moderate assist (50% patient effort)  -KD    Assistive Devices (Toileting) commode, bedside  with drop arms  -KD --    Position (Toileting) unsupported sitting  -KD unsupported sitting  -KD    Row Name 05/17/22 1250 05/17/22 0748       Grooming Assessment/Training    [place] Level (Grooming) grooming skills;wash face, hands;set up  -KD grooming skills;hair care, combing/brushing;wash face, hands;set up  -KD    Position (Grooming) edge of bed sitting  -KD edge of bed sitting  -KD    Row Name 05/17/22 0748          Upper Body Dressing Assessment/Training    [place] Level (Upper Body Dressing) upper body dressing skills;doff;don;minimum assist (75% patient effort)  -KD     Position (Upper Body Dressing) edge of bed sitting  -KD     Row Name 05/17/22 0748          Bathing Assessment/Intervention    [place] Level (Bathing) bathing skills;lower body;upper body;standby assist;maximum assist (25% patient effort)  -KD     Position (Bathing) edge of bed sitting;unsupported sitting  -KD     Row Name 05/17/22 0748          Lower Body Dressing Assessment/Training    [place] Level (Lower Body Dressing) lower body dressing skills;doff;don;socks;maximum assist (25% patient effort)  -KD     Position (Lower Body Dressing) edge of bed sitting  -KD           User Key  (r) = Recorded By, (t) = Taken By, (c) = Cosigned By    Initials Name Provider Type     Sherlyn Carcamo COTA Occupational Therapist Assistant               Obj/Interventions    No documentation.                Goals/Plan     Row Name 05/17/22 0742          Transfer Goal 1 (OT)    Activity/Assistive Device (Transfer Goal 1, OT) toilet  -KD     [place] Level/Cues Needed (Transfer Goal 1, OT) supervision required  -KD     Time Frame (Transfer Goal 1, OT) long term goal (LTG);by discharge  -KD     Progress/Outcome (Transfer Goal 1, OT) goal not met  -KD     Row Name 05/17/22 0742          Bathing Goal 1 (OT)    Activity/Device (Bathing Goal 1, OT) lower body bathing  AD as needed  -KD     [place] Level/Cues Needed (Bathing Goal 1, OT)  minimum assist (75% or more patient effort)  -KD     Time Frame (Bathing Goal 1, OT) long term goal (LTG);by discharge  -KD     Progress/Outcomes (Bathing Goal 1, OT) goal not met  -KD     Row Name 05/17/22 0742          Dressing Goal 1 (OT)    Activity/Device (Dressing Goal 1, OT) lower body dressing  AD as needed  -KD     [place]/Cues Needed (Dressing Goal 1, OT) minimum assist (75% or more patient effort)  -KD     Time Frame (Dressing Goal 1, OT) long term goal (LTG);by discharge  -KD     Progress/Outcome (Dressing Goal 1, OT) goal not met  -KD     Row Name 05/17/22 0742          Problem Specific Goal 1 (OT)    Problem Specific Goal 1 (OT) pt will verbalize and demonstrate proper understanding of sternal precautions with min verbal cues required  -KD     Time Frame (Problem Specific Goal 1, OT) long term goal (LTG);by discharge  -KD     Progress/Outcome (Problem Specific Goal 1, OT) goal not met  -KD           User Key  (r) = Recorded By, (t) = Taken By, (c) = Cosigned By    Initials Name Provider Type    KD Sherlyn Carcamo COTA Occupational Therapist Assistant               Clinical Impression     Row Name 05/17/22 1338 05/17/22 1250       Pain Assessment    Pretreatment Pain Rating 0/10 - no pain  -KD 0/10 - no pain  -KD    Posttreatment Pain Rating 0/10 - no pain  -KD 0/10 - no pain  -KD    Row Name 05/17/22 0748          Pain Assessment    Pretreatment Pain Rating 0/10 - no pain  -KD     Posttreatment Pain Rating 0/10 - no pain  -KD     Row Name 05/17/22 1338 05/17/22 0748       Plan of Care Review    Plan of Care Reviewed With patient  -KD patient  -KD    Progress improving  -KD improving  -KD    Outcome Evaluation Sup-sit-SBA, sit-stand-CGA, amb ~3' BSC>CGA, toilet t/f- CGA. pericare-Max A. UB bathing/dressing-SBA/CGA. LB bathing-SBA. LB dressing-Max A. Pt able but would not attempt to don socks. Grooming-set up. Pt up in recliner @ end of tx w/ all needs in reach. Cont OT POC.  -KD --    Row Name  05/17/22 1338 05/17/22 1250       Therapy Assessment/Plan (OT)    Therapy Frequency (OT) daily  -KD daily  -KD    Row Name 05/17/22 1338          Therapy Plan Review/Discharge Plan (OT)    Anticipated Discharge Disposition (OT) skilled nursing facility  -KD     Row Name 05/17/22 1338          Vital Signs    Pre Systolic BP Rehab 150  -KD     Pre Treatment Diastolic BP 68  -KD     Pretreatment Heart Rate (beats/min) 79  -KD     Pre SpO2 (%) 93  -KD     Pre Patient Position Supine  -KD     Intra Patient Position Standing  -KD     Post Patient Position Sitting  -KD     Row Name 05/17/22 1338          Positioning and Restraints    Pre-Treatment Position in bed  -KD     Post Treatment Position chair  -KD     In Chair exit alarm on;encouraged to call for assist;call light within reach  -KD           User Key  (r) = Recorded By, (t) = Taken By, (c) = Cosigned By    Initials Name Provider Type    Sherlyn Gary COTA Occupational Therapist Assistant               Outcome Measures     Row Name 05/17/22 0742          How much help from another is currently needed...    Putting on and taking off regular lower body clothing? 2  -KD     Bathing (including washing, rinsing, and drying) 2  -KD     Toileting (which includes using toilet bed pan or urinal) 2  -KD     Putting on and taking off regular upper body clothing 2  -KD     Taking care of personal grooming (such as brushing teeth) 3  -KD     Eating meals 4  -KD     AM-PAC 6 Clicks Score (OT) 15  -KD           User Key  (r) = Recorded By, (t) = Taken By, (c) = Cosigned By    Initials Name Provider Type    Sherlyn Gary COTA Occupational Therapist Assistant                Occupational Therapy Education                 Title: PT OT SLP Therapies (In Progress)     Topic: Occupational Therapy (In Progress)     Point: ADL training (Not Started)     Description:   Instruct learner(s) on proper safety adaptation and remediation techniques during self care or transfers.    Instruct in proper use of assistive devices.              Learner Progress:  Not documented in this visit.          Point: Home exercise program (Not Started)     Description:   Instruct learner(s) on appropriate technique for monitoring, assisting and/or progressing therapeutic exercises/activities.              Learner Progress:  Not documented in this visit.          Point: Precautions (Done)     Description:   Instruct learner(s) on prescribed precautions during self-care and functional transfers.              Learning Progress Summary           Patient Acceptance, E, VU,NR by ME at 5/11/2022 0937    Comment: Educated on OT and POC. Educated to call for assistance. Educated on safety precautions. Educated on sternal precautions.                   Point: Body mechanics (Done)     Description:   Instruct learner(s) on proper positioning and spine alignment during self-care, functional mobility activities and/or exercises.              Learning Progress Summary           Patient Acceptance, E, VU,NR by ME at 5/11/2022 0937    Comment: Educated on OT and POC. Educated to call for assistance. Educated on safety precautions. Educated on sternal precautions.                               User Key     Initials Effective Dates Name Provider Type Discipline    ME 06/16/21 -  Amrit Grant, OTR/L Occupational Therapist OT              OT Recommendation and Plan  Therapy Frequency (OT): daily  Plan of Care Review  Plan of Care Reviewed With: patient  Progress: improving  Outcome Evaluation: Sup-sit-SBA, sit-stand-CGA, amb ~3' BSC>CGA, toilet t/f- CGA. pericare-Max A. UB bathing/dressing-SBA/CGA. LB bathing-SBA. LB dressing-Max A. Pt able but would not attempt to don socks. Grooming-set up. Pt up in recliner @ end of tx w/ all needs in reach. Cont OT POC.     Time Calculation:    Time Calculation- OT     Row Name 05/17/22 1250 05/17/22 0742          Time Calculation- OT    OT Start Time 1250  -KD 0742  -KD     OT Stop Time  1313  -KD 0836  -KD     OT Time Calculation (min) 23 min  -KD 54 min  -KD     Total Timed Code Minutes- OT 23 minute(s)  -KD 54 minute(s)  -KD     OT Received On 05/17/22  -KD 05/17/22  -KD            Timed Charges    04599 - OT Self Care/Mgmt Minutes 23  -KD 54  -KD            Total Minutes    Timed Charges Total Minutes 23  -KD 54  -KD      Total Minutes 23  -KD 54  -KD           User Key  (r) = Recorded By, (t) = Taken By, (c) = Cosigned By    Initials Name Provider Type    Sherlyn Gary COTA Occupational Therapist Assistant              Therapy Charges for Today     Code Description Service Date Service Provider Modifiers Qty    65688354898 HC OT THER PROC EA 15 MIN 5/16/2022 Sherlyn Carcamo COTA GO 2    51397203975 HC OT SELF CARE/MGMT/TRAIN EA 15 MIN 5/17/2022 Sherlyn Carcamo COTA GO 4    08000805590 HC OT SELF CARE/MGMT/TRAIN EA 15 MIN 5/17/2022 Sherlyn Carcamo COTA GO 2               DUNCAN Zaidi  5/17/2022

## 2022-05-17 NOTE — PAYOR COMM NOTE
"    Yi Bah RN [facility]  [phone]       Phone  [phone]        Fax  Cont. Stay Review      Afia Adams (71 y.o. Female)             Date of Birth   1950    Social Security Number       [place]   [address] [address]    Home Phone   [phone]    MRN   [id number]       Voodoo   [employer]onden[employer] of [place]    Marital Status                               Admission Date   5/10/22    Admission Type   Elective    Admitting Provider   Brayden Melgar MD    Attending Provider   Brayden Melgar MD    Department, Room/Bed   [facility] [address] [place], 301/1       Discharge Date       Discharge Disposition   Skilled Nursing Facility (DC - External)    Discharge Destination                               Attending Provider: Brayden Melgar MD    Allergies: Bactrim [Sulfamethoxazole-trimethoprim]    Isolation: Contact   Infection: None   Code Status: CPR   Advance Care Planning Activity    Ht: 149.9 cm (59.02\")   Wt: 84.4 kg (186 lb)    Admission Cmt: None   Principal Problem: CAD (coronary artery disease) [I25.10] More...                 Active Insurance as of 5/10/2022     Primary Coverage     Payor Plan Insurance Group Employer/Plan Group    ANTHEM MEDICARE REPLACEMENT ANTHEM MEDICARE ADVANTAGE [id number]     Payor Plan Address Payor Plan Phone Number Payor Plan Fax Number Effective Dates    [address] [phone]  8/1/2017 - None Entered    [facility] [address]       Subscriber Name Subscriber Birth Date Member ID       AFIA ADAMS 1950 [id number]                 Emergency Contacts      (Rel.) Home Phone Work Phone Mobile Phone    Adams,Robert (Spouse) [phone] -- [phone]    DAMASO MOMIN (Relative) [phone] -- [phone]    EVELIOROULA WEBB (Daughter) -- -- [phone]    VALENTINA ADAMS (Relative) [phone] -- [phone]    "         Vital Signs (last day)     Date/Time Temp Temp src Pulse Resp BP Patient Position SpO2    05/17/22 0753 96.9 (36.1) Oral 82 20 159/79 Lying 95    05/17/22 0711 -- -- 73 -- -- -- --    05/17/22 0642 -- -- 79 -- -- -- --    05/17/22 0630 -- -- 79 22 -- -- 93    05/17/22 0349 98 (36.7) Oral 82 20 150/68 Lying 91    05/17/22 0232 -- -- 80 -- -- -- --    05/17/22 0006 98.1 (36.7) Oral 81 22 156/76 Lying 92    05/16/22 2024 97.8 (36.6) Oral 84 18 148/80 Lying 91    05/16/22 1901 -- -- 88 -- -- -- --    05/16/22 1855 -- -- 85 20 -- -- 94    05/16/22 1710 -- -- 88 20 158/75 Sitting 91    05/16/22 1527 -- -- 78 -- -- -- --    05/16/22 1431 -- -- 72 18 -- -- --    05/16/22 1422 -- -- 76 18 -- -- 92    05/16/22 1059 -- -- 62 18 -- -- --    05/16/22 1051 -- -- 64 16 -- -- 92    05/16/22 0812 -- -- 74 18 164/78 Sitting 91    05/16/22 0710 -- -- 67 -- -- -- --    05/16/22 0645 -- -- 64 18 -- -- --    05/16/22 0638 -- -- 73 20 -- -- 94    05/16/22 0256 96.7 (35.9) Infrared 64 18 152/72 Lying 90    05/16/22 0028 -- -- 66 -- -- -- --          Oxygen Therapy (last day)     Date/Time SpO2 Device (Oxygen Therapy) Flow (L/min) Oxygen Concentration (%) ETCO2 (mmHg)    05/17/22 0753 95 nasal cannula -- -- --    05/17/22 0642 -- nasal cannula 1 -- --    05/17/22 0630 93 nasal cannula 1 -- --    05/17/22 0349 91 nasal cannula 1 -- --    05/17/22 0006 92 nasal cannula 1 -- --    05/16/22 2024 91 nasal cannula 1 -- --    05/16/22 1910 -- nasal cannula 1 -- --    05/16/22 1901 -- nasal cannula 1 -- --    05/16/22 1855 94 nasal cannula 1 -- --    05/16/22 1710 91 nasal cannula -- -- --    05/16/22 1422 92 nasal cannula 1 -- --    05/16/22 1051 92 nasal cannula 1 -- --    05/16/22 0812 91 nasal cannula 2 -- --    05/16/22 0638 94 nasal cannula 1  -- --    05/16/22 0256 90 -- -- -- --          Current Facility-Administered Medications   Medication Dose Route Frequency Provider Last Rate Last Admin   • acetaminophen (TYLENOL) tablet 1,000  mg  1,000 mg Oral Q6H Arun Leila Josephine, APRN   1,000 mg at 05/17/22 0922   • ascorbic acid (VITAMIN C) tablet 500 mg  500 mg Oral BID Arun, Leila Burton, APRN   500 mg at 05/17/22 0922   • aspirin EC tablet 81 mg  81 mg Oral Daily Arun, Leila Burton, APRN   81 mg at 05/17/22 0922   • calcium carbonate (TUMS) chewable tablet 500 mg (200 mg elemental)  2 tablet Oral TID PRN Arun Leilayoung Perkinsgh, APRN   2 tablet at 05/12/22 2245   • carvedilol (COREG) tablet 12.5 mg  12.5 mg Oral BID With Meals Arun Leila Josephine APRN   12.5 mg at 05/17/22 0922   • clopidogrel (PLAVIX) tablet 75 mg  75 mg Oral Daily Leila Diasgh, APRN   75 mg at 05/17/22 0922   • dextrose (D50W) (25 g/50 mL) IV injection 25 g  25 g Intravenous Q15 Min PRN Leila Dias, APRN       • dextrose (GLUTOSE) oral gel 15 g  15 g Oral Q15 Min PRN Leila Dias, APRN       • famotidine (PEPCID) tablet 20 mg  20 mg Oral BID Arun Leila Josephine, APRN   20 mg at 05/17/22 0922   • furosemide (LASIX) injection 20 mg  20 mg Intravenous Daily Arun Leila Burton, APRN   20 mg at 05/17/22 0923   • glucagon (human recombinant) (GLUCAGEN DIAGNOSTIC) injection 1 mg  1 mg Intramuscular Q15 Min PRN Leila Dias, APRN       • Insulin Aspart (novoLOG) injection 0-24 Units  0-24 Units Subcutaneous TID AC Leila Dias, APRN   4 Units at 05/12/22 1032   • insulin detemir (LEVEMIR) injection 25 Units  25 Units Subcutaneous Nightly Leila Dias, APRN   25 Units at 05/16/22 2058   • ipratropium-albuterol (DUO-NEB) nebulizer solution 3 mL  3 mL Nebulization 4x Daily - RT Leila Dias APRN   3 mL at 05/17/22 0629   • levothyroxine (SYNTHROID, LEVOTHROID) tablet 25 mcg  25 mcg Oral Q AM Leila Dias APRN   25 mcg at 05/17/22 0532   • [START ON 5/18/2022] losartan (COZAAR) tablet 100 mg  100 mg Oral Q24H Gelacio Zuniga APRN       • losartan (COZAAR) tablet 50 mg  50 mg Oral Once Gelacio Zuniga APRN       • magnesium  oxide (MAG-OX) tablet 400 mg  400 mg Oral BID Arun, Leila Josephine, APRN   400 mg at 05/17/22 0922   • mupirocin (BACTROBAN) 2 % nasal ointment 1 application  1 application Each Nare Daily Arun, Leila Josephine, APRN   1 application at 05/17/22 0923   • ondansetron (ZOFRAN) injection 4 mg  4 mg Intravenous Q6H PRN Arun, Leila Josephine, APRN   4 mg at 05/15/22 2214   • polyethylene glycol (MIRALAX) packet 17 g  17 g Oral Daily Arun, Leila Josephine, APRN   17 g at 05/16/22 0814   • potassium chloride 20 mEq in 50 mL IVPB  20 mEq Intravenous Q1H PRN Arun, Leila Josephine, APRN        Or   • potassium chloride 20 mEq in 50 mL IVPB  20 mEq Intravenous Q1H PRN Arun, Leila Josephine, APRN   Stopped at 05/10/22 1620   • sennosides-docusate (PERICOLACE) 8.6-50 MG per tablet 2 tablet  2 tablet Oral BID Arun, Leila Josephine, APRN   2 tablet at 05/17/22 0922   • tamsulosin (FLOMAX) 24 hr capsule 0.4 mg  0.4 mg Oral Daily Arun, Leila Josephine, APRN   0.4 mg at 05/17/22 0922   • traMADol (ULTRAM) tablet 100 mg  100 mg Oral Q4H PRN Gelacio Zuniga APRN   100 mg at 05/15/22 2057        Physician Progress Notes (last 48 hours)      Gelacio Zuniga APRN at 05/17/22 1023            Cardiothoracic - Vascular Surgery Daily Note        LOS: 7 days   Patient Care Team:  Nieves Childress APRN as PCP - General (Family Medicine)    POD#7 CABGx2  BAUTISTA-LAD, SVG-PLB    Chief Complaint: CAD      Subjective     The following portions of the patient's history were reviewed and updated as appropriate: allergies, current medications, past family history, past medical history, past social history, past surgical history and problem list.     Subjective:  Symptoms:  Stable.  She reports chest pain (surgical) and weakness.    Diet:  Adequate intake.  No nausea.    Activity level: Impaired due to pain.    Pain:  She complains of pain that is moderate.  Pain is well controlled and requiring pain medication.          Objective     Vital Signs  Temp:  [96.9 °F  "(36.1 °C)-98.1 °F (36.7 °C)] 96.9 °F (36.1 °C)  Heart Rate:  [62-88] 82  Resp:  [16-22] 20  BP: (148-159)/(68-80) 159/79  Body mass index is 37.55 kg/m².    Intake/Output Summary (Last 24 hours) at 5/17/2022 1023  Last data filed at 5/16/2022 1800  Gross per 24 hour   Intake 480 ml   Output 300 ml   Net 180 ml     No intake/output data recorded.    Wt Readings from Last 3 Encounters:   05/17/22 84.4 kg (186 lb)   05/05/22 84.4 kg (186 lb)   05/05/22 83.9 kg (185 lb)       Physical Exam   Objective:  General Appearance:  Comfortable and ill-appearing.    Vital signs: (most recent): Blood pressure 159/79, pulse 82, temperature 96.9 °F (36.1 °C), temperature source Oral, resp. rate 20, height 149.9 cm (59.02\"), weight 84.4 kg (186 lb), SpO2 95 %, not currently breastfeeding.  Vital signs are normal.  No fever.    Output: Producing urine and producing stool.    HEENT: Normal HEENT exam.    Lungs:  Normal effort and normal respiratory rate.  Breath sounds clear to auscultation.  There are decreased breath sounds.    Heart: Normal rate.  Regular rhythm.    Chest: (AV wires removed)  Abdomen: Abdomen is soft.  Bowel sounds are normal.     Neurological: Patient is alert and oriented to person, place and time.    Skin:  Warm and dry.  (Sternal provena intact  EVH CDI)              Results Review:    Lab Results   Component Value Date    WBC 12.87 (H) 05/13/2022    HGB 11.2 (L) 05/13/2022    HCT 34.9 05/13/2022    MCV 81.7 05/13/2022     05/13/2022     Lab Results   Component Value Date    GLUCOSE 105 (H) 05/13/2022    BUN 22 05/13/2022    CREATININE 1.20 (H) 05/13/2022    EGFRIFNONA 45 (L) 12/08/2021    EGFRIFAFRI  09/29/2020      Comment:      <15 Indicative of kidney failure.    BCR 18.3 05/13/2022    K 4.3 05/13/2022    CO2 28.0 05/13/2022    CALCIUM 8.9 05/13/2022    ALBUMIN 3.60 05/13/2022    AST 13 05/13/2022    ALT <5 05/13/2022       Calcium No results found for: CALCIUM   Magnesium No results found for: MG "     Imaging Results (Last 24 Hours)     ** No results found for the last 24 hours. **                              acetaminophen, 1,000 mg, Oral, Q6H  vitamin C, 500 mg, Oral, BID  aspirin, 81 mg, Oral, Daily  carvedilol, 12.5 mg, Oral, BID With Meals  clopidogrel, 75 mg, Oral, Daily  famotidine, 20 mg, Oral, BID  furosemide, 20 mg, Intravenous, Daily  Insulin Aspart, 0-24 Units, Subcutaneous, TID AC  insulin detemir, 25 Units, Subcutaneous, Nightly  ipratropium-albuterol, 3 mL, Nebulization, 4x Daily - RT  levothyroxine, 25 mcg, Oral, Q AM  [START ON 5/18/2022] losartan, 100 mg, Oral, Q24H  losartan, 50 mg, Oral, Once  magnesium oxide, 400 mg, Oral, BID  mupirocin, 1 application, Each Nare, Daily  polyethylene glycol, 17 g, Oral, Daily  senna-docusate sodium, 2 tablet, Oral, BID  tamsulosin, 0.4 mg, Oral, Daily                 ASSESSMENT/PLAN     Active Hospital Problems    Diagnosis  POA   • **CAD (coronary artery disease) [I25.10]  Yes     Hx of 2 stents placed by Dr. Kent in 2016        • Stage 3a chronic kidney disease (HCC) [N18.31]  Yes            • Encounter for long-term current use of medication [Z79.899]  Not Applicable   • HFrEF (heart failure with reduced ejection fraction) ([facility]) [I50.20]  Yes   • Essential hypertension [I10]  Yes     Stable Post Op CABG: Progressing slowly. Looks well this AM, A/O, up to chair this AM.    CAD: Medical Management: Aspirin, Plavix, Statin, Beta. ACE/ARB     Acute respiratory failure: hypoxic SpO2 <88% on RA.  O2 support Room air. Weaning as tolerated. Nebs PRN. Incentive Spirometry. Aggressive pulmonary toilet. OPEP    Expected blood loss anemia felix-operatively. Asymptomatic. Monitor CBC. Hemodynamically stable.  Iron supplementation: deferred    Transient hyperglycemia post operative. Medical management:  SSI coverage. Levemir nightly. Carb consistent diet.     Postop Pain Management: Scheduled Tylenol. Toradol. Tramadolol    Rehab: OOB to chair. Ambulate. PT/OT.  Weak.    P. humanus capitis: Nix treatment 5/16, would repeat again 5/23 as is recommended.  Hair examined today, no obvious remaining nits or head lice noted.      Disposition: On 5/14 on anticipated day of discharge family brought in clothing which was discovered had bed bugs.  These articles of clothing were bagged and returned, patient was bathed, relocated to different room while previous room was being treated.  Patient nor current room has had evidence of bed bugs in the last 48 hours. Remains stable for d/c to SNF for rehab          This document has been electronically signed by Yemi Zuniga AGACNP-BC @  On May 17, 2022 10:23 CDT      Electronically signed by Gelacio Zuniga APRN at 05/17/22 1028     Gelacio Zuniga APRN at 05/16/22 1333            Cardiothoracic - Vascular Surgery Daily Note        LOS: 6 days   Patient Care Team:  Nieves Childress APRN as PCP - General (Family Medicine)    POD#6 CABGx2  BAUTISTA-LAD, SVG-PLB    Chief Complaint: CAD      Subjective     The following portions of the patient's history were reviewed and updated as appropriate: allergies, current medications, past family history, past medical history, past social history, past surgical history and problem list.     Subjective:  Symptoms:  Stable.  She reports chest pain (surgical) and weakness.    Diet:  Adequate intake.  No nausea.    Activity level: Impaired due to pain.    Pain:  She complains of pain that is moderate.  Pain is well controlled and requiring pain medication.          Objective     Vital Signs  Temp:  [96.7 °F (35.9 °C)-98 °F (36.7 °C)] 96.7 °F (35.9 °C)  Heart Rate:  [62-76] 62  Resp:  [16-20] 18  BP: (139-171)/(72-79) 164/78  Body mass index is 38.35 kg/m².    Intake/Output Summary (Last 24 hours) at 5/16/2022 1333  Last data filed at 5/16/2022 1200  Gross per 24 hour   Intake 480 ml   Output 600 ml   Net -120 ml     I/O this shift:  In: 240 [P.O.:240]  Out: 300 [Urine:300]    Wt Readings from Last 3  "Encounters:   05/16/22 86.2 kg (190 lb)   05/05/22 84.4 kg (186 lb)   05/05/22 83.9 kg (185 lb)       Physical Exam   Objective:  General Appearance:  Comfortable and ill-appearing.    Vital signs: (most recent): Blood pressure 164/78, pulse 62, temperature 96.7 °F (35.9 °C), temperature source Infrared, resp. rate 18, height 149.9 cm (59.02\"), weight 86.2 kg (190 lb), SpO2 92 %, not currently breastfeeding.  Vital signs are normal.  No fever.    Output: Producing urine and producing stool.    HEENT: Normal HEENT exam.    Lungs:  Normal effort and normal respiratory rate.  Breath sounds clear to auscultation.  There are decreased breath sounds.    Heart: Normal rate.  Regular rhythm.    Chest: (AV wires removed)  Abdomen: Abdomen is soft.  Bowel sounds are normal.     Neurological: Patient is alert and oriented to person, place and time.    Skin:  Warm and dry.  (Sternal provena intact  [facility] CDI)              Results Review:    Lab Results   Component Value Date    WBC 12.87 (H) 05/13/2022    HGB 11.2 (L) 05/13/2022    HCT 34.9 05/13/2022    MCV 81.7 05/13/2022     05/13/2022     Lab Results   Component Value Date    GLUCOSE 105 (H) 05/13/2022    BUN 22 05/13/2022    CREATININE 1.20 (H) 05/13/2022    EGFRIFNONA 45 (L) 12/08/2021    EGFRIFAFRI  09/29/2020      Comment:      <15 Indicative of kidney failure.    BCR 18.3 05/13/2022    K 4.3 05/13/2022    CO2 28.0 05/13/2022    CALCIUM 8.9 05/13/2022    ALBUMIN 3.60 05/13/2022    AST 13 05/13/2022    ALT <5 05/13/2022       Calcium No results found for: CALCIUM   Magnesium No results found for: MG     Imaging Results (Last 24 Hours)     ** No results found for the last 24 hours. **                              acetaminophen, 1,000 mg, Oral, Q6H  vitamin C, 500 mg, Oral, BID  aspirin, 81 mg, Oral, Daily  carvedilol, 12.5 mg, Oral, BID With Meals  clopidogrel, 75 mg, Oral, Daily  famotidine, 20 mg, Oral, BID  furosemide, 20 mg, Intravenous, Daily  Insulin Aspart, " 0-24 Units, Subcutaneous, TID AC  insulin detemir, 25 Units, Subcutaneous, Nightly  ipratropium-albuterol, 3 mL, Nebulization, 4x Daily - RT  levothyroxine, 25 mcg, Oral, Q AM  losartan, 50 mg, Oral, Q24H  magnesium oxide, 400 mg, Oral, BID  mupirocin, 1 application, Each Nare, Daily  polyethylene glycol, 17 g, Oral, Daily  senna-docusate sodium, 2 tablet, Oral, BID  tamsulosin, 0.4 mg, Oral, Daily                 ASSESSMENT/PLAN     Active Hospital Problems    Diagnosis  POA   • **CAD (coronary artery disease) [I25.10]  Yes     Hx of 2 stents placed by Dr. Kent in 2016        • Stage 3a chronic kidney disease (HCC) [N18.31]  Yes            • Encounter for long-term current use of medication [Z79.899]  Not Applicable   • HFrEF (heart failure with reduced ejection fraction) ([facility]) [I50.20]  Yes   • Essential hypertension [I10]  Yes     Stable Post Op CABG: Progressing slowly. Looks well this AM, A/O, up to chair this AM.    CAD: Medical Management: Aspirin, Plavix, Statin, Beta. ACE/ARB     Acute respiratory failure: hypoxic SpO2 <88% on RA.  O2 support Room air. Weaning as tolerated. Nebs PRN. Incentive Spirometry. Aggressive pulmonary toilet. OPEP    Expected blood loss anemia felix-operatively. Asymptomatic. Monitor CBC. Hemodynamically stable.  Iron supplementation: deferred    Transient hyperglycemia post operative. Medical management:  SSI coverage. Levemir nightly. Carb consistent diet.     Postop Pain Management: Scheduled Tylenol. Toradol. Tramadolol    Rehab: OOB to chair. Ambulate. PT/OT. Weak.    P. humanus capitis: Nix treatment done today    Disposition: Remains stable for d/c to SNF for rehab          This document has been electronically signed by MARLENA Rodriguez-BC @  On May 16, 2022 13:33 CDT      Electronically signed by Gelacio Zuniga APRN at 05/16/22 5773       Medical Student Notes (last 24 hours)  Notes from 05/16/22 1100 through 05/17/22 1100   No notes of this type exist for this  encounter.         Consult Notes (last 24 hours)  Notes from 05/16/22 1100 through 05/17/22 1100   No notes of this type exist for this encounter.

## 2022-05-17 NOTE — PLAN OF CARE
Goal Outcome Evaluation:  Plan of Care Reviewed With: patient        Progress: improving  Outcome Evaluation: Sup-sit-SBA, sit-stand-CGA, amb ~3' BSC>CGA, toilet t/f- CGA. pericare-Max A. UB bathing/dressing-SBA/CGA. LB bathing-SBA. LB dressing-Max A. Pt able but would not attempt to don socks. Grooming-set up. Pt up in recliner @ end of tx w/ all needs in reach. Cont OT POC.

## 2022-05-17 NOTE — PROGRESS NOTES
"  Cardiothoracic - Vascular Surgery Daily Note        LOS: 7 days   Patient Care Team:  Nieves Childress APRN as PCP - General (Family Medicine)    POD#7 CABGx2  BAUTISTA-LAD, SVG-PLB    Chief Complaint: CAD      Subjective     The following portions of the patient's history were reviewed and updated as appropriate: allergies, current medications, past family history, past medical history, past social history, past surgical history and problem list.     Subjective:  Symptoms:  Stable.  She reports chest pain (surgical) and weakness.    Diet:  Adequate intake.  No nausea.    Activity level: Impaired due to pain.    Pain:  She complains of pain that is moderate.  Pain is well controlled and requiring pain medication.          Objective     Vital Signs  Temp:  [96.9 °F (36.1 °C)-98.1 °F (36.7 °C)] 96.9 °F (36.1 °C)  Heart Rate:  [62-88] 82  Resp:  [16-22] 20  BP: (148-159)/(68-80) 159/79  Body mass index is 37.55 kg/m².    Intake/Output Summary (Last 24 hours) at 5/17/2022 1023  Last data filed at 5/16/2022 1800  Gross per 24 hour   Intake 480 ml   Output 300 ml   Net 180 ml     No intake/output data recorded.    Wt Readings from Last 3 Encounters:   05/17/22 84.4 kg (186 lb)   05/05/22 84.4 kg (186 lb)   05/05/22 83.9 kg (185 lb)       Physical Exam   Objective:  General Appearance:  Comfortable and ill-appearing.    Vital signs: (most recent): Blood pressure 159/79, pulse 82, temperature 96.9 °F (36.1 °C), temperature source Oral, resp. rate 20, height 149.9 cm (59.02\"), weight 84.4 kg (186 lb), SpO2 95 %, not currently breastfeeding.  Vital signs are normal.  No fever.    Output: Producing urine and producing stool.    HEENT: Normal HEENT exam.    Lungs:  Normal effort and normal respiratory rate.  Breath sounds clear to auscultation.  There are decreased breath sounds.    Heart: Normal rate.  Regular rhythm.    Chest: (AV wires removed)  Abdomen: Abdomen is soft.  Bowel sounds are normal.     Neurological: Patient is " alert and oriented to person, place and time.    Skin:  Warm and dry.  (Sternal provena intact  [facility] CDI)              Results Review:    Lab Results   Component Value Date    WBC 12.87 (H) 05/13/2022    HGB 11.2 (L) 05/13/2022    HCT 34.9 05/13/2022    MCV 81.7 05/13/2022     05/13/2022     Lab Results   Component Value Date    GLUCOSE 105 (H) 05/13/2022    BUN 22 05/13/2022    CREATININE 1.20 (H) 05/13/2022    EGFRIFNONA 45 (L) 12/08/2021    EGFRIFAFRI  09/29/2020      Comment:      <15 Indicative of kidney failure.    BCR 18.3 05/13/2022    K 4.3 05/13/2022    CO2 28.0 05/13/2022    CALCIUM 8.9 05/13/2022    ALBUMIN 3.60 05/13/2022    AST 13 05/13/2022    ALT <5 05/13/2022       Calcium No results found for: CALCIUM   Magnesium No results found for: MG     Imaging Results (Last 24 Hours)     ** No results found for the last 24 hours. **                              acetaminophen, 1,000 mg, Oral, Q6H  vitamin C, 500 mg, Oral, BID  aspirin, 81 mg, Oral, Daily  carvedilol, 12.5 mg, Oral, BID With Meals  clopidogrel, 75 mg, Oral, Daily  famotidine, 20 mg, Oral, BID  furosemide, 20 mg, Intravenous, Daily  Insulin Aspart, 0-24 Units, Subcutaneous, TID AC  insulin detemir, 25 Units, Subcutaneous, Nightly  ipratropium-albuterol, 3 mL, Nebulization, 4x Daily - RT  levothyroxine, 25 mcg, Oral, Q AM  [START ON 5/18/2022] losartan, 100 mg, Oral, Q24H  losartan, 50 mg, Oral, Once  magnesium oxide, 400 mg, Oral, BID  mupirocin, 1 application, Each Nare, Daily  polyethylene glycol, 17 g, Oral, Daily  senna-docusate sodium, 2 tablet, Oral, BID  tamsulosin, 0.4 mg, Oral, Daily                 ASSESSMENT/PLAN     Active Hospital Problems    Diagnosis  POA   • **CAD (coronary artery disease) [I25.10]  Yes     Hx of 2 stents placed by Dr. Kent in 2016        • Stage 3a chronic kidney disease (HCC) [N18.31]  Yes            • Encounter for long-term current use of medication [Z79.899]  Not Applicable   • HFrEF (heart failure  with reduced ejection fraction) (HCC) [I50.20]  Yes   • Essential hypertension [I10]  Yes     Stable Post Op CABG: Progressing slowly. Looks well this AM, A/O, up to chair this AM.    CAD: Medical Management: Aspirin, Plavix, Statin, Beta. ACE/ARB     Acute respiratory failure: hypoxic SpO2 <88% on RA.  O2 support Room air. Weaning as tolerated. Nebs PRN. Incentive Spirometry. Aggressive pulmonary toilet. OPEP    Expected blood loss anemia felix-operatively. Asymptomatic. Monitor CBC. Hemodynamically stable.  Iron supplementation: deferred    Transient hyperglycemia post operative. Medical management:  SSI coverage. Levemir nightly. Carb consistent diet.     Postop Pain Management: Scheduled Tylenol. Toradol. Tramadolol    Rehab: OOB to chair. Ambulate. PT/OT. Weak.    P. humanus capitis: Nix treatment 5/16, would repeat again 5/23 as is recommended.  Hair examined today, no obvious remaining nits or head lice noted.      Disposition: On 5/14 on anticipated day of discharge family brought in clothing which was discovered had bed bugs.  These articles of clothing were bagged and returned, patient was bathed, relocated to different room while previous room was being treated.  Patient nor current room has had evidence of bed bugs in the last 48 hours. Remains stable for d/c to SNF for rehab          This document has been electronically signed by DERRELL Rodriguez  On May 17, 2022 10:23 CDT

## 2022-05-17 NOTE — THERAPY TREATMENT NOTE
Patient Name: Afia Adams  : 1950    MRN: [id number]                              Today's Date: 2022       Admit Date: 5/10/2022    Visit Dx:     ICD-10-CM ICD-9-CM   1. Surgical aftercare, circulatory system  Z48.812 V58.73   2. Coronary artery disease of native artery of native heart with stable angina pectoris ([facility])  I25.118 414.01     413.9   3. Impaired mobility and ADLs  Z74.09 V49.89    Z78.9    4. Impaired functional mobility, balance, gait, and endurance  Z74.09 V49.89     Patient Active Problem List   Diagnosis   • Diabetes mellitus ([facility])   • Essential hypertension   • HFrEF (heart failure with reduced ejection fraction) ([facility])   • Encounter for long-term current use of medication   • CAD (coronary artery disease)   • Stage 3a chronic kidney disease ([facility])   • Lipoma of neck   • Anemia   • Elevated TSH   • History of PTCA   • Abnormal nuclear stress test   • Myocardial infarct, old   • Class 1 obesity due to excess calories with serious comorbidity and body mass index (BMI) of 32.0 to 32.9 in adult   • Mixed hyperlipidemia   • Abnormal findings on diagnostic imaging of other specified body structures    • Panlobular emphysema (HCC)     Past Medical History:   Diagnosis Date   • Abdominal pain 2015    unspecified   • Acute gastritis    • Acute on chronic systolic congestive heart failure (HCC) 2016   • Ankle pain 2015   • Asthma    • Calculus of kidney 10/09/2020    Added automatically from request for surgery [id number]   • Candidiasis, skin or nails 2011    controlled   • Chronic systolic congestive heart failure (HCC) 2016   • Congestive heart failure (HCC) 2016   • Coronary arteriosclerosis 2016    multi-vessel, 2 stents, followed by Dr. Kent   • Cough 2014    proabably due to allergy, chest x ray is normal      • Diabetic ulcer of toe of right foot associated with type 2 diabetes mellitus (HCC) 10/24/2020    Follow up with  podiatry outpatient    • Disease of thyroid gland    • Edema of foot 03/24/2016   • Encounter for long-term (current) drug use     therapy   • Epigastric pain 12/03/2015   • Essential hypertension 06/21/2016   • GERD (gastroesophageal reflux disease)    • Gout    • Heart attack (HCC)     2016 2 stents by Dr. Kent still follows with him/last seen 6 -2020 told doing ok   • History of echocardiogram 03/14/2016    Left atrium normal with mild CLVH wit normal aortic root size.Evidence of posterolateral wall hypokinesis. Severely depressed LV systolic function of 20-25%.Mitral valve thickened Aortic sclerosis.Diastolic dysfunction   • Hyperlipidemia 03/31/2016    unspecified   • Hypertensive disorder 03/24/2016   • Left lower quadrant pain 07/12/2013    ruling out diverticular disease      • Myocardial infarction (HCC) 03/24/2016   • Nausea 11/20/2015   • Obstructive uropathy 09/29/2020   • Osteoarthritis of knee 06/23/2016   • Pain in limb 01/25/2011   • Pediculosis capitis 12/22/2011    controlled   • Peptic ulcer    • Pneumonia    • Polyuria 01/25/2011   • Pruritic rash 12/09/2014   • Superficial foreign body hip without major open wound, no infection 12/09/2011    ??? back   • Type 2 diabetes mellitus (HCC) 06/23/2016    new onset   • Weakness 06/23/2016   • Wears dentures    • Wears glasses      Past Surgical History:   Procedure Laterality Date   • CARDIAC CATHETERIZATION Left 4/29/2022    Procedure: Left Heart Cath;  Surgeon: Luke Kent MD;  Location: [facility] CATH INVASIVE LOCATION;  Service: Cardiology;  Laterality: Left;   • COLONOSCOPY W/ POLYPECTOMY  03/07/2016    Transverse colon polyps,descending polyps,Sigmoid polyps, all resected and retrieved. Diverticulosis without perforation or abscess without bleeding. Erica Thomas   • CYSTOSCOPY Left 11/4/2020    Procedure: CYSTOSCOPY; LEFT J-STENT REMOVAL             (STRETCHER)   FLEXIBLE SCOPE;  Surgeon: Richar Rojas MD;  Location: [facility] OR;   Service: Urology;  Laterality: Left;   • CYSTOSCOPY, URETEROSCOPY, RETROGRADE PYELOGRAM, STENT INSERTION Left 9/30/2020    Procedure: CYSTOSCOPY LEFT URETEROSCOPY RETROGRADE PYELOGRAM STENT INSERTION;  Surgeon: Richar Rojas MD;  Location: [facility];  Service: Urology;  Laterality: Left;   • CYSTOSCOPY, URETEROSCOPY, RETROGRADE PYELOGRAM, STENT INSERTION Left 10/21/2020    Procedure: CYSTOSCOPY, LEFT RETROGRADE, URETEROSCOPY, LASER LITHOTRIPSY, STENT PLACEMENT;  Surgeon: Richar Rojas MD;  Location: [facility];  Service: Urology;  Laterality: Left;   • HYSTERECTOMY      ovary preserv   • INJECTION OF MEDICATION  09/11/2014    Celestone (betamethasone) (2)        General Information     Row Name 05/17/22 0748          OT Time and Intention    Document Type therapy note (daily note)  -     Mode of Treatment individual therapy;occupational therapy  -     Row Name 05/17/22 0748          General Information    Patient Profile Reviewed yes  -KD     Existing Precautions/Restrictions fall;sternal  -KD     Row Name 05/17/22 0748          Cognition    Orientation Status (Cognition) oriented x 4  -KD     Row Name 05/17/22 0748          Safety Issues, Functional Mobility    Impairments Affecting Function (Mobility) balance;endurance/activity tolerance;pain;shortness of breath;strength;coordination  -           User Key  (r) = Recorded By, (t) = Taken By, (c) = Cosigned By    Initials Name Provider Type    Sherlyn Gary COTA Occupational Therapist Assistant                 Mobility/ADL's     Row Name 05/17/22 0748          Bed Mobility    Supine-Sit [place] (Bed Mobility) standby assist  -KD     Sit-Supine [place] (Bed Mobility) standby assist  -KD     Assistive Device (Bed Mobility) head of bed elevated  -     Row Name 05/17/22 0748          Transfers    Bed-Chair [place] (Transfers) standby assist  -KD     Assistive Device (Bed-Chair Transfers) walker, front-wheeled  -     Sit-Stand  [place] (Transfers) standby assist  -KD     Stand-Sit [place] (Transfers) standby assist  -KD     [place] Level (Toilet Transfer) contact guard  -KD     Assistive Device (Toilet Transfer) commode, bedside without drop arms;walker, front-wheeled  -KD     Row Name 05/17/22 0748          Sit-Stand Transfer    Assistive Device (Sit-Stand Transfers) walker, front-wheeled  -KD     Row Name 05/17/22 0748          Stand-Sit Transfer    Assistive Device (Stand-Sit Transfers) walker, front-wheeled  -KD     Row Name 05/17/22 0748          Toilet Transfer    Type (Toilet Transfer) sit-stand;stand-sit  -     Row Name 05/17/22 0748          Functional Mobility    Functional Mobility- Ind. Level contact guard assist  -KD     Functional Mobility- Device walker, front-wheeled  -     Functional Mobility-Distance (Feet) 10  -KD     Row Name 05/17/22 0748          Activities of Daily Living    BADL Assessment/Intervention bathing;upper body dressing;lower body dressing;grooming;toileting  -     Row Name 05/17/22 0748          Toileting Assessment/Training    [place] Level (Toileting) toileting skills;perform perineal hygiene;moderate assist (50% patient effort)  -KD     Position (Toileting) unsupported sitting  -     Row Name 05/17/22 0748          Grooming Assessment/Training    [place] Level (Grooming) grooming skills;hair care, combing/brushing;wash face, hands;set up  -KD     Position (Grooming) edge of bed sitting  -     Row Name 05/17/22 0748          Upper Body Dressing Assessment/Training    [place] Level (Upper Body Dressing) upper body dressing skills;doff;don;minimum assist (75% patient effort)  -KD     Position (Upper Body Dressing) edge of bed sitting  -KD     Row Name 05/17/22 0748          Bathing Assessment/Intervention    [place] Level (Bathing) bathing skills;lower body;upper body;standby assist;maximum assist (25% patient effort)  -KD     Position (Bathing) edge of bed  sitting;unsupported sitting  -KD     Row Name 05/17/22 0748          Lower Body Dressing Assessment/Training    [place] Level (Lower Body Dressing) lower body dressing skills;doff;don;socks;maximum assist (25% patient effort)  -KD     Position (Lower Body Dressing) edge of bed sitting  -KD           User Key  (r) = Recorded By, (t) = Taken By, (c) = Cosigned By    Initials Name Provider Type     Sherlyn Carcamo COTA Occupational Therapist Assistant               Obj/Interventions    No documentation.                Goals/Plan     Row Name 05/17/22 0742          Transfer Goal 1 (OT)    Activity/Assistive Device (Transfer Goal 1, OT) toilet  -KD     [place] Level/Cues Needed (Transfer Goal 1, OT) supervision required  -KD     Time Frame (Transfer Goal 1, OT) long term goal (LTG);by discharge  -KD     Progress/Outcome (Transfer Goal 1, OT) goal not met  -KD     Row Name 05/17/22 0742          Bathing Goal 1 (OT)    Activity/Device (Bathing Goal 1, OT) lower body bathing  AD as needed  -KD     [place] Level/Cues Needed (Bathing Goal 1, OT) minimum assist (75% or more patient effort)  -KD     Time Frame (Bathing Goal 1, OT) long term goal (LTG);by discharge  -KD     Progress/Outcomes (Bathing Goal 1, OT) goal not met  -KD     Row Name 05/17/22 0742          Dressing Goal 1 (OT)    Activity/Device (Dressing Goal 1, OT) lower body dressing  AD as needed  -KD     [place]/Cues Needed (Dressing Goal 1, OT) minimum assist (75% or more patient effort)  -KD     Time Frame (Dressing Goal 1, OT) long term goal (LTG);by discharge  -KD     Progress/Outcome (Dressing Goal 1, OT) goal not met  -KD     Row Name 05/17/22 0742          Problem Specific Goal 1 (OT)    Problem Specific Goal 1 (OT) pt will verbalize and demonstrate proper understanding of sternal precautions with min verbal cues required  -KD     Time Frame (Problem Specific Goal 1, OT) long term goal (LTG);by discharge  -KD     Progress/Outcome  (Problem Specific Goal 1, OT) goal not met  -KD           User Key  (r) = Recorded By, (t) = Taken By, (c) = Cosigned By    Initials Name Provider Type    Sherlyn Gary COTA Occupational Therapist Assistant               Clinical Impression     Row Name 05/17/22 1338 05/17/22 0748       Pain Assessment    Pretreatment Pain Rating 0/10 - no pain  -KD 0/10 - no pain  -KD    Posttreatment Pain Rating 0/10 - no pain  -KD 0/10 - no pain  -KD    Row Name 05/17/22 1338 05/17/22 0748       Plan of Care Review    Plan of Care Reviewed With patient  -KD patient  -KD    Progress improving  -KD improving  -KD    Outcome Evaluation Sup-sit-SBA, sit-stand-CGA, amb ~3' BSC>CGA, toilet t/f- CGA. pericare-Max A. UB bathing/dressing-SBA/CGA. LB bathing-SBA. LB dressing-Max A. Pt able but would not attempt to don socks. Grooming-set up. Pt up in recliner @ end of tx w/ all needs in reach. Cont OT POC.  -KD --    Row Name 05/17/22 1338          Therapy Assessment/Plan (OT)    Therapy Frequency (OT) daily  -KD     Row Name 05/17/22 1338          Therapy Plan Review/Discharge Plan (OT)    Anticipated Discharge Disposition (OT) skilled nursing facility  -KD     Row Name 05/17/22 1338          Vital Signs    Pre Systolic BP Rehab 150  -KD     Pre Treatment Diastolic BP 68  -KD     Pretreatment Heart Rate (beats/min) 79  -KD     Pre SpO2 (%) 93  -KD     Pre Patient Position Supine  -KD     Intra Patient Position Standing  -KD     Post Patient Position Sitting  -KD     Row Name 05/17/22 1338          Positioning and Restraints    Pre-Treatment Position in bed  -KD     Post Treatment Position chair  -KD     In Chair exit alarm on;encouraged to call for assist;call light within reach  -KD           User Key  (r) = Recorded By, (t) = Taken By, (c) = Cosigned By    Initials Name Provider Type    Sherlyn Gary COTA Occupational Therapist Assistant               Outcome Measures     Row Name 05/17/22 0742          How much help from  another is currently needed...    Putting on and taking off regular lower body clothing? 2  -KD     Bathing (including washing, rinsing, and drying) 2  -KD     Toileting (which includes using toilet bed pan or urinal) 2  -KD     Putting on and taking off regular upper body clothing 2  -KD     Taking care of personal grooming (such as brushing teeth) 3  -KD     Eating meals 4  -KD     AM-PAC 6 Clicks Score (OT) 15  -KD           User Key  (r) = Recorded By, (t) = Taken By, (c) = Cosigned By    Initials Name Provider Type    Sherlyn Gary COTA Occupational Therapist Assistant                Occupational Therapy Education                 Title: PT OT SLP Therapies (In Progress)     Topic: Occupational Therapy (In Progress)     Point: ADL training (Not Started)     Description:   Instruct learner(s) on proper safety adaptation and remediation techniques during self care or transfers.   Instruct in proper use of assistive devices.              Learner Progress:  Not documented in this visit.          Point: Home exercise program (Not Started)     Description:   Instruct learner(s) on appropriate technique for monitoring, assisting and/or progressing therapeutic exercises/activities.              Learner Progress:  Not documented in this visit.          Point: Precautions (Done)     Description:   Instruct learner(s) on prescribed precautions during self-care and functional transfers.              Learning Progress Summary           Patient Acceptance, E, VU,NR by ME at 5/11/2022 0937    Comment: Educated on OT and POC. Educated to call for assistance. Educated on safety precautions. Educated on sternal precautions.                   Point: Body mechanics (Done)     Description:   Instruct learner(s) on proper positioning and spine alignment during self-care, functional mobility activities and/or exercises.              Learning Progress Summary           Patient Acceptance, E, VU,NR by ME at 5/11/2022 0937     Comment: Educated on OT and POC. Educated to call for assistance. Educated on safety precautions. Educated on sternal precautions.                               User Key     Initials Effective Dates Name Provider Type Discipline    ME 06/16/21 -  Amrit Grant, OTR/L Occupational Therapist OT              OT Recommendation and Plan  Therapy Frequency (OT): daily  Plan of Care Review  Plan of Care Reviewed With: patient  Progress: improving  Outcome Evaluation: Sup-sit-SBA, sit-stand-CGA, amb ~3' BSC>CGA, toilet t/f- CGA. pericare-Max A. UB bathing/dressing-SBA/CGA. LB bathing-SBA. LB dressing-Max A. Pt able but would not attempt to don socks. Grooming-set up. Pt up in recliner @ end of tx w/ all needs in reach. Cont OT POC.     Time Calculation:    Time Calculation- OT     Row Name 05/17/22 0742             Time Calculation- OT    OT Start Time 0742  -KD      OT Stop Time 0836  -KD      OT Time Calculation (min) 54 min  -KD      Total Timed Code Minutes- OT 54 minute(s)  -KD      OT Received On 05/17/22  -KD              Timed Charges    21320 - OT Self Care/Mgmt Minutes 54  -KD              Total Minutes    Timed Charges Total Minutes 54  -KD       Total Minutes 54  -KD            User Key  (r) = Recorded By, (t) = Taken By, (c) = Cosigned By    Initials Name Provider Type     Sheryln Carcamo COTA Occupational Therapist Assistant              Therapy Charges for Today     Code Description Service Date Service Provider Modifiers Qty    64939245076 HC OT THER PROC EA 15 MIN 5/16/2022 Sherlyn Carcamo COTA GO 2    19631397780 HC OT SELF CARE/MGMT/TRAIN EA 15 MIN 5/17/2022 Sherlyn Carcamo COTA GO 4               DUNCAN Zaidi  5/17/2022

## 2022-05-17 NOTE — SIGNIFICANT NOTE
05/17/22 1414   OTHER   Discipline physical therapy assistant   Rehab Time/Intention   Session Not Performed patient/family declined treatment  (Pt states she has already done therapy twice today.)

## 2022-05-18 NOTE — PAYOR COMM NOTE
"Gladis James  [facility]  Case Management Extender  [phone] phone  [phone] fax      Auth# [id number]    Afia Adams (71 y.o. Female)             Date of Birth   1950    Social Security Number       [place]   [address] [address]    Home Phone   [phone]    MRN   [id number]       Scientologist   [employer]    Marital Status                               Admission Date   5/10/22    Admission Type   Elective    Admitting Provider   Brayden Melgar MD    Attending Provider       Department, Room/Bed   [facility] [address] [place], 301/1       Discharge Date   5/17/2022    Discharge Disposition   Skilled Nursing Facility (DC - External)    Discharge Destination                               Attending Provider: (none)   Allergies: Bactrim [Sulfamethoxazole-trimethoprim]    Isolation: None   Infection: None   Code Status: Prior   Advance Care Planning Activity    Ht: 149.9 cm (59.02\")   Wt: 84.4 kg (186 lb)    Admission Cmt: None   Principal Problem: CAD (coronary artery disease) [I25.10] More...                 Active Insurance as of 5/10/2022     Primary Coverage     Payor Plan Insurance Group Employer/Plan Group    ANTHEM MEDICARE REPLACEMENT ANTHEM MEDICARE ADVANTAGE [id number]     Payor Plan Address Payor Plan Phone Number Payor Plan Fax Number Effective Dates    [address] [phone]  8/1/2017 - None Entered    [facility] [address]       Subscriber Name Subscriber Birth Date Member ID       AFIA ADAMS 1950 [id number]                 Emergency Contacts      (Rel.) Home Phone Work Phone Mobile Phone    Nahun Adams (Spouse) [phone] -- [phone]    DAMASO MOMIN (Relative) [phone] -- [phone]    EVELIOROULA WEBB (Daughter) -- -- [phone]    RASHIVALENTINA (Relative) [phone] -- [phone]               Discharge Summary    "   Gelacio Zuniga, APRN at 05/17/22 1842          CVTS DISCHARGE SUMMARY  Date of Admission: 5/10/2022  5:13 AM  Date of Discharge:  5/17/2022    Admission Diagnosis:   Coronary artery disease of native artery of native heart with stable angina pectoris ([facility]) [I25.118]    Discharge Diagnosis:   Post-Op Diagnosis Codes:     * Coronary artery disease of native artery of native heart with stable angina pectoris ([facility]) [I25.118]  Active Hospital Problems    Diagnosis  POA   • **CAD (coronary artery disease) [I25.10]  Yes   • Stage 3a chronic kidney disease ([facility]) [N18.31]  Yes   • Encounter for long-term current use of medication [Z79.899]  Not Applicable   • HFrEF (heart failure with reduced ejection fraction) ([facility]) [I50.20]  Yes   • Essential hypertension [I10]  Yes      Resolved Hospital Problems   No resolved problems to display.       Consults:   Consults     No orders found from 4/11/2022 to 5/11/2022.          Procedures Performed  Procedure(s):  CORONARY ARTERY BYPASS GRAFTING ENDOSCOPIC VEIN HARVEST         (CELL SAVER)       Discharge Medications     Discharge Medications      New Medications      Instructions Start Date   ascorbic acid 500 MG tablet  Commonly known as: VITAMIN C   500 mg, Oral, 2 Times Daily      aspirin 81 MG EC tablet   81 mg, Oral, Daily      calcium carbonate 500 MG chewable tablet  Commonly known as: TUMS   2 tablets, Oral, 3 Times Daily PRN      clopidogrel 75 MG tablet  Commonly known as: PLAVIX   75 mg, Oral, Daily      furosemide 20 MG tablet  Commonly known as: Lasix   20 mg, Oral, Daily      sennosides-docusate 8.6-50 MG per tablet  Commonly known as: PERICOLACE   2 tablets, Oral, 2 Times Daily      tamsulosin 0.4 MG capsule 24 hr capsule  Commonly known as: FLOMAX   0.4 mg, Oral, Daily      traMADol 50 MG tablet  Commonly known as: ULTRAM   100 mg, Oral, Every 4 Hours PRN         Changes to Medications      Instructions Start Date   carvedilol 12.5 MG tablet  Commonly known as:  COREG  What changed:   · medication strength  · how much to take   12.5 mg, Oral, 2 Times Daily With Meals         Continue These Medications      Instructions Start Date   ferrous sulfate 325 (65 FE) MG EC tablet   325 mg, Oral, 2 Times Daily With Meals      glimepiride 2 MG tablet  Commonly known as: AMARYL   2 mg, Oral, Every Morning Before Breakfast      levothyroxine 25 MCG tablet  Commonly known as: SYNTHROID, LEVOTHROID   25 mcg, Oral, Every Early Morning      loratadine 10 MG tablet  Commonly known as: CLARITIN   10 mg, Oral, Daily      magnesium oxide 400 (241.3 Mg) MG tablet tablet  Commonly known as: MAGOX   400 mg, Oral, 2 Times Daily      olmesartan 40 MG tablet  Commonly known as: BENICAR   40 mg, Oral, Daily With Dinner      omeprazole 40 MG capsule  Commonly known as: priLOSEC   40 mg, Oral, Daily      polyethylene glycol 17 g packet  Commonly known as: MIRALAX   17 g, Oral, Daily PRN      pravastatin 40 MG tablet  Commonly known as: PRAVACHOL   40 mg, Oral, Nightly         Stop These Medications    hydrALAZINE 50 MG tablet  Commonly known as: APRESOLINE     mupirocin 2 % ointment  Commonly known as: BACTROBAN          Gelacio KHAN Reviewed risks, benefits, and habit forming potential and weaning from narcotic medication. Patient understands and wishes to receive prescription.  Prescription written for tramadol for post-surgical pain after Og placed on file.    Discharge Diet:   Diet Instructions     Diet: Regular, Consistent Carbohydrate, Cardiac      Discharge Diet:  Regular  Consistent Carbohydrate  Cardiac             Discharge Disposition: Stable for D/C to SNF     History of Present Illness/Hospital Course:   71 y.o. female with HTN(stable, increased risk stroke, rupture), Hyperlipidemia(stable, increased risk cardiovascular events), Diabetes Mellitus(stable, increased risk cardiovascular events), Obesity(uncontrolled, increased risk cardiovascular events), COPD(stable, increased risk  pulmonary complications) and Chronic Kidney Disease(stable, increased risk renal failure) , CAD(chronic severe progression, increase risk cardiovascular events).  never smoked.  Moderate fatigue, decreased exercise tolerance x 1 year.  PCI 2016.  In wheelchair today..  No TIA stroke amaurosis.  No MI claudication. No other associated signs, symptoms or modifying factors.     5/2022 vein mapping:  RIGHT GSV 1.6-3.7mm, LEFT GSV 2.6-4.7mm.  5/2022 Carotid Duplex:  JENNIFER 0-49% (91/17cm/s, ratio 1.0), LICA 0-49% (90/21cm/s, ratio 0.9) antegrade verts.     10/2020 Echocardiogram:  EF 40%. LA 28mm, LV 54mm, RVS{ 27mmHg, trace MR, mild TR.  4/2022 Stress Test:  EF 60%, medium infarct anteroseptal, felix-infarct ischemia  4/2022 Cardiac Catheterization:  LAD occluded stent prox, CX 50%, RCA 40%, PLB 70%.  5/2022 PFT:  FVC 1.4 (51%), FEV1 1.1 (53%), DLCO 112%    After adequate pre-op studies completed the patient was scheduled electively. Day of surgery the patient was admitted through [facility], taken to the operating suite placed under general anesthesia, and underwent said procedure without complications or difficulty.  The patient was weaned easily from cardiopulmonary bypass with the assistance in drips of bessy, nitro, insulin, precedex and transferred to CCU in stable condition.  The patient began weaning mechanical ventilation with successful extubation and was placed on oxygen per nasal cannula.  A total of 0 in blood products were given during the patient's hospital stay.  POD1 the patient was out of bed to the chair and tolerated breakfast with stable glucose between the hours of 18-24 postoperatively.  Insulin drip was discontinued and the patient was continued on sliding scale insulin coverage as well as Lantus nightly.  Hemodynamics and neuro status remained stable for transfer to  telemetry. The patient continued with PT/OT participation to satisfactory levels with activities of daily living. Chest tubes were without  "air leak and removed with satisfactory post removal CXR viewed on 5/12.  Bed bugs brought in by family on clothing, clothing was bagged and sent back home, patient was bathed and moved to different room without evidence of any more bed bugs.  During hygiene care it was also discovered by the nursing staff that she had head lice, which was treated with nix and nits were removed from hair with comb, would recommend retreat in 7 days as is recommended.  She continues to need aggressive PT/OT in rehab setting in order for her to function safely at home, plans to move to SNF for rehab to home program.  Hemodynamics remained stable, the patient remained afebrile postoperatively, and in regular sinus rhythm for satisfactory discharge to SNF in stable condition on POD 6.        Vital Signs  Heart Rate:  [85] 85  Resp:  [20] 20  BP: (157)/(80) 157/80      05/16/22  0434 05/17/22  0500 05/17/22  1020   Weight: 86.2 kg (190 lb) 87.1 kg (192 lb 1.6 oz) 84.4 kg (186 lb)       Pertinent Test Results:  Lab Results   Component Value Date    WBC 12.87 (H) 05/13/2022    HGB 11.2 (L) 05/13/2022    HCT 34.9 05/13/2022    MCV 81.7 05/13/2022     05/13/2022     Lab Results   Component Value Date    GLUCOSE 105 (H) 05/13/2022    BUN 22 05/13/2022    CREATININE 1.20 (H) 05/13/2022    EGFRIFNONA 45 (L) 12/08/2021    EGFRIFAFRI  09/29/2020      Comment:      <15 Indicative of kidney failure.    BCR 18.3 05/13/2022    K 4.3 05/13/2022    CO2 28.0 05/13/2022    CALCIUM 8.9 05/13/2022    ALBUMIN 3.60 05/13/2022    AST 13 05/13/2022    ALT <5 05/13/2022       Physical Exam   Objective:  General Appearance:  Comfortable and ill-appearing.    Vital signs: (most recent): Blood pressure 164/78, pulse 62, temperature 96.7 °F (35.9 °C), temperature source Infrared, resp. rate 18, height 149.9 cm (59.02\"), weight 86.2 kg (190 lb), SpO2 92 %, not currently breastfeeding.  Vital signs are normal.  No fever.    Output: Producing urine and producing " stool.    HEENT: Normal HEENT exam.    Lungs:  Normal effort and normal respiratory rate.  Breath sounds clear to auscultation.  There are decreased breath sounds.    Heart: Normal rate.  Regular rhythm.    Chest: (AV wires removed)  Abdomen: Abdomen is soft.  Bowel sounds are normal.     Neurological: Patient is alert and oriented to person, place and time.    Skin:  Warm and dry.  (Sternal provena intact  [facility] CD[facility])    Additional Instructions for the Follow-ups that You Need to Schedule     Ambulatory Referral to Physical Therapy POST OP   As directed      Specialty needed: POST OP         Call MD With Problems / Concerns   As directed      Instructions: Instructions: notify provider for uncontrolled pain, shortness of breath, fever or chills, purulent drainage or swelling from the surgical incision site.    Business hours call [phone]    After business hours may call hospital and speak to surgeon on call [phone]    Order Comments: Instructions: Instructions: notify provider for uncontrolled pain, shortness of breath, fever or chills, purulent drainage or swelling from the surgical incision site.  Business hours call [phone]  After business hours may call hospital and speak to surgeon on call [phone]          Discharge Follow-up with PCP   As directed       Currently Documented PCP:    Nieves Childress APRN    PCP Phone Number:    [phone]     Follow Up Details: as scheduled         Discharge Follow-up with Specified Provider: NILAY KHAN; 2 Weeks   As directed      To: NILAY KHAN    Follow Up: 2 Weeks               Discharge Instructions: Discharge instructions include no heavy lifting anything greater than 10lbs for approximately 12 weeks.  No sex or driving for 3-6 weeks. Printed information given to the patient with advancement of activities weekly.  Risks and benefits of narcotic medications and weaning postoperatively have been discussed. Clean operative site with antibacterial soap/water,  pat dry. Keep open to air unless draining, then may apply dry dressing.  No ointments or creams unless prescribed by provider. For signs and symptoms of infection including drainage from operative site, redness, swelling, with associated fever and/or chills [facility] immediately for wound check.  Patient verbalizes understanding of discharge instructions, all questions are answered, follow-up appointments have been made, the patient is discharged to SNF in stable condition.         Follow-up Appointments  Future Appointments   Date Time Provider Department Center   6/1/2022  3:30 PM Leila Dias APRN [facility] None   10/31/2022 10:00 AM Nieves Childress APRN [facility]       Test Results Pending at Discharge        Time: Discharge 45 min      Electronically signed by Gelacio Zuniga APRN at 05/18/22 0982

## 2022-05-18 NOTE — DISCHARGE SUMMARY
CVTS DISCHARGE SUMMARY  Date of Admission: 5/10/2022  5:13 AM  Date of Discharge:  5/17/2022    Admission Diagnosis:   Coronary artery disease of native artery of native heart with stable angina pectoris ([facility]) [I25.118]    Discharge Diagnosis:   Post-Op Diagnosis Codes:     * Coronary artery disease of native artery of native heart with stable angina pectoris ([facility]) [I25.118]  Active Hospital Problems    Diagnosis  POA   • **CAD (coronary artery disease) [I25.10]  Yes   • Stage 3a chronic kidney disease ([facility]) [N18.31]  Yes   • Encounter for long-term current use of medication [Z79.899]  Not Applicable   • HFrEF (heart failure with reduced ejection fraction) ([facility]) [I50.20]  Yes   • Essential hypertension [I10]  Yes      Resolved Hospital Problems   No resolved problems to display.       Consults:   Consults     No orders found from 4/11/2022 to 5/11/2022.          Procedures Performed  Procedure(s):  CORONARY ARTERY BYPASS GRAFTING ENDOSCOPIC VEIN HARVEST         (CELL SAVER)       Discharge Medications     Discharge Medications      New Medications      Instructions Start Date   ascorbic acid 500 MG tablet  Commonly known as: VITAMIN C   500 mg, Oral, 2 Times Daily      aspirin 81 MG EC tablet   81 mg, Oral, Daily      calcium carbonate 500 MG chewable tablet  Commonly known as: TUMS   2 tablets, Oral, 3 Times Daily PRN      clopidogrel 75 MG tablet  Commonly known as: PLAVIX   75 mg, Oral, Daily      furosemide 20 MG tablet  Commonly known as: Lasix   20 mg, Oral, Daily      sennosides-docusate 8.6-50 MG per tablet  Commonly known as: PERICOLACE   2 tablets, Oral, 2 Times Daily      tamsulosin 0.4 MG capsule 24 hr capsule  Commonly known as: FLOMAX   0.4 mg, Oral, Daily      traMADol 50 MG tablet  Commonly known as: ULTRAM   100 mg, Oral, Every 4 Hours PRN         Changes to Medications      Instructions Start Date   carvedilol 12.5 MG tablet  Commonly known as: COREG  What changed:   · medication strength  · how  much to take   12.5 mg, Oral, 2 Times Daily With Meals         Continue These Medications      Instructions Start Date   ferrous sulfate 325 (65 FE) MG EC tablet   325 mg, Oral, 2 Times Daily With Meals      glimepiride 2 MG tablet  Commonly known as: AMARYL   2 mg, Oral, Every Morning Before Breakfast      levothyroxine 25 MCG tablet  Commonly known as: SYNTHROID, LEVOTHROID   25 mcg, Oral, Every Early Morning      loratadine 10 MG tablet  Commonly known as: CLARITIN   10 mg, Oral, Daily      magnesium oxide 400 (241.3 Mg) MG tablet tablet  Commonly known as: MAGOX   400 mg, Oral, 2 Times Daily      olmesartan 40 MG tablet  Commonly known as: BENICAR   40 mg, Oral, Daily With Dinner      omeprazole 40 MG capsule  Commonly known as: priLOSEC   40 mg, Oral, Daily      polyethylene glycol 17 g packet  Commonly known as: MIRALAX   17 g, Oral, Daily PRN      pravastatin 40 MG tablet  Commonly known as: PRAVACHOL   40 mg, Oral, Nightly         Stop These Medications    hydrALAZINE 50 MG tablet  Commonly known as: APRESOLINE     mupirocin 2 % ointment  Commonly known as: BACTROBAN          Gelacio KHAN Reviewed risks, benefits, and habit forming potential and weaning from narcotic medication. Patient understands and wishes to receive prescription.  Prescription written for tramadol for post-surgical pain after Og placed on file.    Discharge Diet:   Diet Instructions     Diet: Regular, Consistent Carbohydrate, Cardiac      Discharge Diet:  Regular  Consistent Carbohydrate  Cardiac             Discharge Disposition: Stable for D/C to SNF     History of Present Illness/Hospital Course:   71 y.o. female with HTN(stable, increased risk stroke, rupture), Hyperlipidemia(stable, increased risk cardiovascular events), Diabetes Mellitus(stable, increased risk cardiovascular events), Obesity(uncontrolled, increased risk cardiovascular events), COPD(stable, increased risk pulmonary complications) and Chronic Kidney  Disease(stable, increased risk renal failure) , CAD(chronic severe progression, increase risk cardiovascular events).  never smoked.  Moderate fatigue, decreased exercise tolerance x 1 year.  PCI 2016.  In wheelchair today..  No TIA stroke amaurosis.  No MI claudication. No other associated signs, symptoms or modifying factors.     5/2022 vein mapping:  RIGHT GSV 1.6-3.7mm, LEFT GSV 2.6-4.7mm.  5/2022 Carotid Duplex:  JENNIFER 0-49% (91/17cm/s, ratio 1.0), LICA 0-49% (90/21cm/s, ratio 0.9) antegrade verts.     10/2020 Echocardiogram:  EF 40%. LA 28mm, LV 54mm, RVS{ 27mmHg, trace MR, mild TR.  4/2022 Stress Test:  EF 60%, medium infarct anteroseptal, felix-infarct ischemia  4/2022 Cardiac Catheterization:  LAD occluded stent prox, CX 50%, RCA 40%, PLB 70%.  5/2022 PFT:  FVC 1.4 (51%), FEV1 1.1 (53%), DLCO 112%    After adequate pre-op studies completed the patient was scheduled electively. Day of surgery the patient was admitted through [facility], taken to the operating suite placed under general anesthesia, and underwent said procedure without complications or difficulty.  The patient was weaned easily from cardiopulmonary bypass with the assistance in drips of bessy, nitro, insulin, precedex and transferred to CCU in stable condition.  The patient began weaning mechanical ventilation with successful extubation and was placed on oxygen per nasal cannula.  A total of 0 in blood products were given during the patient's hospital stay.  POD1 the patient was out of bed to the chair and tolerated breakfast with stable glucose between the hours of 18-24 postoperatively.  Insulin drip was discontinued and the patient was continued on sliding scale insulin coverage as well as Lantus nightly.  Hemodynamics and neuro status remained stable for transfer to  telemetry. The patient continued with PT/OT participation to satisfactory levels with activities of daily living. Chest tubes were without air leak and removed with satisfactory post  "removal CXR viewed on 5/12.  Bed bugs brought in by family on clothing, clothing was bagged and sent back home, patient was bathed and moved to different room without evidence of any more bed bugs.  During hygiene care it was also discovered by the nursing staff that she had head lice, which was treated with nix and nits were removed from hair with comb, would recommend retreat in 7 days as is recommended.  She continues to need aggressive PT/OT in rehab setting in order for her to function safely at home, plans to move to SNF for rehab to home program.  Hemodynamics remained stable, the patient remained afebrile postoperatively, and in regular sinus rhythm for satisfactory discharge to SNF in stable condition on POD 6.        Vital Signs  Heart Rate:  [85] 85  Resp:  [20] 20  BP: (157)/(80) 157/80      05/16/22  0434 05/17/22  0500 05/17/22  1020   Weight: 86.2 kg (190 lb) 87.1 kg (192 lb 1.6 oz) 84.4 kg (186 lb)       Pertinent Test Results:  Lab Results   Component Value Date    WBC 12.87 (H) 05/13/2022    HGB 11.2 (L) 05/13/2022    HCT 34.9 05/13/2022    MCV 81.7 05/13/2022     05/13/2022     Lab Results   Component Value Date    GLUCOSE 105 (H) 05/13/2022    BUN 22 05/13/2022    CREATININE 1.20 (H) 05/13/2022    EGFRIFNONA 45 (L) 12/08/2021    EGFRIFAFRI  09/29/2020      Comment:      <15 Indicative of kidney failure.    BCR 18.3 05/13/2022    K 4.3 05/13/2022    CO2 28.0 05/13/2022    CALCIUM 8.9 05/13/2022    ALBUMIN 3.60 05/13/2022    AST 13 05/13/2022    ALT <5 05/13/2022       Physical Exam   Objective:  General Appearance:  Comfortable and ill-appearing.    Vital signs: (most recent): Blood pressure 164/78, pulse 62, temperature 96.7 °F (35.9 °C), temperature source Infrared, resp. rate 18, height 149.9 cm (59.02\"), weight 86.2 kg (190 lb), SpO2 92 %, not currently breastfeeding.  Vital signs are normal.  No fever.    Output: Producing urine and producing stool.    HEENT: Normal HEENT exam.  "   Lungs:  Normal effort and normal respiratory rate.  Breath sounds clear to auscultation.  There are decreased breath sounds.    Heart: Normal rate.  Regular rhythm.    Chest: (AV wires removed)  Abdomen: Abdomen is soft.  Bowel sounds are normal.     Neurological: Patient is alert and oriented to person, place and time.    Skin:  Warm and dry.  (Sternal provena intact  [facility] CDI)    Additional Instructions for the Follow-ups that You Need to Schedule     Ambulatory Referral to Physical Therapy POST OP   As directed      Specialty needed: POST OP         Call MD With Problems / Concerns   As directed      Instructions: Instructions: notify provider for uncontrolled pain, shortness of breath, fever or chills, purulent drainage or swelling from the surgical incision site.    Business hours call [phone]    After business hours may call hospital and speak to surgeon on call [phone]    Order Comments: Instructions: Instructions: notify provider for uncontrolled pain, shortness of breath, fever or chills, purulent drainage or swelling from the surgical incision site.  Business hours call [phone]  After business hours may call hospital and speak to surgeon on call [phone]          Discharge Follow-up with PCP   As directed       Currently Documented PCP:    Nieves Childress APRN    PCP Phone Number:    [phone]     Follow Up Details: as scheduled         Discharge Follow-up with Specified Provider: NILAY KHAN; 2 Weeks   As directed      To: NILAY KHAN    Follow Up: 2 Weeks               Discharge Instructions: Discharge instructions include no heavy lifting anything greater than 10lbs for approximately 12 weeks.  No sex or driving for 3-6 weeks. Printed information given to the patient with advancement of activities weekly.  Risks and benefits of narcotic medications and weaning postoperatively have been discussed. Clean operative site with antibacterial soap/water, pat dry. Keep open to air unless  draining, then may apply dry dressing.  No ointments or creams unless prescribed by provider. For signs and symptoms of infection including drainage from operative site, redness, swelling, with associated fever and/or chills [facility] immediately for wound check.  Patient verbalizes understanding of discharge instructions, all questions are answered, follow-up appointments have been made, the patient is discharged to SNF in stable condition.         Follow-up Appointments  Future Appointments   Date Time Provider Department Center   6/1/2022  3:30 PM Leila Dias APRN [facility] None   10/31/2022 10:00 AM Nieves Childress APRN [facility]       Test Results Pending at Discharge        Time: Discharge 45 min

## 2022-05-21 ENCOUNTER — LAB REQUISITION (OUTPATIENT)
Dept: LAB | Facility: HOSPITAL | Age: 72
End: 2022-05-21

## 2022-05-21 DIAGNOSIS — E87.5 HYPERKALEMIA: ICD-10-CM

## 2022-05-21 LAB — POTASSIUM SERPL-SCNC: 3.7 MMOL/L (ref 3.5–5.2)

## 2022-05-21 PROCEDURE — 84132 ASSAY OF SERUM POTASSIUM: CPT | Performed by: FAMILY MEDICINE

## 2022-05-23 ENCOUNTER — HOSPITAL ENCOUNTER (OUTPATIENT)
Facility: HOSPITAL | Age: 72
Discharge: HOME HEALTH | End: 2022-05-28
Attending: EMERGENCY MEDICINE | Admitting: HOSPITALIST

## 2022-05-23 DIAGNOSIS — R53.1 WEAKNESS: ICD-10-CM

## 2022-05-23 DIAGNOSIS — Z74.09 IMPAIRED MOBILITY AND ADLS: ICD-10-CM

## 2022-05-23 DIAGNOSIS — K92.1 MELENA: ICD-10-CM

## 2022-05-23 DIAGNOSIS — Z74.09 IMPAIRED FUNCTIONAL MOBILITY, BALANCE, GAIT, AND ENDURANCE: ICD-10-CM

## 2022-05-23 DIAGNOSIS — D50.8 OTHER IRON DEFICIENCY ANEMIA: ICD-10-CM

## 2022-05-23 DIAGNOSIS — Z78.9 IMPAIRED MOBILITY AND ADLS: ICD-10-CM

## 2022-05-23 DIAGNOSIS — K62.5 RECTAL BLEEDING: Primary | ICD-10-CM

## 2022-05-23 LAB
ABO GROUP BLD: NORMAL
ALBUMIN SERPL-MCNC: 3.9 G/DL (ref 3.5–5.2)
ALBUMIN/GLOB SERPL: 1.2 G/DL
ALP SERPL-CCNC: 118 U/L (ref 39–117)
ALT SERPL W P-5'-P-CCNC: 9 U/L (ref 1–33)
ANION GAP SERPL CALCULATED.3IONS-SCNC: 12 MMOL/L (ref 5–15)
AST SERPL-CCNC: 15 U/L (ref 1–32)
BASOPHILS # BLD AUTO: 0.05 10*3/MM3 (ref 0–0.2)
BASOPHILS NFR BLD AUTO: 0.4 % (ref 0–1.5)
BILIRUB SERPL-MCNC: 0.4 MG/DL (ref 0–1.2)
BLD GP AB SCN SERPL QL: NEGATIVE
BUN SERPL-MCNC: 33 MG/DL (ref 8–23)
BUN/CREAT SERPL: 22.8 (ref 7–25)
CALCIUM SPEC-SCNC: 9 MG/DL (ref 8.6–10.5)
CHLORIDE SERPL-SCNC: 93 MMOL/L (ref 98–107)
CO2 SERPL-SCNC: 33 MMOL/L (ref 22–29)
CREAT SERPL-MCNC: 1.45 MG/DL (ref 0.57–1)
DEPRECATED RDW RBC AUTO: 48.3 FL (ref 37–54)
EGFRCR SERPLBLD CKD-EPI 2021: 38.9 ML/MIN/1.73
EOSINOPHIL # BLD AUTO: 0.19 10*3/MM3 (ref 0–0.4)
EOSINOPHIL NFR BLD AUTO: 1.5 % (ref 0.3–6.2)
ERYTHROCYTE [DISTWIDTH] IN BLOOD BY AUTOMATED COUNT: 16.4 % (ref 12.3–15.4)
FLUAV SUBTYP SPEC NAA+PROBE: NOT DETECTED
FLUBV RNA ISLT QL NAA+PROBE: NOT DETECTED
GLOBULIN UR ELPH-MCNC: 3.3 GM/DL
GLUCOSE SERPL-MCNC: 95 MG/DL (ref 65–99)
HCT VFR BLD AUTO: 37.1 % (ref 34–46.6)
HGB BLD-MCNC: 12 G/DL (ref 12–15.9)
HOLD SPECIMEN: NORMAL
HOLD SPECIMEN: NORMAL
IMM GRANULOCYTES # BLD AUTO: 0.09 10*3/MM3 (ref 0–0.05)
IMM GRANULOCYTES NFR BLD AUTO: 0.7 % (ref 0–0.5)
LYMPHOCYTES # BLD AUTO: 1.37 10*3/MM3 (ref 0.7–3.1)
LYMPHOCYTES NFR BLD AUTO: 10.6 % (ref 19.6–45.3)
Lab: NORMAL
MCH RBC QN AUTO: 26.6 PG (ref 26.6–33)
MCHC RBC AUTO-ENTMCNC: 32.3 G/DL (ref 31.5–35.7)
MCV RBC AUTO: 82.3 FL (ref 79–97)
MONOCYTES # BLD AUTO: 0.69 10*3/MM3 (ref 0.1–0.9)
MONOCYTES NFR BLD AUTO: 5.4 % (ref 5–12)
NEUTROPHILS NFR BLD AUTO: 10.49 10*3/MM3 (ref 1.7–7)
NEUTROPHILS NFR BLD AUTO: 81.4 % (ref 42.7–76)
NRBC BLD AUTO-RTO: 0 /100 WBC (ref 0–0.2)
PLATELET # BLD AUTO: 337 10*3/MM3 (ref 140–450)
PMV BLD AUTO: 9.5 FL (ref 6–12)
POTASSIUM SERPL-SCNC: 3.5 MMOL/L (ref 3.5–5.2)
PROT SERPL-MCNC: 7.2 G/DL (ref 6–8.5)
RBC # BLD AUTO: 4.51 10*6/MM3 (ref 3.77–5.28)
RH BLD: POSITIVE
SARS-COV-2 RNA PNL SPEC NAA+PROBE: NOT DETECTED
SODIUM SERPL-SCNC: 138 MMOL/L (ref 136–145)
T&S EXPIRATION DATE: NORMAL
WBC NRBC COR # BLD: 12.88 10*3/MM3 (ref 3.4–10.8)
WHOLE BLOOD HOLD COAG: NORMAL
WHOLE BLOOD HOLD SPECIMEN: NORMAL

## 2022-05-23 PROCEDURE — 86850 RBC ANTIBODY SCREEN: CPT | Performed by: EMERGENCY MEDICINE

## 2022-05-23 PROCEDURE — 87636 SARSCOV2 & INF A&B AMP PRB: CPT | Performed by: EMERGENCY MEDICINE

## 2022-05-23 PROCEDURE — G0378 HOSPITAL OBSERVATION PER HR: HCPCS

## 2022-05-23 PROCEDURE — 86900 BLOOD TYPING SEROLOGIC ABO: CPT | Performed by: EMERGENCY MEDICINE

## 2022-05-23 PROCEDURE — C9803 HOPD COVID-19 SPEC COLLECT: HCPCS

## 2022-05-23 PROCEDURE — 86901 BLOOD TYPING SEROLOGIC RH(D): CPT | Performed by: EMERGENCY MEDICINE

## 2022-05-23 PROCEDURE — 80053 COMPREHEN METABOLIC PANEL: CPT | Performed by: EMERGENCY MEDICINE

## 2022-05-23 PROCEDURE — 99284 EMERGENCY DEPT VISIT MOD MDM: CPT

## 2022-05-23 PROCEDURE — 85025 COMPLETE CBC W/AUTO DIFF WBC: CPT | Performed by: EMERGENCY MEDICINE

## 2022-05-23 RX ORDER — LOSARTAN POTASSIUM 50 MG/1
50 TABLET ORAL DAILY
COMMUNITY
End: 2022-05-28 | Stop reason: HOSPADM

## 2022-05-23 RX ORDER — ASPIRIN 81 MG/1
81 TABLET ORAL DAILY
Status: DISCONTINUED | OUTPATIENT
Start: 2022-05-24 | End: 2022-05-28 | Stop reason: HOSPADM

## 2022-05-23 RX ORDER — CLOPIDOGREL BISULFATE 75 MG/1
75 TABLET ORAL DAILY
Status: DISCONTINUED | OUTPATIENT
Start: 2022-05-24 | End: 2022-05-28 | Stop reason: HOSPADM

## 2022-05-23 RX ORDER — AMOXICILLIN 250 MG
2 CAPSULE ORAL 2 TIMES DAILY
Status: DISCONTINUED | OUTPATIENT
Start: 2022-05-23 | End: 2022-05-28 | Stop reason: HOSPADM

## 2022-05-23 RX ORDER — SODIUM CHLORIDE 0.9 % (FLUSH) 0.9 %
10 SYRINGE (ML) INJECTION AS NEEDED
Status: DISCONTINUED | OUTPATIENT
Start: 2022-05-23 | End: 2022-05-26

## 2022-05-23 RX ORDER — CETIRIZINE HYDROCHLORIDE 5 MG/1
5 TABLET ORAL DAILY
Refills: 3 | Status: DISCONTINUED | OUTPATIENT
Start: 2022-05-24 | End: 2022-05-28 | Stop reason: HOSPADM

## 2022-05-23 RX ORDER — PANTOPRAZOLE SODIUM 40 MG/1
40 TABLET, DELAYED RELEASE ORAL EVERY MORNING
Refills: 1 | Status: DISCONTINUED | OUTPATIENT
Start: 2022-05-24 | End: 2022-05-28 | Stop reason: HOSPADM

## 2022-05-23 RX ORDER — CALCIUM CARBONATE 200(500)MG
2 TABLET,CHEWABLE ORAL 3 TIMES DAILY PRN
Status: DISCONTINUED | OUTPATIENT
Start: 2022-05-23 | End: 2022-05-28 | Stop reason: HOSPADM

## 2022-05-23 RX ORDER — LEVOTHYROXINE SODIUM 0.03 MG/1
25 TABLET ORAL
Status: DISCONTINUED | OUTPATIENT
Start: 2022-05-24 | End: 2022-05-28 | Stop reason: HOSPADM

## 2022-05-23 RX ORDER — TAMSULOSIN HYDROCHLORIDE 0.4 MG/1
0.4 CAPSULE ORAL DAILY
Status: DISCONTINUED | OUTPATIENT
Start: 2022-05-24 | End: 2022-05-28 | Stop reason: HOSPADM

## 2022-05-23 RX ORDER — FERROUS SULFATE TAB EC 324 MG (65 MG FE EQUIVALENT) 324 (65 FE) MG
324 TABLET DELAYED RESPONSE ORAL 2 TIMES DAILY WITH MEALS
Refills: 5 | Status: DISCONTINUED | OUTPATIENT
Start: 2022-05-24 | End: 2022-05-28 | Stop reason: HOSPADM

## 2022-05-23 RX ORDER — SODIUM CHLORIDE 0.9 % (FLUSH) 0.9 %
10 SYRINGE (ML) INJECTION EVERY 12 HOURS SCHEDULED
Status: DISCONTINUED | OUTPATIENT
Start: 2022-05-23 | End: 2022-05-26

## 2022-05-23 RX ORDER — LOSARTAN POTASSIUM 50 MG/1
100 TABLET ORAL
Refills: 0 | Status: DISCONTINUED | OUTPATIENT
Start: 2022-05-24 | End: 2022-05-26

## 2022-05-23 RX ORDER — PRAVASTATIN SODIUM 40 MG
40 TABLET ORAL NIGHTLY
Status: DISCONTINUED | OUTPATIENT
Start: 2022-05-23 | End: 2022-05-28 | Stop reason: HOSPADM

## 2022-05-23 RX ORDER — CARVEDILOL 12.5 MG/1
12.5 TABLET ORAL 2 TIMES DAILY WITH MEALS
Status: DISCONTINUED | OUTPATIENT
Start: 2022-05-23 | End: 2022-05-25

## 2022-05-23 RX ORDER — FUROSEMIDE 20 MG/1
20 TABLET ORAL DAILY
Status: DISCONTINUED | OUTPATIENT
Start: 2022-05-24 | End: 2022-05-28 | Stop reason: HOSPADM

## 2022-05-23 RX ORDER — ASCORBIC ACID 500 MG
500 TABLET ORAL 2 TIMES DAILY
Status: DISCONTINUED | OUTPATIENT
Start: 2022-05-23 | End: 2022-05-28 | Stop reason: HOSPADM

## 2022-05-23 RX ORDER — TRAMADOL HYDROCHLORIDE 50 MG/1
100 TABLET ORAL EVERY 4 HOURS PRN
Status: DISCONTINUED | OUTPATIENT
Start: 2022-05-23 | End: 2022-05-28 | Stop reason: HOSPADM

## 2022-05-23 RX ORDER — POLYETHYLENE GLYCOL 3350 17 G/17G
17 POWDER, FOR SOLUTION ORAL DAILY PRN
Status: DISCONTINUED | OUTPATIENT
Start: 2022-05-23 | End: 2022-05-28 | Stop reason: HOSPADM

## 2022-05-23 RX ADMIN — Medication 400 MG: at 22:00

## 2022-05-23 RX ADMIN — DOCUSATE SODIUM 50 MG AND SENNOSIDES 8.6 MG 2 TABLET: 8.6; 5 TABLET, FILM COATED ORAL at 22:00

## 2022-05-23 RX ADMIN — OXYCODONE HYDROCHLORIDE AND ACETAMINOPHEN 500 MG: 500 TABLET ORAL at 22:00

## 2022-05-23 RX ADMIN — Medication 10 ML: at 22:00

## 2022-05-23 RX ADMIN — CARVEDILOL 12.5 MG: 12.5 TABLET, FILM COATED ORAL at 22:00

## 2022-05-23 RX ADMIN — PRAVASTATIN SODIUM 40 MG: 40 TABLET ORAL at 22:00

## 2022-05-23 RX ADMIN — SODIUM CHLORIDE 1000 ML: 9 INJECTION, SOLUTION INTRAVENOUS at 16:23

## 2022-05-24 LAB
ANION GAP SERPL CALCULATED.3IONS-SCNC: 10 MMOL/L (ref 5–15)
ANISOCYTOSIS BLD QL: NORMAL
BASOPHILS # BLD AUTO: 0.06 10*3/MM3 (ref 0–0.2)
BASOPHILS NFR BLD AUTO: 0.4 % (ref 0–1.5)
BUN SERPL-MCNC: 31 MG/DL (ref 8–23)
BUN/CREAT SERPL: 28.4 (ref 7–25)
CALCIUM SPEC-SCNC: 9.8 MG/DL (ref 8.6–10.5)
CHLORIDE SERPL-SCNC: 95 MMOL/L (ref 98–107)
CLUMPED PLATELETS: PRESENT
CO2 SERPL-SCNC: 33 MMOL/L (ref 22–29)
CREAT SERPL-MCNC: 1.09 MG/DL (ref 0.57–1)
DEPRECATED RDW RBC AUTO: 48.3 FL (ref 37–54)
EGFRCR SERPLBLD CKD-EPI 2021: 54.8 ML/MIN/1.73
EOSINOPHIL # BLD AUTO: 0.22 10*3/MM3 (ref 0–0.4)
EOSINOPHIL NFR BLD AUTO: 1.6 % (ref 0.3–6.2)
ERYTHROCYTE [DISTWIDTH] IN BLOOD BY AUTOMATED COUNT: 16.3 % (ref 12.3–15.4)
GLUCOSE SERPL-MCNC: 82 MG/DL (ref 65–99)
HCT VFR BLD AUTO: 36.2 % (ref 34–46.6)
HCT VFR BLD AUTO: 36.2 % (ref 34–46.6)
HCT VFR BLD AUTO: 36.7 % (ref 34–46.6)
HGB BLD-MCNC: 11.5 G/DL (ref 12–15.9)
HGB BLD-MCNC: 11.6 G/DL (ref 12–15.9)
HGB BLD-MCNC: 11.8 G/DL (ref 12–15.9)
HYPOCHROMIA BLD QL: NORMAL
IMM GRANULOCYTES # BLD AUTO: 0.08 10*3/MM3 (ref 0–0.05)
IMM GRANULOCYTES NFR BLD AUTO: 0.6 % (ref 0–0.5)
LYMPHOCYTES # BLD AUTO: 1.42 10*3/MM3 (ref 0.7–3.1)
LYMPHOCYTES NFR BLD AUTO: 10.1 % (ref 19.6–45.3)
MCH RBC QN AUTO: 26.1 PG (ref 26.6–33)
MCHC RBC AUTO-ENTMCNC: 31.8 G/DL (ref 31.5–35.7)
MCV RBC AUTO: 82.3 FL (ref 79–97)
MONOCYTES # BLD AUTO: 0.88 10*3/MM3 (ref 0.1–0.9)
MONOCYTES NFR BLD AUTO: 6.2 % (ref 5–12)
NEUTROPHILS NFR BLD AUTO: 11.46 10*3/MM3 (ref 1.7–7)
NEUTROPHILS NFR BLD AUTO: 81.1 % (ref 42.7–76)
NRBC BLD AUTO-RTO: 0 /100 WBC (ref 0–0.2)
PLATELET # BLD AUTO: 280 10*3/MM3 (ref 140–450)
PMV BLD AUTO: 10 FL (ref 6–12)
POTASSIUM SERPL-SCNC: 3.5 MMOL/L (ref 3.5–5.2)
QT INTERVAL: 402 MS
QT INTERVAL: 406 MS
QT INTERVAL: 424 MS
QT INTERVAL: 428 MS
QTC INTERVAL: 418 MS
QTC INTERVAL: 428 MS
QTC INTERVAL: 448 MS
QTC INTERVAL: 452 MS
RBC # BLD AUTO: 4.4 10*6/MM3 (ref 3.77–5.28)
SMALL PLATELETS BLD QL SMEAR: ADEQUATE
SODIUM SERPL-SCNC: 138 MMOL/L (ref 136–145)
WBC MORPH BLD: NORMAL
WBC NRBC COR # BLD: 14.12 10*3/MM3 (ref 3.4–10.8)

## 2022-05-24 PROCEDURE — 80048 BASIC METABOLIC PNL TOTAL CA: CPT | Performed by: HOSPITALIST

## 2022-05-24 PROCEDURE — 85018 HEMOGLOBIN: CPT | Performed by: HOSPITALIST

## 2022-05-24 PROCEDURE — 85014 HEMATOCRIT: CPT | Performed by: HOSPITALIST

## 2022-05-24 PROCEDURE — G0378 HOSPITAL OBSERVATION PER HR: HCPCS

## 2022-05-24 PROCEDURE — 96374 THER/PROPH/DIAG INJ IV PUSH: CPT

## 2022-05-24 PROCEDURE — 97162 PT EVAL MOD COMPLEX 30 MIN: CPT

## 2022-05-24 PROCEDURE — 97166 OT EVAL MOD COMPLEX 45 MIN: CPT

## 2022-05-24 PROCEDURE — 85025 COMPLETE CBC W/AUTO DIFF WBC: CPT | Performed by: HOSPITALIST

## 2022-05-24 PROCEDURE — 85007 BL SMEAR W/DIFF WBC COUNT: CPT | Performed by: HOSPITALIST

## 2022-05-24 PROCEDURE — 25010000002 FUROSEMIDE PER 20 MG: Performed by: HOSPITALIST

## 2022-05-24 RX ORDER — FUROSEMIDE 10 MG/ML
20 INJECTION INTRAMUSCULAR; INTRAVENOUS ONCE
Status: COMPLETED | OUTPATIENT
Start: 2022-05-24 | End: 2022-05-24

## 2022-05-24 RX ADMIN — PRAVASTATIN SODIUM 40 MG: 40 TABLET ORAL at 21:01

## 2022-05-24 RX ADMIN — Medication 400 MG: at 21:05

## 2022-05-24 RX ADMIN — Medication 400 MG: at 09:48

## 2022-05-24 RX ADMIN — LEVOTHYROXINE SODIUM 25 MCG: 25 TABLET ORAL at 05:30

## 2022-05-24 RX ADMIN — CLOPIDOGREL BISULFATE 75 MG: 75 TABLET ORAL at 09:48

## 2022-05-24 RX ADMIN — FERROUS SULFATE TAB EC 324 MG (65 MG FE EQUIVALENT) 324 MG: 324 (65 FE) TABLET DELAYED RESPONSE at 19:00

## 2022-05-24 RX ADMIN — FUROSEMIDE 20 MG: 20 TABLET ORAL at 09:48

## 2022-05-24 RX ADMIN — TRAMADOL HYDROCHLORIDE 100 MG: 50 TABLET, COATED ORAL at 02:49

## 2022-05-24 RX ADMIN — OXYCODONE HYDROCHLORIDE AND ACETAMINOPHEN 500 MG: 500 TABLET ORAL at 09:48

## 2022-05-24 RX ADMIN — OXYCODONE HYDROCHLORIDE AND ACETAMINOPHEN 500 MG: 500 TABLET ORAL at 21:01

## 2022-05-24 RX ADMIN — Medication 10 ML: at 09:48

## 2022-05-24 RX ADMIN — CETIRIZINE HYDROCHLORIDE 5 MG: 5 TABLET ORAL at 09:48

## 2022-05-24 RX ADMIN — FERROUS SULFATE TAB EC 324 MG (65 MG FE EQUIVALENT) 324 MG: 324 (65 FE) TABLET DELAYED RESPONSE at 09:48

## 2022-05-24 RX ADMIN — Medication 10 ML: at 21:02

## 2022-05-24 RX ADMIN — DOCUSATE SODIUM 50 MG AND SENNOSIDES 8.6 MG 2 TABLET: 8.6; 5 TABLET, FILM COATED ORAL at 21:01

## 2022-05-24 RX ADMIN — PANTOPRAZOLE SODIUM 40 MG: 40 TABLET, DELAYED RELEASE ORAL at 05:30

## 2022-05-24 RX ADMIN — FUROSEMIDE 20 MG: 10 INJECTION, SOLUTION INTRAMUSCULAR; INTRAVENOUS at 11:25

## 2022-05-24 RX ADMIN — ASPIRIN 81 MG: 81 TABLET, FILM COATED ORAL at 09:48

## 2022-05-24 RX ADMIN — TAMSULOSIN HYDROCHLORIDE 0.4 MG: 0.4 CAPSULE ORAL at 09:48

## 2022-05-24 RX ADMIN — DOCUSATE SODIUM 50 MG AND SENNOSIDES 8.6 MG 2 TABLET: 8.6; 5 TABLET, FILM COATED ORAL at 09:48

## 2022-05-25 ENCOUNTER — HOME HEALTH ADMISSION (OUTPATIENT)
Dept: HOME HEALTH SERVICES | Facility: HOME HEALTHCARE | Age: 72
End: 2022-05-25

## 2022-05-25 LAB
ANION GAP SERPL CALCULATED.3IONS-SCNC: 12 MMOL/L (ref 5–15)
BASOPHILS # BLD AUTO: 0.04 10*3/MM3 (ref 0–0.2)
BASOPHILS NFR BLD AUTO: 0.4 % (ref 0–1.5)
BUN SERPL-MCNC: 29 MG/DL (ref 8–23)
BUN/CREAT SERPL: 27.4 (ref 7–25)
CALCIUM SPEC-SCNC: 9 MG/DL (ref 8.6–10.5)
CHLORIDE SERPL-SCNC: 95 MMOL/L (ref 98–107)
CO2 SERPL-SCNC: 32 MMOL/L (ref 22–29)
CREAT SERPL-MCNC: 1.06 MG/DL (ref 0.57–1)
DEPRECATED RDW RBC AUTO: 48.3 FL (ref 37–54)
EGFRCR SERPLBLD CKD-EPI 2021: 56.6 ML/MIN/1.73
EOSINOPHIL # BLD AUTO: 0.2 10*3/MM3 (ref 0–0.4)
EOSINOPHIL NFR BLD AUTO: 1.8 % (ref 0.3–6.2)
ERYTHROCYTE [DISTWIDTH] IN BLOOD BY AUTOMATED COUNT: 16 % (ref 12.3–15.4)
GLUCOSE SERPL-MCNC: 83 MG/DL (ref 65–99)
HCT VFR BLD AUTO: 36.9 % (ref 34–46.6)
HCT VFR BLD AUTO: 36.9 % (ref 34–46.6)
HGB BLD-MCNC: 11.8 G/DL (ref 12–15.9)
HGB BLD-MCNC: 11.8 G/DL (ref 12–15.9)
IMM GRANULOCYTES # BLD AUTO: 0.08 10*3/MM3 (ref 0–0.05)
IMM GRANULOCYTES NFR BLD AUTO: 0.7 % (ref 0–0.5)
LYMPHOCYTES # BLD AUTO: 1.37 10*3/MM3 (ref 0.7–3.1)
LYMPHOCYTES NFR BLD AUTO: 12 % (ref 19.6–45.3)
MCH RBC QN AUTO: 26.6 PG (ref 26.6–33)
MCHC RBC AUTO-ENTMCNC: 32 G/DL (ref 31.5–35.7)
MCV RBC AUTO: 83.3 FL (ref 79–97)
MONOCYTES # BLD AUTO: 0.89 10*3/MM3 (ref 0.1–0.9)
MONOCYTES NFR BLD AUTO: 7.8 % (ref 5–12)
NEUTROPHILS NFR BLD AUTO: 77.3 % (ref 42.7–76)
NEUTROPHILS NFR BLD AUTO: 8.8 10*3/MM3 (ref 1.7–7)
NRBC BLD AUTO-RTO: 0 /100 WBC (ref 0–0.2)
PLATELET # BLD AUTO: 294 10*3/MM3 (ref 140–450)
PMV BLD AUTO: 9.9 FL (ref 6–12)
POTASSIUM SERPL-SCNC: 3.7 MMOL/L (ref 3.5–5.2)
RBC # BLD AUTO: 4.43 10*6/MM3 (ref 3.77–5.28)
SODIUM SERPL-SCNC: 139 MMOL/L (ref 136–145)
WBC NRBC COR # BLD: 11.38 10*3/MM3 (ref 3.4–10.8)

## 2022-05-25 PROCEDURE — 85018 HEMOGLOBIN: CPT | Performed by: HOSPITALIST

## 2022-05-25 PROCEDURE — 97530 THERAPEUTIC ACTIVITIES: CPT

## 2022-05-25 PROCEDURE — G0378 HOSPITAL OBSERVATION PER HR: HCPCS

## 2022-05-25 PROCEDURE — 80048 BASIC METABOLIC PNL TOTAL CA: CPT | Performed by: HOSPITALIST

## 2022-05-25 PROCEDURE — 85014 HEMATOCRIT: CPT | Performed by: HOSPITALIST

## 2022-05-25 PROCEDURE — 85025 COMPLETE CBC W/AUTO DIFF WBC: CPT | Performed by: HOSPITALIST

## 2022-05-25 RX ORDER — CARVEDILOL 3.12 MG/1
3.12 TABLET ORAL 2 TIMES DAILY WITH MEALS
Qty: 60 TABLET | Refills: 0 | Status: SHIPPED | OUTPATIENT
Start: 2022-05-25

## 2022-05-25 RX ORDER — CARVEDILOL 3.12 MG/1
3.12 TABLET ORAL 2 TIMES DAILY WITH MEALS
Status: DISCONTINUED | OUTPATIENT
Start: 2022-05-25 | End: 2022-05-28 | Stop reason: HOSPADM

## 2022-05-25 RX ADMIN — CLOPIDOGREL BISULFATE 75 MG: 75 TABLET ORAL at 08:52

## 2022-05-25 RX ADMIN — Medication 400 MG: at 21:07

## 2022-05-25 RX ADMIN — LEVOTHYROXINE SODIUM 25 MCG: 25 TABLET ORAL at 05:25

## 2022-05-25 RX ADMIN — FERROUS SULFATE TAB EC 324 MG (65 MG FE EQUIVALENT) 324 MG: 324 (65 FE) TABLET DELAYED RESPONSE at 08:52

## 2022-05-25 RX ADMIN — OXYCODONE HYDROCHLORIDE AND ACETAMINOPHEN 500 MG: 500 TABLET ORAL at 08:51

## 2022-05-25 RX ADMIN — FERROUS SULFATE TAB EC 324 MG (65 MG FE EQUIVALENT) 324 MG: 324 (65 FE) TABLET DELAYED RESPONSE at 19:12

## 2022-05-25 RX ADMIN — Medication 10 ML: at 21:18

## 2022-05-25 RX ADMIN — OXYCODONE HYDROCHLORIDE AND ACETAMINOPHEN 500 MG: 500 TABLET ORAL at 21:07

## 2022-05-25 RX ADMIN — CETIRIZINE HYDROCHLORIDE 5 MG: 5 TABLET ORAL at 08:52

## 2022-05-25 RX ADMIN — Medication 400 MG: at 08:52

## 2022-05-25 RX ADMIN — TRAMADOL HYDROCHLORIDE 100 MG: 50 TABLET, COATED ORAL at 02:45

## 2022-05-25 RX ADMIN — CARVEDILOL 3.12 MG: 3.12 TABLET, FILM COATED ORAL at 19:12

## 2022-05-25 RX ADMIN — TAMSULOSIN HYDROCHLORIDE 0.4 MG: 0.4 CAPSULE ORAL at 08:51

## 2022-05-25 RX ADMIN — PANTOPRAZOLE SODIUM 40 MG: 40 TABLET, DELAYED RELEASE ORAL at 05:25

## 2022-05-25 RX ADMIN — PRAVASTATIN SODIUM 40 MG: 40 TABLET ORAL at 21:07

## 2022-05-25 RX ADMIN — ASPIRIN 81 MG: 81 TABLET, FILM COATED ORAL at 08:51

## 2022-05-25 RX ADMIN — TRAMADOL HYDROCHLORIDE 100 MG: 50 TABLET, COATED ORAL at 21:13

## 2022-05-25 RX ADMIN — DOCUSATE SODIUM 50 MG AND SENNOSIDES 8.6 MG 2 TABLET: 8.6; 5 TABLET, FILM COATED ORAL at 08:51

## 2022-05-26 ENCOUNTER — ANESTHESIA (OUTPATIENT)
Dept: GASTROENTEROLOGY | Facility: HOSPITAL | Age: 72
End: 2022-05-26

## 2022-05-26 ENCOUNTER — ANESTHESIA EVENT (OUTPATIENT)
Dept: GASTROENTEROLOGY | Facility: HOSPITAL | Age: 72
End: 2022-05-26

## 2022-05-26 PROBLEM — K92.1 MELENA: Status: ACTIVE | Noted: 2022-05-23

## 2022-05-26 LAB
ANION GAP SERPL CALCULATED.3IONS-SCNC: 10 MMOL/L (ref 5–15)
BASOPHILS # BLD AUTO: 0.05 10*3/MM3 (ref 0–0.2)
BASOPHILS NFR BLD AUTO: 0.5 % (ref 0–1.5)
BUN SERPL-MCNC: 32 MG/DL (ref 8–23)
BUN/CREAT SERPL: 28.6 (ref 7–25)
CALCIUM SPEC-SCNC: 9.8 MG/DL (ref 8.6–10.5)
CHLORIDE SERPL-SCNC: 95 MMOL/L (ref 98–107)
CO2 SERPL-SCNC: 33 MMOL/L (ref 22–29)
CREAT SERPL-MCNC: 1.12 MG/DL (ref 0.57–1)
DEPRECATED RDW RBC AUTO: 48.2 FL (ref 37–54)
EGFRCR SERPLBLD CKD-EPI 2021: 53 ML/MIN/1.73
EOSINOPHIL # BLD AUTO: 0.22 10*3/MM3 (ref 0–0.4)
EOSINOPHIL NFR BLD AUTO: 2 % (ref 0.3–6.2)
ERYTHROCYTE [DISTWIDTH] IN BLOOD BY AUTOMATED COUNT: 16 % (ref 12.3–15.4)
GLUCOSE SERPL-MCNC: 102 MG/DL (ref 65–99)
HCT VFR BLD AUTO: 33 % (ref 34–46.6)
HGB BLD-MCNC: 10.5 G/DL (ref 12–15.9)
IMM GRANULOCYTES # BLD AUTO: 0.05 10*3/MM3 (ref 0–0.05)
IMM GRANULOCYTES NFR BLD AUTO: 0.5 % (ref 0–0.5)
LYMPHOCYTES # BLD AUTO: 1.6 10*3/MM3 (ref 0.7–3.1)
LYMPHOCYTES NFR BLD AUTO: 14.6 % (ref 19.6–45.3)
MCH RBC QN AUTO: 26.5 PG (ref 26.6–33)
MCHC RBC AUTO-ENTMCNC: 31.8 G/DL (ref 31.5–35.7)
MCV RBC AUTO: 83.3 FL (ref 79–97)
MONOCYTES # BLD AUTO: 1.2 10*3/MM3 (ref 0.1–0.9)
MONOCYTES NFR BLD AUTO: 10.9 % (ref 5–12)
NEUTROPHILS NFR BLD AUTO: 7.87 10*3/MM3 (ref 1.7–7)
NEUTROPHILS NFR BLD AUTO: 71.5 % (ref 42.7–76)
NRBC BLD AUTO-RTO: 0 /100 WBC (ref 0–0.2)
PLATELET # BLD AUTO: 299 10*3/MM3 (ref 140–450)
PMV BLD AUTO: 10.4 FL (ref 6–12)
POTASSIUM SERPL-SCNC: 3.9 MMOL/L (ref 3.5–5.2)
RBC # BLD AUTO: 3.96 10*6/MM3 (ref 3.77–5.28)
SODIUM SERPL-SCNC: 138 MMOL/L (ref 136–145)
WBC NRBC COR # BLD: 10.99 10*3/MM3 (ref 3.4–10.8)

## 2022-05-26 PROCEDURE — G0378 HOSPITAL OBSERVATION PER HR: HCPCS

## 2022-05-26 PROCEDURE — 25010000002 PROPOFOL 10 MG/ML EMULSION: Performed by: NURSE ANESTHETIST, CERTIFIED REGISTERED

## 2022-05-26 PROCEDURE — 99204 OFFICE O/P NEW MOD 45 MIN: CPT | Performed by: INTERNAL MEDICINE

## 2022-05-26 PROCEDURE — 97116 GAIT TRAINING THERAPY: CPT

## 2022-05-26 PROCEDURE — 97530 THERAPEUTIC ACTIVITIES: CPT

## 2022-05-26 PROCEDURE — 97110 THERAPEUTIC EXERCISES: CPT

## 2022-05-26 PROCEDURE — 88342 IMHCHEM/IMCYTCHM 1ST ANTB: CPT

## 2022-05-26 PROCEDURE — 88305 TISSUE EXAM BY PATHOLOGIST: CPT

## 2022-05-26 PROCEDURE — 85025 COMPLETE CBC W/AUTO DIFF WBC: CPT | Performed by: HOSPITALIST

## 2022-05-26 PROCEDURE — 97535 SELF CARE MNGMENT TRAINING: CPT

## 2022-05-26 PROCEDURE — 43239 EGD BIOPSY SINGLE/MULTIPLE: CPT | Performed by: INTERNAL MEDICINE

## 2022-05-26 PROCEDURE — 80048 BASIC METABOLIC PNL TOTAL CA: CPT | Performed by: HOSPITALIST

## 2022-05-26 RX ORDER — DEXTROSE AND SODIUM CHLORIDE 5; .45 G/100ML; G/100ML
30 INJECTION, SOLUTION INTRAVENOUS CONTINUOUS PRN
Status: CANCELLED | OUTPATIENT
Start: 2022-05-26

## 2022-05-26 RX ORDER — POLYETHYLENE GLYCOL 3350 17 G/17G
1 POWDER, FOR SOLUTION ORAL 2 TIMES DAILY
Status: COMPLETED | OUTPATIENT
Start: 2022-05-26 | End: 2022-05-27

## 2022-05-26 RX ORDER — SODIUM CHLORIDE 9 MG/ML
30 INJECTION, SOLUTION INTRAVENOUS CONTINUOUS
Status: DISCONTINUED | OUTPATIENT
Start: 2022-05-26 | End: 2022-05-27

## 2022-05-26 RX ORDER — LIDOCAINE HYDROCHLORIDE 20 MG/ML
INJECTION, SOLUTION INTRAVENOUS AS NEEDED
Status: DISCONTINUED | OUTPATIENT
Start: 2022-05-26 | End: 2022-05-26 | Stop reason: SURG

## 2022-05-26 RX ORDER — ACETAMINOPHEN 325 MG/1
650 TABLET ORAL EVERY 6 HOURS PRN
Status: DISCONTINUED | OUTPATIENT
Start: 2022-05-26 | End: 2022-05-28 | Stop reason: HOSPADM

## 2022-05-26 RX ORDER — LOSARTAN POTASSIUM 25 MG/1
25 TABLET ORAL
Status: DISCONTINUED | OUTPATIENT
Start: 2022-05-27 | End: 2022-05-28 | Stop reason: HOSPADM

## 2022-05-26 RX ORDER — PROPOFOL 10 MG/ML
VIAL (ML) INTRAVENOUS AS NEEDED
Status: DISCONTINUED | OUTPATIENT
Start: 2022-05-26 | End: 2022-05-26 | Stop reason: SURG

## 2022-05-26 RX ADMIN — FERROUS SULFATE TAB EC 324 MG (65 MG FE EQUIVALENT) 324 MG: 324 (65 FE) TABLET DELAYED RESPONSE at 09:24

## 2022-05-26 RX ADMIN — ACETAMINOPHEN 650 MG: 325 TABLET ORAL at 05:19

## 2022-05-26 RX ADMIN — CLOPIDOGREL BISULFATE 75 MG: 75 TABLET ORAL at 09:24

## 2022-05-26 RX ADMIN — CETIRIZINE HYDROCHLORIDE 5 MG: 5 TABLET ORAL at 09:24

## 2022-05-26 RX ADMIN — PANTOPRAZOLE SODIUM 40 MG: 40 TABLET, DELAYED RELEASE ORAL at 05:19

## 2022-05-26 RX ADMIN — Medication 1 BOTTLE: at 21:53

## 2022-05-26 RX ADMIN — CARVEDILOL 3.12 MG: 3.12 TABLET, FILM COATED ORAL at 09:26

## 2022-05-26 RX ADMIN — LEVOTHYROXINE SODIUM 25 MCG: 25 TABLET ORAL at 05:19

## 2022-05-26 RX ADMIN — OXYCODONE HYDROCHLORIDE AND ACETAMINOPHEN 500 MG: 500 TABLET ORAL at 21:53

## 2022-05-26 RX ADMIN — PRAVASTATIN SODIUM 40 MG: 40 TABLET ORAL at 21:53

## 2022-05-26 RX ADMIN — DOCUSATE SODIUM 50 MG AND SENNOSIDES 8.6 MG 2 TABLET: 8.6; 5 TABLET, FILM COATED ORAL at 09:24

## 2022-05-26 RX ADMIN — ASPIRIN 81 MG: 81 TABLET, FILM COATED ORAL at 09:24

## 2022-05-26 RX ADMIN — SODIUM CHLORIDE 30 ML/HR: 900 INJECTION, SOLUTION INTRAVENOUS at 16:21

## 2022-05-26 RX ADMIN — Medication 400 MG: at 09:24

## 2022-05-26 RX ADMIN — TAMSULOSIN HYDROCHLORIDE 0.4 MG: 0.4 CAPSULE ORAL at 09:24

## 2022-05-26 RX ADMIN — FERROUS SULFATE TAB EC 324 MG (65 MG FE EQUIVALENT) 324 MG: 324 (65 FE) TABLET DELAYED RESPONSE at 18:43

## 2022-05-26 RX ADMIN — LIDOCAINE HYDROCHLORIDE 80 MG: 20 INJECTION, SOLUTION INTRAVENOUS at 17:05

## 2022-05-26 RX ADMIN — DOCUSATE SODIUM 50 MG AND SENNOSIDES 8.6 MG 2 TABLET: 8.6; 5 TABLET, FILM COATED ORAL at 21:53

## 2022-05-26 RX ADMIN — FUROSEMIDE 20 MG: 20 TABLET ORAL at 09:24

## 2022-05-26 RX ADMIN — CARVEDILOL 3.12 MG: 3.12 TABLET, FILM COATED ORAL at 18:43

## 2022-05-26 RX ADMIN — OXYCODONE HYDROCHLORIDE AND ACETAMINOPHEN 500 MG: 500 TABLET ORAL at 09:24

## 2022-05-26 RX ADMIN — PROPOFOL 40 MG: 10 INJECTION, EMULSION INTRAVENOUS at 17:05

## 2022-05-26 RX ADMIN — TRAMADOL HYDROCHLORIDE 100 MG: 50 TABLET, COATED ORAL at 01:29

## 2022-05-26 RX ADMIN — Medication 400 MG: at 21:53

## 2022-05-26 NOTE — ANESTHESIA PREPROCEDURE EVALUATION
Anesthesia Evaluation     Patient summary reviewed and Nursing notes reviewed   no history of anesthetic complications:  NPO Solid Status: > 8 hours  NPO Liquid Status: > 2 hours           Airway   Mallampati: II  TM distance: >3 FB  Neck ROM: full  possible difficult intubation, Small opening and Large neck circumference  Comment: General anesthesia 9/30/20. MAC #3, ETT 7.0, Grade 1  Dental    (+) upper dentures and lower dentures    Pulmonary    (+) asthma,decreased breath sounds,   (-) COPD, shortness of breath, sleep apnea, wheezes, not a smoker, no home oxygen    ROS comment: FINDINGS:  Cardiac silhouette is normal in size. Thoracic aorta contains  atherosclerotic calcification. Mild pulmonary vascular  congestion.  No pleural effusion or pneumothorax.     IMPRESSION:  CONCLUSION:  Pulmonary vascular congestion.     Electronically signed by:  Salas Palomo MD  9/29/2020 12:27 PM  Cardiovascular   Exercise tolerance: poor (<4 METS)    ECG reviewed  Patient on routine beta blocker and Beta blocker given within 24 hours of surgery  Rhythm: regular  Rate: normal    (+) hypertension poorly controlled 2 medications or greater, valvular problems/murmurs murmur, MR and TI, past MI (2016)  >12 months, CAD, CABG, cardiac stents (2 stents 2016) more than 12 months ago CHF Systolic <55%, ALVAREZ, murmur (Grade II/VI systolic), hyperlipidemia,   (-) pacemaker, dysrhythmias, angina, carotid bruits, DVT    ROS comment:    Sinus rhythm with 1st degree AV block  Left axis deviation  Inferior infarct , age undetermined  Anteroseptal infarct (cited on or before 02-FEB-2016)  Abnormal ECG  When compared with ECG of 18-APR-2022 10:19,  Inferior infarct is now Present  Questionable change in initial forces of Anterior leads    Impression:  A.  100% occlusion of the left anterior descending artery after the origin of the first diagonal branch with the distal left anterior descending artery filling in from left to left collateral.  B.   First major diagonal branch in the proximal portion had 50 to 60% stenosis.  C.  Distal left main minimal plaquing.  D.  Proximal circumflex artery with 30 to 40% stenosis with some ostial stenosis with the first obtuse marginal branch with 50 to 60% stenosis.  E.  Dominant right coronary artery with diffuse disease with the posterolateral branch with 95% stenosis in the distal right coronary artery with 60% stenosis.  F.  Anteroapical wall hypokinesis with an ejection fraction of 45%.        · The study is technically difficult for diagnosis. The quality of the study is limited due to patient body habitus, patient positioning and lung disease. Verbal consent was obtained from the patient to use Definity image enhancer in order to optimize the study.  · The left ventricular cavity is mildly dilated.  · Estimated left ventricular EF = 43% Left ventricular ejection fraction appears to be 41 - 45%. Left ventricular systolic function is mildly decreased. Mid/ Distal septum Dyskinetic  · Left ventricular diastolic function is consistent with (grade Ia w/high LAP) impaired relaxation.  · The left atrial cavity is mildly dilated.  · The right ventricular cavity is mildly dilated.  · Trace mitral valve regurgitation is present.  · Mild tricuspid valve regurgitation is present.  · Estimated right ventricular systolic pressure from tricuspid regurgitation is normal (<35 mmHg).        Neuro/Psych- negative ROS  (-) seizures, TIA, CVA, headaches, numbness, psychiatric history  GI/Hepatic/Renal/Endo    (+) obesity,  GERD well controlled, PUD,  liver disease history of elevated LFT, renal disease (Creatinine 1.02) stones, diabetes mellitus (glu 125) type 2 well controlled, thyroid problem hypothyroidism  (-) hepatitis    ROS Comment: HGB 9.8 HCT 31.8    Musculoskeletal     Abdominal   (+) obese,    Substance History - negative use  (-) alcohol use, drug use     OB/GYN negative ob/gyn ROS         Other   arthritis,      (-)  history of cancer                    Anesthesia Plan    ASA 4     MAC     intravenous induction   Postoperative Plan: Expected vent after surgery  Anesthetic plan, all risks, benefits, and alternatives have been provided, discussed and informed consent has been obtained with: patient and spouse/significant other.    Plan discussed with CRNA.

## 2022-05-26 NOTE — ANESTHESIA POSTPROCEDURE EVALUATION
Patient: Afia Adams    Procedure Summary     Date: 05/26/22 Room / Location: [facility] ENDOSCOPY 1 / [facility] ENDOSCOPY    Anesthesia Start: 1701 Anesthesia Stop: 1714    Procedure: ESOPHAGOGASTRODUODENOSCOPY (N/A ) Diagnosis:       Rectal bleeding      Melena      Other iron deficiency anemia      (Rectal bleeding [K62.5])      (Melena [K92.1])      (Other iron deficiency anemia [D50.8])    Surgeons: Agustin Powers MD Provider: Keo Pinon CRNA    Anesthesia Type: MAC ASA Status: 4          Anesthesia Type: MAC    Vitals  No vitals data found for the desired time range.          Post Anesthesia Care and Evaluation    Patient location during evaluation: bedside  Patient participation: complete - patient participated  Level of consciousness: sleepy but conscious  Pain score: 0  Pain management: adequate  Airway patency: patent  Anesthetic complications: No anesthetic complications  PONV Status: none  Cardiovascular status: acceptable  Respiratory status: acceptable  Hydration status: acceptable    Comments: ---------------------------               05/26/22 1714         ---------------------------   BP:          98/56         Pulse:         81           Resp:          18           Temp:   97.4 °F (36.3 °C)   SpO2:          95%         ---------------------------

## 2022-05-27 ENCOUNTER — ANESTHESIA EVENT (OUTPATIENT)
Dept: GASTROENTEROLOGY | Facility: HOSPITAL | Age: 72
End: 2022-05-27

## 2022-05-27 ENCOUNTER — ANESTHESIA (OUTPATIENT)
Dept: GASTROENTEROLOGY | Facility: HOSPITAL | Age: 72
End: 2022-05-27

## 2022-05-27 LAB
ANION GAP SERPL CALCULATED.3IONS-SCNC: 9 MMOL/L (ref 5–15)
BASOPHILS # BLD AUTO: 0.04 10*3/MM3 (ref 0–0.2)
BASOPHILS NFR BLD AUTO: 0.5 % (ref 0–1.5)
BUN SERPL-MCNC: 23 MG/DL (ref 8–23)
BUN/CREAT SERPL: 28.8 (ref 7–25)
CALCIUM SPEC-SCNC: 9.7 MG/DL (ref 8.6–10.5)
CHLORIDE SERPL-SCNC: 95 MMOL/L (ref 98–107)
CO2 SERPL-SCNC: 34 MMOL/L (ref 22–29)
CREAT SERPL-MCNC: 0.8 MG/DL (ref 0.57–1)
DEPRECATED RDW RBC AUTO: 48 FL (ref 37–54)
EGFRCR SERPLBLD CKD-EPI 2021: 79.4 ML/MIN/1.73
EOSINOPHIL # BLD AUTO: 0.26 10*3/MM3 (ref 0–0.4)
EOSINOPHIL NFR BLD AUTO: 3.1 % (ref 0.3–6.2)
ERYTHROCYTE [DISTWIDTH] IN BLOOD BY AUTOMATED COUNT: 16 % (ref 12.3–15.4)
GLUCOSE SERPL-MCNC: 101 MG/DL (ref 65–99)
HCT VFR BLD AUTO: 35 % (ref 34–46.6)
HGB BLD-MCNC: 11.3 G/DL (ref 12–15.9)
IMM GRANULOCYTES # BLD AUTO: 0.04 10*3/MM3 (ref 0–0.05)
IMM GRANULOCYTES NFR BLD AUTO: 0.5 % (ref 0–0.5)
LYMPHOCYTES # BLD AUTO: 1.66 10*3/MM3 (ref 0.7–3.1)
LYMPHOCYTES NFR BLD AUTO: 19.8 % (ref 19.6–45.3)
MCH RBC QN AUTO: 26.9 PG (ref 26.6–33)
MCHC RBC AUTO-ENTMCNC: 32.3 G/DL (ref 31.5–35.7)
MCV RBC AUTO: 83.3 FL (ref 79–97)
MONOCYTES # BLD AUTO: 0.84 10*3/MM3 (ref 0.1–0.9)
MONOCYTES NFR BLD AUTO: 10 % (ref 5–12)
NEUTROPHILS NFR BLD AUTO: 5.54 10*3/MM3 (ref 1.7–7)
NEUTROPHILS NFR BLD AUTO: 66.1 % (ref 42.7–76)
NRBC BLD AUTO-RTO: 0 /100 WBC (ref 0–0.2)
PLATELET # BLD AUTO: 280 10*3/MM3 (ref 140–450)
PMV BLD AUTO: 9.5 FL (ref 6–12)
POTASSIUM SERPL-SCNC: 3.8 MMOL/L (ref 3.5–5.2)
RBC # BLD AUTO: 4.2 10*6/MM3 (ref 3.77–5.28)
SODIUM SERPL-SCNC: 138 MMOL/L (ref 136–145)
WBC NRBC COR # BLD: 8.38 10*3/MM3 (ref 3.4–10.8)

## 2022-05-27 PROCEDURE — 25010000002 PROPOFOL 10 MG/ML EMULSION: Performed by: NURSE ANESTHETIST, CERTIFIED REGISTERED

## 2022-05-27 PROCEDURE — 45378 DIAGNOSTIC COLONOSCOPY: CPT | Performed by: INTERNAL MEDICINE

## 2022-05-27 PROCEDURE — G0378 HOSPITAL OBSERVATION PER HR: HCPCS

## 2022-05-27 PROCEDURE — 85025 COMPLETE CBC W/AUTO DIFF WBC: CPT | Performed by: HOSPITALIST

## 2022-05-27 PROCEDURE — 80048 BASIC METABOLIC PNL TOTAL CA: CPT | Performed by: HOSPITALIST

## 2022-05-27 RX ORDER — ONDANSETRON 2 MG/ML
4 INJECTION INTRAMUSCULAR; INTRAVENOUS ONCE AS NEEDED
Status: DISCONTINUED | OUTPATIENT
Start: 2022-05-27 | End: 2022-05-27 | Stop reason: HOSPADM

## 2022-05-27 RX ORDER — MEPERIDINE HYDROCHLORIDE 25 MG/ML
12.5 INJECTION INTRAMUSCULAR; INTRAVENOUS; SUBCUTANEOUS
Status: DISCONTINUED | OUTPATIENT
Start: 2022-05-27 | End: 2022-05-27 | Stop reason: HOSPADM

## 2022-05-27 RX ORDER — PROPOFOL 10 MG/ML
VIAL (ML) INTRAVENOUS AS NEEDED
Status: DISCONTINUED | OUTPATIENT
Start: 2022-05-27 | End: 2022-05-27 | Stop reason: SURG

## 2022-05-27 RX ORDER — PROMETHAZINE HYDROCHLORIDE 25 MG/1
25 SUPPOSITORY RECTAL ONCE AS NEEDED
Status: DISCONTINUED | OUTPATIENT
Start: 2022-05-27 | End: 2022-05-27 | Stop reason: HOSPADM

## 2022-05-27 RX ORDER — PROMETHAZINE HYDROCHLORIDE 25 MG/1
25 TABLET ORAL ONCE AS NEEDED
Status: DISCONTINUED | OUTPATIENT
Start: 2022-05-27 | End: 2022-05-27 | Stop reason: HOSPADM

## 2022-05-27 RX ADMIN — DOCUSATE SODIUM 50 MG AND SENNOSIDES 8.6 MG 2 TABLET: 8.6; 5 TABLET, FILM COATED ORAL at 21:18

## 2022-05-27 RX ADMIN — PROPOFOL 20 MG: 10 INJECTION, EMULSION INTRAVENOUS at 16:21

## 2022-05-27 RX ADMIN — Medication 400 MG: at 21:18

## 2022-05-27 RX ADMIN — PANTOPRAZOLE SODIUM 40 MG: 40 TABLET, DELAYED RELEASE ORAL at 05:40

## 2022-05-27 RX ADMIN — Medication 1 BOTTLE: at 04:40

## 2022-05-27 RX ADMIN — FERROUS SULFATE TAB EC 324 MG (65 MG FE EQUIVALENT) 324 MG: 324 (65 FE) TABLET DELAYED RESPONSE at 17:29

## 2022-05-27 RX ADMIN — PRAVASTATIN SODIUM 40 MG: 40 TABLET ORAL at 21:18

## 2022-05-27 RX ADMIN — LEVOTHYROXINE SODIUM 25 MCG: 25 TABLET ORAL at 05:40

## 2022-05-27 RX ADMIN — OXYCODONE HYDROCHLORIDE AND ACETAMINOPHEN 500 MG: 500 TABLET ORAL at 21:18

## 2022-05-27 RX ADMIN — LOSARTAN POTASSIUM 25 MG: 25 TABLET, FILM COATED ORAL at 08:28

## 2022-05-27 RX ADMIN — SODIUM CHLORIDE 30 ML/HR: 900 INJECTION, SOLUTION INTRAVENOUS at 08:28

## 2022-05-27 RX ADMIN — CARVEDILOL 3.12 MG: 3.12 TABLET, FILM COATED ORAL at 08:28

## 2022-05-27 RX ADMIN — SODIUM CHLORIDE 150 ML: 900 INJECTION, SOLUTION INTRAVENOUS at 16:12

## 2022-05-27 RX ADMIN — PROPOFOL 50 MG: 10 INJECTION, EMULSION INTRAVENOUS at 16:16

## 2022-05-27 RX ADMIN — CARVEDILOL 3.12 MG: 3.12 TABLET, FILM COATED ORAL at 17:29

## 2022-05-27 NOTE — ANESTHESIA PREPROCEDURE EVALUATION
Anesthesia Evaluation     Patient summary reviewed and Nursing notes reviewed   no history of anesthetic complications:  NPO Solid Status: > 8 hours  NPO Liquid Status: > 2 hours           Airway   Mallampati: II  TM distance: >3 FB  Neck ROM: full  possible difficult intubation, Small opening and Large neck circumference  Comment: General anesthesia 9/30/20. MAC #3, ETT 7.0, Grade 1  Dental    (+) upper dentures and lower dentures    Pulmonary    (+) pneumonia resolved , asthma,decreased breath sounds,   (-) COPD, shortness of breath, sleep apnea, wheezes, not a smoker, no home oxygen    ROS comment: FINDINGS:  Cardiac silhouette is normal in size. Thoracic aorta contains  atherosclerotic calcification. Mild pulmonary vascular  congestion.  No pleural effusion or pneumothorax.     IMPRESSION:  CONCLUSION:  Pulmonary vascular congestion.     Electronically signed by:  Salas Palomo MD  9/29/2020 12:27 PM  Cardiovascular   Exercise tolerance: poor (<4 METS)    ECG reviewed  Patient on routine beta blocker and Beta blocker given within 24 hours of surgery  Rhythm: regular  Rate: normal    (+) hypertension poorly controlled 2 medications or greater, valvular problems/murmurs murmur, MR and TI, past MI (2016)  >12 months, CAD, CABG, cardiac stents (2 stents 2016) more than 12 months ago CHF Systolic <55%, ALVAREZ, murmur (Grade II/VI systolic), hyperlipidemia,   (-) pacemaker, dysrhythmias, angina, carotid bruits, DVT    ROS comment:    Sinus rhythm with 1st degree AV block  Left axis deviation  Inferior infarct , age undetermined  Anteroseptal infarct (cited on or before 02-FEB-2016)  Abnormal ECG  When compared with ECG of 18-APR-2022 10:19,  Inferior infarct is now Present  Questionable change in initial forces of Anterior leads    Impression:  A.  100% occlusion of the left anterior descending artery after the origin of the first diagonal branch with the distal left anterior descending artery filling in from left to  left collateral.  B.  First major diagonal branch in the proximal portion had 50 to 60% stenosis.  C.  Distal left main minimal plaquing.  D.  Proximal circumflex artery with 30 to 40% stenosis with some ostial stenosis with the first obtuse marginal branch with 50 to 60% stenosis.  E.  Dominant right coronary artery with diffuse disease with the posterolateral branch with 95% stenosis in the distal right coronary artery with 60% stenosis.  F.  Anteroapical wall hypokinesis with an ejection fraction of 45%.        · The study is technically difficult for diagnosis. The quality of the study is limited due to patient body habitus, patient positioning and lung disease. Verbal consent was obtained from the patient to use Definity image enhancer in order to optimize the study.  · The left ventricular cavity is mildly dilated.  · Estimated left ventricular EF = 43% Left ventricular ejection fraction appears to be 41 - 45%. Left ventricular systolic function is mildly decreased. Mid/ Distal septum Dyskinetic  · Left ventricular diastolic function is consistent with (grade Ia w/high LAP) impaired relaxation.  · The left atrial cavity is mildly dilated.  · The right ventricular cavity is mildly dilated.  · Trace mitral valve regurgitation is present.  · Mild tricuspid valve regurgitation is present.  · Estimated right ventricular systolic pressure from tricuspid regurgitation is normal (<35 mmHg).        Neuro/Psych  (+) weakness,    (-) seizures, TIA, CVA, headaches, numbness, psychiatric history  GI/Hepatic/Renal/Endo    (+) obesity,  GERD well controlled, PUD, GI bleeding , liver disease history of elevated LFT, renal disease (Creatinine 1.02) stones, diabetes mellitus (glu 125) type 2 well controlled, thyroid problem hypothyroidism  (-) hepatitis    ROS Comment: HGB 9.8 HCT 31.8    Musculoskeletal     Abdominal   (+) obese,    Substance History - negative use  (-) alcohol use, drug use     OB/GYN negative ob/gyn ROS          Other   arthritis,      (-) history of cancer                    Anesthesia Plan    ASA 4     MAC     intravenous induction     Anesthetic plan, all risks, benefits, and alternatives have been provided, discussed and informed consent has been obtained with: patient.

## 2022-05-27 NOTE — ANESTHESIA POSTPROCEDURE EVALUATION
Patient: Afia Adams    Procedure Summary     Date: 05/27/22 Room / Location: [facility] ENDOSCOPY 3 / [facility] ENDOSCOPY    Anesthesia Start: 1607 Anesthesia Stop: 1626    Procedure: COLONOSCOPY (N/A ) Diagnosis:       Rectal bleeding      (Rectal bleeding [K62.5])    Surgeons: Agustin Powers MD Provider: Lucretia Lanza CRNA    Anesthesia Type: MAC ASA Status: 4          Anesthesia Type: MAC    Vitals  No vitals data found for the desired time range.          Post Anesthesia Care and Evaluation    Patient location during evaluation: bedside  Patient participation: waiting for patient participation  Level of consciousness: sleepy but conscious  Pain score: 0  Pain management: adequate  Airway patency: patent  Anesthetic complications: No anesthetic complications  PONV Status: none  Cardiovascular status: acceptable and stable  Respiratory status: acceptable, room air and spontaneous ventilation  Hydration status: acceptable    Comments: BP:109/57    HR:  81  SAT:  100  RR:  14

## 2022-05-28 ENCOUNTER — READMISSION MANAGEMENT (OUTPATIENT)
Dept: CALL CENTER | Facility: HOSPITAL | Age: 72
End: 2022-05-28

## 2022-05-28 VITALS
TEMPERATURE: 96.1 F | DIASTOLIC BLOOD PRESSURE: 74 MMHG | SYSTOLIC BLOOD PRESSURE: 139 MMHG | WEIGHT: 175.6 LBS | HEART RATE: 82 BPM | HEIGHT: 63 IN | OXYGEN SATURATION: 99 % | BODY MASS INDEX: 31.11 KG/M2 | RESPIRATION RATE: 20 BRPM

## 2022-05-28 LAB
ANION GAP SERPL CALCULATED.3IONS-SCNC: 11 MMOL/L (ref 5–15)
BASOPHILS # BLD AUTO: 0.04 10*3/MM3 (ref 0–0.2)
BASOPHILS NFR BLD AUTO: 0.5 % (ref 0–1.5)
BUN SERPL-MCNC: 18 MG/DL (ref 8–23)
BUN/CREAT SERPL: 20.2 (ref 7–25)
CALCIUM SPEC-SCNC: 9.8 MG/DL (ref 8.6–10.5)
CHLORIDE SERPL-SCNC: 95 MMOL/L (ref 98–107)
CO2 SERPL-SCNC: 33 MMOL/L (ref 22–29)
CREAT SERPL-MCNC: 0.89 MG/DL (ref 0.57–1)
DEPRECATED RDW RBC AUTO: 47.9 FL (ref 37–54)
EGFRCR SERPLBLD CKD-EPI 2021: 69.8 ML/MIN/1.73
EOSINOPHIL # BLD AUTO: 0.2 10*3/MM3 (ref 0–0.4)
EOSINOPHIL NFR BLD AUTO: 2.5 % (ref 0.3–6.2)
ERYTHROCYTE [DISTWIDTH] IN BLOOD BY AUTOMATED COUNT: 15.9 % (ref 12.3–15.4)
GLUCOSE SERPL-MCNC: 117 MG/DL (ref 65–99)
HCT VFR BLD AUTO: 36.3 % (ref 34–46.6)
HGB BLD-MCNC: 11.3 G/DL (ref 12–15.9)
IMM GRANULOCYTES # BLD AUTO: 0.05 10*3/MM3 (ref 0–0.05)
IMM GRANULOCYTES NFR BLD AUTO: 0.6 % (ref 0–0.5)
LYMPHOCYTES # BLD AUTO: 1.6 10*3/MM3 (ref 0.7–3.1)
LYMPHOCYTES NFR BLD AUTO: 19.8 % (ref 19.6–45.3)
MCH RBC QN AUTO: 25.6 PG (ref 26.6–33)
MCHC RBC AUTO-ENTMCNC: 31.1 G/DL (ref 31.5–35.7)
MCV RBC AUTO: 82.3 FL (ref 79–97)
MONOCYTES # BLD AUTO: 0.7 10*3/MM3 (ref 0.1–0.9)
MONOCYTES NFR BLD AUTO: 8.7 % (ref 5–12)
NEUTROPHILS NFR BLD AUTO: 5.48 10*3/MM3 (ref 1.7–7)
NEUTROPHILS NFR BLD AUTO: 67.9 % (ref 42.7–76)
NRBC BLD AUTO-RTO: 0 /100 WBC (ref 0–0.2)
PLATELET # BLD AUTO: 323 10*3/MM3 (ref 140–450)
PMV BLD AUTO: 10.1 FL (ref 6–12)
POTASSIUM SERPL-SCNC: 3.6 MMOL/L (ref 3.5–5.2)
RBC # BLD AUTO: 4.41 10*6/MM3 (ref 3.77–5.28)
SODIUM SERPL-SCNC: 139 MMOL/L (ref 136–145)
WBC NRBC COR # BLD: 8.07 10*3/MM3 (ref 3.4–10.8)

## 2022-05-28 PROCEDURE — 99233 SBSQ HOSP IP/OBS HIGH 50: CPT | Performed by: INTERNAL MEDICINE

## 2022-05-28 PROCEDURE — 80048 BASIC METABOLIC PNL TOTAL CA: CPT | Performed by: INTERNAL MEDICINE

## 2022-05-28 PROCEDURE — 85025 COMPLETE CBC W/AUTO DIFF WBC: CPT | Performed by: INTERNAL MEDICINE

## 2022-05-28 PROCEDURE — G0378 HOSPITAL OBSERVATION PER HR: HCPCS

## 2022-05-28 RX ORDER — HYDROCORTISONE ACETATE 25 MG/1
25 SUPPOSITORY RECTAL 2 TIMES DAILY
Qty: 30 EACH | Refills: 0 | Status: SHIPPED | OUTPATIENT
Start: 2022-05-28

## 2022-05-28 RX ADMIN — FUROSEMIDE 20 MG: 20 TABLET ORAL at 10:18

## 2022-05-28 RX ADMIN — CETIRIZINE HYDROCHLORIDE 5 MG: 5 TABLET ORAL at 10:18

## 2022-05-28 RX ADMIN — PANTOPRAZOLE SODIUM 40 MG: 40 TABLET, DELAYED RELEASE ORAL at 06:06

## 2022-05-28 RX ADMIN — LEVOTHYROXINE SODIUM 25 MCG: 25 TABLET ORAL at 06:06

## 2022-05-28 RX ADMIN — CLOPIDOGREL BISULFATE 75 MG: 75 TABLET ORAL at 10:20

## 2022-05-28 RX ADMIN — TAMSULOSIN HYDROCHLORIDE 0.4 MG: 0.4 CAPSULE ORAL at 10:19

## 2022-05-28 RX ADMIN — ASPIRIN 81 MG: 81 TABLET, FILM COATED ORAL at 10:19

## 2022-05-28 RX ADMIN — Medication 400 MG: at 10:18

## 2022-05-28 RX ADMIN — LOSARTAN POTASSIUM 25 MG: 25 TABLET, FILM COATED ORAL at 10:19

## 2022-05-28 RX ADMIN — FERROUS SULFATE TAB EC 324 MG (65 MG FE EQUIVALENT) 324 MG: 324 (65 FE) TABLET DELAYED RESPONSE at 10:20

## 2022-05-28 RX ADMIN — OXYCODONE HYDROCHLORIDE AND ACETAMINOPHEN 500 MG: 500 TABLET ORAL at 10:20

## 2022-05-28 RX ADMIN — CARVEDILOL 3.12 MG: 3.12 TABLET, FILM COATED ORAL at 10:19

## 2022-05-28 RX ADMIN — DOCUSATE SODIUM 50 MG AND SENNOSIDES 8.6 MG 2 TABLET: 8.6; 5 TABLET, FILM COATED ORAL at 10:18

## 2022-05-28 NOTE — OUTREACH NOTE
Prep Survey    Flowsheet Row Responses   [facility] facility patient discharged from? [place]   Is LACE score < 7 ? No   Emergency Room discharge w/ pulse ox? No   Eligibility [facility]   Date of Admission 05/23/22   Date of Discharge 05/28/22   Discharge Disposition Home-Health Care Svc   Discharge diagnosis Rectal bleeding    Does the patient have one of the following disease processes/diagnoses(primary or secondary)? Other   Does the patient have Home health ordered? Yes   What is the Home health agency?  [facility]   Is there a DME ordered? No   Prep survey completed? Yes          ELIZABETH HILL - Registered Nurse

## 2022-05-29 ENCOUNTER — HOME CARE VISIT (OUTPATIENT)
Dept: HOME HEALTH SERVICES | Facility: CLINIC | Age: 72
End: 2022-05-29

## 2022-05-29 VITALS
SYSTOLIC BLOOD PRESSURE: 100 MMHG | HEART RATE: 90 BPM | TEMPERATURE: 97.3 F | DIASTOLIC BLOOD PRESSURE: 68 MMHG | OXYGEN SATURATION: 98 % | RESPIRATION RATE: 22 BRPM

## 2022-05-29 PROCEDURE — G0299 HHS/HOSPICE OF RN EA 15 MIN: HCPCS

## 2022-05-30 ENCOUNTER — TRANSITIONAL CARE MANAGEMENT TELEPHONE ENCOUNTER (OUTPATIENT)
Dept: CALL CENTER | Facility: HOSPITAL | Age: 72
End: 2022-05-30

## 2022-05-30 NOTE — OUTREACH NOTE
Call Center TCM Note    Flowsheet Row Responses   [facility] patient discharged from? [place]   Does the patient have one of the following disease processes/diagnoses(primary or secondary)? Other   TCM attempt successful? No   Unsuccessful attempts Attempt 1  [no release]          Andry Webb RN    5/30/2022, 16:29 CDT

## 2022-05-30 NOTE — OUTREACH NOTE
Call Center TCM Note    Flowsheet Row Responses   [facility] patient discharged from? [place]   Does the patient have one of the following disease processes/diagnoses(primary or secondary)? Other   TCM attempt successful? No   Unsuccessful attempts Attempt 2          Andry Webb RN    5/30/2022, 16:46 CDT

## 2022-05-31 ENCOUNTER — HOME CARE VISIT (OUTPATIENT)
Dept: HOME HEALTH SERVICES | Facility: CLINIC | Age: 72
End: 2022-05-31

## 2022-05-31 ENCOUNTER — TRANSITIONAL CARE MANAGEMENT TELEPHONE ENCOUNTER (OUTPATIENT)
Dept: CALL CENTER | Facility: HOSPITAL | Age: 72
End: 2022-05-31

## 2022-05-31 ENCOUNTER — OFFICE VISIT (OUTPATIENT)
Dept: FAMILY MEDICINE CLINIC | Facility: CLINIC | Age: 72
End: 2022-05-31

## 2022-05-31 VITALS
SYSTOLIC BLOOD PRESSURE: 98 MMHG | DIASTOLIC BLOOD PRESSURE: 62 MMHG | HEIGHT: 63 IN | BODY MASS INDEX: 30.3 KG/M2 | OXYGEN SATURATION: 99 % | TEMPERATURE: 97.4 F | HEART RATE: 74 BPM | WEIGHT: 171 LBS | RESPIRATION RATE: 20 BRPM

## 2022-05-31 VITALS
DIASTOLIC BLOOD PRESSURE: 58 MMHG | TEMPERATURE: 97.3 F | RESPIRATION RATE: 18 BRPM | HEART RATE: 82 BPM | SYSTOLIC BLOOD PRESSURE: 100 MMHG | OXYGEN SATURATION: 95 %

## 2022-05-31 DIAGNOSIS — F41.9 ANXIETY: ICD-10-CM

## 2022-05-31 DIAGNOSIS — I10 ESSENTIAL HYPERTENSION: ICD-10-CM

## 2022-05-31 DIAGNOSIS — Z09 HOSPITAL DISCHARGE FOLLOW-UP: Primary | ICD-10-CM

## 2022-05-31 DIAGNOSIS — Z95.1 S/P CABG (CORONARY ARTERY BYPASS GRAFT): ICD-10-CM

## 2022-05-31 DIAGNOSIS — G47.01 INSOMNIA DUE TO MEDICAL CONDITION: ICD-10-CM

## 2022-05-31 PROBLEM — M10.9 GOUT: Status: ACTIVE | Noted: 2022-05-17

## 2022-05-31 PROBLEM — Z95.5 PRESENCE OF CORONARY ANGIOPLASTY IMPLANT AND GRAFT: Status: ACTIVE | Noted: 2022-05-17

## 2022-05-31 PROBLEM — Z74.1 NEED FOR ASSISTANCE WITH PERSONAL CARE: Status: ACTIVE | Noted: 2022-05-18

## 2022-05-31 PROBLEM — M25.579 PAIN IN UNSPECIFIED ANKLE AND JOINTS OF UNSPECIFIED FOOT: Status: ACTIVE | Noted: 2022-05-17

## 2022-05-31 PROBLEM — K27.9 PEPTIC ULCER, SITE UNSPECIFIED, UNSPECIFIED AS ACUTE OR CHRONIC, WITHOUT HEMORRHAGE OR PERFORATION: Status: ACTIVE | Noted: 2022-05-17

## 2022-05-31 PROBLEM — M17.10 UNILATERAL PRIMARY OSTEOARTHRITIS, UNSPECIFIED KNEE: Status: ACTIVE | Noted: 2022-05-17

## 2022-05-31 PROBLEM — Z98.61 CORONARY ANGIOPLASTY STATUS: Status: ACTIVE | Noted: 2022-04-24

## 2022-05-31 PROBLEM — R26.2 DIFFICULTY IN WALKING, NOT ELSEWHERE CLASSIFIED: Status: ACTIVE | Noted: 2022-05-18

## 2022-05-31 PROBLEM — R53.83 OTHER FATIGUE: Status: ACTIVE | Noted: 2022-05-18

## 2022-05-31 PROBLEM — M62.81 GENERALIZED MUSCLE WEAKNESS: Status: ACTIVE | Noted: 2022-05-18

## 2022-05-31 PROBLEM — E07.9 DISORDER OF THYROID, UNSPECIFIED: Status: ACTIVE | Noted: 2022-05-17

## 2022-05-31 PROBLEM — R53.1 WEAKNESS: Status: ACTIVE | Noted: 2022-05-17

## 2022-05-31 PROBLEM — Z90.710 ABSENCE OF BOTH CERVIX AND UTERUS, ACQUIRED: Status: ACTIVE | Noted: 2022-05-17

## 2022-05-31 PROBLEM — J44.9 CHRONIC OBSTRUCTIVE PULMONARY DISEASE, UNSPECIFIED (HCC): Status: ACTIVE | Noted: 2022-05-17

## 2022-05-31 PROBLEM — B85.0 PEDICULOSIS DUE TO PEDICULUS HUMANUS CAPITIS: Status: ACTIVE | Noted: 2022-05-17

## 2022-05-31 PROBLEM — D17.0 BENIGN LIPOMATOUS NEOPLASM OF SKIN AND SUBCUTANEOUS TISSUE OF HEAD, FACE AND NECK: Status: ACTIVE | Noted: 2021-09-08

## 2022-05-31 PROBLEM — I25.10 ATHEROSCLEROTIC HEART DISEASE OF NATIVE CORONARY ARTERY WITHOUT ANGINA PECTORIS: Status: ACTIVE | Noted: 2022-05-18

## 2022-05-31 PROBLEM — Z74.09 OTHER REDUCED MOBILITY: Status: ACTIVE | Noted: 2022-05-17

## 2022-05-31 PROBLEM — K21.9 GASTRO-ESOPHAGEAL REFLUX DISEASE WITHOUT ESOPHAGITIS: Status: ACTIVE | Noted: 2022-05-17

## 2022-05-31 PROBLEM — Z79.899 OTHER LONG TERM (CURRENT) DRUG THERAPY: Status: ACTIVE | Noted: 2017-09-18

## 2022-05-31 PROCEDURE — 99215 OFFICE O/P EST HI 40 MIN: CPT | Performed by: NURSE PRACTITIONER

## 2022-05-31 PROCEDURE — G0151 HHCP-SERV OF PT,EA 15 MIN: HCPCS

## 2022-05-31 PROCEDURE — G0152 HHCP-SERV OF OT,EA 15 MIN: HCPCS

## 2022-05-31 RX ORDER — HYDRALAZINE HYDROCHLORIDE 50 MG/1
50 TABLET, FILM COATED ORAL 3 TIMES DAILY
COMMUNITY
Start: 2022-05-26 | End: 2022-06-01

## 2022-05-31 RX ORDER — LOSARTAN POTASSIUM 50 MG/1
50 TABLET ORAL EVERY 24 HOURS
COMMUNITY
Start: 2022-05-15 | End: 2022-06-01

## 2022-05-31 RX ORDER — HYDROXYZINE PAMOATE 25 MG/1
25 CAPSULE ORAL 3 TIMES DAILY PRN
Qty: 90 CAPSULE | Refills: 1 | Status: SHIPPED | OUTPATIENT
Start: 2022-05-31

## 2022-05-31 RX ORDER — TRAMADOL HYDROCHLORIDE 100 MG/1
100 TABLET, COATED ORAL
COMMUNITY
Start: 2022-05-14

## 2022-05-31 RX ORDER — HYDROXYZINE HYDROCHLORIDE 25 MG/1
25 TABLET, FILM COATED ORAL 3 TIMES DAILY PRN
Status: CANCELLED | OUTPATIENT
Start: 2022-05-31

## 2022-05-31 NOTE — OUTREACH NOTE
Call Center TCM Note    Flowsheet Row Responses   [facility] patient discharged from? [place]   Does the patient have one of the following disease processes/diagnoses(primary or secondary)? Other   TCM attempt successful? No   Unsuccessful attempts Attempt 3  [TCM Call deferred as patient completed f/u appt on 5/31/22]          Marci Hua RN    5/31/2022, 15:18 EDT

## 2022-05-31 NOTE — CASE COMMUNICATION
[facility]  / Case Conference Note  Medical Necessity and focus of care: PATIENT PRESENTS WITH WEAKNESS, LIMITED ACTIVITY TOLERANCE, AND DECREASED ADL INDEPENDENCE AFTER RECENT HOSPITALIZATIONS X 2 AND SNF STAY FOR CABG X3, RECTAL BLEEDING, AND ANEMIA.  SHE REQUIRES SKILLED HOME BASED OT SERVICES FOR FUNCTIONAL TRANSFER TRAINING FOR ADLS, STANDING TOLERANCE TRAINING FOR ADLS, PATIENT/CAREGIVER EDUCATION FOR B UE HEP/STERNAL PRECAUTIONS/SAFE TY, AND B UE THER. EX. FOR STRENGTHENING.  Caregiver Status: SPOUSE, SON, SISTER IN LAW  Patient's goal(s): REGAIN STRENGTH/ENDURANCE  Environmental/ Physical issues: UNEVEN MARY JANE  Any additional needs: NONE    Based upon record review and collaboration conference, the recommended frequency for this patient is   SN-----  PT-----  OT---- 1 WEEK 4  ST-----  HHA---  MSW---    Please verify your eval  scores: (Answer the scores  that per tain to your area of focus remove the others)   : 5  : 2  : 3

## 2022-05-31 NOTE — HOME HEALTH
-STERNAL PRECAUTIONS/NO LIFTING GREATER THAN 10 LBS.    REASON FOR REFERRAL:  72 Y/O FEMALE UNDERWENT CABGX3 5/10/22-5/17/22.  SHE WAS TRANSFERRED TO [place] 5/17/22-5/23/22.  SHE THEN WAS [facility]MITTED TO [facility] 5/23/22-5/28/22 WITH COMPLAINTS OF BLOOD IN HER STOOL AND WEAKNESS.  SHE WAS TREATED FOR RECTAL BLEEDING, ANEMIA, AND DISCHARGE WAS DELAYED DUE TO EPISODE OF HYPOTENSION.  PATIENT REPORTED THAT SHE IS VERY FATIGUED AT THIS TIME DUE TO GOING TO SEE PCP EARLIER THIS DATE.  SHE WAS NOTED TO DEMONSTRATE DYSPNEA WITH MODERATE EXERTION HOWEVER MAINTAINED O2 GREATER THAN 95% ON RA.  PATIENT REPORTED THAT HER GOAL IS TO REGAIN HER STRENGTH AND ACTIVITY TOLERANCE SO SHE CAN RETURN TO HER PRIOR LEVEL OF FUNCTION.     PMH:  CHF, CAD, DM, HTN, CKD, GERD, GOUT, MI, HLD, OA, PUD, HYPOTHYROIDISM, HYSTERECTOMY, CABG 5/10/22     PLOF:  INDEPENDENT WITH ADLS, IADLS, FUNCTIONAL TRANSFERS/FUNCTIONAL MOBILITY WITHOUT AD.       HOME ENVIRONMENT:  PATIENT LIVES WITH HER SPOUSE AND SON IN ONE LEVEL HOME WITH 2 STEPS WITH HANDRAIL.     PRECAUTIONS: STERNAL PRECAUTIONS, NO LIFTING GREATER THAN 10 LBS, HIGH RISK FALL     MD APPTS: 6/1/22 ZAINAB MARTINEZ     DME: ANNE MARIE TONY.     AROM B UES:  WFL (WITH INTEGRATING STERNAL PRECAUTIONS)     STRENGTH B UES:  4-/5 GROSSLY THROUGHOUT EXCEPT B SHOULDERS WITH NO MMT DUE TO STERNAL PRECAUTIONS/LIFTING RESTRICTIONS.     HAND DOMINANCE: RIGHT HAND DOMINANT, B  STRENGTHS: 4/5.     REHAB POTENTIAL: GOOD FOR GOALS     WISH TO ADDRESS BATH/DRESSING WITH OT: NO     PATIENT'S GOAL: REGAIN STRENGTH AND ENDURANCE    MEDICAL NECESSITY:  PATIENT PRESENTS WITH WEAKNESS, LIMITED ACTIVITY TOLERANCE, AND DECREASED ADL INDEPENDENCE AFTER RECENT HOSPITALIZATIONS X 2 AND SNF STAY FOR CABG X3, RECTAL BLEEDING, AND ANEMIA.  SHE REQUIRES SKILLED HOME BASED OT SERVICES FOR FUNCTIONAL TRANSFER TRAINING FOR ADLS, STANDING TOLERANCE TRAINING FOR ADLS, PATIENT/CAREGIVER EDUCATION FOR B UE HEP/STERNAL PRECAUTIONS/SAFETY, AND  ADAN UE THER. EX. FOR STRENGTHENING.

## 2022-06-01 ENCOUNTER — OFFICE VISIT (OUTPATIENT)
Dept: CARDIAC SURGERY | Facility: CLINIC | Age: 72
End: 2022-06-01

## 2022-06-01 VITALS
HEART RATE: 80 BPM | DIASTOLIC BLOOD PRESSURE: 60 MMHG | HEIGHT: 63 IN | TEMPERATURE: 97.3 F | SYSTOLIC BLOOD PRESSURE: 96 MMHG | BODY MASS INDEX: 30.29 KG/M2 | OXYGEN SATURATION: 98 %

## 2022-06-01 VITALS
RESPIRATION RATE: 16 BRPM | SYSTOLIC BLOOD PRESSURE: 98 MMHG | OXYGEN SATURATION: 95 % | HEART RATE: 80 BPM | DIASTOLIC BLOOD PRESSURE: 58 MMHG

## 2022-06-01 DIAGNOSIS — E78.2 MIXED HYPERLIPIDEMIA: ICD-10-CM

## 2022-06-01 DIAGNOSIS — I10 ESSENTIAL HYPERTENSION: ICD-10-CM

## 2022-06-01 DIAGNOSIS — Z09 FOLLOW-UP SURGERY CARE: Primary | ICD-10-CM

## 2022-06-01 DIAGNOSIS — I25.118 CORONARY ARTERY DISEASE OF NATIVE ARTERY OF NATIVE HEART WITH STABLE ANGINA PECTORIS: ICD-10-CM

## 2022-06-01 DIAGNOSIS — I50.20 HFREF (HEART FAILURE WITH REDUCED EJECTION FRACTION): ICD-10-CM

## 2022-06-01 PROCEDURE — 99024 POSTOP FOLLOW-UP VISIT: CPT | Performed by: NURSE PRACTITIONER

## 2022-06-01 RX ORDER — OLMESARTAN MEDOXOMIL 40 MG/1
20 TABLET ORAL
Qty: 90 TABLET | Refills: 0
Start: 2022-06-01

## 2022-06-01 NOTE — PATIENT INSTRUCTIONS
Satisfactory Post op CABG: Progressing well  Lifting Restrictions < 10 lbs : 12 weeks   Follow up 4 weeks:     Medical Management: ASA,PLAVIX,STATIN, BETA, ARB  Follow up Cardiology as scheduled.[place]

## 2022-06-01 NOTE — PROGRESS NOTES
Afia Coolnington  1950    Chief Complaint:    Chief Complaint   Patient presents with   • Coronary Artery Disease     HPI:      PCP:  Nieves Childress APRN  Cardiology:  Dr Kent     71 y.o. female with HTN(stable, increased risk stroke, rupture), Hyperlipidemia(stable, increased risk cardiovascular events), Diabetes Mellitus(stable, increased risk cardiovascular events), Obesity(uncontrolled, increased risk cardiovascular events), COPD(stable, increased risk pulmonary complications) and Chronic Kidney Disease(stable, increased risk renal failure) , CAD(chronic severe progression, increase risk cardiovascular events).  never smoked.  Moderate fatigue, decreased exercise tolerance x 1 year.  PCI 2016.  In wheelchair today..  No TIA stroke amaurosis.  No MI claudication. No other associated signs, symptoms or modifying factors. Prolonged surgical recovery due to deconditioning. SNF rehab. Discharged to home. Returns for follow up. Progressing slowly.    5/2022 vein mapping:  RIGHT GSV 1.6-3.7mm, LEFT GSV 2.6-4.7mm.  5/2022 Carotid Duplex:  JENNIFER 0-49% (91/17cm/s, ratio 1.0), LICA 0-49% (90/21cm/s, ratio 0.9) antegrade verts.    10/2020 Echocardiogram:  EF 40%. LA 28mm, LV 54mm, RVS{ 27mmHg, trace MR, mild TR.  4/2022 Stress Test:  EF 60%, medium infarct anteroseptal, felix-infarct ischemia  4/2022 Cardiac Catheterization:  LAD occluded stent prox, CX 50%, RCA 40%, PLB 70%.  5/2022 PFT:  FVC 1.4 (51%), FEV1 1.1 (53%), DLCO 112%  5/10/22 CABGx2 LIMA-LAD, SVG-PLB    The following portions of the patient's history were reviewed and updated as appropriate: allergies, current medications, past family history, past medical history, past social history, past surgical history and problem list.  Recent images independently reviewed.  Available laboratory values reviewed.    PMH:  Past Medical History:   Diagnosis Date   • Abdominal pain 11/20/2015    unspecified   • Acute gastritis    • Acute on chronic systolic  congestive heart failure (HCC) 04/13/2016   • Ankle pain 04/27/2015   • Asthma    • Calculus of kidney 10/09/2020    Added automatically from request for surgery [id number]   • Candidiasis, skin or nails 12/22/2011    controlled   • Chronic systolic congestive heart failure (HCC) 08/01/2016   • Congestive heart failure ([facility]) 08/01/2016   • Coronary arteriosclerosis 06/21/2016    multi-vessel, 2 stents, followed by Dr. Kent   • Cough 09/25/2014    proabably due to allergy, chest x ray is normal      • Diabetic ulcer of toe of right foot associated with type 2 diabetes mellitus ([facility]) 10/24/2020    Follow up with podiatry outpatient    • Disease of thyroid gland    • Edema of foot 03/24/2016   • Encounter for long-term (current) drug use     therapy   • Epigastric pain 12/03/2015   • Essential hypertension 06/21/2016   • GERD (gastroesophageal reflux disease)    • Gout    • Heart attack ([facility])     2016 2 stents by Dr. Kent still follows with him/last seen 6 -2020 told doing ok   • History of echocardiogram 03/14/2016    Left atrium normal with mild CLVH wit normal aortic root size.Evidence of posterolateral wall hypokinesis. Severely depressed LV systolic function of 20-25%.Mitral valve thickened Aortic sclerosis.Diastolic dysfunction   • Hyperlipidemia 03/31/2016    unspecified   • Hypertensive disorder 03/24/2016   • Left lower quadrant pain 07/12/2013    ruling out diverticular disease      • Myocardial infarction ([facility]) 03/24/2016   • Nausea 11/20/2015   • Obstructive uropathy 09/29/2020   • Osteoarthritis of knee 06/23/2016   • Pain in limb 01/25/2011   • Pediculosis capitis 12/22/2011    controlled   • Peptic ulcer    • Pneumonia    • Polyuria 01/25/2011   • Pruritic rash 12/09/2014   • Superficial foreign body hip without major open wound, no infection 12/09/2011    ??? back   • Type 2 diabetes mellitus ([facility]) 06/23/2016    new onset   • Weakness 06/23/2016   • Wears dentures    • Wears glasses      Past  Surgical History:   Procedure Laterality Date   • CARDIAC CATHETERIZATION Left 4/29/2022    Procedure: Left Heart Cath;  Surgeon: Luke Kent MD;  Location: [facility] CATH INVASIVE LOCATION;  Service: Cardiology;  Laterality: Left;   • COLONOSCOPY N/A 5/27/2022    Procedure: COLONOSCOPY;  Surgeon: Agustin Powers MD;  Location: [facility] ENDOSCOPY;  Service: Gastroenterology;  Laterality: N/A;   • COLONOSCOPY W/ POLYPECTOMY  03/07/2016    Transverse colon polyps,descending polyps,Sigmoid polyps, all resected and retrieved. Diverticulosis without perforation or abscess without bleeding. Erica Thomas   • CORONARY ARTERY BYPASS GRAFT N/A 5/10/2022    Procedure: CORONARY ARTERY BYPASS GRAFTING ENDOSCOPIC VEIN HARVEST         (CELL SAVER);  Surgeon: Brayden Melgar MD;  Location: [facility] OR;  Service: Cardiothoracic;  Laterality: N/A;  CHEST INCISION 0802  EVH [id number]  VEIN PREP [id number]  ON BYPASS 0924  OFF BYPASS 1054   • CYSTOSCOPY Left 11/4/2020    Procedure: CYSTOSCOPY; LEFT J-STENT REMOVAL             (STRETCHER)   FLEXIBLE SCOPE;  Surgeon: Richar Rojas MD;  Location: [facility] OR;  Service: Urology;  Laterality: Left;   • CYSTOSCOPY, URETEROSCOPY, RETROGRADE PYELOGRAM, STENT INSERTION Left 9/30/2020    Procedure: CYSTOSCOPY LEFT URETEROSCOPY RETROGRADE PYELOGRAM STENT INSERTION;  Surgeon: Richar Rojas MD;  Location: [facility] OR;  Service: Urology;  Laterality: Left;   • CYSTOSCOPY, URETEROSCOPY, RETROGRADE PYELOGRAM, STENT INSERTION Left 10/21/2020    Procedure: CYSTOSCOPY, LEFT RETROGRADE, URETEROSCOPY, LASER LITHOTRIPSY, STENT PLACEMENT;  Surgeon: Richar Rojas MD;  Location: [facility] OR;  Service: Urology;  Laterality: Left;   • ENDOSCOPY N/A 5/26/2022    Procedure: ESOPHAGOGASTRODUODENOSCOPY;  Surgeon: Agustin Powers MD;  Location: [facility] ENDOSCOPY;  Service: Gastroenterology;  Laterality: N/A;   • HYSTERECTOMY      ovary preserv   • INJECTION OF MEDICATION  09/11/2014    Celestone  (betamethasone) (2)       Family History   Problem Relation Age of Onset   • Alcohol abuse Other    • Asthma Other    • Lung cancer Other    • Heart attack Father    • Lung cancer Father    • Hypertension Father    • Heart disease Father    • Heart attack Brother    • Hypertension Brother    • Heart disease Brother      Social History     Tobacco Use   • Smoking status: Never Smoker   • Smokeless tobacco: Never Used   Vaping Use   • Vaping Use: Never used   Substance Use Topics   • Alcohol use: No   • Drug use: No       ALLERGIES:  Allergies   Allergen Reactions   • Bactrim [Sulfamethoxazole-Trimethoprim] Other (See Comments)     Profuse sweating         MEDICATIONS:    Current Outpatient Medications:   •  ascorbic acid (VITAMIN C) 500 MG tablet, Take 1 tablet by mouth 2 (Two) Times a Day., Disp: , Rfl:   •  calcium carbonate (TUMS) 500 MG chewable tablet, Chew 2 tablets 3 (Three) Times a Day As Needed for Indigestion or Heartburn., Disp: , Rfl:   •  carvedilol (Coreg) 3.125 MG tablet, Take 1 tablet by mouth 2 (Two) Times a Day With Meals., Disp: 60 tablet, Rfl: 0  •  clopidogrel (PLAVIX) 75 MG tablet, Take 1 tablet by mouth Daily., Disp: 30 tablet, Rfl:   •  ferrous sulfate 325 (65 FE) MG EC tablet, Take 1 tablet by mouth 2 (Two) Times a Day With Meals., Disp: 60 tablet, Rfl: 5  •  furosemide (Lasix) 20 MG tablet, Take 1 tablet by mouth Daily., Disp: 30 tablet, Rfl: 0  •  glimepiride (AMARYL) 2 MG tablet, TAKE 1 TABLET BY MOUTH EVERY MORNING BEFORE BREAKFAST, Disp: 90 tablet, Rfl: 0  •  hydrocortisone (ANUSOL-HC) 25 MG suppository, Insert 1 suppository into the rectum 2 (Two) Times a Day., Disp: 30 each, Rfl: 0  •  hydrOXYzine pamoate (VISTARIL) 25 MG capsule, Take 1 capsule by mouth 3 (Three) Times a Day As Needed for Anxiety (may take an additional capsule at bedtime as needed for insomnia)., Disp: 90 capsule, Rfl: 1  •  levothyroxine (SYNTHROID, LEVOTHROID) 25 MCG tablet, TAKE 1 TABLET BY MOUTH EVERY MORNING,  "Disp: 30 tablet, Rfl: 3  •  loratadine (CLARITIN) 10 MG tablet, Take 1 tablet by mouth Daily., Disp: 30 tablet, Rfl: 3  •  magnesium oxide (MAGOX) 400 (241.3 Mg) MG tablet tablet, Take 1 tablet by mouth 2 (Two) Times a Day., Disp: 30 each, Rfl: 3  •  olmesartan (BENICAR) 40 MG tablet, Take 0.5 tablets by mouth Daily With Dinner., Disp: 90 tablet, Rfl: 0  •  omeprazole (priLOSEC) 40 MG capsule, TAKE 1 CAPSULE BY MOUTH DAILY, Disp: 90 capsule, Rfl: 1  •  polyethylene glycol (MIRALAX) 17 g packet, Take 17 g by mouth Daily As Needed (constipation)., Disp: 100 each, Rfl: 1  •  pravastatin (PRAVACHOL) 40 MG tablet, Take 1 tablet by mouth Every Night., Disp: 30 tablet, Rfl: 5  •  sennosides-docusate (PERICOLACE) 8.6-50 MG per tablet, Take 2 tablets by mouth 2 (Two) Times a Day., Disp: , Rfl:   •  tamsulosin (FLOMAX) 0.4 MG capsule 24 hr capsule, Take 1 capsule by mouth Daily., Disp: 30 capsule, Rfl:   •  traMADol HCl (ULTRAM) 100 MG tablet, Take 100 mg by mouth., Disp: , Rfl:   •  aspirin 81 MG EC tablet, Take 1 tablet by mouth Daily., Disp: , Rfl:     Review of Systems   Review of Systems   Constitutional: Positive for malaise/fatigue. Negative for weight loss.   Cardiovascular: Positive for dyspnea on exertion. Negative for chest pain and claudication.   Respiratory: Negative for cough and shortness of breath.    Skin: Negative for color change and poor wound healing.   Musculoskeletal: Positive for muscle weakness.   Neurological: Positive for loss of balance and weakness. Negative for dizziness and numbness.   Psychiatric/Behavioral: The patient is nervous/anxious.        Physical Exam   Vitals:    06/01/22 1504   BP: 96/60   BP Location: Left arm   Patient Position: Sitting   Cuff Size: Adult   Pulse: 80   Temp: 97.3 °F (36.3 °C)   TempSrc: Temporal   SpO2: 98%   Height: 160 cm (63\")     Body surface area is 1.81 meters squared.  Body mass index is 30.29 kg/m².  Physical Exam  Constitutional:       General: She is not " in acute distress.     Appearance: She is not ill-appearing.   HENT:      Right Ear: Hearing normal.      Left Ear: Hearing normal.      Nose: No nasal deformity.      Mouth/Throat:      Dentition: Normal dentition. Does not have dentures.   Cardiovascular:      Rate and Rhythm: Normal rate and regular rhythm.      Pulses:           Carotid pulses are 2+ on the right side and 2+ on the left side.       Radial pulses are 2+ on the right side and 2+ on the left side.        Dorsalis pedis pulses are 2+ on the right side and 2+ on the left side.        Posterior tibial pulses are 1+ on the right side and 1+ on the left side.      Heart sounds: No murmur heard.     Comments: Sternum stable moderate pressure. No popping clicking  Pulmonary:      Effort: Pulmonary effort is normal.      Breath sounds: Normal breath sounds.   Abdominal:      General: There is no distension.      Palpations: Abdomen is soft. There is no mass.      Tenderness: There is no abdominal tenderness.   Musculoskeletal:         General: No deformity.      Comments: Gait normal.    Skin:     General: Skin is warm and dry.      Coloration: Skin is not pale.      Findings: No erythema.      Comments: [employer]: CDI   Neurological:      Mental Status: She is alert and oriented to person, place, and time. Mental status is at baseline.      Gait: Gait abnormal (WC).   Psychiatric:         Speech: Speech normal.         Behavior: Behavior is cooperative.         Thought Content: Thought content normal.         Judgment: Judgment normal.       Lab Results   Component Value Date    WBC 8.07 05/28/2022    HGB 11.3 (L) 05/28/2022    HCT 36.3 05/28/2022    MCV 82.3 05/28/2022     05/28/2022     Lab Results   Component Value Date    GLUCOSE 117 (H) 05/28/2022    BUN 18 05/28/2022    CREATININE 0.89 05/28/2022    EGFRIFNONA 45 (L) 12/08/2021    EGFRIFAFRI  09/29/2020      Comment:      <15 Indicative of kidney failure.    BCR 20.2 05/28/2022    K 3.6  05/28/2022    CO2 33.0 (H) 05/28/2022    CALCIUM 9.8 05/28/2022    ALBUMIN 3.90 05/23/2022    AST 15 05/23/2022    ALT 9 05/23/2022       ASSESSMENT:PLAN  Diagnoses and all orders for this visit:    1. Follow-up surgery care (Primary)  Satisfactory Post op CABG: Progressing well  Lifting Restrictions < 10 lbs : 12 weeks   Home Health PT/OT  Follow up 4 weeks:     2. Coronary artery disease of native artery of native heart with stable angina pectoris (HCC)  Medical Management: ASA,PLAVIX,STATIN, BETA, ARB  Follow up Cardiology as scheduled.-Yoli    -     olmesartan (BENICAR) 40 MG tablet; Take 0.5 tablets by mouth Daily With Dinner.  Dispense: 90 tablet; Refill: 0    3. Essential hypertension    4. Mixed hyperlipidemia    5. HFrEF (heart failure with reduced ejection fraction) (HCC)        Advance Care Planning discussed and  Informational packet given to patient. Advance Care Plan on file: No    Your BMI is Body mass index is 30.29 kg/m². and falls within  Obese Class I: 30-34.9kg/m2. BMI is strongly correlated with increased health risks. Review options for weight management, heart healthy diet, and exercise programs.           This document has been electronically signed by BILL Velasquez on June 6, 2022 15:07 CDT

## 2022-06-01 NOTE — HOME HEALTH
PATIENT HAD AN OPEN HEART SURGERY CABG ON 5/10/2022. SHE WAS THEN ADMITTED TO THE [facility] TO BLOOD IN HER STOOL, WEAKNESS AND ANEMIA. PATIENT WAS ADMITTED TO HOSPITAL 5/23/2022 AND SHE WAS SCHEDULED TO BE DISCHARGED 5/25/2022 BUT THEN SHE HAD A HYPOTENSIVE EPISODE AND STAYED UNTIL 5/28/2022. FOLLOWING HER CABG PATIENT WENT TO [facility] ON 5/17/22 AND STAYED UNTIL SHE WENT TO THE HOSPITAL 5/23/2022. SHE THEN WAS DISCHARGED HOME ON 5/28/2022.     PMHX:CKD, HTN, CAD, CABG, CHF    PATIENT GOAL: PATIENT WAS INDEPENDENT     PRIOR LEVEL OF FUNCTION

## 2022-06-02 ENCOUNTER — TELEPHONE (OUTPATIENT)
Dept: FAMILY MEDICINE CLINIC | Facility: CLINIC | Age: 72
End: 2022-06-02

## 2022-06-02 ENCOUNTER — HOME CARE VISIT (OUTPATIENT)
Dept: HOME HEALTH SERVICES | Facility: CLINIC | Age: 72
End: 2022-06-02

## 2022-06-02 PROCEDURE — G0180 MD CERTIFICATION HHA PATIENT: HCPCS | Performed by: NURSE PRACTITIONER

## 2022-06-02 PROCEDURE — G0493 RN CARE EA 15 MIN HH/HOSPICE: HCPCS

## 2022-06-02 NOTE — TELEPHONE ENCOUNTER
returned your call. He stated that as far as he knows everything is ok and if he needs anything he will call back

## 2022-06-02 NOTE — TELEPHONE ENCOUNTER
[facility] CALLED STATING THE PATIENTS  SAID MRS. MARKHAM IS STILL PASSING BRIGHT RED BLOOD IN HER STOOL. SHE DIDN'T HAVE ANY YESTERDAY BUT HAS 2X TODAY. HER  WOULD LIKE SOMEONE TO CALL TO DISCUSS HIS CONCERNS.     THANKS

## 2022-06-03 VITALS
TEMPERATURE: 98.2 F | SYSTOLIC BLOOD PRESSURE: 106 MMHG | RESPIRATION RATE: 20 BRPM | OXYGEN SATURATION: 96 % | DIASTOLIC BLOOD PRESSURE: 62 MMHG | HEART RATE: 94 BPM

## 2022-06-03 LAB — REF LAB TEST METHOD: NORMAL

## 2022-06-03 NOTE — TELEPHONE ENCOUNTER
Spoke with spouse Nahun, patient is no longer passing blood in her stool. I told them patient has a history of hemorrhoids which can cause bleeding but if it gets bad and they are concerned to call the their gastro provider or go to the Emergency Room.

## 2022-06-03 NOTE — CASE COMMUNICATION
PATIENT'S  REPORTED CONCERN OVER SMALL AMOUNT OF BRIGHT RED BLOOD NOTED WITH PATIENT'S BOWEL MOVEMENT TODAY. PATIENT HAS HAD RECENT COLONOSCOPY WITH POLYPS REMOVED AND HAS HEMORRHOIDS. MESSAGE LEFT WITH KRISTOPHER RHOADES AT [facility]S OFFICE WHO STATED SHE WILL CALL PATIENT'S .

## 2022-06-06 ENCOUNTER — HOME CARE VISIT (OUTPATIENT)
Dept: HOME HEALTH SERVICES | Facility: CLINIC | Age: 72
End: 2022-06-06

## 2022-06-06 VITALS
HEART RATE: 87 BPM | DIASTOLIC BLOOD PRESSURE: 60 MMHG | OXYGEN SATURATION: 93 % | RESPIRATION RATE: 18 BRPM | SYSTOLIC BLOOD PRESSURE: 118 MMHG

## 2022-06-06 PROCEDURE — G0158 HHC OT ASSISTANT EA 15: HCPCS

## 2022-06-06 PROCEDURE — G0157 HHC PT ASSISTANT EA 15: HCPCS

## 2022-06-07 ENCOUNTER — TELEPHONE (OUTPATIENT)
Dept: FAMILY MEDICINE CLINIC | Facility: CLINIC | Age: 72
End: 2022-06-07

## 2022-06-08 VITALS
DIASTOLIC BLOOD PRESSURE: 70 MMHG | RESPIRATION RATE: 18 BRPM | OXYGEN SATURATION: 96 % | SYSTOLIC BLOOD PRESSURE: 120 MMHG | HEART RATE: 80 BPM | TEMPERATURE: 97.2 F

## 2022-06-09 ENCOUNTER — APPOINTMENT (OUTPATIENT)
Dept: PHYSICAL THERAPY | Facility: HOSPITAL | Age: 72
End: 2022-06-09

## 2022-06-09 ENCOUNTER — HOME CARE VISIT (OUTPATIENT)
Dept: HOME HEALTH SERVICES | Facility: CLINIC | Age: 72
End: 2022-06-09

## 2022-06-09 VITALS
SYSTOLIC BLOOD PRESSURE: 126 MMHG | DIASTOLIC BLOOD PRESSURE: 64 MMHG | TEMPERATURE: 97.5 F | RESPIRATION RATE: 20 BRPM | HEART RATE: 86 BPM | OXYGEN SATURATION: 99 %

## 2022-06-09 PROCEDURE — G0493 RN CARE EA 15 MIN HH/HOSPICE: HCPCS

## 2022-06-13 ENCOUNTER — HOME CARE VISIT (OUTPATIENT)
Dept: HOME HEALTH SERVICES | Facility: CLINIC | Age: 72
End: 2022-06-13

## 2022-06-13 PROCEDURE — G0157 HHC PT ASSISTANT EA 15: HCPCS

## 2022-06-14 ENCOUNTER — DOCUMENTATION (OUTPATIENT)
Dept: CARDIAC REHAB | Facility: HOSPITAL | Age: 72
End: 2022-06-14

## 2022-06-14 VITALS
SYSTOLIC BLOOD PRESSURE: 112 MMHG | TEMPERATURE: 97.2 F | HEART RATE: 91 BPM | DIASTOLIC BLOOD PRESSURE: 60 MMHG | OXYGEN SATURATION: 96 % | RESPIRATION RATE: 18 BRPM

## 2022-06-15 ENCOUNTER — HOME CARE VISIT (OUTPATIENT)
Dept: HOME HEALTH SERVICES | Facility: CLINIC | Age: 72
End: 2022-06-15

## 2022-06-15 VITALS
DIASTOLIC BLOOD PRESSURE: 50 MMHG | OXYGEN SATURATION: 95 % | HEART RATE: 88 BPM | RESPIRATION RATE: 18 BRPM | SYSTOLIC BLOOD PRESSURE: 122 MMHG

## 2022-06-15 PROCEDURE — G0158 HHC OT ASSISTANT EA 15: HCPCS

## 2022-06-15 NOTE — HOME HEALTH
Patient states she is doing much better. I've been walking getting up by myself and walking more and not having to use my walker.

## 2022-06-15 NOTE — CASE COMMUNICATION
PT WAS REFERRED TO HOME HEALTH FOLLOWING: PATIENT HAD AN OPEN HEART SURGERY CABG ON 5/10/2022. SHE WAS THEN ADMITTED TO THE [facility] TO BLOOD IN HER STOOL, WEAKNESS AND ANEMIA. PATIENT WAS ADMITTED TO HOSPITAL 5/23/2022 AND SHE WAS SCHEDULED TO BE DISCHARGED 5/25/2022 BUT THEN SHE HAD A HYPOTENSIVE EPISODE AND STAYED UNTIL 5/28/2022. FOLLOWING HER CABG PATIENT WENT TO [facility] ON 5/17/22 AND STAYED UNTIL SHE WENT TO THE [facility] 5/23/2022. SHE THEN WAS DISCHARGED HOME ON 5/28/2022.     PRIOR VS CURRENT LEVEL OF FUNCTION:    GAIT: 30'ft with RW and supervision on eval. 125'ft in/out of home on unlevel terrain with HHA and SBA on D/C..    T/F: Sit to stands with min/CGA on eval. Sit to stands with independence on D/C     BED MOBILITY: Independent on eval and D/C.    DISCHARGE CONDITION: GOOD    DISCHARGE REASON: GOALS MET    DISCHARGE TO SELF-CARE WITH FAMILY  ASSIST AS NEEDED.      NEXT MD VISIT: Leila AVILEZ 6/29/22

## 2022-06-16 ENCOUNTER — HOME CARE VISIT (OUTPATIENT)
Dept: HOME HEALTH SERVICES | Facility: CLINIC | Age: 72
End: 2022-06-16

## 2022-06-16 VITALS
RESPIRATION RATE: 18 BRPM | SYSTOLIC BLOOD PRESSURE: 118 MMHG | OXYGEN SATURATION: 97 % | TEMPERATURE: 98 F | DIASTOLIC BLOOD PRESSURE: 66 MMHG | HEART RATE: 80 BPM

## 2022-06-16 PROCEDURE — G0493 RN CARE EA 15 MIN HH/HOSPICE: HCPCS

## 2022-06-20 NOTE — CASE COMMUNICATION
Patient is progressing toward all OT GOALS.  Patient is expected to meet all OT goals my next scheduled visit. Thank you.

## 2022-06-21 ENCOUNTER — HOME CARE VISIT (OUTPATIENT)
Dept: HOME HEALTH SERVICES | Facility: CLINIC | Age: 72
End: 2022-06-21

## 2022-06-21 VITALS
HEART RATE: 80 BPM | DIASTOLIC BLOOD PRESSURE: 50 MMHG | SYSTOLIC BLOOD PRESSURE: 102 MMHG | OXYGEN SATURATION: 96 % | RESPIRATION RATE: 18 BRPM | TEMPERATURE: 97.7 F

## 2022-06-21 PROCEDURE — G0158 HHC OT ASSISTANT EA 15: HCPCS

## 2022-06-21 NOTE — CASE COMMUNICATION
Patient D/C from OT on this date.  All goals met.  Patient has nuring visits, then will be going to outpatient therapy.

## 2022-06-23 ENCOUNTER — HOME CARE VISIT (OUTPATIENT)
Dept: HOME HEALTH SERVICES | Facility: CLINIC | Age: 72
End: 2022-06-23

## 2022-06-23 PROCEDURE — G0299 HHS/HOSPICE OF RN EA 15 MIN: HCPCS

## 2022-06-24 VITALS
RESPIRATION RATE: 16 BRPM | DIASTOLIC BLOOD PRESSURE: 86 MMHG | HEART RATE: 86 BPM | HEIGHT: 63 IN | TEMPERATURE: 97.8 F | BODY MASS INDEX: 31.89 KG/M2 | WEIGHT: 180 LBS | SYSTOLIC BLOOD PRESSURE: 108 MMHG | OXYGEN SATURATION: 96 %

## 2022-06-28 ENCOUNTER — HOME CARE VISIT (OUTPATIENT)
Dept: HOME HEALTH SERVICES | Facility: CLINIC | Age: 72
End: 2022-06-28

## 2022-06-28 VITALS
DIASTOLIC BLOOD PRESSURE: 72 MMHG | HEART RATE: 82 BPM | RESPIRATION RATE: 18 BRPM | TEMPERATURE: 98.6 F | SYSTOLIC BLOOD PRESSURE: 120 MMHG | OXYGEN SATURATION: 98 %

## 2022-06-28 PROCEDURE — G0300 HHS/HOSPICE OF LPN EA 15 MIN: HCPCS

## 2022-06-30 ENCOUNTER — OFFICE VISIT (OUTPATIENT)
Dept: FAMILY MEDICINE CLINIC | Facility: CLINIC | Age: 72
End: 2022-06-30

## 2022-06-30 VITALS
OXYGEN SATURATION: 98 % | DIASTOLIC BLOOD PRESSURE: 70 MMHG | TEMPERATURE: 96.9 F | HEIGHT: 63 IN | RESPIRATION RATE: 18 BRPM | HEART RATE: 89 BPM | WEIGHT: 173.4 LBS | SYSTOLIC BLOOD PRESSURE: 130 MMHG | BODY MASS INDEX: 30.72 KG/M2

## 2022-06-30 DIAGNOSIS — I10 ESSENTIAL HYPERTENSION: Primary | ICD-10-CM

## 2022-06-30 DIAGNOSIS — E11.69 TYPE 2 DIABETES MELLITUS WITH OTHER SPECIFIED COMPLICATION, WITHOUT LONG-TERM CURRENT USE OF INSULIN: ICD-10-CM

## 2022-06-30 PROCEDURE — 99214 OFFICE O/P EST MOD 30 MIN: CPT | Performed by: NURSE PRACTITIONER

## 2022-07-03 RX ORDER — GLIMEPIRIDE 2 MG/1
2 TABLET ORAL
Qty: 90 TABLET | Refills: 1 | Status: SHIPPED | OUTPATIENT
Start: 2022-07-03

## 2022-07-05 ENCOUNTER — HOME CARE VISIT (OUTPATIENT)
Dept: HOME HEALTH SERVICES | Facility: CLINIC | Age: 72
End: 2022-07-05

## 2022-07-05 VITALS
SYSTOLIC BLOOD PRESSURE: 123 MMHG | WEIGHT: 179 LBS | BODY MASS INDEX: 31.71 KG/M2 | RESPIRATION RATE: 18 BRPM | TEMPERATURE: 98 F | HEART RATE: 78 BPM | DIASTOLIC BLOOD PRESSURE: 70 MMHG | OXYGEN SATURATION: 94 %

## 2022-07-05 PROCEDURE — G0299 HHS/HOSPICE OF RN EA 15 MIN: HCPCS

## 2022-07-14 ENCOUNTER — HOME CARE VISIT (OUTPATIENT)
Dept: HOME HEALTH SERVICES | Facility: CLINIC | Age: 72
End: 2022-07-14

## 2022-07-14 VITALS
RESPIRATION RATE: 18 BRPM | DIASTOLIC BLOOD PRESSURE: 62 MMHG | TEMPERATURE: 97.8 F | SYSTOLIC BLOOD PRESSURE: 118 MMHG | WEIGHT: 180 LBS | HEART RATE: 76 BPM | BODY MASS INDEX: 31.89 KG/M2 | OXYGEN SATURATION: 97 %

## 2022-07-14 PROCEDURE — G0299 HHS/HOSPICE OF RN EA 15 MIN: HCPCS

## 2022-07-19 ENCOUNTER — OFFICE VISIT (OUTPATIENT)
Dept: CARDIAC SURGERY | Facility: CLINIC | Age: 72
End: 2022-07-19

## 2022-07-19 VITALS
BODY MASS INDEX: 30.65 KG/M2 | HEIGHT: 63 IN | WEIGHT: 173 LBS | DIASTOLIC BLOOD PRESSURE: 72 MMHG | TEMPERATURE: 98.9 F | SYSTOLIC BLOOD PRESSURE: 128 MMHG | HEART RATE: 75 BPM | OXYGEN SATURATION: 99 %

## 2022-07-19 DIAGNOSIS — Z09 FOLLOW-UP SURGERY CARE: Primary | ICD-10-CM

## 2022-07-19 DIAGNOSIS — E78.2 MIXED HYPERLIPIDEMIA: ICD-10-CM

## 2022-07-19 DIAGNOSIS — E66.09 CLASS 1 OBESITY DUE TO EXCESS CALORIES WITH SERIOUS COMORBIDITY AND BODY MASS INDEX (BMI) OF 32.0 TO 32.9 IN ADULT: ICD-10-CM

## 2022-07-19 DIAGNOSIS — I25.10 ATHEROSCLEROSIS OF NATIVE CORONARY ARTERY OF NATIVE HEART WITHOUT ANGINA PECTORIS: ICD-10-CM

## 2022-07-19 DIAGNOSIS — N18.31 STAGE 3A CHRONIC KIDNEY DISEASE: ICD-10-CM

## 2022-07-19 DIAGNOSIS — I50.20 HFREF (HEART FAILURE WITH REDUCED EJECTION FRACTION): ICD-10-CM

## 2022-07-19 PROCEDURE — 99024 POSTOP FOLLOW-UP VISIT: CPT | Performed by: NURSE PRACTITIONER

## 2022-07-19 NOTE — PATIENT INSTRUCTIONS
"Post-Operative Activities after heart surgery are as follows.  No driving until 2-3 weeks post-op, may ambulate as much as desired with periods of rest during the day.  Recreational walks outdoors are helpful for mood if weather permits.  No lifting anything over 10lbs until 6 weeks post op as this may strain the breast bone.  You should not lift anything exceeding 20lbs until 12 weeks post op.  Continue to shower daily, poor soap/rinse/and pat dry incision sites, do not scrub.  You can start household chores araound 2-3 weeks post op however, no vacuuming shoveling, gardening, mopping, or laundry until 12 weeks post op.  Use common sense in choosing activities, activities should not cause strain.  Alternate periods of activity with periods of rest.  Please refer to your \"Moving Right Along Book\" for specific details.  If leg swelling occurs elevate the extremity in a reclined position and use support hose as needed.      Return 4 weeks for release of surgical restrictions  "

## 2022-07-19 NOTE — PROGRESS NOTES
Afia Coolnington  1950    Chief Complaint:    Chief Complaint   Patient presents with   • Coronary Artery Disease     HPI:      PCP:  Nieves Childress APRN  Cardiology:  Dr Kent     71 y.o. female with HTN(stable, increased risk stroke, rupture), Hyperlipidemia(stable, increased risk cardiovascular events), Diabetes Mellitus(stable, increased risk cardiovascular events), Obesity(uncontrolled, increased risk cardiovascular events), COPD(stable, increased risk pulmonary complications) and Chronic Kidney Disease(stable, increased risk renal failure) , CAD(chronic severe progression, increase risk cardiovascular events).  never smoked.  Moderate fatigue, decreased exercise tolerance x 1 year.  PCI 2016.  In wheelchair today..  No TIA stroke amaurosis.  No MI claudication. No other associated signs, symptoms or modifying factors. Prolonged surgical recovery due to deconditioning. SNF rehab. Discharged to home. Returns for follow up. Progressing slowly.    5/2022 vein mapping:  RIGHT GSV 1.6-3.7mm, LEFT GSV 2.6-4.7mm.  5/2022 Carotid Duplex:  JENNIFER 0-49% (91/17cm/s, ratio 1.0), LICA 0-49% (90/21cm/s, ratio 0.9) antegrade verts.    10/2020 Echocardiogram:  EF 40%. LA 28mm, LV 54mm, RVS{ 27mmHg, trace MR, mild TR.  4/2022 Stress Test:  EF 60%, medium infarct anteroseptal, felix-infarct ischemia  4/2022 Cardiac Catheterization:  LAD occluded stent prox, CX 50%, RCA 40%, PLB 70%.  5/2022 PFT:  FVC 1.4 (51%), FEV1 1.1 (53%), DLCO 112%  5/10/22 CABGx2 LIMA-LAD, SVG-PLB    The following portions of the patient's history were reviewed and updated as appropriate: allergies, current medications, past family history, past medical history, past social history, past surgical history and problem list.  Recent images independently reviewed.  Available laboratory values reviewed.    PMH:  Past Medical History:   Diagnosis Date   • Abdominal pain 11/20/2015    unspecified   • Acute gastritis    • Acute on chronic systolic  congestive heart failure (HCC) 04/13/2016   • Ankle pain 04/27/2015   • Asthma    • Calculus of kidney 10/09/2020    Added automatically from request for surgery [id number]   • Candidiasis, skin or nails 12/22/2011    controlled   • Chronic systolic congestive heart failure (HCC) 08/01/2016   • Congestive heart failure (Formerly [facility]) 08/01/2016   • Coronary arteriosclerosis 06/21/2016    multi-vessel, 2 stents, followed by Dr. Kent   • Cough 09/25/2014    proabably due to allergy, chest x ray is normal      • Diabetic ulcer of toe of right foot associated with type 2 diabetes mellitus (Formerly [facility]) 10/24/2020    Follow up with podiatry outpatient    • Disease of thyroid gland    • Edema of foot 03/24/2016   • Encounter for long-term (current) drug use     therapy   • Epigastric pain 12/03/2015   • Essential hypertension 06/21/2016   • GERD (gastroesophageal reflux disease)    • Gout    • Heart attack (Formerly [facility])     2016 2 stents by Dr. Kent still follows with him/last seen 6 -2020 told doing ok   • History of echocardiogram 03/14/2016    Left atrium normal with mild CLVH wit normal aortic root size.Evidence of posterolateral wall hypokinesis. Severely depressed LV systolic function of 20-25%.Mitral valve thickened Aortic sclerosis.Diastolic dysfunction   • Hyperlipidemia 03/31/2016    unspecified   • Hypertensive disorder 03/24/2016   • Left lower quadrant pain 07/12/2013    ruling out diverticular disease      • Myocardial infarction ([facility]) 03/24/2016   • Nausea 11/20/2015   • Obstructive uropathy 09/29/2020   • Osteoarthritis of knee 06/23/2016   • Pain in limb 01/25/2011   • Pediculosis capitis 12/22/2011    controlled   • Peptic ulcer    • Pneumonia    • Polyuria 01/25/2011   • Pruritic rash 12/09/2014   • Superficial foreign body hip without major open wound, no infection 12/09/2011    ??? back   • Type 2 diabetes mellitus ([facility]) 06/23/2016    new onset   • Weakness 06/23/2016   • Wears dentures    • Wears glasses      Past  Surgical History:   Procedure Laterality Date   • CARDIAC CATHETERIZATION Left 4/29/2022    Procedure: Left Heart Cath;  Surgeon: Luke Kent MD;  Location: [facility] CATH INVASIVE LOCATION;  Service: Cardiology;  Laterality: Left;   • COLONOSCOPY N/A 5/27/2022    Procedure: COLONOSCOPY;  Surgeon: Agustin Powers MD;  Location: [facility] ENDOSCOPY;  Service: Gastroenterology;  Laterality: N/A;   • COLONOSCOPY W/ POLYPECTOMY  03/07/2016    Transverse colon polyps,descending polyps,Sigmoid polyps, all resected and retrieved. Diverticulosis without perforation or abscess without bleeding. Erica Thomas   • CORONARY ARTERY BYPASS GRAFT N/A 5/10/2022    Procedure: CORONARY ARTERY BYPASS GRAFTING ENDOSCOPIC VEIN HARVEST         (CELL SAVER);  Surgeon: Brayden Melgar MD;  Location: [facility] OR;  Service: Cardiothoracic;  Laterality: N/A;  CHEST INCISION 0802  EVH [id number]  VEIN PREP [id number]  ON BYPASS 0924  OFF BYPASS 1054   • CYSTOSCOPY Left 11/4/2020    Procedure: CYSTOSCOPY; LEFT J-STENT REMOVAL             (STRETCHER)   FLEXIBLE SCOPE;  Surgeon: Richar Rojas MD;  Location: [facility] OR;  Service: Urology;  Laterality: Left;   • CYSTOSCOPY, URETEROSCOPY, RETROGRADE PYELOGRAM, STENT INSERTION Left 9/30/2020    Procedure: CYSTOSCOPY LEFT URETEROSCOPY RETROGRADE PYELOGRAM STENT INSERTION;  Surgeon: Richar Rojas MD;  Location: [facility] OR;  Service: Urology;  Laterality: Left;   • CYSTOSCOPY, URETEROSCOPY, RETROGRADE PYELOGRAM, STENT INSERTION Left 10/21/2020    Procedure: CYSTOSCOPY, LEFT RETROGRADE, URETEROSCOPY, LASER LITHOTRIPSY, STENT PLACEMENT;  Surgeon: Richar Rojas MD;  Location: [facility] OR;  Service: Urology;  Laterality: Left;   • ENDOSCOPY N/A 5/26/2022    Procedure: ESOPHAGOGASTRODUODENOSCOPY;  Surgeon: Agustin Powers MD;  Location: [facility] ENDOSCOPY;  Service: Gastroenterology;  Laterality: N/A;   • HYSTERECTOMY      ovary preserv   • INJECTION OF MEDICATION  09/11/2014    Celestone  (betamethasone) (2)       Family History   Problem Relation Age of Onset   • Alcohol abuse Other    • Asthma Other    • Lung cancer Other    • Heart attack Father    • Lung cancer Father    • Hypertension Father    • Heart disease Father    • Heart attack Brother    • Hypertension Brother    • Heart disease Brother      Social History     Tobacco Use   • Smoking status: Never Smoker   • Smokeless tobacco: Never Used   Vaping Use   • Vaping Use: Never used   Substance Use Topics   • Alcohol use: No   • Drug use: No       ALLERGIES:  Allergies   Allergen Reactions   • Bactrim [Sulfamethoxazole-Trimethoprim] Other (See Comments)     Profuse sweating         MEDICATIONS:    Current Outpatient Medications:   •  aspirin 81 MG EC tablet, Take 1 tablet by mouth Daily., Disp: , Rfl:   •  calcium carbonate (TUMS) 500 MG chewable tablet, Chew 2 tablets 3 (Three) Times a Day As Needed for Indigestion or Heartburn., Disp: , Rfl:   •  carvedilol (Coreg) 3.125 MG tablet, Take 1 tablet by mouth 2 (Two) Times a Day With Meals., Disp: 60 tablet, Rfl: 0  •  clopidogrel (PLAVIX) 75 MG tablet, Take 1 tablet by mouth Daily., Disp: 30 tablet, Rfl:   •  ferrous sulfate 325 (65 FE) MG EC tablet, Take 1 tablet by mouth 2 (Two) Times a Day With Meals., Disp: 60 tablet, Rfl: 5  •  furosemide (Lasix) 20 MG tablet, Take 1 tablet by mouth Daily., Disp: 30 tablet, Rfl: 0  •  glimepiride (AMARYL) 2 MG tablet, Take 1 tablet by mouth Every Morning Before Breakfast., Disp: 90 tablet, Rfl: 1  •  hydrocortisone (ANUSOL-HC) 25 MG suppository, Insert 1 suppository into the rectum 2 (Two) Times a Day., Disp: 30 each, Rfl: 0  •  hydrOXYzine pamoate (VISTARIL) 25 MG capsule, Take 1 capsule by mouth 3 (Three) Times a Day As Needed for Anxiety (may take an additional capsule at bedtime as needed for insomnia)., Disp: 90 capsule, Rfl: 1  •  levothyroxine (SYNTHROID, LEVOTHROID) 25 MCG tablet, TAKE 1 TABLET BY MOUTH EVERY MORNING, Disp: 30 tablet, Rfl: 3  •   "loratadine (CLARITIN) 10 MG tablet, Take 1 tablet by mouth Daily., Disp: 30 tablet, Rfl: 3  •  olmesartan (BENICAR) 40 MG tablet, Take 0.5 tablets by mouth Daily With Dinner., Disp: 90 tablet, Rfl: 0  •  omeprazole (priLOSEC) 40 MG capsule, TAKE 1 CAPSULE BY MOUTH DAILY, Disp: 90 capsule, Rfl: 1  •  polyethylene glycol (MIRALAX) 17 g packet, Take 17 g by mouth Daily As Needed (constipation)., Disp: 100 each, Rfl: 1  •  pravastatin (PRAVACHOL) 40 MG tablet, Take 1 tablet by mouth Every Night., Disp: 30 tablet, Rfl: 5  •  tamsulosin (FLOMAX) 0.4 MG capsule 24 hr capsule, Take 1 capsule by mouth Daily., Disp: 30 capsule, Rfl:   •  traMADol HCl (ULTRAM) 100 MG tablet, Take 100 mg by mouth., Disp: , Rfl:     Review of Systems   Review of Systems   Constitutional: Positive for malaise/fatigue. Negative for weight loss.   Cardiovascular: Positive for dyspnea on exertion. Negative for chest pain and claudication.   Respiratory: Negative for cough and shortness of breath.    Skin: Negative for color change and poor wound healing.   Musculoskeletal: Positive for muscle weakness.   Neurological: Positive for loss of balance and weakness. Negative for dizziness and numbness.   Psychiatric/Behavioral: The patient is nervous/anxious.        Physical Exam   Vitals:    07/19/22 0813   BP: 128/72   BP Location: Left arm   Patient Position: Sitting   Cuff Size: Adult   Pulse: 75   Temp: 98.9 °F (37.2 °C)   TempSrc: Temporal   SpO2: 99%   Weight: 78.5 kg (173 lb)   Height: 160 cm (63\")     Body surface area is 1.82 meters squared.  Body mass index is 30.65 kg/m².  Physical Exam  Constitutional:       General: She is not in acute distress.     Appearance: She is not ill-appearing.   HENT:      Right Ear: Hearing normal.      Left Ear: Hearing normal.      Nose: No nasal deformity.      Mouth/Throat:      Dentition: Normal dentition. Does not have dentures.   Cardiovascular:      Rate and Rhythm: Normal rate and regular rhythm.      " Pulses:           Carotid pulses are 2+ on the right side and 2+ on the left side.       Radial pulses are 2+ on the right side and 2+ on the left side.        Dorsalis pedis pulses are 2+ on the right side and 2+ on the left side.        Posterior tibial pulses are 1+ on the right side and 1+ on the left side.      Heart sounds: No murmur heard.     Comments: Sternum stable moderate pressure. No popping clicking  Pulmonary:      Effort: Pulmonary effort is normal.      Breath sounds: Normal breath sounds.   Abdominal:      General: There is no distension.      Palpations: Abdomen is soft. There is no mass.      Tenderness: There is no abdominal tenderness.   Musculoskeletal:         General: No deformity.      Comments: Gait normal.    Skin:     General: Skin is warm and dry.      Coloration: Skin is not pale.      Findings: No erythema.      Comments: [employer]ternal [employer]: CDI   Neurological:      Mental Status: She is alert and oriented to person, place, and time. Mental status is at baseline.      Gait: Gait abnormal (WC).   Psychiatric:         Speech: Speech normal.         Behavior: Behavior is cooperative.         Thought Content: Thought content normal.         Judgment: Judgment normal.       Lab Results   Component Value Date    WBC 8.07 05/28/2022    HGB 11.3 (L) 05/28/2022    HCT 36.3 05/28/2022    MCV 82.3 05/28/2022     05/28/2022     Lab Results   Component Value Date    GLUCOSE 117 (H) 05/28/2022    BUN 18 05/28/2022    CREATININE 0.89 05/28/2022    EGFRIFNONA 45 (L) 12/08/2021    EGFRIFAFRI  09/29/2020      Comment:      <15 Indicative of kidney failure.    BCR 20.2 05/28/2022    K 3.6 05/28/2022    CO2 33.0 (H) 05/28/2022    CALCIUM 9.8 05/28/2022    ALBUMIN 3.90 05/23/2022    AST 15 05/23/2022    ALT 9 05/23/2022       ASSESSMENT:PLAN    1. Follow-up surgery care  Clean operative site with antibacterial soap/water, pat dry. Keep open to air unless draining, then may apply dry dressing.  No  ointments or creams unless prescribed by provider.    CT sutures removed    Lifting restrictions <10lbs x12 weeks from surgery date  Sternum stable, surgical incisions CDI    Progressing well    Return 4 weeks for release of surgical restrictions, cardiac rehab once finished with     - Ambulatory Referral to Cardiac Rehab    2. Mixed hyperlipidemia  Lipid-lowering therapy has been proven beneficial in patients with cardio-vascular disease. Current guidelines recommend statin treatment for all patients with PAD,CAD and carotid stenosis. Statins are beneficial in preventing cardiovascular events, increasing functional capacity and lower the risk of adverse limb loss in PAD. Statins decrease the progression of plaque formation and may improve peripheral vessel lining, and aid in reversing atherosclerosis.    3. Atherosclerosis of native coronary artery of native heart without angina pectoris  DAPT, Statin, BB, ARB    4. Class 1 obesity due to excess calories with serious comorbidity and body mass index (BMI) of 32.0 to 32.9 in adult  Body mass index is 30.65 kg/m². Class 1 obesity, We have discussed the benefits of weight loss as it relates to long term morbidity and disease prevention.  Interventions discussed included self-monitoring of caloric intake, increasing physical activity/exercise, goal setting, stimulus control, consultation with dietitian, and non-food rewards.        5. HFrEF (heart failure with reduced ejection fraction) (Formerly [facility])  ARB, Statin, BB  Euvolemic, lasix daily.     Continued follow up with cardiology    6. Stage 3a chronic kidney disease (HCC)  Lab Results   Component Value Date    CREATININE 0.89 05/28/2022    BUN 18 05/28/2022     05/28/2022    K 3.6 05/28/2022    CL 95 (L) 05/28/2022    CO2 33.0 (H) 05/28/2022         This document has been electronically signed by Yemi Zuniga, AGACNP-BC @  On July 19, 2022 09:46 CDT

## 2022-07-22 ENCOUNTER — HOME CARE VISIT (OUTPATIENT)
Dept: HOME HEALTH SERVICES | Facility: CLINIC | Age: 72
End: 2022-07-22

## 2022-07-22 VITALS
TEMPERATURE: 97.7 F | RESPIRATION RATE: 16 BRPM | DIASTOLIC BLOOD PRESSURE: 70 MMHG | OXYGEN SATURATION: 94 % | SYSTOLIC BLOOD PRESSURE: 118 MMHG | HEART RATE: 76 BPM

## 2022-07-22 PROCEDURE — G0493 RN CARE EA 15 MIN HH/HOSPICE: HCPCS

## 2022-09-17 DIAGNOSIS — I10 ESSENTIAL HYPERTENSION: ICD-10-CM

## 2022-09-19 RX ORDER — HYDRALAZINE HYDROCHLORIDE 50 MG/1
TABLET, FILM COATED ORAL
Qty: 90 TABLET | Refills: 5 | OUTPATIENT
Start: 2022-09-19

## 2022-10-17 DIAGNOSIS — E03.9 HYPOTHYROIDISM, UNSPECIFIED TYPE: ICD-10-CM

## 2022-10-17 RX ORDER — LEVOTHYROXINE SODIUM 0.03 MG/1
25 TABLET ORAL
Qty: 30 TABLET | Refills: 3 | OUTPATIENT
Start: 2022-10-17

## 2022-10-17 NOTE — TELEPHONE ENCOUNTER
Rx Refill Note  Requested Prescriptions     Refused Prescriptions Disp Refills   • levothyroxine (SYNTHROID, LEVOTHROID) 25 MCG tablet [Pharmacy Med Name: LEVOTHYROXINE 0.025MG (25MCG) TAB] 30 tablet 3     Sig: TAKE 1 TABLET BY MOUTH EVERY MORNING      Last office visit with prescribing clinician: 6/30/2022      Next office visit with prescribing clinician: 10/31/2022   {TIP  Encounters:    PATIENT TO CALL OFFICE. PROVIDER NO LONGER AT THIS PRACTICE. MESSAGE SENT TO .    {TIP  Please add Last Relevant Lab Date if appropriate  {TIP  Is Refill Pharmacy correct?  Hemanth Arechiga LPN  10/17/22, 08:20 CDT

## 2024-07-29 NOTE — PROGRESS NOTES
7/26: [facility] Minimal chronic microvascular type changes  7/26 EEG: Normal  B12, folate, TSH normal    Recommendation:  - Management by primary team  - Can follow outpatient in neurology clinic  - If any neurological worsening or any further question, please contact back neurology    Case discussed with General Neurology attending Dr. Rogers Subjective   Afia Adams is a 69 y.o. female.     Afia Adams age 69 comes in today for recheck.  She been in the hospital with kidney stones and hypoglycemia.  She is not been eating well and continues take her medication her blood sugar dropped down.  She has seen Dr. Rojas for the kidney stones and she has had lithotripsy done.  She reports that she still not eating well.  But her blood sugar has gone back up to the 200 range.  Reports that she is not checking her blood sugar on a consistent basis    Diabetes  She presents for her follow-up diabetic visit. She has type 2 diabetes mellitus. Her disease course has been fluctuating. Hypoglycemia symptoms include confusion, dizziness, headaches, nervousness/anxiousness, pallor, sleepiness, sweats and tremors. Pertinent negatives for hypoglycemia include no seizures or speech difficulty. There are no diabetic associated symptoms. Hypoglycemia complications include hospitalization and required glucagon injection. Diabetic complications include heart disease. Risk factors for coronary artery disease include dyslipidemia, diabetes mellitus, hypertension, obesity, sedentary lifestyle and post-menopausal. Current diabetic treatment includes oral agent (monotherapy). She is compliant with treatment all of the time. Her weight is stable. She is following a diabetic diet. Meal planning includes avoidance of concentrated sweets. She has not had a previous visit with a dietitian. She rarely participates in exercise. She monitors blood glucose at home 1-2 x per week. Blood glucose monitoring compliance is adequate. An ACE inhibitor/angiotensin II receptor blocker is not being taken. She does not see a podiatrist.Eye exam is current.        The following portions of the patient's history were reviewed and updated as appropriate: allergies, current medications, past family history, past medical history, past social history, past surgical history and problem  list.    Review of Systems   Constitutional: Negative.    Eyes: Negative.    Respiratory: Negative.    Cardiovascular: Negative.    Gastrointestinal: Negative.    Endocrine: Negative.    Genitourinary: Negative.    Musculoskeletal: Negative.    Skin: Positive for pallor.   Allergic/Immunologic: Negative.    Neurological: Positive for dizziness, tremors and confusion. Negative for seizures and speech difficulty.   Hematological: Negative.    Psychiatric/Behavioral: The patient is nervous/anxious.        Objective   Physical Exam  Vitals signs and nursing note reviewed.   Constitutional:       General: She is not in acute distress.     Appearance: She is well-developed.   HENT:      Head: Normocephalic and atraumatic.      Right Ear: External ear normal.      Left Ear: External ear normal.      Nose: Nose normal.      Mouth/Throat:      Pharynx: No oropharyngeal exudate.   Eyes:      Pupils: Pupils are equal, round, and reactive to light.   Neck:      Musculoskeletal: Normal range of motion and neck supple.      Thyroid: No thyromegaly.   Cardiovascular:      Rate and Rhythm: Normal rate and regular rhythm.      Heart sounds: Normal heart sounds. No murmur. No friction rub.   Pulmonary:      Effort: Pulmonary effort is normal. No respiratory distress.      Breath sounds: Normal breath sounds. No wheezing or rales.   Abdominal:      Palpations: Abdomen is soft.   Musculoskeletal: Normal range of motion.   Skin:     General: Skin is warm and dry.   Neurological:      Mental Status: She is alert and oriented to person, place, and time.   Psychiatric:         Thought Content: Thought content normal.           Assessment/Plan   Diagnoses and all orders for this visit:    1. Type 2 diabetes mellitus without complication, without long-term current use of insulin (CMS/[facility]) (Primary)  -     Discontinue: glucose blood (Accu-Chek Lauryn Plus) test strip; Use to check b/s twice daily e11.9  Dispense: 100 each; Refill: 12  -      Discontinue: Blood Glucose Monitoring Suppl (Accu-Chek Lauryn Plus) w/Device kit; To check b/s twice daily.  E11.9  Dispense: 1 kit; Refill: 0  -     glimepiride (AMARYL) 1 MG tablet; 1/2 tab bid  Dispense: 180 tablet; Refill: 1  -     Discontinue: Blood Glucose Monitoring Suppl (Accu-Chek Laurny Plus) w/Device kit; To check b/s twice daily.  E11.9  Dispense: 1 kit; Refill: 0  -     Blood Glucose Monitoring Suppl (Accu-Chek Lauryn Plus) w/Device kit; To check b/s twice daily.  E11.9  Dispense: 1 kit; Refill: 0  -     glucose blood (Accu-Chek Lauryn Plus) test strip; Use to check b/s twice daily e11.9  Dispense: 100 each; Refill: 12      I have decreased her glimepiride to half a tablet twice a day.  She is also advised if she is not going to eat that she should not take the medication until she does eat.  And her daughter accompanies her and I have asked her to check her blood sugar at least twice a day.

## (undated) DEVICE — SOL NACL 0.9PCT 1000ML

## (undated) DEVICE — SOL IRR NACL 0.9PCT 3000ML

## (undated) DEVICE — DOUBLE MALE LL ADAPTER SPECIAL ADAPTER TO CONVERT A FEMALE FITTING TO MALE LL: Brand: DOUBLE MALE LL ADAPTER

## (undated) DEVICE — SYS PANNUS RETENT LF

## (undated) DEVICE — GOWN,AURORA,NOREINF,RAGLAN,XL,STERILE: Brand: MEDLINE

## (undated) DEVICE — STERILE POLYISOPRENE POWDER-FREE SURGICAL GLOVES WITH EMOLLIENT COATING: Brand: PROTEXIS

## (undated) DEVICE — SINGLE-USE BIOPSY FORCEPS: Brand: RADIAL JAW 4

## (undated) DEVICE — GW PERIPH GUIDERIGHT STD/J/TP PTFE/PCOAT SS 0.038IN 5X150CM

## (undated) DEVICE — ELECTRODE,RT,STRESS,FOAM,50PK: Brand: MEDLINE

## (undated) DEVICE — CATH URETRL OPN/END 5F70CM

## (undated) DEVICE — SINGLE-USE DIGITAL FLEXIBLE URETEROSCOPE: Brand: LITHOVUE

## (undated) DEVICE — GLV SURG NEOLON 2G PF LF 7.5 STRL

## (undated) DEVICE — CORONARY ARTERY BYPASS GRAFT MARKERS, STAINLESS STEEL, DISTAL, WITHOUT HOLDER: Brand: ANASTOMARK CORONARY ARTERY BYPASS GRAFT MARKERS, STAINLESS STEEL, DISTAL

## (undated) DEVICE — DRP SURG UNIV BASIC BILAMINATE 53X77IN DISP

## (undated) DEVICE — GW PTFE FIX/CORE FLXTIP .038 3X150CM

## (undated) DEVICE — PRESSURE MONITORING LINES PRESSURE OPAQUE PE TUBING TUBING 4FT. LINE W/MALE LUER LOCK CONNECTORS ON EACH END: Brand: PRESSURE MONITORING LINES

## (undated) DEVICE — DECANTER BAG 9": Brand: MEDLINE INDUSTRIES, INC.

## (undated) DEVICE — CANN VESL DLP 1WY BLNT/TP 3MM

## (undated) DEVICE — STERILE POLYISOPRENE POWDER-FREE SURGICAL GLOVES: Brand: PROTEXIS

## (undated) DEVICE — BITEBLOCK ENDO W/STRAP 60F A/ LF DISP

## (undated) DEVICE — A2000 MULTI-USE SYRINGE KIT, P/N 701277-003KIT CONTENTS: 100ML CONTRAST RESERVOIR AND TUBING WITH CONTRAST SPIKE AND CLAMP: Brand: A2000 MULTI-USE SYRINGE KIT

## (undated) DEVICE — CATH DIAG EXPO M/ PK 6FR FL4/FR4 PIG 3PK

## (undated) DEVICE — ANGIO-SEAL VIP VASCULAR CLOSURE DEVICE: Brand: ANGIO-SEAL

## (undated) DEVICE — CLTH CLENS READYCLEANSE PERI CARE PK/5

## (undated) DEVICE — UNDYED BRAIDED (POLYGLACTIN 910), SYNTHETIC ABSORBABLE SUTURE: Brand: COATED VICRYL

## (undated) DEVICE — SUT PROLN 4/0 RB1 D/A 36IN 8557H

## (undated) DEVICE — SOL IRR H2O BTL 1000ML STRL

## (undated) DEVICE — SUT PROLENE CARDIO C1D 6/0 24IN 8726H

## (undated) DEVICE — SUT PROLENE 7-0 BV175-7 24IN DB ETH8766H

## (undated) DEVICE — PK CYSTO LF 60

## (undated) DEVICE — SUT ETHIBOND RB1 3/0 36IN X558H

## (undated) DEVICE — TBG PENCL TELESCP MEGADYNE SMOKE EVAC 15FT

## (undated) DEVICE — SUT NUROLON 2 0 CT1 18IN CR8 C522D

## (undated) DEVICE — SOL PVPI SPRY BETADINE 3OZ

## (undated) DEVICE — SPNG DRN AMD EXCILON 6PLY 4X4IN PK/2

## (undated) DEVICE — OASIS DRAIN, SINGLE, INLINE & ATS COMPATIBLE: Brand: OASIS

## (undated) DEVICE — GLV SURG NEOLON 2G PF LF 6.5 STRL

## (undated) DEVICE — ELECTRD BLD MEGADYNE EZCLEAN STD 2.75IN XLNG

## (undated) DEVICE — SUT ETHIB 3/0 SH DA 36IN X522H

## (undated) DEVICE — FIBR LASR MOSES 365 DFL 6J 80HZ 120W

## (undated) DEVICE — PK CATH LAB 60

## (undated) DEVICE — 24 FR EXTENDED LENGTH – SILICONE CATHETER: Brand: SILICONE THORACIC CATHETERS

## (undated) DEVICE — ACROBAT-I VACUUM POSITIONER SYSTEM: Brand: ACROBAT-I POSITIONER

## (undated) DEVICE — GLV SURG SENSICARE PI LF PF 8 GRN STRL

## (undated) DEVICE — MODEL BT2000 P/N 700287-012KIT CONTENTS: MANIFOLD WITH SALINE AND CONTRAST PORTS, SALINE TUBING WITH SPIKE AND HAND SYRINGE, TRANSDUCER: Brand: BT2000 AUTOMATED MANIFOLD KIT

## (undated) DEVICE — ENDOSCOPIC SEAL URO 1 SIZE FITS ALL: Brand: ENDOSCOPIC SEAL

## (undated) DEVICE — RADIFOCUS GLIDECATH: Brand: GLIDECATH

## (undated) DEVICE — SUT SILK 0 TIES 18IN A186H BX36

## (undated) DEVICE — INTRO SHEATH ART/FEM ENGAGE .038 6F12CM

## (undated) DEVICE — SUT VIC 3/0 SH 27IN J416H

## (undated) DEVICE — SYS IRR PUMP SGL ACTN VAC SYR 10CC

## (undated) DEVICE — GLV SURG SENSICARE PI LF PF 7.5 GRN STRL

## (undated) DEVICE — BLD SCLPL BEAVR MINI STR 2BVL 180D LF

## (undated) DEVICE — PENCL BUTN/SWTCH MEGADYNE W/HOLSTR 10FT SS

## (undated) DEVICE — TEMP PACING WIRE: Brand: MYO/WIRE

## (undated) DEVICE — ADHS LIQ MASTISOL 2/3ML

## (undated) DEVICE — KT INTRO MINISTICK MAX W/GW NITNL/TUNG ECHO 4F 21G 7CM

## (undated) DEVICE — I-KNIFE CUTTING INSTRUMENT 15 DEGREE: Brand: I-KNIFE

## (undated) DEVICE — ADHS SKIN PREMIERPRO EXOFIN TOPICAL HI/VISC .5ML

## (undated) DEVICE — ST CVR PROB PULLUP ULTRASND 5X48IN

## (undated) DEVICE — SUT PDS 2 0 CT1 27IN CLR Z259H

## (undated) DEVICE — SUT SILK 1 LBYRNTH TIES 30IN A307H

## (undated) DEVICE — CONTAINER,SPECIMEN,OR STERILE,4OZ: Brand: MEDLINE

## (undated) DEVICE — PK OPN HEART 60

## (undated) DEVICE — SUT ETHIBOND 2/0 SH1 36IN X763H

## (undated) DEVICE — SUT MONOCRYL 4/0 PS2 27IN Y426H ETY426H

## (undated) DEVICE — CATH URETRL OPEN END W/CONNECT 5F 70CM

## (undated) DEVICE — 28 FR STRAIGHT – SILICONE CATHETER: Brand: SILICONE THORACIC CATHETERS

## (undated) DEVICE — PRESSURE MONITORING INJECTION LINE 6FT. M/F: Brand: PRESSURE MONITORING INJECTION LINE

## (undated) DEVICE — SUT SILK 0 FSL 18IN 678G

## (undated) DEVICE — Device

## (undated) DEVICE — SAFESECURE,SECUREMENT,FOLEY CATH,STERILE: Brand: MEDLINE

## (undated) DEVICE — SUT PROLN 3/0 SH D/A 36IN 8522H

## (undated) DEVICE — SOL IRRG H2O PL/BG 1000ML STRL

## (undated) DEVICE — SYS VASOVIEW HEMOPRO ENDOSCOPIC HARVST VESL

## (undated) DEVICE — CATH IV JELCO 20GAX1 1/4IN

## (undated) DEVICE — SYS PUMP PREVENA125 INCSN MNGT PP/DRSNG 45ML 1P/U